# Patient Record
Sex: MALE | Race: BLACK OR AFRICAN AMERICAN | NOT HISPANIC OR LATINO | Employment: FULL TIME | URBAN - METROPOLITAN AREA
[De-identification: names, ages, dates, MRNs, and addresses within clinical notes are randomized per-mention and may not be internally consistent; named-entity substitution may affect disease eponyms.]

---

## 2017-04-19 ENCOUNTER — TELEPHONE (OUTPATIENT)
Dept: TRANSPLANT | Facility: CLINIC | Age: 37
End: 2017-04-19

## 2017-04-19 DIAGNOSIS — I10 ESSENTIAL HYPERTENSION: ICD-10-CM

## 2017-04-19 DIAGNOSIS — N18.6 END STAGE RENAL DISEASE (H): ICD-10-CM

## 2017-04-19 DIAGNOSIS — Z76.82 ORGAN TRANSPLANT CANDIDATE: ICD-10-CM

## 2017-04-19 DIAGNOSIS — Z87.891 HISTORY OF TOBACCO USE: ICD-10-CM

## 2017-04-19 DIAGNOSIS — D57.3 SICKLE CELL TRAIT (H): Primary | ICD-10-CM

## 2017-04-19 DIAGNOSIS — N05.1 FSGS (FOCAL SEGMENTAL GLOMERULOSCLEROSIS): ICD-10-CM

## 2017-04-19 NOTE — LETTER
April 19, 2017            Harshad Beatty  [address] [facility] [address]      Dear Dr. Florentino Chapin,    We are writing to inform you that Harshad Beatty has completed the referral process with us to be evaluated for a kidney transplant.    Their assigned Transplant Coordinator is Tonya Vallejo.  If you should have any questions or concerns, please don t hesitate to contact us.        Regards,           Solid Organ Transplant Intake   [facility], [address]  Office [address], [facility] [address]52  Phone: [phone]  [place] [address]

## 2017-04-19 NOTE — TELEPHONE ENCOUNTER
"Intake Progress Note  Organ:  Kidney    Referral Came Via (Fax from/phone call from):  Phone call    Referring Physician:  Dr. Florentino Chapin Nephrologist [facility]    Assigned Coordinator:  Tonya Vallejo    Reported Diagnosis that caused the kidney failure ( not CKD)  Hypertension    Best time patient can be reached:  Anytime     Type of packet sent:  Kidney    Records:  [facility] requested from: [facility], [facility], [facility], [facility]    Insurance information:  [employer]  Policy kilgore:  self  Subscriber/policy/ID number:  [id number]  Group Number:  [id number]    History of diabetes:  No    Do you have an endocrinologist?: No          On insulin or oral medication:  No          History of a kidney biopsy:  Yes         If Yes,when and where:  [facility], [facility]    Past Medical and Surgical History (updated in Epic medical / surgical):             History of  cancer personally:  No                            History of cardiac events:  No              History abdominal surgeries: No                History of previous transplant: No             Listed or in eval at another transplant center?  Yes [facility] Patricia' insurance changed no longer covered at [facility]             History of hospitalization in last 12 months:  No              History of blood transfusion:  Yes               Smoking history:  No     Current smoker:  No     On dialysis: Yes  [facility]                 Type of Dialysis: In center Hemo   Start date: 08/12/2015       Dialysis Days:  M-W-F  3rd Shift 3:00pm       If NYOD, estimated GFR:  Unknown  Height:  6'0\"  Weight:  230  BMI:  31.2    Health Maintenance:  Colon:  No   PSA:  No  Dental: Yes 6 months ago [facility]  Vaccines Yes [facility]  Special Needs (ie--wheelchair, assistance, guardian, " interpretor):  No      Informed patient on the need to arrange age appropriate cancer screening, vaccines up to date and dental clearance    Reviewed evaluation process and reminded patient to complete questionnaire, complete medical records release, and review packet prior to evaluation visit     Informed patient that coordinator will review their chart and insurance coverage and if no concerns they will receive a call from a  to schedule evaluation

## 2017-04-20 ENCOUNTER — MEDICAL CORRESPONDENCE (OUTPATIENT)
Dept: TRANSPLANT | Facility: CLINIC | Age: 37
End: 2017-04-20

## 2017-04-21 ENCOUNTER — MEDICAL CORRESPONDENCE (OUTPATIENT)
Dept: TRANSPLANT | Facility: CLINIC | Age: 37
End: 2017-04-21

## 2017-04-26 ENCOUNTER — MEDICAL CORRESPONDENCE (OUTPATIENT)
Dept: TRANSPLANT | Facility: CLINIC | Age: 37
End: 2017-04-26

## 2017-05-03 NOTE — TELEPHONE ENCOUNTER
Reviewed medical records and intake notes in EPIC to date. ESRD on hemodialysis since 8/12/2015. Kidney biopsy proven FSGS. HTN, Sickle Cell Trait, anemia and cardiomyopathy. Records indicate efx of 25% in 5/2014 and efx has improved since starting dialysis to 55-60% in 4/2016. Need to find out where he see cardiology and get those records. No records from hematology- will ask Harshad. Intake says he was listed at [facility] but his insurance provider changed and he is sent here. Requesting records from transplant evaluation at [facility]. Has received blood. History of smoking cigars and marijuana. BMI 31.2 Dental is not up  to date. Past surgery is ORIF of right 5th metatarpal fracture.Records acceptable to proceed with pre kidney evaluation.    Called Harshad and was only able to leave a voice message asking for a return call. I introduced myself and the purpose of the call. Provided my contact information.    Smart set orders placed in [facility] and routed to scheduling.    Harshad returned my call. We discussed the 2 day evaluation process. He is familiar with pre kidney transplant evaluation as he had gone through the evaluation at [facility]. He was never listed as he needed to be referred to an oral surgeon. An unknown mass was discovered by his dentist on his gum and he was referred to oral surgery. His insurance changed and the oral surgeon was no longer covered so he is contacting his insurance carrier for a list of acceptable providers. He will arrive fasting for labs on day 1. Once the labs are drawn he may eat breakfast. He may bring his breakfast or money to purchase. We talked about the online presentation in a group setting of [facility] and he was encouraged to bring someone with him. We reviewed the list of providers he will see and their roles. We did review the overall referral, evaluation, approval and wait list process. He is aware that Tonya Vallejo is his assigned coordinator and was given her direct  phone number. Next contact will be from scheduling to offer him the next available appointments and he was also given Deborah's phone number.He saw a cardiologist while he was inpatient at [facility] in [facility] for initiation of dialysis but has not been seen since. Will request those records. He has never seen a hematologist. His father has Sickle Cell Disease and Harshad has the trait. He has never had any type of episode with the Sickle Cell. He had no further questions at this time.

## 2017-05-04 ENCOUNTER — TELEPHONE (OUTPATIENT)
Dept: TRANSPLANT | Facility: CLINIC | Age: 37
End: 2017-05-04

## 2017-05-04 ENCOUNTER — MEDICAL CORRESPONDENCE (OUTPATIENT)
Dept: TRANSPLANT | Facility: CLINIC | Age: 37
End: 2017-05-04

## 2017-05-04 NOTE — LETTER
May 12, 2017    Harshad Beatty  [address] [facility] [address]      Dear Mr. Beatty,    Attached is your Pre Kidney Evaluation schedule on May 31, 2017 and returning August 28, 2017. Please feel free to contact me at [phone], if you have any question.      Sincerely,   Deborah DANIELS    CC:Tonya GOODMAN.                                                  [facility]  [address] [place] [address]  [address]      EVALUATION SCHEDULE: KIDNEY TRANSPLANT SLOT 3 EVAL    Patient:   HARSHAD BEATTY   MR#:    [id number]  Coordinator:  Tonya IVEY    [phone]  :   Deborah DANIELS     [phone]  Location:   [facility] Clinic 3A  Date:    May 31, 2017 & August 28, 2017    This is your evaluation schedule, please follow dates and times.  You  will receive reminder phone calls for other tests, but please follow this schedule only!  If you have any questions about dates and times,  please call us on number listed above.  Thank you, Transplant Clinic.     NO FOOD or DRINK AFTER MIDNIGHT  (You may only have small amounts of water)    Day/Date:   Wednesday, May 31, 2017  Time Location Activity   6:30a.m. [facility]  Imaging and Lab Testing  1st floor Blood tests (fasting, PLEASE)    7:15a.m. BREAKFAST     7:30a.m. [facility]  Transplant Services  3rd floor; Clinic 3A Review evaluation process, vitals, medication review   7:50a.m-  9:00a.m. [facility]  Transplant Services  3rd floor; Clinic 3A Pre-transplant class   9:00a.m. [facility]  Transplant Services  3rd floor; Clinic 3A; Consult Room Appointment with Priti Montano,  Registered Dietitian   9:30a.m. [facility]  Transplant Services  3rd floor; Clinic 3A Appointment with Dr. High,   Transplant Surgeon   10:00a.m. [facility]  Clinics  Transplant Services  3rd floor; Clinic 3A Meet with Tonya IVEY  Transplant Coordinator   10:30a.m. [facility]  Transplant Services  3rd floor; Clinic 3B Appointment with Dr. Kaur,   Transplant Nephrologist   11:15a.m. [facility]  Transplant Services  3rd floor; Clinic 3A; Consult Room Appointment with either Lien Mir  Transplant      12:15p.m.-  12:30p.m. [facility]  Transplant Services  3rd floor; Clinic 3A; Consult Room Research    12:30p.m.-  1:00p.m. LUNCH    1:00p.m. [facility]  Imaging and Lab Testing  1st floor 2nd ABO blood draw - confirmation of 1st draw    PLEASE TAKE SHUTTLE GOING TO MEDICAL CENTER: [facility]    2:00p.m. [facility] Waiting Room  (2nd floor [facility]) EKG   2:30p.m. [facility]  Waiting Room  (2nd floor [facility]) ECHOCARDIOGRAM    3:30p.m. [facility] Waiting Room  (2nd floor [facility]) CHEST X-RAY        Day/Date:   Monday, August 28, 2017  Time Location Activity   12:30p.m. [facility]  Pulmonary Function Lab  3rd floor Pulmonary Function Tests   2:00p.m. [facility]  Cardiology/Heart Care Clinic  3rd floor; Clinic 3L Appointment with Dr. Wan,   Cardiology

## 2017-05-10 NOTE — TELEPHONE ENCOUNTER
Called pt to schedule pre kidney eval. LVM to cb.  Called dialysis center and left my message to Magaly (HAIDER), left my contact information.

## 2017-05-31 ENCOUNTER — RESULTS ONLY (OUTPATIENT)
Dept: OTHER | Facility: CLINIC | Age: 37
End: 2017-05-31

## 2017-05-31 ENCOUNTER — HOSPITAL PATHOLOGY (OUTPATIENT)
Dept: OTHER | Facility: CLINIC | Age: 37
End: 2017-05-31

## 2017-05-31 ENCOUNTER — ALLIED HEALTH/NURSE VISIT (OUTPATIENT)
Dept: TRANSPLANT | Facility: CLINIC | Age: 37
End: 2017-05-31
Attending: INTERNAL MEDICINE
Payer: COMMERCIAL

## 2017-05-31 ENCOUNTER — HOSPITAL ENCOUNTER (OUTPATIENT)
Dept: GENERAL RADIOLOGY | Facility: CLINIC | Age: 37
End: 2017-05-31
Attending: PHYSICIAN ASSISTANT
Payer: COMMERCIAL

## 2017-05-31 ENCOUNTER — HOSPITAL ENCOUNTER (OUTPATIENT)
Dept: CARDIOLOGY | Facility: CLINIC | Age: 37
Discharge: HOME | End: 2017-05-31
Attending: PHYSICIAN ASSISTANT | Admitting: PHYSICIAN ASSISTANT
Payer: COMMERCIAL

## 2017-05-31 ENCOUNTER — HOSPITAL ENCOUNTER (OUTPATIENT)
Dept: CARDIOLOGY | Facility: CLINIC | Age: 37
End: 2017-05-31
Attending: PHYSICIAN ASSISTANT
Payer: COMMERCIAL

## 2017-05-31 ENCOUNTER — OFFICE VISIT (OUTPATIENT)
Dept: TRANSPLANT | Facility: CLINIC | Age: 37
End: 2017-05-31
Attending: SURGERY
Payer: COMMERCIAL

## 2017-05-31 VITALS
TEMPERATURE: 98.1 F | BODY MASS INDEX: 35.01 KG/M2 | HEIGHT: 72 IN | HEART RATE: 84 BPM | WEIGHT: 258.5 LBS | OXYGEN SATURATION: 96 % | SYSTOLIC BLOOD PRESSURE: 146 MMHG | RESPIRATION RATE: 18 BRPM | DIASTOLIC BLOOD PRESSURE: 82 MMHG

## 2017-05-31 DIAGNOSIS — Z87.891 HISTORY OF TOBACCO USE: ICD-10-CM

## 2017-05-31 DIAGNOSIS — N18.6 ESRD (END STAGE RENAL DISEASE) (H): Primary | ICD-10-CM

## 2017-05-31 DIAGNOSIS — Z76.82 ORGAN TRANSPLANT CANDIDATE: ICD-10-CM

## 2017-05-31 DIAGNOSIS — Z76.82 ORGAN TRANSPLANT CANDIDATE: Primary | ICD-10-CM

## 2017-05-31 DIAGNOSIS — N05.1 FSGS (FOCAL SEGMENTAL GLOMERULOSCLEROSIS): ICD-10-CM

## 2017-05-31 DIAGNOSIS — Z01.818 ENCOUNTER FOR PRE-TRANSPLANT EVALUATION FOR KIDNEY TRANSPLANT: ICD-10-CM

## 2017-05-31 DIAGNOSIS — D57.3 SICKLE CELL TRAIT (H): ICD-10-CM

## 2017-05-31 DIAGNOSIS — I10 ESSENTIAL HYPERTENSION: ICD-10-CM

## 2017-05-31 DIAGNOSIS — N18.6 END STAGE RENAL DISEASE (H): ICD-10-CM

## 2017-05-31 DIAGNOSIS — Z01.818 ENCOUNTER FOR PRE-TRANSPLANT EVALUATION FOR KIDNEY TRANSPLANT: Primary | ICD-10-CM

## 2017-05-31 LAB
ABO + RH BLD: NORMAL
ALBUMIN SERPL-MCNC: 3.9 G/DL (ref 3.4–5)
ALBUMIN UR-MCNC: 30 MG/DL
ALP SERPL-CCNC: 57 U/L (ref 40–150)
ALT SERPL W P-5'-P-CCNC: 19 U/L (ref 0–70)
ANION GAP SERPL CALCULATED.3IONS-SCNC: 11 MMOL/L (ref 3–14)
APPEARANCE UR: CLEAR
APTT PPP: 29 SEC (ref 22–37)
AST SERPL W P-5'-P-CCNC: 7 U/L (ref 0–45)
BASOPHILS # BLD AUTO: 0 10E9/L (ref 0–0.2)
BASOPHILS NFR BLD AUTO: 1.1 %
BILIRUB SERPL-MCNC: 0.4 MG/DL (ref 0.2–1.3)
BILIRUB UR QL STRIP: NEGATIVE
BLD GP AB SCN SERPL QL: NORMAL
BLOOD BANK CMNT PATIENT-IMP: NORMAL
BLOOD BANK CMNT PATIENT-IMP: NORMAL
BUN SERPL-MCNC: 47 MG/DL (ref 7–30)
CALCIUM SERPL-MCNC: 9.8 MG/DL (ref 8.5–10.1)
CARDIOLIPIN ANTIBODY IGG: NORMAL GPL-U/ML (ref 0–19.9)
CARDIOLIPIN ANTIBODY IGM: 9 MPL-U/ML (ref 0–19.9)
CHLORIDE SERPL-SCNC: 98 MMOL/L (ref 94–109)
CHOLEST SERPL-MCNC: 160 MG/DL
CMV IGG SERPL QL IA: ABNORMAL AI (ref 0–0.8)
CO2 SERPL-SCNC: 26 MMOL/L (ref 20–32)
COLOR UR AUTO: ABNORMAL
CREAT SERPL-MCNC: 12.6 MG/DL (ref 0.66–1.25)
DIFFERENTIAL METHOD BLD: ABNORMAL
EBV VCA IGG SER QL IA: ABNORMAL AI (ref 0–0.8)
EOSINOPHIL # BLD AUTO: 0.2 10E9/L (ref 0–0.7)
EOSINOPHIL NFR BLD AUTO: 5 %
ERYTHROCYTE [DISTWIDTH] IN BLOOD BY AUTOMATED COUNT: 13.2 % (ref 10–15)
GFR SERPL CREATININE-BSD FRML MDRD: 5 ML/MIN/1.7M2
GLUCOSE SERPL-MCNC: 77 MG/DL (ref 70–99)
GLUCOSE UR STRIP-MCNC: 50 MG/DL
HBV CORE AB SERPL QL IA: NONREACTIVE
HBV SURFACE AB SERPL IA-ACNC: 32.09 M[IU]/ML
HBV SURFACE AG SERPL QL IA: NONREACTIVE
HCT VFR BLD AUTO: 34.2 % (ref 40–53)
HCV AB SERPL QL IA: NORMAL
HDLC SERPL-MCNC: 81 MG/DL
HGB BLD-MCNC: 11.1 G/DL (ref 13.3–17.7)
HGB UR QL STRIP: ABNORMAL
HIV 1+2 AB+HIV1 P24 AG SERPL QL IA: NORMAL
IMM GRANULOCYTES # BLD: 0 10E9/L (ref 0–0.4)
IMM GRANULOCYTES NFR BLD: 0.3 %
INR PPP: 1.05 (ref 0.86–1.14)
KETONES UR STRIP-MCNC: NEGATIVE MG/DL
LDLC SERPL CALC-MCNC: 69 MG/DL
LEUKOCYTE ESTERASE UR QL STRIP: NEGATIVE
LYMPHOCYTES # BLD AUTO: 1.3 10E9/L (ref 0.8–5.3)
LYMPHOCYTES NFR BLD AUTO: 36.6 %
MCH RBC QN AUTO: 28.2 PG (ref 26.5–33)
MCHC RBC AUTO-ENTMCNC: 32.5 G/DL (ref 31.5–36.5)
MCV RBC AUTO: 87 FL (ref 78–100)
MONOCYTES # BLD AUTO: 0.3 10E9/L (ref 0–1.3)
MONOCYTES NFR BLD AUTO: 8.4 %
NEUTROPHILS # BLD AUTO: 1.7 10E9/L (ref 1.6–8.3)
NEUTROPHILS NFR BLD AUTO: 48.6 %
NITRATE UR QL: NEGATIVE
NONHDLC SERPL-MCNC: 79 MG/DL
NRBC # BLD AUTO: 0 10*3/UL
NRBC BLD AUTO-RTO: 0 /100
PH UR STRIP: 8 PH (ref 5–7)
PHOSPHATE SERPL-MCNC: 4.5 MG/DL (ref 2.5–4.5)
PLATELET # BLD AUTO: 305 10E9/L (ref 150–450)
POTASSIUM SERPL-SCNC: 4.4 MMOL/L (ref 3.4–5.3)
PROT SERPL-MCNC: 7.9 G/DL (ref 6.8–8.8)
RBC # BLD AUTO: 3.93 10E12/L (ref 4.4–5.9)
RBC #/AREA URNS AUTO: 1 /HPF (ref 0–2)
SODIUM SERPL-SCNC: 135 MMOL/L (ref 133–144)
SP GR UR STRIP: 1 (ref 1–1.03)
SPECIMEN EXP DATE BLD: NORMAL
SPECIMEN EXP DATE BLD: NORMAL
T PALLIDUM IGG+IGM SER QL: NEGATIVE
THROMBIN TIME: 17.6 SEC (ref 13–19)
TRIGL SERPL-MCNC: 51 MG/DL
URN SPEC COLLECT METH UR: ABNORMAL
UROBILINOGEN UR STRIP-MCNC: 0 MG/DL (ref 0–2)
VZV IGG SER QL IA: 4.4 AI (ref 0–0.8)
WBC # BLD AUTO: 3.6 10E9/L (ref 4–11)
WBC #/AREA URNS AUTO: 1 /HPF (ref 0–2)

## 2017-05-31 PROCEDURE — 85613 RUSSELL VIPER VENOM DILUTED: CPT | Performed by: PHYSICIAN ASSISTANT

## 2017-05-31 PROCEDURE — 85670 THROMBIN TIME PLASMA: CPT | Performed by: PHYSICIAN ASSISTANT

## 2017-05-31 PROCEDURE — 93005 ELECTROCARDIOGRAM TRACING: CPT

## 2017-05-31 PROCEDURE — 85025 COMPLETE CBC W/AUTO DIFF WBC: CPT | Performed by: PHYSICIAN ASSISTANT

## 2017-05-31 PROCEDURE — 86706 HEP B SURFACE ANTIBODY: CPT | Performed by: PHYSICIAN ASSISTANT

## 2017-05-31 PROCEDURE — 71020 XR CHEST 2 VW: CPT

## 2017-05-31 PROCEDURE — 86704 HEP B CORE ANTIBODY TOTAL: CPT | Performed by: PHYSICIAN ASSISTANT

## 2017-05-31 PROCEDURE — 80061 LIPID PANEL: CPT | Performed by: PHYSICIAN ASSISTANT

## 2017-05-31 PROCEDURE — 85610 PROTHROMBIN TIME: CPT | Performed by: PHYSICIAN ASSISTANT

## 2017-05-31 PROCEDURE — 86147 CARDIOLIPIN ANTIBODY EA IG: CPT | Performed by: PHYSICIAN ASSISTANT

## 2017-05-31 PROCEDURE — 86850 RBC ANTIBODY SCREEN: CPT | Performed by: PHYSICIAN ASSISTANT

## 2017-05-31 PROCEDURE — 87340 HEPATITIS B SURFACE AG IA: CPT | Performed by: PHYSICIAN ASSISTANT

## 2017-05-31 PROCEDURE — 84100 ASSAY OF PHOSPHORUS: CPT | Performed by: PHYSICIAN ASSISTANT

## 2017-05-31 PROCEDURE — 86832 HLA CLASS I HIGH DEFIN QUAL: CPT | Performed by: PHYSICIAN ASSISTANT

## 2017-05-31 PROCEDURE — 81240 F2 GENE: CPT | Performed by: PHYSICIAN ASSISTANT

## 2017-05-31 PROCEDURE — 86905 BLOOD TYPING RBC ANTIGENS: CPT | Performed by: PHYSICIAN ASSISTANT

## 2017-05-31 PROCEDURE — 93306 TTE W/DOPPLER COMPLETE: CPT

## 2017-05-31 PROCEDURE — 86665 EPSTEIN-BARR CAPSID VCA: CPT | Performed by: PHYSICIAN ASSISTANT

## 2017-05-31 PROCEDURE — 85730 THROMBOPLASTIN TIME PARTIAL: CPT | Performed by: PHYSICIAN ASSISTANT

## 2017-05-31 PROCEDURE — 86900 BLOOD TYPING SEROLOGIC ABO: CPT | Performed by: PHYSICIAN ASSISTANT

## 2017-05-31 PROCEDURE — 86886 COOMBS TEST INDIRECT TITER: CPT | Performed by: PHYSICIAN ASSISTANT

## 2017-05-31 PROCEDURE — 86803 HEPATITIS C AB TEST: CPT | Performed by: PHYSICIAN ASSISTANT

## 2017-05-31 PROCEDURE — 40000866 ZZHCL STATISTIC HIV 1/2 ANTIGEN/ANTIBODY PRETRANSPLANT ONLY: Performed by: PHYSICIAN ASSISTANT

## 2017-05-31 PROCEDURE — 80053 COMPREHEN METABOLIC PANEL: CPT | Performed by: PHYSICIAN ASSISTANT

## 2017-05-31 PROCEDURE — 86480 TB TEST CELL IMMUN MEASURE: CPT | Performed by: PHYSICIAN ASSISTANT

## 2017-05-31 PROCEDURE — 93306 TTE W/DOPPLER COMPLETE: CPT | Mod: 26 | Performed by: INTERNAL MEDICINE

## 2017-05-31 PROCEDURE — 85730 THROMBOPLASTIN TIME PARTIAL: CPT | Mod: 91 | Performed by: PHYSICIAN ASSISTANT

## 2017-05-31 PROCEDURE — 86787 VARICELLA-ZOSTER ANTIBODY: CPT | Performed by: PHYSICIAN ASSISTANT

## 2017-05-31 PROCEDURE — 81001 URINALYSIS AUTO W/SCOPE: CPT | Performed by: PHYSICIAN ASSISTANT

## 2017-05-31 PROCEDURE — 86833 HLA CLASS II HIGH DEFIN QUAL: CPT | Performed by: PHYSICIAN ASSISTANT

## 2017-05-31 PROCEDURE — 86644 CMV ANTIBODY: CPT | Performed by: PHYSICIAN ASSISTANT

## 2017-05-31 PROCEDURE — 36415 COLL VENOUS BLD VENIPUNCTURE: CPT | Performed by: PHYSICIAN ASSISTANT

## 2017-05-31 PROCEDURE — 86780 TREPONEMA PALLIDUM: CPT | Performed by: PHYSICIAN ASSISTANT

## 2017-05-31 PROCEDURE — 81241 F5 GENE: CPT | Performed by: PHYSICIAN ASSISTANT

## 2017-05-31 PROCEDURE — 86901 BLOOD TYPING SEROLOGIC RH(D): CPT | Performed by: PHYSICIAN ASSISTANT

## 2017-05-31 ASSESSMENT — PAIN SCALES - GENERAL: PAINLEVEL: NO PAIN (0)

## 2017-05-31 NOTE — PROGRESS NOTES
Pre Abdominal Organ Transplant Coordinator Evaluation Note:    MD:  Dr High say Harshad but I was not present during their visit.    Attendees: self    Type of transplant: kidney    Required Topic(s) Discussed: Evaluation notification document, SRTR data, Multiple wait list brochure, KDPI, Evaluation/approval process, Selection committee process, Wait list process and Living donor process    Teaching: Instruct patient on living donor process/provided contact info, Provided my business card and To call me with any questions    Assessment/Plan: Patricia states he is here for a possible kidney transplant.  He states he is familiar with transplant now that this the 2nd evaluation he has attended.  He states he is unsure if there will be any living donors.  I discussed some of the issues that came up when he was working with the [facility] program:  1) Colonoscopy - he says his PCP ?Dr Ortega reviewed and determined he does not need  2)  Dental - he says he has been completing dental at [facility] in [facility].  He had a questionable mass that was investigated and found to be nothing (I have asked Kyra to get records).  I reviewed the overall evaluation/approval process, the living donor process and the waitlisting process.  Questions to this point have been answered.    Time spent with patient: 15 minutes

## 2017-05-31 NOTE — PROGRESS NOTES
NUTRITION KIDNEY TRANSPLANT EVALUATION  Medical Nutrition Therapy    Weight and BMI:  Current BMI: 35.5  BMI is outside criteria for kidney transplant  BMI Goal: <35  Current Weight: 258 lbs  Goal Weight <254 lbs  WEIGHT TO LOSE: 4 lbs    Malnutrition Status:    Pt does not meet malnutrition criteria        Visit Type: Initial Assessment    Harshad Beatty referred by Dr. Mohr for MNT related to kidney transplant evaluation    Patient accompanied by self    H/o previous txp: none    Nutrition Assessment:  Anthropometrics  Height:   71.5 in   BMI:    35.5  Weight Status:Obesity Grade II BMI 35-39.9   Weight:  258 lbs            IBW (lb): 175  % IBW: 147    Wt Hx: Pt lost 20# prior to starting dialysis when he received his fistula, ~2 years ago. Wt relatively stable since, with normal fluctuations (pt reports 15-20# is typical).     Adj/dosing BW: 196 lbs/89 kg     Goal wt prior to txp: 254 lbs (BMI <35 or per MD)       Recent Labs   Lab Test  05/31/17   0734   CHOL  160   HDL  81   LDL  69   TRIG  51     No results found for: A1C  Potassium   Date Value Ref Range Status   05/31/2017 4.4 3.4 - 5.3 mmol/L Final       Phosphorus: 4.5 today    Vitamins, Supplements, Herbals, Pertinent Meds:   Phoslo (consistent with this), vit D     Dialysis Modality: HD  Days/Times: MWF 3pm  Dialysis Start: 8/2015  Pt receives protein supplement with dialysis: N    Nutrition History  Pt-reported special diets/eating habits: avoids K+/Phos   Dining out/food not made at home: 2x/week for lunch at work   Pt and wife cook at home. They do add some salt with their cooking and use some canned items, but try to buy low sodium if possible.    Recall:  B: scrambled eggs with shields or fruit (grapes) either from work or made at home   L: ham or turkey s/w, either from work or made at home   D: doesn't always eat dinner on dialysis days; turkey burger with green beans or grilled chix (4 oz) with some pasta or rice, canned/fresh/frozen veggies or  just a salad with ranch   Sn: fruit, cereal, rice krispie bar   Beverages: water, 1.5 cans regular Mt Dew or Sprite/day, 8-16 oz juice/day, 8-16 oz sweet tea/day  ETOH (1 drink = 12 oz beer, 5 oz wine, 1.5 oz liquor): none     Current adherence to recommended diet (low K+, low Phos): Good    Physical Activity  No routine exercise      MALNUTRITION  % Intake:  No decreased intake noted  % Weight Loss:  None noted  Subcutaneous Fat Loss:  None  Muscle Loss:  None  Fluid Accumulation/Edema:  Unknown  Malnutrition Diagnosis: Patient does not meet two of the above criteria necessary for diagnosing malnutrition     Nutrition Prescription  Energy:  7779-0068     (20-25 kcal/kg Adj/dosing BW for desired wt loss)       Protein:  107-125    (1.2-1.4 g/kg Adj/dosing BW for HD needs)           Fluid:  1 ml/kcal or per MD        Micronutrient:  Na+: <2000 mg/day  K+: 0537-5278 mg/day  Phos: 800-1000 mg/day         Nutrition Diagnosis:  Food and nutrition related knowledge deficit r/t pre transplant eval AEB pt verbalized not hearing pre/post transplant diet guidelines.    Obesity r/t excessive energy intake and inadequate physical activity AEB BMI >30.    Nutrition Intervention:  Nutrition education provided:  Discussed weight status and BMI and that he should avoid gaining weight and ideally lose some weight. Encouraged elimination of sweetened beverages (pop, juice, tea, etc).   Discussed sodium intake (low sodium foods and drinks, seasoning food without salt and tips for low sodium diet). Reviewed need for adequate protein intake with dialysis and reviewed K+/Phos foods to limit/avoid.     Reviewed post txp diet guidelines in brief (will review in further detail post txp):   (1) Review of proper food safety measures d/t immunosuppressant therapy post-op and increased risk for food-borne illness (2) Stressed importance of not taking any herbal/Chinese/alternative medicines or supplements post txp (d/t risk for rejection,  unknown effects on the organs, potential interactions with immunosuppresants). (3) Med regimen and possible side effects    Patient Understanding: Pt verbalized understanding of education provided.  Expected Compliance: Good  Follow-Up Plans: PRN     Nutrition Goals:  1. Weight loss to goal wt: < 254 lbs (BMI <35 or per MD)  2. Adequate protein (min 107 g/day) with dialysis    Provided pt with contact info.   Time spent with patient: 15 minutes.  Petra Montano, RD, LD

## 2017-05-31 NOTE — MR AVS SNAPSHOT
After Visit Summary   5/31/2017    Harshad Beatty    MRN: [id number]           Patient Information     Date Of Birth          1980        Visit Information        Provider Department      5/31/2017 9:00 AM Petra Montano RD [facility] Solid Organ Transplant        Today's Diagnoses     Organ transplant candidate    -  1       Follow-ups after your visit        Your next 10 appointments already scheduled     May 31, 2017 10:00 AM CDT   (Arrive by 9:45 AM)   SOT CARE COORDINATOR EVAL with Tonya Vallejo RN   [facility] Solid Organ Transplant ([facility])    [address] [place] [id number]   [phone]            May 31, 2017 10:30 AM CDT   Nephrology Consult with MACKENZIE VELAZCO PATIENT 3   [facility] Solid Organ Transplant ([facility])    [address] [place] [id number]   [phone]            May 31, 2017 11:15 AM CDT   (Arrive by 11:00 AM)   SOT SOCIAL WORK EVAL with SUMAN Calero   [facility] Solid Organ Transplant ([facility])    [address] [place] [address]   [phone]            May 31, 2017  1:00 PM CDT   Lab with [facility]    Health Lab ([facility])    [address] [place] [address]   [phone]            May 31, 2017  2:00 PM CDT   ecg with UELIJAHKGBO   [facility] ELECTROCARDIOLOGY ([facility])    [address] [facility] [phone]               May 31, 2017  2:30 PM CDT   Ech Complete with UUECHR2   [facility] [place],  Echocardiography ([facility])    [address] [facility] [address] [phone]           1.  Please bring or wear a comfortable two-piece outfit. 2.  You may eat, drink and take your normal medicines. 3.  For any questions that cannot be answered, please contact the  ordering physician            May 31, 2017  3:30 PM CDT   XR CHEST 2 VIEWS with UUXR3   [facility] [place],  Radiology ([facility])    [address] [facility] [id number] [phone]           Please bring a list of your current medicines to your exam. (Include vitamins, minerals and over-thecounter medicines.) Leave your valuables at home.  Tell your doctor if there is a chance you may be pregnant.  You do not need to do anything special for this exam.            Aug 28, 2017 12:30 PM CDT   FULL PULMONARY FUNCTION with  PFL C   [facility] Pulmonary Function Testing ([facility])    [address] [address] [phone]            Aug 28, 2017  2:00 PM CDT   (Arrive by 1:45 PM)   NEW PANCREAS/KIDNEY TRANSPLANT WORK-UP with Krystin Wan MD   [facility] Heart Care ([facility])    [address] [place] [address] [phone]              Who to contact     If you have questions or need follow up information about today's clinic visit or your schedule please contact [facility] SOLID ORGAN TRANSPLANT directly at [phone].  Normal or non-critical lab and imaging results will be communicated to you by [employer], letter or phone within 4 business days after the clinic has received the results. If you do not hear from us within 7 days, please contact the clinic through [employer] or phone. If you have a critical or abnormal lab result, we will notify you by phone as soon as possible.  Submit refill requests through [employer] or call your pharmacy and they will forward the refill request to us. Please allow 3 business days for your refill to be completed.          Additional Information About Your Visit        [employer] Information     [employer] lets you send messages to your doctor, view your test results, renew your prescriptions, schedule appointments and  "more. To sign up, go to [email or web address] . Click on \"Log in\" on the left side of the screen, which will take you to the Welcome page. Then click on \"Sign up Now\" on the right side of the page.     You will be asked to enter the access code listed below, as well as some personal information. Please follow the directions to create your username and password.     Your access code is: [email or web address]  Expires: 8/15/2017  6:30 AM     Your access code will  in 90 days. If you need help or a new code, please call your [facility] clinic or [phone].        Care EveryWhere ID     This is your Care EveryWhere ID. This could be used by other organizations to access your [facility] medical records  [id number]         Blood Pressure from Last 3 Encounters:   No data found for BP    Weight from Last 3 Encounters:   No data found for Wt              Today, you had the following     No orders found for display       Primary Care Provider    None Specified       No primary provider on file.        Thank you!     Thank you for choosing [facility] SOLID ORGAN TRANSPLANT  for your care. Our goal is always to provide you with excellent care. Hearing back from our patients is one way we can continue to improve our services. Please take a few minutes to complete the written survey that you may receive in the mail after your visit with us. Thank you!             Your Updated Medication List - Protect others around you: Learn how to safely use, store and throw away your medicines at www.disposemymeds.org.          This list is accurate as of: 17  9:30 AM.  Always use your most recent med list.                   Brand Name Dispense Instructions for use    AMLODIPINE BESYLATE PO      Take 5 mg by mouth daily       calcium acetate 667 MG Caps capsule    PHOSLO     Take 2,668 mg by mouth 3 times daily (with meals)       LISINOPRIL PO      Take 80 mg by mouth daily       MINOXIDIL PO      Take 10 mg by mouth daily       SENSIPAR " PO      Take 60 mg by mouth daily       VITAMIN D (CHOLECALCIFEROL) PO      Take 2,000 Units by mouth daily

## 2017-05-31 NOTE — PROGRESS NOTES
Patient attended the Pre Kidney/Pancreas Transplant Education Class today alone. Patient was attentive and engaged throughout the class and asked few questions. I introduced the [facility] website and encouraged them to access it for further education. In addition to the standard video content. I reviewed living donation paired exchange, KDPI, typical length of stay and the need to stay locally post transplant discharge.

## 2017-05-31 NOTE — MR AVS SNAPSHOT
"              After Visit Summary   5/31/2017    Harshad Beatty    MRN: [id number]           Patient Information     Date Of Birth          1980        Visit Information        Provider Department      5/31/2017 10:30 AM MACKENZIE PKE PATIENT 3 [facility] Solid Organ Transplant        Today's Diagnoses     ESRD (end stage renal disease) (H)    -  1       Follow-ups after your visit        Your next 10 appointments already scheduled     Aug 28, 2017  2:00 PM CDT   (Arrive by 1:45 PM)   NEW PANCREAS/KIDNEY TRANSPLANT WORK-UP with Krystin Wan MD   [facility] ([facility])    [address] [place] [id number] [phone]              Who to contact     If you have questions or need follow up information about today's clinic visit or your schedule please contact [facility] SOLID ORGAN TRANSPLANT directly at [phone].  Normal or non-critical lab and imaging results will be communicated to you by MyChart, letter or phone within 4 business days after the clinic has received the results. If you do not hear from us within 7 days, please contact the clinic through NPShart or phone. If you have a critical or abnormal lab result, we will notify you by phone as soon as possible.  Submit refill requests through Packetmotion or call your pharmacy and they will forward the refill request to us. Please allow 3 business days for your refill to be completed.          Additional Information About Your Visit        MyChart Information     Packetmotion lets you send messages to your doctor, view your test results, renew your prescriptions, schedule appointments and more. To sign up, go to [email or web address] . Click on \"Log in\" on the left side of the screen, which will take you to the Welcome page. Then click on \"Sign up Now\" on the right side of the page.     You will be asked to enter the access code listed below, as well as some personal information. Please follow the directions " "to create your username and password.     Your access code is: [email or web address]  Expires: 8/15/2017  6:30 AM     Your access code will  in 90 days. If you need help or a new code, please call your [place] clinic or [phone].        Care EveryWhere ID     This is your Care EveryWhere ID. This could be used by other organizations to access your [place] medical records  [id number]        Your Vitals Were     Pulse Temperature Respirations Height Pulse Oximetry BMI (Body Mass Index)    84 98.1  F (36.7  C) (Oral) 18 1.816 m (5' 11.5\") 96% 35.55 kg/m2       Blood Pressure from Last 3 Encounters:   17 146/82    Weight from Last 3 Encounters:   17 117.3 kg (258 lb 8 oz)              Today, you had the following     No orders found for display       Primary Care Provider    None Specified       No primary provider on file.        Thank you!     Thank you for choosing [facility] SOLID ORGAN TRANSPLANT  for your care. Our goal is always to provide you with excellent care. Hearing back from our patients is one way we can continue to improve our services. Please take a few minutes to complete the written survey that you may receive in the mail after your visit with us. Thank you!             Your Updated Medication List - Protect others around you: Learn how to safely use, store and throw away your medicines at www.disposemymeds.org.          This list is accurate as of: 17 11:59 PM.  Always use your most recent med list.                   Brand Name Dispense Instructions for use    AMLODIPINE BESYLATE PO      Take 5 mg by mouth daily       calcium acetate 667 MG Caps capsule    PHOSLO     Take 2,668 mg by mouth 3 times daily (with meals)       LISINOPRIL PO      Take 80 mg by mouth daily       MINOXIDIL PO      Take 10 mg by mouth daily       SENSIPAR PO      Take 60 mg by mouth daily       VITAMIN D (CHOLECALCIFEROL) PO      Take 2,000 Units by mouth daily         "

## 2017-05-31 NOTE — MR AVS SNAPSHOT
"              After Visit Summary   5/31/2017    Harshad Beatty    MRN: [id number]           Patient Information     Date Of Birth          1980        Visit Information        Provider Department      5/31/2017 10:00 AM Tonya Vallejo RN [facility] Solid Organ Transplant        Today's Diagnoses     Organ transplant candidate    -  1       Follow-ups after your visit        Your next 10 appointments already scheduled     Aug 28, 2017 12:30 PM CDT   FULL PULMONARY FUNCTION with  PFL C   [facility] Pulmonary Function Testing ([facility])    [address] [place] [id number] [phone]            Aug 28, 2017  2:00 PM CDT   (Arrive by 1:45 PM)   NEW PANCREAS/KIDNEY TRANSPLANT WORK-UP with Krystin Wan MD   [facility] Care ([facility])    [address] [place] [id number] [phone]              Who to contact     If you have questions or need follow up information about today's clinic visit or your schedule please contact [facility] SOLID ORGAN TRANSPLANT directly at [phone].  Normal or non-critical lab and imaging results will be communicated to you by Genomaticahart, letter or phone within 4 business days after the clinic has received the results. If you do not hear from us within 7 days, please contact the clinic through [facility] or phone. If you have a critical or abnormal lab result, we will notify you by phone as soon as possible.  Submit refill requests through CloudSlides or call your pharmacy and they will forward the refill request to us. Please allow 3 business days for your refill to be completed.          Additional Information About Your Visit        CloudSlides Information     CloudSlides lets you send messages to your doctor, view your test results, renew your prescriptions, schedule appointments and more. To sign up, go to [email or web address] . Click on \"Log in\" on the left side of the " "screen, which will take you to the Welcome page. Then click on \"Sign up Now\" on the right side of the page.     You will be asked to enter the access code listed below, as well as some personal information. Please follow the directions to create your username and password.     Your access code is: [email or web address]  Expires: 8/15/2017  6:30 AM     Your access code will  in 90 days. If you need help or a new code, please call your [place] clinic or [phone].        Care EveryWhere ID     This is your Care EveryWhere ID. This could be used by other organizations to access your [place] medical records  [id number]         Blood Pressure from Last 3 Encounters:   17 146/82    Weight from Last 3 Encounters:   17 117.3 kg (258 lb 8 oz)              Today, you had the following     No orders found for display       Primary Care Provider    None Specified       No primary provider on file.        Thank you!     Thank you for choosing [facility] SOLID ORGAN TRANSPLANT  for your care. Our goal is always to provide you with excellent care. Hearing back from our patients is one way we can continue to improve our services. Please take a few minutes to complete the written survey that you may receive in the mail after your visit with us. Thank you!             Your Updated Medication List - Protect others around you: Learn how to safely use, store and throw away your medicines at www.disposemymeds.org.          This list is accurate as of: 17  3:40 PM.  Always use your most recent med list.                   Brand Name Dispense Instructions for use    AMLODIPINE BESYLATE PO      Take 5 mg by mouth daily       calcium acetate 667 MG Caps capsule    PHOSLO     Take 2,668 mg by mouth 3 times daily (with meals)       LISINOPRIL PO      Take 80 mg by mouth daily       MINOXIDIL PO      Take 10 mg by mouth daily       SENSIPAR PO      Take 60 mg by mouth daily       VITAMIN D (CHOLECALCIFEROL) PO      Take 2,000 " Units by mouth daily

## 2017-05-31 NOTE — LETTER
2017       RE: Harshad Beatty  [address] [place] [address]     Dear Colleague,    Thank you for referring your patient, Harshad Beatty, to the [facility] SOLID ORGAN TRANSPLANT at [facility]. Please see a copy of my visit note below.    Transplant Surgery Consult Note     Medical record number: [id number]  YOB: 1980,   Consult requested by Dr. Chapin for evaluation of kidney transplant candidacy.    Assessment and Recommendations:Mr. Beatty appears to be an excellent candidate for kidney transplantation and has a good understanding of the risks and benefits of this approach to the management of renal failure. The following issues should be addressed prior to finalizing his transplant candidacy:     1. While his weight is within acceptable range for kidney transplantation, weight loss is recommended in order to decrease perioperative risk and risk of posttransplant diabetes.  Certainly, any weight gain may jeopardize his transplant candidacy and he should guard against this.  2. OK for donors to call in for evaluation.    The majority of our visit was spent in counselling, discussing the medical and surgical risks of kidney transplantation. We discussed approximate wait time and how that is influenced by issues such as blood type and sensitization (PRA) and access to a living donor. I contrasted potential waiting time for living vs  donor kidneys from  normal (0-85%) or higher (%) kidney donor profile index (KDPI) donors and their associated outcomes. I would not recommend this individual to consider kidneys from high KDPI donors. The reason for this decision is best summarized as: improved long term graft survival. Potential surgical complications of kidney transplantation include bleeding, superficial or deep wound complications (infection, hernia, lymphocele), ureteral anastomotic failure (leak or stenosis), graft thrombosis, need  for reoperation and other issues such as cardiac complications, pneumonia, deep venous thrombosis, pulmonary embolism, post transplant diabetes and death. The potential for recurrent disease or need for retransplantation was also addressed. We discussed the possible need for ureteral stent (and subsequent removal), and the utility of protocol biopsy and laboratory studies to evaluate for rejection or recurrent disease. We discussed the risk of graft rejection, our center's average graft and patient survival rates, immunosuppression protocols, as well as the potential opportunity to participate in clinical trials.  We also discussed the average length of stay, recovery process, and posttransplant lab and monitoring protocol.  I emphasized the need for strict immunosuppression medication adherence and the potential for complications of immunosuppression such as skin cancer or lymphoma, as well as a very low but not zero risk of donor-derived disease transmission risks (infection, cancer). Mr. Beatty asked good questions and his candidacy will be reviewed at our Multidisciplinary Selection Committee. Thank you for the opportunity to participate in Mr. eBatty's care.    Total time: 45 minutes  Counselling time: 40 minutes        Herb High MD  Department of Surgery  ---------------------------------------------------------------------------------------------------    HPI: Mr. Beatty has End stage renal failure due to Hypertension and FSGS, biopsy performed. The patient is non-diabetic. He has sickle trait, no history of disease.  He dialyzes via a R arm AVF, for about 1.5 years. He still makes a regular amounts of urine.  He has had a blood transfusion around the time of HD initiation (8/15).  He was originally evalutated at [facility] but changed insurances and now presents to [place].    The patient is on dialysis.    Has potential kidney donors:  Yes .  Interested in participation in paired exchange if a donor is willing: Yes      The patient has the following pertinent history:       No    Yes  Dialysis:    []      [x] via:     R arm AVF  Blood Transfusion                  []      [x]  Number of units: ?  Most recently: Fall 2015  Pregnancy:    [x]      [] Number:       Previous Transplant:  [x]      [] Details:    Cancer    [x]      [] Comment:   Kidney stones   [x]      [] Comment:      Recurrent infections  [x]      []  Type:                  Bladder dysfunction  [x]      [] Cause:    Claudication   [x]      [] Distance:    Previous Amputation  [x]      [] Cause:     Chronic anticoagulation  [x]      [] Indication:   Mormonism  [x]      []      Past Medical History:   Diagnosis Date     Anemia in chronic kidney disease      ESRD (end stage renal disease) (H)      History of cardiomyopathy      HTN (hypertension)      Secondary hyperparathyroidism (H)      Sickle cell trait (H)      Past Surgical History:   Procedure Laterality Date     CREATE FISTULA ARTERIOVENOUS UPPER EXTREMITY       ORIF right 5th metatarpal fracture       Family History   Problem Relation Age of Onset     Sickle Cell Anemia Father      Sickle Cell Trait Sister      Pancreatic Cancer Maternal Grandfather      KIDNEY DISEASE Cousin      Social History     Social History     Marital status:      Spouse name: Yesica     Number of children: 3     Years of education: 16     Occupational History     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[employer]     Social History Main Topics     Smoking status: Never Smoker     Smokeless tobacco: Not on file     Alcohol use No     Drug use: Yes     Special: Marijuana      Comment: remote     Sexual activity: Yes     Partners: Female     Birth control/ protection: Implant     Other Topics Concern     Not on file     Social History Narrative       ROS:   CONSTITUTIONAL:  No fevers or chills  EYES: negative for icterus  ENT:  negative for hearing loss, tinnitus and sore throat  RESPIRATORY:  negative for cough, sputum, dyspnea  CARDIOVASCULAR:   "negative for chest pain None  GASTROINTESTINAL:  negative for nausea, vomiting, diarrhea or constipation  GENITOURINARY:  negative for incontinence, dysuria, bladder emptying problems  HEME:  No easy bruising  INTEGUMENT:  negative for rash and pruritus  NEURO:  Negative for headache, seizure disorder    Allergies:   No Known Allergies  Medications:  Prescription Medications as of 6/10/2017             AMLODIPINE BESYLATE PO Take 5 mg by mouth daily    calcium acetate (PHOSLO) 667 MG CAPS capsule Take 2,668 mg by mouth 3 times daily (with meals)    VITAMIN D, CHOLECALCIFEROL, PO Take 2,000 Units by mouth daily    LISINOPRIL PO Take 80 mg by mouth daily    MINOXIDIL PO Take 10 mg by mouth daily    Cinacalcet HCl (SENSIPAR PO) Take 60 mg by mouth daily        Exam:   Vital Signs 5/31/2017   Systolic 146   Diastolic 82   Pulse 84   Temperature 98.1   Respirations 18   Weight (LB) 258 lb 8 oz   Height 5' 11.5\"   BMI (Calculated) 35.63   Pain    O2 96     Appearance: in no apparent distress.   Skin: normal  Head and Neck: Normal, no rashes or jaundice  Respiratory: easy respirations, no audible wheezing.  Cardiovascular: RRR  Abdomen: rounded, Liver, spleen, and kidneys not palpably enlarged and Lower abdomen negative for tenderness and masses   Extremeties: femoral 2+/2+, Edema, none  Neuro: without deficit     Diagnostics:   Recent Results (from the past 672 hour(s))   OS Unacceptable Antigens    Collection Time: 05/31/17 12:00 AM   Result Value Ref Range    Protocol Cutoff      Unacceptable Antigen DR:Jaya DRw:52    UNOS cPRA    Collection Time: 05/31/17 12:00 AM   Result Value Ref Range    [facility]    Routine UA with microscopic [CQX6444]    Collection Time: 05/31/17  7:26 AM   Result Value Ref Range    Color Urine Straw     Appearance Urine Clear     Glucose Urine 50 (A) NEG mg/dL    Bilirubin Urine Negative NEG    Ketones Urine Negative NEG mg/dL    Specific Gravity Urine 1.004 1.003 - 1.035    Blood Urine " Small (A) NEG    pH Urine 8.0 (H) 5.0 - 7.0 pH    Protein Albumin Urine 30 (A) NEG mg/dL    Urobilinogen mg/dL 0.0 0.0 - 2.0 mg/dL    Nitrite Urine Negative NEG    Leukocyte Esterase Urine Negative NEG    Source Midstream Urine     WBC Urine 1 0 - 2 /HPF    RBC Urine 1 0 - 2 /HPF   ABO type [JQH3575]    Collection Time: 05/31/17  7:31 AM   Result Value Ref Range    ABO A     RH(D)  Pos     Specimen Expires 06/03/2017    Lipid Profile [LAB18]    Collection Time: 05/31/17  7:34 AM   Result Value Ref Range    Cholesterol 160 <200 mg/dL    Triglycerides 51 <150 mg/dL    HDL Cholesterol 81 >39 mg/dL    LDL Cholesterol Calculated 69 <100 mg/dL    Non HDL Cholesterol 79 <130 mg/dL   Comprehensive metabolic panel [LAB17]    Collection Time: 05/31/17  7:34 AM   Result Value Ref Range    Sodium 135 133 - 144 mmol/L    Potassium 4.4 3.4 - 5.3 mmol/L    Chloride 98 94 - 109 mmol/L    Carbon Dioxide 26 20 - 32 mmol/L    Anion Gap 11 3 - 14 mmol/L    Glucose 77 70 - 99 mg/dL    Urea Nitrogen 47 (H) 7 - 30 mg/dL    Creatinine 12.60 (H) 0.66 - 1.25 mg/dL    GFR Estimate 5 (L) >60 mL/min/1.7m2    GFR Estimate If Black 5 (L) >60 mL/min/1.7m2    Calcium 9.8 8.5 - 10.1 mg/dL    Bilirubin Total 0.4 0.2 - 1.3 mg/dL    Albumin 3.9 3.4 - 5.0 g/dL    Protein Total 7.9 6.8 - 8.8 g/dL    Alkaline Phosphatase 57 40 - 150 U/L    ALT 19 0 - 70 U/L    AST 7 0 - 45 U/L   Cardiolipin Aggie IgG and IgM [EZT1192]    Collection Time: 05/31/17  7:34 AM   Result Value Ref Range    Cardiolipin Antibody IgG <1.6  Negative   0.0 - 19.9 GPL-U/mL    Cardiolipin Antibody IgM 9.0 0.0 - 19.9 MPL-U/mL   M Tuberculosis by Quantiferon [OTU3288]    Collection Time: 05/31/17  7:34 AM   Result Value Ref Range    M Tuberculosis Result Negative NEG    M Tuberculosis Antigen Value 0.04 IU/mL   INR [AAE6108]    Collection Time: 05/31/17  7:34 AM   Result Value Ref Range    INR 1.05 0.86 - 1.14   Partial thromboplastin time [LAB56]    Collection Time: 05/31/17  7:34 AM    Result Value Ref Range    PTT 29 22 - 37 sec   Thrombin time [GLI837]    Collection Time: 05/31/17  7:34 AM   Result Value Ref Range    Thrombin Time 17.6 13.0 - 19.0 sec   Lupus panel [ORU1547]    Collection Time: 05/31/17  7:34 AM   Result Value Ref Range    Lupus Result  NEG     Negative  (Note)  COMMENTS:  The INR is normal.  APTT ratio is normal.  DRVVT Screen ratio is normal.  Thrombin time is normal.  NEGATIVE TEST; A LUPUS ANTICOAGULANT WAS NOT DETECTED IN THIS  SPECIMEN WITHIN THE LIMITS OF THE TESTING REPERTOIRE.  If the clinical picture is strongly suggestive of an antiphospholipid  syndrome, recommend anticardiolipin and beta-2-glycoprotein (IgG and  IgM) antibody tests.  Kaylene Garnica M.D.  [phone]  6/1/2017    APTT:       Ratio  Patient  =  1.03  1:2 Mix  =  N/A  Reference:  Negative: Less than or equal to 1.16  Positive: Greater than or equal to 1.17     DILUTE CRISTINA VIPER VENOM TEST:  Screen Ratio = 0.93   Normal is less than 1.21       CBC with platelets differential [DRX537]    Collection Time: 05/31/17  7:34 AM   Result Value Ref Range    WBC 3.6 (L) 4.0 - 11.0 10e9/L    RBC Count 3.93 (L) 4.4 - 5.9 10e12/L    Hemoglobin 11.1 (L) 13.3 - 17.7 g/dL    Hematocrit 34.2 (L) 40.0 - 53.0 %    MCV 87 78 - 100 fl    MCH 28.2 26.5 - 33.0 pg    MCHC 32.5 31.5 - 36.5 g/dL    RDW 13.2 10.0 - 15.0 %    Platelet Count 305 150 - 450 10e9/L    Diff Method Automated Method     % Neutrophils 48.6 %    % Lymphocytes 36.6 %    % Monocytes 8.4 %    % Eosinophils 5.0 %    % Basophils 1.1 %    % Immature Granulocytes 0.3 %    Nucleated RBCs 0 0 /100    Absolute Neutrophil 1.7 1.6 - 8.3 10e9/L    Absolute Lymphocytes 1.3 0.8 - 5.3 10e9/L    Absolute Monocytes 0.3 0.0 - 1.3 10e9/L    Absolute Eosinophils 0.2 0.0 - 0.7 10e9/L    Absolute Basophils 0.0 0.0 - 0.2 10e9/L    Abs Immature Granulocytes 0.0 0 - 0.4 10e9/L    Absolute Nucleated RBC 0.0    HLA Typing Complete SOT Recipient    Collection Time:  05/31/17  7:34 AM   Result Value Ref Range    HLA Typing Complete SOT Recipient       Specimen received - Immunology report to follow upon completion.   PRA Single Antigen IgG Antibody    Collection Time: 05/31/17  7:34 AM   Result Value Ref Range    PRA Single Antigen IgG Antibody       Specimen received - Immunology report to follow upon completion.   CMV Antibody IgG [LGK0745]    Collection Time: 05/31/17  7:34 AM   Result Value Ref Range    CMV Antibody IgG (H) 0.0 - 0.8 AI     >8.0  Positive   Antibody index (AI) values reflect qualitative changes in antibody   concentration that cannot be directly associated with clinical condition or   disease state.     EBV Capsid Antibody IgG [KLC7627]    Collection Time: 05/31/17  7:34 AM   Result Value Ref Range    EBV Capsid Antibody IgG (H) 0.0 - 0.8 AI     >8.0  Positive, suggests recent or past exposure   Antibody index (AI) values reflect qualitative changes in antibody   concentration that cannot be directly associated with clinical condition or   disease state.     Hepatitis B core antibody [LYB3485]    Collection Time: 05/31/17  7:34 AM   Result Value Ref Range    Hepatitis B Core Aggie Nonreactive NR   Hepatitis B Surface Antibody [WWF3717]    Collection Time: 05/31/17  7:34 AM   Result Value Ref Range    Hepatitis B Surface Antibody 32.09 (H) <8.00 m[IU]/mL   Hepatitis B surface antigen [OKG631]    Collection Time: 05/31/17  7:34 AM   Result Value Ref Range    Hep B Surface Agn Nonreactive NR   Hepatitis C antibody [ROL445]    Collection Time: 05/31/17  7:34 AM   Result Value Ref Range    Hepatitis C Antibody  NR     Nonreactive   Assay performance characteristics have not been established for newborns,   infants, and children     HIV Antigen Antibody Combo Pretransplant    Collection Time: 05/31/17  7:34 AM   Result Value Ref Range    HIV Antigen Antibody Combo Pretransplant  NR     Nonreactive   HIV-1 p24 Ag & HIV-1/HIV-2 Ab Not Detected     Anti Treponema  [JXS6633]    Collection Time: 05/31/17  7:34 AM   Result Value Ref Range    Treponema pallidum Antibody Negative NEG   Varicella Zoster Virus Antibody IgG [LVW8100]    Collection Time: 05/31/17  7:34 AM   Result Value Ref Range    Varicella Zoster Virus Antibody IgG 4.4 (H) 0.0 - 0.8 AI   Phosphorus    Collection Time: 05/31/17  7:34 AM   Result Value Ref Range    Phosphorus 4.5 2.5 - 4.5 mg/dL   Factor 2 and 5 mutation analysis    Collection Time: 05/31/17  7:34 AM   Result Value Ref Range    Copath Report       Patient Name: WILIAN SAM  MR#: [id number]  Specimen #: [id number]  Collected: 5/31/2017 07:34  Received: 5/31/2017 09:57  Reported: 6/3/2017 14:58  Ordering Phy(s): DELLA RING    For improved result formatting, select 'View Enhanced Report Format'  under Linked Documents section.  _________________________________________    TEST(S) REQUESTED:  Factor 5 Leiden and Factor 2 by PCR    SPECIMEN DESCRIPTION:  Blood    METHODOLOGY:   The regions of genomic DNA containing the Z9104W Factor 5  gene mutation (Factor V Leiden) and the Factor 2(Prothrombin C93550D)  gene mutation were simultaneously amplified using the polymerase chain  reaction.  The amplified products were digested with restriction  endonuclease TaqI and products were analyzed by gel electrophoresis.    RESULTS:    Factor V 1691G>A (Leiden)  RESULTS:  Mutation analyzed:     1691G>A  Factor V 1691G>A (Leiden)  Interpretation:      ABSENT  Factor V 1691G>A (Leiden) mutation  genotype:      G/G    FACTOR 2/PROTHROMBIN  RESULTS:  Mutation analyzed:     65921F>A  Factor 2 Mutation Interpretation:      ABSENT  Factor 2 Mutation genotype:      G/G    INTERPRETATION:  The patient is negative for the Factor V 1691G>A (Leiden) and negative  for the Factor 2 mutation.    COMMENTS:  If a patient is the recipient of an allogeneic bone marrow transplant,  this test must be done on a pre-transplant sample or buccal swab.  A  previous allogeneic bone  marrow transplant will interfere with test  results.  Call the [facility]([phone]) for  instructions on sample collection for these patients.    This test was developed and its performance characteristics determined  by the [facility],  [facility]. It has not been cleared or approved by the FDA. The  laboratory is regulated under CLIA as qualified to perform  high-complexity testing. This test is used for clinical purposes. It  should not be regarded as investigational or for research.    A reside nt/fellow in an accredited training program was involved in the  selection of testing, review of laboratory data, and/or interpretation  of this case.  I, as the senior physician, attest that I: (i) confirmed  appropriate testing, (ii) examined the relevant raw data for the  specimen(s); and (iii) rendered or confirmed the interpretation(s).    Electronically Signed Out By:  Linda Brown M.D., [facility]    CPT Codes:  A: 36792-Q6TWHX, 28551-B2LUIY, -FEEUWS(2)    TESTING LAB LOCATION:  [address] [address]  [phone]    COLLECTION SITE:  Client:  [facility]  Location:  [facility] (B)     ABO/Rh type and screen [VOZ400]    Collection Time: 05/31/17  7:39 AM   Result Value Ref Range    ABO A     RH(D)  Pos     Antibody Screen Neg     Test Valid Only At       [facility]    Specimen Expires 06/03/2017    Antibody titer red cell [YQA1525]    Collection Time: 05/31/17  7:39 AM   Result Value Ref Range    Antibody Titer Anti B: IgM 16, IgG 16    ABO Subtyping [PGM4996]    Collection Time: 05/31/17  7:39 AM   Result Value Ref Range    Antigen Type A1 Positive    EKG 12-lead, tracing only [EKG1]    Collection Time: 05/31/17 12:14 PM   Result Value Ref Range    Interpretation ECG  Click View Image link to view waveform and result      UNOS cPRA   Date Value Ref Range Status   05/31/2017 62  Final       Again, thank you for allowing me to participate in the care of your patient.      Sincerely,    JUAN A

## 2017-05-31 NOTE — MR AVS SNAPSHOT
"              After Visit Summary   5/31/2017    Harshad Beatty    MRN: [id number]           Patient Information     Date Of Birth          1980        Visit Information        Provider Department      5/31/2017 9:30 AM [facility] PATIENT 3 [facility] Solid Organ Transplant        Today's Diagnoses     Organ transplant candidate    -  1    FSGS (focal segmental glomerulosclerosis)        Encounter for pre-transplant evaluation for kidney transplant        Sickle cell trait (H)        End stage renal disease (H)           Follow-ups after your visit        Your next 10 appointments already scheduled     Aug 28, 2017  2:00 PM CDT   (Arrive by 1:45 PM)   NEW PANCREAS/KIDNEY TRANSPLANT WORK-UP with Krystin Wan MD   [facility] ([facility])    [address]  [address] [facility] [address] [phone]              Who to contact     If you have questions or need follow up information about today's clinic visit or your schedule please contact [facility] SOLID ORGAN TRANSPLANT directly at [phone].  Normal or non-critical lab and imaging results will be communicated to you by [employer], letter or phone within 4 business days after the clinic has received the results. If you do not hear from us within 7 days, please contact the clinic through [employer] or phone. If you have a critical or abnormal lab result, we will notify you by phone as soon as possible.  Submit refill requests through [employer] or call your pharmacy and they will forward the refill request to us. Please allow 3 business days for your refill to be completed.          Additional Information About Your Visit        [employer] Information     [employer] lets you send messages to your doctor, view your test results, renew your prescriptions, schedule appointments and more. To sign up, go to [email or web address] . Click on \"Log in\" on the left side of the screen, which will take you to the Welcome page. Then click on " "\"Sign up Now\" on the right side of the page.     You will be asked to enter the access code listed below, as well as some personal information. Please follow the directions to create your username and password.     Your access code is: [email or web address]  Expires: 8/15/2017  6:30 AM     Your access code will  in 90 days. If you need help or a new code, please call your [place] clinic or [phone].        Care EveryWhere ID     This is your Care EveryWhere ID. This could be used by other organizations to access your [place] medical records  [id number]         Blood Pressure from Last 3 Encounters:   17 146/82    Weight from Last 3 Encounters:   17 117.3 kg (258 lb 8 oz)              Today, you had the following     No orders found for display       Primary Care Provider    None Specified       No primary provider on file.        Thank you!     Thank you for choosing [facility] SOLID ORGAN TRANSPLANT  for your care. Our goal is always to provide you with excellent care. Hearing back from our patients is one way we can continue to improve our services. Please take a few minutes to complete the written survey that you may receive in the mail after your visit with us. Thank you!             Your Updated Medication List - Protect others around you: Learn how to safely use, store and throw away your medicines at www.disposemymeds.org.          This list is accurate as of: 17 11:59 PM.  Always use your most recent med list.                   Brand Name Dispense Instructions for use    AMLODIPINE BESYLATE PO      Take 5 mg by mouth daily       calcium acetate 667 MG Caps capsule    PHOSLO     Take 2,668 mg by mouth 3 times daily (with meals)       LISINOPRIL PO      Take 80 mg by mouth daily       MINOXIDIL PO      Take 10 mg by mouth daily       SENSIPAR PO      Take 60 mg by mouth daily       VITAMIN D (CHOLECALCIFEROL) PO      Take 2,000 Units by mouth daily         "

## 2017-05-31 NOTE — MR AVS SNAPSHOT
"              After Visit Summary   5/31/2017    Harshad Beatty    MRN: [id number]           Patient Information     Date Of Birth          1980        Visit Information        Provider Department      5/31/2017 11:15 AM Adeola Quintanilla, SUMAN [facility] Solid Organ Transplant        Today's Diagnoses     Organ transplant candidate    -  1       Follow-ups after your visit        Your next 10 appointments already scheduled     Aug 28, 2017 12:30 PM CDT   FULL PULMONARY FUNCTION with  PFL C   [facility] Pulmonary Function Testing ([facility])    [address] [place] [id number] [phone]            Aug 28, 2017  2:00 PM CDT   (Arrive by 1:45 PM)   NEW PANCREAS/KIDNEY TRANSPLANT WORK-UP with Krystin Wan MD   [facility] ([facility])    [address] [place] [id number] [phone]              Who to contact     If you have questions or need follow up information about today's clinic visit or your schedule please contact [facility] SOLID ORGAN TRANSPLANT directly at [phone].  Normal or non-critical lab and imaging results will be communicated to you by Hailohart, letter or phone within 4 business days after the clinic has received the results. If you do not hear from us within 7 days, please contact the clinic through [employer] or phone. If you have a critical or abnormal lab result, we will notify you by phone as soon as possible.  Submit refill requests through [employer] or call your pharmacy and they will forward the refill request to us. Please allow 3 business days for your refill to be completed.          Additional Information About Your Visit        Nonoba Information     [employer] lets you send messages to your doctor, view your test results, renew your prescriptions, schedule appointments and more. To sign up, go to [email or web address] . Click on \"Log in\" on the left side of the " "screen, which will take you to the Welcome page. Then click on \"Sign up Now\" on the right side of the page.     You will be asked to enter the access code listed below, as well as some personal information. Please follow the directions to create your username and password.     Your access code is: [email or web address]  Expires: 8/15/2017  6:30 AM     Your access code will  in 90 days. If you need help or a new code, please call your [place] clinic or [phone].        Care EveryWhere ID     This is your Care EveryWhere ID. This could be used by other organizations to access your [facility] [place] medical records  [id number]         Blood Pressure from Last 3 Encounters:   17 146/82    Weight from Last 3 Encounters:   17 117.3 kg (258 lb 8 oz)              Today, you had the following     No orders found for display       Primary Care Provider    None Specified       No primary provider on file.        Thank you!     Thank you for choosing [facility] TRANSPL[facility]  for your care. Our goal is always to provide you with excellent care. Hearing back from our patients is one way we can continue to improve our services. Please take a few minutes to complete the written survey that you may receive in the mail after your visit with us. Thank you!             Your Updated Medication List - Protect others around you: Learn how to safely use, store and throw away your medicines at www.disposemymeds.org.          This list is accurate as of: 17 11:59 PM.  Always use your most recent med list.                   Brand Name Dispense Instructions for use    AMLODIPINE BESYLATE PO      Take 5 mg by mouth daily       calcium acetate 667 MG Caps capsule    PHOSLO     Take 2,668 mg by mouth 3 times daily (with meals)       LISINOPRIL PO      Take 80 mg by mouth daily       MINOXIDIL PO      Take 10 mg by mouth daily       SENSIPAR PO      Take 60 mg by mouth daily       VITAMIN D (CHOLECALCIFEROL) PO      Take 2,000 " Units by mouth daily

## 2017-05-31 NOTE — PROGRESS NOTES
"Psychosocial Assessment  Patient Name/ Age: Harshad Beatty 37 year old   Medical Record #: [id number]  Duration of Interview:  45 min  Process:   Face-to-Face Interview                (counseling < 50%)   Present at Appointment: Harshad        : CHAU Rocha Date:  May 31, 2017        Type of transplant: Kidney    Donor type: Harshad reported he has friends who are interested in being donors.      Cadaver and friend   Prior Transplants:    No Status of Transplant: n/a       Current Living Situation    Location:   [address] [place] [address]  With Whom: lives with their family (wife and three kids)       Family/ Social Support:    Harshad has three children- Rio 13, Wallace 6, and Servando 5. Harshad has one sister who lives in [place]. Harshad' mother lives in [place] and his father lives in [place].  available, helpful   Committed relationship: Harshad is  to Yesica.     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 Harshad's wife is employed full time.    Income   salary/wages and spouse's income   Insurance      At this time, patient can afford medication costs:  Yes  private insurance- [employer] through employer       Medical Status    Current mode of treatment for ESRD Dialysis-2015   Complications none       Behavioral    Tobacco Use No Chemical Dependency No   Harshad denied any tobacco use.  Harshad denied any alcohol use. Harshad reported he smokes marijuana recreationally \"every once in awhile\". Harshad reported he has not smoked marijuana since March and does not feel he will have a problem quitting post transplant. Harshad denied any other substance " use. Harshad denied any history of chemical dependency treatment.      Psychiatric Impairment No  Harshad denied any current or history of anxiety, depression, or other mental health concerns.     Reading ability Good  Education Level: College Recent Legal History No      Coping Style/Strategies: Take deep breaths       Ability to Adhere to Complex Medical Regime: Yes     Adherence History: Harshad reported he follows his physician's recommendations, takes his medications as prescribed, and attends his appointments as scheduled, including dialysis.         Education  _X_ Medicare  _X_ Rehabilitation  _X_ Donor issues  _X_ Community resources  _X_ Post discharge housing  _X_ Financial resources  _X_ Medical insurance options  _X_ Psych adjustment  _X_ Family adjustment  _X_ Health Care Directive No, okay with wife   Psychosocial Risks of Transplant Reviewed and Discussed:  _X_ Increased stress related to emotional,            family, social, employment or financial           situation  _X_ Affect on work and/or disability benefits  _X_ Affect on future health and life           insurance  _X_ Transplant outcome expectations may           not be met  _X_ Mental Health Risks: anxiety,           depression, PTSD, guilt, grief and           chronic fatigue     Notable Items:   None noted.       Final Evaluation/Assessment   Patient seemed to process information well. Appeared well informed, motivated and able to follow post transplant requirements. Behavior was appropriate during interview. Has adequate income and insurance coverage. Adequate social support. No major contraindications noted for transplant.  At this time patient appears to understand the risks and benefits of transplant.      Recommendation  Acceptable    Selection Criteria Met:  Plan for support Yes   Chemical Dependence Yes   Smoking Yes   Mental Health Yes   Adequate Finances Yes    Signature: CHAU Rocha   Title: Clinical

## 2017-05-31 NOTE — PROGRESS NOTES
Assessment and Plan:  1. Kidney transplant evaluation - patient is a good candidate overall. Benefits of a living donor transplant were discussed.  2. ESKD from hypertension - doing well on dialysis since August 2016, but would likely benefit from a kidney transplant.  3. Cardiac risk - he will need cardiac risk assessment given past history of cardiomyopathy and multiple cardiac risk factors.  4. Sickle cell trait without evidence of disease.  5. Health maintenance - he is currently looking for a new dentist (due to insurance changes) for further evaluation of a gum lesion that was identified as part of his previous transplant evaluation.     Discussed the risks and benefits of a transplant, including the risk of surgery and immunosuppression medications.  Patients overall evaluation will be discussed in the Transplant Program's regular meeting with a final recommendation on the patients suitability for transplant to be made at that time.  Patient was seen in conjunction with Dr. Juan Bahena as part of a shared visit.    Patient was seen by myself, Dr. Juan Bahena, in conjunction with Jayne Aguilar PA-C as part of a shared visit.    I personally reviewed past medical and surgical history, vital signs, medications and labs.  Present and past medical history, along with significant physical exam findings were all reviewed with CONCHITA.    My vivas findings:  Harshad Beatty is a 37 year old year old male with ESKD from HTN, who presents for kidney transplant evaluation.  Patient reports feeling okay overall with some medical complaints.    Key management decisions made by me and discussed with CONCHITA:  1. Kidney transplant evaluation - patient is a good candidate overall. Benefits of a living donor transplant were discussed.  2. ESKD from HTN - patient is doing okay on dialysis, but would benefit from a kidney transplant.  Preferably, a living donor kidney transplant.  3. Cardiac risk - patient will require Cardiology  evaluation prior to transplant.    Evaluation:  Harshad Beatty was seen in consultation at the request of Dr. Herb High for evaluation as a potential kidney transplant recipient.    Reason for Visit:  Harshad Beatty is a 37-year-old  male with ESKD from hypertension, who presents for kidney transplant evaluation.    HPI:  Mr. Beatty is a 37-year-old  male with ESKD from hypertension on dialysis since August 2015, hypertension, sickle cell trait without evidence of disease, and Sunitha-Uribe tears who presents for kidney transplant evaluation. He was previously undergoing evaluation at [facility] but recently had an insurance change, and [facility] is no longer in his network. He was diagnosed with hypertension in his early 20's, but did not consistently take antihypertensive medications until a few years ago. 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 He presented to the hospital in August 2015 with worsening uremic symptoms and was initiated on dialysis. During that admission, he was found to have Sunitha-Mateo tears that were treated with epinephrine injections. It is also reported that he had cardiomyopathy with a reduced EF somewhere around 25%, however, repeat ECHO's have since shown improvement. He has no other known cardiac issues. 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 He also stays busy with taking care of his 3 children. He doesn't do anything particular for exercise, but is able to walk for long distances without any exertional symptoms. For instance, he walked 16 miles while on vacation in [place], [place] in February 2017 without significant issue. Overall, he feels he is doing well. He reports a good energy level and a stable appetite. He denies chest pain, SOB, or claudication symptoms with exertion. No nausea, vomiting, or diarrhea. No recent illness or hospitalization. No fevers, sweats,  or chills. He currently dialyzes MWF via a RUE AVF. He is still making urine without dysuria, hematuria, or trouble emptying his bladder. He has a paternal cousin who is on dialysis, but Mr. Beatty does not know the etiology of the kidney disease.         Kidney Disease Hx:        Kidney Disease Dx: HTN       Biopsy Proven: Yes         On Dialysis: Yes, Date initiated: August 2015 and Dialysis Type: [facility];       Primary Nephrologist: Dr. Chapin          Medical Hx:       h/o HTN: Yes         h/o DM:  No       h/o Protein in Urine: Yes        h/o Blood in Urine:  No       h/o Kidney Stones:  No       h/o UTI: No       h/o Chronic NSAID Use: No         Previous Transplant Hx:        No         Transplant Sensitization Hx:       Previous Tx: No       Blood Transfusion: Yes         Uremic Symptoms:       Fatigue: No; Cold: No; Nausea: No; Poor Appetite: No; Metallic Taste: No; Edema: Yes;         Cardiovascular Hx:       h/o Cardiac Issues: cardiomyopathy at time of dialysis initiation        Exercise Tolerance: no chest pain or shortness of breath with exertion.         Health Maintenance:       Dental: Not up to date         Potential Donor(s): Yes    ROS:  A comprehensive review of systems was obtained and negative, except as noted in the HPI or PMH.    PMH:   Medical record was reviewed and PMH was discussed with patient and noted below.  Past Medical History:   Diagnosis Date     Anemia in chronic kidney disease      ESRD (end stage renal disease) (H)      History of cardiomyopathy      HTN (hypertension)      Secondary hyperparathyroidism (H)      Sickle cell trait (H)        PSH:   Past Surgical History:   Procedure Laterality Date     CREATE FISTULA ARTERIOVENOUS UPPER EXTREMITY       ORIF right 5th metatarpal fracture       Personal or family history of bleeding or anesthesia problems: No    Family Hx:  Family History   Problem Relation Age of Onset     Sickle Cell Anemia Father      Sickle Cell Trait  "Sister      Pancreatic Cancer Maternal Grandfather      KIDNEY DISEASE Cousin        Personal Hx:   Social History     Social History     Marital status:      Spouse name: Yesica     Number of children: 3     Years of education: 16     Occupational History     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[employer]     Social History Main Topics     Smoking status: Never Smoker     Smokeless tobacco: Not on file     Alcohol use No     Drug use: Yes     Special: Marijuana      Comment: remote     Sexual activity: Yes     Partners: Female     Birth control/ protection: Implant     Other Topics Concern     Not on file     Social History Narrative       Allergies:  No Known Allergies    Medications:  Prior to Admission medications    Not on File       Vitals:  /82  Pulse 84  Temp 98.1  F (36.7  C) (Oral)  Resp 18  Ht 1.816 m (5' 11.5\")  Wt 117.3 kg (258 lb 8 oz)  SpO2 96%  BMI 35.55 kg/m2    Exam:  GENERAL APPEARANCE: alert and no distress  HENT: mouth without ulcers or lesions. Good dentition. No obvious lesion or sign of infection  LYMPHATICS: no cervical or supraclavicular nodes  RESP: lungs clear to auscultation - no rales, rhonchi or wheezes  CV: regular rhythm, normal rate, no rub, no murmur  EDEMA: no LE edema bilaterally  ABDOMEN: soft, nondistended, nontender  MS: extremities normal - no gross deformities noted, no evidence of inflammation in joints, no muscle tenderness  SKIN: no rash    Results:   Recent Results (from the past 336 hour(s))   UNOS Unacceptable Antigens    Collection Time: 05/31/17 12:00 AM   Result Value Ref Range    Protocol Cutoff      Unacceptable Antigen :Jaya DAVALOSw:52    UNOS cPRA    Collection Time: 05/31/17 12:00 AM   Result Value Ref Range    [facility] cPRA 62    Routine UA with microscopic [[id number]]    Collection Time: 05/31/17  7:26 AM   Result Value Ref Range    Color Urine Straw     Appearance Urine Clear     Glucose Urine 50 (A) NEG mg/dL    Bilirubin Urine Negative NEG    Ketones Urine Negative NEG " mg/dL    Specific Gravity Urine 1.004 1.003 - 1.035    Blood Urine Small (A) NEG    pH Urine 8.0 (H) 5.0 - 7.0 pH    Protein Albumin Urine 30 (A) NEG mg/dL    Urobilinogen mg/dL 0.0 0.0 - 2.0 mg/dL    Nitrite Urine Negative NEG    Leukocyte Esterase Urine Negative NEG    Source Midstream Urine     WBC Urine 1 0 - 2 /HPF    RBC Urine 1 0 - 2 /HPF   ABO type [[id number]]    Collection Time: 05/31/17  7:31 AM   Result Value Ref Range    ABO A     RH(D)  Pos     Specimen Expires 06/03/2017    Lipid Profile [LAB18]    Collection Time: 05/31/17  7:34 AM   Result Value Ref Range    Cholesterol 160 <200 mg/dL    Triglycerides 51 <150 mg/dL    HDL Cholesterol 81 >39 mg/dL    LDL Cholesterol Calculated 69 <100 mg/dL    Non HDL Cholesterol 79 <130 mg/dL   Comprehensive metabolic panel [LAB17]    Collection Time: 05/31/17  7:34 AM   Result Value Ref Range    Sodium 135 133 - 144 mmol/L    Potassium 4.4 3.4 - 5.3 mmol/L    Chloride 98 94 - 109 mmol/L    Carbon Dioxide 26 20 - 32 mmol/L    Anion Gap 11 3 - 14 mmol/L    Glucose 77 70 - 99 mg/dL    Urea Nitrogen 47 (H) 7 - 30 mg/dL    Creatinine 12.60 (H) 0.66 - 1.25 mg/dL    GFR Estimate 5 (L) >60 mL/min/1.7m2    GFR Estimate If Black 5 (L) >60 mL/min/1.7m2    Calcium 9.8 8.5 - 10.1 mg/dL    Bilirubin Total 0.4 0.2 - 1.3 mg/dL    Albumin 3.9 3.4 - 5.0 g/dL    Protein Total 7.9 6.8 - 8.8 g/dL    Alkaline Phosphatase 57 40 - 150 U/L    ALT 19 0 - 70 U/L    AST 7 0 - 45 U/L   Cardiolipin Aggie IgG and IgM [[id number]]    Collection Time: 05/31/17  7:34 AM   Result Value Ref Range    Cardiolipin Antibody IgG <1.6  Negative   0.0 - 19.9 GPL-U/mL    Cardiolipin Antibody IgM 9.0 0.0 - 19.9 MPL-U/mL   M Tuberculosis by Quantiferon [[id number]]    Collection Time: 05/31/17  7:34 AM   Result Value Ref Range    M Tuberculosis Result Negative NEG    M Tuberculosis Antigen Value 0.04 IU/mL   INR [[id number]]    Collection Time: 05/31/17  7:34 AM   Result Value Ref Range    INR 1.05 0.86 - 1.14   Partial  thromboplastin time [LAB56]    Collection Time: 05/31/17  7:34 AM   Result Value Ref Range    PTT 29 22 - 37 sec   Thrombin time [MYM177]    Collection Time: 05/31/17  7:34 AM   Result Value Ref Range    Thrombin Time 17.6 13.0 - 19.0 sec   Lupus panel [[id number]]    Collection Time: 05/31/17  7:34 AM   Result Value Ref Range    Lupus Result  NEG     Negative  (Note)  COMMENTS:  The INR is normal.  APTT ratio is normal.  DRVVT Screen ratio is normal.  Thrombin time is normal.  NEGATIVE TEST; A LUPUS ANTICOAGULANT WAS NOT DETECTED IN THIS  SPECIMEN WITHIN THE LIMITS OF THE TESTING REPERTOIRE.  If the clinical picture is strongly suggestive of an antiphospholipid  syndrome, recommend anticardiolipin and beta-2-glycoprotein (IgG and  IgM) antibody tests.  Kaylene Garnica M.D.  [phone]  6/1/2017    APTT:       Ratio  Patient  =  1.03  1:2 Mix  =  N/A  Reference:  Negative: Less than or equal to 1.16  Positive: Greater than or equal to 1.17     DILUTE CRISTINA VIPER VENOM TEST:  Screen Ratio = 0.93   Normal is less than 1.21       CBC with platelets differential [[id number]]    Collection Time: 05/31/17  7:34 AM   Result Value Ref Range    WBC 3.6 (L) 4.0 - 11.0 10e9/L    RBC Count 3.93 (L) 4.4 - 5.9 10e12/L    Hemoglobin 11.1 (L) 13.3 - 17.7 g/dL    Hematocrit 34.2 (L) 40.0 - 53.0 %    MCV 87 78 - 100 fl    MCH 28.2 26.5 - 33.0 pg    MCHC 32.5 31.5 - 36.5 g/dL    RDW 13.2 10.0 - 15.0 %    Platelet Count 305 150 - 450 10e9/L    Diff Method Automated Method     % Neutrophils 48.6 %    % Lymphocytes 36.6 %    % Monocytes 8.4 %    % Eosinophils 5.0 %    % Basophils 1.1 %    % Immature Granulocytes 0.3 %    Nucleated RBCs 0 0 /100    Absolute Neutrophil 1.7 1.6 - 8.3 10e9/L    Absolute Lymphocytes 1.3 0.8 - 5.3 10e9/L    Absolute Monocytes 0.3 0.0 - 1.3 10e9/L    Absolute Eosinophils 0.2 0.0 - 0.7 10e9/L    Absolute Basophils 0.0 0.0 - 0.2 10e9/L    Abs Immature Granulocytes 0.0 0 - 0.4 10e9/L    Absolute Nucleated RBC  0.0    HLA Typing Complete SOT Recipient    Collection Time: 05/31/17  7:34 AM   Result Value Ref Range    HLA Typing Complete SOT Recipient       Specimen received - Immunology report to follow upon completion.   PRA Single Antigen IgG Antibody    Collection Time: 05/31/17  7:34 AM   Result Value Ref Range    PRA Single Antigen IgG Antibody       Specimen received - Immunology report to follow upon completion.   CMV Antibody IgG [[id number]]    Collection Time: 05/31/17  7:34 AM   Result Value Ref Range    CMV Antibody IgG (H) 0.0 - 0.8 AI     >8.0  Positive   Antibody index (AI) values reflect qualitative changes in antibody   concentration that cannot be directly associated with clinical condition or   disease state.     EBV Capsid Antibody IgG [[id number]]    Collection Time: 05/31/17  7:34 AM   Result Value Ref Range    EBV Capsid Antibody IgG (H) 0.0 - 0.8 AI     >8.0  Positive, suggests recent or past exposure   Antibody index (AI) values reflect qualitative changes in antibody   concentration that cannot be directly associated with clinical condition or   disease state.     Hepatitis B core antibody [[id number]]    Collection Time: 05/31/17  7:34 AM   Result Value Ref Range    Hepatitis B Core Aggie Nonreactive NR   Hepatitis B Surface Antibody [[id number]]    Collection Time: 05/31/17  7:34 AM   Result Value Ref Range    Hepatitis B Surface Antibody 32.09 (H) <8.00 m[IU]/mL   Hepatitis B surface antigen [JFY616]    Collection Time: 05/31/17  7:34 AM   Result Value Ref Range    Hep B Surface Agn Nonreactive NR   Hepatitis C antibody [CKH384]    Collection Time: 05/31/17  7:34 AM   Result Value Ref Range    Hepatitis C Antibody  NR     Nonreactive   Assay performance characteristics have not been established for newborns,   infants, and children     HIV Antigen Antibody Combo Pretransplant    Collection Time: 05/31/17  7:34 AM   Result Value Ref Range    HIV Antigen Antibody Combo Pretransplant  NR     Nonreactive   HIV-1  p24 Ag & HIV-1/HIV-2 Ab Not Detected     Anti Treponema [EZH2215]    Collection Time: 05/31/17  7:34 AM   Result Value Ref Range    Treponema pallidum Antibody Negative NEG   Varicella Zoster Virus Antibody IgG [WGX0843]    Collection Time: 05/31/17  7:34 AM   Result Value Ref Range    Varicella Zoster Virus Antibody IgG 4.4 (H) 0.0 - 0.8 AI   Phosphorus    Collection Time: 05/31/17  7:34 AM   Result Value Ref Range    Phosphorus 4.5 2.5 - 4.5 mg/dL   Factor 2 and 5 mutation analysis    Collection Time: 05/31/17  7:34 AM   Result Value Ref Range    Copath Report       Patient Name: WILIAN SAM  MR#: [id number]  Specimen #: [id number]  Collected: 5/31/2017 07:34  Received: 5/31/2017 09:57  Reported: 6/3/2017 14:58  Ordering Phy(s): DELLA RING    For improved result formatting, select 'View Enhanced Report Format'  under Linked Documents section.  _________________________________________    TEST(S) REQUESTED:  Factor 5 Leiden and Factor 2 by PCR    SPECIMEN DESCRIPTION:  Blood    METHODOLOGY:   The regions of genomic DNA containing the J7766M Factor 5  gene mutation (Factor V Leiden) and the Factor 2(Prothrombin E86979H)  gene mutation were simultaneously amplified using the polymerase chain  reaction.  The amplified products were digested with restriction  endonuclease TaqI and products were analyzed by gel electrophoresis.    RESULTS:    Factor V 1691G>A (Leiden)  RESULTS:  Mutation analyzed:     1691G>A  Factor V 1691G>A (Leiden)  Interpretation:      ABSENT  Factor V 1691G>A (Leiden) mutation  genotype:      G/G    FACTOR 2/PROTHROMBIN  RESULTS:  Mutation analyzed:     63182H>A  Factor 2 Mutation Interpretation:      ABSENT  Factor 2 Mutation genotype:      G/G    INTERPRETATION:  The patient is negative for the Factor V 1691G>A (Leiden) and negative  for the Factor 2 mutation.    COMMENTS:  If a patient is the recipient of an allogeneic bone marrow transplant,  this test must be done on a  pre-transplant sample or buccal swab.  A  previous allogeneic bone marrow transplant will interfere with test  results.  Call the [facility]([phone]) for  instructions on sample collection for these patients.    This test was developed and its performance characteristics determined  by the [facility],  Molecular Diagnostics  Laboratory. It has not been cleared or approved by the FDA. The  laboratory is regulated under CLIA as qualified to perform  high-complexity testing. This test is used for clinical purposes. It  should not be regarded as investigational or for research.    A reside nt/fellow in an accredited training program was involved in the  selection of testing, review of laboratory data, and/or interpretation  of this case.  I, as the senior physician, attest that I: (i) confirmed  appropriate testing, (ii) examined the relevant raw data for the  specimen(s); and (iii) rendered or confirmed the interpretation(s).    Electronically Signed Out By:  Linda Brown M.D., [facility]    CPT Codes:  A: 30196-O8VKAP, 26941-P7JZFV, -NLDZRO(2)    TESTING LAB LOCATION:  [address] [address] [phone]    COLLECTION SITE:  Client:  [facility]  Location:  [facility] (B)     ABO/Rh type and screen [GDX205]    Collection Time: 05/31/17  7:39 AM   Result Value Ref Range    ABO A     RH(D)  Pos     Antibody Screen Neg     Test Valid Only At       [facility]    Specimen Expires 06/03/2017    Antibody titer red cell [[id number]]    Collection Time: 05/31/17  7:39 AM   Result Value Ref Range    Antibody Titer Anti B: IgM 16, IgG 16    ABO Subtyping [[id number]]    Collection Time: 05/31/17  7:39 AM   Result Value Ref Range    Antigen Type A1 Positive    EKG 12-lead, tracing only [EKG1]    Collection Time:  05/31/17 12:14 PM   Result Value Ref Range    Interpretation ECG Click View Image link to view waveform and result

## 2017-05-31 NOTE — LETTER
5/31/2017       RE: Harshad Beatty  [address] [place] [address]     Dear Colleague,    Thank you for referring your patient, Harshad Beatty, to the [facility]. Please see a copy of my visit note below.    Assessment and Plan:  1. Kidney transplant evaluation - patient is a good candidate overall. Benefits of a living donor transplant were discussed.  2. ESKD from hypertension - doing well on dialysis since August 2016, but would likely benefit from a kidney transplant.  3. Cardiac risk - he will need cardiac risk assessment given past history of cardiomyopathy and multiple cardiac risk factors.  4. Sickle cell trait without evidence of disease.  5. Health maintenance - he is currently looking for a new dentist (due to insurance changes) for further evaluation of a gum lesion that was identified as part of his previous transplant evaluation.     Discussed the risks and benefits of a transplant, including the risk of surgery and immunosuppression medications.  Patients overall evaluation will be discussed in the Transplant Program's regular meeting with a final recommendation on the patients suitability for transplant to be made at that time.  Patient was seen in conjunction with Dr. Juan Bahena as part of a shared visit.    Patient was seen by myself, Dr. Juan Bahena, in conjunction with Jayne Aguilar PA-C as part of a shared visit.    I personally reviewed past medical and surgical history, vital signs, medications and labs.  Present and past medical history, along with significant physical exam findings were all reviewed with CONCHITA.    My vivas findings:  Harshad Beatty is a 37 year old year old male with ESKD from HTN, who presents for kidney transplant evaluation.  Patient reports feeling okay overall with some medical complaints.    Key management decisions made by me and discussed with CONCHITA:  1. Kidney transplant  evaluation - patient is a good candidate overall. Benefits of a living donor transplant were discussed.  2. ESKD from HTN - patient is doing okay on dialysis, but would benefit from a kidney transplant.  Preferably, a living donor kidney transplant.  3. Cardiac risk - patient will require Cardiology evaluation prior to transplant.    Evaluation:  Harshad Beatty was seen in consultation at the request of Dr. Herb High for evaluation as a potential kidney transplant recipient.    Reason for Visit:  Harshad Beatty is a 37-year-old  male with ESKD from hypertension, who presents for kidney transplant evaluation.    HPI:  Mr. Beatty is a 37-year-old  male with ESKD from hypertension on dialysis since August 2015, hypertension, sickle cell trait without evidence of disease, and Sunitha-Uribe tears who presents for kidney transplant evaluation. He was previously undergoing evaluation at [facility] but recently had an insurance change, and [facility] is no longer in his network. He was diagnosed with hypertension in his early 20's, but did not consistently take antihypertensive medications until a few years ago. 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 He presented to the hospital in August 2015 with worsening uremic symptoms and was initiated on dialysis. During that admission, he was found to have Sunitha-Mateo tears that were treated with epinephrine injections. It is also reported that he had cardiomyopathy with a reduced EF somewhere around 25%, however, repeat ECHO's have since shown improvement. He has no other known cardiac issues. 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 He also stays busy with taking care of his 3 children. He doesn't do anything particular for exercise, but is able to walk for long distances without any exertional symptoms. For instance, he walked 16 miles while on vacation in  [place] in February 2017 without significant issue. Overall, he feels he is doing well. He reports a good energy level and a stable appetite. He denies chest pain, SOB, or claudication symptoms with exertion. No nausea, vomiting, or diarrhea. No recent illness or hospitalization. No fevers, sweats, or chills. He currently dialyzes MWF via a RUE AVF. He is still making urine without dysuria, hematuria, or trouble emptying his bladder. He has a paternal cousin who is on dialysis, but Mr. Beatty does not know the etiology of the kidney disease.         Kidney Disease Hx:        Kidney Disease Dx: HTN       Biopsy Proven: Yes         On Dialysis: Yes, Date initiated: August 2015 and Dialysis Type: [facility];       Primary Nephrologist: Dr. Chapin          Medical Hx:       h/o HTN: Yes         h/o DM:  No       h/o Protein in Urine: Yes        h/o Blood in Urine:  No       h/o Kidney Stones:  No       h/o UTI: No       h/o Chronic NSAID Use: No         Previous Transplant Hx:        No         Transplant Sensitization Hx:       Previous Tx: No       Blood Transfusion: Yes         Uremic Symptoms:       Fatigue: No; Cold: No; Nausea: No; Poor Appetite: No; Metallic Taste: No; Edema: Yes;         Cardiovascular Hx:       h/o Cardiac Issues: cardiomyopathy at time of dialysis initiation        Exercise Tolerance: no chest pain or shortness of breath with exertion.         Health Maintenance:       Dental: Not up to date         Potential Donor(s): Yes    ROS:  A comprehensive review of systems was obtained and negative, except as noted in the HPI or PMH.    PMH:   Medical record was reviewed and PMH was discussed with patient and noted below.  Past Medical History:   Diagnosis Date     Anemia in chronic kidney disease      ESRD (end stage renal disease) (H)      History of cardiomyopathy      HTN (hypertension)      Secondary hyperparathyroidism (H)      Sickle cell trait (H)        PSH:   Past Surgical History:  "  Procedure Laterality Date     CREATE FISTULA ARTERIOVENOUS UPPER EXTREMITY       ORIF right 5th metatarpal fracture       Personal or family history of bleeding or anesthesia problems: No    Family Hx:  Family History   Problem Relation Age of Onset     Sickle Cell Anemia Father      Sickle Cell Trait Sister      Pancreatic Cancer Maternal Grandfather      KIDNEY DISEASE Cousin        Personal Hx:   Social History     Social History     Marital status:      Spouse name: Yesica     Number of children: 3     Years of education: 16     Occupational History     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[employer]     Social History Main Topics     Smoking status: Never Smoker     Smokeless tobacco: Not on file     Alcohol use No     Drug use: Yes     Special: Marijuana      Comment: remote     Sexual activity: Yes     Partners: Female     Birth control/ protection: Implant     Other Topics Concern     Not on file     Social History Narrative       Allergies:  No Known Allergies    Medications:  Prior to Admission medications    Not on File       Vitals:  /82  Pulse 84  Temp 98.1  F (36.7  C) (Oral)  Resp 18  Ht 1.816 m (5' 11.5\")  Wt 117.3 kg (258 lb 8 oz)  SpO2 96%  BMI 35.55 kg/m2    Exam:  GENERAL APPEARANCE: alert and no distress  HENT: mouth without ulcers or lesions. Good dentition. No obvious lesion or sign of infection  LYMPHATICS: no cervical or supraclavicular nodes  RESP: lungs clear to auscultation - no rales, rhonchi or wheezes  CV: regular rhythm, normal rate, no rub, no murmur  EDEMA: no LE edema bilaterally  ABDOMEN: soft, nondistended, nontender  MS: extremities normal - no gross deformities noted, no evidence of inflammation in joints, no muscle tenderness  SKIN: no rash    Results:   Recent Results (from the past 336 hour(s))   [facility] Unacceptable Antigens    Collection Time: 05/31/17 12:00 AM   Result Value Ref Range    Protocol Cutoff      Unacceptable Antigen :Jaya Mullen:52    [facility] cPRA    Collection Time: " 05/31/17 12:00 AM   Result Value Ref Range    UNOS cPRA 62    Routine UA with microscopic [[id number]]    Collection Time: 05/31/17  7:26 AM   Result Value Ref Range    Color Urine Straw     Appearance Urine Clear     Glucose Urine 50 (A) NEG mg/dL    Bilirubin Urine Negative NEG    Ketones Urine Negative NEG mg/dL    Specific Gravity Urine 1.004 1.003 - 1.035    Blood Urine Small (A) NEG    pH Urine 8.0 (H) 5.0 - 7.0 pH    Protein Albumin Urine 30 (A) NEG mg/dL    Urobilinogen mg/dL 0.0 0.0 - 2.0 mg/dL    Nitrite Urine Negative NEG    Leukocyte Esterase Urine Negative NEG    Source Midstream Urine     WBC Urine 1 0 - 2 /HPF    RBC Urine 1 0 - 2 /HPF   ABO type [UQF1753]    Collection Time: 05/31/17  7:31 AM   Result Value Ref Range    ABO A     RH(D)  Pos     Specimen Expires 06/03/2017    Lipid Profile [LAB18]    Collection Time: 05/31/17  7:34 AM   Result Value Ref Range    Cholesterol 160 <200 mg/dL    Triglycerides 51 <150 mg/dL    HDL Cholesterol 81 >39 mg/dL    LDL Cholesterol Calculated 69 <100 mg/dL    Non HDL Cholesterol 79 <130 mg/dL   Comprehensive metabolic panel [LAB17]    Collection Time: 05/31/17  7:34 AM   Result Value Ref Range    Sodium 135 133 - 144 mmol/L    Potassium 4.4 3.4 - 5.3 mmol/L    Chloride 98 94 - 109 mmol/L    Carbon Dioxide 26 20 - 32 mmol/L    Anion Gap 11 3 - 14 mmol/L    Glucose 77 70 - 99 mg/dL    Urea Nitrogen 47 (H) 7 - 30 mg/dL    Creatinine 12.60 (H) 0.66 - 1.25 mg/dL    GFR Estimate 5 (L) >60 mL/min/1.7m2    GFR Estimate If Black 5 (L) >60 mL/min/1.7m2    Calcium 9.8 8.5 - 10.1 mg/dL    Bilirubin Total 0.4 0.2 - 1.3 mg/dL    Albumin 3.9 3.4 - 5.0 g/dL    Protein Total 7.9 6.8 - 8.8 g/dL    Alkaline Phosphatase 57 40 - 150 U/L    ALT 19 0 - 70 U/L    AST 7 0 - 45 U/L   Cardiolipin Aggie IgG and IgM [[id number]]    Collection Time: 05/31/17  7:34 AM   Result Value Ref Range    Cardiolipin Antibody IgG <1.6  Negative   0.0 - 19.9 GPL-U/mL    Cardiolipin Antibody IgM 9.0 0.0 - 19.9  MPL-U/mL   M Tuberculosis by Quantiferon [[id number]]    Collection Time: 05/31/17  7:34 AM   Result Value Ref Range    M Tuberculosis Result Negative NEG    M Tuberculosis Antigen Value 0.04 IU/mL   INR [ZCK3860]    Collection Time: 05/31/17  7:34 AM   Result Value Ref Range    INR 1.05 0.86 - 1.14   Partial thromboplastin time [LAB56]    Collection Time: 05/31/17  7:34 AM   Result Value Ref Range    PTT 29 22 - 37 sec   Thrombin time [OOG410]    Collection Time: 05/31/17  7:34 AM   Result Value Ref Range    Thrombin Time 17.6 13.0 - 19.0 sec   Lupus panel [[id number]]    Collection Time: 05/31/17  7:34 AM   Result Value Ref Range    Lupus Result  NEG     Negative  (Note)  COMMENTS:  The INR is normal.  APTT ratio is normal.  DRVVT Screen ratio is normal.  Thrombin time is normal.  NEGATIVE TEST; A LUPUS ANTICOAGULANT WAS NOT DETECTED IN THIS  SPECIMEN WITHIN THE LIMITS OF THE TESTING REPERTOIRE.  If the clinical picture is strongly suggestive of an antiphospholipid  syndrome, recommend anticardiolipin and beta-2-glycoprotein (IgG and  IgM) antibody tests.  Kaylene Garnica M.D.  [phone]  6/1/2017    APTT:       Ratio  Patient  =  1.03  1:2 Mix  =  N/A  Reference:  Negative: Less than or equal to 1.16  Positive: Greater than or equal to 1.17     DILUTE CRISTINA VIPER VENOM TEST:  Screen Ratio = 0.93   Normal is less than 1.21       CBC with platelets differential [SCA139]    Collection Time: 05/31/17  7:34 AM   Result Value Ref Range    WBC 3.6 (L) 4.0 - 11.0 10e9/L    RBC Count 3.93 (L) 4.4 - 5.9 10e12/L    Hemoglobin 11.1 (L) 13.3 - 17.7 g/dL    Hematocrit 34.2 (L) 40.0 - 53.0 %    MCV 87 78 - 100 fl    MCH 28.2 26.5 - 33.0 pg    MCHC 32.5 31.5 - 36.5 g/dL    RDW 13.2 10.0 - 15.0 %    Platelet Count 305 150 - 450 10e9/L    Diff Method Automated Method     % Neutrophils 48.6 %    % Lymphocytes 36.6 %    % Monocytes 8.4 %    % Eosinophils 5.0 %    % Basophils 1.1 %    % Immature Granulocytes 0.3 %    Nucleated  RBCs 0 0 /100    Absolute Neutrophil 1.7 1.6 - 8.3 10e9/L    Absolute Lymphocytes 1.3 0.8 - 5.3 10e9/L    Absolute Monocytes 0.3 0.0 - 1.3 10e9/L    Absolute Eosinophils 0.2 0.0 - 0.7 10e9/L    Absolute Basophils 0.0 0.0 - 0.2 10e9/L    Abs Immature Granulocytes 0.0 0 - 0.4 10e9/L    Absolute Nucleated RBC 0.0    HLA Typing Complete SOT Recipient    Collection Time: 05/31/17  7:34 AM   Result Value Ref Range    HLA Typing Complete SOT Recipient       Specimen received - Immunology report to follow upon completion.   PRA Single Antigen IgG Antibody    Collection Time: 05/31/17  7:34 AM   Result Value Ref Range    PRA Single Antigen IgG Antibody       Specimen received - Immunology report to follow upon completion.   CMV Antibody IgG [[id number]]    Collection Time: 05/31/17  7:34 AM   Result Value Ref Range    CMV Antibody IgG (H) 0.0 - 0.8 AI     >8.0  Positive   Antibody index (AI) values reflect qualitative changes in antibody   concentration that cannot be directly associated with clinical condition or   disease state.     EBV Capsid Antibody IgG [[id number]]    Collection Time: 05/31/17  7:34 AM   Result Value Ref Range    EBV Capsid Antibody IgG (H) 0.0 - 0.8 AI     >8.0  Positive, suggests recent or past exposure   Antibody index (AI) values reflect qualitative changes in antibody   concentration that cannot be directly associated with clinical condition or   disease state.     Hepatitis B core antibody [[id number]]    Collection Time: 05/31/17  7:34 AM   Result Value Ref Range    Hepatitis B Core Aggie Nonreactive NR   Hepatitis B Surface Antibody [[id number]]    Collection Time: 05/31/17  7:34 AM   Result Value Ref Range    Hepatitis B Surface Antibody 32.09 (H) <8.00 m[IU]/mL   Hepatitis B surface antigen [OQP303]    Collection Time: 05/31/17  7:34 AM   Result Value Ref Range    Hep B Surface Agn Nonreactive NR   Hepatitis C antibody [DZH071]    Collection Time: 05/31/17  7:34 AM   Result Value Ref Range    Hepatitis C  Antibody  NR     Nonreactive   Assay performance characteristics have not been established for newborns,   infants, and children     HIV Antigen Antibody Combo Pretransplant    Collection Time: 05/31/17  7:34 AM   Result Value Ref Range    HIV Antigen Antibody Combo Pretransplant  NR     Nonreactive   HIV-1 p24 Ag & HIV-1/HIV-2 Ab Not Detected     Anti Treponema [[id number]]    Collection Time: 05/31/17  7:34 AM   Result Value Ref Range    Treponema pallidum Antibody Negative NEG   Varicella Zoster Virus Antibody IgG [[id number]]    Collection Time: 05/31/17  7:34 AM   Result Value Ref Range    Varicella Zoster Virus Antibody IgG 4.4 (H) 0.0 - 0.8 AI   Phosphorus    Collection Time: 05/31/17  7:34 AM   Result Value Ref Range    Phosphorus 4.5 2.5 - 4.5 mg/dL   Factor 2 and 5 mutation analysis    Collection Time: 05/31/17  7:34 AM   Result Value Ref Range    Copath Report       Patient Name: WILIAN SAM  MR#: [id number]  Specimen #: [id number]  Collected: 5/31/2017 07:34  Received: 5/31/2017 09:57  Reported: 6/3/2017 14:58  Ordering Phy(s): DELLA RING    For improved result formatting, select 'View Enhanced Report Format'  under Linked Documents section.  _________________________________________    TEST(S) REQUESTED:  Factor 5 Leiden and Factor 2 by PCR    SPECIMEN DESCRIPTION:  Blood    METHODOLOGY:   The regions of genomic DNA containing the [id number] Factor 5  gene mutation (Factor V Leiden) and the Factor 2(Prothrombin [id number])  gene mutation were simultaneously amplified using the polymerase chain  reaction.  The amplified products were digested with restriction  endonuclease TaqI and products were analyzed by gel electrophoresis.    RESULTS:    Factor V 1691G>A (Leiden)  RESULTS:  Mutation analyzed:     1691G>A  Factor V 1691G>A (Leiden)  Interpretation:      ABSENT  Factor V 1691G>A (Leiden) mutation  genotype:      G/G    FACTOR 2/PROTHROMBIN  RESULTS:  Mutation analyzed:     10983F>A  Factor 2 Mutation  Interpretation:      ABSENT  Factor 2 Mutation genotype:      G/G    INTERPRETATION:  The patient is negative for the Factor V 1691G>A (Leiden) and negative  for the Factor 2 mutation.    COMMENTS:  If a patient is the recipient of an allogeneic bone marrow transplant,  this test must be done on a pre-transplant sample or buccal swab.  A  previous allogeneic bone marrow transplant will interfere with test  results.  Call the [facility]([phone]) for  instructions on sample collection for these patients.    This test was developed and its performance characteristics determined  by the [facility]  [facility]  Laboratory. It has not been cleared or approved by the FDA. The  laboratory is regulated under CLIA as qualified to perform  high-complexity testing. This test is used for clinical purposes. It  should not be regarded as investigational or for research.    A reside nt/fellow in an accredited training program was involved in the  selection of testing, review of laboratory data, and/or interpretation  of this case.  I, as the senior physician, attest that I: (i) confirmed  appropriate testing, (ii) examined the relevant raw data for the  specimen(s); and (iii) rendered or confirmed the interpretation(s).    Electronically Signed Out By:  Linda Brown M.D., RUST    CPT Codes:  A: 22030-R7FNDE, 92677-Q3DBKY, -NLPPTP(2)    TESTING LAB LOCATION:  [address] [id number] [phone]    COLLECTION SITE:  Client[facility]  [facility]     ABO/Rh type and screen [VRD064]    Collection Time: 05/31/17  7:39 AM   Result Value Ref Range    ABO A     RH(D)  Pos     Antibody Screen Neg     Test Valid Only At       [facility]    Specimen Expires 06/03/2017    Antibody  titer red cell [[id number]]    Collection Time: 05/31/17  7:39 AM   Result Value Ref Range    Antibody Titer Anti B: IgM 16, IgG 16    ABO Subtyping [[id number]]    Collection Time: 05/31/17  7:39 AM   Result Value Ref Range    Antigen Type A1 Positive    EKG 12-lead, tracing only [EKG1]    Collection Time: 05/31/17 12:14 PM   Result Value Ref Range    Interpretation ECG Click View Image link to view waveform and result        Again, thank you for allowing me to participate in the care of your patient.      Sincerely,    Juan Bahena MD

## 2017-05-31 NOTE — MR AVS SNAPSHOT
After Visit Summary   5/31/2017    Harshad Beatty    MRN: [id number]           Patient Information     Date Of Birth          1980        Visit Information        Provider Department      5/31/2017 8:00 AM UC TRANSPLANT CLASS [facility] Solid Organ Transplant        Today's Diagnoses     Encounter for pre-transplant evaluation for kidney transplant    -  1       Follow-ups after your visit        Your next 10 appointments already scheduled     May 31, 2017  9:30 AM CDT   (Arrive by 9:15 AM)   Surgery Consult with  PKE PATIENT 3   [facility] Solid Organ Transplant ([facility])    [address] [place] [phone]   [phone]            May 31, 2017 10:00 AM CDT   (Arrive by 9:45 AM)   SOT CARE COORDINATOR EVAL with Tonya Vallejo RN   [facility] Solid Organ Transplant ([facility])    [address] [place] [phone]   [phone]            May 31, 2017 10:30 AM CDT   Nephrology Consult with UC PKE PATIENT 3   [facility] Solid Organ Transplant ([facility])    [address] [place] [phone]   [phone]            May 31, 2017 11:15 AM CDT   (Arrive by 11:00 AM)   SOT SOCIAL WORK EVAL with SUMAN Calero   [facility] Solid Organ Transplant ([facility])    [address] [place] [address]   [phone]            May 31, 2017  1:00 PM CDT   Lab with MACKENZIE LAB    Health Lab ([facility])    [address] [place] [id number]   [phone]            May 31, 2017  2:00 PM CDT   ecg with RISHABH REYEZ ELECTROCARDIOLOGY ([facility])    [address] [facility] [phone]               May 31, 2017  2:30 PM CDT   Ech Complete with UELIJAHCHDNEYS   [facility] [place],  Echocardiography ([facility])    [address] [facility] [phone]           1.  Please bring or wear a comfortable two-piece outfit. 2.  You may eat, drink and take your normal medicines. 3.  For any questions that cannot be answered, please contact the ordering physician            May 31, 2017  3:30 PM CDT   XR CHEST 2 VIEWS with UUXR3   [facility], [place],  Radiology ([facility])    [address] [facility] [phone]           Please bring a list of your current medicines to your exam. (Include vitamins, minerals and over-thecounter medicines.) Leave your valuables at home.  Tell your doctor if there is a chance you may be pregnant.  You do not need to do anything special for this exam.            Aug 28, 2017 12:30 PM CDT   FULL PULMONARY FUNCTION with  PFL C   [facility] Pulmonary Function Testing ([facility])    [address] [place] [phone] [phone]            Aug 28, 2017  2:00 PM CDT   (Arrive by 1:45 PM)   NEW PANCREAS/KIDNEY TRANSPLANT WORK-UP with Krystin Wan MD   [facility] Heart Care ([facility])    [address] [place] [address] [phone]              Who to contact     If you have questions or need follow up information about today's clinic visit or your schedule please contact [facility] SOLID ORGAN TRANSPLANT directly at [phone].  Normal or non-critical lab and imaging results will be communicated to you by MyChart, letter or phone within 4 business days after the clinic has received the results. If you do not hear from us within 7 days, please contact the clinic through exozethart or phone. If you have a critical or abnormal lab result, we will notify you by phone as soon as possible.  Submit refill requests through [employer] or call your pharmacy and they will forward the refill  "request to us. Please allow 3 business days for your refill to be completed.          Additional Information About Your Visit        MyChart Information     5by lets you send messages to your doctor, view your test results, renew your prescriptions, schedule appointments and more. To sign up, go to [email or web address] . Click on \"Log in\" on the left side of the screen, which will take you to the Welcome page. Then click on \"Sign up Now\" on the right side of the page.     You will be asked to enter the access code listed below, as well as some personal information. Please follow the directions to create your username and password.     Your access code is: [email or web address]  Expires: 8/15/2017  6:30 AM     Your access code will  in 90 days. If you need help or a new code, please call your [facility] clinic or [phone].        Care EveryWhere ID     This is your Care EveryWhere ID. This could be used by other organizations to access your [facility] medical records  [id number]         Blood Pressure from Last 3 Encounters:   No data found for BP    Weight from Last 3 Encounters:   No data found for Wt              Today, you had the following     No orders found for display       Primary Care Provider    None Specified       No primary provider on file.        Thank you!     Thank you for choosing [facility] SOLID ORGAN TRANSPLANT  for your care. Our goal is always to provide you with excellent care. Hearing back from our patients is one way we can continue to improve our services. Please take a few minutes to complete the written survey that you may receive in the mail after your visit with us. Thank you!             Your Updated Medication List - Protect others around you: Learn how to safely use, store and throw away your medicines at www.disposemymeds.org.          This list is accurate as of: 17  9:29 AM.  Always use your most recent med list.                   Brand Name Dispense Instructions for use "    AMLODIPINE BESYLATE PO      Take 5 mg by mouth daily       calcium acetate 667 MG Caps capsule    PHOSLO     Take 2,668 mg by mouth 3 times daily (with meals)       LISINOPRIL PO      Take 80 mg by mouth daily       MINOXIDIL PO      Take 10 mg by mouth daily       SENSIPAR PO      Take 60 mg by mouth daily       VITAMIN D (CHOLECALCIFEROL) PO      Take 2,000 Units by mouth daily

## 2017-05-31 NOTE — PROGRESS NOTES
Transplant Surgery Consult Note     Medical record number: [id number]  YOB: 1980,   Consult requested by Dr. Chapin for evaluation of kidney transplant candidacy.    Assessment and Recommendations:Mr. Beatty appears to be an excellent candidate for kidney transplantation and has a good understanding of the risks and benefits of this approach to the management of renal failure. The following issues should be addressed prior to finalizing his transplant candidacy:     1. While his weight is within acceptable range for kidney transplantation, weight loss is recommended in order to decrease perioperative risk and risk of posttransplant diabetes.  Certainly, any weight gain may jeopardize his transplant candidacy and he should guard against this.  2. OK for donors to call in for evaluation.    The majority of our visit was spent in counselling, discussing the medical and surgical risks of kidney transplantation. We discussed approximate wait time and how that is influenced by issues such as blood type and sensitization (PRA) and access to a living donor. I contrasted potential waiting time for living vs  donor kidneys from  normal (0-85%) or higher (%) kidney donor profile index (KDPI) donors and their associated outcomes. I would not recommend this individual to consider kidneys from high KDPI donors. The reason for this decision is best summarized as: improved long term graft survival. Potential surgical complications of kidney transplantation include bleeding, superficial or deep wound complications (infection, hernia, lymphocele), ureteral anastomotic failure (leak or stenosis), graft thrombosis, need for reoperation and other issues such as cardiac complications, pneumonia, deep venous thrombosis, pulmonary embolism, post transplant diabetes and death. The potential for recurrent disease or need for retransplantation was also addressed. We discussed the possible need for ureteral stent  (and subsequent removal), and the utility of protocol biopsy and laboratory studies to evaluate for rejection or recurrent disease. We discussed the risk of graft rejection, our center's average graft and patient survival rates, immunosuppression protocols, as well as the potential opportunity to participate in clinical trials.  We also discussed the average length of stay, recovery process, and posttransplant lab and monitoring protocol.  I emphasized the need for strict immunosuppression medication adherence and the potential for complications of immunosuppression such as skin cancer or lymphoma, as well as a very low but not zero risk of donor-derived disease transmission risks (infection, cancer). Mr. Beatty asked good questions and his candidacy will be reviewed at our Multidisciplinary Selection Committee. Thank you for the opportunity to participate in Mr. Beatty's care.    Total time: 45 minutes  Counselling time: 40 minutes        Herb High MD  Department of Surgery  ---------------------------------------------------------------------------------------------------    HPI: Mr. Beatty has End stage renal failure due to Hypertension and FSGS, biopsy performed. The patient is non-diabetic. He has sickle trait, no history of disease.  He dialyzes via a R arm AVF, for about 1.5 years. He still makes a regular amounts of urine.  He has had a blood transfusion around the time of HD initiation (8/15).  He was originally evalutated at [facility] [place] but changed insurances and now presents to [facility].    The patient is on dialysis.    Has potential kidney donors:  Yes .  Interested in participation in paired exchange if a donor is willing: Yes     The patient has the following pertinent history:       No    Yes  Dialysis:    []      [x] via:     R arm AVF  Blood Transfusion                  []      [x]  Number of units: ?  Most recently: Fall 2015  Pregnancy:    [x]      [] Number:       Previous Transplant:  [x]      [] Details:     Cancer    [x]      [] Comment:   Kidney stones   [x]      [] Comment:      Recurrent infections  [x]      []  Type:                  Bladder dysfunction  [x]      [] Cause:    Claudication   [x]      [] Distance:    Previous Amputation  [x]      [] Cause:     Chronic anticoagulation  [x]      [] Indication:   Synagogue  [x]      []      Past Medical History:   Diagnosis Date     Anemia in chronic kidney disease      ESRD (end stage renal disease) (H)      History of cardiomyopathy      HTN (hypertension)      Secondary hyperparathyroidism (H)      Sickle cell trait (H)      Past Surgical History:   Procedure Laterality Date     CREATE FISTULA ARTERIOVENOUS UPPER EXTREMITY       ORIF right 5th metatarpal fracture       Family History   Problem Relation Age of Onset     Sickle Cell Anemia Father      Sickle Cell Trait Sister      Pancreatic Cancer Maternal Grandfather      KIDNEY DISEASE Cousin      Social History     Social History     Marital status:      Spouse name: Yesica     Number of children: 3     Years of education: 16     Occupational History     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[employer]     Social History Main Topics     Smoking status: Never Smoker     Smokeless tobacco: Not on file     Alcohol use No     Drug use: Yes     Special: Marijuana      Comment: remote     Sexual activity: Yes     Partners: Female     Birth control/ protection: Implant     Other Topics Concern     Not on file     Social History Narrative       ROS:   CONSTITUTIONAL:  No fevers or chills  EYES: negative for icterus  ENT:  negative for hearing loss, tinnitus and sore throat  RESPIRATORY:  negative for cough, sputum, dyspnea  CARDIOVASCULAR:  negative for chest pain None  GASTROINTESTINAL:  negative for nausea, vomiting, diarrhea or constipation  GENITOURINARY:  negative for incontinence, dysuria, bladder emptying problems  HEME:  No easy bruising  INTEGUMENT:  negative for rash and pruritus  NEURO:  Negative for headache, seizure  "disorder    Allergies:   No Known Allergies  Medications:  Prescription Medications as of 6/10/2017             AMLODIPINE BESYLATE PO Take 5 mg by mouth daily    calcium acetate (PHOSLO) 667 MG CAPS capsule Take 2,668 mg by mouth 3 times daily (with meals)    VITAMIN D, CHOLECALCIFEROL, PO Take 2,000 Units by mouth daily    LISINOPRIL PO Take 80 mg by mouth daily    MINOXIDIL PO Take 10 mg by mouth daily    Cinacalcet HCl (SENSIPAR PO) Take 60 mg by mouth daily        Exam:   Vital Signs 5/31/2017   Systolic 146   Diastolic 82   Pulse 84   Temperature 98.1   Respirations 18   Weight (LB) 258 lb 8 oz   Height 5' 11.5\"   BMI (Calculated) 35.63   Pain    O2 96     Appearance: in no apparent distress.   Skin: normal  Head and Neck: Normal, no rashes or jaundice  Respiratory: easy respirations, no audible wheezing.  Cardiovascular: RRR  Abdomen: rounded, Liver, spleen, and kidneys not palpably enlarged and Lower abdomen negative for tenderness and masses   Extremeties: femoral 2+/2+, Edema, none  Neuro: without deficit     Diagnostics:   Recent Results (from the past 672 hour(s))   RocketOzOS Unacceptable Antigens    Collection Time: 05/31/17 12:00 AM   Result Value Ref Range    Protocol Cutoff      Unacceptable Antigen DR:Jaya DRw:52    UNOS cPRA    Collection Time: 05/31/17 12:00 AM   Result Value Ref Range    [facility]    Routine UA with microscopic [BEG5092]    Collection Time: 05/31/17  7:26 AM   Result Value Ref Range    Color Urine Straw     Appearance Urine Clear     Glucose Urine 50 (A) NEG mg/dL    Bilirubin Urine Negative NEG    Ketones Urine Negative NEG mg/dL    Specific Gravity Urine 1.004 1.003 - 1.035    Blood Urine Small (A) NEG    pH Urine 8.0 (H) 5.0 - 7.0 pH    Protein Albumin Urine 30 (A) NEG mg/dL    Urobilinogen mg/dL 0.0 0.0 - 2.0 mg/dL    Nitrite Urine Negative NEG    Leukocyte Esterase Urine Negative NEG    Source Midstream Urine     WBC Urine 1 0 - 2 /HPF    RBC Urine 1 0 - 2 /HPF   ABO type " [QJC5424]    Collection Time: 05/31/17  7:31 AM   Result Value Ref Range    ABO A     RH(D)  Pos     Specimen Expires 06/03/2017    Lipid Profile [LAB18]    Collection Time: 05/31/17  7:34 AM   Result Value Ref Range    Cholesterol 160 <200 mg/dL    Triglycerides 51 <150 mg/dL    HDL Cholesterol 81 >39 mg/dL    LDL Cholesterol Calculated 69 <100 mg/dL    Non HDL Cholesterol 79 <130 mg/dL   Comprehensive metabolic panel [LAB17]    Collection Time: 05/31/17  7:34 AM   Result Value Ref Range    Sodium 135 133 - 144 mmol/L    Potassium 4.4 3.4 - 5.3 mmol/L    Chloride 98 94 - 109 mmol/L    Carbon Dioxide 26 20 - 32 mmol/L    Anion Gap 11 3 - 14 mmol/L    Glucose 77 70 - 99 mg/dL    Urea Nitrogen 47 (H) 7 - 30 mg/dL    Creatinine 12.60 (H) 0.66 - 1.25 mg/dL    GFR Estimate 5 (L) >60 mL/min/1.7m2    GFR Estimate If Black 5 (L) >60 mL/min/1.7m2    Calcium 9.8 8.5 - 10.1 mg/dL    Bilirubin Total 0.4 0.2 - 1.3 mg/dL    Albumin 3.9 3.4 - 5.0 g/dL    Protein Total 7.9 6.8 - 8.8 g/dL    Alkaline Phosphatase 57 40 - 150 U/L    ALT 19 0 - 70 U/L    AST 7 0 - 45 U/L   Cardiolipin Aggie IgG and IgM [ROV6254]    Collection Time: 05/31/17  7:34 AM   Result Value Ref Range    Cardiolipin Antibody IgG <1.6  Negative   0.0 - 19.9 GPL-U/mL    Cardiolipin Antibody IgM 9.0 0.0 - 19.9 MPL-U/mL   M Tuberculosis by Quantiferon [YUS0316]    Collection Time: 05/31/17  7:34 AM   Result Value Ref Range    M Tuberculosis Result Negative NEG    M Tuberculosis Antigen Value 0.04 IU/mL   INR [JOD2057]    Collection Time: 05/31/17  7:34 AM   Result Value Ref Range    INR 1.05 0.86 - 1.14   Partial thromboplastin time [LAB56]    Collection Time: 05/31/17  7:34 AM   Result Value Ref Range    PTT 29 22 - 37 sec   Thrombin time [KVL911]    Collection Time: 05/31/17  7:34 AM   Result Value Ref Range    Thrombin Time 17.6 13.0 - 19.0 sec   Lupus panel [PWX0294]    Collection Time: 05/31/17  7:34 AM   Result Value Ref Range    Lupus Result  NEG      Negative  (Note)  COMMENTS:  The INR is normal.  APTT ratio is normal.  DRVVT Screen ratio is normal.  Thrombin time is normal.  NEGATIVE TEST; A LUPUS ANTICOAGULANT WAS NOT DETECTED IN THIS  SPECIMEN WITHIN THE LIMITS OF THE TESTING REPERTOIRE.  If the clinical picture is strongly suggestive of an antiphospholipid  syndrome, recommend anticardiolipin and beta-2-glycoprotein (IgG and  IgM) antibody tests.  Kaylene Garnica M.D.  [phone]  6/1/2017    APTT:       Ratio  Patient  =  1.03  1:2 Mix  =  N/A  Reference:  Negative: Less than or equal to 1.16  Positive: Greater than or equal to 1.17     DILUTE CRISTINA VIPER VENOM TEST:  Screen Ratio = 0.93   Normal is less than 1.21       CBC with platelets differential [JPY729]    Collection Time: 05/31/17  7:34 AM   Result Value Ref Range    WBC 3.6 (L) 4.0 - 11.0 10e9/L    RBC Count 3.93 (L) 4.4 - 5.9 10e12/L    Hemoglobin 11.1 (L) 13.3 - 17.7 g/dL    Hematocrit 34.2 (L) 40.0 - 53.0 %    MCV 87 78 - 100 fl    MCH 28.2 26.5 - 33.0 pg    MCHC 32.5 31.5 - 36.5 g/dL    RDW 13.2 10.0 - 15.0 %    Platelet Count 305 150 - 450 10e9/L    Diff Method Automated Method     % Neutrophils 48.6 %    % Lymphocytes 36.6 %    % Monocytes 8.4 %    % Eosinophils 5.0 %    % Basophils 1.1 %    % Immature Granulocytes 0.3 %    Nucleated RBCs 0 0 /100    Absolute Neutrophil 1.7 1.6 - 8.3 10e9/L    Absolute Lymphocytes 1.3 0.8 - 5.3 10e9/L    Absolute Monocytes 0.3 0.0 - 1.3 10e9/L    Absolute Eosinophils 0.2 0.0 - 0.7 10e9/L    Absolute Basophils 0.0 0.0 - 0.2 10e9/L    Abs Immature Granulocytes 0.0 0 - 0.4 10e9/L    Absolute Nucleated RBC 0.0    HLA Typing Complete SOT Recipient    Collection Time: 05/31/17  7:34 AM   Result Value Ref Range    HLA Typing Complete SOT Recipient       Specimen received - Immunology report to follow upon completion.   PRA Single Antigen IgG Antibody    Collection Time: 05/31/17  7:34 AM   Result Value Ref Range    PRA Single Antigen IgG Antibody        Specimen received - Immunology report to follow upon completion.   CMV Antibody IgG [EPM1049]    Collection Time: 05/31/17  7:34 AM   Result Value Ref Range    CMV Antibody IgG (H) 0.0 - 0.8 AI     >8.0  Positive   Antibody index (AI) values reflect qualitative changes in antibody   concentration that cannot be directly associated with clinical condition or   disease state.     EBV Capsid Antibody IgG [[id number]]    Collection Time: 05/31/17  7:34 AM   Result Value Ref Range    EBV Capsid Antibody IgG (H) 0.0 - 0.8 AI     >8.0  Positive, suggests recent or past exposure   Antibody index (AI) values reflect qualitative changes in antibody   concentration that cannot be directly associated with clinical condition or   disease state.     Hepatitis B core antibody [JNB3808]    Collection Time: 05/31/17  7:34 AM   Result Value Ref Range    Hepatitis B Core Aggie Nonreactive NR   Hepatitis B Surface Antibody [KFD6151]    Collection Time: 05/31/17  7:34 AM   Result Value Ref Range    Hepatitis B Surface Antibody 32.09 (H) <8.00 m[IU]/mL   Hepatitis B surface antigen [FGC470]    Collection Time: 05/31/17  7:34 AM   Result Value Ref Range    Hep B Surface Agn Nonreactive NR   Hepatitis C antibody [SFQ063]    Collection Time: 05/31/17  7:34 AM   Result Value Ref Range    Hepatitis C Antibody  NR     Nonreactive   Assay performance characteristics have not been established for newborns,   infants, and children     HIV Antigen Antibody Combo Pretransplant    Collection Time: 05/31/17  7:34 AM   Result Value Ref Range    HIV Antigen Antibody Combo Pretransplant  NR     Nonreactive   HIV-1 p24 Ag & HIV-1/HIV-2 Ab Not Detected     Anti Treponema [BON7339]    Collection Time: 05/31/17  7:34 AM   Result Value Ref Range    Treponema pallidum Antibody Negative NEG   Varicella Zoster Virus Antibody IgG [VYZ3659]    Collection Time: 05/31/17  7:34 AM   Result Value Ref Range    Varicella Zoster Virus Antibody IgG 4.4 (H) 0.0 - 0.8 AI    Phosphorus    Collection Time: 05/31/17  7:34 AM   Result Value Ref Range    Phosphorus 4.5 2.5 - 4.5 mg/dL   Factor 2 and 5 mutation analysis    Collection Time: 05/31/17  7:34 AM   Result Value Ref Range    Copath Report       Patient Name: WILIAN SAM  MR#: [id number]  Specimen #: [id number]  Collected: 5/31/2017 07:34  Received: 5/31/2017 09:57  Reported: 6/3/2017 14:58  Ordering Phy(s): DELLA RING    For improved result formatting, select 'View Enhanced Report Format'  under Linked Documents section.  _________________________________________    TEST(S) REQUESTED:  Factor 5 Leiden and Factor 2 by PCR    SPECIMEN DESCRIPTION:  Blood    METHODOLOGY:   The regions of genomic DNA containing the Y2036P Factor 5  gene mutation (Factor V Leiden) and the Factor 2(Prothrombin N60079U)  gene mutation were simultaneously amplified using the polymerase chain  reaction.  The amplified products were digested with restriction  endonuclease TaqI and products were analyzed by gel electrophoresis.    RESULTS:    Factor V 1691G>A (Leiden)  RESULTS:  Mutation analyzed:     1691G>A  Factor V 1691G>A (Leiden)  Interpretation:      ABSENT  Factor V 1691G>A (Leiden) mutation  genotype:      G/G    FACTOR 2/PROTHROMBIN  RESULTS:  Mutation analyzed:     03813U>A  Factor 2 Mutation Interpretation:      ABSENT  Factor 2 Mutation genotype:      G/G    INTERPRETATION:  The patient is negative for the Factor V 1691G>A (Leiden) and negative  for the Factor 2 mutation.    COMMENTS:  If a patient is the recipient of an allogeneic bone marrow transplant,  this test must be done on a pre-transplant sample or buccal swab.  A  previous allogeneic bone marrow transplant will interfere with test  results.  Call the Molecular Diagnostics Lab([phone]) for  instructions on sample collection for these patients.    This test was developed and its performance characteristics determined  by the [facility],   Molecular Diagnostics  Laboratory. It has not been cleared or approved by the FDA. The  laboratory is regulated under CLIA as qualified to perform  high-complexity testing. This test is used for clinical purposes. It  should not be regarded as investigational or for research.    A reside nt/fellow in an accredited training program was involved in the  selection of testing, review of laboratory data, and/or interpretation  of this case.  I, as the senior physician, attest that I: (i) confirmed  appropriate testing, (ii) examined the relevant raw data for the  specimen(s); and (iii) rendered or confirmed the interpretation(s).    Electronically Signed Out By:  Linda Brown M.D., [facility]    CPT Codes:  A: 62043-B9IMNS, 21255-U0AKLT, -KMVSNQ(2)    TESTING LAB LOCATION:  [address] [id number] [phone]    COLLECTION SITE:  Client:  [facility]  Location:  [facility] (B)     ABO/Rh type and screen [TTX552]    Collection Time: 05/31/17  7:39 AM   Result Value Ref Range    ABO A     RH(D)  Pos     Antibody Screen Neg     Test Valid Only At       [facility]    Specimen Expires 06/03/2017    Antibody titer red cell [JAB1238]    Collection Time: 05/31/17  7:39 AM   Result Value Ref Range    Antibody Titer Anti B: IgM 16, IgG 16    ABO Subtyping [RQE6767]    Collection Time: 05/31/17  7:39 AM   Result Value Ref Range    Antigen Type A1 Positive    EKG 12-lead, tracing only [EKG1]    Collection Time: 05/31/17 12:14 PM   Result Value Ref Range    Interpretation ECG Click View Image link to view waveform and result      UNOS cPRA   Date Value Ref Range Status   05/31/2017 62  Final

## 2017-06-01 LAB
BLD GP AB SCN TITR SERPL: NORMAL {TITER}
HLA TYPING COMPLETE SOT RECIPIENT: NORMAL
INTERPRETATION ECG - MUSE: NORMAL
LA PPP-IMP: NORMAL
M TB TUBERC IFN-G BLD QL: NEGATIVE
M TB TUBERC IFN-G/MITOGEN IGNF BLD: 0.04 IU/ML
PRA SINGLE ANTIGEN IGG ANTIBODY: NORMAL

## 2017-06-03 LAB — COPATH REPORT: NORMAL

## 2017-06-05 ENCOUNTER — MEDICAL CORRESPONDENCE (OUTPATIENT)
Dept: TRANSPLANT | Facility: CLINIC | Age: 37
End: 2017-06-05

## 2017-06-07 ENCOUNTER — COMMITTEE REVIEW (OUTPATIENT)
Dept: TRANSPLANT | Facility: CLINIC | Age: 37
End: 2017-06-07

## 2017-06-07 ENCOUNTER — TELEPHONE (OUTPATIENT)
Dept: TRANSPLANT | Facility: CLINIC | Age: 37
End: 2017-06-07

## 2017-06-07 NOTE — COMMITTEE REVIEW
Abdominal Committee Review Note     Evaluation Date: 5/31/2017  Committee Review Date: 6/7/2017    Organ being evaluated for: Kidney    Transplant Phase: Evaluation  Transplant Status: Active    Transplant Coordinator: Tonya Vallejo  Transplant Surgeon:       Referring Physician: Florentino Chapin    Primary Diagnosis: Focal Glomerular Sclerosis (Focal Segmental - FSG)  Secondary Diagnosis:     Committee Review Members:  Nephrology Nilay Petty, APRN CNP, Juan Bahena MD   Nurse Vandana Collier, RN, Elise Garcia, CHRISSIE   Nutrition Petra Montano,    Pharmacy Amanda Barrow [facility]    - Clinical Adeola Quintanilla, [facility], Leandra Dumont, MSW   Transplant Rhys Mohr MD, Vandana Collier, CHRISSIE, Blanca Smith LPN, Krista Woodard, CHRISSIE, Angie Le, CHRISSIE, Tonya Vallejo, CHRISSIE, Ned Carreon MD   Transplant Surgery Herb Hgih MD       Transplant Eligibility: Irreversible chronic kidney disease treated w/dialysis or expected need for dialysis    Committee Review Decision: Needs Re-presentation    Relative Contraindications: None    Absolute Contraindications: None    Committee Chair Herb High MD verbally attested to the committee's decision.    Committee Discussion Details:     Team reviewed.  His BMI 35.5 but is muscular and weight well-distributed.  Dr High who saw him in clinic approved him at BMI 35.5.  Team reviewed his cardiac picture and current echo normal so will not require him to see Cardiology here.  Team all agree that he must follow-through with the oral surgeon consult because of the suspicious lesion found on previous x-ray.  Provider notes not complete yet.  Team will review again after dental complete.  No listing yet; is on dialysis.    Decision:  Likely to be acceptable but review again once dental lesion fully investigated.

## 2017-06-07 NOTE — TELEPHONE ENCOUNTER
I attempted to reach Harshad to give him feedback from Selection meeting.  Message left on voicemail to call me back.  He needs to follow-through with dental plan.  Message to Deborah to cancel the cardiology and PFT appointments here 8-28-17.    Addendum:  I received a message later from Jayne Aguilar that Dr Bahena recommends that Harshad be evaluated by Cardiology because of mention of amyloid on echocardiogram.  I sent another message to  asking her NOT to cancel the cardiology appointment on 8-28-17.  I will review all with Harshad when he calls me back.

## 2017-06-08 ENCOUNTER — TELEPHONE (OUTPATIENT)
Dept: TRANSPLANT | Facility: CLINIC | Age: 37
End: 2017-06-08

## 2017-06-08 NOTE — TELEPHONE ENCOUNTER
Contacted patient to review outcome of selection committee meeting (See selection committee encounter).   Explained to patient that he/she needs to complete all components of the evaluation to be eligible for active status on the waiting list or to proceed with a live donor kidney transplant.   Reviewed next steps based on outcomes:   Patient will not be listed (patient is on dialysis and evaluation is not complete).  Confirmed with patient that on successful completion of outstanding components, patient is eligible for active status and they will receive a follow-up call.   Confirmed that patient has contact information for additional questions or concerns.   I shared with him that the team reviewed his dental status and need to insist that he follow through with the recommendation to see an oral surgeon regarding suspicious area on gum seen on x-ray.  He also has cavities to attend to.  He agrees to do this.  I informed him that he has 62% antibody level.  I informed him that the team reviewed his BMI 35.5 but all agree that he is acceptable because of how he carries it but he could try to lose a little weight and should not gain.  I told him that the team reviewed need for cardiology clearance and team wants him to be seen by Cardiology for sure.  I reminded him that he has appointment here in August.      Harshad states understanding of his situation and agrees to call me with any questions.

## 2017-06-15 LAB
SA1 CELL: NORMAL
SA1 COMMENTS: NORMAL
SA1 HI RISK ABY: NORMAL
SA1 MOD RISK ABY: NORMAL
SA1 TEST METHOD: NORMAL
SA2 CELL: NORMAL
SA2 COMMENTS: NORMAL
SA2 HI RISK ABY UA: NORMAL
SA2 MOD RISK ABY: NORMAL
SA2 TEST METHOD: NORMAL

## 2017-09-20 ENCOUNTER — PRE VISIT (OUTPATIENT)
Dept: CARDIOLOGY | Facility: CLINIC | Age: 37
End: 2017-09-20

## 2017-09-20 DIAGNOSIS — I10 HTN (HYPERTENSION): Primary | ICD-10-CM

## 2017-09-20 DIAGNOSIS — Z01.810 PRE-OPERATIVE CARDIOVASCULAR EXAMINATION: ICD-10-CM

## 2017-09-20 NOTE — TELEPHONE ENCOUNTER
Previsit nursing summary    HPI: Mr. Beatty is a 37-year-old  male with ESKD from hypertension on dialysis since August 2015, hypertension, sickle cell trait without evidence of disease, and Sunitha-Uribe tears who presents for kidney transplant evaluation. He was previously undergoing evaluation at [facility] but recently had an insurance change, and [facility] is no longer in his network. He was diagnosed with hypertension in his early 20's, but did not consistently take antihypertensive medications until a few years ago. 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 He presented to the hospital in August 2015 with worsening uremic symptoms and was initiated on dialysis. During that admission, he was found to have Sunitha-Mateo tears that were treated with epinephrine injections. It is also reported that he had cardiomyopathy with a reduced EF somewhere around 25%, however, repeat ECHO's have since shown improvement. He has no other known cardiac issues.     Procedures:    ECHO (5/31/2017)    Interpretation Summary  Mild left ventricular dilation with severe concentric hypertrophy. Normal  systolic function. EF 60-65%.  Mild right ventricular dilation with normal function.  No significant valvular pathology.  No pericardial effusion.  LVH can be from CKD/HTN, or alternatively infiltrative disease such as  amyloid.  No prior study for comparison.    ECHO (4/14/2016)  Final Impressions:   1. Borderline LV size, severely increased wall thickness, normal global systolic function with an estimated EF of 55 - 60%.   2. Severely enlarged left atrium.   3. The aortic valve is normal and trileaflet, no stenosis and trivial regurgitation.    ECHO (8/13/2015)   Summary   Asymmetric left ventricular septal hypertrophy   Left ventricular ejection fraction is calculated to be 65%.   No regional wall motion abnormalities.   Mild increased gradient through  the LVOT   Mild mitral regurgitation is present.   Mild tricuspid regurgitation.     Compared to echo of 5/7/14, the LV systolic function has normalized

## 2017-09-25 ENCOUNTER — OFFICE VISIT (OUTPATIENT)
Dept: CARDIOLOGY | Facility: CLINIC | Age: 37
End: 2017-09-25
Attending: INTERNAL MEDICINE
Payer: COMMERCIAL

## 2017-09-25 VITALS
HEIGHT: 70 IN | OXYGEN SATURATION: 96 % | BODY MASS INDEX: 37.75 KG/M2 | WEIGHT: 263.7 LBS | HEART RATE: 79 BPM | SYSTOLIC BLOOD PRESSURE: 125 MMHG | DIASTOLIC BLOOD PRESSURE: 71 MMHG

## 2017-09-25 DIAGNOSIS — Z76.82 ORGAN TRANSPLANT CANDIDATE: ICD-10-CM

## 2017-09-25 DIAGNOSIS — Z01.810 PRE-OPERATIVE CARDIOVASCULAR EXAMINATION: ICD-10-CM

## 2017-09-25 DIAGNOSIS — I15.0 RENOVASCULAR HYPERTENSION: ICD-10-CM

## 2017-09-25 DIAGNOSIS — N18.6 END STAGE RENAL DISEASE (H): Primary | ICD-10-CM

## 2017-09-25 PROCEDURE — 99212 OFFICE O/P EST SF 10 MIN: CPT | Mod: ZF

## 2017-09-25 PROCEDURE — 93010 ELECTROCARDIOGRAM REPORT: CPT | Mod: ZP | Performed by: INTERNAL MEDICINE

## 2017-09-25 PROCEDURE — 93005 ELECTROCARDIOGRAM TRACING: CPT | Mod: ZF

## 2017-09-25 PROCEDURE — 99204 OFFICE O/P NEW MOD 45 MIN: CPT | Mod: ZP | Performed by: INTERNAL MEDICINE

## 2017-09-25 ASSESSMENT — PAIN SCALES - GENERAL: PAINLEVEL: NO PAIN (0)

## 2017-09-25 NOTE — NURSING NOTE
Chief Complaint   Patient presents with     New Patient     risk stratify for kidney transplant/EKG     Vitals were taken and medications were reconciled. EKG was performed    Wesley Gonzáles MA  12:37 PM

## 2017-09-25 NOTE — MR AVS SNAPSHOT
After Visit Summary   9/25/2017    Harshad Beatty    MRN: [id number]           Patient Information     Date Of Birth          1980        Visit Information        Provider Department      9/25/2017 1:30 PM Krystin Wan MD [facility]        Today's Diagnoses     End stage renal disease (H)    -  1    Pre-operative cardiovascular examination        Renovascular hypertension        Organ transplant candidate          Care Instructions    Patient Instructions:  It was a pleasure to see you in the cardiology clinic today.      If you have any questions, you can reach my nurse, Jamila Meredith, at [phone].  Press Option #1 for the [facility], and then press Option #3 for nursing.  We are encouraging the use of Periscape to communicate with your HealthCare Provider    Note the new medications: none  Stop the following medications: none    Follow the American Heart Association Diet and Lifestyle recommendations:  Limit saturated fat, trans fat, sodium, red meat, sweets and sugar-sweetened beverages. If you choose to eat red meat, compare labels and select the leanest cuts available.  Aim for at least 150 minutes of moderate physical activity or 75 minutes of vigorous physical activity - or an equal combination of both - each week.    The results from today include: none  Please follow up with cardiology in one year if not transplanted    Sincerely,    Krystin Wan MD     If you have an urgent need after hours (8:00 am to 4:30 pm) please call [phone] and ask for the cardiology fellow on call.                    Follow-ups after your visit        Follow-up notes from your care team     Return in about 1 year (around 9/25/2018), or if symptoms worsen or fail to improve.      Who to contact     If you have questions or need follow up information about today's clinic visit or your schedule please contact [facility] directly at [phone].  Normal or  "non-critical lab and imaging results will be communicated to you by MyChart, letter or phone within 4 business days after the clinic has received the results. If you do not hear from us within 7 days, please contact the clinic through Mint[employer] or phone. If you have a critical or abnormal lab result, we will notify you by phone as soon as possible.  Submit refill requests through [employer] or call your pharmacy and they will forward the refill request to us. Please allow 3 business days for your refill to be completed.          Additional Information About Your Visit        [employer] Information     [employer] lets you send messages to your doctor, view your test results, renew your prescriptions, schedule appointments and more. To sign up, go to [email or web address] . Click on \"Log in\" on the left side of the screen, which will take you to the Welcome page. Then click on \"Sign up Now\" on the right side of the page.     You will be asked to enter the access code listed below, as well as some personal information. Please follow the directions to create your username and password.     Your access code is: [email or web address]  Expires: 12/10/2017  6:30 AM     Your access code will  in 90 days. If you need help or a new code, please call your [facility] clinic or [phone].        Care EveryWhere ID     This is your Care EveryWhere ID. This could be used by other organizations to access your [facility] medical records  [id number]        Your Vitals Were     Pulse Height Pulse Oximetry BMI (Body Mass Index)          79 1.778 m (5' 10\") 96% 37.84 kg/m2         Blood Pressure from Last 3 Encounters:   17 125/71   17 146/82    Weight from Last 3 Encounters:   17 119.6 kg (263 lb 11.2 oz)   17 117.3 kg (258 lb 8 oz)              We Performed the Following     EKG 12-lead, tracing only (Future)        Primary Care Provider    None Specified       No primary provider on file.        Equal Access to Services  "    JAKI PUGA : Hadii aad john sergio Penn, waaxda luqadaha, qaybta kaalmada simonalysha, herb allen haymargi smithcecyfabiola beltran . So [facility] [phone].    ATENCIÓN: Si habla español, tiene a rodriguez disposición servicios gratuitos de asistencia lingüística. Llame al [phone].    We comply with applicable federal civil rights laws and [place] laws. We do not discriminate on the basis of race, color, national origin, age, disability, sex, sexual orientation, or gender identity.            Thank you!     Thank you for choosing [facility]  for your care. Our goal is always to provide you with excellent care. Hearing back from our patients is one way we can continue to improve our services. Please take a few minutes to complete the written survey that you may receive in the mail after your visit with us. Thank you!             Your Updated Medication List - Protect others around you: Learn how to safely use, store and throw away your medicines at www.disposemymeds.org.          This list is accurate as of: 9/25/17 11:59 PM.  Always use your most recent med list.                   Brand Name Dispense Instructions for use Diagnosis    AMLODIPINE BESYLATE PO      Take 5 mg by mouth daily        calcium acetate 667 MG Caps capsule    PHOSLO     Take 2,668 mg by mouth 3 times daily (with meals)        LISINOPRIL PO      Take 80 mg by mouth daily        MINOXIDIL PO      Take 10 mg by mouth daily        SENSIPAR PO      Take 60 mg by mouth daily        VITAMIN D (CHOLECALCIFEROL) PO      Take 2,000 Units by mouth daily

## 2017-09-25 NOTE — PATIENT INSTRUCTIONS
Patient Instructions:  It was a pleasure to see you in the cardiology clinic today.      If you have any questions, you can reach my nurse, Jamila Meredith, at [phone].  Press Option #1 for the [facility] [place], and then press Option #3 for nursing.  We are encouraging the use of [employer] to communicate with your HealthCare Provider    Note the new medications: none  Stop the following medications: none    Follow the American Heart Association Diet and Lifestyle recommendations:  Limit saturated fat, trans fat, sodium, red meat, sweets and sugar-sweetened beverages. If you choose to eat red meat, compare labels and select the leanest cuts available.  Aim for at least 150 minutes of moderate physical activity or 75 minutes of vigorous physical activity - or an equal combination of both - each week.    The results from today include: none  Please follow up with cardiology in one year if not transplanted    Sincerely,    Krystin Wan MD     If you have an urgent need after hours (8:00 am to 4:30 pm) please call [phone] and ask for the cardiology fellow on call.

## 2017-09-25 NOTE — LETTER
9/25/2017      RE: Harshad Beatty  [address] [place] [address]       Dear Colleague,    Thank you for the opportunity to participate in the care of your patient, Harshad Beatty, at the [facility]. Please see a copy of my visit note below.      HPI: Mr. Beatty is a 37-year-old  male with ESKD from untreated hypertension, now on dialysis since August 2015. He has sickle cell trait without evidence of disease, and Sunitha-Uribe tears who presents for cardiac evaluation for future kidney transplantation. He was previously undergoing evaluation at [facility] but recently had an insurance change, and [facility] is no longer in his network. He was diagnosed with hypertension in his early 20's, but did not consistently take antihypertensive medications until a few years ago. He at one point had severe cardiomyopathy with a reduced EF around 25%, however, repeat ECHO's have since shown improvement. He has no other known cardiac issues.     He reports stable dyspnea with moderate exertion, but no chest pain. States he can walk several blocks and can do stairs without chest pain. He denies palpitations, worsening pedal edema, orthopnea, PND or syncope. He is not a diabetic and does not have a family history of premature cardiac disease.    PAST MEDICAL HISTORY:  Past Medical History:   Diagnosis Date     Anemia in chronic kidney disease      ESRD (end stage renal disease) (H)      History of cardiomyopathy      HTN (hypertension)      Secondary hyperparathyroidism (H)      Sickle cell trait (H)        CURRENT MEDICATIONS:  Current Outpatient Prescriptions   Medication Sig Dispense Refill     AMLODIPINE BESYLATE PO Take 5 mg by mouth daily       calcium acetate (PHOSLO) 667 MG CAPS capsule Take 2,668 mg by mouth 3 times daily (with meals)       VITAMIN D, CHOLECALCIFEROL, PO Take 2,000 Units by mouth daily       LISINOPRIL PO Take 80 mg by mouth  "daily       MINOXIDIL PO Take 10 mg by mouth daily       Cinacalcet HCl (SENSIPAR PO) Take 60 mg by mouth daily         PAST SURGICAL HISTORY:  Past Surgical History:   Procedure Laterality Date     CREATE FISTULA ARTERIOVENOUS UPPER EXTREMITY       ORIF right 5th metatarpal fracture         ALLERGIES   No Known Allergies    FAMILY HISTORY:  Family History   Problem Relation Age of Onset     Sickle Cell Anemia Father      Sickle Cell Trait Sister      Pancreatic Cancer Maternal Grandfather      KIDNEY DISEASE Cousin        SOCIAL HISTORY:  Social History     Social History     Marital status:      Spouse name: Yesica     Number of children: 3     Years of education: 16     Occupational History     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 No weight gain/loss   ENT: No visual disturbance, ear ache, epistaxis, sore throat  Allergies/Immunologic: Negative.   Respiratory: No cough, hemoptysia  Cardiovascular: As per HPI  GI: No nausea, vomiting, hematemesis, melena, or hematochezia  : on HD  Integument: Negative  Psychiatric: Negative  Neuro: Negative  Endocrinology: Negative   Musculoskeletal: Negative    EXAM:  /71 (BP Location: Left arm, Cuff Size: Adult Large)  Pulse 79  Ht 1.778 m (5' 10\")  Wt 119.6 kg (263 lb 11.2 oz)  SpO2 96%  BMI 37.84 kg/m2  In general, the patient is a pleasant male in no apparent distress.      HEENT: NC/AT.  PERRLA.  EOMI.  Sclerae white, not injected.    Neck: Carotids 2+ bilaterally without bruits.  No jugular venous distension. No thyromegaly.   Heart: RRR. Normal S1, S2. No murmur, rub, click, or gallop. There is no heave.    Lungs: Clear bilaterally.  No rhonchi, wheezes, " rales.   Abdomen: Soft, nontender, nondistended.   Extremities: No edema.  The pulses are 2+at the radial on the L and DP bilaterally. RUE fistula  Neuro: grossly non focal, gait normal.  Skin: no rashes.      Labs:  LIPID RESULTS:  Lab Results   Component Value Date    CHOL 160 05/31/2017    HDL 81 05/31/2017    LDL 69 05/31/2017    TRIG 51 05/31/2017    NHDL 79 05/31/2017       LIVER ENZYME RESULTS:  Lab Results   Component Value Date    AST 7 05/31/2017    ALT 19 05/31/2017       CBC RESULTS:  Lab Results   Component Value Date    WBC 3.6 (L) 05/31/2017    RBC 3.93 (L) 05/31/2017    HGB 11.1 (L) 05/31/2017    HCT 34.2 (L) 05/31/2017    MCV 87 05/31/2017    MCH 28.2 05/31/2017    MCHC 32.5 05/31/2017    RDW 13.2 05/31/2017     05/31/2017       BMP RESULTS:  Lab Results   Component Value Date     05/31/2017    POTASSIUM 4.4 05/31/2017    CHLORIDE 98 05/31/2017    CO2 26 05/31/2017    ANIONGAP 11 05/31/2017    GLC 77 05/31/2017    BUN 47 (H) 05/31/2017    CR 12.60 (H) 05/31/2017    GFRESTIMATED 5 (L) 05/31/2017    GFRESTBLACK 5 (L) 05/31/2017    JUAN 9.8 05/31/2017        A1C RESULTS:  No results found for: A1C    INR RESULTS:  Lab Results   Component Value Date    INR 1.05 05/31/2017       Cardiac data        ECHO (5/31/2017)  Mild left ventricular dilation with severe concentric hypertrophy. Normal systolic function. EF 60-65%.  Mild right ventricular dilation with normal function.  No significant valvular pathology.  No pericardial effusion.  LVH can be from CKD/HTN, or alternatively infiltrative disease such as amyloid.  No prior study for comparison.    ECHO (4/14/2016)   1. Borderline LV size, severely increased wall thickness, normal global systolic function with an estimated EF of 55 - 60%.   2. Severely enlarged left atrium.   3. The aortic valve is normal and trileaflet, no stenosis and trivial regurgitation.    ECHO (8/13/2015)   Asymmetric left ventricular septal hypertrophy   Left  ventricular ejection fraction is calculated to be 65%.   No regional wall motion abnormalities.   Mild increased gradient through the LVOT   Mild mitral regurgitation is present.   Mild tricuspid regurgitation.     Compared to echo of 5/7/14, the LV systolic function has normalized     Assessment and Plan:     37 year old male with end stage renal disease and hypertension.    Cardiac risk assessment -   Overall, patient has no active anginal or heart faliure symptoms. Exam shows no heart failure. The patient's Revised Marrero Cardiac Risk Index score is 1 with a low estimated post operative CV event rate of ~1% . Given the recent investigations he has had, he does not need any further cardiac work up at his point. Clinical suspicion for underlying CAD is low, therefore will defer stress testing at this time. Close attention to heart rate, blood pressure and fluid management would be recommended perioperatively in the event he gets a kidney in the near future.    Other comorbidities -   Hypertension - controlled (target BP <140/90).   Lipids - ldl-c 69 mg/dL (target <100). Daily exercise and diet low in saturated fat reinforced.  Diabetes - none.  Renal disease - Stage V, tolerating HD well. Euvolemic.      Follow up 1 year or sooner if needed    Krystin Wan MD, MS  Staff Cardiologist, [facility]   Pager: [phone]    CC  Patient Care Team:  Florentino Chapin MD as Nephrologist  Steven Salas MD as PCP (Family Practice)  [facility]  DELLA RING

## 2017-09-25 NOTE — PROGRESS NOTES
HPI: Mr. Beatty is a 37-year-old  male with ESKD from untreated hypertension, now on dialysis since August 2015. He has sickle cell trait without evidence of disease, and Sunitha-Uribe tears who presents for cardiac evaluation for future kidney transplantation. He was previously undergoing evaluation at [facility] but recently had an insurance change, and [facility] is no longer in his network. He was diagnosed with hypertension in his early 20's, but did not consistently take antihypertensive medications until a few years ago. He at one point had severe cardiomyopathy with a reduced EF around 25%, however, repeat ECHO's have since shown improvement. He has no other known cardiac issues.     He reports stable dyspnea with moderate exertion, but no chest pain. States he can walk several blocks and can do stairs without chest pain. He denies palpitations, worsening pedal edema, orthopnea, PND or syncope. He is not a diabetic and does not have a family history of premature cardiac disease.    PAST MEDICAL HISTORY:  Past Medical History:   Diagnosis Date     Anemia in chronic kidney disease      ESRD (end stage renal disease) (H)      History of cardiomyopathy      HTN (hypertension)      Secondary hyperparathyroidism (H)      Sickle cell trait (H)        CURRENT MEDICATIONS:  Current Outpatient Prescriptions   Medication Sig Dispense Refill     AMLODIPINE BESYLATE PO Take 5 mg by mouth daily       calcium acetate (PHOSLO) 667 MG CAPS capsule Take 2,668 mg by mouth 3 times daily (with meals)       VITAMIN D, CHOLECALCIFEROL, PO Take 2,000 Units by mouth daily       LISINOPRIL PO Take 80 mg by mouth daily       MINOXIDIL PO Take 10 mg by mouth daily       Cinacalcet HCl (SENSIPAR PO) Take 60 mg by mouth daily         PAST SURGICAL HISTORY:  Past Surgical History:   Procedure Laterality Date     CREATE FISTULA ARTERIOVENOUS UPPER EXTREMITY       ORIF right 5th metatarpal fracture         ALLERGIES   No Known  "Allergies    FAMILY HISTORY:  Family History   Problem Relation Age of Onset     Sickle Cell Anemia Father      Sickle Cell Trait Sister      Pancreatic Cancer Maternal Grandfather      KIDNEY DISEASE Cousin        SOCIAL HISTORY:  Social History     Social History     Marital status:      Spouse name: Yesica     Number of children: 3     Years of education: 16     Occupational History     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 No weight gain/loss   ENT: No visual disturbance, ear ache, epistaxis, sore throat  Allergies/Immunologic: Negative.   Respiratory: No cough, hemoptysia  Cardiovascular: As per HPI  GI: No nausea, vomiting, hematemesis, melena, or hematochezia  : on HD  Integument: Negative  Psychiatric: Negative  Neuro: Negative  Endocrinology: Negative   Musculoskeletal: Negative    EXAM:  /71 (BP Location: Left arm, Cuff Size: Adult Large)  Pulse 79  Ht 1.778 m (5' 10\")  Wt 119.6 kg (263 lb 11.2 oz)  SpO2 96%  BMI 37.84 kg/m2  In general, the patient is a pleasant male in no apparent distress.      HEENT: NC/AT.  PERRLA.  EOMI.  Sclerae white, not injected.    Neck: Carotids 2+ bilaterally without bruits.  No jugular venous distension. No thyromegaly.   Heart: RRR. Normal S1, S2. No murmur, rub, click, or gallop. There is no heave.    Lungs: Clear bilaterally.  No rhonchi, wheezes, rales.   Abdomen: Soft, nontender, nondistended.   Extremities: No edema.  The pulses are 2+at the radial on the L and DP bilaterally. RUE fistula  Neuro: grossly non focal, gait normal.  Skin: no rashes.      Labs:  LIPID RESULTS:  Lab Results   Component Value Date    CHOL 160 05/31/2017    HDL 81 05/31/2017    " LDL 69 05/31/2017    TRIG 51 05/31/2017    NHDL 79 05/31/2017       LIVER ENZYME RESULTS:  Lab Results   Component Value Date    AST 7 05/31/2017    ALT 19 05/31/2017       CBC RESULTS:  Lab Results   Component Value Date    WBC 3.6 (L) 05/31/2017    RBC 3.93 (L) 05/31/2017    HGB 11.1 (L) 05/31/2017    HCT 34.2 (L) 05/31/2017    MCV 87 05/31/2017    MCH 28.2 05/31/2017    MCHC 32.5 05/31/2017    RDW 13.2 05/31/2017     05/31/2017       BMP RESULTS:  Lab Results   Component Value Date     05/31/2017    POTASSIUM 4.4 05/31/2017    CHLORIDE 98 05/31/2017    CO2 26 05/31/2017    ANIONGAP 11 05/31/2017    GLC 77 05/31/2017    BUN 47 (H) 05/31/2017    CR 12.60 (H) 05/31/2017    GFRESTIMATED 5 (L) 05/31/2017    GFRESTBLACK 5 (L) 05/31/2017    JUAN 9.8 05/31/2017        A1C RESULTS:  No results found for: A1C    INR RESULTS:  Lab Results   Component Value Date    INR 1.05 05/31/2017       Cardiac data        ECHO (5/31/2017)  Mild left ventricular dilation with severe concentric hypertrophy. Normal systolic function. EF 60-65%.  Mild right ventricular dilation with normal function.  No significant valvular pathology.  No pericardial effusion.  LVH can be from CKD/HTN, or alternatively infiltrative disease such as amyloid.  No prior study for comparison.    ECHO (4/14/2016)   1. Borderline LV size, severely increased wall thickness, normal global systolic function with an estimated EF of 55 - 60%.   2. Severely enlarged left atrium.   3. The aortic valve is normal and trileaflet, no stenosis and trivial regurgitation.    ECHO (8/13/2015)   Asymmetric left ventricular septal hypertrophy   Left ventricular ejection fraction is calculated to be 65%.   No regional wall motion abnormalities.   Mild increased gradient through the LVOT   Mild mitral regurgitation is present.   Mild tricuspid regurgitation.     Compared to echo of 5/7/14, the LV systolic function has normalized     Assessment and Plan:     37 year old  male with end stage renal disease and hypertension.    Cardiac risk assessment -   Overall, patient has no active anginal or heart faliure symptoms. Exam shows no heart failure. The patient's Revised Marrero Cardiac Risk Index score is 1 with a low estimated post operative CV event rate of ~1% . Given the recent investigations he has had, he does not need any further cardiac work up at his point. Clinical suspicion for underlying CAD is low, therefore will defer stress testing at this time. Close attention to heart rate, blood pressure and fluid management would be recommended perioperatively in the event he gets a kidney in the near future.    Other comorbidities -   Hypertension - controlled (target BP <140/90).   Lipids - ldl-c 69 mg/dL (target <100). Daily exercise and diet low in saturated fat reinforced.  Diabetes - none.  Renal disease - Stage V, tolerating HD well. Euvolemic.      Follow up 1 year or sooner if needed    Krystin Wan MD, MS  Staff Cardiologist, [facility]   Pager: [phone]    CC  Patient Care Team:  Florentino Chapin MD as Nephrologist  Steven Salas MD as PCP (Family Practice)  [facility]

## 2017-09-26 LAB — INTERPRETATION ECG - MUSE: NORMAL

## 2017-09-27 ENCOUNTER — TELEPHONE (OUTPATIENT)
Dept: TRANSPLANT | Facility: CLINIC | Age: 37
End: 2017-09-27

## 2017-09-27 NOTE — TELEPHONE ENCOUNTER
Message left for Harshad to call me back to let me know if dental work complete or still in process?  Did lesion get a diagnosis, etc?  Left message requesting a call back.

## 2017-10-28 NOTE — PROGRESS NOTES
HPI: Mr. Beatty is a 37-year-old  male with ESKD from untreated hypertension, now on dialysis since August 2015. He has sickle cell trait without evidence of disease, and Sunitha-Uribe tears who presents for cardiac evaluation for future kidney transplantation. He was previously undergoing evaluation at [facility] but recently had an insurance change, and [facility] is no longer in his network. He was diagnosed with hypertension in his early 20's, but did not consistently take antihypertensive medications until a few years ago. He at one point had severe cardiomyopathy with a reduced EF around 25%, however, repeat ECHO's have since shown improvement. He has no other known cardiac issues.     He reports stable dyspnea with moderate exertion, but no chest pain. States he can walk several blocks and can do stairs without chest pain. He denies palpitations, worsening pedal edema, orthopnea, PND or syncope. He is not a diabetic and does not have a family history of premature cardiac disease.    PAST MEDICAL HISTORY:  Past Medical History:   Diagnosis Date     Anemia in chronic kidney disease      ESRD (end stage renal disease) (H)      History of cardiomyopathy      HTN (hypertension)      Secondary hyperparathyroidism (H)      Sickle cell trait (H)        CURRENT MEDICATIONS:  Current Outpatient Prescriptions   Medication Sig Dispense Refill     AMLODIPINE BESYLATE PO Take 5 mg by mouth daily       calcium acetate (PHOSLO) 667 MG CAPS capsule Take 2,668 mg by mouth 3 times daily (with meals)       VITAMIN D, CHOLECALCIFEROL, PO Take 2,000 Units by mouth daily       LISINOPRIL PO Take 80 mg by mouth daily       MINOXIDIL PO Take 10 mg by mouth daily       Cinacalcet HCl (SENSIPAR PO) Take 60 mg by mouth daily         PAST SURGICAL HISTORY:  Past Surgical History:   Procedure Laterality Date     CREATE FISTULA ARTERIOVENOUS UPPER EXTREMITY       ORIF right 5th metatarpal fracture         ALLERGIES   No Known  "Allergies    FAMILY HISTORY:  Family History   Problem Relation Age of Onset     Sickle Cell Anemia Father      Sickle Cell Trait Sister      Pancreatic Cancer Maternal Grandfather      KIDNEY DISEASE Cousin        SOCIAL HISTORY:  Social History     Social History     Marital status:      Spouse name: Yesica     Number of children: 3     Years of education: 16     Occupational History     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 No weight gain/loss   ENT: No visual disturbance, ear ache, epistaxis, sore throat  Allergies/Immunologic: Negative.   Respiratory: No cough, hemoptysia  Cardiovascular: As per HPI  GI: No nausea, vomiting, hematemesis, melena, or hematochezia  : on HD  Integument: Negative  Psychiatric: Negative  Neuro: Negative  Endocrinology: Negative   Musculoskeletal: Negative    EXAM:  /71 (BP Location: Left arm, Cuff Size: Adult Large)  Pulse 79  Ht 1.778 m (5' 10\")  Wt 119.6 kg (263 lb 11.2 oz)  SpO2 96%  BMI 37.84 kg/m2  In general, the patient is a pleasant male in no apparent distress.      HEENT: NC/AT.  PERRLA.  EOMI.  Sclerae white, not injected.    Neck: Carotids 2+ bilaterally without bruits.  No jugular venous distension. No thyromegaly.   Heart: RRR. Normal S1, S2. No murmur, rub, click, or gallop. There is no heave.    Lungs: Clear bilaterally.  No rhonchi, wheezes, rales.   Abdomen: Soft, nontender, nondistended.   Extremities: No edema.  The pulses are 2+at the radial on the L and DP bilaterally. RUE fistula  Neuro: grossly non focal, gait normal.  Skin: no rashes.      Labs:  LIPID RESULTS:  Lab Results   Component Value Date    CHOL 160 05/31/2017    HDL 81 05/31/2017    " LDL 69 05/31/2017    TRIG 51 05/31/2017    NHDL 79 05/31/2017       LIVER ENZYME RESULTS:  Lab Results   Component Value Date    AST 7 05/31/2017    ALT 19 05/31/2017       CBC RESULTS:  Lab Results   Component Value Date    WBC 3.6 (L) 05/31/2017    RBC 3.93 (L) 05/31/2017    HGB 11.1 (L) 05/31/2017    HCT 34.2 (L) 05/31/2017    MCV 87 05/31/2017    MCH 28.2 05/31/2017    MCHC 32.5 05/31/2017    RDW 13.2 05/31/2017     05/31/2017       BMP RESULTS:  Lab Results   Component Value Date     05/31/2017    POTASSIUM 4.4 05/31/2017    CHLORIDE 98 05/31/2017    CO2 26 05/31/2017    ANIONGAP 11 05/31/2017    GLC 77 05/31/2017    BUN 47 (H) 05/31/2017    CR 12.60 (H) 05/31/2017    GFRESTIMATED 5 (L) 05/31/2017    GFRESTBLACK 5 (L) 05/31/2017    JUAN 9.8 05/31/2017        A1C RESULTS:  No results found for: A1C    INR RESULTS:  Lab Results   Component Value Date    INR 1.05 05/31/2017       Cardiac data        ECHO (5/31/2017)  Mild left ventricular dilation with severe concentric hypertrophy. Normal systolic function. EF 60-65%.  Mild right ventricular dilation with normal function.  No significant valvular pathology.  No pericardial effusion.  LVH can be from CKD/HTN, or alternatively infiltrative disease such as amyloid.  No prior study for comparison.    ECHO (4/14/2016)   1. Borderline LV size, severely increased wall thickness, normal global systolic function with an estimated EF of 55 - 60%.   2. Severely enlarged left atrium.   3. The aortic valve is normal and trileaflet, no stenosis and trivial regurgitation.    ECHO (8/13/2015)   Asymmetric left ventricular septal hypertrophy   Left ventricular ejection fraction is calculated to be 65%.   No regional wall motion abnormalities.   Mild increased gradient through the LVOT   Mild mitral regurgitation is present.   Mild tricuspid regurgitation.     Compared to echo of 5/7/14, the LV systolic function has normalized     Assessment and Plan:     37 year old  male with end stage renal disease and hypertension.    Cardiac risk assessment -   Overall, patient has no active anginal or heart faliure symptoms. Exam shows no heart failure. The patient's Revised Marrero Cardiac Risk Index score is 1 with a low estimated post operative CV event rate of ~1% . Given the recent investigations he has had, he does not need any further cardiac work up at his point. Clinical suspicion for underlying CAD is low, therefore will defer stress testing at this time. Close attention to heart rate, blood pressure and fluid management would be recommended perioperatively in the event he gets a kidney in the near future.    Other comorbidities -   Hypertension - controlled (target BP <140/90).   Lipids - ldl-c 69 mg/dL (target <100). Daily exercise and diet low in saturated fat reinforced.  Diabetes - none.  Renal disease - Stage V, tolerating HD well. Euvolemic.      Follow up 1 year or sooner if needed    Krystin Wan MD, MS  Staff Cardiologist, [facility]   Pager: [phone]    CC  Patient Care Team:  Florentino Chapin MD as Nephrologist  Steven Salas MD as PCP (Family Practice)  [facility]

## 2017-11-15 ENCOUNTER — MEDICAL CORRESPONDENCE (OUTPATIENT)
Dept: TRANSPLANT | Facility: CLINIC | Age: 37
End: 2017-11-15

## 2017-11-22 ENCOUNTER — COMMITTEE REVIEW (OUTPATIENT)
Dept: TRANSPLANT | Facility: CLINIC | Age: 37
End: 2017-11-22

## 2017-11-22 NOTE — COMMITTEE REVIEW
Abdominal Committee Review Note     Evaluation Date: 5/31/2017  Committee Review Date: 11/22/2017    Organ being evaluated for: Kidney    Transplant Phase: Evaluation  Transplant Status: Active    Transplant Coordinator: Tonya Vallejo  Transplant Surgeon:       Referring Physician: Florentino Chapin    Primary Diagnosis: Focal Glomerular Sclerosis (Focal Segmental - FSG)  Secondary Diagnosis:     Committee Review Members:  Nephrology Andrey Connolly MD, Nilay Petty, APRN CNP, Brijesh Gaxiola MD   Nutrition Petra Montano,    Pharmacy Amanda Barrow, [facility]    - Clinical Adeola Quintanilla, [facility], Leandra Dumotn, [facility]   Transplant Jayne Aguilar PA-C, Kristy Minaya, CHRISSIE, Vandana Collier, RN, Nora El, LPN, Angie Le, RN, Tonya Vallejo, RN, Juan Bahena MD, Elise Garcia, CHRISSIE, Herb High MD       Transplant Eligibility: Irreversible chronic kidney disease treated w/dialysis or expected need for dialysis    Committee Review Decision: Approved  (OK to list active when $ prior authorization in place)    Relative Contraindications: None    Absolute Contraindications: None    Committee Chair Herb High MD verbally attested to the committee's decision.    Committee Discussion Details: Reviewed with team that Harshad has completed entire evaluation, reviewed the note from oral surgeon clearing the oral mass.  Next step is to submit all of the clinical documents and form to [facility] to get $ prior authorization in place.    Message left on Harshad' voicemail with this information.

## 2017-11-29 ENCOUNTER — TELEPHONE (OUTPATIENT)
Dept: TRANSPLANT | Facility: CLINIC | Age: 37
End: 2017-11-29

## 2017-11-29 DIAGNOSIS — Z76.82 ORGAN TRANSPLANT CANDIDATE: Primary | ICD-10-CM

## 2017-11-29 NOTE — TELEPHONE ENCOUNTER
I was notified today that insurance prior authorization for transplant was in place.  I contacted Patricia at dialysis and notified him.  I reviewed what typically might happen when he is offered a kidney.  I advised him to get ALA done via his dialysis unit as soon as mailers and letter/order come.  I notified him that he is now assigned to a new coordinator.  He states understanding.     Process completed to add Harshad to the active kidney list.

## 2018-01-08 ENCOUNTER — RESULTS ONLY (OUTPATIENT)
Dept: OTHER | Facility: CLINIC | Age: 38
End: 2018-01-08

## 2018-01-08 DIAGNOSIS — Z76.82 ORGAN TRANSPLANT CANDIDATE: ICD-10-CM

## 2018-01-12 LAB — PRA SINGLE ANTIGEN IGG ANTIBODY: NORMAL

## 2018-01-21 LAB
PROTOCOL CUTOFF: NORMAL
UNACCEPTABLE ANTIGEN: NORMAL
UNOS CPRA: 0

## 2018-03-08 VITALS — HEIGHT: 72 IN | WEIGHT: 264.55 LBS | BODY MASS INDEX: 35.83 KG/M2

## 2018-04-09 ENCOUNTER — RESULTS ONLY (OUTPATIENT)
Dept: OTHER | Facility: CLINIC | Age: 38
End: 2018-04-09

## 2018-04-09 DIAGNOSIS — Z76.82 ORGAN TRANSPLANT CANDIDATE: ICD-10-CM

## 2018-04-12 LAB — PRA SINGLE ANTIGEN IGG ANTIBODY: NORMAL

## 2018-04-16 ENCOUNTER — RESULTS ONLY (OUTPATIENT)
Dept: OTHER | Facility: CLINIC | Age: 38
End: 2018-04-16

## 2018-04-18 LAB — CROSSMATCH RESULT: NORMAL

## 2018-05-01 ENCOUNTER — TELEPHONE (OUTPATIENT)
Dept: TRANSPLANT | Facility: CLINIC | Age: 38
End: 2018-05-01

## 2018-05-01 NOTE — TELEPHONE ENCOUNTER
Coordinator left pt msg, requested call back. Wife is approved as his donor and is looking at scheduled transplant 6/7/18. Would like to verify this date works for pt. Contact number provided.

## 2018-05-08 NOTE — TELEPHONE ENCOUNTER
Coordinator left pt 2nd msg. Wanted to see if 6/7/18 would work for transplant. Requested call back, contact information provided.

## 2018-05-09 DIAGNOSIS — Z76.82 AWAITING ORGAN TRANSPLANT: Primary | ICD-10-CM

## 2018-05-09 DIAGNOSIS — N18.6 ESRD (END STAGE RENAL DISEASE) (H): ICD-10-CM

## 2018-05-09 NOTE — TELEPHONE ENCOUNTER
Pt called coordinator back to state 6/7/18 would work for transplant.   Pt will have pre-op appointments and final crossmatch on 6/4/18.   Reviewed general components of inpt stay and post-transplant routines, including frequent appointments here as an outpt x1-2 weeks.  PFR notified of transplant.  Dialysis notified of transplant date.   Pt verbalized understanding and had no further questions.

## 2018-05-10 ENCOUNTER — DOCUMENTATION ONLY (OUTPATIENT)
Dept: TRANSPLANT | Facility: CLINIC | Age: 38
End: 2018-05-10

## 2018-05-10 ENCOUNTER — HOSPITAL ENCOUNTER (INPATIENT)
Facility: CLINIC | Age: 38
Setting detail: SURGERY ADMIT
End: 2018-05-10
Attending: TRANSPLANT SURGERY | Admitting: TRANSPLANT SURGERY
Payer: COMMERCIAL

## 2018-05-24 ENCOUNTER — TELEPHONE (OUTPATIENT)
Dept: TRANSPLANT | Facility: CLINIC | Age: 38
End: 2018-05-24

## 2018-05-24 VITALS — WEIGHT: 263.69 LBS | BODY MASS INDEX: 37.75 KG/M2 | HEIGHT: 70 IN

## 2018-05-24 DIAGNOSIS — Z76.82 ORGAN TRANSPLANT CANDIDATE: Primary | ICD-10-CM

## 2018-05-24 DIAGNOSIS — N18.6 END STAGE RENAL DISEASE (H): ICD-10-CM

## 2018-05-29 ENCOUNTER — OFFICE VISIT (OUTPATIENT)
Dept: TRANSPLANT | Facility: CLINIC | Age: 38
End: 2018-05-29
Attending: SURGERY
Payer: COMMERCIAL

## 2018-05-29 ENCOUNTER — TELEPHONE (OUTPATIENT)
Dept: TRANSPLANT | Facility: CLINIC | Age: 38
End: 2018-05-29

## 2018-05-29 VITALS
RESPIRATION RATE: 20 BRPM | WEIGHT: 261 LBS | TEMPERATURE: 98.1 F | HEART RATE: 96 BPM | DIASTOLIC BLOOD PRESSURE: 78 MMHG | OXYGEN SATURATION: 99 % | SYSTOLIC BLOOD PRESSURE: 140 MMHG | HEIGHT: 70 IN | BODY MASS INDEX: 37.37 KG/M2

## 2018-05-29 DIAGNOSIS — N18.6 ESRD (END STAGE RENAL DISEASE) (H): Primary | ICD-10-CM

## 2018-05-29 DIAGNOSIS — I10 ESSENTIAL HYPERTENSION: ICD-10-CM

## 2018-05-29 DIAGNOSIS — N05.1 FSGS (FOCAL SEGMENTAL GLOMERULOSCLEROSIS): ICD-10-CM

## 2018-05-29 DIAGNOSIS — Z76.82 ORGAN TRANSPLANT CANDIDATE: ICD-10-CM

## 2018-05-29 RX ORDER — CARVEDILOL 25 MG/1
2 TABLET ORAL DAILY
Refills: 3 | Status: ON HOLD | COMMUNITY
Start: 2018-04-27 | End: 2018-08-10

## 2018-05-29 ASSESSMENT — PAIN SCALES - GENERAL: PAINLEVEL: NO PAIN (0)

## 2018-05-29 NOTE — LETTER
"2018      RE: Harshad Beatty  [address]  [place] [address]       Mr. Beatty comes to clinic to discuss his upcoming planned living donor kidney transplant.  His wife is his intended donor.  Mr. Beatty has been on dialysis for about 3 years.  His access is working well.  His pre-transplant course has been remarkable for efforts at weight loss.  His BMI hovers around 35-37, at the upper limits of acceptable to proceed with transplant.  This has not changed since his last clinic visit. This BMI was acceptable for remaining active on the  donor waitlist.      Vitals: /78 (BP Location: Left arm, Patient Position: Chair, Cuff Size: Adult Regular)  Pulse 96  Temp 98.1  F (36.7  C) (Oral)  Resp 20  Ht 1.778 m (5' 10\")  Wt 118.4 kg (261 lb)  SpO2 99%  BMI 37.45 kg/m2  BMI= Body mass index is 37.45 kg/(m^2).    During preoperative review of imaging it was found that his wife can only donate her right kidney due to an ostial stenosis of a lower polar accessory artery, and upon further review, there are TWO renal veins, both medium in size that will require venoplasty.  Due to these complex anatomic issues, in combination with his borderline acceptable weight, the technical risk of accepting his wife's kidney is significantly increased.    In review, Mr. Beatty is an unsensitized blood type A individual, so he should be easy to match.  His wife is blood type B.      In the interest of getting Mr. Beatty transplanted with the lowest risk of technical complications (which could increase his risk of graft loss), I suggested the possibility of searching for a more anatomically suitable donor through paired exchange, of course, if his wife is in agreement.       He will discuss the rational with her.  I spent over 20 minutes in counselling reviewing the kidney anatomy with him.  He asked good questions and has a good appreciation of the situation. I would advise cancelling the upcoming direct " donation if he and his wife are interested in trying paired exchange.  I think if no match is found in the next couple months (or slightly longer if his access is doing ok) it would be reasonable to proceed with direct donation with the understanding of increased technical risk.     He will discuss these options with his wife and report back to his coordinator with their decision.  I am available to meet with his wife if she desires to review her kidney anatomy and further discuss the situation.  He was encouraged to continue his efforts at weight loss as this will lower his risk of post-transplant diabetes and wound complications.    Total time 25 min, 20 minutes spent in counselling.      Herb High MD, MS  Associate Professor of Surgery   Division of Transplantation  Surgical Director, Kidney Transplant Program  Surgical Director, Living Kidney Donor Program  Associate Surgical Director, Chronic Pancreatitis Program  [facility]  [phone]

## 2018-05-29 NOTE — MR AVS SNAPSHOT
"              After Visit Summary   2018    Harshad Beatty    MRN: [id number]           Patient Information     Date Of Birth          1980        Visit Information        Provider Department      2018 8:40 AM Herb High MD [facility] Solid Organ Transplant        Today's Diagnoses     ESRD (end stage renal disease) (H)    -  1    Organ transplant candidate        FSGS (focal segmental glomerulosclerosis)        Essential hypertension           Follow-ups after your visit        Who to contact     If you have questions or need follow up information about today's clinic visit or your schedule please contact [facility] SOLID ORGAN TRANSPLANT directly at [phone].  Normal or non-critical lab and imaging results will be communicated to you by MyChart, letter or phone within 4 business days after the clinic has received the results. If you do not hear from us within 7 days, please contact the clinic through [employer] or phone. If you have a critical or abnormal lab result, we will notify you by phone as soon as possible.  Submit refill requests through The Echo Nest or call your pharmacy and they will forward the refill request to us. Please allow 3 business days for your refill to be completed.          Additional Information About Your Visit        MyChart Information     The [employer] Nest lets you send messages to your doctor, view your test results, renew your prescriptions, schedule appointments and more. To sign up, go to [email or web address] . Click on \"Log in\" on the left side of the screen, which will take you to the Welcome page. Then click on \"Sign up Now\" on the right side of the page.     You will be asked to enter the access code listed below, as well as some personal information. Please follow the directions to create your username and password.     Your access code is: [email or web address]  Expires: 2018  6:30 AM     Your access code will  in 90 days. If you need help or a new code, please call " "your [place] clinic or [phone].        Care EveryWhere ID     This is your Care EveryWhere ID. This could be used by other organizations to access your [place] medical records  [id number]        Your Vitals Were     Pulse Temperature Respirations Height Pulse Oximetry BMI (Body Mass Index)    96 98.1  F (36.7  C) (Oral) 20 1.778 m (5' 10\") 99% 37.45 kg/m2       Blood Pressure from Last 3 Encounters:   05/29/18 140/78   09/25/17 125/71   05/31/17 146/82    Weight from Last 3 Encounters:   05/29/18 118.4 kg (261 lb)   05/24/18 119.6 kg (263 lb 11 oz)   03/08/18 120 kg (264 lb 8.8 oz)              Today, you had the following     No orders found for display         Today's Medication Changes          These changes are accurate as of 5/29/18 11:59 PM.  If you have any questions, ask your nurse or doctor.               Stop taking these medicines if you haven't already. Please contact your care team if you have questions.     VITAMIN D (CHOLECALCIFEROL) PO   Stopped by:  Herb High MD                    Primary Care Provider Fax #    Physician No Ref-Primary [phone]       No address on file        Equal Access to Services     JAKI PUGA : Silas shaffero [place], waaxda luqadaha, qaybta kaalmada adenarcisoyada, herb mar. So [facility] [phone].    ATENCIÓN: Si habla español, tiene a rodriguez disposición servicios gratuitos de asistencia lingüística. Llame al [phone].    We comply with applicable federal civil rights laws and [place] laws. We do not discriminate on the basis of race, color, national origin, age, disability, sex, sexual orientation, or gender identity.            Thank you!     Thank you for choosing [facility] SOLID ORGAN TRANSPLANT  for your care. Our goal is always to provide you with excellent care. Hearing back from our patients is one way we can continue to improve our services. Please take a few minutes to complete the written survey that you may " receive in the mail after your visit with us. Thank you!             Your Updated Medication List - Protect others around you: Learn how to safely use, store and throw away your medicines at www.disposemymeds.org.          This list is accurate as of 5/29/18 11:59 PM.  Always use your most recent med list.                   Brand Name Dispense Instructions for use Diagnosis    AMLODIPINE BESYLATE PO      Take 5 mg by mouth daily        calcium acetate 667 MG Caps capsule    PHOSLO     Take 2,668 mg by mouth 3 times daily (with meals)        carvedilol 25 MG tablet    COREG     Take 2 tablets by mouth daily        LISINOPRIL PO      Take 80 mg by mouth daily        MINOXIDIL PO      Take 10 mg by mouth daily        SENSIPAR PO      Take 60 mg by mouth daily

## 2018-05-30 ENCOUNTER — TELEPHONE (OUTPATIENT)
Dept: TRANSPLANT | Facility: CLINIC | Age: 38
End: 2018-05-30

## 2018-05-30 NOTE — TELEPHONE ENCOUNTER
Coordinator left pt msg x2, requesting call back. Wondering if pt and his wife were able to discuss paired kidney exchange at all. Also, wanted to let pt know he could be backup for a kidney transplant wait list payback chain scheduled for tomorrow at 5/31/18.    Pt called back and is interested in being backup for tomorrow kidney transplant chain. Pt will be NPO starting tonight incase he were to become primary. Pt and his wife are going to discuss if they are interested in PKE tonight.

## 2018-06-07 NOTE — PROGRESS NOTES
"Mr. Beatty comes to clinic to discuss his upcoming planned living donor kidney transplant.  His wife is his intended donor.  Mr. Beatty has been on dialysis for about 3 years.  His access is working well.  His pre-transplant course has been remarkable for efforts at weight loss.  His BMI hovers around 35-37, at the upper limits of acceptable to proceed with transplant.  This has not changed since his last clinic visit. This BMI was acceptable for remaining active on the  donor waitlist.      Vitals: /78 (BP Location: Left arm, Patient Position: Chair, Cuff Size: Adult Regular)  Pulse 96  Temp 98.1  F (36.7  C) (Oral)  Resp 20  Ht 1.778 m (5' 10\")  Wt 118.4 kg (261 lb)  SpO2 99%  BMI 37.45 kg/m2  BMI= Body mass index is 37.45 kg/(m^2).    During preoperative review of imaging it was found that his wife can only donate her right kidney due to an ostial stenosis of a lower polar accessory artery, and upon further review, there are TWO renal veins, both medium in size that will require venoplasty.  Due to these complex anatomic issues, in combination with his borderline acceptable weight, the technical risk of accepting his wife's kidney is significantly increased.    In review, Mr. Beatty is an unsensitized blood type A individual, so he should be easy to match.  His wife is blood type B.      In the interest of getting Mr. Beatty transplanted with the lowest risk of technical complications (which could increase his risk of graft loss), I suggested the possibility of searching for a more anatomically suitable donor through paired exchange, of course, if his wife is in agreement.       He will discuss the rational with her.  I spent over 20 minutes in counselling reviewing the kidney anatomy with him.  He asked good questions and has a good appreciation of the situation. I would advise cancelling the upcoming direct donation if he and his wife are interested in trying paired exchange.  I think if no " match is found in the next couple months (or slightly longer if his access is doing ok) it would be reasonable to proceed with direct donation with the understanding of increased technical risk.     He will discuss these options with his wife and report back to his coordinator with their decision.  I am available to meet with his wife if she desires to review her kidney anatomy and further discuss the situation.  He was encouraged to continue his efforts at weight loss as this will lower his risk of post-transplant diabetes and wound complications.    Total time 25 min, 20 minutes spent in counselling.      Herb High MD, MS  Associate Professor of Surgery   Division of Transplantation  Surgical Director, Kidney Transplant Program  Surgical Director, Living Kidney Donor Program  Associate Surgical Director, Chronic Pancreatitis Program  [facility]  [phone]

## 2018-07-09 ENCOUNTER — ORGAN (OUTPATIENT)
Dept: TRANSPLANT | Facility: CLINIC | Age: 38
End: 2018-07-09

## 2018-07-11 ENCOUNTER — RESULTS ONLY (OUTPATIENT)
Dept: OTHER | Facility: CLINIC | Age: 38
End: 2018-07-11

## 2018-07-11 DIAGNOSIS — Z76.82 ORGAN TRANSPLANT CANDIDATE: ICD-10-CM

## 2018-07-16 ENCOUNTER — RESULTS ONLY (OUTPATIENT)
Dept: OTHER | Facility: CLINIC | Age: 38
End: 2018-07-16

## 2018-07-16 DIAGNOSIS — Z76.82 AWAITING ORGAN TRANSPLANT: Primary | ICD-10-CM

## 2018-07-16 DIAGNOSIS — N18.6 ESRD (END STAGE RENAL DISEASE) (H): ICD-10-CM

## 2018-07-16 LAB — PRA SINGLE ANTIGEN IGG ANTIBODY: NORMAL

## 2018-07-17 ENCOUNTER — TELEPHONE (OUTPATIENT)
Dept: TRANSPLANT | Facility: CLINIC | Age: 38
End: 2018-07-17

## 2018-07-17 NOTE — TELEPHONE ENCOUNTER
Coordinator left pt msg, requesting call back. Would like to go over transplant instructions with pt. Contact information provided.    Coordinator spoke with dialysis and let them know about pt's upcoming transplant on 8/7/18.

## 2018-07-18 LAB — CROSSMATCH RESULT: NORMAL

## 2018-07-18 NOTE — TELEPHONE ENCOUNTER
Coordinator left pt second msg, requesting call back. Want to review instructions for upcoming transplant on 8/7/18. Contact information provided.

## 2018-07-23 NOTE — TELEPHONE ENCOUNTER
Coordinator called pt to go over instructions for upcoming kidney transplant chain on 8/7/18.  Pt will have pre-op appointments and final crossmatch on 7/30/18 .  Reviewed general components of inpt stay and post-transplant routines, including frequent appointments here as an outpt x1-2 weeks.  PFR notified of transplant.  Dialysis notified of transplant date.    Pt verbalized understanding and had no further questions.

## 2018-07-30 ENCOUNTER — RESULTS ONLY (OUTPATIENT)
Dept: OTHER | Facility: CLINIC | Age: 38
End: 2018-07-30

## 2018-07-30 ENCOUNTER — APPOINTMENT (OUTPATIENT)
Dept: GENERAL RADIOLOGY | Facility: CLINIC | Age: 38
End: 2018-07-30
Payer: COMMERCIAL

## 2018-07-30 ENCOUNTER — OFFICE VISIT (OUTPATIENT)
Dept: TRANSPLANT | Facility: CLINIC | Age: 38
End: 2018-07-30
Attending: SURGERY
Payer: COMMERCIAL

## 2018-07-30 ENCOUNTER — APPOINTMENT (OUTPATIENT)
Dept: TRANSPLANT | Facility: CLINIC | Age: 38
End: 2018-07-30
Attending: SURGERY
Payer: COMMERCIAL

## 2018-07-30 ENCOUNTER — OFFICE VISIT (OUTPATIENT)
Dept: NEPHROLOGY | Facility: CLINIC | Age: 38
End: 2018-07-30
Attending: INTERNAL MEDICINE
Payer: COMMERCIAL

## 2018-07-30 VITALS
RESPIRATION RATE: 18 BRPM | WEIGHT: 264 LBS | SYSTOLIC BLOOD PRESSURE: 149 MMHG | HEART RATE: 76 BPM | TEMPERATURE: 97.7 F | DIASTOLIC BLOOD PRESSURE: 76 MMHG | BODY MASS INDEX: 37.8 KG/M2 | OXYGEN SATURATION: 100 % | HEIGHT: 70 IN

## 2018-07-30 DIAGNOSIS — Z76.82 AWAITING ORGAN TRANSPLANT: ICD-10-CM

## 2018-07-30 DIAGNOSIS — N25.81 SECONDARY HYPERPARATHYROIDISM (H): ICD-10-CM

## 2018-07-30 DIAGNOSIS — Z99.2 ANEMIA IN CHRONIC KIDNEY DISEASE, ON CHRONIC DIALYSIS (H): ICD-10-CM

## 2018-07-30 DIAGNOSIS — N18.6 ESRD (END STAGE RENAL DISEASE) (H): ICD-10-CM

## 2018-07-30 DIAGNOSIS — N18.6 ESRD (END STAGE RENAL DISEASE) (H): Primary | ICD-10-CM

## 2018-07-30 DIAGNOSIS — D63.1 ANEMIA IN CHRONIC KIDNEY DISEASE, ON CHRONIC DIALYSIS (H): ICD-10-CM

## 2018-07-30 DIAGNOSIS — D57.3 SICKLE CELL TRAIT (H): ICD-10-CM

## 2018-07-30 DIAGNOSIS — Z01.811 PRE-OP CHEST EXAM: Primary | ICD-10-CM

## 2018-07-30 DIAGNOSIS — N18.6 ANEMIA IN CHRONIC KIDNEY DISEASE, ON CHRONIC DIALYSIS (H): ICD-10-CM

## 2018-07-30 LAB
ALBUMIN SERPL-MCNC: 3.9 G/DL (ref 3.4–5)
ALBUMIN UR-MCNC: 100 MG/DL
ALP SERPL-CCNC: 50 U/L (ref 40–150)
ALT SERPL W P-5'-P-CCNC: 17 U/L (ref 0–70)
ANION GAP SERPL CALCULATED.3IONS-SCNC: 10 MMOL/L (ref 3–14)
APPEARANCE UR: CLEAR
AST SERPL W P-5'-P-CCNC: 4 U/L (ref 0–45)
BILIRUB SERPL-MCNC: 0.5 MG/DL (ref 0.2–1.3)
BILIRUB UR QL STRIP: NEGATIVE
BUN SERPL-MCNC: 57 MG/DL (ref 7–30)
CALCIUM SERPL-MCNC: 10.5 MG/DL (ref 8.5–10.1)
CHLORIDE SERPL-SCNC: 98 MMOL/L (ref 94–109)
CO2 SERPL-SCNC: 28 MMOL/L (ref 20–32)
COLOR UR AUTO: ABNORMAL
CREAT SERPL-MCNC: 14.6 MG/DL (ref 0.66–1.25)
DEPRECATED CALCIDIOL+CALCIFEROL SERPL-MC: 9 UG/L (ref 20–75)
ERYTHROCYTE [DISTWIDTH] IN BLOOD BY AUTOMATED COUNT: 14.6 % (ref 10–15)
FERRITIN SERPL-MCNC: 716 NG/ML (ref 26–388)
GFR SERPL CREATININE-BSD FRML MDRD: 4 ML/MIN/1.7M2
GLUCOSE SERPL-MCNC: 98 MG/DL (ref 70–99)
GLUCOSE UR STRIP-MCNC: 50 MG/DL
HBV CORE AB SERPL QL IA: NONREACTIVE
HBV SURFACE AB SERPL IA-ACNC: 13.27 M[IU]/ML
HBV SURFACE AG SERPL QL IA: NONREACTIVE
HCT VFR BLD AUTO: 33.7 % (ref 40–53)
HCV AB SERPL QL IA: NONREACTIVE
HGB BLD-MCNC: 10.7 G/DL (ref 13.3–17.7)
HGB UR QL STRIP: ABNORMAL
HIV 1+2 AB+HIV1 P24 AG SERPL QL IA: NONREACTIVE
IRON SATN MFR SERPL: 21 % (ref 15–46)
IRON SERPL-MCNC: 46 UG/DL (ref 35–180)
KETONES UR STRIP-MCNC: NEGATIVE MG/DL
LEUKOCYTE ESTERASE UR QL STRIP: NEGATIVE
MCH RBC QN AUTO: 27.7 PG (ref 26.5–33)
MCHC RBC AUTO-ENTMCNC: 31.8 G/DL (ref 31.5–36.5)
MCV RBC AUTO: 87 FL (ref 78–100)
MUCOUS THREADS #/AREA URNS LPF: PRESENT /LPF
NITRATE UR QL: NEGATIVE
PH UR STRIP: 8 PH (ref 5–7)
PLATELET # BLD AUTO: 276 10E9/L (ref 150–450)
POTASSIUM SERPL-SCNC: 4.9 MMOL/L (ref 3.4–5.3)
PROT SERPL-MCNC: 8.6 G/DL (ref 6.8–8.8)
PTH-INTACT SERPL-MCNC: 168 PG/ML (ref 18–80)
RBC # BLD AUTO: 3.86 10E12/L (ref 4.4–5.9)
RBC #/AREA URNS AUTO: 1 /HPF (ref 0–2)
SODIUM SERPL-SCNC: 135 MMOL/L (ref 133–144)
SOURCE: ABNORMAL
SP GR UR STRIP: 1.01 (ref 1–1.03)
TIBC SERPL-MCNC: 225 UG/DL (ref 240–430)
UROBILINOGEN UR STRIP-MCNC: 0 MG/DL (ref 0–2)
WBC # BLD AUTO: 5.3 10E9/L (ref 4–11)
WBC #/AREA URNS AUTO: 1 /HPF (ref 0–5)

## 2018-07-30 PROCEDURE — 83540 ASSAY OF IRON: CPT | Performed by: SURGERY

## 2018-07-30 PROCEDURE — 86803 HEPATITIS C AB TEST: CPT | Performed by: SURGERY

## 2018-07-30 PROCEDURE — 83550 IRON BINDING TEST: CPT | Performed by: SURGERY

## 2018-07-30 PROCEDURE — 83970 ASSAY OF PARATHORMONE: CPT | Performed by: SURGERY

## 2018-07-30 PROCEDURE — 85027 COMPLETE CBC AUTOMATED: CPT | Performed by: SURGERY

## 2018-07-30 PROCEDURE — 86644 CMV ANTIBODY: CPT | Performed by: SURGERY

## 2018-07-30 PROCEDURE — 86850 RBC ANTIBODY SCREEN: CPT | Performed by: SURGERY

## 2018-07-30 PROCEDURE — 86645 CMV ANTIBODY IGM: CPT | Performed by: SURGERY

## 2018-07-30 PROCEDURE — 82728 ASSAY OF FERRITIN: CPT | Performed by: SURGERY

## 2018-07-30 PROCEDURE — 87389 HIV-1 AG W/HIV-1&-2 AB AG IA: CPT | Performed by: SURGERY

## 2018-07-30 PROCEDURE — 87340 HEPATITIS B SURFACE AG IA: CPT | Performed by: SURGERY

## 2018-07-30 PROCEDURE — 80053 COMPREHEN METABOLIC PANEL: CPT | Performed by: SURGERY

## 2018-07-30 PROCEDURE — G0463 HOSPITAL OUTPT CLINIC VISIT: HCPCS | Mod: ZF

## 2018-07-30 PROCEDURE — 86901 BLOOD TYPING SEROLOGIC RH(D): CPT | Performed by: SURGERY

## 2018-07-30 PROCEDURE — 86665 EPSTEIN-BARR CAPSID VCA: CPT | Performed by: SURGERY

## 2018-07-30 PROCEDURE — 36415 COLL VENOUS BLD VENIPUNCTURE: CPT | Performed by: SURGERY

## 2018-07-30 PROCEDURE — 86900 BLOOD TYPING SEROLOGIC ABO: CPT | Performed by: SURGERY

## 2018-07-30 PROCEDURE — 87086 URINE CULTURE/COLONY COUNT: CPT | Performed by: SURGERY

## 2018-07-30 PROCEDURE — 81001 URINALYSIS AUTO W/SCOPE: CPT | Performed by: SURGERY

## 2018-07-30 PROCEDURE — 86832 HLA CLASS I HIGH DEFIN QUAL: CPT | Performed by: PHYSICIAN ASSISTANT

## 2018-07-30 PROCEDURE — 86833 HLA CLASS II HIGH DEFIN QUAL: CPT | Performed by: PHYSICIAN ASSISTANT

## 2018-07-30 PROCEDURE — 82306 VITAMIN D 25 HYDROXY: CPT | Performed by: SURGERY

## 2018-07-30 PROCEDURE — 86706 HEP B SURFACE ANTIBODY: CPT | Performed by: SURGERY

## 2018-07-30 PROCEDURE — 86825 HLA X-MATH NON-CYTOTOXIC: CPT | Performed by: PHYSICIAN ASSISTANT

## 2018-07-30 PROCEDURE — 86704 HEP B CORE ANTIBODY TOTAL: CPT | Performed by: SURGERY

## 2018-07-30 PROCEDURE — 86923 COMPATIBILITY TEST ELECTRIC: CPT | Performed by: SURGERY

## 2018-07-30 ASSESSMENT — PAIN SCALES - GENERAL: PAINLEVEL: NO PAIN (0)

## 2018-07-30 NOTE — MR AVS SNAPSHOT
After Visit Summary   7/30/2018    Harshad Beatty    MRN: [id number]           Patient Information     Date Of Birth          1980        Visit Information        Provider Department      7/30/2018 12:00 PM Tracy Finley NP [facility] Solid Organ Transplant        Today's Diagnoses     ESRD (end stage renal disease) (H)    -  1       Follow-ups after your visit        Your next 10 appointments already scheduled     Jul 30, 2018 10:30 AM CDT   LAB with  LAB    [facility] Lab ([facility])    [address] [place] [address]  1st Floor  [facility] [address] [phone]           Please do not eat 10-12 hours before your appointment if you are coming in fasting for labs on lipids, cholesterol, or glucose (sugar). This does not apply to pregnant women. Water, hot tea and black coffee (with nothing added) are okay. Do not drink other fluids, diet soda or chew gum.            Jul 30, 2018 11:00 AM CDT   ecg with  CV EKG   [facility] Imaging Almond Xray ([facility])    [address] [place] [address]  1st Floor  [facility] [address]   [phone]            Jul 30, 2018 11:30 AM CDT   Nurse Visit with  Txc Nurse   [facility] Solid Organ Transplant ([facility])    [address] [place] [address]  [address]  [facility] [address]   [phone]            Jul 30, 2018 12:00 PM CDT   (Arrive by 11:45 AM)   Day Minus Visit with Tracy Finley NP   [facility] Solid Organ Transplant ([facility])    [address] [place] [address]  [address]  [facility] [address]   [phone]            Jul 30, 2018  1:00 PM CDT   RESEARCH with  Transplant Research Coordinator   [facility] Solid Organ Transplant ([facility])    [address] [place] [address]  [address]  [facility] [address]   [phone]            Jul 30, 2018  1:15 PM CDT   (Arrive by 12:45 PM)   Return Kidney Transplant with  Kidney/Pancreas Recipient     [facility] Nephrology ([facility])    [address]  [address]  [facility] [address]   [phone]            Jul 30, 2018  1:30 PM CDT   (Arrive by 1:15 PM)   Kidney Post Op with Bety Mitchell MD   [facility] Solid Organ Transplant ([facility])    [address]  [address]  [facility] [address]   [phone]            Aug 27, 2018  2:30 PM CDT   (Arrive by 2:15 PM)   Kidney Post Op with Bety Mitchell MD   [facility] Solid Organ Transplant ([facility])    [address]  [address]  [facility] [address]   [phone]            Sep 07, 2018 10:30 AM CDT   (Arrive by 10:00 AM)   Return Kidney Transplant with Uc Early Post Transplant   [facility] Nephrology ([facility])    [address]  [address]  [facility] [address]   [phone]            Sep 07, 2018 11:00 AM CDT   (Arrive by 10:30 AM)   Return Kidney Transplant with Uc Early Post Transplant   [facility] Nephrology ([facility])    [address]  [address]  [facility] [address]   [phone]              Who to contact     If you have questions or need follow up information about today's clinic visit or your schedule please contact [facility] SOLID ORGAN TRANSPLANT directly at [phone].  Normal or non-critical lab and imaging results will be communicated to you by MyChart, letter or phone within 4 business days after the clinic has received the results. If you do not hear from us within 7 days, please contact the clinic through Judicatahart or phone. If you have a critical or abnormal lab result, we will notify you by phone as soon as possible.  Submit refill requests through DotBlu or call your pharmacy and they will forward the refill request to us. Please allow 3 business days for your refill to be completed.          Additional Information About Your Visit        JudicataharWiNetworks Information  "    [employer] lets you send messages to your doctor, view your test results, renew your prescriptions, schedule appointments and more. To sign up, go to [email or web address] . Click on \"Log in\" on the left side of the screen, which will take you to the Welcome page. Then click on \"Sign up Now\" on the right side of the page.     You will be asked to enter the access code listed below, as well as some personal information. Please follow the directions to create your username and password.     Your access code is: [email or web address]  Expires: 2018  6:30 AM     Your access code will  in 90 days. If you need help or a new code, please call your [facility] clinic or [phone].        Care EveryWhere ID     This is your Care EveryWhere ID. This could be used by other organizations to access your [facility] medical records  [id number]         Blood Pressure from Last 3 Encounters:   18 140/78   17 125/71   17 146/82    Weight from Last 3 Encounters:   18 118.4 kg (261 lb)   18 119.6 kg (263 lb 11 oz)   18 120 kg (264 lb 8.8 oz)              Today, you had the following     No orders found for display       Primary Care Provider    None Specified       No primary provider on file.        Equal Access to Services     [facility]: Hadii nan Penn, waaxda luqadaha, qaybta kaalmada dariela, herb beltran . So [facility] [phone].    ATENCIÓN: Si habla español, tiene a rodriguez disposición servicios gratuitos de asistencia lingüística. Llisaura al [phone].    We comply with applicable federal civil rights laws and [place] laws. We do not discriminate on the basis of race, color, national origin, age, disability, sex, sexual orientation, or gender identity.            Thank you!     Thank you for choosing [facility] SOLID ORGAN TRANSPLANT  for your care. Our goal is always to provide you with excellent care. Hearing back from our patients is one way " we can continue to improve our services. Please take a few minutes to complete the written survey that you may receive in the mail after your visit with us. Thank you!             Your Updated Medication List - Protect others around you: Learn how to safely use, store and throw away your medicines at www.disposemymeds.org.          This list is accurate as of 7/23/18  3:34 PM.  Always use your most recent med list.                   Brand Name Dispense Instructions for use Diagnosis    AMLODIPINE BESYLATE PO      Take 5 mg by mouth daily        calcium acetate 667 MG Caps capsule    PHOSLO     Take 2,668 mg by mouth 3 times daily (with meals)        carvedilol 25 MG tablet    COREG     Take 2 tablets by mouth daily        LISINOPRIL PO      Take 80 mg by mouth daily        MINOXIDIL PO      Take 10 mg by mouth daily        SENSIPAR PO      Take 60 mg by mouth daily

## 2018-07-30 NOTE — MR AVS SNAPSHOT
After Visit Summary   7/30/2018    Harshad Beatty    MRN: [id number]           Patient Information     Date Of Birth          1980        Visit Information        Provider Department      7/30/2018 1:30 PM Bety Mitchell MD [facility] Solid Organ Transplant        Today's Diagnoses     Pre-op chest exam    -  1       Follow-ups after your visit        Your next 10 appointments already scheduled     Aug 07, 2018   Procedure with Bety Mitchell MD   [facility] Same Day Surgery (--)    [address] [address]   [phone]            Aug 27, 2018  2:30 PM CDT   (Arrive by 2:15 PM)   Kidney Post Op with Bety Mitchell MD   [facility] Solid Organ Transplant ([facility])    [address]  [address]  [facility] [address]   [phone]            Sep 07, 2018 10:30 AM CDT   (Arrive by 10:00 AM)   Return Kidney Transplant with Uc Early Post Transplant   [facility] Nephrology ([facility])    [address]  [address]  [facility] [address]   [phone]            Sep 07, 2018 11:00 AM CDT   (Arrive by 10:30 AM)   Return Kidney Transplant with Uc Early Post Transplant   [facility] Nephrology ([facility])    [address]  [address]  [facility] [address]   [phone]            Sep 10, 2018  3:45 PM CDT   (Arrive by 3:30 PM)   Cystoscopy with Bety Mitchell MD   [facility] Solid Organ Transplant ([facility])    [address] [place] [address] [address]  [address]  [facility] [address]   [phone]            Dec 07, 2018  1:05 PM CST   (Arrive by 12:35 PM)   Return Kidney Transplant with Uc Kidney/Pancreas Recipient   [facility] Nephrology ([facility])    [address] [place] [address] [address]  [address]  [facility] [address]   [phone]            Feb 07, 2019  1:05 PM CST   (Arrive by 12:35 PM)   Return Kidney Transplant with Uc Kidney/Pancreas Recipient  "  [facility] Nephrology ([facility])    [address]  [address]  [facility] [address] [phone]              Future tests that were ordered for you today     Open Future Orders        Priority Expected Expires Ordered    X-ray Chest 2 vws* Routine 7/30/2018 7/30/2019 7/30/2018            Who to contact     If you have questions or need follow up information about today's clinic visit or your schedule please contact [facility] SOLID ORGAN TRANSPLANT directly at [phone].  Normal or non-critical lab and imaging results will be communicated to you by MyChart, letter or phone within 4 business days after the clinic has received the results. If you do not hear from us within 7 days, please contact the clinic through MyChart or phone. If you have a critical or abnormal lab result, we will notify you by phone as soon as possible.  Submit refill requests through [employer] or call your pharmacy and they will forward the refill request to us. Please allow 3 business days for your refill to be completed.          Additional Information About Your Visit        Care EveryWhere ID     This is your Care EveryWhere ID. This could be used by other organizations to access your [place] medical records  [id number]        Your Vitals Were     Pulse Temperature Respirations Height Pulse Oximetry BMI (Body Mass Index)    76 97.7  F (36.5  C) (Oral) 18 1.778 m (5' 10\") 100% 37.88 kg/m2       Blood Pressure from Last 3 Encounters:   07/30/18 149/76   05/29/18 140/78   09/25/17 125/71    Weight from Last 3 Encounters:   07/30/18 119.7 kg (264 lb)   05/29/18 118.4 kg (261 lb)   05/24/18 119.6 kg (263 lb 11 oz)               Primary Care Provider    None Specified       No primary provider on file.        Equal Access to Services     JAKI PUGA : Silas [place], alix poe, herb [facility] [phone].    ATENCIÓN: " Si habla fatoumata, tiene a rodriguez disposición servicios gratuitos de asistencia lingüística. Fabián arellano [phone].    We comply with applicable federal civil rights laws and [place] laws. We do not discriminate on the basis of race, color, national origin, age, disability, sex, sexual orientation, or gender identity.            Thank you!     Thank you for choosing [facility] SOLID ORGAN TRANSPLANT  for your care. Our goal is always to provide you with excellent care. Hearing back from our patients is one way we can continue to improve our services. Please take a few minutes to complete the written survey that you may receive in the mail after your visit with us. Thank you!             Your Updated Medication List - Protect others around you: Learn how to safely use, store and throw away your medicines at www.disposemymeds.org.          This list is accurate as of 7/30/18  4:04 PM.  Always use your most recent med list.                   Brand Name Dispense Instructions for use Diagnosis    AMLODIPINE BESYLATE PO      Take 5 mg by mouth daily        calcium acetate 667 MG Caps capsule    PHOSLO     Take 2,668 mg by mouth 3 times daily (with meals)        carvedilol 25 MG tablet    COREG     Take 2 tablets by mouth daily        LISINOPRIL PO      Take 80 mg by mouth daily        MINOXIDIL PO      Take 10 mg by mouth daily        SENSIPAR PO      Take 60 mg by mouth daily

## 2018-07-30 NOTE — LETTER
7/30/2018       RE: Harshad Beatty  [address] [place] [address]     Dear Colleague,    Thank you for referring your patient, Harshad Beatty, to the [facility] at [facility]. Please see a copy of my visit note below.    Assessment and Plan:  1. ESRD due to hypertension on dialysis  -  Kg, HD 3.5 hrs typically. Right Arm fistula, uses heparin on dialysis. He produces large amount of urine. He is scheduled for LDKT ([place] as part of paired kidney exchange.   2. Immunosuppression management no contraindication to standard induction and maintenance.  3. Sickle Cell Trait no history of crises or other complications related to his sickle trait. Would ensure adequate hydration and oxygenation   4.  Renal osteodystrophy will update his PTH and vitamin D.  5.  Anemia of chronic kidney disease in addition to sickle cell trait, hemoglobin last checked was 10 g/dL we will continue to monitor.  6.  Immune suppression prophylaxis patient has no known allergies Bactrim single strength daily or renally dosed will be advised.  Valcyte for a period of 3 months will serve as CMV prophylaxis.  7.  Obesity patient was advised to closely monitor his weight changes after dialysis and to pursue active lifestyle as soon as able.  8. Cardiac Risk Average   9. DVT prophylaxis consider subcutaneous heparin   Assessment and plan was discussed with patient and he voiced his understanding and agreement.    Reason for Visit:  Mr. Beatty is here for day -8 prior to living donor kidney transplant.    HPI:   Harshad Beatty is a 38 year old male with ESKD from Hypertension day-8 for LDKT .         Transplant Hx:       Tx: LDKT  Date: 8/7/2018       Recent DSA: Yes  Historic DPB1*1 MFI of 1680       Crossmatch is negative     Mr. Beatty is here for day -8 visit prior to living donor kidney transplantation.  He is in the usual state of health.  He works  full-time.  He is very active without any chest pains or shortness of breath.  He reports no recent illnesses no fevers no chills no nausea vomiting diarrhea or abdominal pain.  No cough or phlegm production.  He was found to be suitable candidate by our committee and he is now scheduled for living donor kidney transplantation as part of impaired kidney donation.  The kidney is expected to arrive from [place] in the afternoon on August 7, 2018.  Mr. Beatty is currently on dialysis about 3-1/2 hours on Monday Wednesday Friday.  He is scheduled to dialyze on Monday prior to surgery.  His estimated dry weight is 118 kg and he typically dialyzes 3-1/2 hours to right upper extremity fistula that is large with good thrill and bruit.  He is maintained on several blood pressure agents including lisinopril and minoxidil.  I reviewed his regimen and instructed him to continue his carvedilol through the morning of the surgery as well as the amlodipine.  After engraftment I plan on withholding the lisinopril and minoxidil and his regimen should consist of carvedilol and nifedipine unless otherwise indicated by the inpatient nephrology team.  He has history of sickle cell trait without any clinical consequences. He never experienced a crisis or required blood transfusion for this matter except for one blood transfusion at the time of diagnosis with end-stage kidney disease.     Home BP: Not checked.      ROS:   A comprehensive review of systems was obtained and negative, except as noted in the HPI or PMH.    Active Medical Problems:  Patient Active Problem List   Diagnosis     ESRD (end stage renal disease) (H)     HTN (hypertension)     Sickle cell trait (H)     Anemia in chronic kidney disease     Secondary hyperparathyroidism (H)       Personal Hx:  Social History     Social History     Marital status:      Spouse name: Yesica     Number of children: 3     Years of education: 16     Occupational History     [employer]  [employer]     Social History Main Topics     Smoking status: Never Smoker     Smokeless tobacco: Never Used     Alcohol use No     Drug use: Yes     Special: Marijuana      Comment: remote     Sexual activity: Yes     Partners: Female     Birth control/ protection: Implant     Other Topics Concern     Not on file     Social History Narrative       Allergies:  No Known Allergies    Medications:  Prior to Admission medications    Medication Sig Start Date End Date Taking? Authorizing Provider   AMLODIPINE BESYLATE PO Take 5 mg by mouth daily    Reported, Patient   calcium acetate (PHOSLO) 667 MG CAPS capsule Take 2,668 mg by mouth 3 times daily (with meals)    Reported, Patient   carvedilol (COREG) 25 MG tablet Take 2 tablets by mouth daily 4/27/18   Reported, Patient   Cinacalcet HCl (SENSIPAR PO) Take 60 mg by mouth daily    Reported, Patient   LISINOPRIL PO Take 80 mg by mouth daily    Reported, Patient   MINOXIDIL PO Take 10 mg by mouth daily    Reported, Patient       Vitals:  Vital Signs 7/30/2018   Systolic 149   Diastolic 76   Pulse 76   Temperature 97.7   Respirations 18   Weight (LB) 264 lb   Height 5' 10\"   BMI (Calculated) 37.96   Pain    O2 100       Exam:   GENERAL APPEARANCE: alert and no distress  HENT: mouth without ulcers or lesions  LYMPHATICS: no cervical or supraclavicular nodes  RESP: lungs clear to auscultation - no rales, rhonchi or wheezes  CV: regular rhythm, normal rate, no rub, no murmur  EDEMA: no LE edema bilaterally  ABDOMEN: soft, nondistended, nontender, bowel sounds normal  MS: extremities normal - no gross deformities noted, no evidence of inflammation in joints, no muscle tenderness  SKIN: no rash    Results:   Reviewed       Again, thank you for allowing me to participate in the care of your patient.      Sincerely,    Kidney/Pancreas Recipient      "

## 2018-07-30 NOTE — PATIENT INSTRUCTIONS
1. Last dose of Lisinopril should be on Saturday 8/4/2018   2. Day of surgery take usual dose Carvedilol and if your blood pressure greater than 150 mmHg take Amlodipine.

## 2018-07-30 NOTE — LETTER
7/30/2018      RE: Harshad Beatty  [address] [place] [address]       Transplant Surgery H&P:                           HPI:      Mr. Beatty is a 38 year old male who comes to clinic today for preop prior to planned living donor kidney transplantation. The patient was previously reviewed by the multidisciplinary selection committee and found to be medically and psychosocially appropriate for kidney transplantation. Mr. Beatty has End stage renal failure due to Hypertension.     The patient is on dialysis.      If on dialysis, modality: HD, via Right are fistula  Dialysis days: Monday, Wednesday, Friday    Chronic anticoagulation  [x]      [] Indication:   Recurrent infections  [x]      []  Type:                  Bladder dysfunction  [x]      [] Cause:  Claudication   [x]      [] Distance:    Previous Amputation  [x]      [] Cause:       Health events since transplant evaluation: None    Special considerations:  PONV: no  Taoism: No    MEDICAL HISTORY:      Patient Active Problem List    Diagnosis Date Noted     Secondary hyperparathyroidism (H)      Priority: Medium     Anemia in chronic kidney disease      Priority: Medium     ESRD (end stage renal disease) (H)      Priority: Medium     HTN (hypertension)      Priority: Medium     Sickle cell trait (H)      Priority: Medium      Past Medical History:   Diagnosis Date     Anemia in chronic kidney disease      ESRD (end stage renal disease) (H)      History of cardiomyopathy      HTN (hypertension)      Secondary hyperparathyroidism (H)      Sickle cell trait (H)      Past Surgical History:   Procedure Laterality Date     CREATE FISTULA ARTERIOVENOUS UPPER EXTREMITY       ORIF right 5th metatarpal fracture       Current Outpatient Prescriptions   Medication Sig Dispense Refill     AMLODIPINE BESYLATE PO Take 5 mg by mouth daily       calcium acetate (PHOSLO) 667 MG CAPS capsule Take 2,668 mg by mouth 3 times daily (with meals)       carvedilol  (COREG) 25 MG tablet Take 2 tablets by mouth daily  3     Cinacalcet HCl (SENSIPAR PO) Take 60 mg by mouth daily       LISINOPRIL PO Take 80 mg by mouth daily       MINOXIDIL PO Take 10 mg by mouth daily       OTC products: None, except as noted above  No Known Allergies   Social History   Substance Use Topics     Smoking status: Never Smoker     Smokeless tobacco: Never Used     Alcohol use No     OR  Social History     Social History     Marital status:      Spouse name: Yesica     Number of children: 3     Years of education: 16     Occupational History     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[employer]     Social History Main Topics     Smoking status: Never Smoker     Smokeless tobacco: Never Used     Alcohol use No     Drug use: Yes     Special: Marijuana      Comment: remote     Sexual activity: Yes     Partners: Female     Birth control/ protection: Implant     Other Topics Concern     Not on file     Social History Narrative     History   Drug Use     Yes     Special: Marijuana     Comment: remote     Family History   Problem Relation Age of Onset     Sickle Cell Anemia Father      Sickle Cell Trait Sister      Pancreatic Cancer Maternal Grandfather      KIDNEY DISEASE Cousin        REVIEW OF SYSTEMS:        CONSTITUTIONAL: NEGATIVE for fever, chills, change in weight  INTEGUMENTARY/SKIN: NEGATIVE for worrisome rashes, moles or lesions  EYES: NEGATIVE for vision changes or irritation  ENT/MOUTH: NEGATIVE for ear, mouth and throat problems  RESP: NEGATIVE for significant cough or SOB  BREAST: NEGATIVE for masses, tenderness or discharge  CV: NEGATIVE for chest pain, palpitations or peripheral edema  GI: NEGATIVE for nausea, abdominal pain, heartburn, or change in bowel habits  : NEGATIVE for frequency, dysuria, or hematuria  MUSCULOSKELETAL: NEGATIVE for significant arthralgias or myalgia  NEURO: NEGATIVE for weakness, dizziness or paresthesias  ENDOCRINE: NEGATIVE for temperature intolerance, skin/hair changes  HEME:  NEGATIVE for bleeding problems  PSYCHIATRIC: NEGATIVE for changes in mood or affect    EXAM:                       Temp:  [97.7  F (36.5  C)] 97.7  F (36.5  C)  Pulse:  [76] 76  Resp:  [18] 18  BP: (149)/(76) 149/76  SpO2:  [100 %] 100 %    GENERAL APPEARANCE: healthy, alert and no distress     EYES: EOMI,  PERRL     HENT: ear canals and TM's normal and nose and mouth without ulcers or lesions     NECK: no adenopathy, no asymmetry, masses, or scars and thyroid normal to palpation     RESP: lungs clear to auscultation - no rales, rhonchi or wheezes     CV: regular rates and rhythm, normal S1 S2, no S3 or S4 and no murmur, click or rub     ABDOMEN:  soft, nontender, no HSM or masses and bowel sounds normal     MS: extremities normal- no gross deformities noted, no evidence of inflammation in joints, FROM in all extremities.     SKIN: no suspicious lesions or rashes     NEURO: Normal strength and tone, sensory exam grossly normal, mentation intact and speech normal     PSYCH: mentation appears normal. and affect normal/bright     LYMPHATICS: No cervical adenopathy      DIAGNOSTICS:                EKG: appears normal, NSR, normal axis, normal intervals, no acute ST/T changes c/w ischemia, no LVH by voltage criteria, unchanged from previous tracings  Chest XRay  Labs Resulted Today:   Results for orders placed or performed in visit on 07/30/18   CBC with platelets   Result Value Ref Range    WBC 5.3 4.0 - 11.0 10e9/L    RBC Count 3.86 (L) 4.4 - 5.9 10e12/L    Hemoglobin 10.7 (L) 13.3 - 17.7 g/dL    Hematocrit 33.7 (L) 40.0 - 53.0 %    MCV 87 78 - 100 fl    MCH 27.7 26.5 - 33.0 pg    MCHC 31.8 31.5 - 36.5 g/dL    RDW 14.6 10.0 - 15.0 %    Platelet Count 276 150 - 450 10e9/L   Comprehensive metabolic panel   Result Value Ref Range    Sodium 135 133 - 144 mmol/L    Potassium 4.9 3.4 - 5.3 mmol/L    Chloride 98 94 - 109 mmol/L    Carbon Dioxide 28 20 - 32 mmol/L    Anion Gap 10 3 - 14 mmol/L    Glucose 98 70 - 99 mg/dL     Urea Nitrogen 57 (H) 7 - 30 mg/dL    Creatinine 14.60 (H) 0.66 - 1.25 mg/dL    GFR Estimate 4 (L) >60 mL/min/1.7m2    GFR Estimate If Black 5 (L) >60 mL/min/1.7m2    Calcium 10.5 (H) 8.5 - 10.1 mg/dL    Bilirubin Total 0.5 0.2 - 1.3 mg/dL    Albumin 3.9 3.4 - 5.0 g/dL    Protein Total 8.6 6.8 - 8.8 g/dL    Alkaline Phosphatase 50 40 - 150 U/L    ALT 17 0 - 70 U/L    AST 4 0 - 45 U/L   Routine UA with microscopic   Result Value Ref Range    Color Urine Straw     Appearance Urine Clear     Glucose Urine 50 (A) NEG^Negative mg/dL    Bilirubin Urine Negative NEG^Negative    Ketones Urine Negative NEG^Negative mg/dL    Specific Gravity Urine 1.008 1.003 - 1.035    Blood Urine Small (A) NEG^Negative    pH Urine 8.0 (H) 5.0 - 7.0 pH    Protein Albumin Urine 100 (A) NEG^Negative mg/dL    Urobilinogen mg/dL 0.0 0.0 - 2.0 mg/dL    Nitrite Urine Negative NEG^Negative    Leukocyte Esterase Urine Negative NEG^Negative    Source Midstream Urine     WBC Urine 1 0 - 5 /HPF    RBC Urine 1 0 - 2 /HPF    Mucous Urine Present (A) NEG^Negative /LPF   Iron and iron binding capacity   Result Value Ref Range    Iron 46 35 - 180 ug/dL    Iron Binding Cap 225 (L) 240 - 430 ug/dL    Iron Saturation Index 21 15 - 46 %   Ferritin   Result Value Ref Range    Ferritin 716 (H) 26 - 388 ng/mL     Labs Drawn and in Process:   Unresulted Labs Ordered in the Past 30 Days of this Admission     Date and Time Order Name Status Description    7/30/2018 1051 VITAMIN D DEFICIENCY SCREENING In process     7/30/2018 1051 PARATHYROID HORMONE INTACT In process     7/30/2018 1051 URINE CULTURE AEROBIC BACTERIAL In process     7/30/2018 1051 ABO/RH TYPE AND SCREEN In process     7/30/2018 1051 HLA FINAL CROSSMATCH RECIPIENT In process     7/30/2018 1051 HEPATITIS C ANTIBODY In process     7/30/2018 1051 HEPATITIS B CORE ANTIBODY In process     7/30/2018 1051 HEPATITIS B SURFACE ANTIGEN In process     7/30/2018 1051 HEPATITIS B SURFACE ANTIBODY In process      7/30/2018 1051 HIV ANTIGEN ANTIBODY COMBO In process     7/30/2018 1051 EBV CAPSID ANTIBODY IGM In process     7/30/2018 1051 EBV CAPSID ANTIBODY IGG In process     7/30/2018 1051 CMV ANTIBODY IGM In process     7/30/2018 1051 CMV ANTIBODY IGG In process         Recent Labs   Lab Test  07/30/18   1132  05/31/17   0734   HGB  10.7*  11.1*   PLT  276  305   INR   --   1.05   NA  135  135   POTASSIUM  4.9  4.4   CR  14.60*  12.60*     UNOS cPRA   Date Value Ref Range Status   01/21/2018 0  Final     A    ASSESSMENT:                 Mr. Beatty is a 38 year old male who is appropriate for elective living donor kidney transplantation with the following pertinent issues:    1. Labs reviewed. Findings requiring additional evaluation before surgery: No  2. EKG (7/30/2018): appears normal, NSR  3. ECHO (5/31/2017); { :[id number]::Interpretation Summary  Mild left ventricular dilation with severe concentric hypertrophy. Normal  systolic function. EF 60-65%.  Mild right ventricular dilation with normal function.  No significant valvular pathology.  No pericardial effusion.  LVH can be from CKD/HTN, or alternatively infiltrative disease such as  amyloid.  No prior study for comparison.  4. ABO= A  5. Paired Exchange case: Yes  6. Outstanding issues: chest x-ray, UC, final cross match    PLAN:                 1. Consent: Done  2. Outstanding issues: chest x-ray, uc, final cross match      Signed Electronically by: Tracy Finley    I have reviewed history, examined patient and discussed plan with the fellow/resident/CONCHITA.  I concur with the findings in this note.       Risks of the surgical procedure including but not limited to the rare risk of mortality discussed in detail. Patient verbalized good understanding and had several pertinent questions which were answered satisfactorily.     Immunosuppressive regimen, management and long term risks discussed in detail.     Total time: 40 min  Counseling time: 25 min

## 2018-07-30 NOTE — PROGRESS NOTES
Transplant Surgery H&P:                           HPI:      Mr. Beatty is a 38 year old male who comes to clinic today for preop prior to planned living donor kidney transplantation. The patient was previously reviewed by the multidisciplinary selection committee and found to be medically and psychosocially appropriate for kidney transplantation. Mr. Beatty has End stage renal failure due to Hypertension.     The patient is on dialysis.      If on dialysis, modality: HD, via Right are fistula  Dialysis days: Monday, Wednesday, Friday    Chronic anticoagulation  [x]      [] Indication:   Recurrent infections  [x]      []  Type:                  Bladder dysfunction  [x]      [] Cause:  Claudication   [x]      [] Distance:    Previous Amputation  [x]      [] Cause:       Health events since transplant evaluation: None    Special considerations:  PONV: no  Mu-ism: No    MEDICAL HISTORY:      Patient Active Problem List    Diagnosis Date Noted     Secondary hyperparathyroidism (H)      Priority: Medium     Anemia in chronic kidney disease      Priority: Medium     ESRD (end stage renal disease) (H)      Priority: Medium     HTN (hypertension)      Priority: Medium     Sickle cell trait (H)      Priority: Medium      Past Medical History:   Diagnosis Date     Anemia in chronic kidney disease      ESRD (end stage renal disease) (H)      History of cardiomyopathy      HTN (hypertension)      Secondary hyperparathyroidism (H)      Sickle cell trait (H)      Past Surgical History:   Procedure Laterality Date     CREATE FISTULA ARTERIOVENOUS UPPER EXTREMITY       ORIF right 5th metatarpal fracture       Current Outpatient Prescriptions   Medication Sig Dispense Refill     AMLODIPINE BESYLATE PO Take 5 mg by mouth daily       calcium acetate (PHOSLO) 667 MG CAPS capsule Take 2,668 mg by mouth 3 times daily (with meals)       carvedilol (COREG) 25 MG tablet Take 2 tablets by mouth daily  3     Cinacalcet HCl (SENSIPAR  PO) Take 60 mg by mouth daily       LISINOPRIL PO Take 80 mg by mouth daily       MINOXIDIL PO Take 10 mg by mouth daily       OTC products: None, except as noted above  No Known Allergies   Social History   Substance Use Topics     Smoking status: Never Smoker     Smokeless tobacco: Never Used     Alcohol use No     OR  Social History     Social History     Marital status:      Spouse name: Yesica     Number of children: 3     Years of education: 16     Occupational History     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[employer]     Social History Main Topics     Smoking status: Never Smoker     Smokeless tobacco: Never Used     Alcohol use No     Drug use: Yes     Special: Marijuana      Comment: remote     Sexual activity: Yes     Partners: Female     Birth control/ protection: Implant     Other Topics Concern     Not on file     Social History Narrative     History   Drug Use     Yes     Special: Marijuana     Comment: remote     Family History   Problem Relation Age of Onset     Sickle Cell Anemia Father      Sickle Cell Trait Sister      Pancreatic Cancer Maternal Grandfather      KIDNEY DISEASE Cousin        REVIEW OF SYSTEMS:        CONSTITUTIONAL: NEGATIVE for fever, chills, change in weight  INTEGUMENTARY/SKIN: NEGATIVE for worrisome rashes, moles or lesions  EYES: NEGATIVE for vision changes or irritation  ENT/MOUTH: NEGATIVE for ear, mouth and throat problems  RESP: NEGATIVE for significant cough or SOB  BREAST: NEGATIVE for masses, tenderness or discharge  CV: NEGATIVE for chest pain, palpitations or peripheral edema  GI: NEGATIVE for nausea, abdominal pain, heartburn, or change in bowel habits  : NEGATIVE for frequency, dysuria, or hematuria  MUSCULOSKELETAL: NEGATIVE for significant arthralgias or myalgia  NEURO: NEGATIVE for weakness, dizziness or paresthesias  ENDOCRINE: NEGATIVE for temperature intolerance, skin/hair changes  HEME: NEGATIVE for bleeding problems  PSYCHIATRIC: NEGATIVE for changes in mood or  affect    EXAM:                       Temp:  [97.7  F (36.5  C)] 97.7  F (36.5  C)  Pulse:  [76] 76  Resp:  [18] 18  BP: (149)/(76) 149/76  SpO2:  [100 %] 100 %    GENERAL APPEARANCE: healthy, alert and no distress     EYES: EOMI,  PERRL     HENT: ear canals and TM's normal and nose and mouth without ulcers or lesions     NECK: no adenopathy, no asymmetry, masses, or scars and thyroid normal to palpation     RESP: lungs clear to auscultation - no rales, rhonchi or wheezes     CV: regular rates and rhythm, normal S1 S2, no S3 or S4 and no murmur, click or rub     ABDOMEN:  soft, nontender, no HSM or masses and bowel sounds normal     MS: extremities normal- no gross deformities noted, no evidence of inflammation in joints, FROM in all extremities.     SKIN: no suspicious lesions or rashes     NEURO: Normal strength and tone, sensory exam grossly normal, mentation intact and speech normal     PSYCH: mentation appears normal. and affect normal/bright     LYMPHATICS: No cervical adenopathy      DIAGNOSTICS:                EKG: appears normal, NSR, normal axis, normal intervals, no acute ST/T changes c/w ischemia, no LVH by voltage criteria, unchanged from previous tracings  Chest XRay  Labs Resulted Today:   Results for orders placed or performed in visit on 07/30/18   CBC with platelets   Result Value Ref Range    WBC 5.3 4.0 - 11.0 10e9/L    RBC Count 3.86 (L) 4.4 - 5.9 10e12/L    Hemoglobin 10.7 (L) 13.3 - 17.7 g/dL    Hematocrit 33.7 (L) 40.0 - 53.0 %    MCV 87 78 - 100 fl    MCH 27.7 26.5 - 33.0 pg    MCHC 31.8 31.5 - 36.5 g/dL    RDW 14.6 10.0 - 15.0 %    Platelet Count 276 150 - 450 10e9/L   Comprehensive metabolic panel   Result Value Ref Range    Sodium 135 133 - 144 mmol/L    Potassium 4.9 3.4 - 5.3 mmol/L    Chloride 98 94 - 109 mmol/L    Carbon Dioxide 28 20 - 32 mmol/L    Anion Gap 10 3 - 14 mmol/L    Glucose 98 70 - 99 mg/dL    Urea Nitrogen 57 (H) 7 - 30 mg/dL    Creatinine 14.60 (H) 0.66 - 1.25 mg/dL     GFR Estimate 4 (L) >60 mL/min/1.7m2    GFR Estimate If Black 5 (L) >60 mL/min/1.7m2    Calcium 10.5 (H) 8.5 - 10.1 mg/dL    Bilirubin Total 0.5 0.2 - 1.3 mg/dL    Albumin 3.9 3.4 - 5.0 g/dL    Protein Total 8.6 6.8 - 8.8 g/dL    Alkaline Phosphatase 50 40 - 150 U/L    ALT 17 0 - 70 U/L    AST 4 0 - 45 U/L   Routine UA with microscopic   Result Value Ref Range    Color Urine Straw     Appearance Urine Clear     Glucose Urine 50 (A) NEG^Negative mg/dL    Bilirubin Urine Negative NEG^Negative    Ketones Urine Negative NEG^Negative mg/dL    Specific Gravity Urine 1.008 1.003 - 1.035    Blood Urine Small (A) NEG^Negative    pH Urine 8.0 (H) 5.0 - 7.0 pH    Protein Albumin Urine 100 (A) NEG^Negative mg/dL    Urobilinogen mg/dL 0.0 0.0 - 2.0 mg/dL    Nitrite Urine Negative NEG^Negative    Leukocyte Esterase Urine Negative NEG^Negative    Source Midstream Urine     WBC Urine 1 0 - 5 /HPF    RBC Urine 1 0 - 2 /HPF    Mucous Urine Present (A) NEG^Negative /LPF   Iron and iron binding capacity   Result Value Ref Range    Iron 46 35 - 180 ug/dL    Iron Binding Cap 225 (L) 240 - 430 ug/dL    Iron Saturation Index 21 15 - 46 %   Ferritin   Result Value Ref Range    Ferritin 716 (H) 26 - 388 ng/mL     Labs Drawn and in Process:   Unresulted Labs Ordered in the Past 30 Days of this Admission     Date and Time Order Name Status Description    7/30/2018 1051 VITAMIN D DEFICIENCY SCREENING In process     7/30/2018 1051 PARATHYROID HORMONE INTACT In process     7/30/2018 1051 URINE CULTURE AEROBIC BACTERIAL In process     7/30/2018 1051 ABO/RH TYPE AND SCREEN In process     7/30/2018 1051 HLA FINAL CROSSMATCH RECIPIENT In process     7/30/2018 1051 HEPATITIS C ANTIBODY In process     7/30/2018 1051 HEPATITIS B CORE ANTIBODY In process     7/30/2018 1051 HEPATITIS B SURFACE ANTIGEN In process     7/30/2018 1051 HEPATITIS B SURFACE ANTIBODY In process     7/30/2018 1051 HIV ANTIGEN ANTIBODY COMBO In process     7/30/2018 1051 EBV CAPSID  ANTIBODY IGM In process     7/30/2018 1051 EBV CAPSID ANTIBODY IGG In process     7/30/2018 1051 CMV ANTIBODY IGM In process     7/30/2018 1051 CMV ANTIBODY IGG In process         Recent Labs   Lab Test  07/30/18   1132  05/31/17   0734   HGB  10.7*  11.1*   PLT  276  305   INR   --   1.05   NA  135  135   POTASSIUM  4.9  4.4   CR  14.60*  12.60*     UNOS cPRA   Date Value Ref Range Status   01/21/2018 0  Final     A    ASSESSMENT:                 Mr. Beatty is a 38 year old male who is appropriate for elective living donor kidney transplantation with the following pertinent issues:    1. Labs reviewed. Findings requiring additional evaluation before surgery: No  2. EKG (7/30/2018): appears normal, NSR  3. ECHO (5/31/2017); { :[id number]::Interpretation Summary  Mild left ventricular dilation with severe concentric hypertrophy. Normal  systolic function. EF 60-65%.  Mild right ventricular dilation with normal function.  No significant valvular pathology.  No pericardial effusion.  LVH can be from CKD/HTN, or alternatively infiltrative disease such as  amyloid.  No prior study for comparison.  4. ABO= A  5. Paired Exchange case: Yes  6. Outstanding issues: chest x-ray, UC, final cross match    PLAN:                 1. Consent: Done  2. Outstanding issues: chest x-ray, uc, final cross match      Signed Electronically by: Tracy Finley    I have reviewed history, examined patient and discussed plan with the fellow/resident/CONCHITA.  I concur with the findings in this note.       Risks of the surgical procedure including but not limited to the rare risk of mortality discussed in detail. Patient verbalized good understanding and had several pertinent questions which were answered satisfactorily.     Immunosuppressive regimen, management and long term risks discussed in detail.     Total time: 40 min  Counseling time: 25 min

## 2018-07-30 NOTE — MR AVS SNAPSHOT
After Visit Summary   7/30/2018    Harshad Beatty    MRN: [id number]           Patient Information     Date Of Birth          1980        Visit Information        Provider Department      7/30/2018 1:15 PM Recipient, Uc Kidney/Pancreas [facility]        Today's Diagnoses     ESRD (end stage renal disease) (H)    -  1    Secondary hyperparathyroidism (H)        Sickle cell trait (H)        Anemia in chronic kidney disease, on chronic dialysis (H)          Care Instructions    1. Last dose of Lisinopril should be on Saturday 8/4/2018   2. Day of surgery take usual dose Carvedilol and if your blood pressure greater than 150 mmHg take Amlodipine.           Follow-ups after your visit        Your next 10 appointments already scheduled     Aug 07, 2018   Procedure with Bety Mitchell MD   [facility] [place], Same Day Surgery (--)    [address] [address]   [phone]            Aug 27, 2018  2:30 PM CDT   (Arrive by 2:15 PM)   Kidney Post Op with Bety Mitchell MD   [facility] Solid Organ Transplant ([facility])    [address] [place] [address]  [address]  [facility] [address]   [phone]            Sep 07, 2018 10:30 AM CDT   (Arrive by 10:00 AM)   Return Kidney Transplant with Uc Early Post Transplant   [facility] ([facility])    [address] [place] [address] Se  [address]  [facility] [address]   [phone]            Sep 07, 2018 11:00 AM CDT   (Arrive by 10:30 AM)   Return Kidney Transplant with Uc Early Post Transplant   [facility] ([facility])    [address] [place] [address] Se  [address]  [facility] [address]   [phone]            Sep 10, 2018  3:45 PM CDT   (Arrive by 3:30 PM)   Cystoscopy with Bety Mitchell MD   [facility] ([facility])    [address] [place] [address] Se  [address]  [facility] [address]   [phone]            Dec 07, 2018  1:05  PM CST   (Arrive by 12:35 PM)   Return Kidney Transplant with Uc Kidney/Pancreas Recipient   [facility] ([facility])    [address]  [address]  [facility] [address] [phone]            Feb 07, 2019  1:05 PM CST   (Arrive by 12:35 PM)   Return Kidney Transplant with Uc Kidney/Pancreas Recipient   [facility] ([facility])    [address]  [address]  [facility] [id number] [phone]              Future tests that were ordered for you today     Open Future Orders        Priority Expected Expires Ordered    X-ray Chest 2 vws* Routine 7/30/2018 7/30/2019 7/30/2018            Who to contact     If you have questions or need follow up information about today's clinic visit or your schedule please contact [facility] directly at [phone].  Normal or non-critical lab and imaging results will be communicated to you by MyChart, letter or phone within 4 business days after the clinic has received the results. If you do not hear from us within 7 days, please contact the clinic through MyChart or phone. If you have a critical or abnormal lab result, we will notify you by phone as soon as possible.  Submit refill requests through DocOnYou or call your pharmacy and they will forward the refill request to us. Please allow 3 business days for your refill to be completed.          Additional Information About Your Visit        Care EveryWhere ID     This is your Care EveryWhere ID. This could be used by other organizations to access your [place] medical records  [id number]         Blood Pressure from Last 3 Encounters:   07/30/18 149/76   05/29/18 140/78   09/25/17 125/71    Weight from Last 3 Encounters:   07/30/18 119.7 kg (264 lb)   05/29/18 118.4 kg (261 lb)   05/24/18 119.6 kg (263 lb 11 oz)              Today, you had the following     No orders found for display       Primary Care Provider    None Specified        No primary provider on file.        Equal Access to Services     [facility] VIVIEN : Hadii aad ku hadessiejaleesa Penn, wajuanaalysha vovitaliyha, qasantiagooli alvaradoestellaherb hunter. So [facility] [phone].    ATENCIÓN: Si habla español, tiene a rodriguez disposición servicios gratuitos de asistencia lingüística. Llame al [phone].    We comply with applicable federal civil rights laws and [place] laws. We do not discriminate on the basis of race, color, national origin, age, disability, sex, sexual orientation, or gender identity.            Thank you!     Thank you for choosing [facility]OLOG[facility]  for your care. Our goal is always to provide you with excellent care. Hearing back from our patients is one way we can continue to improve our services. Please take a few minutes to complete the written survey that you may receive in the mail after your visit with us. Thank you!             Your Updated Medication List - Protect others around you: Learn how to safely use, store and throw away your medicines at www.disposemymeds.org.          This list is accurate as of 7/30/18  6:01 PM.  Always use your most recent med list.                   Brand Name Dispense Instructions for use Diagnosis    AMLODIPINE BESYLATE PO      Take 5 mg by mouth daily        calcium acetate 667 MG Caps capsule    PHOSLO     Take 2,668 mg by mouth 3 times daily (with meals)        carvedilol 25 MG tablet    COREG     Take 2 tablets by mouth daily        LISINOPRIL PO      Take 80 mg by mouth daily        MINOXIDIL PO      Take 10 mg by mouth daily        SENSIPAR PO      Take 60 mg by mouth daily

## 2018-07-30 NOTE — LETTER
7/30/2018     RE: Harshad Beatty  [address] [place] [address]     Dear Colleague,    Thank you for referring your patient, Harshad Beatty, to the [facility] SOLID ORGAN TRANSPLANT at [facility]. Please see a copy of my visit note below.    Patient not seen yet          Again, thank you for allowing me to participate in the care of your patient.      Sincerely,    Tracy Finley NP

## 2018-07-31 LAB
BACTERIA SPEC CULT: NO GROWTH
CMV IGG SERPL QL IA: >8 AI (ref 0–0.8)
CMV IGM SERPL QL IA: 0.2 AI (ref 0–0.8)
EBV VCA IGG SER QL IA: 6.4 AI (ref 0–0.8)
EBV VCA IGM SER QL IA: <0.2 AI (ref 0–0.8)
HLA FINAL CROSSMATCH RECIPIENT: NORMAL
INTERPRETATION ECG - MUSE: NORMAL
Lab: NORMAL
SPECIMEN SOURCE: NORMAL

## 2018-08-01 ENCOUNTER — RESULTS ONLY (OUTPATIENT)
Dept: OTHER | Facility: CLINIC | Age: 38
End: 2018-08-01

## 2018-08-03 ENCOUNTER — RESULTS ONLY (OUTPATIENT)
Dept: OTHER | Facility: CLINIC | Age: 38
End: 2018-08-03

## 2018-08-06 ENCOUNTER — TELEPHONE (OUTPATIENT)
Dept: TRANSPLANT | Facility: CLINIC | Age: 38
End: 2018-08-06

## 2018-08-07 ENCOUNTER — HOSPITAL ENCOUNTER (INPATIENT)
Facility: CLINIC | Age: 38
LOS: 3 days | Discharge: HOME HEALTH | DRG: 652 | End: 2018-08-10
Attending: SURGERY | Admitting: SURGERY
Payer: COMMERCIAL

## 2018-08-07 ENCOUNTER — APPOINTMENT (OUTPATIENT)
Dept: GENERAL RADIOLOGY | Facility: CLINIC | Age: 38
DRG: 652 | End: 2018-08-07
Attending: ANESTHESIOLOGY
Payer: COMMERCIAL

## 2018-08-07 ENCOUNTER — ANESTHESIA (OUTPATIENT)
Dept: SURGERY | Facility: CLINIC | Age: 38
End: 2018-08-07

## 2018-08-07 ENCOUNTER — DOCUMENTATION ONLY (OUTPATIENT)
Dept: TRANSPLANT | Facility: CLINIC | Age: 38
End: 2018-08-07

## 2018-08-07 ENCOUNTER — ANESTHESIA EVENT (OUTPATIENT)
Dept: SURGERY | Facility: CLINIC | Age: 38
End: 2018-08-07

## 2018-08-07 DIAGNOSIS — I15.0 RENOVASCULAR HYPERTENSION: ICD-10-CM

## 2018-08-07 DIAGNOSIS — D57.3 SICKLE CELL TRAIT (H): ICD-10-CM

## 2018-08-07 DIAGNOSIS — Z94.0 KIDNEY REPLACED BY TRANSPLANT: ICD-10-CM

## 2018-08-07 DIAGNOSIS — D84.9 IMMUNOSUPPRESSED STATUS (H): ICD-10-CM

## 2018-08-07 DIAGNOSIS — N18.6 ESRD (END STAGE RENAL DISEASE) (H): Primary | ICD-10-CM

## 2018-08-07 LAB
ABO + RH BLD: NORMAL
ABO + RH BLD: NORMAL
ANION GAP SERPL CALCULATED.3IONS-SCNC: 9 MMOL/L (ref 3–14)
BASE EXCESS BLDA CALC-SCNC: 4.6 MMOL/L
BLD GP AB SCN SERPL QL: NORMAL
BLD PROD TYP BPU: NORMAL
BLOOD BANK CMNT PATIENT-IMP: NORMAL
BLOOD BANK CMNT PATIENT-IMP: NORMAL
BUN SERPL-MCNC: 27 MG/DL (ref 7–30)
CA-I BLD-MCNC: 4.3 MG/DL (ref 4.4–5.2)
CALCIUM SERPL-MCNC: 8.6 MG/DL (ref 8.5–10.1)
CHLORIDE SERPL-SCNC: 94 MMOL/L (ref 94–109)
CO2 SERPL-SCNC: 29 MMOL/L (ref 20–32)
CREAT SERPL-MCNC: 9.77 MG/DL (ref 0.66–1.25)
ERYTHROCYTE [DISTWIDTH] IN BLOOD BY AUTOMATED COUNT: 14.3 % (ref 10–15)
GFR SERPL CREATININE-BSD FRML MDRD: 6 ML/MIN/1.7M2
GLUCOSE BLD-MCNC: 114 MG/DL (ref 70–99)
GLUCOSE BLDC GLUCOMTR-MCNC: 65 MG/DL (ref 70–99)
GLUCOSE BLDC GLUCOMTR-MCNC: 83 MG/DL (ref 70–99)
GLUCOSE BLDC GLUCOMTR-MCNC: 85 MG/DL (ref 70–99)
GLUCOSE BLDC GLUCOMTR-MCNC: 86 MG/DL (ref 70–99)
GLUCOSE SERPL-MCNC: 141 MG/DL (ref 70–99)
HCO3 BLD-SCNC: 28 MMOL/L (ref 21–28)
HCT VFR BLD AUTO: 33.2 % (ref 40–53)
HGB BLD-MCNC: 10.9 G/DL (ref 13.3–17.7)
HGB BLD-MCNC: 9.7 G/DL (ref 13.3–17.7)
LACTATE BLD-SCNC: 0.8 MMOL/L (ref 0.7–2)
MAGNESIUM SERPL-MCNC: 2 MG/DL (ref 1.6–2.3)
MCH RBC QN AUTO: 28.2 PG (ref 26.5–33)
MCHC RBC AUTO-ENTMCNC: 32.8 G/DL (ref 31.5–36.5)
MCV RBC AUTO: 86 FL (ref 78–100)
NUM BPU REQUESTED: 2
O2/TOTAL GAS SETTING VFR VENT: 40 %
PCO2 BLD: 37 MM HG (ref 35–45)
PH BLD: 7.49 PH (ref 7.35–7.45)
PHOSPHATE SERPL-MCNC: 4.1 MG/DL (ref 2.5–4.5)
PLATELET # BLD AUTO: 211 10E9/L (ref 150–450)
PO2 BLD: 83 MM HG (ref 80–105)
POTASSIUM BLD-SCNC: 3.5 MMOL/L (ref 3.4–5.3)
POTASSIUM SERPL-SCNC: 3.7 MMOL/L (ref 3.4–5.3)
POTASSIUM SERPL-SCNC: 4.2 MMOL/L (ref 3.4–5.3)
RBC # BLD AUTO: 3.86 10E12/L (ref 4.4–5.9)
SODIUM BLD-SCNC: 134 MMOL/L (ref 133–144)
SODIUM SERPL-SCNC: 132 MMOL/L (ref 133–144)
SPECIMEN EXP DATE BLD: NORMAL
WBC # BLD AUTO: 8.4 10E9/L (ref 4–11)

## 2018-08-07 PROCEDURE — 84132 ASSAY OF SERUM POTASSIUM: CPT | Performed by: SURGERY

## 2018-08-07 PROCEDURE — 0TY00Z0 TRANSPLANTATION OF RIGHT KIDNEY, ALLOGENEIC, OPEN APPROACH: ICD-10-PCS | Performed by: SURGERY

## 2018-08-07 PROCEDURE — 25000128 H RX IP 250 OP 636: Performed by: SURGERY

## 2018-08-07 PROCEDURE — 84100 ASSAY OF PHOSPHORUS: CPT | Performed by: SURGERY

## 2018-08-07 PROCEDURE — 25000128 H RX IP 250 OP 636: Performed by: STUDENT IN AN ORGANIZED HEALTH CARE EDUCATION/TRAINING PROGRAM

## 2018-08-07 PROCEDURE — 84295 ASSAY OF SERUM SODIUM: CPT | Performed by: SURGERY

## 2018-08-07 PROCEDURE — 25000125 ZZHC RX 250: Performed by: STUDENT IN AN ORGANIZED HEALTH CARE EDUCATION/TRAINING PROGRAM

## 2018-08-07 PROCEDURE — 71000017 ZZH RECOVERY PHASE 1 LEVEL 3 EA ADDTL HR: Performed by: SURGERY

## 2018-08-07 PROCEDURE — P9041 ALBUMIN (HUMAN),5%, 50ML: HCPCS | Performed by: STUDENT IN AN ORGANIZED HEALTH CARE EDUCATION/TRAINING PROGRAM

## 2018-08-07 PROCEDURE — 25000566 ZZH SEVOFLURANE, EA 15 MIN: Performed by: SURGERY

## 2018-08-07 PROCEDURE — 20000004 ZZH R&B ICU UMMC

## 2018-08-07 PROCEDURE — 40000275 ZZH STATISTIC RCP TIME EA 10 MIN

## 2018-08-07 PROCEDURE — 37000008 ZZH ANESTHESIA TECHNICAL FEE, 1ST 30 MIN: Performed by: SURGERY

## 2018-08-07 PROCEDURE — 71000016 ZZH RECOVERY PHASE 1 LEVEL 3 FIRST HR: Performed by: SURGERY

## 2018-08-07 PROCEDURE — 25000131 ZZH RX MED GY IP 250 OP 636 PS 637: Performed by: STUDENT IN AN ORGANIZED HEALTH CARE EDUCATION/TRAINING PROGRAM

## 2018-08-07 PROCEDURE — 36415 COLL VENOUS BLD VENIPUNCTURE: CPT | Performed by: NURSE PRACTITIONER

## 2018-08-07 PROCEDURE — 36000062 ZZH SURGERY LEVEL 4 1ST 30 MIN - UMMC: Performed by: SURGERY

## 2018-08-07 PROCEDURE — 25800025 ZZH RX 258: Performed by: ANESTHESIOLOGY

## 2018-08-07 PROCEDURE — 82947 ASSAY GLUCOSE BLOOD QUANT: CPT | Performed by: SURGERY

## 2018-08-07 PROCEDURE — 37000009 ZZH ANESTHESIA TECHNICAL FEE, EACH ADDTL 15 MIN: Performed by: SURGERY

## 2018-08-07 PROCEDURE — 40000196 ZZH STATISTIC RAPCV CVP MONITORING

## 2018-08-07 PROCEDURE — 82803 BLOOD GASES ANY COMBINATION: CPT | Performed by: SURGERY

## 2018-08-07 PROCEDURE — 27210794 ZZH OR GENERAL SUPPLY STERILE: Performed by: SURGERY

## 2018-08-07 PROCEDURE — 86900 BLOOD TYPING SEROLOGIC ABO: CPT | Performed by: NURSE PRACTITIONER

## 2018-08-07 PROCEDURE — 81100000 ZZH ACQUISITION KIDNEY LIVING DONOR

## 2018-08-07 PROCEDURE — 25800025 ZZH RX 258: Performed by: SURGERY

## 2018-08-07 PROCEDURE — 36000064 ZZH SURGERY LEVEL 4 EA 15 ADDTL MIN - UMMC: Performed by: SURGERY

## 2018-08-07 PROCEDURE — 80048 BASIC METABOLIC PNL TOTAL CA: CPT | Performed by: SURGERY

## 2018-08-07 PROCEDURE — 83605 ASSAY OF LACTIC ACID: CPT | Performed by: SURGERY

## 2018-08-07 PROCEDURE — 25000128 H RX IP 250 OP 636: Performed by: NURSE PRACTITIONER

## 2018-08-07 PROCEDURE — 85027 COMPLETE CBC AUTOMATED: CPT | Performed by: SURGERY

## 2018-08-07 PROCEDURE — 82330 ASSAY OF CALCIUM: CPT | Performed by: SURGERY

## 2018-08-07 PROCEDURE — 27210995 ZZH RX 272: Performed by: SURGERY

## 2018-08-07 PROCEDURE — 40000986 XR CHEST PORT 1 VW

## 2018-08-07 PROCEDURE — 40000170 ZZH STATISTIC PRE-PROCEDURE ASSESSMENT II: Performed by: SURGERY

## 2018-08-07 PROCEDURE — 83735 ASSAY OF MAGNESIUM: CPT | Performed by: SURGERY

## 2018-08-07 PROCEDURE — 86850 RBC ANTIBODY SCREEN: CPT | Performed by: NURSE PRACTITIONER

## 2018-08-07 PROCEDURE — 00000146 ZZHCL STATISTIC GLUCOSE BY METER IP

## 2018-08-07 PROCEDURE — 25000132 ZZH RX MED GY IP 250 OP 250 PS 637: Performed by: ANESTHESIOLOGY

## 2018-08-07 PROCEDURE — 86901 BLOOD TYPING SEROLOGIC RH(D): CPT | Performed by: NURSE PRACTITIONER

## 2018-08-07 RX ORDER — EPHEDRINE SULFATE 50 MG/ML
INJECTION, SOLUTION INTRAMUSCULAR; INTRAVENOUS; SUBCUTANEOUS PRN
Status: DISCONTINUED | OUTPATIENT
Start: 2018-08-07 | End: 2018-08-07

## 2018-08-07 RX ORDER — ALBUMIN, HUMAN INJ 5% 5 %
SOLUTION INTRAVENOUS CONTINUOUS PRN
Status: DISCONTINUED | OUTPATIENT
Start: 2018-08-07 | End: 2018-08-07

## 2018-08-07 RX ORDER — LABETALOL HYDROCHLORIDE 5 MG/ML
10 INJECTION, SOLUTION INTRAVENOUS
Status: COMPLETED | OUTPATIENT
Start: 2018-08-07 | End: 2018-08-07

## 2018-08-07 RX ORDER — CEFUROXIME SODIUM 1.5 G/16ML
1.5 INJECTION, POWDER, FOR SOLUTION INTRAVENOUS ONCE
Status: COMPLETED | OUTPATIENT
Start: 2018-08-07 | End: 2018-08-07

## 2018-08-07 RX ORDER — DEXTROSE MONOHYDRATE 25 G/50ML
25-50 INJECTION, SOLUTION INTRAVENOUS
Status: DISCONTINUED | OUTPATIENT
Start: 2018-08-07 | End: 2018-08-10 | Stop reason: HOSPADM

## 2018-08-07 RX ORDER — ONDANSETRON 2 MG/ML
4 INJECTION INTRAMUSCULAR; INTRAVENOUS EVERY 30 MIN PRN
Status: DISCONTINUED | OUTPATIENT
Start: 2018-08-07 | End: 2018-08-08 | Stop reason: HOSPADM

## 2018-08-07 RX ORDER — NICOTINE POLACRILEX 4 MG
15-30 LOZENGE BUCCAL
Status: DISCONTINUED | OUTPATIENT
Start: 2018-08-07 | End: 2018-08-10 | Stop reason: HOSPADM

## 2018-08-07 RX ORDER — MAGNESIUM HYDROXIDE 1200 MG/15ML
LIQUID ORAL PRN
Status: DISCONTINUED | OUTPATIENT
Start: 2018-08-07 | End: 2018-08-08 | Stop reason: HOSPADM

## 2018-08-07 RX ORDER — DEXTROSE MONOHYDRATE, SODIUM CHLORIDE, AND POTASSIUM CHLORIDE 50; 1.49; 4.5 G/1000ML; G/1000ML; G/1000ML
INJECTION, SOLUTION INTRAVENOUS CONTINUOUS
Status: DISCONTINUED | OUTPATIENT
Start: 2018-08-07 | End: 2018-08-08 | Stop reason: HOSPADM

## 2018-08-07 RX ORDER — CARVEDILOL 25 MG/1
50 TABLET ORAL ONCE
Status: COMPLETED | OUTPATIENT
Start: 2018-08-07 | End: 2018-08-07

## 2018-08-07 RX ORDER — NEOSTIGMINE METHYLSULFATE 1 MG/ML
VIAL (ML) INJECTION PRN
Status: DISCONTINUED | OUTPATIENT
Start: 2018-08-07 | End: 2018-08-07

## 2018-08-07 RX ORDER — LIDOCAINE 40 MG/G
CREAM TOPICAL
Status: DISCONTINUED | OUTPATIENT
Start: 2018-08-07 | End: 2018-08-10 | Stop reason: HOSPADM

## 2018-08-07 RX ORDER — PROPOFOL 10 MG/ML
INJECTION, EMULSION INTRAVENOUS PRN
Status: DISCONTINUED | OUTPATIENT
Start: 2018-08-07 | End: 2018-08-07

## 2018-08-07 RX ORDER — ONDANSETRON 4 MG/1
4 TABLET, ORALLY DISINTEGRATING ORAL EVERY 30 MIN PRN
Status: DISCONTINUED | OUTPATIENT
Start: 2018-08-07 | End: 2018-08-08 | Stop reason: HOSPADM

## 2018-08-07 RX ORDER — SODIUM CHLORIDE 9 MG/ML
INJECTION, SOLUTION INTRAVENOUS CONTINUOUS
Status: DISCONTINUED | OUTPATIENT
Start: 2018-08-07 | End: 2018-08-07 | Stop reason: HOSPADM

## 2018-08-07 RX ORDER — SODIUM CHLORIDE, SODIUM LACTATE, POTASSIUM CHLORIDE, CALCIUM CHLORIDE 600; 310; 30; 20 MG/100ML; MG/100ML; MG/100ML; MG/100ML
INJECTION, SOLUTION INTRAVENOUS CONTINUOUS PRN
Status: DISCONTINUED | OUTPATIENT
Start: 2018-08-07 | End: 2018-08-07

## 2018-08-07 RX ORDER — GLYCOPYRROLATE 0.2 MG/ML
INJECTION, SOLUTION INTRAMUSCULAR; INTRAVENOUS PRN
Status: DISCONTINUED | OUTPATIENT
Start: 2018-08-07 | End: 2018-08-07

## 2018-08-07 RX ORDER — FENTANYL CITRATE 50 UG/ML
INJECTION, SOLUTION INTRAMUSCULAR; INTRAVENOUS PRN
Status: DISCONTINUED | OUTPATIENT
Start: 2018-08-07 | End: 2018-08-07

## 2018-08-07 RX ORDER — CALCIUM CHLORIDE 100 MG/ML
INJECTION INTRAVENOUS; INTRAVENTRICULAR PRN
Status: DISCONTINUED | OUTPATIENT
Start: 2018-08-07 | End: 2018-08-07

## 2018-08-07 RX ORDER — LIDOCAINE 40 MG/G
CREAM TOPICAL
Status: DISCONTINUED | OUTPATIENT
Start: 2018-08-07 | End: 2018-08-08

## 2018-08-07 RX ORDER — SODIUM CHLORIDE, SODIUM GLUCONATE, SODIUM ACETATE, POTASSIUM CHLORIDE AND MAGNESIUM CHLORIDE 526; 502; 368; 37; 30 MG/100ML; MG/100ML; MG/100ML; MG/100ML; MG/100ML
INJECTION, SOLUTION INTRAVENOUS CONTINUOUS PRN
Status: DISCONTINUED | OUTPATIENT
Start: 2018-08-07 | End: 2018-08-07

## 2018-08-07 RX ORDER — SODIUM CHLORIDE 9 MG/ML
1000 INJECTION, SOLUTION INTRAVENOUS CONTINUOUS PRN
Status: DISCONTINUED | OUTPATIENT
Start: 2018-08-07 | End: 2018-08-08

## 2018-08-07 RX ORDER — NALOXONE HYDROCHLORIDE 0.4 MG/ML
.1-.4 INJECTION, SOLUTION INTRAMUSCULAR; INTRAVENOUS; SUBCUTANEOUS
Status: DISCONTINUED | OUTPATIENT
Start: 2018-08-07 | End: 2018-08-08

## 2018-08-07 RX ORDER — DOPAMINE HYDROCHLORIDE 160 MG/100ML
INJECTION, SOLUTION INTRAVENOUS CONTINUOUS PRN
Status: DISCONTINUED | OUTPATIENT
Start: 2018-08-07 | End: 2018-08-07

## 2018-08-07 RX ORDER — HYDROMORPHONE HYDROCHLORIDE 1 MG/ML
.3-.5 INJECTION, SOLUTION INTRAMUSCULAR; INTRAVENOUS; SUBCUTANEOUS EVERY 5 MIN PRN
Status: DISCONTINUED | OUTPATIENT
Start: 2018-08-07 | End: 2018-08-08 | Stop reason: HOSPADM

## 2018-08-07 RX ORDER — ESMOLOL HYDROCHLORIDE 10 MG/ML
INJECTION INTRAVENOUS PRN
Status: DISCONTINUED | OUTPATIENT
Start: 2018-08-07 | End: 2018-08-07

## 2018-08-07 RX ORDER — FENTANYL CITRATE 50 UG/ML
25-50 INJECTION, SOLUTION INTRAMUSCULAR; INTRAVENOUS
Status: DISCONTINUED | OUTPATIENT
Start: 2018-08-07 | End: 2018-08-08 | Stop reason: HOSPADM

## 2018-08-07 RX ORDER — ONDANSETRON 2 MG/ML
INJECTION INTRAMUSCULAR; INTRAVENOUS PRN
Status: DISCONTINUED | OUTPATIENT
Start: 2018-08-07 | End: 2018-08-07

## 2018-08-07 RX ADMIN — DEXTROSE MONOHYDRATE AND SODIUM CHLORIDE: 5; .45 INJECTION, SOLUTION INTRAVENOUS at 17:53

## 2018-08-07 RX ADMIN — ANTI-THYMOCYTE GLOBULIN (RABBIT) 200 MG: 5 INJECTION, POWDER, LYOPHILIZED, FOR SOLUTION INTRAVENOUS at 18:45

## 2018-08-07 RX ADMIN — ESMOLOL HYDROCHLORIDE 100 MG: 10 INJECTION, SOLUTION INTRAVENOUS at 22:55

## 2018-08-07 RX ADMIN — FENTANYL CITRATE 50 MCG: 50 INJECTION, SOLUTION INTRAMUSCULAR; INTRAVENOUS at 19:05

## 2018-08-07 RX ADMIN — DEXTROSE MONOHYDRATE AND SODIUM CHLORIDE: 5; .9 INJECTION, SOLUTION INTRAVENOUS at 23:50

## 2018-08-07 RX ADMIN — ONDANSETRON 4 MG: 2 INJECTION INTRAMUSCULAR; INTRAVENOUS at 21:30

## 2018-08-07 RX ADMIN — PHENYLEPHRINE HYDROCHLORIDE 100 MCG: 10 INJECTION, SOLUTION INTRAMUSCULAR; INTRAVENOUS; SUBCUTANEOUS at 19:40

## 2018-08-07 RX ADMIN — EPINEPHRINE 5 MCG: 1 INJECTION PARENTERAL at 20:35

## 2018-08-07 RX ADMIN — PROPOFOL 200 MG: 10 INJECTION, EMULSION INTRAVENOUS at 18:03

## 2018-08-07 RX ADMIN — SODIUM CHLORIDE, SODIUM GLUCONATE, SODIUM ACETATE, POTASSIUM CHLORIDE AND MAGNESIUM CHLORIDE: 526; 502; 368; 37; 30 INJECTION, SOLUTION INTRAVENOUS at 19:00

## 2018-08-07 RX ADMIN — LABETALOL HYDROCHLORIDE 5 MG: 5 INJECTION INTRAVENOUS at 23:00

## 2018-08-07 RX ADMIN — ALBUMIN HUMAN: 0.05 INJECTION, SOLUTION INTRAVENOUS at 19:41

## 2018-08-07 RX ADMIN — EPINEPHRINE 10 MCG: 1 INJECTION PARENTERAL at 20:32

## 2018-08-07 RX ADMIN — FENTANYL CITRATE 50 MCG: 50 INJECTION, SOLUTION INTRAMUSCULAR; INTRAVENOUS at 20:09

## 2018-08-07 RX ADMIN — HYDROMORPHONE HYDROCHLORIDE 0.25 MG: 1 INJECTION, SOLUTION INTRAMUSCULAR; INTRAVENOUS; SUBCUTANEOUS at 22:06

## 2018-08-07 RX ADMIN — ALBUMIN HUMAN: 0.05 INJECTION, SOLUTION INTRAVENOUS at 19:30

## 2018-08-07 RX ADMIN — Medication 10 MG: at 19:53

## 2018-08-07 RX ADMIN — EPINEPHRINE 20 MCG: 1 INJECTION PARENTERAL at 19:57

## 2018-08-07 RX ADMIN — CARVEDILOL 50 MG: 25 TABLET, FILM COATED ORAL at 14:17

## 2018-08-07 RX ADMIN — LABETALOL HYDROCHLORIDE 10 MG: 5 INJECTION INTRAVENOUS at 22:55

## 2018-08-07 RX ADMIN — GLYCOPYRROLATE 1 MG: 0.2 INJECTION, SOLUTION INTRAMUSCULAR; INTRAVENOUS at 21:52

## 2018-08-07 RX ADMIN — PHENYLEPHRINE HYDROCHLORIDE 100 MCG: 10 INJECTION, SOLUTION INTRAMUSCULAR; INTRAVENOUS; SUBCUTANEOUS at 19:25

## 2018-08-07 RX ADMIN — DOPAMINE HYDROCHLORIDE 5 MCG/KG/MIN: 160 INJECTION, SOLUTION INTRAVENOUS at 19:56

## 2018-08-07 RX ADMIN — CISATRACURIUM BESYLATE 4 MG: 2 INJECTION INTRAVENOUS at 20:22

## 2018-08-07 RX ADMIN — DEXTROSE MONOHYDRATE AND SODIUM CHLORIDE: 5; .45 INJECTION, SOLUTION INTRAVENOUS at 13:21

## 2018-08-07 RX ADMIN — HYDROMORPHONE HYDROCHLORIDE 0.25 MG: 1 INJECTION, SOLUTION INTRAMUSCULAR; INTRAVENOUS; SUBCUTANEOUS at 22:26

## 2018-08-07 RX ADMIN — CISATRACURIUM BESYLATE 4 MG: 2 INJECTION INTRAVENOUS at 19:01

## 2018-08-07 RX ADMIN — EPINEPHRINE 10 MCG: 1 INJECTION PARENTERAL at 19:55

## 2018-08-07 RX ADMIN — CISATRACURIUM BESYLATE 20 MG: 2 INJECTION INTRAVENOUS at 18:03

## 2018-08-07 RX ADMIN — MYCOPHENOLATE MOFETIL 1500 MG: 500 INJECTION, POWDER, LYOPHILIZED, FOR SOLUTION INTRAVENOUS at 18:45

## 2018-08-07 RX ADMIN — CISATRACURIUM BESYLATE 4 MG: 2 INJECTION INTRAVENOUS at 19:44

## 2018-08-07 RX ADMIN — EPINEPHRINE 5 MCG: 1 INJECTION PARENTERAL at 20:40

## 2018-08-07 RX ADMIN — CALCIUM CHLORIDE 500 MG: 100 INJECTION, SOLUTION INTRAVENOUS at 20:45

## 2018-08-07 RX ADMIN — NEOSTIGMINE METHYLSULFATE 5 MG: 1 INJECTION, SOLUTION INTRAVENOUS at 21:52

## 2018-08-07 RX ADMIN — SODIUM CHLORIDE, POTASSIUM CHLORIDE, SODIUM LACTATE AND CALCIUM CHLORIDE: 600; 310; 30; 20 INJECTION, SOLUTION INTRAVENOUS at 18:45

## 2018-08-07 RX ADMIN — CISATRACURIUM BESYLATE 2 MG: 2 INJECTION INTRAVENOUS at 20:51

## 2018-08-07 RX ADMIN — MIDAZOLAM 2 MG: 1 INJECTION INTRAMUSCULAR; INTRAVENOUS at 23:00

## 2018-08-07 RX ADMIN — PROPOFOL 100 MG: 10 INJECTION, EMULSION INTRAVENOUS at 18:04

## 2018-08-07 RX ADMIN — CEFUROXIME 1500 MG: 1.5 INJECTION, POWDER, FOR SOLUTION INTRAVENOUS at 19:00

## 2018-08-07 RX ADMIN — HYDROMORPHONE HYDROCHLORIDE 0.25 MG: 1 INJECTION, SOLUTION INTRAMUSCULAR; INTRAVENOUS; SUBCUTANEOUS at 22:14

## 2018-08-07 RX ADMIN — SODIUM CHLORIDE, SODIUM GLUCONATE, SODIUM ACETATE, POTASSIUM CHLORIDE AND MAGNESIUM CHLORIDE: 526; 502; 368; 37; 30 INJECTION, SOLUTION INTRAVENOUS at 20:45

## 2018-08-07 RX ADMIN — Medication 5 MG: at 19:22

## 2018-08-07 RX ADMIN — FENTANYL CITRATE 50 MCG: 50 INJECTION, SOLUTION INTRAMUSCULAR; INTRAVENOUS at 20:22

## 2018-08-07 RX ADMIN — PHENYLEPHRINE HYDROCHLORIDE 100 MCG: 10 INJECTION, SOLUTION INTRAMUSCULAR; INTRAVENOUS; SUBCUTANEOUS at 22:20

## 2018-08-07 RX ADMIN — HYDROMORPHONE HYDROCHLORIDE 0.25 MG: 1 INJECTION, SOLUTION INTRAMUSCULAR; INTRAVENOUS; SUBCUTANEOUS at 21:30

## 2018-08-07 RX ADMIN — PHENYLEPHRINE HYDROCHLORIDE 150 MCG: 10 INJECTION, SOLUTION INTRAMUSCULAR; INTRAVENOUS; SUBCUTANEOUS at 19:51

## 2018-08-07 RX ADMIN — SODIUM CHLORIDE 500 MG: 9 INJECTION, SOLUTION INTRAVENOUS at 18:45

## 2018-08-07 RX ADMIN — PHENYLEPHRINE HYDROCHLORIDE 50 MCG: 10 INJECTION, SOLUTION INTRAMUSCULAR; INTRAVENOUS; SUBCUTANEOUS at 19:22

## 2018-08-07 RX ADMIN — ESMOLOL HYDROCHLORIDE 50 MG: 10 INJECTION, SOLUTION INTRAVENOUS at 23:00

## 2018-08-07 RX ADMIN — MIDAZOLAM 2 MG: 1 INJECTION INTRAMUSCULAR; INTRAVENOUS at 17:50

## 2018-08-07 ASSESSMENT — PAIN DESCRIPTION - DESCRIPTORS
DESCRIPTORS: ACHING;SHARP
DESCRIPTORS: SORE

## 2018-08-07 NOTE — LETTER
Transition Communication Hand-off for Care Transitions to Next Level of Care Provider    Name: Harshad Beatty  : 1980  MRN #: [id number]  Primary Care Provider: [facility]     Primary Clinic: [address] [place] [id number]     Reason for Hospitalization:  Kidney replaced by transplant [Z94.0]  S/P kidney transplant [Z94.0]  Admit Date/Time: 2018 10:44 AM  Discharge Date: 8.10.18  Payor Source: Payor: [employer] / Plan: [employer] TRANSPLANT / Product Type: Indemnity /       Reason for Communication Hand-off Referral: Other KT    Discharge Plan:  Discharge Plan:       Most Recent Value    Concerns To Be Addressed all concerns addressed in this encounter           Concern for non-adherence with plan of care:   Y/N N  Discharge Needs Assessment:  Needs       Most Recent Value    Equipment Currently Used at Home none    Transportation Available family or friend will provide    Home Care [facility] [phone], Fax: [phone]            Follow-up specialty is recommended: Yes    Follow-up plan:  Future Appointments  Date Time Provider Department Center   2018 7:00 AM UC SPEC INFUSION [facility]   2018 8:00 AM Brijesh Gaxiola MD [facility]   2018 2:30 PM Bety Mitchell MD [facility]   2018 10:30 [facility]   2018 11:00 AM Transplant, Uc Early Post [facility]   9/10/2018 3:45 PM Bety Mitchell MD [facility]   2018 1:05 PM Recipient, Uc Kidney/Pancreas [facility]   2019 1:05 PM Recipient[facility]       Any outstanding tests or procedures:        Referrals     Future Labs/Procedures    Home care nursing referral     Comments:    ___________________________________________________  [facility]  Phone: [phone]  Fax: [phone]  ___________________________________________________      RN skilled nursing visit, to begin  after completion of ATC clinic ([phone]) visits: Approx start date 8/15 or 8/17      RN to assess vital signs and weight, respiratory and cardiac status, patients ability to take and record daily blood pressure, temp and weight, pain level and activity tolerance, incision for signs/symptoms of infection, hydration, nutrition and bowel status and home safety.    RN to teach medication management.   Assist / teach patient to obtain and record lab results in handbook     RN to provide morning lab draws, Q M-W-F and report results to Outpatient Care Coordinator: Blanca Sow and Soledad Roldan  Ph: [phone]  Fax: [phone]          Your provider has ordered home care nursing services. If you have not been contacted within 2 days of your discharge please call the inpatient department phone number at [phone] .            Espitia Recommendations:      Elda Daley    AVS/Discharge Summary is the source of truth; this is a helpful guide for improved communication of patient story

## 2018-08-07 NOTE — OR NURSING
Dr. Benavides of anesthesia notified of patients glucose of 65. Orders to start D5 0.45% NS at 120 ml/hr for hypoglycemia. Dr. Benavides also okayed for patient to have clear liquids as OR start times is still a few hours away.

## 2018-08-07 NOTE — OR NURSING
Dr. Benavides of anesthesia notified that patient has not taken his home dose of coreg since Sunday. Orders received to give 50mg coreg pre op.

## 2018-08-07 NOTE — ANESTHESIA PREPROCEDURE EVALUATION
Anesthesia Evaluation     .             ROS/MED HX    ENT/Pulmonary:  - neg pulmonary ROS     Neurologic:  - neg neurologic ROS     Cardiovascular:     (+) hypertension----. : . . . :. .       METS/Exercise Tolerance:     Hematologic:     (+) Anemia, -      Musculoskeletal:  - neg musculoskeletal ROS       GI/Hepatic:  - neg GI/hepatic ROS       Renal/Genitourinary:     (+) chronic renal disease, type: ESRD, Pt requires dialysis,       Endo: Comment: Secondary hyperparathyroidism        Psychiatric:  - neg psychiatric ROS       Infectious Disease:  - neg infectious disease ROS       Malignancy:      - no malignancy   Other:    - neg other ROS               Past Medical History:   Diagnosis Date     Anemia in chronic kidney disease      ESRD (end stage renal disease) (H)      History of cardiomyopathy      HTN (hypertension)      Secondary hyperparathyroidism (H)      Sickle cell trait (H)          Patient Active Problem List   Diagnosis     ESRD (end stage renal disease) (H)     HTN (hypertension)     Sickle cell trait (H)     Anemia in chronic kidney disease     Secondary hyperparathyroidism (H)             Past Surgical History:   Procedure Laterality Date     CREATE FISTULA ARTERIOVENOUS UPPER EXTREMITY       ORIF right 5th metatarpal fracture               Allergies:  No Known Allergies        Meds:   Prescriptions Prior to Admission   Medication Sig Dispense Refill Last Dose     calcium acetate (PHOSLO) 667 MG CAPS capsule Take 2,668 mg by mouth 3 times daily (with meals)   8/6/2018 at 1200     Cinacalcet HCl (SENSIPAR PO) Take 60 mg by mouth daily   8/6/2018 at Unknown time     AMLODIPINE BESYLATE PO Take 5 mg by mouth daily   8/5/2018 at 0800     carvedilol (COREG) 25 MG tablet Take 2 tablets by mouth daily  3 8/5/2018 at 0800     LISINOPRIL PO Take 80 mg by mouth daily   8/5/2018 at 0800     MINOXIDIL PO Take 10 mg by mouth daily   8/7/2018 at 0700       No current outpatient prescriptions on file.            Physical Exam  Normal systems: cardiovascular, pulmonary and dental    Airway   Mallampati: II  TM distance: >3 FB  Neck ROM: full    Dental     Cardiovascular   Rhythm and rate: regular and normal      Pulmonary    breath sounds clear to auscultation      Labs:  BMP:  Recent Labs   Lab Test  08/07/18   1304  07/30/18   1132   NA   --   135   POTASSIUM  4.2  4.9   CHLORIDE   --   98   CO2   --   28   BUN   --   57*   CR   --   14.60*   GLC   --   98   JUAN   --   10.5*     LFTs:   Recent Labs   Lab Test  07/30/18   1132   PROTTOTAL  8.6   ALBUMIN  3.9   BILITOTAL  0.5   ALKPHOS  50   AST  4   ALT  17     CBC:   Recent Labs   Lab Test  07/30/18   1132   WBC  5.3   RBC  3.86*   HGB  10.7*   HCT  33.7*   MCV  87   MCH  27.7   MCHC  31.8   RDW  14.6   PLT  276     Coags:  Recent Labs   Lab Test  05/31/17   0734   INR  1.05   PTT  29     ECHO: 5/31/2017  Interpretation Summary  Mild left ventricular dilation with severe concentric hypertrophy. Normal  systolic function. EF 60-65%.  Mild right ventricular dilation with normal function.  No significant valvular pathology.  No pericardial effusion.  LVH can be from CKD/HTN, or alternatively infiltrative disease such as  amyloid.  No prior study for comparison.                Anesthesia Plan      History & Physical Review  History and physical reviewed and following examination; no interval change.    ASA Status:  3 .    NPO Status:  > 8 hours    Plan for General and ETT with Intravenous induction. Maintenance will be Balanced.    PONV prophylaxis:  Ondansetron (or other 5HT-3)  Additional equipment: 2nd IV and Central Line Plan Gen with ETT  May need CVL for Thymol infusion.       Postoperative Care  Postoperative pain management:  IV analgesics and Multi-modal analgesia.      Consents  Anesthetic plan, risks, benefits and alternatives discussed with:  Patient.  Use of blood products discussed: Yes.   Use of blood products discussed with Patient.  .                           .

## 2018-08-07 NOTE — PROGRESS NOTES
"The coordinator from the donor center ([facility]) created a new  donor UNOS ID since the start of this paired exchange, therefore some of the EPIC documentation for Patricia (including the pre-op final XM) references the \"old\"/\"former\"/\"previous\" UNOS ID.  The OR documents (TIEDI info and ABOs) and EPIC organ encounter have been updated with the \"new\"/\"current\" UNOS ID.  The Immunology department has been updated of the new UNOS ID.  The coordinator at [facility] was encouraged to follow-up with [facility] so that they could \"link\" the two IDs or delete the previous ID.    Old/Former/Previous UNOS ID: [id number]  New/Current UNOS ID: [id number]    "

## 2018-08-07 NOTE — IP AVS SNAPSHOT
[address] [address]    Phone:  [phone]                                       After Visit Summary   8/7/2018    Harshad Beatty    MRN: [id number]           After Visit Summary Signature Page     I have received my discharge instructions, and my questions have been answered. I have discussed any challenges I see with this plan with the nurse or doctor.    ..........................................................................................................................................  Patient/Patient Representative Signature      ..........................................................................................................................................  Patient Representative Print Name and Relationship to Patient    ..................................................               ................................................  Date                                            Time    ..........................................................................................................................................  Reviewed by Signature/Title    ...................................................              ..............................................  Date                                                            Time

## 2018-08-07 NOTE — IP AVS SNAPSHOT
MRN:[id number]                      After Visit Summary   8/7/2018    Harshad Beatty    MRN: [id number]           Thank you!     Thank you for choosing [facility] for your care. Our goal is always to provide you with excellent care. Hearing back from our patients is one way we can continue to improve our services. Please take a few minutes to complete the written survey that you may receive in the mail after you visit with us. Thank you!        Patient Information     Date Of Birth          1980        Designated Caregiver       Most Recent Value    Caregiver    Will someone help with your care after discharge? yes    Name of designated caregiver Yesica    Phone number of caregiver [phone]    Caregiver address [place], [place]      About your hospital stay     You were admitted on:  August 7, 2018 You last received care in the:  [address] [facility]    You were discharged on:  August 10, 2018        Reason for your hospital stay       Patient underwent a living donor kidney transplant on 8/7/18.                  Who to Call     For medical emergencies, please call 911.  For non-urgent questions about your medical care, please call your primary care provider or clinic, [phone]  For questions related to your surgery, please call your surgery clinic        Attending Provider     Provider Specialty    Bety Mitchell MD Transplant       Primary Care Provider Office Phone # Fax #    [facility] [phone]      After Care Instructions     Diet       Follow this diet upon discharge:  Regular Diet Adult            Discharge Instructions       On 2/8/19 (after 6 months), decrease aspirin dose from 325 mg daily to 81 mg by mouth daily                  Follow-up Appointments     Adult [facility] Follow-up and recommended labs and tests       Over the next 3-5 days you will be [facility] in [facility] at 7 am (ph. [phone], option 7) .  Your labs  will be drawn at the beginning of your appointment at 7:00 am.  DO NOT take your medications prior to having labs drawn. Please bring all your medications with you from home to take after labs are drawn.    LABS:  CBC, BMP, Mg, Phos and Tacrolimus levels (12 hours after administration) to be drawn daily while in ATC, then every Monday, Wednesday, Friday by home health care nurse if arranged, or at an outpatient lab.     FOLLOW UP APPOINTMENTS:  Remember to always bring an updated medication list to all appointments.     An appointment with your transplant surgeon will be scheduled for approximately 2 weeks following discharge from the hospital.  Your transplant surgeon is: Dr. Mitchell.  You will follow up with transplant nephrology in clinic at 1 and 3 months and then annually.     Follow up with primary care provider in 4-8 weeks. (Pt to schedule)    Call scheduling at [phone] if you have not heard about your appointments within 48 hours after discharge.  If you have staples in place, they will be removed in 3 weeks after operation.    WHEN TO CONTACT YOUR  COORDINATOR:  Transplant Coordinator [phone]  Notify your coordinator if you have pain over your kidney, increased redness or drainage from your incision, fever greater than 100.5F, or decreased urine output.  Notify your coordinator immediately if you are ever unable to take your immunosuppressive medications for any reason.  If it is outside of office hours, please call the hospital switchboard at [phone] and ask to have the kidney transplant surgery fellow paged for urgent medical questions, or present to the emergency department.    OTHER DISCHARGE INSTRUCTIONS:  RONALD plan: Please monitor color and amount of output. Drain will remain in place until output is <30cc/day x 3 days.     Monitor blood pressure and weight daily initially post transplant.    Walk at least four times a day, lift no greater than 10 pounds for 6-8 weeks from the time of  surgery.  No driving while taking narcotics or 3 weeks after surgery.    Diet recommendations post-transplant: Heart healthy dietary habits long term (low saturated/trans fat, low sodium). High protein diet x 8 weeks. Practice food safety precautions.                  Your next 10 appointments already scheduled     Aug 12, 2018  7:00 AM CDT   (Arrive by 6:45 AM)   New Transplant Visit with UC SPEC INFUSION, UC 42 ATC   [facility] ([facility])    [address] [place] [address]  [address]  [facility] [address]   [phone]            Aug 13, 2018  7:00 AM CDT   (Arrive by 6:45 AM)   New Transplant Visit with UC SPEC INFUSION, UC 50 ATC   [facility] ([facility])    [address] [place] [address]  [address]  [facility] [address]   [phone]            Aug 13, 2018  8:00 AM CDT   (Arrive by 7:45 AM)   Kidney Transplant Discharge with Brijesh Gaxiola MD   [facility] ([facility])    [address] [place] [address]  [address]  [facility] [address]   [phone]            Aug 14, 2018  7:00 AM CDT   (Arrive by 6:45 AM)   New Transplant Visit with UC SPEC INFUSION, UC 43 ATC   [facility] ([facility])    [address] [place] [address]  [address]  [facility] [address]   [phone]            Aug 14, 2018  8:00 AM CDT   (Arrive by 7:45 AM)   Kidney Transplant Discharge with Brijesh Gaxiola MD   [facility] ([facility])    [address] [place] [address]  [address]  [facility] [address]   [phone]            Aug 27, 2018  2:30 PM CDT   (Arrive by 2:15 PM)   Kidney Post Op with Bety Mitchell MD   [facility] ([facility])    [address] [place] [address]  [address]  [address]  [facility] [id number] [phone]            Sep 07, 2018 10:30 AM CDT   (Arrive by 10:00 AM)   Return Kidney Transplant with Uc Early Post Transplant   [facility] ([facility])    [address]  [address]  [facility] [id number] [phone]              Additional Services     Home care nursing referral       ___________________________________________________  [facility]  Phone: [phone]  Fax: [phone]  ___________________________________________________      RN skilled nursing visit, to begin after completion of ATC clinic ([phone]) visits: Approx start date 8/15 or 8/17      RN to assess vital signs and weight, respiratory and cardiac status, patients ability to take and record daily blood pressure, temp and weight, pain level and activity tolerance, incision for signs/symptoms of infection, hydration, nutrition and bowel status and home safety.    RN to teach medication management.   Assist / teach patient to obtain and record lab results in handbook     RN to provide morning lab draws, Q M-W-F and report results to Outpatient Care Coordinator: Blanca Sow and Soledad Roldan  Ph: [phone]  Fax: [phone]          Your provider has ordered home care nursing services. If you have not been contacted within 2 days of your discharge please call the inpatient department phone number at [phone] .                  Further instructions from your care team       ________________________________________________________  Discharge RN please fax discharge orders to home care agency: [facility]  ________________________________________________________        Diet recommendations post-transplant: High protein diet x 8 weeks.  Heart healthy dietary habits long term (low saturated/trans fat, low sodium). Practice food safety precautions. See nutrition section of transplant handbook for more information.      Pending  "Results     Date and Time Order Name Status Description    8/10/2018 1410 XR Abdomen Port 1 View In process             Statement of Approval     Ordered          08/10/18 1410  I have reviewed and agree with all the recommendations and orders detailed in this document.  EFFECTIVE NOW     Approved and electronically signed by:  Montse Mireles PA-C             Admission Information     Date & Time Provider Department Dept. Phone    8/7/2018 Bety Mitchell MD [address] [facility] [phone]      Your Vitals Were     Blood Pressure Pulse Temperature Respirations Height Weight    157/89 (BP Location: Left arm) 88 98.1  F (36.7  C) (Oral) 18 1.8 m (5' 10.87\") 117.2 kg (258 lb 4.8 oz)    Pulse Oximetry BMI (Body Mass Index)                98% 36.16 kg/m2          Care EveryWhere ID     This is your Care EveryWhere ID. This could be used by other organizations to access your [facility] medical records  [id number]        Equal Access to Services     JAKI PUGA AH: Hadii nan lopez hadasho [place], waaxda luqadaha, qaybta kaalmada adeegyada, herb beltran . So [facility] [phone].    ATENCIÓN: Si habla español, tiene a rodriguez disposición servicios gratuitos de asistencia lingüística. Llame al [phone].    We comply with applicable federal civil rights laws and [place] laws. We do not discriminate on the basis of race, color, national origin, age, disability, sex, sexual orientation, or gender identity.               Review of your medicines      START taking        Dose / Directions    acetaminophen 325 MG tablet   Commonly known as:  TYLENOL        Dose:  325-650 mg   Take 1-2 tablets (325-650 mg) by mouth every 4 hours as needed for mild pain or fever   Quantity:  100 tablet   Refills:  0       aspirin 325 MG EC tablet        Dose:  325 mg   Start taking on:  8/11/2018   Take 1 tablet (325 mg) by mouth daily   Quantity:  30 tablet   Refills:  5       atorvastatin 10 MG tablet "   Commonly known as:  LIPITOR        Dose:  10 mg   Start taking on:  8/11/2018   Take 1 tablet (10 mg) by mouth daily   Quantity:  30 tablet   Refills:  11       cholecalciferol 2000 units tablet   Used for:  Renovascular hypertension        Dose:  2000 Units   Start taking on:  8/11/2018   Take 2,000 Units by mouth daily   Quantity:  30 tablet   Refills:  3       magnesium oxide 400 MG tablet   Commonly known as:  MAG-OX        Dose:  400 mg   Take 1 tablet (400 mg) by mouth daily (with lunch)   Quantity:  30 tablet   Refills:  3       mycophenolate 250 MG capsule   Commonly known as:  GENERIC EQUIVALENT        Dose:  1000 mg   Take 4 capsules (1,000 mg) by mouth 2 times daily   Quantity:  240 capsule   Refills:  11       ondansetron 4 MG ODT tab   Commonly known as:  ZOFRAN-ODT        Dose:  4 mg   Take 1 tablet (4 mg) by mouth every 6 hours as needed for nausea or vomiting   Quantity:  20 tablet   Refills:  0       oxyCODONE IR 5 MG tablet   Commonly known as:  ROXICODONE        Dose:  5 mg   Take 1 tablet (5 mg) by mouth every 4 hours as needed for moderate to severe pain   Quantity:  20 tablet   Refills:  0       senna-docusate 8.6-50 MG per tablet   Commonly known as:  SENOKOT-S;PERICOLACE        Dose:  1 tablet   Take 1 tablet by mouth 2 times daily   Quantity:  30 tablet   Refills:  0       sulfamethoxazole-trimethoprim 400-80 MG per tablet   Commonly known as:  BACTRIM/SEPTRA   Indication:  PCP prophylaxis   Used for:  Immunosuppressed status (H)        Dose:  1 tablet   Start taking on:  8/11/2018   Take 1 tablet by mouth daily   Quantity:  30 tablet   Refills:  11       tacrolimus 1 MG 24 hr tablet   Commonly known as:  ENVARSUS XR        Dose:  10 mg   Take 10 tablets (10 mg) by mouth every morning (before breakfast)   Quantity:  300 tablet   Refills:  11       valGANciclovir 450 MG tablet   Commonly known as:  VALCYTE   Indication:  Medication Treatment to Prevent Cytomegalovirus Disease        Dose:   450 mg   Start taking on:  8/11/2018   Take 1 tablet (450 mg) by mouth daily Titrate dose up to a max of 2 tabs (900 mg) by mouth daily when directed by your transplant team.   Quantity:  60 tablet   Refills:  2         CONTINUE these medicines which may have CHANGED, or have new prescriptions. If we are uncertain of the size of tablets/capsules you have at home, strength may be listed as something that might have changed.        Dose / Directions    carvedilol 25 MG tablet   Commonly known as:  COREG   This may have changed:  when to take this   Used for:  Renovascular hypertension        Dose:  50 mg   Take 2 tablets (50 mg) by mouth 2 times daily (with meals)   Quantity:  120 tablet   Refills:  3       minoxidil 2.5 MG tablet   Commonly known as:  OPAL   This may have changed:    - how much to take  - when to take this   Used for:  Renovascular hypertension        Dose:  5 mg   Take 2 tablets (5 mg) by mouth 2 times daily   Quantity:  120 tablet   Refills:  11         STOP taking     AMLODIPINE BESYLATE PO           calcium acetate 667 MG Caps capsule   Commonly known as:  PHOSLO           LISINOPRIL PO           SENSIPAR PO                Where to get your medicines      These medications were sent to [facility] - [place], [place] - [address]  [address], [facility] [address]     Phone:  [phone]     acetaminophen 325 MG tablet    aspirin 325 MG EC tablet    atorvastatin 10 MG tablet    carvedilol 25 MG tablet    cholecalciferol 2000 units tablet    magnesium oxide 400 MG tablet    minoxidil 2.5 MG tablet    mycophenolate 250 MG capsule    ondansetron 4 MG ODT tab    senna-docusate 8.6-50 MG per tablet    sulfamethoxazole-trimethoprim 400-80 MG per tablet    tacrolimus 1 MG 24 hr tablet    valGANciclovir 450 MG tablet         Some of these will need a paper prescription and others can be bought over the counter. Ask your nurse if you have questions.     Bring a paper  prescription for each of these medications     oxyCODONE IR 5 MG tablet                Protect others around you: Learn how to safely use, store and throw away your medicines at www.disposemymeds.org.        ANTIBIOTIC INSTRUCTION     You've Been Prescribed an Antibiotic - Now What?  Your healthcare team thinks that you or your loved one might have an infection. Some infections can be treated with antibiotics, which are powerful, life-saving drugs. Like all medications, antibiotics have side effects and should only be used when necessary. There are some important things you should know about your antibiotic treatment.      Your healthcare team may run tests before you start taking an antibiotic.    Your team may take samples (e.g., from your blood, urine or other areas) to run tests to look for bacteria. These test can be important to determine if you need an antibiotic at all and, if you do, which antibiotic will work best.      Within a few days, your healthcare team might change or even stop your antibiotic.    Your team may start you on an antibiotic while they are working to find out what is making you sick.    Your team might change your antibiotic because test results show that a different antibiotic would be better to treat your infection.    In some cases, once your team has more information, they learn that you do not need an antibiotic at all. They may find out that you don't have an infection, or that the antibiotic you're taking won't work against your infection. For example, an infection caused by a virus can't be treated with antibiotics. Staying on an antibiotic when you don't need it is more likely to be harmful than helpful.      You may experience side effects from your antibiotic.    Like all medications, antibiotics have side effects. Some of these can be serious.    Let you healthcare team know if you have any known allergies when you are admitted to the hospital.    One significant side effect  of nearly all antibiotics is the risk of severe and sometimes deadly diarrhea caused by Clostridium difficile (C. Difficile). This occurs when a person takes antibiotics because some good germs are destroyed. Antibiotic use allows C. diificile to take over, putting patients at high risk for this serious infection.    As a patient or caregiver, it is important to understand your or your loved one's antibiotic treatment. It is especially important for caregivers to speak up when patients can't speak for themselves. Here are some important questions to ask your healthcare team.    What infection is this antibiotic treating and how do you know I have that infection?    What side effects might occur from this antibiotic?    How long will I need to take this antibiotic?    Is it safe to take this antibiotic with other medications or supplements (e.g., vitamins) that I am taking?     Are there any special directions I need to know about taking this antibiotic? For example, should I take it with food?    How will I be monitored to know whether my infection is responding to the antibiotic?    What tests may help to make sure the right antibiotic is prescribed for me?      Information provided by:  www.cdc.gov/getsmart  U.S. Department of Health and Human Services  Centers for disease Control and Prevention  National Center for Emerging and Zoonotic Infectious Diseases  Division of Healthcare Quality Promotion        Information about OPIOIDS     PRESCRIPTION OPIOIDS: WHAT YOU NEED TO KNOW   We gave you an opioid (narcotic) pain medicine. It is important to manage your pain, but opioids are not always the best choice. You should first try all the other options your care team gave you. Take this medicine for as short a time (and as few doses) as possible.    Some activities can increase your pain, such as bandage changes or therapy sessions. It may help to take your pain medicine 30 to 60 minutes before these activities.  Reduce your stress by getting enough sleep, working on hobbies you enjoy and practicing relaxation or meditation. Talk to your care team about ways to manage your pain beyond prescription opioids.    These medicines have risks:    DO NOT drive when on new or higher doses of pain medicine. These medicines can affect your alertness and reaction times, and you could be arrested for driving under the influence (DUI). If you need to use opioids long-term, talk to your care team about driving.    DO NOT operate heavy machinery    DO NOT do any other dangerous activities while taking these medicines.    DO NOT drink any alcohol while taking these medicines.     If the opioid prescribed includes acetaminophen, DO NOT take with any other medicines that contain acetaminophen. Read all labels carefully. Look for the word  acetaminophen  or  Tylenol.  Ask your pharmacist if you have questions or are unsure.    You can get addicted to pain medicines, especially if you have a history of addiction (chemical, alcohol or substance dependence). Talk to your care team about ways to reduce this risk.    All opioids tend to cause constipation. Drink plenty of water and eat foods that have a lot of fiber, such as fruits, vegetables, prune juice, apple juice and high-fiber cereal. Take a laxative (Miralax, milk of magnesia, Colace, Senna) if you don t move your bowels at least every other day. Other side effects include upset stomach, sleepiness, dizziness, throwing up, tolerance (needing more of the medicine to have the same effect), physical dependence and slowed breathing.    Store your pills in a secure place, locked if possible. We will not replace any lost or stolen medicine. If you don t finish your medicine, please throw away (dispose) as directed by your pharmacist. The [place] Pollution Control Agency has more information about safe disposal: https://www.pca.[facility].[place].us/living-green/managing-unwanted-medications              Medication List: This is a list of all your medications and when to take them. Check marks below indicate your daily home schedule. Keep this list as a reference.      Medications           Morning Afternoon Evening Bedtime As Needed    acetaminophen 325 MG tablet   Commonly known as:  TYLENOL   Take 1-2 tablets (325-650 mg) by mouth every 4 hours as needed for mild pain or fever   Last time this was given:  650 mg on 8/10/2018 12:54 PM                                aspirin 325 MG EC tablet   Take 1 tablet (325 mg) by mouth daily   Start taking on:  8/11/2018   Last time this was given:  325 mg on 8/10/2018  8:01 AM                                atorvastatin 10 MG tablet   Commonly known as:  LIPITOR   Take 1 tablet (10 mg) by mouth daily   Start taking on:  8/11/2018   Last time this was given:  10 mg on 8/10/2018  8:00 AM                                carvedilol 25 MG tablet   Commonly known as:  COREG   Take 2 tablets (50 mg) by mouth 2 times daily (with meals)   Last time this was given:  50 mg on 8/10/2018  8:01 AM                                cholecalciferol 2000 units tablet   Take 2,000 Units by mouth daily   Start taking on:  8/11/2018   Last time this was given:  2,000 Units on 8/10/2018  9:16 AM                                magnesium oxide 400 MG tablet   Commonly known as:  MAG-OX   Take 1 tablet (400 mg) by mouth daily (with lunch)   Last time this was given:  400 mg on 8/10/2018 12:14 PM                                minoxidil 2.5 MG tablet   Commonly known as:  LONITEN   Take 2 tablets (5 mg) by mouth 2 times daily                                mycophenolate 250 MG capsule   Commonly known as:  GENERIC EQUIVALENT   Take 4 capsules (1,000 mg) by mouth 2 times daily   Last time this was given:  1,000 mg on 8/10/2018  8:01 AM                                ondansetron 4 MG ODT tab   Commonly known as:  ZOFRAN-ODT   Take 1 tablet (4 mg) by mouth every 6 hours as needed for nausea or vomiting                                 oxyCODONE IR 5 MG tablet   Commonly known as:  ROXICODONE   Take 1 tablet (5 mg) by mouth every 4 hours as needed for moderate to severe pain   Last time this was given:  10 mg on 8/9/2018  7:56 AM                                senna-docusate 8.6-50 MG per tablet   Commonly known as:  SENOKOT-S;PERICOLACE   Take 1 tablet by mouth 2 times daily   Last time this was given:  2 tablets on 8/9/2018  8:16 PM                                sulfamethoxazole-trimethoprim 400-80 MG per tablet   Commonly known as:  BACTRIM/SEPTRA   Take 1 tablet by mouth daily   Start taking on:  8/11/2018   Last time this was given:  1 tablet on 8/10/2018  8:01 AM                                tacrolimus 1 MG 24 hr tablet   Commonly known as:  ENVARSUS XR   Take 10 tablets (10 mg) by mouth every morning (before breakfast)   Last time this was given:  10 mg on 8/10/2018  8:00 AM                                valGANciclovir 450 MG tablet   Commonly known as:  VALCYTE   Take 1 tablet (450 mg) by mouth daily Titrate dose up to a max of 2 tabs (900 mg) by mouth daily when directed by your transplant team.   Start taking on:  8/11/2018

## 2018-08-08 ENCOUNTER — APPOINTMENT (OUTPATIENT)
Dept: ULTRASOUND IMAGING | Facility: CLINIC | Age: 38
DRG: 652 | End: 2018-08-08
Attending: SURGERY
Payer: COMMERCIAL

## 2018-08-08 ENCOUNTER — DOCUMENTATION ONLY (OUTPATIENT)
Dept: TRANSPLANT | Facility: CLINIC | Age: 38
End: 2018-08-08

## 2018-08-08 DIAGNOSIS — Z94.0 KIDNEY REPLACED BY TRANSPLANT: Primary | ICD-10-CM

## 2018-08-08 DIAGNOSIS — Z48.298 AFTERCARE FOLLOWING ORGAN TRANSPLANT: ICD-10-CM

## 2018-08-08 LAB
ANION GAP SERPL CALCULATED.3IONS-SCNC: 8 MMOL/L (ref 3–14)
BASOPHILS # BLD AUTO: 0 10E9/L (ref 0–0.2)
BASOPHILS NFR BLD AUTO: 0 %
BUN SERPL-MCNC: 28 MG/DL (ref 7–30)
CALCIUM SERPL-MCNC: 8.8 MG/DL (ref 8.5–10.1)
CHLORIDE SERPL-SCNC: 97 MMOL/L (ref 94–109)
CO2 SERPL-SCNC: 28 MMOL/L (ref 20–32)
CREAT SERPL-MCNC: 9.58 MG/DL (ref 0.66–1.25)
DIFFERENTIAL METHOD BLD: ABNORMAL
DONOR IDENTIFICATION: NORMAL
DSA COMMENTS: NORMAL
DSA PRESENT: NO
DSA TEST METHOD: NORMAL
EOSINOPHIL # BLD AUTO: 0 10E9/L (ref 0–0.7)
EOSINOPHIL NFR BLD AUTO: 0 %
ERYTHROCYTE [DISTWIDTH] IN BLOOD BY AUTOMATED COUNT: 14.5 % (ref 10–15)
GFR SERPL CREATININE-BSD FRML MDRD: 6 ML/MIN/1.7M2
GLUCOSE BLDC GLUCOMTR-MCNC: 147 MG/DL (ref 70–99)
GLUCOSE SERPL-MCNC: 147 MG/DL (ref 70–99)
HCT VFR BLD AUTO: 33.6 % (ref 40–53)
HGB BLD-MCNC: 10.9 G/DL (ref 13.3–17.7)
HGB BLD-MCNC: 11.1 G/DL (ref 13.3–17.7)
HGB BLD-MCNC: 11.2 G/DL (ref 13.3–17.7)
HGB BLD-MCNC: 11.6 G/DL (ref 13.3–17.7)
IMM GRANULOCYTES # BLD: 0 10E9/L (ref 0–0.4)
IMM GRANULOCYTES NFR BLD: 0.2 %
LYMPHOCYTES # BLD AUTO: 0.1 10E9/L (ref 0.8–5.3)
LYMPHOCYTES NFR BLD AUTO: 1.9 %
MAGNESIUM SERPL-MCNC: 2.1 MG/DL (ref 1.6–2.3)
MCH RBC QN AUTO: 28 PG (ref 26.5–33)
MCHC RBC AUTO-ENTMCNC: 32.4 G/DL (ref 31.5–36.5)
MCV RBC AUTO: 86 FL (ref 78–100)
MONOCYTES # BLD AUTO: 0.1 10E9/L (ref 0–1.3)
MONOCYTES NFR BLD AUTO: 0.8 %
MRSA DNA SPEC QL NAA+PROBE: NEGATIVE
NEUTROPHILS # BLD AUTO: 6.2 10E9/L (ref 1.6–8.3)
NEUTROPHILS NFR BLD AUTO: 97.1 %
NRBC # BLD AUTO: 0 10*3/UL
NRBC BLD AUTO-RTO: 0 /100
ORGAN: NORMAL
PHOSPHATE SERPL-MCNC: 5.4 MG/DL (ref 2.5–4.5)
PLATELET # BLD AUTO: 202 10E9/L (ref 150–450)
POTASSIUM SERPL-SCNC: 4 MMOL/L (ref 3.4–5.3)
POTASSIUM SERPL-SCNC: 4 MMOL/L (ref 3.4–5.3)
POTASSIUM SERPL-SCNC: 4.2 MMOL/L (ref 3.4–5.3)
POTASSIUM SERPL-SCNC: 4.2 MMOL/L (ref 3.4–5.3)
RBC # BLD AUTO: 3.89 10E12/L (ref 4.4–5.9)
SODIUM SERPL-SCNC: 133 MMOL/L (ref 133–144)
SPECIMEN SOURCE: NORMAL
WBC # BLD AUTO: 6.4 10E9/L (ref 4–11)

## 2018-08-08 PROCEDURE — 00000146 ZZHCL STATISTIC GLUCOSE BY METER IP

## 2018-08-08 PROCEDURE — 25000132 ZZH RX MED GY IP 250 OP 250 PS 637: Performed by: SURGERY

## 2018-08-08 PROCEDURE — 84100 ASSAY OF PHOSPHORUS: CPT | Performed by: SURGERY

## 2018-08-08 PROCEDURE — 85018 HEMOGLOBIN: CPT | Performed by: SURGERY

## 2018-08-08 PROCEDURE — 85025 COMPLETE CBC W/AUTO DIFF WBC: CPT | Performed by: SURGERY

## 2018-08-08 PROCEDURE — 80048 BASIC METABOLIC PNL TOTAL CA: CPT | Performed by: SURGERY

## 2018-08-08 PROCEDURE — 83735 ASSAY OF MAGNESIUM: CPT | Performed by: SURGERY

## 2018-08-08 PROCEDURE — 84132 ASSAY OF SERUM POTASSIUM: CPT | Performed by: SURGERY

## 2018-08-08 PROCEDURE — 99291 CRITICAL CARE FIRST HOUR: CPT | Mod: GC | Performed by: ANESTHESIOLOGY

## 2018-08-08 PROCEDURE — 25000132 ZZH RX MED GY IP 250 OP 250 PS 637: Performed by: PHYSICIAN ASSISTANT

## 2018-08-08 PROCEDURE — 27210995 ZZH RX 272: Performed by: SURGERY

## 2018-08-08 PROCEDURE — 12000001 ZZH R&B MED SURG/OB UMMC

## 2018-08-08 PROCEDURE — 87640 STAPH A DNA AMP PROBE: CPT | Performed by: STUDENT IN AN ORGANIZED HEALTH CARE EDUCATION/TRAINING PROGRAM

## 2018-08-08 PROCEDURE — 25000128 H RX IP 250 OP 636: Performed by: SURGERY

## 2018-08-08 PROCEDURE — 40000275 ZZH STATISTIC RCP TIME EA 10 MIN

## 2018-08-08 PROCEDURE — 99207 ZZC APP CREDIT; MD BILLING SHARED VISIT: CPT | Performed by: ANESTHESIOLOGY

## 2018-08-08 PROCEDURE — 40000196 ZZH STATISTIC RAPCV CVP MONITORING

## 2018-08-08 PROCEDURE — 25000131 ZZH RX MED GY IP 250 OP 636 PS 637: Performed by: SURGERY

## 2018-08-08 PROCEDURE — 94660 CPAP INITIATION&MGMT: CPT

## 2018-08-08 PROCEDURE — 76776 US EXAM K TRANSPL W/DOPPLER: CPT

## 2018-08-08 PROCEDURE — 87641 MR-STAPH DNA AMP PROBE: CPT | Performed by: STUDENT IN AN ORGANIZED HEALTH CARE EDUCATION/TRAINING PROGRAM

## 2018-08-08 RX ORDER — SODIUM CHLORIDE 450 MG/100ML
INJECTION, SOLUTION INTRAVENOUS CONTINUOUS PRN
Status: DISCONTINUED | OUTPATIENT
Start: 2018-08-08 | End: 2018-08-08

## 2018-08-08 RX ORDER — ONDANSETRON 2 MG/ML
4 INJECTION INTRAMUSCULAR; INTRAVENOUS EVERY 6 HOURS PRN
Status: DISCONTINUED | OUTPATIENT
Start: 2018-08-08 | End: 2018-08-10 | Stop reason: HOSPADM

## 2018-08-08 RX ORDER — SULFAMETHOXAZOLE AND TRIMETHOPRIM 400; 80 MG/1; MG/1
1 TABLET ORAL
Status: DISCONTINUED | OUTPATIENT
Start: 2018-08-10 | End: 2018-08-10

## 2018-08-08 RX ORDER — METHYLPREDNISOLONE SODIUM SUCCINATE 125 MG/2ML
125 INJECTION, POWDER, LYOPHILIZED, FOR SOLUTION INTRAMUSCULAR; INTRAVENOUS ONCE
Status: COMPLETED | OUTPATIENT
Start: 2018-08-08 | End: 2018-08-08

## 2018-08-08 RX ORDER — DIPHENHYDRAMINE HCL 25 MG
25-50 CAPSULE ORAL ONCE
Status: COMPLETED | OUTPATIENT
Start: 2018-08-08 | End: 2018-08-08

## 2018-08-08 RX ORDER — DIPHENHYDRAMINE HCL 12.5MG/5ML
25-50 LIQUID (ML) ORAL ONCE
Status: COMPLETED | OUTPATIENT
Start: 2018-08-08 | End: 2018-08-08

## 2018-08-08 RX ORDER — ACETAMINOPHEN 325 MG/1
650 TABLET ORAL ONCE
Status: COMPLETED | OUTPATIENT
Start: 2018-08-08 | End: 2018-08-08

## 2018-08-08 RX ORDER — SULFAMETHOXAZOLE AND TRIMETHOPRIM 400; 80 MG/1; MG/1
1 TABLET ORAL DAILY
Status: DISCONTINUED | OUTPATIENT
Start: 2018-08-08 | End: 2018-08-08

## 2018-08-08 RX ORDER — OXYCODONE HYDROCHLORIDE 5 MG/1
5-10 TABLET ORAL EVERY 4 HOURS PRN
Status: DISCONTINUED | OUTPATIENT
Start: 2018-08-08 | End: 2018-08-10 | Stop reason: HOSPADM

## 2018-08-08 RX ORDER — VALACYCLOVIR HYDROCHLORIDE 500 MG/1
500 TABLET, FILM COATED ORAL DAILY
Status: DISCONTINUED | OUTPATIENT
Start: 2018-08-09 | End: 2018-08-08

## 2018-08-08 RX ORDER — MYCOPHENOLATE MOFETIL 250 MG/1
1000 CAPSULE ORAL
Status: DISCONTINUED | OUTPATIENT
Start: 2018-08-08 | End: 2018-08-10 | Stop reason: HOSPADM

## 2018-08-08 RX ORDER — AMOXICILLIN 250 MG
2 CAPSULE ORAL 2 TIMES DAILY
Status: DISCONTINUED | OUTPATIENT
Start: 2018-08-08 | End: 2018-08-10 | Stop reason: HOSPADM

## 2018-08-08 RX ORDER — MAGNESIUM OXIDE 400 MG/1
400 TABLET ORAL
Status: DISCONTINUED | OUTPATIENT
Start: 2018-08-09 | End: 2018-08-10 | Stop reason: HOSPADM

## 2018-08-08 RX ORDER — DIAZEPAM 5 MG
5 TABLET ORAL EVERY 6 HOURS PRN
Status: DISCONTINUED | OUTPATIENT
Start: 2018-08-08 | End: 2018-08-10 | Stop reason: HOSPADM

## 2018-08-08 RX ORDER — ACETAMINOPHEN 325 MG/1
325-650 TABLET ORAL EVERY 4 HOURS PRN
Status: DISCONTINUED | OUTPATIENT
Start: 2018-08-08 | End: 2018-08-10 | Stop reason: HOSPADM

## 2018-08-08 RX ORDER — HYDROMORPHONE HCL/0.9% NACL/PF 0.2MG/0.2
0.2 SYRINGE (ML) INTRAVENOUS
Status: DISCONTINUED | OUTPATIENT
Start: 2018-08-08 | End: 2018-08-10 | Stop reason: HOSPADM

## 2018-08-08 RX ORDER — ACETAMINOPHEN 325 MG/1
650 TABLET ORAL ONCE
Status: DISCONTINUED | OUTPATIENT
Start: 2018-08-08 | End: 2018-08-08

## 2018-08-08 RX ORDER — VALACYCLOVIR HYDROCHLORIDE 500 MG/1
500 TABLET, FILM COATED ORAL DAILY
Status: DISCONTINUED | OUTPATIENT
Start: 2018-08-08 | End: 2018-08-09

## 2018-08-08 RX ORDER — NALOXONE HYDROCHLORIDE 0.4 MG/ML
.1-.4 INJECTION, SOLUTION INTRAMUSCULAR; INTRAVENOUS; SUBCUTANEOUS
Status: DISCONTINUED | OUTPATIENT
Start: 2018-08-08 | End: 2018-08-08

## 2018-08-08 RX ORDER — AMOXICILLIN 250 MG
1 CAPSULE ORAL 2 TIMES DAILY
Status: DISCONTINUED | OUTPATIENT
Start: 2018-08-08 | End: 2018-08-10 | Stop reason: HOSPADM

## 2018-08-08 RX ORDER — ATORVASTATIN CALCIUM 10 MG/1
10 TABLET, FILM COATED ORAL DAILY
Status: DISCONTINUED | OUTPATIENT
Start: 2018-08-08 | End: 2018-08-10 | Stop reason: HOSPADM

## 2018-08-08 RX ORDER — ONDANSETRON 4 MG/1
4 TABLET, ORALLY DISINTEGRATING ORAL EVERY 6 HOURS PRN
Status: DISCONTINUED | OUTPATIENT
Start: 2018-08-08 | End: 2018-08-10 | Stop reason: HOSPADM

## 2018-08-08 RX ORDER — DOPAMINE HYDROCHLORIDE 160 MG/100ML
2-20 INJECTION, SOLUTION INTRAVENOUS CONTINUOUS
Status: DISCONTINUED | OUTPATIENT
Start: 2018-08-08 | End: 2018-08-08

## 2018-08-08 RX ORDER — VALGANCICLOVIR 450 MG/1
450 TABLET, FILM COATED ORAL
Status: DISCONTINUED | OUTPATIENT
Start: 2018-08-09 | End: 2018-08-08

## 2018-08-08 RX ORDER — NALOXONE HYDROCHLORIDE 0.4 MG/ML
.1-.4 INJECTION, SOLUTION INTRAMUSCULAR; INTRAVENOUS; SUBCUTANEOUS
Status: DISCONTINUED | OUTPATIENT
Start: 2018-08-08 | End: 2018-08-10 | Stop reason: HOSPADM

## 2018-08-08 RX ADMIN — ANTI-THYMOCYTE GLOBULIN (RABBIT) 200 MG: 5 INJECTION, POWDER, LYOPHILIZED, FOR SOLUTION INTRAVENOUS at 04:31

## 2018-08-08 RX ADMIN — METHYLPREDNISOLONE 125 MG: 125 INJECTION, POWDER, LYOPHILIZED, FOR SOLUTION INTRAMUSCULAR; INTRAVENOUS at 04:01

## 2018-08-08 RX ADMIN — VALACYCLOVIR HYDROCHLORIDE 500 MG: 500 TABLET, FILM COATED ORAL at 14:27

## 2018-08-08 RX ADMIN — ACETAMINOPHEN 650 MG: 325 TABLET, FILM COATED ORAL at 20:16

## 2018-08-08 RX ADMIN — SENNOSIDES AND DOCUSATE SODIUM 2 TABLET: 8.6; 5 TABLET ORAL at 20:02

## 2018-08-08 RX ADMIN — HYDROMORPHONE HYDROCHLORIDE: 10 INJECTION, SOLUTION INTRAMUSCULAR; INTRAVENOUS; SUBCUTANEOUS at 00:01

## 2018-08-08 RX ADMIN — SODIUM CHLORIDE 1000 ML: 9 INJECTION, SOLUTION INTRAVENOUS at 00:00

## 2018-08-08 RX ADMIN — OXYCODONE HYDROCHLORIDE 5 MG: 5 TABLET ORAL at 21:26

## 2018-08-08 RX ADMIN — SENNOSIDES AND DOCUSATE SODIUM 1 TABLET: 8.6; 5 TABLET ORAL at 08:27

## 2018-08-08 RX ADMIN — SODIUM CHLORIDE 1000 ML: 4.5 INJECTION, SOLUTION INTRAVENOUS at 11:03

## 2018-08-08 RX ADMIN — MYCOPHENOLATE MOFETIL 1000 MG: 250 CAPSULE ORAL at 08:27

## 2018-08-08 RX ADMIN — ACETAMINOPHEN 650 MG: 325 TABLET, FILM COATED ORAL at 04:01

## 2018-08-08 RX ADMIN — TACROLIMUS 16.75 MG: 0.75 TABLET, EXTENDED RELEASE ORAL at 08:28

## 2018-08-08 RX ADMIN — MYCOPHENOLATE MOFETIL 1000 MG: 250 CAPSULE ORAL at 18:09

## 2018-08-08 RX ADMIN — DEXTROSE MONOHYDRATE AND SODIUM CHLORIDE: 5; .9 INJECTION, SOLUTION INTRAVENOUS at 20:17

## 2018-08-08 RX ADMIN — ATORVASTATIN CALCIUM 10 MG: 10 TABLET, FILM COATED ORAL at 08:28

## 2018-08-08 RX ADMIN — SULFAMETHOXAZOLE AND TRIMETHOPRIM 1 TABLET: 400; 80 TABLET ORAL at 08:27

## 2018-08-08 RX ADMIN — DIPHENHYDRAMINE HYDROCHLORIDE 50 MG: 25 CAPSULE ORAL at 04:01

## 2018-08-08 RX ADMIN — OXYCODONE HYDROCHLORIDE 5 MG: 5 TABLET ORAL at 20:16

## 2018-08-08 RX ADMIN — SODIUM CHLORIDE 1000 ML: 9 INJECTION, SOLUTION INTRAVENOUS at 12:17

## 2018-08-08 RX ADMIN — DEXTROSE MONOHYDRATE AND SODIUM CHLORIDE: 5; .9 INJECTION, SOLUTION INTRAVENOUS at 10:11

## 2018-08-08 ASSESSMENT — ACTIVITIES OF DAILY LIVING (ADL)
ADLS_ACUITY_SCORE: 11
ADLS_ACUITY_SCORE: 10
DRESS: 0-->INDEPENDENT
RETIRED_EATING: 0-->INDEPENDENT
BATHING: 0-->INDEPENDENT
SWALLOWING: 0-->SWALLOWS FOODS/LIQUIDS WITHOUT DIFFICULTY
ADLS_ACUITY_SCORE: 9
FALL_HISTORY_WITHIN_LAST_SIX_MONTHS: NO
AMBULATION: 0-->INDEPENDENT
COGNITION: 0 - NO COGNITION ISSUES REPORTED
TRANSFERRING: 0-->INDEPENDENT
TOILETING: 0-->INDEPENDENT
ADLS_ACUITY_SCORE: 11
ADLS_ACUITY_SCORE: 11
RETIRED_COMMUNICATION: 0-->UNDERSTANDS/COMMUNICATES WITHOUT DIFFICULTY

## 2018-08-08 ASSESSMENT — PAIN DESCRIPTION - DESCRIPTORS
DESCRIPTORS: SORE
DESCRIPTORS: SORE
DESCRIPTORS: ACHING;SORE

## 2018-08-08 NOTE — PROGRESS NOTES
Social Work:    Pt underwent living unrelated donor kidney transplant on 8/7/2018. SW received post op solid organ transplant consult. SW to see pt tomorrow to complete psychosocial assessment and Medicare 2726 form.    Please contact  if immediate needs arise.    SUMAN Child, [address]   Ph: [phone], Pager: [phone]

## 2018-08-08 NOTE — PROGRESS NOTES
Patient removed from [facility] waitlist after living donor kidney transplant. [facility] ID [id number]   Donor PHS increased risk: No.   If Yes, MD documentation N/A.

## 2018-08-08 NOTE — OR NURSING
Pt to Pacu from the OR. Pt sedated and needing continual reminders to breath. Pt has needed reminders to breath and take deep breaths while in Pacu. Pt obstructs with his breathing continually. Dr. Harris-Anesthesiologist at bedside often to assess Pt. Dr. Harris ordered narcotics to be held. Pt is reporting sore throat and right abdominal pain but both are tolerable.Vitals stable. BP started off elevated but has decreased and is currently WNL's.  Dr. Lopez-Surgury Resident stated antirejection meds are not to be started until Pt gets to the SICU. There is concern that Pt will become hypotensive with meds. This was passed on to Chad SICU RN.

## 2018-08-08 NOTE — ADDENDUM NOTE
Addendum  created 08/08/18 0739 by Dian Harris MD    Anesthesia Intra Blocks edited, Sign clinical note

## 2018-08-08 NOTE — ANESTHESIA POSTPROCEDURE EVALUATION
Patient: Harshad Beatty    Procedure(s):  Kidney Transplant Living Unrelated Non Directed Recipient  - Wound Class: II-Clean Contaminated    Diagnosis:Focal Segmental Glomerular Sclerosis  Diagnosis Additional Information: No value filed.    Anesthesia Type:  General, ETT    Note:  Anesthesia Post Evaluation    Patient location during evaluation: PACU  Patient participation: Unable to evaluate secondary to administered sedation  Level of consciousness: agitated and combative  Pain management: adequate  Airway patency: Obstructing.  Cardiovascular status: acceptable  Respiratory status: acceptable  Hydration status: acceptable  PONV: none       Comments: Pt was acutely agitated upon emergence requiring 10 staff to restrain the patient, hypertensive up to 200s systolic. Administered 2mg versed, 150mg esmolol, 15mg labetalol. In transport, occasionally satting down to high 80s on nasal cannula. Denies nausea or pain. No apparent complications from anesthesia.     Juvencio No DO, MSc.  Anesthesia Resident, CA-2          Last vitals:  Vitals:    08/07/18 1247 08/07/18 1418   BP: 126/73 135/73   Pulse: 89 80   Resp: 16    Temp: 37.5  C (99.5  F)    SpO2: 92%          Electronically Signed By: Juvencio No DO  August 7, 2018  11:13 PM

## 2018-08-08 NOTE — ANESTHESIA PROCEDURE NOTES
Central Line Procedure Note  Staff:     Anesthesiologist:  MARIA ELENA HOU    Resident/CRNA:  CATHERINE BAKER    Central line placed by Resident/CRNA in the presence of a teaching physician    Location: In OR after induction  Procedure Start/Stop Times:     patient identified, IV checked, site marked, risks and benefits discussed, informed consent, monitors and equipment checked, pre-op evaluation and at physician/surgeon's request      Correct Patient: Yes      Correct Position: Yes      Correct Site: Yes      Correct Procedure: Yes      Correct Laterality:  Yes    Site Marked:  Yes  Line Placement:     Procedure:  Central Line    Insertion laterality:  Left    Insertion site:  Internal Jugular    Position:  Trendelenburg      Maximal Sterile Barriers: All elements of maximal sterile barrier technique followed      (Maximal sterile barriers include:   Sterile gown, Sterile Gloves, Mask, Cap, Whole body draped, hand hygiene and acceptable skin prep).Skin Prep: Chloraprep         Injection Technique:  Ultrasound guided    Sterile Ultrasound Technique:  Sterile probe cover and Sterile gel    Vein evaluated via U/S for patency/adequacy of catheter insertion and is adequate.  Using realtime U/S imaging the vein was punctured, and needle was observed entering vein on U/S      Permanent Image entered into patient's record      Local skin infiltration:  None    Catheter size:  7 Fr, 3 lumen, 20 cm    Catheter length at skin (cm):  15    Cath secured with: suture      Dressing:  Tegaderm and Biopatch    Complications:  None obvious    Blood aspirated all lumens: Yes      All Lumens Flushed: Yes

## 2018-08-08 NOTE — PROGRESS NOTES
SURGICAL ICU PROGRESS NOTE  08/08/2018      CO-MORBIDITIES:   ESRD (end stage renal disease) (H)  (primary encounter diagnosis)  HTN    ASSESSMENT: Harshad Beatty is a 38 year old male POD # 1 s/p LDKT who is being admitted to SICU for monitoring of thymoglobulin induced hypotension.    TODAY'S PROGRESS/PLANS:   - Transfer to Floor if stable during day  - Restart home meds per Transplant recs    PLAN:  Neuro/ pain/ sedation:  # Acute post surgical pain  - Monitor neurological status; notify the MD for any acute changes in exam  - Dilaudid PCA 0.2 mg Q10 minutes for pain.     Pulmonary care:   # Obstructive Sleep Apnea  - Supplemental oxygen to keep saturation above 92%  - Incentive spirometer p91-72sdm when awake  - Initially on CPAP at bedtime, patient did not tolerate due to feeling of claustrophobia and was placed on oxygen by facemask  - Has not required supplemental oxygen while awake     Cardiovascular:    # Hypertension  - Monitor hemodynamic status  - HOLDING PTA amlodipine, lisinopril, coreg. Will wait to restart per Transplant recs  - Atorvastatin 10 mg daily     GI care:   - Advance to regular diet as tolerated  - Bowel regimen: magnesium oxide daily and senna-docusate BID     Fluids/ Electrolytes/ Nutrition:   - D5 1/2NS for IV fluid hydration  - Replacement IVF per kidney transplant protocol  - No indication for parenteral nutrition  - Q4h Potassium check    Renal/ Fluid Balance:    # Living Donor Kidney Transplant  - Urine output is adequate so far  - Will continue to monitor intake and output     Endocrine:    - No management indication      ID/ Antibiotics:  # Living Donor Kidney Transplant  - No indication for antibiotics   - Immunotherapy per primary team: methylprednisolone, mycophenolate, tacrolimus  - Immunosuppression prophylaxis: Bactrim, valganciclovir  - Thymoglobulin running over 24 hours     Heme:     # Anemia of chronic disease  # Acute surgical blood loss anemia  - Post op acute  anemia, no indication for transfusion  - Trend Hgb until stable     MSK:   - Up OOB with assist     Prophylaxis:    - Mechanical prophylaxis for DVT  - No chemical DVT prophylaxis due to high risk of bleeding      Lines/ tubes/ drains:  - LUIZ  - RONALD drain (abdomen)  - Peres      Disposition:  - Surgical ICU    Patient seen and discussed with ICU staff, Dr. Zamora    ====================================    SUBJECTIVE:   - Transferred to SICU overnight  - Initially placed on CPAP for short apneic episodes and brief desat episodes, patient felt claustrophobic and was placed on oxygen by facemask instead    OBJECTIVE:   1. VITAL SIGNS:   Temp:  [96.6  F (35.9  C)-99.5  F (37.5  C)] 98  F (36.7  C)  Pulse:  [80-89] 80  Heart Rate:  [81-96] 91  Resp:  [16-37] 37  BP: (107-147)/(51-79) 143/72  MAP:  [63 mmHg-102 mmHg] 87 mmHg  Arterial Line BP: (111-168)/(39-90) 132/68  SpO2:  [87 %-99 %] 94 %  Resp: 37    2. INTAKE/ OUTPUT:   I/O last 3 completed shifts:  In: 4299.57 [P.O.:130; I.V.:2039.17; IV Piggyback:30.4]  Out: 2440 [Urine:2200; Drains:140; Blood:100]    3. PHYSICAL EXAMINATION:   General: sitting at edge of bed eating breakfast  Neuro: A&Ox3, NAD  Resp: Breathing non-labored on RA, no crackles, wheezing or rhonchi on auscultation  CV: RRR, no murmurs  Abdomen: Soft, Non-distended, tender to palpation at site of incision  Incisions: Right lower abdomen incision clean and intact, mildly swollen, no ecchymosis or erythema. RONALD drain in place  Extremities: warm and well perfused, no peripheral edema    4. INVESTIGATIONS:   Arterial Blood Gases     Recent Labs  Lab 08/07/18 2006   PH 7.49*   PCO2 37   PO2 83   HCO3 28     Complete Blood Count     Recent Labs  Lab 08/08/18  0423 08/07/18  2310 08/07/18 2006   WBC 6.4 8.4  --    HGB 10.9* 10.9* 9.7*    211  --      Basic Metabolic Panel    Recent Labs  Lab 08/08/18  0423 08/07/18  2310 08/07/18 2006 08/07/18  1304    132* 134  --    POTASSIUM 4.0 3.7 3.5 4.2    CHLORIDE 97 94  --   --    CO2 28 29  --   --    BUN 28 27  --   --    CR 9.58* 9.77*  --   --    * 141* 114*  --      Liver Function Tests  No lab results found in last 7 days.  Pancreatic Enzymes  No lab results found in last 7 days.  Coagulation Profile  No lab results found in last 7 days.  Lactate  Invalid input(s): LACTATE    5. RADIOLOGY:   Recent Results (from the past 24 hour(s))   XR Chest Port 1 View    Narrative    Exam:  XR CHEST PORT 1 VW, 8/7/2018 11:26 PM    History: Verify position of central venous catheter(s) inserted in the  O.R.;     Comparison:  Chest radiograph 5/31/2017.    Findings:  Portable AP of the chest. Left IJ central venous catheter  tip projected bilaterally over the high SVC. Enlargement of the  cardiac silhouette. Perihilar and bilateral mixed interstitial and  airspace opacities have increased. No pneumothorax. Visualized upper  abdomen and bones are unremarkable.      Impression    Impression:    1. Left IJ central venous catheter projected laterally over the high  SVC, possibly abutting the SVC sidewall. Consider repositioning.  2. Increased perihilar and mixed interstitial and airspace opacities  favored represent pulmonary edema.  3. Cardiomegaly.    I have personally reviewed the examination and initial interpretation  and I agree with the findings.    LISA PEREZ MD       =========================================      Patient seen, findings and plan discussed with surgical ICU staff Dr. Zamora.  - - - - - - - - - - - - - - - - - -    I, Yasmani Elmore MD, agree with the above documentation by student doctor Devin Tavarez and have made any necessary edits to the note.    Yasmani Elmore MD  PGY-2 Surgery  pager [phone]

## 2018-08-08 NOTE — PROGRESS NOTES
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    Graft function:Good, Cr was ~14 pre-op, now 9.6 this AM.  Good UOP, 7.6 L since MN  Immunosuppression management: Thymo: OR dose was paused due to hypotension, completed this AM in the ICU.  Will not order a repeat dose for today for this reason.  MMF 1000 mg BID and Envarsus ordered.  Complexity of management:Medium. Contributing factors: hypotension, induction  Hematology: Hgb has been stable, 11.2  Cardiorespiratory: Was initially transferred to the ICU post-op due to hypotension, thymo run very slowly as a result.  Now normotensive to hypertensive.  Now hypoxic, on 10L O2 via Oxy Plus, CXR last evening showed some pulmonary edema.  Will transfer to the floor once O2 needs improved  GI/Nutrition: Regular diet as tolerated  Endocrine: Mild steroid induced hyperglycemia  Fluid/Electrolytes: MIVF:  Will DC urine replacements, D5 NS at 100 ml/hr  : Peres to remain due to new surgical anastomosis  Infectious disease: PPx with valtrex (per study) and bactrim  Prophylaxis: DVT, fall, GI, fungal  Disposition: 4A, will transfer to  once O2 needs improved     Medical Decision Making: Medium  Subsequent visit 76636 (moderate level decision making)    CONCHITA/Fellow/Resident Provider: Montse Mireles    Faculty: Bety Mitchell M.D.  _________________________________________________________________  Transplant History: Admitted 2018 for  donor kidney transplant.  2018 (Kidney), Postoperative day: 1     Interval History: History is obtained from the patient  Overnight events: Hypotensive after OR, now normo-hypertensive.  Some hypoxia, does appear to have some sleep apnea though patient states this has never been diagnosed nor has he used CPAP at home    ROS:   A  "10-point review of systems was negative except as noted above.    Meds:    atorvastatin  10 mg Oral Daily     [START ON 8/9/2018] magnesium oxide  400 mg Oral Daily with lunch     mycophenolate  1,000 mg Oral BID IS     senna-docusate  1 tablet Oral BID    Or     senna-docusate  2 tablet Oral BID     sodium chloride (PF)  10 mL Intracatheter Q8H     sodium chloride (PF)  3 mL Intracatheter Q8H     sodium chloride (PF)  3 mL Intracatheter Q8H     [START ON 8/10/2018] sulfamethoxazole-trimethoprim  1 tablet Oral Once per day on Mon Wed Fri     [START ON 8/9/2018] tacrolimus  10 mg Oral QAM AC     valACYclovir  500 mg Oral Daily       Physical Exam:     Admit Weight: 120.1 kg (264 lb 12.4 oz)    Current vitals:   /72  Pulse 80  Temp 97.7  F (36.5  C) (Oral)  Resp (!) 38  Ht 1.8 m (5' 10.87\")  Wt 120.1 kg (264 lb 12.4 oz)  SpO2 93%  BMI 37.07 kg/m2    CVP (mmHg): 6 mmHg    Vital sign ranges:    Temp:  [96.6  F (35.9  C)-98.4  F (36.9  C)] 97.7  F (36.5  C)  Heart Rate:  [81-96] 91  Resp:  [18-38] 38  BP: (107-147)/(51-79) 143/72  MAP:  [63 mmHg-114 mmHg] 114 mmHg  Arterial Line BP: (111-170)/(39-90) 170/88  SpO2:  [87 %-99 %] 93 %  Patient Vitals for the past 24 hrs:   BP Temp Temp src Heart Rate Resp SpO2   08/08/18 1200 - 97.7  F (36.5  C) Oral - - -   08/08/18 1000 - - - 91 (!) 38 93 %   08/08/18 0900 - - - 91 (!) 37 92 %   08/08/18 0800 - 98.2  F (36.8  C) Oral 90 - 94 %   08/08/18 0700 - - - 92 18 94 %   08/08/18 0630 - - - 96 20 91 %   08/08/18 0600 - - - 88 20 99 %   08/08/18 0530 - - - 86 24 98 %   08/08/18 0500 - - - 92 20 92 %   08/08/18 0430 - - - 88 20 98 %   08/08/18 0400 - 98  F (36.7  C) Axillary 88 24 94 %   08/08/18 0330 - - - 86 20 98 %   08/08/18 0300 - - - 87 20 96 %   08/08/18 0245 - - - 87 24 97 %   08/08/18 0230 - - - 85 20 98 %   08/08/18 0215 - - - 86 24 97 %   08/08/18 0200 143/72 96.6  F (35.9  C) Oral 85 28 95 %   08/08/18 0145 133/77 - - 92 20 -   08/08/18 0130 136/79 - - 87 24 " 94 %   08/08/18 0115 115/61 - - 88 22 93 %   08/08/18 0100 114/54 98.1  F (36.7  C) Oral 87 26 92 %   08/08/18 0045 107/53 - - 88 24 92 %   08/08/18 0030 108/51 - - 88 22 93 %   08/08/18 0015 107/52 97.5  F (36.4  C) Oral 90 20 (!) 87 %   08/08/18 0000 113/56 - - 90 24 90 %   08/07/18 2345 129/65 - - 88 22 (!) 87 %   08/07/18 2330 140/71 98  F (36.7  C) Oral 89 20 92 %   08/07/18 2315 147/71 - - 95 24 93 %   08/07/18 2300 127/64 98.4  F (36.9  C) Oral 81 (!) 36 92 %     General Appearance: in no apparent distress.   Skin: normal, warm  Heart: regular rate and rhythm  Lungs: CTA on R, diffuse crackles on L  Abdomen: The abdomen is soft, nontender to light palpation, incision covered with post op dressing  : stallworth is present.  Urine has no gross hematuria.   Extremities: edema: absent.   Neurologic: awake and alert. Tremor absent..     Data:   CMP  Recent Labs  Lab 08/08/18  1211 08/08/18 0423 08/07/18 2310 08/07/18 2006   NA  --  133 132* 134   POTASSIUM 4.2 4.0 3.7 3.5   CHLORIDE  --  97 94  --    CO2  --  28 29  --    GLC  --  147* 141* 114*   BUN  --  28 27  --    CR  --  9.58* 9.77*  --    GFRESTIMATED  --  6* 6*  --    GFRESTBLACK  --  7* 7*  --    JUAN  --  8.8 8.6  --    ICAW  --   --   --  4.3*   MAG  --  2.1 2.0  --    PHOS  --  5.4* 4.1  --      CBC  Recent Labs  Lab 08/08/18  1211 08/08/18 0423 08/07/18 2310   HGB 11.2* 10.9* 10.9*   WBC  --  6.4 8.4   PLT  --  202 211     COAGSNo lab results found in last 7 days.    Invalid input(s): XA   Urinalysis  Recent Labs   Lab Test  07/30/18   1142  05/31/17   0726   COLOR  Straw  Straw   APPEARANCE  Clear  Clear   URINEGLC  50*  50*   URINEBILI  Negative  Negative   URINEKETONE  Negative  Negative   SG  1.008  1.004   UBLD  Small*  Small*   URINEPH  8.0*  8.0*   PROTEIN  100*  30*   NITRITE  Negative  Negative   LEUKEST  Negative  Negative   RBCU  1  1   WBCU  1  1     Virology:  Hepatitis C Antibody   Date Value Ref Range Status   07/30/2018 Nonreactive  NR^Nonreactive Final     Comment:     Assay performance characteristics have not been established for newborns,   infants, and children

## 2018-08-08 NOTE — PHARMACY-TRANSPLANT NOTE
Adult Kidney Transplant Post Operative Note    38 year old male s/p living donor kidney transplant on 8/7 for ESRD.      Planned immunosuppression regimen per kidney transplant protocol:  INDUCTION: thymoglobulin to total ~ 6mg/kg and methylprednisolone IV daily x 3 doses used as pre-med for thymoglobulin.  MAINTENANCE:  mycophenolate and tacrolimus (Envarsus XR) with goal trough levels of 8-10 mcg/L for first 6 months post-transplant.    Opportunistic pathogen prophylaxis includes: trimethoprim/sulfamethoxazole and valacyclovir (see study information below).    Patient is enrolled in medication study (IDS#: [id number]).    Pharmacy will monitor for medication interactions and immunosuppression levels in conjunction with the team. Medication therapy needs for discharge planning will continue to be addressed throughout the current admission via multidisciplinary rounds and order review.  Pharmacy will make recommendations as appropriate.        Eleanor Broadbent, PharmD, [facility]  PGY-2 Infectious Diseases Resident

## 2018-08-08 NOTE — ANESTHESIA POSTPROCEDURE EVALUATION
Patient: Harshad Beatty    Procedure(s):  Kidney Transplant Living Unrelated Non Directed Recipient  - Wound Class: II-Clean Contaminated    Diagnosis:Focal Segmental Glomerular Sclerosis  Diagnosis Additional Information: No value filed.    Anesthesia Type:  General, ETT    Note:  Anesthesia Post Evaluation    Patient location during evaluation: PACU  Patient participation: Able to fully participate in evaluation  Level of consciousness: awake and alert and sleepy but conscious  Pain management: adequate  Airway patency: patent (very likely has PRIETO had nasal trumpet in PACU he has pulled it out will likely need BIPAP  qhs will start in ICU)  Cardiovascular status: acceptable  Respiratory status: acceptable  Hydration status: acceptable  PONV: none     Anesthetic complications: None          Last vitals:  Vitals:    08/07/18 2345 08/08/18 0000 08/08/18 0015   BP: 129/65 113/56 107/52   Pulse:      Resp: 22 24 20   Temp:   36.4  C (97.5  F)   SpO2: (!) 87% 90% (!) 87%         Electronically Signed By: Dian Harris MD  August 8, 2018  1:00 AM

## 2018-08-08 NOTE — LETTER
OUTPATIENT LABORATORY TEST ORDER    Patient Name:Harshad Beatty   Transplant Date: 8/7/2018  YOB: 1980  Issue Date & Time: August 8, 2018 1:58 PM     [facility] MR: [id number]  Expiration Date:  (1 year after date issued)      Diagnoses: Aftercare following organ transplant (ICD-10 Z48.288)   Kidney Transplant (ICD-10 Z94.0)   Long term use of medications (ICD-10  Z79.899)     ?Lab results to be available on the same day drawn.   ?Patient should release information to the [facility], [facility].     ?Please fax to the Transplant Center at [phone].    3x/week, First 4Weeks post-transplant    8/7/2018 - 9/7/2018    2x/week, Months 2-3 post-transplant    9/8/2018 - 11/8/2018       1x/week, Months 4-6 post-transplant    11/9/2018 - 2/9/2019  Every 2 weeks, Months 7-9 post-transplant    2/10/2019 - 5/10/2019  Every 3 weeks, Months 10-12 post-transplant   5/11/2019 - 8/7/2019       ?Hemogram and Platelet   ?Basic Metabolic Panel (Sodium, Potassium,Chloride, CO2, Creatinine, Urea Nitrogen, Glucose, Calcium)   ?Prograf/Tacrolimus drug level     Weekly through first 3 months post-transplant    Due: 8/7/2018 - 11/7/2018   ?Phosphorus, Magnesium   ?MPA level (mycophenolic acid) once per week for first 3 months.    ?Please add a diff to hemogram once per week for the first 3 months.    Monthly   ? BK PCR Quantitative (Polyoma virus - blood in purple top tube to Reference Lab)    At 6 & 12 months post-transplant         Due: 2/2019 & 8/2019   ? HBsurfaceAb, HBcoreAb, HCV at 6 months only -   ? Liver Function Tests(Bilirubin Direct/Total, AST, ALT, Alkaline Phosphatase)   ?Complete Lipid Panel fasting (Cholesterol, Triglycerides, HDL, LDL)   ? Random Urine for Protein/Creatinine ratio    At month(s) 1, 2, 4, 6, 9, 12      Due: 9/2018, 10/2018, 12/2018, 2/2019, 5/2019, 8/2019                  ? PRA/DSA level (mailers provided by the patient)                    If you  have any questions, please call The Transplant Center at [phone] or [phone].    Please fax all results to [phone].  .

## 2018-08-08 NOTE — OP NOTE
Transplant Surgery  Operative Note     Procedure date:  08/07/18    Preoperative diagnosis:  End Stage renal failure due to Hypertension    Postoperative diagnosis:  Same    Procedure:  1. Left kidney transplant,  Living, Right  iliac fossa, without vascular reconstruction. A J-J ureteral stent was not placed.  2. Kidney allograft preparation on Back Table    Surgeon:  KIN SHANKAR    Fellow/Assistant:  Kareem Lugo, fellow, Eli Price, fellow, Misti Lopez, Resident, Shobha, Student. There was no qualified resident to assist with this procedure.    Anesthesia:  General    Specimen:  None    Drains:  Cirilo-Nelson drain    Urine output:  30 mls    Estimated blood loss:  100    Fluids administered:       Indication: The patient has End Stage renal failure due to Hypertension and received an organ offer for a Living kidney allograft. After discussing the risks and benefits of proceeding, the patient agreed to proceed with surgery and provided informed consent.  Findings: Integrity of recipient artery: Normal   Intraoperative Events: None    Final ABO/Crossmatch verification: After the donor organ arrived to the operating room and prior to anastomosis, I participated in the transplant pre-verification upon organ receipt timeout by visually verifying the donor ID, organ and laterality, donor blood type, recipient unique identifier, recipient blood type, and that the donor and recipient are blood type compatible. The crossmatch was done prospectively; the T cell flow crossmatch result was negative and B cell flow crossmatch result was negative prior to anastomosis.  The patient received Thymoglobulin, Cellcept and Solumedrol on induction. Notably, the cellcept and thymo were paused intraoperatively due to refractory hypotension. He eventually stabilized with a low dose dopamine drip.     Donor Organ Information:   Donor UNOS ID:  [id number]    Donor arterial clamp on:  8/7/2018  9:39 AM    Total ischemic  time:  664 min    Cold ischemic time:  611 min    Warm ischemic time:  53 min    Preservation fluid:  UW      Back Table Details:   Procedure:  Bench preparation of the kidney allograft for transplantation without vascular reconstruction    Surgeon:  KIN SHANKAR    Faculty Co-Surgeon:  KIN SHANKAR    Fellow/Assistant:  Kareem Lugo, fellow, Eli Price, fellow, Misti Lopez,     Donor arrival to recipient room:  8/7/2018  5:26 PM    Graft injury:  No    Graft biopsy:  No    Organ received on:  Ice    Pump resistance:      Pump flow:      Arterial anatomy:  Single    Donor arterial quality:  Normal    Venous anatomy:  Single    Ureteral anatomy:  Single    Any reconstruction:  No    Artery:      Vein:       Complications: None.    Findings: Normal       None.    Back Table Preparation:  The donor kidney was received and inspected. It had been flushed with lactated ringers. The graft was prepared on the back table by removing perinephric fat and ligating venous tributaries and lymphatics. The ureter was also cleaned of excess tissue. If required, reconstruction was performed as detailed above. The kidney was stored in iced cold preservation solution until ready for transplantation. Faculty was present for the critical portions of the procedure.    Operative Procedure:   Arterial anastomosis start:  8/7/2018  7:50 PM    Arterial unclamp:  8/7/2018  8:43 PM    Extra vessels used:         The patient was brought to the operating room, placed in a supine position, and a time out was performed. Sequential compression devices were placed on both lower extremities and general endotracheal anesthesia was induced.  The patient was given IV antibiotics and a Peres catheter. A central line was placed by Anesthesia service. The abdomen was then shaved, prepped, and draped in the usual sterile fashion.  An incision was made in the right lower quadrant and carried down through the subcutaneous tissue  and the abdominal wall fascia. If encountered, the epigastric vessels were ligated in continuity, divided and secured with surgical clips. The right iliac artery and vein were exposed. The retractor system was placed and the lymphatics overlying the vessels were serially ligated and divided.     The patient was not heparinized. We applied atraumatic vascular clamps and the donor kidney was brought to the operative field. We made a venotomy and the renal vein was anastomosed to the recipient right External Iliac in an end-to-side fashion. An arteriotomy was made and the donor renal artery was anastomosed to the recipient right external iliac artery  in an end to side fashion. The patient was simultaneously loaded with IV mannitol, Lasix and volume. The renal artery was protected and the clamps were removed. After several cardiac cycles, we opened the renal artery and the kidney had Good reperfusion and was firm and pink .    The transplant ureter was managed by creating a Liche (anterior multistitch) anastomosis with absorbable suture. No The kidney made Yes urine prior to implantation.    Hemostasis was obtained, the anastomoses inspected, and the kidney placed in the iliac fossa. After placement, the vessel lay was inspected and found to be acceptable. The kidney position was Retroperitoneal. The field was irrigated with antibiotic solution. A drain was placed. The retractor was removed and the abdominal wall fascia reapproximated. Subcutaneous tissues were irrigated and hemostasis obtained.  The skin was reapproximated with running subcuticular stitch and dermabond was applied.   All needle, sponge and instrument counts were correct x 2. The patient was awakened, extubated, and transferred to PACU for post-op monitoring. Faculty was present for key portions of the procedure.  I was present during the key portions of the procedure, and I was immediately available for the entire procedure.

## 2018-08-08 NOTE — OR NURSING
Dr. Harris at bedside assessing Pt. Pt continues to need continual reminders to take deep breaths as he obstructs while asleep and O2 sats drop to upper 80's to low 90's. Dr. Harris recommended writer let SICU RN know that Pt will need CPAP and to get this arranged. Writer passed this information to Ling RN and let him know. Writer discussed Pt's obstructing with family after getting the OK to give out info to family from Pt, and they said Pt obstructs at night but argues with them denying that he has sleep apnea.

## 2018-08-08 NOTE — ANESTHESIA PROCEDURE NOTES
Arterial Line Procedure Note  Staff:     Anesthesiologist:  LA BEACH    Resident/CRNA:  CATHERINE BAKER    Arterial line performed by resident/CRNA in presence of a teaching physician    Location: In OR After Induction  Procedure Start/Stop Times:     patient identified, IV checked, site marked, risks and benefits discussed, informed consent, monitors and equipment checked, pre-op evaluation and at physician/surgeon's request      Correct Patient: Yes      Correct Position: Yes      Correct Site: Yes      Correct Procedure: Yes      Correct Laterality:  Yes    Site Marked:  Yes  Line Placement:     Procedure:  Arterial Line    Insertion Site:  Radial    Insertion laterality:  Left    Skin Prep: Chloraprep      Patient Prep: mask and hat      Local skin infiltration:  None    Ultrasound Guided?: No      Catheter size:  20 gauge, Quick cath    Dressing:  Tegaderm    Complications:  None obvious    Arterial waveform: Yes      IBP within 10% of NIBP: Yes

## 2018-08-08 NOTE — H&P
SURGICAL ICU H&P  August 8, 2018      CO-MORBIDITIES:   ESRD (end stage renal disease) (H)  (primary encounter diagnosis)  HTN    ASSESSMENT: Harshad Beatty is a 38 year old male POD # 0 s/p LDKT who is being admitted to SICU for monitoring of thyroglobulin induced hypotension.    PLAN:  Neuro/ pain/ sedation:  - Monitor neurological status; notify the MD for any acute changes in exam  - dilaudid PCA for pain.    Pulmonary care:   - Supplemental oxygen to keep saturation above 92%  - Incentive spirometer [id number] when awake  - CPAP QHS      Cardiovascular:    - Monitor hemodynamic status  - HOLDING PTA amlodiine, lisinopril, coreg tonight    GI care:   -CLD    Fluids/ Electrolytes/ Nutrition:   - d5 1/2NS  for IV fluid hydration  - replacement IVF per kidney transplant protocol  - No indication for parenteral nutrition  -Q4h Potassium check    Renal/ Fluid Balance:    - Urine output is  adequate so far  - Will continue to monitor intake and output    Endocrine:    - No management indication     ID/ Antibiotics:  - No indication for antibiotics   - Immunotherapy per primary team    Heme:     - Post op acute anemia, no indication for transfusion  - Trend Hgb until stable    MSK:    Prophylaxis:    - Mechanical prophylaxis for DVT  - No chemical DVT prophylaxis due to high risk of bleeding       Lines/ tubes/ drains:  - LIJ  - RONALD drain (abdomen)  - stallworth     Disposition:  - Surgical ICU    Patient seen, findings and plan discussed with surgical ICU staff Dr. Small.    Joelle Pelaez MD  PGY-2, General Surgery  [phone]    - - - - - - - - - - - - - - - - - - - - - - - - - - - - - - - - - - - - - - - - - - - - - - - - - - - - - - - - -     PRIMARY TEAM: Kidney Transplant  PRIMARY PHYSICIAN: Paula    REASON FOR CRITICAL CARE ADMISSION: Hypotension 2/2 thymoglobulin infusion   ADMITTING PHYSICIAN: Geovanny    HISTORY PRESENTING ILLNESS: Harshad Beatty is a 38 year old male POD # 0 s/p LKDT for ESRD 2/2 HTN.  Intraoperatively, thymoglobulin infusion was started and associated with hypotension.  Epinephrine and dopamine infusions were started for SBP in 50s-60s. Thymoglobulin was then discontinued and he was able to be titrated to off prior to the end of the case. In PACU, he became hypertensive SBP 160s-170s, so Transplant decided to restart thymoglobulin infusion, but run at a slower rate. He was admitted to the SICU for observation for recurrence of hypotension with need for pressor support.    REVIEW OF SYSTEMS: As noted above. No headache, dizziness. No fever, chills. No chest pain or shortness of breath. No abdominal pain, nausea, vomiting. No diarrhea or constipation. No urinary difficulties. No muscle or body aches.     PAST MEDICAL HISTORY:   Past Medical History:   Diagnosis Date     Anemia in chronic kidney disease      ESRD (end stage renal disease) (H)      History of cardiomyopathy      HTN (hypertension)      Secondary hyperparathyroidism (H)      Sickle cell trait (H)        SURGICAL HISTORY:   Past Surgical History:   Procedure Laterality Date     CREATE FISTULA ARTERIOVENOUS UPPER EXTREMITY       ORIF right 5th metatarpal fracture         SOCIAL HISTORY:  Social History   Substance Use Topics     Smoking status: Never Smoker     Smokeless tobacco: Never Used     Alcohol use No       FAMILY HISTORY:  Family History   Problem Relation Age of Onset     Sickle Cell Anemia Father      Sickle Cell Trait Sister      Pancreatic Cancer Maternal Grandfather      KIDNEY DISEASE Cousin         ALLERGIES:    No Known Allergies    MEDICATIONS:    No current facility-administered medications on file prior to encounter.   Current Outpatient Prescriptions on File Prior to Encounter:  calcium acetate (PHOSLO) 667 MG CAPS capsule Take 2,668 mg by mouth 3 times daily (with meals)   Cinacalcet HCl (SENSIPAR PO) Take 60 mg by mouth daily   AMLODIPINE BESYLATE PO Take 5 mg by mouth daily   carvedilol (COREG) 25 MG tablet Take  2 tablets by mouth daily   LISINOPRIL PO Take 80 mg by mouth daily   MINOXIDIL PO Take 10 mg by mouth daily       PHYSICAL EXAMINATION:  Temp:  [97.5  F (36.4  C)-99.5  F (37.5  C)] 98.1  F (36.7  C)  Pulse:  [80-89] 80  Heart Rate:  [81-95] (P) 87  Resp:  [16-36] (P) 24  BP: (107-147)/(51-73) (P) 136/79  MAP:  [63 mmHg-87 mmHg] 87 mmHg  Arterial Line BP: (111-147)/(39-67) 139/67  SpO2:  [87 %-94 %] (P) 94 %  General: Alert, well-appearing  in no acute distress.  HEENT: Normocephalic, atraumatic.   Neck: No cervical lymphadenopathy.   Chest wall: Symmetric thorax. No masses or tenderness to palpation.   Respiratory: Non-labored breathing on oxy mask, snores. Lung sounds clear to auscultation bilaterally.   Cardiovascular: Regular rate and rhythm.   Gastrointestinal: Abdomen soft, non-distended, ATTP in RLQ, incision c/d/i with dermabond  Extremities: No limb deformities. No pedal edema.   Skin: As noted above. No rashes or lesions appreciated.    LABS: Reviewed.   Arterial Blood Gases     Recent Labs  Lab 08/07/18 2006   PH 7.49*   PCO2 37   PO2 83   HCO3 28     Complete Blood Count     Recent Labs  Lab 08/07/18 2310 08/07/18 2006   WBC 8.4  --    HGB 10.9* 9.7*     --      Basic Metabolic Panel    Recent Labs  Lab 08/07/18 2310 08/07/18 2006 08/07/18  1304   * 134  --    POTASSIUM 3.7 3.5 4.2   CHLORIDE 94  --   --    CO2 29  --   --    BUN 27  --   --    CR 9.77*  --   --    * 114*  --      Liver Function Tests  No lab results found in last 7 days.  Pancreatic Enzymes  No lab results found in last 7 days.  Coagulation Profile  No lab results found in last 7 days.  Lactate  Invalid input(s): LACTATE    IMAGING:  Results for orders placed or performed during the hospital encounter of 08/07/18   XR Chest Port 1 View    Impression    Impression:    1. Left IJ central venous catheter projected laterally over the high  SVC, possibly abutting the SVC sidewall. Consider repositioning.  2.  Increased perihilar and mixed interstitial and airspace opacities  favored represent pulmonary edema.  3. Cardiomegaly.

## 2018-08-08 NOTE — BRIEF OP NOTE
[facility], [place]    Brief Operative Note    Pre-operative diagnosis: Focal Segmental Glomerular Sclerosis  Post-operative diagnosis * No post-op diagnosis entered *  Procedure: Procedure(s):  Kidney Transplant Living Unrelated Non Directed Recipient  - Wound Class: II-Clean Contaminated  Surgeon: Surgeon(s) and Role:     * Bety Mitchell MD - Primary     * Kareem Lugo MD - Assisting     * Eli Price MD - Assisting     * Misti Lopez MD - Resident - Assisting  Anesthesia: General   Estimated blood loss: 100 mL  Drains: RONALD drain- subfascial  Specimens: * No specimens in log *  Findings:   None.  Complications: None.  Implants: None.

## 2018-08-08 NOTE — PROGRESS NOTES
"CLINICAL NUTRITION SERVICES - ASSESSMENT NOTE     Nutrition Prescription    RECOMMENDATIONS FOR MDs/PROVIDERS TO ORDER:  None at this time    Malnutrition Status:    Patient does not meet criteria necessary for diagnosing malnutrition    Recommendations already ordered by Registered Dietitian (RD):  None today    Future/Additional Recommendations:  1. If suspect eating poorly, would offer supplements and start on kcal cnts.     2. Discharge diet order needs to be written.    3. If pt to require enteral nutrition, would recommend feeding stomach:  -Nutren 1.5 @ 10 mL/hr  -If pt tolerates and lytes remain WNL, adv TF by 10 mL q8h to goal 70 mL/hr to provide 2520 kcals (28 kcal/kg/day), 114 g PRO (1.3 g/kg/day), 1277 mL H2O, 296 g CHO and no fiber daily.  -Order multivitamin/mineral (15 ml/day via FT) to help ensure micronutrient needs being met with suspected hypermetabolic demands and potential interruptions to TF infusions.  -30 mL water flush q4h for tube patency  -Assess gastric residuals and HOB >30 if to feed gastric     REASON FOR ASSESSMENT  Harshad Beatty is a 38 year old male seen by the dietitian for MD Order- Assess & Educate post-op SOT    Pt is s/p LDKT on 8/7.    NUTRITION HISTORY  Pt reports having no issues with eating or appetite PTA and was following a low sodium, potassium, and phosphorus diet PTA. He denies significant weight loss in the recent past.    CURRENT NUTRITION ORDERS  Diet: ADAT to Regular  Intake/Tolerance: Pt has not yet had any PO intake since surgery yesterday    LABS  8/8:  Creatinine = 9.6 (H)  Phos = 5.4 (H)    MEDICATIONS  Medications reviewed    ANTHROPOMETRICS  Height: 180 cm (5' 10.866\")  Most Recent Weight: 120.1 kg (264 lb 12.4 oz)    IBW: 78.2 kg  BMI: 37; Obesity Grade II BMI 35-39.9  Weight History:   Wt Readings from Last 10 Encounters:   08/07/18 120.1 kg (264 lb 12.4 oz)   07/30/18 119.7 kg (264 lb)   05/29/18 118.4 kg (261 lb)   05/24/18 119.6 kg (263 lb 11 oz) "   03/08/18 120 kg (264 lb 8.8 oz)   09/25/17 119.6 kg (263 lb 11.2 oz)   05/31/17 117.3 kg (258 lb 8 oz)   No significant weight loss noted in the recent past  Dosing Weight: 89 kg (adjusted based on lowest admit wt of 120.1 kg on 8/7, IBW = 78.2 kg)    ASSESSED NUTRITION NEEDS:  Estimated Energy Needs: 1055-1172 kcals (25-30 Kcal/Kg)  Justification: increased needs post-op SOT, OBese  Estimated Protein Needs: 116-178 grams protein (1.3-2 gm/Kg)  Justification: increased needs post op SOT  Estimated Fluid Needs: 3911-1241  mL (25-30 mL/Kg)  Justification: maintenance, or per MD pending fluid status and adequate UOP    PHYSICAL FINDINGS  See malnutrition section below.  No abnormal nutrition-related physical findings observed.     MALNUTRITION  % Intake: No decreased intake noted  % Weight Loss: None noted  Subcutaneous Fat Loss: None observed  Muscle Loss: None observed  Fluid Accumulation/Edema: Mild  Malnutrition Diagnosis: Patient does not meet two of the above criteria necessary for diagnosing malnutrition    NUTRITION DIAGNOSIS:  Food and nutrition-related knowledge deficit r/t length of time since previous post-transplant education AEB patient verbal report, review of chart record, and MD consult for nutrition education.     INTERVENTIONS  Implementation  Nutrition Education:  1. Provided instruction on post-transplant diet with discussion regarding protein sources and high protein needs in acute post-tx phase.  Reviewed recommendations to follow low fat/low sodium diet long term and discussed heart healthy diet tips.  Discussed monitoring of K+/Phos lab values with possible need for adjustment of these in the diet as necessary. Reviewed need for food safety precautions to prevent food borne illness.    2. Provided & reviewed handout: Post-transplant diet guidelines. Patient receptive to information provided. Expected diet compliance is good.     3. Collaboration and Referral of Nutrition care - Discussed plan  for FEN/GI on rounds with Providers    Goals  1. Patient will verbalize understanding of 3 important aspects of post-transplant diet guidelines.   2. PO intake >50% meals TID.    Monitoring/Evaluation  Progress toward goals will be monitored and evaluated per protocol.    Samantha Pappas RD, LD  SICU RD Pgr: [phone]

## 2018-08-08 NOTE — PLAN OF CARE
"Problem: Patient Care Overview  Goal: Plan of Care/Patient Progress Review  Outcome: Improving  D/I: Patient admitted to unit 4A Surgical/Neuro ICU s/p kidney transplant  /72  Pulse 80  Temp 98  F (36.7  C) (Axillary)  Resp 20  Ht 1.8 m (5' 10.87\")  Wt 120.1 kg (264 lb 12.4 oz)  SpO2 91%  BMI 37.07 kg/m2  Neuro- intact.  Remains slightly sleepy at times.  CASTRO  CV- HR .  SBP >120.  CVP 10  Respiratory- O2 sats 84-90 on room air, >95 on oxiplus mask.  Pt continue to snore and have periods of apena.  Pt refused to use CPAP.    GI- Clear liquids ordered this AM  - -700 ml/hr.  Following UOP replacement protocol.    Skin-Incision C/D/I, RONALD x 1  Pain- Dilaudid PCA  See flowsheets for further interventions and assessments.   A: Stable  P: Continue to monitor pt closely. Notify MD of significant changes. Continue to replace UOP per orders.  Increase activity today.  Continue Thymo infusion.            "

## 2018-08-09 ENCOUNTER — APPOINTMENT (OUTPATIENT)
Dept: OCCUPATIONAL THERAPY | Facility: CLINIC | Age: 38
DRG: 652 | End: 2018-08-09
Attending: PHYSICIAN ASSISTANT
Payer: COMMERCIAL

## 2018-08-09 LAB
ANION GAP SERPL CALCULATED.3IONS-SCNC: 5 MMOL/L (ref 3–14)
BASOPHILS # BLD AUTO: 0 10E9/L (ref 0–0.2)
BASOPHILS NFR BLD AUTO: 0 %
BUN SERPL-MCNC: 28 MG/DL (ref 7–30)
CALCIUM SERPL-MCNC: 9.9 MG/DL (ref 8.5–10.1)
CHLORIDE SERPL-SCNC: 106 MMOL/L (ref 94–109)
CO2 SERPL-SCNC: 28 MMOL/L (ref 20–32)
CREAT SERPL-MCNC: 5.73 MG/DL (ref 0.66–1.25)
CROSSMATCH RESULT: NORMAL
CROSSMATCH RESULT: NORMAL
DIFFERENTIAL METHOD BLD: ABNORMAL
EOSINOPHIL # BLD AUTO: 0 10E9/L (ref 0–0.7)
EOSINOPHIL NFR BLD AUTO: 0 %
ERYTHROCYTE [DISTWIDTH] IN BLOOD BY AUTOMATED COUNT: 14.7 % (ref 10–15)
GFR SERPL CREATININE-BSD FRML MDRD: 11 ML/MIN/1.7M2
GLUCOSE SERPL-MCNC: 117 MG/DL (ref 70–99)
HCT VFR BLD AUTO: 33.7 % (ref 40–53)
HGB BLD-MCNC: 10.8 G/DL (ref 13.3–17.7)
HGB BLD-MCNC: 10.8 G/DL (ref 13.3–17.7)
IMM GRANULOCYTES # BLD: 0 10E9/L (ref 0–0.4)
IMM GRANULOCYTES NFR BLD: 0.1 %
LYMPHOCYTES # BLD AUTO: 0.3 10E9/L (ref 0.8–5.3)
LYMPHOCYTES NFR BLD AUTO: 3 %
MAGNESIUM SERPL-MCNC: 2.4 MG/DL (ref 1.6–2.3)
MCH RBC QN AUTO: 27.6 PG (ref 26.5–33)
MCHC RBC AUTO-ENTMCNC: 32 G/DL (ref 31.5–36.5)
MCV RBC AUTO: 86 FL (ref 78–100)
MONOCYTES # BLD AUTO: 0.3 10E9/L (ref 0–1.3)
MONOCYTES NFR BLD AUTO: 3.3 %
NEUTROPHILS # BLD AUTO: 8.8 10E9/L (ref 1.6–8.3)
NEUTROPHILS NFR BLD AUTO: 93.6 %
NRBC # BLD AUTO: 0 10*3/UL
NRBC BLD AUTO-RTO: 0 /100
PHOSPHATE SERPL-MCNC: 5.4 MG/DL (ref 2.5–4.5)
PLATELET # BLD AUTO: 252 10E9/L (ref 150–450)
POTASSIUM SERPL-SCNC: 4.2 MMOL/L (ref 3.4–5.3)
POTASSIUM SERPL-SCNC: 4.4 MMOL/L (ref 3.4–5.3)
RBC # BLD AUTO: 3.91 10E12/L (ref 4.4–5.9)
SODIUM SERPL-SCNC: 139 MMOL/L (ref 133–144)
TACROLIMUS BLD-MCNC: 9.6 UG/L (ref 5–15)
TME LAST DOSE: NORMAL H
WBC # BLD AUTO: 9.4 10E9/L (ref 4–11)

## 2018-08-09 PROCEDURE — 40000133 ZZH STATISTIC OT WARD VISIT: Performed by: OCCUPATIONAL THERAPIST

## 2018-08-09 PROCEDURE — 12000026 ZZH R&B TRANSPLANT

## 2018-08-09 PROCEDURE — 30243S1 TRANSFUSION OF NONAUTOLOGOUS GLOBULIN INTO CENTRAL VEIN, PERCUTANEOUS APPROACH: ICD-10-PCS | Performed by: PHYSICIAN ASSISTANT

## 2018-08-09 PROCEDURE — 25000132 ZZH RX MED GY IP 250 OP 250 PS 637: Performed by: SURGERY

## 2018-08-09 PROCEDURE — 84100 ASSAY OF PHOSPHORUS: CPT | Performed by: SURGERY

## 2018-08-09 PROCEDURE — 25000128 H RX IP 250 OP 636

## 2018-08-09 PROCEDURE — 80048 BASIC METABOLIC PNL TOTAL CA: CPT | Performed by: SURGERY

## 2018-08-09 PROCEDURE — 25000128 H RX IP 250 OP 636: Performed by: PHYSICIAN ASSISTANT

## 2018-08-09 PROCEDURE — 97530 THERAPEUTIC ACTIVITIES: CPT | Mod: GO | Performed by: OCCUPATIONAL THERAPIST

## 2018-08-09 PROCEDURE — 40000196 ZZH STATISTIC RAPCV CVP MONITORING

## 2018-08-09 PROCEDURE — 25000132 ZZH RX MED GY IP 250 OP 250 PS 637: Performed by: PHYSICIAN ASSISTANT

## 2018-08-09 PROCEDURE — 80197 ASSAY OF TACROLIMUS: CPT | Performed by: SURGERY

## 2018-08-09 PROCEDURE — 40000275 ZZH STATISTIC RCP TIME EA 10 MIN

## 2018-08-09 PROCEDURE — 97535 SELF CARE MNGMENT TRAINING: CPT | Mod: GO | Performed by: OCCUPATIONAL THERAPIST

## 2018-08-09 PROCEDURE — 25000131 ZZH RX MED GY IP 250 OP 636 PS 637: Performed by: SURGERY

## 2018-08-09 PROCEDURE — 25000128 H RX IP 250 OP 636: Performed by: SURGERY

## 2018-08-09 PROCEDURE — 97165 OT EVAL LOW COMPLEX 30 MIN: CPT | Mod: GO | Performed by: OCCUPATIONAL THERAPIST

## 2018-08-09 PROCEDURE — 25000131 ZZH RX MED GY IP 250 OP 636 PS 637: Performed by: PHYSICIAN ASSISTANT

## 2018-08-09 PROCEDURE — 83735 ASSAY OF MAGNESIUM: CPT | Performed by: SURGERY

## 2018-08-09 PROCEDURE — 85025 COMPLETE CBC W/AUTO DIFF WBC: CPT | Performed by: SURGERY

## 2018-08-09 RX ORDER — DIPHENHYDRAMINE HCL 25 MG
25-50 CAPSULE ORAL ONCE
Status: COMPLETED | OUTPATIENT
Start: 2018-08-09 | End: 2018-08-09

## 2018-08-09 RX ORDER — ACETAMINOPHEN 325 MG/1
650 TABLET ORAL ONCE
Status: COMPLETED | OUTPATIENT
Start: 2018-08-09 | End: 2018-08-09

## 2018-08-09 RX ORDER — DIPHENHYDRAMINE HCL 12.5MG/5ML
25-50 LIQUID (ML) ORAL ONCE
Status: COMPLETED | OUTPATIENT
Start: 2018-08-09 | End: 2018-08-09

## 2018-08-09 RX ORDER — AMLODIPINE BESYLATE 5 MG/1
5 TABLET ORAL DAILY
Status: DISCONTINUED | OUTPATIENT
Start: 2018-08-09 | End: 2018-08-10

## 2018-08-09 RX ORDER — LABETALOL HYDROCHLORIDE 5 MG/ML
20 INJECTION, SOLUTION INTRAVENOUS EVERY 4 HOURS PRN
Status: DISCONTINUED | OUTPATIENT
Start: 2018-08-09 | End: 2018-08-10 | Stop reason: HOSPADM

## 2018-08-09 RX ORDER — CARVEDILOL 25 MG/1
50 TABLET ORAL 2 TIMES DAILY WITH MEALS
Status: DISCONTINUED | OUTPATIENT
Start: 2018-08-09 | End: 2018-08-10 | Stop reason: HOSPADM

## 2018-08-09 RX ORDER — VALACYCLOVIR HYDROCHLORIDE 500 MG/1
500 TABLET, FILM COATED ORAL EVERY 12 HOURS SCHEDULED
Status: DISCONTINUED | OUTPATIENT
Start: 2018-08-09 | End: 2018-08-10

## 2018-08-09 RX ORDER — LABETALOL HYDROCHLORIDE 5 MG/ML
20 INJECTION, SOLUTION INTRAVENOUS ONCE
Status: COMPLETED | OUTPATIENT
Start: 2018-08-09 | End: 2018-08-09

## 2018-08-09 RX ORDER — CARVEDILOL 25 MG/1
25 TABLET ORAL 2 TIMES DAILY WITH MEALS
Status: DISCONTINUED | OUTPATIENT
Start: 2018-08-09 | End: 2018-08-09

## 2018-08-09 RX ORDER — HYDRALAZINE HYDROCHLORIDE 20 MG/ML
10 INJECTION INTRAMUSCULAR; INTRAVENOUS EVERY 6 HOURS PRN
Status: DISCONTINUED | OUTPATIENT
Start: 2018-08-09 | End: 2018-08-10 | Stop reason: HOSPADM

## 2018-08-09 RX ADMIN — METHYLPREDNISOLONE SODIUM SUCCINATE 250 MG: 125 INJECTION, POWDER, LYOPHILIZED, FOR SOLUTION INTRAMUSCULAR; INTRAVENOUS at 10:33

## 2018-08-09 RX ADMIN — ANTI-THYMOCYTE GLOBULIN (RABBIT) 200 MG: 5 INJECTION, POWDER, LYOPHILIZED, FOR SOLUTION INTRAVENOUS at 11:04

## 2018-08-09 RX ADMIN — Medication 20 MG: at 08:51

## 2018-08-09 RX ADMIN — ACETAMINOPHEN 650 MG: 325 TABLET, FILM COATED ORAL at 10:33

## 2018-08-09 RX ADMIN — VALACYCLOVIR HYDROCHLORIDE 500 MG: 500 TABLET, FILM COATED ORAL at 20:16

## 2018-08-09 RX ADMIN — MAGNESIUM OXIDE TAB 400 MG (241.3 MG ELEMENTAL MG) 400 MG: 400 (241.3 MG) TAB at 13:22

## 2018-08-09 RX ADMIN — CARVEDILOL 50 MG: 25 TABLET, FILM COATED ORAL at 17:37

## 2018-08-09 RX ADMIN — Medication 20 MG: at 15:34

## 2018-08-09 RX ADMIN — SENNOSIDES AND DOCUSATE SODIUM 2 TABLET: 8.6; 5 TABLET ORAL at 20:16

## 2018-08-09 RX ADMIN — AMLODIPINE BESYLATE 5 MG: 5 TABLET ORAL at 08:50

## 2018-08-09 RX ADMIN — ATORVASTATIN CALCIUM 10 MG: 10 TABLET, FILM COATED ORAL at 08:15

## 2018-08-09 RX ADMIN — HYDRALAZINE HYDROCHLORIDE 10 MG: 20 INJECTION INTRAMUSCULAR; INTRAVENOUS at 13:22

## 2018-08-09 RX ADMIN — DIPHENHYDRAMINE HYDROCHLORIDE 50 MG: 25 CAPSULE ORAL at 10:35

## 2018-08-09 RX ADMIN — MYCOPHENOLATE MOFETIL 1000 MG: 250 CAPSULE ORAL at 08:15

## 2018-08-09 RX ADMIN — DEXTROSE MONOHYDRATE AND SODIUM CHLORIDE: 5; .9 INJECTION, SOLUTION INTRAVENOUS at 06:52

## 2018-08-09 RX ADMIN — CARVEDILOL 25 MG: 25 TABLET, FILM COATED ORAL at 08:15

## 2018-08-09 RX ADMIN — VALACYCLOVIR HYDROCHLORIDE 500 MG: 500 TABLET, FILM COATED ORAL at 08:17

## 2018-08-09 RX ADMIN — OXYCODONE HYDROCHLORIDE 10 MG: 5 TABLET ORAL at 07:56

## 2018-08-09 RX ADMIN — MYCOPHENOLATE MOFETIL 1000 MG: 250 CAPSULE ORAL at 17:37

## 2018-08-09 RX ADMIN — TACROLIMUS 10 MG: 4 TABLET, EXTENDED RELEASE ORAL at 08:16

## 2018-08-09 RX ADMIN — SENNOSIDES AND DOCUSATE SODIUM 1 TABLET: 8.6; 5 TABLET ORAL at 08:15

## 2018-08-09 ASSESSMENT — ACTIVITIES OF DAILY LIVING (ADL)
ADLS_ACUITY_SCORE: 11
PREVIOUS_RESPONSIBILITIES: MEAL PREP;HOUSEKEEPING;LAUNDRY;SHOPPING;MEDICATION MANAGEMENT;FINANCES;DRIVING;WORK;CHILD CARE
ADLS_ACUITY_SCORE: 11

## 2018-08-09 ASSESSMENT — PAIN DESCRIPTION - DESCRIPTORS
DESCRIPTORS: SORE
DESCRIPTORS: ACHING

## 2018-08-09 NOTE — PROGRESS NOTES
Brief SICU Note    Patient is floor status with kidney transplant. Chart reviewed and pt seen with no acute critical care needs. Up and walking around room without difficulty or labored breathing. SICU will continue to follow while on unit.    Yasmani Elmore MD  PGY-2 Surgery  pager [phone]

## 2018-08-09 NOTE — PLAN OF CARE
Problem: Patient Care Overview  Goal: Plan of Care/Patient Progress Review  4A-PT: Defer: Pt consult received and appreciated. Per discussion with OT, pt indep with mobility. No IP PT needs at this time. OT to continue to follow for ADLs and activity tolerance - PT to defer. Will complete order, please re-consult if pt has change in status.

## 2018-08-09 NOTE — PROGRESS NOTES
08/09/18 1400   Quick Adds   Type of Visit Initial Occupational Therapy Evaluation   Living Environment   Lives With child(connie), dependent;significant other   Living Arrangements house   Home Accessibility stairs to enter home;tub/shower is not walk in   Transportation Available car;family or friend will provide   Self-Care   Usual Activity Tolerance good   Current Activity Tolerance good   Regular Exercise no   Equipment Currently Used at Home none   Functional Level Prior   Ambulation 0-->independent   Transferring 0-->independent   Toileting 0-->independent   Bathing 0-->independent   Dressing 0-->independent   Eating 0-->independent   Communication 0-->understands/communicates without difficulty   Swallowing 0-->swallows foods/liquids without difficulty   Cognition 0 - no cognition issues reported   Fall history within last six months no   General Information   Referring Physician Montse Mireles   Patient/Family Goals Statement return to PLOF   Additional Occupational Profile Info/Pertinent History of Current Problem pt POD 2 kidney transplant.    Precautions/Limitations abdominal precautions   General Info Comments pt very motivated in therapy.    Cognitive Status Examination   Orientation orientation to person, place and time   Level of Consciousness alert   Able to Follow Commands WNL/WFL   Personal Safety (Cognitive) WNL/WFL   Memory intact   Attention No deficits were identified   Organization/Problem Solving No deficits were identified   Cognitive Comment no concerns.    Visual Perception   Visual Perception No deficits were identified   Sensory Examination   Sensory Quick Adds No deficits were identified   Range of Motion (ROM)   ROM Quick Adds No deficits were identified   Strength   Manual Muscle Testing Quick Adds No deficits were identified   Hand Strength   Hand Strength Comments no deficits.    Coordination   Coordination Comments no deficits    Transfer Skill: Bed to Chair/Chair to Bed   Level of  "[place]: Bed to Chair stand-by assist   Transfer Skill: Sit to Stand   Level of [place]: Sit/Stand independent   Lower Body Dressing   Level of [place]: Dress Lower Body other (see comments)  (education required)   Instrumental Activities of Daily Living (IADL)   Previous Responsibilities meal prep;housekeeping;laundry;shopping;medication management;finances;driving;work;   IADL Comments SO and sons can assist as needed   Activities of Daily Living Analysis   Impairments Contributing to Impaired Activities of Daily Living post surgical precautions   General Therapy Interventions   Planned Therapy Interventions ADL retraining;IADL retraining;transfer training;home program guidelines;progressive activity/exercise;risk factor education   Clinical Impression   Criteria for Skilled Therapeutic Interventions Met yes, treatment indicated   OT Diagnosis decreased ADL I   Assessment of Occupational Performance 3-5 Performance Deficits   Identified Performance Deficits dressing, bathing, toileting, chidcare, leisure.    Clinical Decision Making (Complexity) Low complexity   Therapy Frequency 5 times/wk   Predicted Duration of Therapy Intervention (days/wks) 1 week   Anticipated Discharge Disposition Home with Assist   Risks and Benefits of Treatment have been explained. Yes   Patient, Family & other staff in agreement with plan of care Yes   Clinical Impression Comments Pt presents to OT with post surgical precautions leading to decreased ADL I . Pt to benefit from ksilled OT intervention to address the above problem list. See daily note for treatment provided today.    [facility] Becual-PAC TM \"6 Clicks\"   2016, Trustees of [facility], under license to [employer].  All rights reserved.   6 Clicks Short Forms Daily Activity Inpatient Short Form   [facility] AM-PAC  \"6 Clicks\" Daily Activity Inpatient Short Form   1. Putting on and taking off regular lower body clothing? 3 - A Little "   2. Bathing (including washing, rinsing, drying)? 3 - A Little   3. Toileting, which includes using toilet, bedpan or urinal? 3 - A Little   4. Putting on and taking off regular upper body clothing? 4 - None   5. Taking care of personal grooming such as brushing teeth? 4 - None   6. Eating meals? 4 - None   Daily Activity Raw Score (Score out of 24.Lower scores equate to lower levels of function) 21   Total Evaluation Time   Total Evaluation Time (Minutes) 5

## 2018-08-09 NOTE — PROGRESS NOTES
Transplant Nephrology Progress Note  08/09/2018         Assessment & Recommendations:   Harshad Beatty is a 38 year old with h/o ESRD secondary to HTN who is now s/p LDKT on 8/7/2018.    # Kidney Tx - h/o ESRD secondary to HTN who is now s/p LDKT on 8/7/2018. 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 He has been on HD since 8/2015 and dialyzes on MWF schedule via his right arm fistula. His EDW is 118kg.  Post op course was complicated by hypotension after thyroglobulin administration which required pressors briefly.   Post operatively his creat was 9.7 and is down to 5.7 this morning. He has put out >10Litres as of this morning.   -would continue to monitor renal fucntion.   - would check UPC prior to discharge although his primary disease is due to HTN     # Immunosuppression -  Induced with ATG, cellcept and steroids. Received 2nd dose of ATF this morning without complication. Started on envarsus per protocol. Goal trough level of 8-10.      # Hypertension- was hypotensive requiring pressors briefly now elevated. Home meds included amlodipine 5mg daily, coreg 25mg BID, lisinopril 40 mg daily and minoxdil 10 mg daily.  IVF stopped.   - if his SBP continues to be above 165- would increase his coreg 50 mg BID and amlodipine to 10 mg daily     # Diabetes- no h/o DM. Post operatively elevated due to steroids. Management per primary.      # Anemia- Hgb postoperatively stable at ~10. Normocytic. Iron replete.      # Secondary hyperparathyroidism  # Vitamin D deficiency  # Hypophosphatemia  - iPTH prior to surgery on 7/2018 was 168. This will be followed as outpatient.   -Vit D low at 9. -Would start on 2000U of cholecalciferol daily.   - calcium okay. Phos mildly elevated. Expected to improve with renal function. Hold all binders.      # Hypomagnesemia- Mag mildly elevated. Started on mag oxide 400mg daily per protocol.      # PCP prophylaxis- Started  "on bactrim three times per week  which will be adjusted to his GFR.      # Viral prophylaxis- Donor and recipient positive for Ab to EBV and CMV.started on valcyte per his GFR.     Recommendations were communicated to primary team via note.     Interval History :   In the last 24 hours Harshad Beatty has had no acute events. SBP noted to be in 200s - was given PRN labetalol.     Review of Systems:   (4 pt ROS reviewed alone is not adequate unless you detail systems you reviewed)  I reviewed the following systems:  GI: no nausea or vomiting or diarrhea.   Neuro:  no confusion  Constitutional:  no fever or chills  CV: no dyspnea or edema.  no chest pain.    Physical Exam:   I/O last 3 completed shifts:  In: 7885.14 [P.O.:1360; I.V.:6443.94; IV Piggyback:81.2]  Out: 59301 [Urine:52170; Drains:107]   BP (!) 160/92  Pulse 80  Temp 97.6  F (36.4  C) (Oral)  Resp (!) 38  Ht 1.8 m (5' 10.87\")  Wt 120.1 kg (264 lb 12.4 oz)  SpO2 100%  BMI 37.07 kg/m2     GENERAL APPEARANCE: alert and no distress  EYES:  no scleral icterus, pupils equal  HENT: mouth without ulcers or lesions  PULM: lungs clear to auscultation, equal air movement, no clubbing  CV: regular rhythm, normal rate, no rub     -edema  none  GI: soft, post surgical site c/d/i  INTEGUMENT: no cyanosis  NEURO:  No asterixis   Access right UE avf    Labs:   All labs reviewed by me  Electrolytes/Renal -   Recent Labs   Lab Test  08/09/18   0306  08/08/18   2357  08/08/18 2009 08/08/18   0423  08/07/18   2310   NA  139   --    --    --   133  132*   POTASSIUM  4.4  4.2  4.2   < >  4.0  3.7   CHLORIDE  106   --    --    --   97  94   CO2  28   --    --    --   28  29   BUN  28   --    --    --   28  27   CR  5.73*   --    --    --   9.58*  9.77*   GLC  117*   --    --    --   147*  141*   JUAN  9.9   --    --    --   8.8  8.6   MAG  2.4*   --    --    --   2.1  2.0   PHOS  5.4*   --    --    --   5.4*  4.1    < > = values in this interval not displayed.       CBC " -   Recent Labs   Lab Test  08/09/18   0306  08/08/18   2357  08/08/18 2009 08/08/18   0423  08/07/18   2310   WBC  9.4   --    --    --   6.4  8.4   HGB  10.8*  10.8*  11.6*   < >  10.9*  10.9*   PLT  252   --    --    --   202  211    < > = values in this interval not displayed.       LFTs -   Recent Labs   Lab Test  07/30/18   1132  05/31/17   0734   ALKPHOS  50  57   BILITOTAL  0.5  0.4   ALT  17  19   AST  4  7   PROTTOTAL  8.6  7.9   ALBUMIN  3.9  3.9       Iron Panel -   Recent Labs   Lab Test  07/30/18   1132   IRON  46   IRONSAT  21   JET  716*         Imaging:  All imaging studies reviewed by me.     Current Medications:    amLODIPine  5 mg Oral Daily     atorvastatin  10 mg Oral Daily     carvedilol  25 mg Oral BID w/meals     magnesium oxide  400 mg Oral Daily with lunch     mycophenolate  1,000 mg Oral BID IS     senna-docusate  1 tablet Oral BID    Or     senna-docusate  2 tablet Oral BID     sodium chloride (PF)  10 mL Intracatheter Q8H     sodium chloride (PF)  3 mL Intracatheter Q8H     sodium chloride (PF)  3 mL Intracatheter Q8H     [START ON 8/10/2018] sulfamethoxazole-trimethoprim  1 tablet Oral Once per day on Mon Wed Fri     tacrolimus  10 mg Oral QAM AC     valACYclovir  500 mg Oral Q12H PATRIA Patrick MD    Attestation:  This patient has been seen and evaluated by me, Juan Bahena MD.  I have reviewed the note and agree with plan of care as documented by the fellow.

## 2018-08-09 NOTE — PROGRESS NOTES
Social Work:    SW attempted to meet with pt to complete psychosocial assessment and 2728 form but pts children were visiting. Pt asked that SW stop by tomorrow instead.    SUMAN Child, [address]   Ph: [phone], Pager: [phone]

## 2018-08-09 NOTE — PLAN OF CARE
Problem: Patient Care Overview  Goal: Plan of Care/Patient Progress Review  Outcome: Improving  Patient appears to be improving during the duration of my shift.     Neuro: A/o x4, denies numbness and tingling. Pupils 3-4mm and briskly reactive to light.   CV: Normal sinus rhythm to sinus tach when standing at bedside. BP range from 140-160's systolic.  Pulm: Room air while awake oxy plus while sleeping. Patient exhibits PRIETO, but cannot tolerate the CPAP machine.  GI: Patient taking PO with diet advanced to regular diet. Patient c/o lack of appetite.  : Peres catheter in place. UO replacement discontinued by transplant team this afternoon.  IV/Gtt: Left central line with three lumens. NS at 100.     Will continue to monitor for safety and comfort.    Usman Mendoza RN

## 2018-08-09 NOTE — PLAN OF CARE
Problem: Patient Care Overview  Goal: Plan of Care/Patient Progress Review  Discharge Planner OT   Patient plan for discharge: home with assist  Current status: pt ambulating I. Mod I bed mobility and LE dressing  Barriers to return to prior living situation: none  Recommendations for discharge: home with assist as needed  Rationale for recommendations: pt up I and with understanding of precautions and sufficient assist in home.        Entered by: Navid Huston 08/09/2018 2:49 PM

## 2018-08-09 NOTE — PLAN OF CARE
Neuro: AOX4. Complained of incisional pain X1, PRN oxycodone given X 1, with relief noted. Denies pain with continued assessments.   Cardiac: Hypertensive, PRN labetalol and hydralazine given. One time dose of labetalol given with slight improvement noted, Transplant aware.   Respiratory: LS clear, bases diminished. Pt utilizing IS, up and walking around the room.   GI: abd soft, passing flatus, no stool. Minimal appetite.   : stallworth patent, good UOP.   Pre-medicated with solu-medrol, benadryl, tylenol and thymoglobulin infusion completed, with no reactions noted.   Plan: report given to Mary WOODWARD RN. Pt will transfer when room is ready. continue to monitor pt and notify MD of any changes or concerns.

## 2018-08-09 NOTE — PLAN OF CARE
"Problem: Patient Care Overview  Goal: Plan of Care/Patient Progress Review    NEURO: AAOX4; PERRLA; MAETC; denies any vision changes/numbness/tingling; incisional pain well controlled with prn Oxycodone and Tylenol    CV: permissive HTN with SBP in the 140-160s; afebrile; NSR/ST    PULM: Clear lung sounds; on RA while awake but on 4L Oxyplus while sleeping for his PRIETO    GI/: no BM but reports + flatulence; stallworth with good UOP, pt would like stallworth out reasoning that it is \"too uncomfortable\"    SKIN: Abdominal skin incision intact with liquid bandage and ANDREY; abdominal binder to be worn when pt out of bed    GTTS: Thymo gtt done; MIVF at 100ml/hr    LABS: trending HGB and K+ and both are stable    PLAN: transfer to ,  Monitor UOP; continue to monitor and with POC, update primary team with changes or concerns.          "

## 2018-08-09 NOTE — PROGRESS NOTES
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    Graft function:Good, Cr was ~14 pre-op, now 5.73 this AM.  Good UOP- 11L yesterday, 3L since MN  Immunosuppression management: Thymo: OR dose was paused due to hypotension, completed yesterday in the ICU. Dose 2 today, run at normal rate. MMF 1000 mg BID and Envarsus ordered.  Complexity of management:Medium. Contributing factors: hypotension, induction  Hematology: Hgb has been stable, 10.8  Cardiorespiratory:   -Hypotension: resolved, unclear etiology  -Hypertension: baseline. Persistent 160-170s, occasional spikes to 200s. Resuming coreg/amlodipine, IV breakthrough, currently stable in 140s  -Hypoxia: undiagnosed PRIETO. Sats well when awake, needs PAP at night  GI/Nutrition: Regular diet as tolerated  Endocrine: Mild steroid induced hyperglycemia  Fluid/Electrolytes: SLIV. Drinking well.   : Peres to remain due to new surgical anastomosis  Infectious disease: PPx with valtrex (per study) and bactrim  Prophylaxis: DVT, fall, GI, fungal  Disposition: transfer to  pending. Floor status- may stop cardiac monitor.     Medical Decision Making: Medium  Subsequent visit 60512 (moderate level decision making)    CONCHITA/Fellow/Resident Provider: Kareem Lugo, Fellow    Faculty: Bety Mitchell M.D.  _________________________________________________________________  Transplant History: Admitted 2018 for  donor kidney transplant.  2018 (Kidney), Postoperative day: 2     Interval History: History is obtained from the patient  Overnight events: No events. Did well in ICU yesterday. Brisk diuresis. Hypoxemia only when sleeping- sats normal when awake. Taking good PO. Has been up OOB. Sore with position changes but otherwise doing well.     ROS:   A 10-point review of  "systems was negative except as noted above.    Meds:    acetaminophen  650 mg Oral Once     amLODIPine  5 mg Oral Daily     anti-thymocyte globulin  200 mg Intravenous Central line Once     atorvastatin  10 mg Oral Daily     carvedilol  25 mg Oral BID w/meals     diphenhydrAMINE  25-50 mg Oral Once    Or     diphenhydrAMINE  25-50 mg Per NG tube Once     magnesium oxide  400 mg Oral Daily with lunch     methylPREDNISolone  250 mg Intravenous Once     mycophenolate  1,000 mg Oral BID IS     senna-docusate  1 tablet Oral BID    Or     senna-docusate  2 tablet Oral BID     sodium chloride (PF)  10 mL Intracatheter Q8H     sodium chloride (PF)  3 mL Intracatheter Q8H     sodium chloride (PF)  3 mL Intracatheter Q8H     [START ON 8/10/2018] sulfamethoxazole-trimethoprim  1 tablet Oral Once per day on Mon Wed Fri     tacrolimus  10 mg Oral QAM AC     valACYclovir  500 mg Oral Q12H PATRIA       Physical Exam:     Admit Weight: 120.1 kg (264 lb 12.4 oz)    Current vitals:   BP (!) 218/115 (BP Location: Left arm)  Pulse 80  Temp 97.6  F (36.4  C) (Oral)  Resp 18  Ht 1.8 m (5' 10.87\")  Wt 120.1 kg (264 lb 12.4 oz)  SpO2 100%  BMI 37.07 kg/m2    CVP (mmHg): 6 mmHg    Vital sign ranges:    Temp:  [97.6  F (36.4  C)-98.5  F (36.9  C)] 97.6  F (36.4  C)  Heart Rate:  [] 90  Resp:  [13-38] 18  BP: (140-218)/() 218/115  MAP:  [87 mmHg-114 mmHg] 99 mmHg  Arterial Line BP: (137-175)/(64-88) 165/69  SpO2:  [93 %-100 %] 100 %  Patient Vitals for the past 24 hrs:   BP Temp Temp src Heart Rate Resp SpO2   08/09/18 0800 (!) 218/115 97.6  F (36.4  C) Oral 90 18 100 %   08/09/18 0700 (!) 180/98 - - 91 13 93 %   08/09/18 0500 (!) 166/92 - - 92 16 -   08/09/18 0400 (!) 170/97 97.8  F (36.6  C) Oral 96 30 -   08/09/18 0300 (!) 163/96 - - 91 17 -   08/09/18 0200 (!) 160/100 - - 82 27 -   08/09/18 0100 160/80 - - 91 25 -   08/09/18 0000 151/73 98.5  F (36.9  C) Oral 89 18 98 %   08/08/18 2300 145/73 - - 87 27 -   08/08/18 2200 " 141/66 - - 98 23 100 %   08/08/18 2100 (!) 180/108 - - 108 20 97 %   08/08/18 2015 - - - 105 23 96 %   08/08/18 2000 (!) 169/96 98.3  F (36.8  C) Oral 104 25 97 %   08/08/18 1945 - - - - - 99 %   08/08/18 1930 - - - - - 98 %   08/08/18 1900 140/69 - - 96 30 95 %   08/08/18 1800 165/88 - - 100 28 -   08/08/18 1700 157/81 - - 101 22 94 %   08/08/18 1600 - 97.9  F (36.6  C) Oral 97 28 97 %   08/08/18 1500 - - - 87 17 99 %   08/08/18 1400 - - - 90 20 93 %   08/08/18 1300 - - - 96 18 94 %   08/08/18 1200 - 97.7  F (36.5  C) Oral - - -   08/08/18 1000 - - - 91 (!) 38 93 %     General Appearance: in no apparent distress.   Skin: normal, warm  Heart: regular rate and rhythm  Lungs: CTA on R, diffuse crackles on L  Abdomen: The abdomen is soft, nontender to light palpation, incision c/d/i. Binder in place. RONALD 159mL out, serosang  : stallworth is present.  Urine has no gross hematuria.   Extremities: edema: absent.   Neurologic: awake and alert. Tremor absent..     Data:   CMP  Recent Labs  Lab 08/09/18  0306 08/08/18  2357  08/08/18  0423  08/07/18 2006     --   --  133  < > 134   POTASSIUM 4.4 4.2  < > 4.0  < > 3.5   CHLORIDE 106  --   --  97  < >  --    CO2 28  --   --  28  < >  --    *  --   --  147*  < > 114*   BUN 28  --   --  28  < >  --    CR 5.73*  --   --  9.58*  < >  --    GFRESTIMATED 11*  --   --  6*  < >  --    GFRESTBLACK 13*  --   --  7*  < >  --    JUAN 9.9  --   --  8.8  < >  --    ICAW  --   --   --   --   --  4.3*   MAG 2.4*  --   --  2.1  < >  --    PHOS 5.4*  --   --  5.4*  < >  --    < > = values in this interval not displayed.  CBC  Recent Labs  Lab 08/09/18  0306 08/08/18  2357  08/08/18  0423   HGB 10.8* 10.8*  < > 10.9*   WBC 9.4  --   --  6.4     --   --  202   < > = values in this interval not displayed.  COAGSNo lab results found in last 7 days.    Invalid input(s): XA   Urinalysis  Recent Labs   Lab Test  07/30/18   1142  05/31/17   0726   COLOR  Straw  Straw   APPEARANCE  Clear   Clear   URINEGLC  50*  50*   URINEBILI  Negative  Negative   URINEKETONE  Negative  Negative   SG  1.008  1.004   UBLD  Small*  Small*   URINEPH  8.0*  8.0*   PROTEIN  100*  30*   NITRITE  Negative  Negative   LEUKEST  Negative  Negative   RBCU  1  1   WBCU  1  1     Virology:  Hepatitis C Antibody   Date Value Ref Range Status   07/30/2018 Nonreactive NR^Nonreactive Final     Comment:     Assay performance characteristics have not been established for newborns,   infants, and children

## 2018-08-10 ENCOUNTER — TELEPHONE (OUTPATIENT)
Dept: PHARMACY | Facility: CLINIC | Age: 38
End: 2018-08-10

## 2018-08-10 ENCOUNTER — APPOINTMENT (OUTPATIENT)
Dept: GENERAL RADIOLOGY | Facility: CLINIC | Age: 38
DRG: 652 | End: 2018-08-10
Attending: PHYSICIAN ASSISTANT
Payer: COMMERCIAL

## 2018-08-10 VITALS
TEMPERATURE: 98.1 F | HEART RATE: 88 BPM | RESPIRATION RATE: 18 BRPM | SYSTOLIC BLOOD PRESSURE: 161 MMHG | OXYGEN SATURATION: 99 % | HEIGHT: 71 IN | BODY MASS INDEX: 36.16 KG/M2 | WEIGHT: 258.3 LBS | DIASTOLIC BLOOD PRESSURE: 89 MMHG

## 2018-08-10 LAB
ANION GAP SERPL CALCULATED.3IONS-SCNC: 8 MMOL/L (ref 3–14)
BASOPHILS # BLD AUTO: 0 10E9/L (ref 0–0.2)
BASOPHILS NFR BLD AUTO: 0 %
BUN SERPL-MCNC: 38 MG/DL (ref 7–30)
CALCIUM SERPL-MCNC: 10.5 MG/DL (ref 8.5–10.1)
CHLORIDE SERPL-SCNC: 106 MMOL/L (ref 94–109)
CO2 SERPL-SCNC: 24 MMOL/L (ref 20–32)
CREAT SERPL-MCNC: 3.24 MG/DL (ref 0.66–1.25)
DIFFERENTIAL METHOD BLD: ABNORMAL
EOSINOPHIL # BLD AUTO: 0 10E9/L (ref 0–0.7)
EOSINOPHIL NFR BLD AUTO: 0 %
ERYTHROCYTE [DISTWIDTH] IN BLOOD BY AUTOMATED COUNT: 15 % (ref 10–15)
GFR SERPL CREATININE-BSD FRML MDRD: 21 ML/MIN/1.7M2
GLUCOSE SERPL-MCNC: 108 MG/DL (ref 70–99)
HCT VFR BLD AUTO: 33.5 % (ref 40–53)
HGB BLD-MCNC: 10.8 G/DL (ref 13.3–17.7)
IMM GRANULOCYTES # BLD: 0 10E9/L (ref 0–0.4)
IMM GRANULOCYTES NFR BLD: 0.3 %
LYMPHOCYTES # BLD AUTO: 0.5 10E9/L (ref 0.8–5.3)
LYMPHOCYTES NFR BLD AUTO: 7.6 %
MAGNESIUM SERPL-MCNC: 2.4 MG/DL (ref 1.6–2.3)
MCH RBC QN AUTO: 28.1 PG (ref 26.5–33)
MCHC RBC AUTO-ENTMCNC: 32.2 G/DL (ref 31.5–36.5)
MCV RBC AUTO: 87 FL (ref 78–100)
MONOCYTES # BLD AUTO: 0.2 10E9/L (ref 0–1.3)
MONOCYTES NFR BLD AUTO: 2.3 %
NEUTROPHILS # BLD AUTO: 6.3 10E9/L (ref 1.6–8.3)
NEUTROPHILS NFR BLD AUTO: 89.8 %
NRBC # BLD AUTO: 0 10*3/UL
NRBC BLD AUTO-RTO: 0 /100
PHOSPHATE SERPL-MCNC: 2.8 MG/DL (ref 2.5–4.5)
PLATELET # BLD AUTO: 226 10E9/L (ref 150–450)
POTASSIUM SERPL-SCNC: 4.8 MMOL/L (ref 3.4–5.3)
RBC # BLD AUTO: 3.84 10E12/L (ref 4.4–5.9)
SODIUM SERPL-SCNC: 138 MMOL/L (ref 133–144)
WBC # BLD AUTO: 7 10E9/L (ref 4–11)

## 2018-08-10 PROCEDURE — 80048 BASIC METABOLIC PNL TOTAL CA: CPT | Performed by: SURGERY

## 2018-08-10 PROCEDURE — 40000275 ZZH STATISTIC RCP TIME EA 10 MIN

## 2018-08-10 PROCEDURE — 25000132 ZZH RX MED GY IP 250 OP 250 PS 637: Performed by: SURGERY

## 2018-08-10 PROCEDURE — 85025 COMPLETE CBC W/AUTO DIFF WBC: CPT | Performed by: SURGERY

## 2018-08-10 PROCEDURE — 83735 ASSAY OF MAGNESIUM: CPT | Performed by: SURGERY

## 2018-08-10 PROCEDURE — 84100 ASSAY OF PHOSPHORUS: CPT | Performed by: SURGERY

## 2018-08-10 PROCEDURE — 25000128 H RX IP 250 OP 636

## 2018-08-10 PROCEDURE — 40000986 XR ABDOMEN PORT 1 VW

## 2018-08-10 PROCEDURE — 25000131 ZZH RX MED GY IP 250 OP 636 PS 637: Performed by: SURGERY

## 2018-08-10 PROCEDURE — 97110 THERAPEUTIC EXERCISES: CPT | Mod: GO | Performed by: OCCUPATIONAL THERAPIST

## 2018-08-10 PROCEDURE — 97530 THERAPEUTIC ACTIVITIES: CPT | Mod: GO | Performed by: OCCUPATIONAL THERAPIST

## 2018-08-10 PROCEDURE — 40000133 ZZH STATISTIC OT WARD VISIT: Performed by: OCCUPATIONAL THERAPIST

## 2018-08-10 PROCEDURE — 25000128 H RX IP 250 OP 636: Performed by: PHYSICIAN ASSISTANT

## 2018-08-10 PROCEDURE — 36592 COLLECT BLOOD FROM PICC: CPT | Performed by: SURGERY

## 2018-08-10 PROCEDURE — 25000132 ZZH RX MED GY IP 250 OP 250 PS 637: Performed by: PHYSICIAN ASSISTANT

## 2018-08-10 RX ORDER — SULFAMETHOXAZOLE AND TRIMETHOPRIM 400; 80 MG/1; MG/1
1 TABLET ORAL DAILY
Status: DISCONTINUED | OUTPATIENT
Start: 2018-08-11 | End: 2018-08-10 | Stop reason: HOSPADM

## 2018-08-10 RX ORDER — MAGNESIUM OXIDE 400 MG/1
400 TABLET ORAL
Qty: 30 TABLET | Refills: 3 | Status: SHIPPED | OUTPATIENT
Start: 2018-08-10 | End: 2023-09-27

## 2018-08-10 RX ORDER — ACETAMINOPHEN 325 MG/1
650 TABLET ORAL ONCE
Status: COMPLETED | OUTPATIENT
Start: 2018-08-10 | End: 2018-08-10

## 2018-08-10 RX ORDER — AMLODIPINE BESYLATE 10 MG/1
10 TABLET ORAL DAILY
Status: DISCONTINUED | OUTPATIENT
Start: 2018-08-10 | End: 2018-08-10 | Stop reason: HOSPADM

## 2018-08-10 RX ORDER — ASPIRIN 325 MG
325 TABLET, DELAYED RELEASE (ENTERIC COATED) ORAL DAILY
Qty: 30 TABLET | Refills: 5 | Status: SHIPPED | OUTPATIENT
Start: 2018-08-11 | End: 2018-12-07

## 2018-08-10 RX ORDER — AMLODIPINE BESYLATE 10 MG/1
10 TABLET ORAL DAILY
Qty: 30 TABLET | Refills: 11 | Status: SHIPPED | OUTPATIENT
Start: 2018-08-10 | End: 2018-08-10

## 2018-08-10 RX ORDER — AMOXICILLIN 250 MG
1 CAPSULE ORAL 2 TIMES DAILY
Qty: 30 TABLET | Refills: 0 | Status: SHIPPED | OUTPATIENT
Start: 2018-08-10 | End: 2018-09-06

## 2018-08-10 RX ORDER — SULFAMETHOXAZOLE AND TRIMETHOPRIM 400; 80 MG/1; MG/1
1 TABLET ORAL DAILY
Qty: 30 TABLET | Refills: 11 | Status: SHIPPED | OUTPATIENT
Start: 2018-08-11 | End: 2018-08-24

## 2018-08-10 RX ORDER — ATORVASTATIN CALCIUM 10 MG/1
10 TABLET, FILM COATED ORAL DAILY
Qty: 30 TABLET | Refills: 11 | Status: SHIPPED | OUTPATIENT
Start: 2018-08-11 | End: 2019-08-19

## 2018-08-10 RX ORDER — ACETAMINOPHEN 325 MG/1
325-650 TABLET ORAL EVERY 4 HOURS PRN
Qty: 100 TABLET | Refills: 0 | Status: SHIPPED | OUTPATIENT
Start: 2018-08-10 | End: 2018-10-10

## 2018-08-10 RX ORDER — VALACYCLOVIR HYDROCHLORIDE 500 MG/1
1000 TABLET, FILM COATED ORAL EVERY 12 HOURS
Qty: 180 TABLET | Refills: 2 | Status: SHIPPED | OUTPATIENT
Start: 2018-08-10 | End: 2018-08-10

## 2018-08-10 RX ORDER — METHYLPREDNISOLONE SODIUM SUCCINATE 125 MG/2ML
100 INJECTION, POWDER, LYOPHILIZED, FOR SOLUTION INTRAMUSCULAR; INTRAVENOUS ONCE
Status: COMPLETED | OUTPATIENT
Start: 2018-08-10 | End: 2018-08-10

## 2018-08-10 RX ORDER — VALGANCICLOVIR 450 MG/1
450 TABLET, FILM COATED ORAL DAILY
Qty: 60 TABLET | Refills: 2 | Status: SHIPPED | OUTPATIENT
Start: 2018-08-11 | End: 2018-10-12

## 2018-08-10 RX ORDER — MINOXIDIL 2.5 MG/1
5 TABLET ORAL 2 TIMES DAILY
Qty: 120 TABLET | Refills: 11 | Status: SHIPPED | OUTPATIENT
Start: 2018-08-10 | End: 2018-08-16

## 2018-08-10 RX ORDER — DIPHENHYDRAMINE HCL 12.5MG/5ML
25-50 LIQUID (ML) ORAL ONCE
Status: COMPLETED | OUTPATIENT
Start: 2018-08-10 | End: 2018-08-10

## 2018-08-10 RX ORDER — VALGANCICLOVIR 450 MG/1
450 TABLET, FILM COATED ORAL DAILY
Status: DISCONTINUED | OUTPATIENT
Start: 2018-08-11 | End: 2018-08-10 | Stop reason: HOSPADM

## 2018-08-10 RX ORDER — OXYCODONE HYDROCHLORIDE 5 MG/1
5 TABLET ORAL EVERY 4 HOURS PRN
Qty: 20 TABLET | Refills: 0 | Status: SHIPPED | OUTPATIENT
Start: 2018-08-10 | End: 2018-09-06

## 2018-08-10 RX ORDER — ONDANSETRON 4 MG/1
4 TABLET, ORALLY DISINTEGRATING ORAL EVERY 6 HOURS PRN
Qty: 20 TABLET | Refills: 0 | Status: SHIPPED | OUTPATIENT
Start: 2018-08-10 | End: 2018-10-03

## 2018-08-10 RX ORDER — CARVEDILOL 25 MG/1
50 TABLET ORAL 2 TIMES DAILY WITH MEALS
Qty: 120 TABLET | Refills: 3 | Status: SHIPPED | OUTPATIENT
Start: 2018-08-10 | End: 2018-12-18

## 2018-08-10 RX ORDER — MYCOPHENOLATE MOFETIL 250 MG/1
1000 CAPSULE ORAL 2 TIMES DAILY
Qty: 240 CAPSULE | Refills: 11 | Status: SHIPPED | OUTPATIENT
Start: 2018-08-10 | End: 2018-09-05

## 2018-08-10 RX ORDER — DIPHENHYDRAMINE HCL 25 MG
25-50 CAPSULE ORAL ONCE
Status: COMPLETED | OUTPATIENT
Start: 2018-08-10 | End: 2018-08-10

## 2018-08-10 RX ADMIN — TACROLIMUS 10 MG: 4 TABLET, EXTENDED RELEASE ORAL at 08:00

## 2018-08-10 RX ADMIN — SULFAMETHOXAZOLE AND TRIMETHOPRIM 1 TABLET: 400; 80 TABLET ORAL at 08:01

## 2018-08-10 RX ADMIN — ASPIRIN 325 MG: 325 TABLET, DELAYED RELEASE ORAL at 08:01

## 2018-08-10 RX ADMIN — SENNOSIDES AND DOCUSATE SODIUM 1 TABLET: 8.6; 5 TABLET ORAL at 21:02

## 2018-08-10 RX ADMIN — ANTI-THYMOCYTE GLOBULIN (RABBIT) 200 MG: 5 INJECTION, POWDER, LYOPHILIZED, FOR SOLUTION INTRAVENOUS at 13:37

## 2018-08-10 RX ADMIN — CARVEDILOL 50 MG: 25 TABLET, FILM COATED ORAL at 18:03

## 2018-08-10 RX ADMIN — CARVEDILOL 50 MG: 25 TABLET, FILM COATED ORAL at 08:01

## 2018-08-10 RX ADMIN — METHYLPREDNISOLONE 100 MG: 125 INJECTION, POWDER, LYOPHILIZED, FOR SOLUTION INTRAMUSCULAR; INTRAVENOUS at 12:53

## 2018-08-10 RX ADMIN — VALACYCLOVIR HYDROCHLORIDE 500 MG: 500 TABLET, FILM COATED ORAL at 08:00

## 2018-08-10 RX ADMIN — ACETAMINOPHEN 650 MG: 325 TABLET, FILM COATED ORAL at 12:54

## 2018-08-10 RX ADMIN — MAGNESIUM OXIDE TAB 400 MG (241.3 MG ELEMENTAL MG) 400 MG: 400 (241.3 MG) TAB at 12:14

## 2018-08-10 RX ADMIN — VITAMIN D, TAB 1000IU (100/BT) 2000 UNITS: 25 TAB at 09:16

## 2018-08-10 RX ADMIN — MYCOPHENOLATE MOFETIL 1000 MG: 250 CAPSULE ORAL at 08:01

## 2018-08-10 RX ADMIN — DIPHENHYDRAMINE HYDROCHLORIDE 50 MG: 25 CAPSULE ORAL at 12:54

## 2018-08-10 RX ADMIN — ATORVASTATIN CALCIUM 10 MG: 10 TABLET, FILM COATED ORAL at 08:00

## 2018-08-10 RX ADMIN — MYCOPHENOLATE MOFETIL 1000 MG: 250 CAPSULE ORAL at 18:03

## 2018-08-10 RX ADMIN — AMLODIPINE BESYLATE 10 MG: 10 TABLET ORAL at 09:16

## 2018-08-10 ASSESSMENT — ACTIVITIES OF DAILY LIVING (ADL)
ADLS_ACUITY_SCORE: 11

## 2018-08-10 NOTE — PROGRESS NOTES
Care Coordinator  D: 38 year old with h/o ESRD secondary to HTN who is now s/p LDKT on 8/7/2018--per DR Bahena note today.  I: Due to time constraints, I did not see him but per lizzeth REESE--pt's wife donated her kidney and they have many family members to assist with driving,etc.  I have made referral to [facility] via email to begin after ATC clinic appointments.  A: discharge  This evening  P: ATC notified. [facility] Magi.

## 2018-08-10 NOTE — PROGRESS NOTES
CLINICAL NUTRITION SERVICES - DISCHARGE NOTE    Pt was followed by this service while inpatient. See previous nutrition notes for all recs. Diet discharge instructions in chart. Patient s discharge needs assessed and discharge planning has been conducted with the multidisciplinary transplant care team including physicians, pharmacy, social work and transplant coordinator.    Diet: regular - per patient appetite is fair. He reports he is trying to force intake of protein but has been eating light    Labs: (8/10): BUN 38 mg/dL (H), Cr 3.24 mg/dL (H)    Interventions  1. Reviewed post transplant nutrition guidelines. Emphasized high protein x 8 weeks and food safety. Patient verbalized understanding   2. Discharge diet instruction written     Follow up/Monitoring:  Once discharged, place outpatient nutrition consult via the transplant team if nutrition concerns arise.     Olivia Alan MS/RD/ANNALISE/CNSC  [address] RD Pager: [phone]

## 2018-08-10 NOTE — PROGRESS NOTES
Transplant Nephrology Progress Note  08/10/2018         Assessment & Recommendations:   Harshad Beatty is a 38 year old with h/o ESRD secondary to HTN who is now s/p LDKT on 8/7/2018.    # Kidney Tx - h/o ESRD secondary to HTN who is now s/p LDKT on 8/7/2018. 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 He has been on HD since 8/2015 and dialyzes on MWF schedule via his right arm fistula. His EDW is 118kg.  Post op course was complicated by hypotension after thyroglobulin administration which required pressors briefly.   Post operatively his creat was 9.7 and is down to 3.2 this morning. He has put out >4.4 Litres as of this morning.   -would continue to monitor renal fucntion.   - would check UPC prior to discharge although his primary disease is due to HTN     # Immunosuppression -  Induced with ATG, cellcept and steroids.  Started on envarsus and cellcept per protocol. Goal trough level of 8-10.      # Hypertension- was hypotensive requiring pressors briefly now elevated. Home meds included amlodipine 5mg daily, coreg 25mg BID, lisinopril 40 mg daily and minoxdil 10 mg daily.  -Now well controlled on 10 mg of amlodipine and 50 BID of coreg.   -if his BP continues to be elevated with systolics above 150- wouls stop amlodipine and start him on 5mg BID of minoxdil.      # Diabetes- no h/o DM. Post operatively elevated due to steroids. Management per primary.      # Anemia- Hgb postoperatively stable at ~10. Normocytic. Iron replete.      # Secondary hyperparathyroidism  # Vitamin D deficiency  # Hypophosphatemia  - iPTH prior to surgery on 7/2018 was 168. This will be followed as outpatient.   -Vit D low at 9. -started on 2000U of cholecalciferol daily.   - calcium okay. Phos okay.nders.      # Hypomagnesemia- Mag mildly elevated. Started on mag oxide 400mg daily per protocol.      # PCP prophylaxis- Started on bactrim three times per week  which  "will be adjusted to his GFR.      # Viral prophylaxis- Donor and recipient positive for Ab to EBV and CMV.started on valtrex per his GFR.     Recommendations were communicated to primary team via note.     Interval History :   In the last 24 hours Harshad Beatty has had no acute events. SBP controlled this AM. Has no complaints. Denies chest pain,n,v.     Review of Systems:   (4 pt ROS reviewed alone is not adequate unless you detail systems you reviewed)  I reviewed the following systems:  GI: no nausea or vomiting or diarrhea.   Neuro:  no confusion  Constitutional:  no fever or chills  CV: no dyspnea or edema.  no chest pain.    Physical Exam:   I/O last 3 completed shifts:  In: 480 [P.O.:480]  Out: 4837 [Urine:4775; Drains:62]   /83 (BP Location: Left arm)  Pulse 88  Temp 97.8  F (36.6  C) (Oral)  Resp 16  Ht 1.8 m (5' 10.87\")  Wt 117.2 kg (258 lb 4.8 oz)  SpO2 98%  BMI 36.16 kg/m2     GENERAL APPEARANCE: alert and no distress  EYES:  no scleral icterus, pupils equal  HENT: mouth without ulcers or lesions  PULM: lungs clear to auscultation, equal air movement, no clubbing  CV: regular rhythm, normal rate, no rub     -edema  none  GI: soft, post surgical site c/d/i  INTEGUMENT: no cyanosis  NEURO:  No asterixis   Access right UE avf    Labs:   All labs reviewed by me  Electrolytes/Renal -   Recent Labs   Lab Test  08/10/18   0654  08/09/18   0306  08/08/18   2357   08/08/18   0423   NA  138  139   --    --   133   POTASSIUM  4.8  4.4  4.2   < >  4.0   CHLORIDE  106  106   --    --   97   CO2  24  28   --    --   28   BUN  38*  28   --    --   28   CR  3.24*  5.73*   --    --   9.58*   GLC  108*  117*   --    --   147*   JUAN  10.5*  9.9   --    --   8.8   MAG  2.4*  2.4*   --    --   2.1   PHOS  2.8  5.4*   --    --   5.4*    < > = values in this interval not displayed.       CBC -   Recent Labs   Lab Test  08/10/18   0654  08/09/18   0306  08/08/18   2357   08/08/18   0423   WBC  7.0  9.4   --    --  "  6.4   HGB  10.8*  10.8*  10.8*   < >  10.9*   PLT  226  252   --    --   202    < > = values in this interval not displayed.       LFTs -   Recent Labs   Lab Test  07/30/18   1132  05/31/17   0734   ALKPHOS  50  57   BILITOTAL  0.5  0.4   ALT  17  19   AST  4  7   PROTTOTAL  8.6  7.9   ALBUMIN  3.9  3.9       Iron Panel -   Recent Labs   Lab Test  07/30/18   1132   IRON  46   IRONSAT  21   JET  716*         Imaging:  All imaging studies reviewed by me.     Current Medications:    amLODIPine  10 mg Oral Daily     aspirin  325 mg Oral Daily     atorvastatin  10 mg Oral Daily     carvedilol  50 mg Oral BID w/meals     cholecalciferol  2,000 Units Oral Daily     magnesium oxide  400 mg Oral Daily with lunch     mycophenolate  1,000 mg Oral BID IS     senna-docusate  1 tablet Oral BID    Or     senna-docusate  2 tablet Oral BID     sodium chloride (PF)  10 mL Intracatheter Q8H     sodium chloride (PF)  3 mL Intracatheter Q8H     sodium chloride (PF)  3 mL Intracatheter Q8H     [START ON 8/11/2018] sulfamethoxazole-trimethoprim  1 tablet Oral Daily     valACYclovir  500 mg Oral Q12H PATRIA Patrick MD    Attestation:  This patient has been seen and evaluated by me, Juan Bahena MD.  I have reviewed the note and agree with plan of care as documented by the fellow.

## 2018-08-10 NOTE — PROGRESS NOTES
Transplant Admission Psychosocial Assessment    Patient Name: Harshad Beatty  : 1980  Age: 38 year old  MRN: [id number]  Date of Initial Social Work Evaluation: 2017    Patient underwent living unrelated donor kidney transplant through paired exchange (pts wife donated) on 2018.  Met with pt to update psychosocial assessment and provide education about SW role while inpatient, and to begin discussion of expectations/requirements, caregiver needs and follow up needs post-transplant. Completed medicare 2728 form.    Presenting Information   Living Situation: Pt lives with his wife and kids.  If not local, plans for short term stay:  NA.  Previous Functional Status: Independent with ADL's.  Cultural/Language/Spiritual Considerations: Pt is an  male, english speaking.    Support System  Primary Support Person Wife.  Other support:  Children, pts mother and father, pts sister.  Plan for support in immediate post-transplant period: Pts mother, sister, and father. Once his wife heals from kidney donation she will also help.    Health Care Directive  Decision Maker: Pt is his own decision maker.  Alternate Decision Maker: Wife.  Health Care Directive: Declined completing    Mental Health/Coping:   History of Mental Health: Denied.  History of Chemical Health: Denied.  Current status: Stable.  Coping: Pt feels he is coping well post transplant and has no concerns.  Services Needed/Recommended: None.    Financial   Income: Pt works full time as a bond processor. Pts wife works part time.  Impact of transplant on income: Pt will be unable to work for a few months, he did fill out [facility] paperwork.  Insurance and medication coverage: HEALTH BENEFITS THROUGH [employer] ID#[id number] GRP#[id number] (EFFECTIVE: 18) WITH PHARMACY BENEFITS THROUGH [employer] (EFFECTIVE: 17 ). BILL MEDICATIONS TO [employer]ESS[employer] ID#[id number] GRP#[id number] PCN#[id number] BIN#[id number]. PATIENT HAS A DEDUCTIBLE  OF $1500 WITH A $5000 MAX OUT OF POCKET. PATIENT WILL PAY %20 COINSURANCE OR MINIMUM OF $10 COPAY WITH MAXIMUM $35 COPAY FOR GENERICS , %30  COINSURANCE OR MINIMUM $20 COPAY WITH MAXIMUM $175 COPAY FOR BRANDS , & %45 COINSURANCE OR MINIMUM $50 COPAY WITH MAXIMUM $250 COPAY FOR NON-PREFERRED MEDICATIONS. PATIENT WILL HAVE A PENALTY FOR CHOOSING BRAND/NON-PREFERRED WHEN GENERIC IS AVAILABLE, IT WILL BE THE BRAND/NON-PREFERRED COPAY PLUS THE DIFFERENCE IN PRICE OF GENERIC VS. BRAND. TESTCLAIMS: MYCOPHENOLATE 250MG =$0 , TACROLIMUS 1MG=$0 , CYCLOSPORINE 100MG=$0 , ENVARSUS XR 1MG= PRIOR AUTH REQUIRED , VALCYTE 450MG=$3991.84 , VALGANCICLOVIR 450MG=$0  Financial concerns: None.  Resources needed: None.    Education provided by SW: Social Work role inpatient setting, availability of support groups, parking information.    Assessment and recommendations and plan:  Pt is a 39 yo male who underwent living unrelated donor kidney transplant on 8/7/2018. Pt has been going to dialysis for the past three years at [facility] and has a fistula.     Pt was pleasant and receptive to  visit. He was very talkative and engaged. Asked appropriate questions. Seemed motivated and well informed to follow post transplant expectation. Has adequate social support and insurance coverage. No psychosocial concerns.    SUMAN Child, [facility]  7A   Ph: [phone], Pager: [phone]

## 2018-08-10 NOTE — PROGRESS NOTES
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    Graft function:Good, Cr was ~14 pre-op, now 3.24 this AM.  Good UOP- 4L yesterday  Immunosuppression management: Thymo: no issues yesterday. final dose today. MMF 1000 BID, [place] 10 today (0.1mg/kg). Lvl 9.6 yesterday  Complexity of management:Medium. Contributing factors: hypotension, induction  Hematology: Hgb has been stable  Cardiorespiratory:   -Hypertension: baseline. Persistent 160-170s, on home coreg, increase amlodipine to 10 daily  -Hypoxia: undiagnosed PRIETO. Sats well when awake, needs PAP at night  GI/Nutrition: Regular diet as tolerated  Endocrine: Mild steroid induced hyperglycemia- improved  Fluid/Electrolytes: SLIV. Drinking well.   : Stallworth out  Infectious disease: PPx with valtrex (per study) and bactrim  Prophylaxis: DVT, fall, GI, fungal  Disposition: likely home today if tolerates thymo well.     Medical Decision Making: Medium  Subsequent visit 80466 (moderate level decision making)    CONCHIAT/Fellow/Resident Provider: Kareem Lugo, Fellow    Faculty: Bety Mitchell M.D.  _________________________________________________________________  Transplant History: Admitted 2018 for  donor kidney transplant.  2018 (Kidney), Postoperative day: 3     Interval History: History is obtained from the patient  Overnight events: No events. Feeling well overall. Urinating well without stallworth. No issues with thymo yesterday. Eating well, moving bowels, no complaints.     ROS:   A 10-point review of systems was negative except as noted above.    Meds:    acetaminophen  650 mg Oral Once     amLODIPine  10 mg Oral Daily     anti-thymocyte globulin  200 mg Intravenous Central line Once     aspirin  325 mg Oral Daily     atorvastatin  10 mg Oral Daily      "carvedilol  50 mg Oral BID w/meals     cholecalciferol  2,000 Units Oral Daily     diphenhydrAMINE  25-50 mg Oral Once    Or     diphenhydrAMINE  25-50 mg Per NG tube Once     magnesium oxide  400 mg Oral Daily with lunch     methylPREDNISolone  100 mg Intravenous Once     mycophenolate  1,000 mg Oral BID IS     senna-docusate  1 tablet Oral BID    Or     senna-docusate  2 tablet Oral BID     sodium chloride (PF)  10 mL Intracatheter Q8H     sodium chloride (PF)  3 mL Intracatheter Q8H     sodium chloride (PF)  3 mL Intracatheter Q8H     [START ON 8/11/2018] sulfamethoxazole-trimethoprim  1 tablet Oral Daily     valACYclovir  500 mg Oral Q12H PATRIA       Physical Exam:     Admit Weight: 120.1 kg (264 lb 12.4 oz)    Current vitals:   BP (!) 164/96  Pulse 91  Temp 97.6  F (36.4  C) (Oral)  Resp 20  Ht 1.8 m (5' 10.87\")  Wt 117.2 kg (258 lb 4.8 oz)  SpO2 98%  BMI 36.16 kg/m2    CVP (mmHg): 6 mmHg    Vital sign ranges:    Temp:  [97.6  F (36.4  C)-98.4  F (36.9  C)] 97.6  F (36.4  C)  Pulse:  [91] 91  Heart Rate:  [83-95] 85  Resp:  [15-34] 20  BP: (144-202)/() 164/96  SpO2:  [97 %-100 %] 98 %  Patient Vitals for the past 24 hrs:   BP Temp Temp src Pulse Heart Rate Resp SpO2 Weight   08/10/18 1015 - - - - - - - 117.2 kg (258 lb 4.8 oz)   08/10/18 0758 (!) 164/96 - - - 85 - - -   08/10/18 0719 (!) 169/104 97.6  F (36.4  C) Oral - 83 20 98 % -   08/10/18 0400 144/85 98.4  F (36.9  C) Oral - 95 20 99 % -   08/10/18 0016 156/86 98.3  F (36.8  C) Oral - 92 20 98 % -   08/09/18 1914 (!) 157/97 97.9  F (36.6  C) Oral 91 - 20 97 % -   08/09/18 1630 (!) 155/95 - - - 85 19 98 % -   08/09/18 1600 (!) 202/114 97.6  F (36.4  C) Oral - 84 18 99 % -   08/09/18 1500 (!) 162/92 - - - 90 16 100 % -   08/09/18 1400 (!) 201/104 - - - 89 15 100 % -   08/09/18 1300 (!) 190/120 - - - 84 19 100 % -   08/09/18 1200 (!) 168/100 97.6  F (36.4  C) Oral - 85 (!) 34 100 % -     General Appearance: in no apparent distress.   Skin: normal, " warm  Heart: regular rate and rhythm  Lungs: CTA on R, diffuse crackles on L  Abdomen: The abdomen is soft, nontender to light palpation, incision c/d/i. Binder in place. RONALD 45mL out, serosang  : stallworth is present.  Urine has no gross hematuria.   Extremities: edema: absent.   Neurologic: awake and alert. Tremor absent..     Data:   CMP  Recent Labs  Lab 08/10/18  0654 08/09/18  0306  08/07/18 2006    139  < > 134   POTASSIUM 4.8 4.4  < > 3.5   CHLORIDE 106 106  < >  --    CO2 24 28  < >  --    * 117*  < > 114*   BUN 38* 28  < >  --    CR 3.24* 5.73*  < >  --    GFRESTIMATED 21* 11*  < >  --    GFRESTBLACK 26* 13*  < >  --    JUAN 10.5* 9.9  < >  --    ICAW  --   --   --  4.3*   MAG 2.4* 2.4*  < >  --    PHOS 2.8 5.4*  < >  --    < > = values in this interval not displayed.  CBC    Recent Labs  Lab 08/10/18  0654 08/09/18  0306   HGB 10.8* 10.8*   WBC 7.0 9.4    252     COAGSNo lab results found in last 7 days.    Invalid input(s): XA   Urinalysis  Recent Labs   Lab Test  07/30/18   1142  05/31/17   0726   COLOR  Straw  Straw   APPEARANCE  Clear  Clear   URINEGLC  50*  50*   URINEBILI  Negative  Negative   URINEKETONE  Negative  Negative   SG  1.008  1.004   UBLD  Small*  Small*   URINEPH  8.0*  8.0*   PROTEIN  100*  30*   NITRITE  Negative  Negative   LEUKEST  Negative  Negative   RBCU  1  1   WBCU  1  1     Virology:  Hepatitis C Antibody   Date Value Ref Range Status   07/30/2018 Nonreactive NR^Nonreactive Final     Comment:     Assay performance characteristics have not been established for newborns,   infants, and children

## 2018-08-10 NOTE — DISCHARGE INSTRUCTIONS
________________________________________________________  Discharge RN please fax discharge orders to home care agency: [facility]  ________________________________________________________        Diet recommendations post-transplant: High protein diet x 8 weeks.  Heart healthy dietary habits long term (low saturated/trans fat, low sodium). Practice food safety precautions. See nutrition section of transplant handbook for more information.

## 2018-08-10 NOTE — PLAN OF CARE
Problem: Patient Care Overview  Goal: Plan of Care/Patient Progress Review  Outcome: Improving  Afebrile; -160's/'s. Norvasc dose increased this AM. Otherwise vitally stable on RA. Denies pain and nausea. Tolerating regular diet with good appetite. Fluids encouraged. Peres removed- PVR negative. No BM. RONALD with 20 mL output- patient to discharge with drain. Drain education sheet printed and given to patient. Thymo infusing without issue. Patient to discharge this evening. Up independently. Will continue to monitor and treat per plan of care.

## 2018-08-10 NOTE — PLAN OF CARE
Problem: Patient Care Overview  Goal: Plan of Care/Patient Progress Review  Discharge Planner OT   Patient plan for discharge: Home  Current status: Pt ambulated ~500 ft<>rehab gym and completed 4 steps x10 w/no SOB or fatigue. Hypertensive, and RN notified.  Barriers to return to prior living situation: None  Recommendations for discharge: Home w/A PRN  Rationale for recommendations: Increase IND in ADLs/IADLs       Entered by: Yanni Ferrara 08/10/2018 4:21 PM

## 2018-08-10 NOTE — PLAN OF CARE
Problem: Patient Care Overview  Goal: Plan of Care/Patient Progress Review  Outcome: Improving    RN:  Afebrile, -150's,80's, HR 90's, O2 sat 98% on room air, facemask at 4L/MN O2 for sleep apnea when sleeping. Incisional pain tolerable, no pain medication during the shift, denies nausea. Peres with great urine output, 1750cc for the shift, RONALD with small serosanguinous drainage. Large soft BM x1. Up ad richard, will continue to monitor.

## 2018-08-10 NOTE — PLAN OF CARE
"Problem: Kidney Transplant (Adult)  Goal: Signs and Symptoms of Listed Potential Problems Will be Absent, Minimized or Managed (Kidney Transplant)  Signs and symptoms of listed potential problems will be absent, minimized or managed by discharge/transition of care (reference Kidney Transplant (Adult) CPG).   Outcome: Improving  BP (!) 157/97 (BP Location: Left arm)  Pulse 91  Temp 97.9  F (36.6  C) (Oral)  Resp 20  Ht 1.8 m (5' 10.87\")  Wt 120.1 kg (264 lb 12.4 oz)  SpO2 97%  BMI 37.07 kg/m2    Transferred from  from kidney tx 8/7/18 from ESRD.     8559-5855: Pt has been tachycardic. Pt also has a history of HTN. Pt denies any pain or nausea. CVC SL'd. AV fistula positive bruit and thrill. RONALD dressing changed. Scant amount of serosang. Incision liquid bandage open to air no drainage. Peres patent with adequate amounts of urine. Peres will be removed on POD #3. Alert and oriented x4. Ambulates frequently. Regular diet with fair PO intake. Pt still needs lab book and med care. Pt needs O2 at night with oxymask due to sleep apnea. Pt does not like CPAP and also needs a sleep study. Pt aware. Full code. Will continue to monitor and notify team of any changes.       "

## 2018-08-10 NOTE — PHARMACY-TRANSPLANT NOTE
Solid Organ Transplant Recipient Prior to Discharge Note    38 year old male s/p kidney transplant on 8/7/2018.    Pharmacy has monitored for medication interactions and immunosuppression levels in conjunction with the multidisciplinary team. In anticipation for discharge, medication therapy needs have been addressed daily throughout the current admission via multidisciplinary rounds and/or discussions, order verification, daily clinical pharmacy review, and communication with prescribers.  Julieta Hare, PharmD, BCPS

## 2018-08-11 LAB
BLD PROD TYP BPU: NORMAL
BLD PROD TYP BPU: NORMAL
BLD UNIT ID BPU: 0
BLD UNIT ID BPU: 0
BLOOD PRODUCT CODE: NORMAL
BLOOD PRODUCT CODE: NORMAL
BPU ID: NORMAL
BPU ID: NORMAL
TRANSFUSION STATUS PATIENT QL: NORMAL

## 2018-08-11 NOTE — PROGRESS NOTES
Pt received LUKT on 8/7. Orders for pt to dc home after thymo dose this evening.   VSS except continues to be hypertensive. Team aware. Denies pain. Wears abdominal binder when up and walking. Pt will go home with RONALD drain. Drain cares reviewed with pt and his mother- pt also received take home materials to reference and supplies to care for the drain and to measure output. RIJ removed. Pt voiding and having regular BM's. Meds were picked up for DC pharmacy and med card reviewed and updated. Lab book is up to date, and pt spoke with specialty pharmacy, watched the transplant videos and has all materials. Pt will have follow up labs and appointment at [facility] on 8/12 at 0700. Pt and his mother left the unit at 2100. No further concerns.

## 2018-08-11 NOTE — PLAN OF CARE
Problem: Patient Care Overview  Goal: Plan of Care/Patient Progress Review  Occupational Therapy Discharge Summary    Reason for therapy discharge:    Discharge Home with A as needed    Progress towards therapy goal(s). See goals on Care Plan in [facility] electronic health record for goal details.  Goals partially met.  Barriers to achieving goals:   discharge from facility.    Therapy recommendation(s):    No further therapy is recommended.  Pt continues to have post surgical precautions and drain.  Family to A as needed

## 2018-08-12 ENCOUNTER — INFUSION THERAPY VISIT (OUTPATIENT)
Dept: INFUSION THERAPY | Facility: CLINIC | Age: 38
End: 2018-08-12
Attending: INTERNAL MEDICINE
Payer: COMMERCIAL

## 2018-08-12 VITALS
WEIGHT: 254.4 LBS | OXYGEN SATURATION: 98 % | BODY MASS INDEX: 35.62 KG/M2 | DIASTOLIC BLOOD PRESSURE: 81 MMHG | TEMPERATURE: 98.7 F | HEART RATE: 83 BPM | RESPIRATION RATE: 18 BRPM | SYSTOLIC BLOOD PRESSURE: 160 MMHG

## 2018-08-12 DIAGNOSIS — Z94.0 KIDNEY REPLACED BY TRANSPLANT: Primary | ICD-10-CM

## 2018-08-12 LAB
ANION GAP SERPL CALCULATED.3IONS-SCNC: 6 MMOL/L (ref 3–14)
BASOPHILS # BLD AUTO: 0 10E9/L (ref 0–0.2)
BASOPHILS NFR BLD AUTO: 0 %
BUN SERPL-MCNC: 38 MG/DL (ref 7–30)
CALCIUM SERPL-MCNC: 10.3 MG/DL (ref 8.5–10.1)
CHLORIDE SERPL-SCNC: 110 MMOL/L (ref 94–109)
CO2 SERPL-SCNC: 24 MMOL/L (ref 20–32)
CREAT SERPL-MCNC: 2.28 MG/DL (ref 0.66–1.25)
DIFFERENTIAL METHOD BLD: ABNORMAL
EOSINOPHIL # BLD AUTO: 0 10E9/L (ref 0–0.7)
EOSINOPHIL NFR BLD AUTO: 0.4 %
ERYTHROCYTE [DISTWIDTH] IN BLOOD BY AUTOMATED COUNT: 14.4 % (ref 10–15)
GFR SERPL CREATININE-BSD FRML MDRD: 32 ML/MIN/1.7M2
GLUCOSE SERPL-MCNC: 89 MG/DL (ref 70–99)
HCT VFR BLD AUTO: 35.6 % (ref 40–53)
HGB BLD-MCNC: 11.6 G/DL (ref 13.3–17.7)
IMM GRANULOCYTES # BLD: 0 10E9/L (ref 0–0.4)
IMM GRANULOCYTES NFR BLD: 0.4 %
LYMPHOCYTES # BLD AUTO: 0.2 10E9/L (ref 0.8–5.3)
LYMPHOCYTES NFR BLD AUTO: 4.8 %
MAGNESIUM SERPL-MCNC: 1.7 MG/DL (ref 1.6–2.3)
MCH RBC QN AUTO: 28 PG (ref 26.5–33)
MCHC RBC AUTO-ENTMCNC: 32.6 G/DL (ref 31.5–36.5)
MCV RBC AUTO: 86 FL (ref 78–100)
MONOCYTES # BLD AUTO: 0.6 10E9/L (ref 0–1.3)
MONOCYTES NFR BLD AUTO: 12.1 %
NEUTROPHILS # BLD AUTO: 3.8 10E9/L (ref 1.6–8.3)
NEUTROPHILS NFR BLD AUTO: 82.3 %
NRBC # BLD AUTO: 0 10*3/UL
NRBC BLD AUTO-RTO: 0 /100
PHOSPHATE SERPL-MCNC: 1.5 MG/DL (ref 2.5–4.5)
PLATELET # BLD AUTO: 227 10E9/L (ref 150–450)
POTASSIUM SERPL-SCNC: 4.5 MMOL/L (ref 3.4–5.3)
RBC # BLD AUTO: 4.15 10E12/L (ref 4.4–5.9)
SODIUM SERPL-SCNC: 140 MMOL/L (ref 133–144)
TACROLIMUS BLD-MCNC: 11.9 UG/L (ref 5–15)
TME LAST DOSE: NORMAL H
WBC # BLD AUTO: 4.6 10E9/L (ref 4–11)

## 2018-08-12 PROCEDURE — 80048 BASIC METABOLIC PNL TOTAL CA: CPT | Performed by: INTERNAL MEDICINE

## 2018-08-12 PROCEDURE — 36415 COLL VENOUS BLD VENIPUNCTURE: CPT

## 2018-08-12 PROCEDURE — 85025 COMPLETE CBC W/AUTO DIFF WBC: CPT | Performed by: INTERNAL MEDICINE

## 2018-08-12 PROCEDURE — 80197 ASSAY OF TACROLIMUS: CPT | Performed by: INTERNAL MEDICINE

## 2018-08-12 PROCEDURE — 84100 ASSAY OF PHOSPHORUS: CPT | Performed by: INTERNAL MEDICINE

## 2018-08-12 PROCEDURE — 83735 ASSAY OF MAGNESIUM: CPT | Performed by: INTERNAL MEDICINE

## 2018-08-12 RX ORDER — DIBASIC SODIUM PHOSPHATE, MONOBASIC POTASSIUM PHOSPHATE AND MONOBASIC SODIUM PHOSPHATE 852; 155; 130 MG/1; MG/1; MG/1
500 TABLET ORAL 2 TIMES DAILY
Qty: 120 TABLET | Refills: 1 | Status: SHIPPED | OUTPATIENT
Start: 2018-08-12 | End: 2018-09-06

## 2018-08-12 NOTE — PATIENT INSTRUCTIONS
Dear Harshad Beatty    Thank you for choosing [facility] ([facility]) for your transplant cares.  The following information is a summary of our appointment as well as important reminders.      I will be calling your later with dose changes with your envarsus.     Additional information:   Remember to  your phosphorous tomorrow morning when you arrive here. You will start taking 500 mg 2 x daily. For today eat high phosphorous rich foods. See your hand out on high phosphorous containing foods.       We look forward in seeing you on your next appointment here at [facility].  Please don t hesitate to call us at [phone] to reschedule any of your appointments or to speak with one of the [facility] registered nurses.  It was a pleasure taking care of you today.    Sincerely,    [facility]  [address] [place] [address]  [address]  Phone:  [phone]

## 2018-08-12 NOTE — PROGRESS NOTES
"Harshad Beatty came to [facility] today for a lab and assess following a LDKT transplant on 8/7/18.      Discharge date: 8/11/18  Transplant coordinator: Blanca Sow/Soledad Proctor.  Phone number patient can be reached at: [phone]      Physical Assessment:  See physical assessment located under \"Document Flowsheets\".  Incision site: surgical glue c/d/i. Reviewed incision care and s/s of infection.   Lines: RONALD patient reports putting out about 90 mls in 24 hours.  Urine clarity: clear yellow urine. Denies burning, stinging, or irritation when voiding. Reports frequent voids as patient was on HD prior to surgery and not making urine.   Hydration: reports drinking 1 1/2-2 liters of non caffeinated fluid a day.   Nutrition: reports slowly improving appetitive. No N/V   Last BM: yesterday afternoon. Not taking stool softeners right now as patient able to have BM's without it. Discussed with patient to start taking if not having daily BM's/   Pain: patient did report pain when he stood up too quick last night and took one dose of oxycodone otherwise has denied pain and has not taking any pain meds.      Labs drawn by [facility] staff Yes    Plan of care for today:   Labs and assessment reviewed with Dr. Ronquillo.   Verified patient is to stop amlodipine and only take coreg 50 mg BID and minoxidil 5 mg BID with Dr. Ronquillo.  Verified patient is to be taking Bactrim daily not just MWF.     Medication changes:  Adding phosphorous 500 mg BID.  (medication card updated) Will  from pharmacy tomorrow.     Medications administered:      Patient education:    The following teaching topics were addressed: Importance of drinking 2L of non-caffeinated fluids daily, Incisional care, Signs/symptoms of infection, Good handwashing, Medications (purposes, doses and times of administration) and Phone numbers to call with concenrs (Transplant coordinator, [address] and [facility])   Patient verbalized understanding and all questions " answered.    Drug level:  Envarsus level today was 11.9 reviewed with Dr Ronquillo who gave orders to keep on the same dose.  Patient was updated with this information and verbalized understanding.    Face to face time: 60 minutes  Discharge Plan    Pt will follow up with specialty an infusion for LBA # 2.   Discharge instructions reviewed with patient: YES  Patient/Representative verbalized understanding, all questions answered: YES    Discharged from unit at 1000 with whom: self to home.    Brianna Castillo RN

## 2018-08-12 NOTE — MR AVS SNAPSHOT
After Visit Summary   8/12/2018    Harshad Beatty    MRN: [id number]           Patient Information     Date Of Birth          1980        Visit Information        Provider Department      8/12/2018 7:00 AM UC 42 ATC; UC SPEC INFUSION [facility] Specialty and Procedure        Today's Diagnoses     Kidney replaced by transplant    -  1      Care Instructions    Dear Harshad Beatty    Thank you for choosing [facility] Specialty Infusion [facility] ([facility]) for your transplant cares.  The following information is a summary of our appointment as well as important reminders.      I will be calling your later with dose changes with your envarsus.     Additional information:   Remember to  your phosphorous tomorrow morning when you arrive here. You will start taking 500 mg 2 x daily. For today eat high phosphorous rich foods. See your hand out on high phosphorous containing foods.       We look forward in seeing you on your next appointment here at [facility].  Please don t hesitate to call us at [phone] to reschedule any of your appointments or to speak with one of the [facility] registered nurses.  It was a pleasure taking care of you today.    Sincerely,    [facility]  Specialty Infusion & Procedure Center  [address] [place] [address]  [address]  Phone:  [phone]          Follow-ups after your visit        Your next 10 appointments already scheduled     Aug 13, 2018  7:00 AM CDT   (Arrive by 6:45 AM)   New Transplant Visit with UC SPEC INFUSION, UC 50 ATC   [facility] Specialty and Procedure ([facility])    [address] [place] [address]  [address] [place] [address] [phone]            Aug 13, 2018  8:00 AM CDT   (Arrive by 7:45 AM)   Kidney Transplant Discharge with Brijesh Gaxiola MD   [facility] Specialty and Procedure ([facility])    [address]  [address]  [place] [address]   [phone]            Aug 14, 2018  7:00 AM CDT   (Arrive by 6:45 AM)   New Transplant Visit with UC SPEC INFUSION, UC 43 ATC   [facility] Specialty and Procedure ([facility])    [address]  [address]  [place] [address]   [phone]            Aug 14, 2018  8:00 AM CDT   (Arrive by 7:45 AM)   Kidney Transplant Discharge with Brijesh Gaxiola MD   [facility] Specialty and Procedure ([facility])    [address]  [address]  [place] [address]   [phone]            Aug 27, 2018  2:30 PM CDT   (Arrive by 2:15 PM)   Kidney Post Op with Bety Mitchell MD   [facility] Solid Organ Transplant ([facility])    [address]  [address]  [place] [address] [phone]            Sep 07, 2018 10:30 AM CDT   (Arrive by 10:00 AM)   Return Kidney Transplant with Uc Early Post Transplant   [facility] Nephrology ([facility])    [address]  [address]  [place] [address]   [phone]            Sep 07, 2018 11:00 AM CDT   (Arrive by 10:30 AM)   Return Kidney Transplant with Uc Early Post Transplant   [facility] Nephrology ([facility])    [address]  [address]  [place] [address] [phone]            Sep 10, 2018  3:45 PM CDT   (Arrive by 3:30 PM)   Cystoscopy with Bety Mitchell MD   [facility] Solid Organ Transplant ([facility])    [address]  [address]  [place] [address] [phone]              Who to contact     If you have questions or need follow up information about today's clinic visit or your schedule please contact [facility] SPECIALTY AND PROCEDURE directly at [phone].  Normal or non-critical lab and  imaging results will be communicated to you by MyChart, letter or phone within 4 business days after the clinic has received the results. If you do not hear from us within 7 days, please contact the clinic through MyChart or phone. If you have a critical or abnormal lab result, we will notify you by phone as soon as possible.  Submit refill requests through [employer] or call your pharmacy and they will forward the refill request to us. Please allow 3 business days for your refill to be completed.          Additional Information About Your Visit        Care EveryWhere ID     This is your Care EveryWhere ID. This could be used by other organizations to access your [place] medical records  [id number]        Your Vitals Were     Pulse Temperature Respirations Pulse Oximetry BMI (Body Mass Index)       83 98.7  F (37.1  C) (Oral) 18 98% 35.62 kg/m2        Blood Pressure from Last 3 Encounters:   08/12/18 160/81   08/10/18 161/89   07/30/18 149/76    Weight from Last 3 Encounters:   08/12/18 115.4 kg (254 lb 6.4 oz)   08/10/18 117.2 kg (258 lb 4.8 oz)   07/30/18 119.7 kg (264 lb)              We Performed the Following     Basic metabolic panel     CBC with platelets differential     Magnesium     Phosphorus     Tacrolimus level        Primary Care Provider Office Phone # Fax #    [facility] [phone] [address] [facility] [address]        Equal Access to Services     JAKI PUGA : Silas Penn, wajuanada deepika, qaybta kaalmada dariela, herb beltran . So [facility] [phone].    ATENCIÓN: Si habla español, tiene a rodriguez disposición servicios gratuitos de asistencia lingüística. Llame al [phone].    We comply with applicable federal civil rights laws and [place] laws. We do not discriminate on the basis of race, color, national origin, age, disability, sex, sexual orientation, or gender identity.            Thank  you!     Thank you for choosing [facility] SPECIALTY AND PROCEDURE  for your care. Our goal is always to provide you with excellent care. Hearing back from our patients is one way we can continue to improve our services. Please take a few minutes to complete the written survey that you may receive in the mail after your visit with us. Thank you!             Your Updated Medication List - Protect others around you: Learn how to safely use, store and throw away your medicines at www.disposemymeds.org.          This list is accurate as of 8/12/18  9:37 AM.  Always use your most recent med list.                   Brand Name Dispense Instructions for use Diagnosis    acetaminophen 325 MG tablet    TYLENOL    100 tablet    Take 1-2 tablets (325-650 mg) by mouth every 4 hours as needed for mild pain or fever    Kidney replaced by transplant       aspirin 325 MG EC tablet     30 tablet    Take 1 tablet (325 mg) by mouth daily    Kidney replaced by transplant       atorvastatin 10 MG tablet    LIPITOR    30 tablet    Take 1 tablet (10 mg) by mouth daily    Kidney replaced by transplant       carvedilol 25 MG tablet    COREG    120 tablet    Take 2 tablets (50 mg) by mouth 2 times daily (with meals)    Renovascular hypertension, Kidney replaced by transplant       cholecalciferol 2000 units tablet     30 tablet    Take 2,000 Units by mouth daily    Renovascular hypertension, Kidney replaced by transplant       magnesium oxide 400 MG tablet    MAG-OX    30 tablet    Take 1 tablet (400 mg) by mouth daily (with lunch)    Kidney replaced by transplant       minoxidil 2.5 MG tablet    LONITEN    120 tablet    Take 2 tablets (5 mg) by mouth 2 times daily    Renovascular hypertension, Kidney replaced by transplant       mycophenolate 250 MG capsule    GENERIC EQUIVALENT    240 capsule    Take 4 capsules (1,000 mg) by mouth 2 times daily    Kidney replaced by transplant       ondansetron 4 MG ODT tab     ZOFRAN-ODT    20 tablet    Take 1 tablet (4 mg) by mouth every 6 hours as needed for nausea or vomiting    Kidney replaced by transplant       oxyCODONE IR 5 MG tablet    ROXICODONE    20 tablet    Take 1 tablet (5 mg) by mouth every 4 hours as needed for moderate to severe pain    Kidney replaced by transplant       senna-docusate 8.6-50 MG per tablet    SENOKOT-S;PERICOLACE    30 tablet    Take 1 tablet by mouth 2 times daily    Kidney replaced by transplant       sulfamethoxazole-trimethoprim 400-80 MG per tablet    BACTRIM/SEPTRA    30 tablet    Take 1 tablet by mouth daily    Kidney replaced by transplant, Immunosuppressed status (H)       tacrolimus 1 MG 24 hr tablet    ENVARSUS XR    300 tablet    Take 10 tablets (10 mg) by mouth every morning (before breakfast)    Kidney replaced by transplant       valGANciclovir 450 MG tablet    VALCYTE    60 tablet    Take 1 tablet (450 mg) by mouth daily Titrate dose up to a max of 2 tabs (900 mg) by mouth daily when directed by your transplant team.    Kidney replaced by transplant

## 2018-08-13 ENCOUNTER — DOCUMENTATION ONLY (OUTPATIENT)
Dept: TRANSPLANT | Facility: CLINIC | Age: 38
End: 2018-08-13

## 2018-08-13 ENCOUNTER — OFFICE VISIT (OUTPATIENT)
Dept: INFUSION THERAPY | Facility: CLINIC | Age: 38
End: 2018-08-13
Attending: NURSE PRACTITIONER
Payer: COMMERCIAL

## 2018-08-13 ENCOUNTER — TELEPHONE (OUTPATIENT)
Dept: TRANSPLANT | Facility: CLINIC | Age: 38
End: 2018-08-13

## 2018-08-13 ENCOUNTER — OFFICE VISIT (OUTPATIENT)
Dept: INFUSION THERAPY | Facility: CLINIC | Age: 38
End: 2018-08-13
Attending: INTERNAL MEDICINE
Payer: COMMERCIAL

## 2018-08-13 VITALS
BODY MASS INDEX: 34.17 KG/M2 | RESPIRATION RATE: 16 BRPM | SYSTOLIC BLOOD PRESSURE: 150 MMHG | OXYGEN SATURATION: 99 % | DIASTOLIC BLOOD PRESSURE: 102 MMHG | HEART RATE: 78 BPM | TEMPERATURE: 97.8 F | WEIGHT: 244.1 LBS

## 2018-08-13 DIAGNOSIS — E83.39 HYPOPHOSPHATEMIA: Primary | ICD-10-CM

## 2018-08-13 DIAGNOSIS — N25.81 SECONDARY RENAL HYPERPARATHYROIDISM (H): ICD-10-CM

## 2018-08-13 DIAGNOSIS — Z94.0 STATUS POST KIDNEY TRANSPLANT: Primary | ICD-10-CM

## 2018-08-13 DIAGNOSIS — Z94.0 KIDNEY REPLACED BY TRANSPLANT: ICD-10-CM

## 2018-08-13 DIAGNOSIS — Z29.89 NEED FOR PNEUMOCYSTIS PROPHYLAXIS: ICD-10-CM

## 2018-08-13 DIAGNOSIS — Z94.0 KIDNEY REPLACED BY TRANSPLANT: Primary | ICD-10-CM

## 2018-08-13 DIAGNOSIS — I10 BENIGN ESSENTIAL HYPERTENSION: ICD-10-CM

## 2018-08-13 DIAGNOSIS — Z48.298 AFTERCARE FOLLOWING ORGAN TRANSPLANT: ICD-10-CM

## 2018-08-13 DIAGNOSIS — D64.89 ANEMIA DUE TO OTHER CAUSE, NOT CLASSIFIED: ICD-10-CM

## 2018-08-13 DIAGNOSIS — D84.9 IMMUNOSUPPRESSION (H): ICD-10-CM

## 2018-08-13 DIAGNOSIS — E83.52 HYPERCALCEMIA: ICD-10-CM

## 2018-08-13 DIAGNOSIS — Z29.89 NEED FOR CMV IMMUNOTHERAPY: ICD-10-CM

## 2018-08-13 LAB
ANION GAP SERPL CALCULATED.3IONS-SCNC: 6 MMOL/L (ref 3–14)
BUN SERPL-MCNC: 34 MG/DL (ref 7–30)
CALCIUM SERPL-MCNC: 10.2 MG/DL (ref 8.5–10.1)
CHLORIDE SERPL-SCNC: 108 MMOL/L (ref 94–109)
CO2 SERPL-SCNC: 24 MMOL/L (ref 20–32)
CREAT SERPL-MCNC: 2.35 MG/DL (ref 0.66–1.25)
ERYTHROCYTE [DISTWIDTH] IN BLOOD BY AUTOMATED COUNT: 14.2 % (ref 10–15)
GFR SERPL CREATININE-BSD FRML MDRD: 31 ML/MIN/1.7M2
GLUCOSE SERPL-MCNC: 91 MG/DL (ref 70–99)
HCT VFR BLD AUTO: 36.4 % (ref 40–53)
HGB BLD-MCNC: 12 G/DL (ref 13.3–17.7)
MAGNESIUM SERPL-MCNC: 1.7 MG/DL (ref 1.6–2.3)
MCH RBC QN AUTO: 28.1 PG (ref 26.5–33)
MCHC RBC AUTO-ENTMCNC: 33 G/DL (ref 31.5–36.5)
MCV RBC AUTO: 85 FL (ref 78–100)
PHOSPHATE SERPL-MCNC: 1.6 MG/DL (ref 2.5–4.5)
PLATELET # BLD AUTO: 231 10E9/L (ref 150–450)
POTASSIUM SERPL-SCNC: 4.6 MMOL/L (ref 3.4–5.3)
RBC # BLD AUTO: 4.27 10E12/L (ref 4.4–5.9)
SODIUM SERPL-SCNC: 138 MMOL/L (ref 133–144)
TACROLIMUS BLD-MCNC: 14.8 UG/L (ref 5–15)
TME LAST DOSE: NORMAL H
WBC # BLD AUTO: 4.2 10E9/L (ref 4–11)

## 2018-08-13 PROCEDURE — 84100 ASSAY OF PHOSPHORUS: CPT | Performed by: SURGERY

## 2018-08-13 PROCEDURE — 83735 ASSAY OF MAGNESIUM: CPT | Performed by: SURGERY

## 2018-08-13 PROCEDURE — 36415 COLL VENOUS BLD VENIPUNCTURE: CPT

## 2018-08-13 PROCEDURE — 80048 BASIC METABOLIC PNL TOTAL CA: CPT | Performed by: SURGERY

## 2018-08-13 PROCEDURE — 85027 COMPLETE CBC AUTOMATED: CPT | Performed by: SURGERY

## 2018-08-13 PROCEDURE — 80197 ASSAY OF TACROLIMUS: CPT | Performed by: SURGERY

## 2018-08-13 NOTE — MR AVS SNAPSHOT
After Visit Summary   8/13/2018    Harshad Beatty    MRN: [id number]           Patient Information     Date Of Birth          1980        Visit Information        Provider Department      8/13/2018 8:00 AM Tracy Finley NP [facility] Specialty and Procedure        Today's Diagnoses     Status post kidney transplant    -  1       Follow-ups after your visit        Your next 10 appointments already scheduled     Aug 15, 2018  7:00 AM CDT   (Arrive by 6:45 AM)   New Transplant Visit with UC SPEC INFUSION, UC 41 ATC   [facility] Specialty and Procedure ([facility])    [address]  [address]  [place] [address]   [phone]            Aug 15, 2018  8:00 AM CDT   (Arrive by 7:45 AM)   Return with Fareed Ronquillo MD   [facility] Specialty and Procedure ([facility])    [address]  [address]  [place] [address]   [phone]            Aug 27, 2018  2:30 PM CDT   (Arrive by 2:15 PM)   Kidney Post Op with Bety Mitchell MD   [facility] Solid Organ Transplant ([facility])    [address]  [address]  [place] [address]   [phone]            Sep 07, 2018 10:30 AM CDT   (Arrive by 10:00 AM)   Return Kidney Transplant with Uc Early Post Transplant   [facility] Nephrology ([facility])    [address]  [address]  [place] [address]   [phone]            Sep 07, 2018 11:00 AM CDT   (Arrive by 10:30 AM)   Return Kidney Transplant with Uc Early Post Transplant   [facility] Nephrology ([facility])    [address]  [address]  [place] [address]   [phone]            Sep 10, 2018  3:45 PM CDT   (Arrive by 3:30 PM)   Cystoscopy with Bety Mitchell MD   [facility] Solid Organ Transplant ([facility])    [address]  [address]  [place] [address]   [phone]            Dec 07, 2018  1:05 PM CST   (Arrive by 12:35 PM)   Return Kidney Transplant with Uc Kidney/Pancreas Recipient   [facility] Nephrology ([facility])    [address]  [address]  [place] [address]   [phone]            Feb 07, 2019  1:05 PM CST   (Arrive by 12:35 PM)   Return Kidney Transplant with Uc Kidney/Pancreas Recipient   [facility] Nephrology ([facility])    [address]  [address]  [place] [address]   [phone]              Future tests that were ordered for you today     Open Standing Orders        Priority Remaining Interval Expires Ordered    Protein  random urine with Creat Ratio Routine 4/4 Weekly 9/13/2018 8/13/2018    PRA Donor Specific Antibody Routine 18/18 8/13/2019 8/13/2018          Open Future Orders        Priority Expected Expires Ordered    Protein  random urine with Creat Ratio Routine 9/14/2018 8/13/2019 8/13/2018            Who to contact     If you have questions or need follow up information about today's clinic visit or your schedule please contact [facility] SPECIALTY AND PROCEDURE directly at [phone].  Normal or non-critical lab and imaging results will be communicated to you by MyChart, letter or phone within 4 business days after the clinic has received the results. If you do not hear from us within 7 days, please contact the clinic through MyChart or phone. If you have a critical or abnormal lab result, we will notify you by phone as soon as possible.  Submit refill requests through [employer] or call your pharmacy and they will forward the refill request to us. Please allow 3 business days for your refill to be completed.          Additional Information About Your Visit        Care EveryWhere ID     This is your Care EveryWhere ID. This could be used by other organizations to access your  [place] medical records  [id number]         Blood Pressure from Last 3 Encounters:   08/14/18 145/74   08/13/18 (!) 150/102   08/12/18 160/81    Weight from Last 3 Encounters:   08/14/18 110.9 kg (244 lb 7.8 oz)   08/13/18 110.7 kg (244 lb 1.6 oz)   08/12/18 115.4 kg (254 lb 6.4 oz)              Today, you had the following     No orders found for display         Today's Medication Changes          These changes are accurate as of 8/13/18 11:59 PM.  If you have any questions, ask your nurse or doctor.               Start taking these medicines.        Dose/Directions    phosphorus tablet 250 mg 250 MG per tablet   Used for:  Hypophosphatemia        Dose:  500 mg   Take 2 tablets (500 mg) by mouth 2 times daily   Quantity:  120 tablet   Refills:  1            Where to get your medicines      These medications were sent to [address] [place] [address] [address] [place] [address]    Hours:  TRANSPLANT PHONE NUMBER [phone] Phone:  [phone]     phosphorus tablet 250 mg 250 MG per tablet                Primary Care Provider Office Phone # Fax #    [facility] [phone] [address] [facility] [address]        Equal Access to Services     JAKI PUGA AH: Silas shaffero [place], waaxda luqadaha, qaybta kaalmada dariela, herb mar. So [facility] [phone].    ATENCIÓN: Si habla español, tiene a rodriguez disposición servicios gratuitos de asistencia lingüística. Ettaame al [phone].    We comply with applicable federal civil rights laws and [place] laws. We do not discriminate on the basis of race, color, national origin, age, disability, sex, sexual orientation, or gender identity.            Thank you!     Thank you for choosing [facility] SPECIALTY AND PROCEDURE  for your care. Our goal is always to provide you with  excellent care. Hearing back from our patients is one way we can continue to improve our services. Please take a few minutes to complete the written survey that you may receive in the mail after your visit with us. Thank you!             Your Updated Medication List - Protect others around you: Learn how to safely use, store and throw away your medicines at www.disposemymeds.org.          This list is accurate as of 8/13/18 11:59 PM.  Always use your most recent med list.                   Brand Name Dispense Instructions for use Diagnosis    acetaminophen 325 MG tablet    TYLENOL    100 tablet    Take 1-2 tablets (325-650 mg) by mouth every 4 hours as needed for mild pain or fever    Kidney replaced by transplant       aspirin 325 MG EC tablet     30 tablet    Take 1 tablet (325 mg) by mouth daily    Kidney replaced by transplant       atorvastatin 10 MG tablet    LIPITOR    30 tablet    Take 1 tablet (10 mg) by mouth daily    Kidney replaced by transplant       carvedilol 25 MG tablet    COREG    120 tablet    Take 2 tablets (50 mg) by mouth 2 times daily (with meals)    Renovascular hypertension, Kidney replaced by transplant       cholecalciferol 2000 units tablet     30 tablet    Take 2,000 Units by mouth daily    Renovascular hypertension, Kidney replaced by transplant       magnesium oxide 400 MG tablet    MAG-OX    30 tablet    Take 1 tablet (400 mg) by mouth daily (with lunch)    Kidney replaced by transplant       minoxidil 2.5 MG tablet    LONITEN    120 tablet    Take 2 tablets (5 mg) by mouth 2 times daily    Renovascular hypertension, Kidney replaced by transplant       mycophenolate 250 MG capsule    GENERIC EQUIVALENT    240 capsule    Take 4 capsules (1,000 mg) by mouth 2 times daily    Kidney replaced by transplant       ondansetron 4 MG ODT tab    ZOFRAN-ODT    20 tablet    Take 1 tablet (4 mg) by mouth every 6 hours as needed for nausea or vomiting    Kidney replaced by transplant       oxyCODONE  IR 5 MG tablet    ROXICODONE    20 tablet    Take 1 tablet (5 mg) by mouth every 4 hours as needed for moderate to severe pain    Kidney replaced by transplant       phosphorus tablet 250 mg 250 MG per tablet     120 tablet    Take 2 tablets (500 mg) by mouth 2 times daily    Hypophosphatemia       senna-docusate 8.6-50 MG per tablet    SENOKOT-S;PERICOLACE    30 tablet    Take 1 tablet by mouth 2 times daily    Kidney replaced by transplant       sulfamethoxazole-trimethoprim 400-80 MG per tablet    BACTRIM/SEPTRA    30 tablet    Take 1 tablet by mouth daily    Kidney replaced by transplant, Immunosuppressed status (H)       valGANciclovir 450 MG tablet    VALCYTE    60 tablet    Take 1 tablet (450 mg) by mouth daily Titrate dose up to a max of 2 tabs (900 mg) by mouth daily when directed by your transplant team.    Kidney replaced by transplant

## 2018-08-13 NOTE — PATIENT INSTRUCTIONS
Dear Harshad Beatty    Thank you for choosing [facility] Specialty Infusion and Procedure Center ([facility]) for your transplant care and follow up.  The following information is a summary of our appointment as well as important reminders.      Please make sure your phone is available today because I will call to update you with your anti-rejection drug levels and possibly make changes to your anti-rejection dosages.    Additional information:   1. RONALD Drain goal is 45 ml in 24 hours, will discuss drain removal tomorrow.  2. Please continue to drink 3 liters of water per day.  3. Please return tomorrow for labs and assessment.  4. Tonya Beltran will follow up with study medication of Valtrex. This is the medication you will be be taking. Continue with current anti viral (Valcyte) for now.    We look forward in seeing you on your next appointment here at [facility].  Please don t hesitate to call us at [phone] to reschedule any of your appointments or to speak with one of the [facility] registered nurses.  It was a pleasure taking care of you today.    Sincerely,    [facility]  Specialty Infusion & Procedure Center  [address] [place] [address]  [address]  Phone:  [phone]

## 2018-08-13 NOTE — PROGRESS NOTES
"Harshad Beatty came to [facility] today for a lab and assess following a Kidney transplant on 8-7-2018.      Discharge date: 8-.  Transplant coordinator: Blanca Sow and Soledad Proctor  Phone number patient can be reached at: [phone] cell phone for patient, Bee is his mother she is helping him but she is not here today.      Physical Assessment:  See physical assessment located under \"Document Flowsheets\".  Incision site: clean, dry and intact with surgical glue.  Lines: RONALD drain. Patient reports has put out 25 ml in 24 hours, dressing changed today.  Peres: 0  Urine clarity: Clear yellow urine. Denies burning or irritation. Reports urinating frequently.  Hydration: reports drinking 2-3 liters a day, discussed importance of drinking non- caffeine drinks and patient understood.  Nutrition: appetite is decreased patient states, he believes he is snacking more that eating meals. No vomiting, nausea or diarrhea.   Last BM: yesterday had a well formed BM, patient understands the stool softeners and when to take them.  Pain: 0, intermittent with movement he states, he is wearing the abdominal binder.    Labs drawn by [facility] staff Yes    Plan of care for today: Labs and assessment following kidney transplant 8-7-2018, Dr. Connolly also reviewed labs and spoke with patient today.    1. RONALD Drain goal is 45 ml in 24 hours, will discuss drain removal tomorrow.  2. Please continue to drink 3 liters of water per day.  3. Please return tomorrow for labs and assessment.  4. Tonya Beltran will follow up with study medication of Valtrex. This is the medication you will be be taking. Continue with current anti viral (Valcyte) for now.    Medication changes: Harshad will be taking the study drug Valtrex, he has not started this yet. He discussed this with Tonya Everettl she will facilitate this she states, she saw the patient in [facility] today, he will continue with his current anti viral ( Valganciclovir, Valcyte ) until " then.    Medications administered: 0      Patient education:    The following teaching topics were addressed: Importance of drinking 2L of non-caffeinated fluids daily, Incisional care, Signs/symptoms of infection, Good handwashing, Medications (purposes, doses and times of administration) and Plan of care   Patient verbalized understanding and all questions answered.    Drug level:  Envarsus level today reviewed with Dr. Connolly who gave orders to reduce to 8 mg of Envarsus daily.  Patient was updated with this information I left a message on his phone.    Face to face time: 20  Discharge Plan    Pt will follow up with [facility] tomorrow 8- for labs and assessment.  Discharge instructions reviewed with patient: YES  Patient/Representative verbalized understanding, all questions answered: YES    Discharged from unit at 1045 with whom: self to home in [place].    Elsi Mancini RN

## 2018-08-13 NOTE — PROGRESS NOTES
TRANSPLANT NEPHROLOGY VISIT    Assessment & Plan   # LDKT: baseline Cr ~ yet to be determined; creatinine stuck today, likely due to high tac level. F/u today's. Will likely decrease to 8 mg daily on envarsus   - Proteinuria: check UPC due to hx of FSGS    DSA at the time of transplant: Check a stat donor specific antibody.    # Immunosuppression: Tacrolimus extended release (goal  8-10) and Mycophenolate mofetil (goal  1-3.5)   - Changes: Yes - see above. mmf 1000 mg po bid    # Prophylaxis:   - PJP: Bactrim   - CMV: Valtrex - on study 1000 mg po bid valA    # Transplant History:    Transplant: 8/7/2018 (Kidney)    Donor Type: Living Donor Class:    Crossmatch at time of Tx: negative    DSA at time of Tx: Yes    Latest DSA: Yes Date of last check: 8/3   Significant changes in immunosuppression: None    Biopsy: No Rejection History:No   CMV IgG Ab Discordance (D+/R-): No    EBV IgG Ab Discordance (D+/R-): No    History of BK Viremia: No    Significant Complications: None    Transplant Office Phone Number: [phone]    # Hypertension: inadequate control; Goal BP: 140/90   - Changes: No not in setting of elevated creatinine. Continue coreg / minoxidil.    # Anemia in chronic renal disease: Hgb: Stable   - Iron studies: iron sat 21%, but ferritin elevated in july 2018    # Mineral Bone Disorder:   - Secondary renal hyperparathyroidism:  on 7/30?18   - Vitamin D: low in July. On cholecalciferol.    - Calcium: high. Monitor for need to restart sensipar   - Phosphorus: low at 1.6. Continue phos supplement.     # Electrolytes:    - Magnesium: last on 1.7 mg / dl   - Potassium: 4.6 meq / L    # Other Medical Issues: None    Return visit: Follow-up tomorrow in infusion center.  Assessment and plan was discussed with the patient and he voiced his understanding and agreement.    Andrey Connolly MD    Chief Complaint   Mr. 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  Next    The patient currently feels well.  He specifically denies any chest pain or shortness of breath.  He has no nausea or vomiting.  He has no fever shakes or chills.  He has no problem with diarrhea.    He is currently on immunosuppression with long-acting tacrolimus and CellCept 1000 mg p.o. twice daily.    He did have donor specific antibody to DP beta 1 at the time of transplant and we will send a stat donor specific antibody given the creatinine plateau.    However, the patient's immunosuppression is if anything supratherapeutic for his tacrolimus trough.  As such, we will follow-up today's level and plan to reduce to 8 mg p.o. daily.  Goal tacrolimus trough for this patient is 8-10 ng/mL.    He also has a history of focal segmental glomerulosclerosis and as such should have a urine protein to creatinine ratio tomorrow as well.    Recent Hospitalizations:  [x] No [] Yes    New Medical Issues: [x] No [] Yes    Decreased energy: [x] No [] Yes    Chest pain or SOB with exertion:  [x] No [] Yes    Appetite change or weight change: [x] No [] Yes    Nausea, vomiting or diarrhea:  [x] No [] Yes    Fever, sweats or chills: [x] No [] Yes    Leg swelling: [x] No [] Yes      Other medical issues:  No    Home BP: elevated    Review of Systems   A comprehensive review of systems was obtained and negative, except as noted in the HPI or PMH.    Problem List   Patient Active Problem List   Diagnosis     ESRD (end stage renal disease) (H)     HTN (hypertension)     Sickle cell trait (H)     Anemia in chronic kidney disease     Secondary hyperparathyroidism (H)     Kidney replaced by transplant     Kidney transplant recipient      Immunosuppression (H)     Social History   Social History   Substance Use Topics     Smoking status: Never Smoker     Smokeless tobacco: Never Used     Alcohol use No     Allergies   No Known Allergies    Medications   Current Outpatient Prescriptions   Medication Sig     acetaminophen (TYLENOL) 325 MG tablet Take 1-2 tablets (325-650 mg) by mouth every 4 hours as needed for mild pain or fever (Patient not taking: Reported on 8/12/2018)     aspirin 325 MG EC tablet Take 1 tablet (325 mg) by mouth daily     atorvastatin (LIPITOR) 10 MG tablet Take 1 tablet (10 mg) by mouth daily     carvedilol (COREG) 25 MG tablet Take 2 tablets (50 mg) by mouth 2 times daily (with meals)     cholecalciferol 2000 units tablet Take 2,000 Units by mouth daily     magnesium oxide (MAG-OX) 400 MG tablet Take 1 tablet (400 mg) by mouth daily (with lunch)     minoxidil (LONITEN) 2.5 MG tablet Take 2 tablets (5 mg) by mouth 2 times daily     mycophenolate (GENERIC EQUIVALENT) 250 MG capsule Take 4 capsules (1,000 mg) by mouth 2 times daily     ondansetron (ZOFRAN-ODT) 4 MG ODT tab Take 1 tablet (4 mg) by mouth every 6 hours as needed for nausea or vomiting (Patient not taking: Reported on 8/12/2018)     oxyCODONE IR (ROXICODONE) 5 MG tablet Take 1 tablet (5 mg) by mouth every 4 hours as needed for moderate to severe pain (Patient not taking: Reported on 8/12/2018)     phosphorus tablet 250 mg (PHOSPHA 250 NEUTRAL) 250 MG per tablet Take 2 tablets (500 mg) by mouth 2 times daily     senna-docusate (SENOKOT-S;PERICOLACE) 8.6-50 MG per tablet Take 1 tablet by mouth 2 times daily (Patient not taking: Reported on 8/12/2018)     sulfamethoxazole-trimethoprim (BACTRIM/SEPTRA) 400-80 MG per tablet Take 1 tablet by mouth daily     tacrolimus (ENVARSUS XR) 1 MG 24 hr tablet Take 10 tablets (10 mg) by mouth every morning (before breakfast)     valGANciclovir (VALCYTE) 450 MG tablet Take 1 tablet (450 mg) by mouth daily Titrate dose up to a max of 2  tabs (900 mg) by mouth daily when directed by your transplant team.     No current facility-administered medications for this visit.      There are no discontinued medications.    Physical Exam   Vital Signs:   150/102  GENERAL APPEARANCE: alert and no distress  HENT: mouth without ulcers or lesions  LYMPHATICS: no cervical or supraclavicular nodes  RESP: lungs clear to auscultation - no rales, rhonchi or wheezes  CV: regular rhythm, normal rate, no rub, no murmur  EDEMA: no LE edema bilaterally  ABDOMEN: soft, nondistended, nontender, bowel sounds normal  MS: extremities normal - no gross deformities noted, no evidence of inflammation in joints, no muscle tenderness  SKIN: no rash    Data   Renal -   Recent Labs   Lab Test  08/13/18   0755  08/12/18   0720  08/10/18   0654   NA  138  140  138   POTASSIUM  4.6  4.5  4.8   CHLORIDE  108  110*  106   CO2  24  24  24   BUN  34*  38*  38*   CR  2.35*  2.28*  3.24*   GLC  91  89  108*   JUAN  10.2*  10.3*  10.5*     Recent Labs   Lab Test  08/13/18   0755  08/12/18   0720  08/10/18   0654   MAG  1.7  1.7  2.4*   PHOS  1.6*  1.5*  2.8     Recent Labs   Lab Test  07/30/18   1132   PTHI  168*     Recent Labs   Lab Test  07/30/18   1132   VITDT  9*     CBC -   Recent Labs   Lab Test  08/13/18   0755  08/12/18   0720  08/10/18   0654   WBC  4.2  4.6  7.0   HGB  12.0*  11.6*  10.8*   PLT  231  227  226     LFTs -   Recent Labs   Lab Test  07/30/18   1132  05/31/17   0734   ALKPHOS  50  57   BILITOTAL  0.5  0.4   ALT  17  19   AST  4  7   PROTTOTAL  8.6  7.9   ALBUMIN  3.9  3.9     Iron Panel -   Recent Labs   Lab Test  07/30/18   1132   IRON  46   IRONSAT  21   JET  716*     Transplant -     Recent Labs   Lab Test  08/12/18   0720  08/09/18   0306   DOSTAC  Not Provided  Not Provided   TACROL  11.9  9.6

## 2018-08-13 NOTE — TELEPHONE ENCOUNTER
1 week Kidney and Transplant Patient Phone Call      Please review with the patient how to contact the Transplant Center:  Best phone contact is [phone], as if the need is urgent, any care coordinator will be able to assist you.  *THIS PHONE NUMBER IS ANSWERED 24/7.*  Coordinators are available 8am-5pm M-F, otherwise there will be an on-call RN available.  If the emergency room is necessary, please present to the [facility] ER (not a local emergency room).  When to contact the Transplant Center:  - Fever > 100.5F  - If BP drops (symptoms = dizziness, lightheadedness)  - If your daily weight drops >5lbs   - If you are unable to take your immunosuppression medications.    Medications:  Please review medications, update MAR if needed.   Ensure the patient has an up to date medication card at home and is knowledgeable in updating their med card (or has someone to assist in this).    *ALWAYS BRING YOUR MED CARD TO APPOINTMENTS.*    Confirm which pharmacy the patient will use to fill medications:       *ALWAYS CONTACT YOUR PHARMACY FIRST FOR REFILLS*    Labs:  Please review how to record lab values OR how to review lab results on [employer].   *Labs are expected to be drawn M/W/F for the first month of transplant.  It is recommended that you take a copy of your lab letter to each lab draw.    If the patient does not have a copy of their lab letter, please send one now.  Confirm which lab patient will utilize post-home care:     Vitals:  Ensure the patient is recording the following:  - BP - 2x daily  - Temperature - daily  - Weight - daily     Follow Up:    Review current scheduled follow up appointments with surgeon and nephrology.    Follow up with your PCP within 1 month post-transplant.     NO STENT - Has the patient been contacted with initial visit from HOME CARE? Not at this time   o If not, please notify RNCC.

## 2018-08-13 NOTE — LETTER
OUTPATIENT LABORATORY TEST ORDER    Patient Name:Harshad Beatty   Transplant Date: 8/7/2018  YOB: 1980  Issue Date & Time: 8/13/2018  12:19 PM  [facility] MR: [id number]  Expiration Date:  (1 year after date issued)      Diagnoses: Aftercare following organ transplant (ICD-10 Z48.288)   Kidney Transplant (ICD-10 Z94.0)   Long term use of medications (ICD-10  Z79.899)     ?Lab results to be available on the same day drawn.   ?Patient should release information to the [facility].     ?Please fax to the Transplant Center at [phone].    3x/week, First 4Weeks post-transplant    8/7/2018 - 9/7/2018    2x/week, Months 2-3 post-transplant    9/8/2018 - 11/8/2018       1x/week, Months 4-6 post-transplant    11/9/2018 - 2/9/2019  Every 2 weeks, Months 7-9 post-transplant    2/10/2019 - 5/10/2019  Every 3 weeks, Months 10-12 post-transplant   5/11/2019 - 8/7/2019       ?Hemogram and Platelet   ?Basic Metabolic Panel (Sodium, Potassium,Chloride, CO2, Creatinine, Urea Nitrogen, Glucose, Calcium)   ?Prograf/Tacrolimus drug level     Weekly through first month post-transplant, then  Monthly   ? Random Urine for Protein/Creatinine ratio    Weekly through first 3 months post-transplant    Due: 8/7/2018 - 11/7/2018   ?Phosphorus, Magnesium   ?MPA level (mycophenolic acid) once per week for first 3 months.    ?Please add a diff to hemogram once per week for the first 3 months.    Monthly   ? BK PCR Quantitative (Polyoma virus - blood in purple top tube to Reference Lab)    At 6 & 12 months post-transplant         Due: 2/2019 & 8/2019   ? HBsurfaceAb, HBcoreAb, HCV at 6 months only -   ? Liver Function Tests(Bilirubin Direct/Total, AST, ALT, Alkaline Phosphatase)   ?Complete Lipid Panel fasting (Cholesterol, Triglycerides, HDL, LDL)     At 1 month post-transplant = Day 7,10,14,21,28  At 2 months post-transplant = Every other week  At 3-12 months post-transplant =  Monthly                  ? PRA/DSA level (mailers provided by the patient)                    If you have any questions, please call The Transplant Center at [phone] or [phone].    Please fax all results to [phone].  .

## 2018-08-13 NOTE — PROGRESS NOTES
Transplant Social Work Service Discharge Note      Patient Name:  Harshad Beatty     Anticipated Discharge Date:  8/10/2018    Discharge Disposition:   Home with assist.    Plan for 24 hour care for immediate post transplant period: Pts wife, sister, and mother will all be assisting pt.    If not local, plans for short term stay:  NA.    Additional Services/Equipment Arranged:  No SW services arranged. See RNCC note.     Persons notified of above discharge plan:  Pt and medical team.    Patient / Family response to discharge plan:  In agreement.    Education and resources provided by SW at discharge: role of transplant  in out patient setting and provided contact info for , availability of support groups.    Discussed anticipated pharmacy out of pocket costs: YES    Provided [employer] Donor Letter Writing packet : NO    Plan: Plan is for pt to return home and attend follow up appointments in clinic. His wife and other family members will be his caregivers.    SUMAN Child, [facility]  7A   Ph: [phone], Pager: [phone]

## 2018-08-13 NOTE — MR AVS SNAPSHOT
After Visit Summary   8/13/2018    Harshad Beatty    MRN: [id number]           Patient Information     Date Of Birth          1980        Visit Information        Provider Department      8/13/2018 7:00 AM UC 50 ATC; UC SPEC INFUSION [facility] Specialty and Procedure        Today's Diagnoses     Hypophosphatemia    -  1    Aftercare following organ transplant        Kidney replaced by transplant          Care Instructions    Dear Harshad Beatty    Thank you for choosing [facility] ([facility]) for your transplant care and follow up.  The following information is a summary of our appointment as well as important reminders.      Please make sure your phone is available today because I will call to update you with your anti-rejection drug levels and possibly make changes to your anti-rejection dosages.    Additional information:   1. Drain goal is 45 ml in 24 hours, will discuss drain removal tomorrow.  2. Please continue to drink 3 liters of water per day.  3. Please return tomorrow for labs and assessment.  4. Tonya Beltran will follow up with study medication of Valcyte. This is the medication you  will be be taking. Continue with current anti viral for now.          We look forward in seeing you on your next appointment here at [facility].  Please don t hesitate to call us at [phone] to reschedule any of your appointments or to speak with one of the [facility] registered nurses.  It was a pleasure taking care of you today.    Sincerely,    [facility]  Specialty Infusion & Procedure Center  [address] [place] [address]  [address]  Phone:  [phone]            Follow-ups after your visit        Your next 10 appointments already scheduled     Aug 14, 2018  7:00 AM CDT   (Arrive by 6:45 AM)   New Transplant Visit with MACKENZIE SPEC INFUSION, UC 43 ATC   [facility]  Specialty and Procedure ([facility])    [address]  [address]  [facility] [address]   [phone]            Aug 14, 2018  8:00 AM CDT   (Arrive by 7:45 AM)   Kidney Transplant Discharge with Brijesh Gaxiola MD   [facility] Special[facility] Procedure ([facility])    [address]  [address]  [facility] [address]   [phone]            Aug 27, 2018  2:30 PM CDT   (Arrive by 2:15 PM)   Kidney Post Op with Bety Mitchell MD   [facility] Solid Organ Transplant ([facility])    [address]  [address]  [facility] [address]   [phone]            Sep 07, 2018 10:30 AM CDT   (Arrive by 10:00 AM)   Return Kidney Transplant with Uc Early Post Transplant   [facility] Nephrology ([facility])    [address]  [address]  [facility] [address]   [phone]            Sep 07, 2018 11:00 AM CDT   (Arrive by 10:30 AM)   Return Kidney Transplant with Uc Early Post Transplant   [facility] Nephrology ([facility])    [address]  [address]  [facility] [address]   [phone]            Sep 10, 2018  3:45 PM CDT   (Arrive by 3:30 PM)   Cystoscopy with Bety Mitchell MD   [facility] Solid Organ Transplant ([facility])    [address]  [address]  [facility] [address]   [phone]            Dec 07, 2018  1:05 PM CST   (Arrive by 12:35 PM)   Return Kidney Transplant with Uc Kidney/Pancreas Recipient   [facility] Nephrology ([facility])    [address]  [address]  [facility] [address]   [phone]            Feb 07, 2019  1:05 PM CST   (Arrive by 12:35 PM)   Return Kidney Transplant with Uc Kidney/Pancreas Recipient   [facility] Nephrology ([facility])    [address]  [address]  [facility] [address]   [phone]               Who to contact     If you have questions or need follow up information about today's clinic visit or your schedule please contact [facility] SPECIALTY AND PROCEDURE directly at [phone].  Normal or non-critical lab and imaging results will be communicated to you by MyChart, letter or phone within 4 business days after the clinic has received the results. If you do not hear from us within 7 days, please contact the clinic through MyChart or phone. If you have a critical or abnormal lab result, we will notify you by phone as soon as possible.  Submit refill requests through [employer] or call your pharmacy and they will forward the refill request to us. Please allow 3 business days for your refill to be completed.          Additional Information About Your Visit        Care EveryWhere ID     This is your Care EveryWhere ID. This could be used by other organizations to access your [facility] medical records  [id number]        Your Vitals Were     Pulse Temperature Respirations Pulse Oximetry BMI (Body Mass Index)       78 97.8  F (36.6  C) (Oral) 16 99% 34.17 kg/m2        Blood Pressure from Last 3 Encounters:   08/13/18 (!) 150/102   08/12/18 160/81   08/10/18 161/89    Weight from Last 3 Encounters:   08/13/18 110.7 kg (244 lb 1.6 oz)   08/12/18 115.4 kg (254 lb 6.4 oz)   08/10/18 117.2 kg (258 lb 4.8 oz)              We Performed the Following     Basic metabolic panel     CBC with platelets     Magnesium     Phosphorus     Tacrolimus level          Today's Medication Changes          These changes are accurate as of 8/13/18 10:25 AM.  If you have any questions, ask your nurse or doctor.               Start taking these medicines.        Dose/Directions    phosphorus tablet 250 mg 250 MG per tablet   Used for:  Hypophosphatemia        Dose:  500 mg   Take 2 tablets (500 mg) by mouth 2 times daily   Quantity:  120 tablet   Refills:  1            Where to get your medicines       These medications were sent to [place], [place] - [address]  [address], [facility] [address]    Hours:  TRANSPLANT PHONE NUMBER [phone] Phone:  [phone]     phosphorus tablet 250 mg 250 MG per tablet                Primary Care Provider Office Phone # Fax #    [facility] [phone] [address] [address]        Equal Access to Services     JAKI PUGA : Hadii aad ku hadasho Soomaali, waaxda luqadaha, qaybta kaalmada adeegyada, waxay idiin hayaan adeeg norma mar. So [facility] [phone].    ATENCIÓN: Si habla español, tiene a rodriguez disposición servicios gratuitos de asistencia lingüística. [facility] [phone].    We comply with applicable federal civil rights laws and [place] laws. We do not discriminate on the basis of race, color, national origin, age, disability, sex, sexual orientation, or gender identity.            Thank you!     Thank you for choosing [facility] SPECIALTY AND PROCEDURE  for your care. Our goal is always to provide you with excellent care. Hearing back from our patients is one way we can continue to improve our services. Please take a few minutes to complete the written survey that you may receive in the mail after your visit with us. Thank you!             Your Updated Medication List - Protect others around you: Learn how to safely use, store and throw away your medicines at www.disposemymeds.org.          This list is accurate as of 8/13/18 10:25 AM.  Always use your most recent med list.                   Brand Name Dispense Instructions for use Diagnosis    acetaminophen 325 MG tablet    TYLENOL    100 tablet    Take 1-2 tablets (325-650 mg) by mouth every 4 hours as needed for mild pain or fever    Kidney replaced by transplant       aspirin 325 MG EC tablet     30 tablet    Take 1 tablet (325 mg) by mouth  daily    Kidney replaced by transplant       atorvastatin 10 MG tablet    LIPITOR    30 tablet    Take 1 tablet (10 mg) by mouth daily    Kidney replaced by transplant       carvedilol 25 MG tablet    COREG    120 tablet    Take 2 tablets (50 mg) by mouth 2 times daily (with meals)    Renovascular hypertension, Kidney replaced by transplant       cholecalciferol 2000 units tablet     30 tablet    Take 2,000 Units by mouth daily    Renovascular hypertension, Kidney replaced by transplant       magnesium oxide 400 MG tablet    MAG-OX    30 tablet    Take 1 tablet (400 mg) by mouth daily (with lunch)    Kidney replaced by transplant       minoxidil 2.5 MG tablet    LONITEN    120 tablet    Take 2 tablets (5 mg) by mouth 2 times daily    Renovascular hypertension, Kidney replaced by transplant       mycophenolate 250 MG capsule    GENERIC EQUIVALENT    240 capsule    Take 4 capsules (1,000 mg) by mouth 2 times daily    Kidney replaced by transplant       ondansetron 4 MG ODT tab    ZOFRAN-ODT    20 tablet    Take 1 tablet (4 mg) by mouth every 6 hours as needed for nausea or vomiting    Kidney replaced by transplant       oxyCODONE IR 5 MG tablet    ROXICODONE    20 tablet    Take 1 tablet (5 mg) by mouth every 4 hours as needed for moderate to severe pain    Kidney replaced by transplant       phosphorus tablet 250 mg 250 MG per tablet     120 tablet    Take 2 tablets (500 mg) by mouth 2 times daily    Hypophosphatemia       senna-docusate 8.6-50 MG per tablet    SENOKOT-S;PERICOLACE    30 tablet    Take 1 tablet by mouth 2 times daily    Kidney replaced by transplant       sulfamethoxazole-trimethoprim 400-80 MG per tablet    BACTRIM/SEPTRA    30 tablet    Take 1 tablet by mouth daily    Kidney replaced by transplant, Immunosuppressed status (H)       tacrolimus 1 MG 24 hr tablet    ENVARSUS XR    300 tablet    Take 10 tablets (10 mg) by mouth every morning (before breakfast)    Kidney replaced by transplant        valGANciclovir 450 MG tablet    VALCYTE    60 tablet    Take 1 tablet (450 mg) by mouth daily Titrate dose up to a max of 2 tabs (900 mg) by mouth daily when directed by your transplant team.    Kidney replaced by transplant

## 2018-08-13 NOTE — MR AVS SNAPSHOT
After Visit Summary   8/13/2018    Harshad Beatty    MRN: [id number]           Patient Information     Date Of Birth          1980        Visit Information        Provider Department      8/13/2018 8:00 AM Andrey Connolly MD [facility] Specialty and Procedure        Today's Diagnoses     Status post kidney transplant    -  1    Immunosuppression (H)        Need for CMV immunotherapy        Need for pneumocystis prophylaxis        Benign essential hypertension        Anemia due to other cause, not classified        Secondary renal hyperparathyroidism (H)        Hypercalcemia           Follow-ups after your visit        Your next 10 appointments already scheduled     Aug 14, 2018  7:00 AM CDT   (Arrive by 6:45 AM)   New Transplant Visit with UC SPEC INFUSION, UC 43 ATC   [facility] Specialty and Procedure ([facility])    [address]  [address]  [place] [address]   [phone]            Aug 14, 2018  8:00 AM CDT   (Arrive by 7:45 AM)   Kidney Transplant Discharge with Brijesh Gaxiola MD   [facility] Specialty and Procedure ([facility])    [address]  [address]  [place] [address]   [phone]            Aug 27, 2018  2:30 PM CDT   (Arrive by 2:15 PM)   Kidney Post Op with Bety Mitchell MD   [facility] Solid Organ Transplant ([facility])    [address]  [address]  [place] [address]   [phone]            Sep 07, 2018 10:30 AM CDT   (Arrive by 10:00 AM)   Return Kidney Transplant with Uc Early Post Transplant   [facility] Nephrology ([facility])    [address]  [address]  [place] [address]   [phone]            Sep 07, 2018 11:00 AM CDT   (Arrive by 10:30 AM)   Return Kidney Transplant with Uc Early Post Transplant   [facility] Nephrology ([facility]  [facility])    [address]  [address]  [place] [address]   [phone]            Sep 10, 2018  3:45 PM CDT   (Arrive by 3:30 PM)   Cystoscopy with Bety Mitchell MD   [facility] Solid Organ Transplant ([facility])    [address]  [address]  [place] [address]   [phone]            Dec 07, 2018  1:05 PM CST   (Arrive by 12:35 PM)   Return Kidney Transplant with Uc Kidney/Pancreas Recipient   [facility] Nephrology ([facility])    [address]  [address]  [place] [address]   [phone]            Feb 07, 2019  1:05 PM CST   (Arrive by 12:35 PM)   Return Kidney Transplant with Uc Kidney/Pancreas Recipient   [facility] Nephrology ([facility])    [address]  [address]  [place] [address]   [phone]              Who to contact     If you have questions or need follow up information about today's clinic visit or your schedule please contact [facility] SPECIALTY AND PROCEDURE directly at [phone].  Normal or non-critical lab and imaging results will be communicated to you by MyChart, letter or phone within 4 business days after the clinic has received the results. If you do not hear from us within 7 days, please contact the clinic through MyChart or phone. If you have a critical or abnormal lab result, we will notify you by phone as soon as possible.  Submit refill requests through [employer] or call your pharmacy and they will forward the refill request to us. Please allow 3 business days for your refill to be completed.          Additional Information About Your Visit        Care EveryWhere ID     This is your Care EveryWhere ID. This could be used by other organizations to access your [place] medical records  [id number]         Blood Pressure from Last 3 Encounters:   08/13/18 (!) 150/102   08/12/18 160/81   08/10/18 161/89     Weight from Last 3 Encounters:   08/13/18 110.7 kg (244 lb 1.6 oz)   08/12/18 115.4 kg (254 lb 6.4 oz)   08/10/18 117.2 kg (258 lb 4.8 oz)              Today, you had the following     No orders found for display         Today's Medication Changes          These changes are accurate as of 8/13/18 11:19 AM.  If you have any questions, ask your nurse or doctor.               Start taking these medicines.        Dose/Directions    phosphorus tablet 250 mg 250 MG per tablet   Used for:  Hypophosphatemia        Dose:  500 mg   Take 2 tablets (500 mg) by mouth 2 times daily   Quantity:  120 tablet   Refills:  1            Where to get your medicines      These medications were sent to [facility] [address]  [address] [address]    Hours:  TRANSPLANT PHONE NUMBER [phone] Phone:  [phone]     phosphorus tablet 250 mg 250 MG per tablet                Primary Care Provider Office Phone # Fax #    [facility] [phone] [address] [address]        Equal Access to Services     JAKI PUGA AH: Hadii nan shaffero [place], waaxda luqadaha, qaybta kaalmada adeegyada, herb mar. So [facility] [phone].    ATENCIÓN: Si habla español, tiene a rodriguez disposición servicios gratuitos de asistencia lingüística. Llame al [phone].    We comply with applicable federal civil rights laws and [place] laws. We do not discriminate on the basis of race, color, national origin, age, disability, sex, sexual orientation, or gender identity.            Thank you!     Thank you for choosing [facility] SPECIALTY AND PROCEDURE  for your care. Our goal is always to provide you with excellent care. Hearing back from our patients is one way we can continue to improve our services. Please take a few minutes to complete the written  survey that you may receive in the mail after your visit with us. Thank you!             Your Updated Medication List - Protect others around you: Learn how to safely use, store and throw away your medicines at www.disposemymeds.org.          This list is accurate as of 8/13/18 11:19 AM.  Always use your most recent med list.                   Brand Name Dispense Instructions for use Diagnosis    acetaminophen 325 MG tablet    TYLENOL    100 tablet    Take 1-2 tablets (325-650 mg) by mouth every 4 hours as needed for mild pain or fever    Kidney replaced by transplant       aspirin 325 MG EC tablet     30 tablet    Take 1 tablet (325 mg) by mouth daily    Kidney replaced by transplant       atorvastatin 10 MG tablet    LIPITOR    30 tablet    Take 1 tablet (10 mg) by mouth daily    Kidney replaced by transplant       carvedilol 25 MG tablet    COREG    120 tablet    Take 2 tablets (50 mg) by mouth 2 times daily (with meals)    Renovascular hypertension, Kidney replaced by transplant       cholecalciferol 2000 units tablet     30 tablet    Take 2,000 Units by mouth daily    Renovascular hypertension, Kidney replaced by transplant       magnesium oxide 400 MG tablet    MAG-OX    30 tablet    Take 1 tablet (400 mg) by mouth daily (with lunch)    Kidney replaced by transplant       minoxidil 2.5 MG tablet    LONITEN    120 tablet    Take 2 tablets (5 mg) by mouth 2 times daily    Renovascular hypertension, Kidney replaced by transplant       mycophenolate 250 MG capsule    GENERIC EQUIVALENT    240 capsule    Take 4 capsules (1,000 mg) by mouth 2 times daily    Kidney replaced by transplant       ondansetron 4 MG ODT tab    ZOFRAN-ODT    20 tablet    Take 1 tablet (4 mg) by mouth every 6 hours as needed for nausea or vomiting    Kidney replaced by transplant       oxyCODONE IR 5 MG tablet    ROXICODONE    20 tablet    Take 1 tablet (5 mg) by mouth every 4 hours as needed for moderate to severe pain    Kidney replaced by  transplant       phosphorus tablet 250 mg 250 MG per tablet     120 tablet    Take 2 tablets (500 mg) by mouth 2 times daily    Hypophosphatemia       senna-docusate 8.6-50 MG per tablet    SENOKOT-S;PERICOLACE    30 tablet    Take 1 tablet by mouth 2 times daily    Kidney replaced by transplant       sulfamethoxazole-trimethoprim 400-80 MG per tablet    BACTRIM/SEPTRA    30 tablet    Take 1 tablet by mouth daily    Kidney replaced by transplant, Immunosuppressed status (H)       tacrolimus 1 MG 24 hr tablet    ENVARSUS XR    300 tablet    Take 10 tablets (10 mg) by mouth every morning (before breakfast)    Kidney replaced by transplant       valGANciclovir 450 MG tablet    VALCYTE    60 tablet    Take 1 tablet (450 mg) by mouth daily Titrate dose up to a max of 2 tabs (900 mg) by mouth daily when directed by your transplant team.    Kidney replaced by transplant

## 2018-08-13 NOTE — PROGRESS NOTES
Kidney and Pancreas Transplant Coordinator Transition to Outpatient Discharge Note    Pt Name: Harshad Beatty  : 1980     Transplant Date:2018      Surgeon: Dr Tran  Transplant Coordinator: Tonya Albrecht    Harshad Beatty Right  iliac fossa, without vascular reconstruction. A J-J ureteral stent was not placed.     CMV: Donor + /Recipient +  EBV: Donor + / Recipient +      High Risk DSA: Donor specific antibody present:   18  DPB1*1150   PHS Increased Risk: No.    Immunosuppression:     Cellcept mycophenolate mofetil Envarsus tacrolimus xr      Prophylaxis: TMP Sulfa       Lines / drains in place at time of discharge      Pharmacy after discharge: Lab after discharge:    Post-op course / additional monitoring:  ]    Patient Active Problem List   Diagnosis     ESRD (end stage renal disease) (H)     HTN (hypertension)     Sickle cell trait (H)     Anemia in chronic kidney disease     Secondary hyperparathyroidism (H)     Kidney replaced by transplant     Kidney transplant recipient     Immunosuppression (H)     Need for CMV immunotherapy     Need for pneumocystis prophylaxis     Benign essential hypertension     Anemia due to other cause, not classified     Hypercalcemia       Education:  Writer met with Harshad BeattyWe discussed immunosuppression medications, indications, side effects, dose adjustments, and lab monitoring. Lab draw schedule was given to pt and fully explained - Mailers not given; no questions. Further education was provided on typical post transplant course, labs examined with thresholds, biopsy, infection prevention, office contact numbers, and when to call.     Pt questions for follow up:      Discharge Transition Plan:   Pt will transition home, with assistance   Pt will have home care  [place] .   Pt states having working BP monitor, scale, and thermometer at home.            Patient has viewed My Transplant Place, and has no additional questions at this time. RNCC  encouraged on-going review.  Patient has voiced understanding of ability to utilize MyChart for self-review of lab values.    Special/Additional needs: No.

## 2018-08-14 ENCOUNTER — RESULTS ONLY (OUTPATIENT)
Dept: OTHER | Facility: CLINIC | Age: 38
End: 2018-08-14

## 2018-08-14 ENCOUNTER — OFFICE VISIT (OUTPATIENT)
Dept: INFUSION THERAPY | Facility: CLINIC | Age: 38
End: 2018-08-14
Attending: INTERNAL MEDICINE
Payer: COMMERCIAL

## 2018-08-14 ENCOUNTER — RADIANT APPOINTMENT (OUTPATIENT)
Dept: ULTRASOUND IMAGING | Facility: CLINIC | Age: 38
End: 2018-08-14
Payer: COMMERCIAL

## 2018-08-14 VITALS
DIASTOLIC BLOOD PRESSURE: 74 MMHG | TEMPERATURE: 98.7 F | HEART RATE: 76 BPM | RESPIRATION RATE: 16 BRPM | BODY MASS INDEX: 34.23 KG/M2 | SYSTOLIC BLOOD PRESSURE: 145 MMHG | WEIGHT: 244.49 LBS

## 2018-08-14 DIAGNOSIS — Z29.89 NEED FOR CMV IMMUNOTHERAPY: ICD-10-CM

## 2018-08-14 DIAGNOSIS — E83.39 HYPOPHOSPHATEMIA: ICD-10-CM

## 2018-08-14 DIAGNOSIS — Z94.0 KIDNEY REPLACED BY TRANSPLANT: ICD-10-CM

## 2018-08-14 DIAGNOSIS — Z48.298 AFTERCARE FOLLOWING ORGAN TRANSPLANT: Primary | ICD-10-CM

## 2018-08-14 DIAGNOSIS — I15.0 RENOVASCULAR HYPERTENSION: ICD-10-CM

## 2018-08-14 DIAGNOSIS — E83.52 HYPERCALCEMIA: ICD-10-CM

## 2018-08-14 DIAGNOSIS — Z29.89 NEED FOR PNEUMOCYSTIS PROPHYLAXIS: ICD-10-CM

## 2018-08-14 DIAGNOSIS — E83.42 HYPOMAGNESEMIA: ICD-10-CM

## 2018-08-14 DIAGNOSIS — D64.89 ANEMIA DUE TO OTHER CAUSE, NOT CLASSIFIED: ICD-10-CM

## 2018-08-14 DIAGNOSIS — Z48.298 AFTERCARE FOLLOWING ORGAN TRANSPLANT: ICD-10-CM

## 2018-08-14 DIAGNOSIS — R79.89 ELEVATED SERUM CREATININE: ICD-10-CM

## 2018-08-14 DIAGNOSIS — D84.9 IMMUNOSUPPRESSION (H): ICD-10-CM

## 2018-08-14 LAB
ANION GAP SERPL CALCULATED.3IONS-SCNC: 7 MMOL/L (ref 3–14)
BUN SERPL-MCNC: 33 MG/DL (ref 7–30)
CALCIUM SERPL-MCNC: 10.2 MG/DL (ref 8.5–10.1)
CHLORIDE SERPL-SCNC: 105 MMOL/L (ref 94–109)
CO2 SERPL-SCNC: 24 MMOL/L (ref 20–32)
CREAT SERPL-MCNC: 2.57 MG/DL (ref 0.66–1.25)
CREAT UR-MCNC: 59 MG/DL
ERYTHROCYTE [DISTWIDTH] IN BLOOD BY AUTOMATED COUNT: 14.3 % (ref 10–15)
GFR SERPL CREATININE-BSD FRML MDRD: 28 ML/MIN/1.7M2
GLUCOSE SERPL-MCNC: 84 MG/DL (ref 70–99)
HCT VFR BLD AUTO: 35.9 % (ref 40–53)
HGB BLD-MCNC: 11.8 G/DL (ref 13.3–17.7)
MAGNESIUM SERPL-MCNC: 1.9 MG/DL (ref 1.6–2.3)
MCH RBC QN AUTO: 28.1 PG (ref 26.5–33)
MCHC RBC AUTO-ENTMCNC: 32.9 G/DL (ref 31.5–36.5)
MCV RBC AUTO: 86 FL (ref 78–100)
PHOSPHATE SERPL-MCNC: 1.9 MG/DL (ref 2.5–4.5)
PLATELET # BLD AUTO: 246 10E9/L (ref 150–450)
POTASSIUM SERPL-SCNC: 4.7 MMOL/L (ref 3.4–5.3)
PROT UR-MCNC: 0.28 G/L
PROT/CREAT 24H UR: 0.47 G/G CR (ref 0–0.2)
RBC # BLD AUTO: 4.2 10E12/L (ref 4.4–5.9)
SODIUM SERPL-SCNC: 136 MMOL/L (ref 133–144)
TACROLIMUS BLD-MCNC: 6.9 UG/L (ref 5–15)
TME LAST DOSE: NORMAL H
WBC # BLD AUTO: 5.5 10E9/L (ref 4–11)

## 2018-08-14 PROCEDURE — 83735 ASSAY OF MAGNESIUM: CPT | Performed by: SURGERY

## 2018-08-14 PROCEDURE — 84156 ASSAY OF PROTEIN URINE: CPT | Performed by: INTERNAL MEDICINE

## 2018-08-14 PROCEDURE — 86832 HLA CLASS I HIGH DEFIN QUAL: CPT | Performed by: PHYSICIAN ASSISTANT

## 2018-08-14 PROCEDURE — 85027 COMPLETE CBC AUTOMATED: CPT | Performed by: SURGERY

## 2018-08-14 PROCEDURE — 40000694 ZZHCL STATISTIC STAT SERVICE TX TESTING: Performed by: PHYSICIAN ASSISTANT

## 2018-08-14 PROCEDURE — 36415 COLL VENOUS BLD VENIPUNCTURE: CPT

## 2018-08-14 PROCEDURE — 80180 DRUG SCRN QUAN MYCOPHENOLATE: CPT | Performed by: SURGERY

## 2018-08-14 PROCEDURE — 80048 BASIC METABOLIC PNL TOTAL CA: CPT | Performed by: SURGERY

## 2018-08-14 PROCEDURE — 84100 ASSAY OF PHOSPHORUS: CPT | Performed by: SURGERY

## 2018-08-14 PROCEDURE — 80197 ASSAY OF TACROLIMUS: CPT | Performed by: SURGERY

## 2018-08-14 PROCEDURE — 86833 HLA CLASS II HIGH DEFIN QUAL: CPT | Performed by: PHYSICIAN ASSISTANT

## 2018-08-14 PROCEDURE — 86828 HLA CLASS I&II ANTIBODY QUAL: CPT | Performed by: PHYSICIAN ASSISTANT

## 2018-08-14 NOTE — PROGRESS NOTES
"Harshad Beatty came to [facility] today for a lab and assess following a kidney transplant on 08/07/2018.      Discharge date: 08/10/2018  Transplant coordinator: Blanca Sow/Soledad Meza  Phone number patient can be reached at: [phone]      Physical Assessment:  See physical assessment located under \"Document Flowsheets\".  Incision site: surgical incision closed with dermabond, no evidence of infection noted.   Lines: RONALD draining serosanguinous fluid. 35 ml in past 24 hours. Tracy Finley NP notified.   Peres: N/A  Urine clarity: light yellow per pt, pt denies any pain or burning but is experiencing frequency  Hydration: Pt states most likely did not drink a full 3 liters yesterday but trying.  Nutrition: Appetite is minimal, pt denies any nausea.    Last BM: 08/13/2018 normal formed stool  Pain: pt states pain at surgical site is a 4 on 1-10 pain scale and is using Oxy as needed for pain control. Last dose was yesterday at around 2 pm. Oxy is effectively relieving pain.  Pt denies dizziness when standing.     Labs drawn by [facility] staff Yes    Plan of care for today: Labs, assessment and VS reviewed by renal fellow and Dr. Ronquillo who also saw the pt today in the [facility].  U/S ordered and scheduled today for 1:30 pm. Pt will go home after the U/S and provider will follow up with patient later today via telephone or at tomorrow's [facility] appt.    Specialty pharmacy met with pt today and reviewed med card.     Pt verified he is registered on the transplant website and nurse reminded him to watch \"General Health\" and \"Kidney Complications\".     Medication changes:   1. Envarsus decreased to 8 mg PO every morning.    Medications administered:      Patient education:    The following teaching topics were addressed: Importance of drinking 2L of non-caffeinated fluids daily, Incisional care, Plan of care and Drain care   Patient verbalized understanding and all questions answered.    Drug level:  TAC level today reviewed with " Dr Ronquillo who gave orders to instruct pt to continue taking Envarsus 10 mg PO every morning.  Unable to reach pt with update, left 2 voicemails to call [facility]. Will update pt tomorrow AM before he takes his Envarsus dose.     Face to face time: 20 minutes  Discharge Plan    [facility]  Discharge instructions reviewed with patient: YES  Patient/Representative verbalized understanding, all questions answered: YES    Discharged from unit at 0945 with whom: self to home.    Bekah Heredia RN

## 2018-08-14 NOTE — MR AVS SNAPSHOT
After Visit Summary   8/14/2018    Harshad Beatty    MRN: [id number]           Patient Information     Date Of Birth          1980        Visit Information        Provider Department      8/14/2018 8:00 AM Fareed Ronquillo MD [facility] Specialty and Procedure        Today's Diagnoses     Aftercare following organ transplant    -  1    Kidney replaced by transplant        Hypercalcemia        Renovascular hypertension        Immunosuppression (H)        Need for CMV immunotherapy        Need for pneumocystis prophylaxis        Hypophosphatemia        Hypomagnesemia        Anemia due to other cause, not classified        Elevated serum creatinine           Follow-ups after your visit        Your next 10 appointments already scheduled     Aug 14, 2018  1:30 PM CDT   US RENAL TRANSPLANT with UCUS2   [facility] Imaging Center US ([facility])    [address]  1st [address]  [place] [address] [phone]           Please bring a list of your medicines (including vitamins, minerals and over-the-counter drugs). Also, tell your doctor about any allergies you may have. Wear comfortable clothes and leave your valuables at home.  You do not need to do anything special to prepare for your exam.  Please call the Imaging Department at your exam site with any questions.            Aug 15, 2018  7:00 AM CDT   (Arrive by 6:45 AM)   New Transplant Visit with UC SPEC INFUSION, UC 41 ATC   [facility] Specialty and Procedure ([facility])    [address]  [address]  [place] [address] [phone]            Aug 15, 2018  8:00 AM CDT   (Arrive by 7:45 AM)   Return with Fareed Ronquillo MD   [facility] Specialty and Procedure ([facility])    [address]  [address]  [place] [address] [phone]            Aug 27, 2018   2:30 PM CDT   (Arrive by 2:15 PM)   Kidney Post Op with Bety Mitchell MD   [facility] Solid Organ Transplant ([facility])    [address]  [address]  [place] [address]   [phone]            Sep 07, 2018 10:30 AM CDT   (Arrive by 10:00 AM)   Return Kidney Transplant with Uc Early Post Transplant   [facility] Nephrology ([facility])    [address]  [address]  [place] [address]   [phone]            Sep 07, 2018 11:00 AM CDT   (Arrive by 10:30 AM)   Return Kidney Transplant with Uc Early Post Transplant   [facility] Nephrology ([facility])    [address]  [address]  [place] [address]   [phone]            Sep 10, 2018  3:45 PM CDT   (Arrive by 3:30 PM)   Cystoscopy with Bety Mitchell MD   [facility] Solid Organ Transplant ([facility])    [address]  [address]  [place] [address]   [phone]            Dec 07, 2018  1:05 PM CST   (Arrive by 12:35 PM)   Return Kidney Transplant with Uc Kidney/Pancreas Recipient   [facility] Nephrology ([facility])    [address]  [address]  [place] [address]   [phone]            Feb 07, 2019  1:05 PM CST   (Arrive by 12:35 PM)   Return Kidney Transplant with Uc Kidney/Pancreas Recipient   [facility] Nephrology ([facility])    [address]  [address]  [place] [address]   [phone]              Future tests that were ordered for you today     Open Standing Orders        Priority Remaining Interval Expires Ordered    Protein  random urine with Creat Ratio Routine 4/4 Weekly 9/13/2018 8/13/2018    PRA Donor Specific Antibody Routine 18/18 8/13/2019 8/13/2018          Open Future Orders        Priority Expected Expires Ordered    Protein  random urine with Creat Ratio Routine 9/14/2018 8/13/2019 8/13/2018            Who to  contact     If you have questions or need follow up information about today's clinic visit or your schedule please contact [facility] SPECIALTY AND PROCEDURE directly at [phone].  Normal or non-critical lab and imaging results will be communicated to you by MyChart, letter or phone within 4 business days after the clinic has received the results. If you do not hear from us within 7 days, please contact the clinic through MyChart or phone. If you have a critical or abnormal lab result, we will notify you by phone as soon as possible.  Submit refill requests through [employer] or call your pharmacy and they will forward the refill request to us. Please allow 3 business days for your refill to be completed.          Additional Information About Your Visit        Care EveryWhere ID     This is your Care EveryWhere ID. This could be used by other organizations to access your [place] medical records  [id number]         Blood Pressure from Last 3 Encounters:   08/14/18 145/74   08/13/18 (!) 150/102   08/12/18 160/81    Weight from Last 3 Encounters:   08/14/18 110.9 kg (244 lb 7.8 oz)   08/13/18 110.7 kg (244 lb 1.6 oz)   08/12/18 115.4 kg (254 lb 6.4 oz)              We Performed the Following     US renal transplant          Today's Medication Changes          These changes are accurate as of 8/14/18 10:04 AM.  If you have any questions, ask your nurse or doctor.               These medicines have changed or have updated prescriptions.        Dose/Directions    tacrolimus 1 MG 24 hr tablet   Commonly known as:  ENVARSUS XR   This may have changed:  how much to take   Used for:  Kidney replaced by transplant   Changed by:  Fareed Ronquillo MD        Dose:  8 mg   Take 8 tablets (8 mg) by mouth every morning (before breakfast)   Quantity:  300 tablet   Refills:  11            Where to get your medicines      These medications were sent to [facility]  [place] - [address]  [address], [place] [address]    Hours:  TRANSPLANT PHONE NUMBER [phone] Phone:  [phone]     tacrolimus 1 MG 24 hr tablet                Primary Care Provider Office Phone # Fax #    [facility] [phone] [address] [address]        Equal Access to Services     JAKI PUGA : Hadii aad ku hadasho Soomaali, waaxda luqadaha, qaybta kaalmada adeegyada, waxay idiin hayaan adeeg sarahcecyfabiola lajúnior mar. So [facility] [phone].    ATENCIÓN: Si karinala espadarsh, tiene a rodriguez disposición servicios gratuitos de asistencia lingüística. Fabián al [phone].    We comply with applicable federal civil rights laws and [place] laws. We do not discriminate on the basis of race, color, national origin, age, disability, sex, sexual orientation, or gender identity.            Thank you!     Thank you for choosing [facility] SPECIALTY AND PROCEDURE  for your care. Our goal is always to provide you with excellent care. Hearing back from our patients is one way we can continue to improve our services. Please take a few minutes to complete the written survey that you may receive in the mail after your visit with us. Thank you!             Your Updated Medication List - Protect others around you: Learn how to safely use, store and throw away your medicines at www.disposemymeds.org.          This list is accurate as of 8/14/18 10:04 AM.  Always use your most recent med list.                   Brand Name Dispense Instructions for use Diagnosis    acetaminophen 325 MG tablet    TYLENOL    100 tablet    Take 1-2 tablets (325-650 mg) by mouth every 4 hours as needed for mild pain or fever    Kidney replaced by transplant       aspirin 325 MG EC tablet     30 tablet    Take 1 tablet (325 mg) by mouth daily    Kidney replaced by transplant       atorvastatin 10 MG tablet    LIPITOR    30 tablet     Take 1 tablet (10 mg) by mouth daily    Kidney replaced by transplant       carvedilol 25 MG tablet    COREG    120 tablet    Take 2 tablets (50 mg) by mouth 2 times daily (with meals)    Renovascular hypertension, Kidney replaced by transplant       cholecalciferol 2000 units tablet     30 tablet    Take 2,000 Units by mouth daily    Renovascular hypertension, Kidney replaced by transplant       magnesium oxide 400 MG tablet    MAG-OX    30 tablet    Take 1 tablet (400 mg) by mouth daily (with lunch)    Kidney replaced by transplant       minoxidil 2.5 MG tablet    LONITEN    120 tablet    Take 2 tablets (5 mg) by mouth 2 times daily    Renovascular hypertension, Kidney replaced by transplant       mycophenolate 250 MG capsule    GENERIC EQUIVALENT    240 capsule    Take 4 capsules (1,000 mg) by mouth 2 times daily    Kidney replaced by transplant       ondansetron 4 MG ODT tab    ZOFRAN-ODT    20 tablet    Take 1 tablet (4 mg) by mouth every 6 hours as needed for nausea or vomiting    Kidney replaced by transplant       oxyCODONE IR 5 MG tablet    ROXICODONE    20 tablet    Take 1 tablet (5 mg) by mouth every 4 hours as needed for moderate to severe pain    Kidney replaced by transplant       phosphorus tablet 250 mg 250 MG per tablet     120 tablet    Take 2 tablets (500 mg) by mouth 2 times daily    Hypophosphatemia       senna-docusate 8.6-50 MG per tablet    SENOKOT-S;PERICOLACE    30 tablet    Take 1 tablet by mouth 2 times daily    Kidney replaced by transplant       sulfamethoxazole-trimethoprim 400-80 MG per tablet    BACTRIM/SEPTRA    30 tablet    Take 1 tablet by mouth daily    Kidney replaced by transplant, Immunosuppressed status (H)       tacrolimus 1 MG 24 hr tablet    ENVARSUS XR    300 tablet    Take 8 tablets (8 mg) by mouth every morning (before breakfast)    Kidney replaced by transplant       valGANciclovir 450 MG tablet    VALCYTE    60 tablet    Take 1 tablet (450 mg) by mouth daily Titrate  dose up to a max of 2 tabs (900 mg) by mouth daily when directed by your transplant team.    Kidney replaced by transplant

## 2018-08-14 NOTE — MR AVS SNAPSHOT
"              After Visit Summary   8/14/2018    Harshad Beatty    MRN: [id number]           Patient Information     Date Of Birth          1980        Visit Information        Provider Department      8/14/2018 7:00 AM  42 ATC;  SPEC INFUSION [facility] Specialty and Procedure        Today's Diagnoses     Aftercare following organ transplant        Kidney replaced by transplant          Care Instructions    Dear Harshad Beatty    Thank you for choosing [facility] Specialty Infusion [facility] Center ([facility]) for your transplant cares.  The following information is a summary of our appointment as well as important reminders.      Please make sure your phone is available today because I will call to update you with your anti-rejection drug levels and possibly make changes to your anti-rejection dosages.    Please return tomorrow for labs and assessment at 7 am.    Please decrease your Envarsus to 8 mg every morning.     Please watch the videos \"General Health\" and \"Kidney Complications\" on the transplant website.    We look forward in seeing you on your next appointment here at [facility].  Please don t hesitate to call us at [phone] to reschedule any of your appointments or to speak with one of the [facility] registered nurses.  It was a pleasure taking care of you today.    Sincerely,    [facility]  Specialty Infusion & Procedure Center  [address] [place] [address]  [address]  Phone:  [phone]            Follow-ups after your visit        Your next 10 appointments already scheduled     Aug 14, 2018  1:30 PM CDT   US RENAL TRANSPLANT with [facility]   [facility]  ([facility])    [address] [place] [address] [phone]           Please bring a list of your medicines (including vitamins, minerals and over-the-counter drugs). Also, tell your doctor about any " allergies you may have. Wear comfortable clothes and leave your valuables at home.  You do not need to do anything special to prepare for your exam.  Please call the Imaging Department at your exam site with any questions.            Aug 15, 2018  7:00 AM CDT   (Arrive by 6:45 AM)   New Transplant Visit with UC SPEC INFUSION, UC 41 ATC   [facility] Specialty and Procedure ([facility])    [address]  [address]  [facility] [address]   [phone]            Aug 15, 2018  8:00 AM CDT   (Arrive by 7:45 AM)   Return with Viral Marquez Ronquillo MD   [facility] Specialty and Procedure ([facility])    [address]  [address]  [facility] [address]   [phone]            Aug 27, 2018  2:30 PM CDT   (Arrive by 2:15 PM)   Kidney Post Op with Bety Mitchell MD   [facility] Solid Organ Transplant ([facility])    [address]  [address]  [facility] [address]   [phone]            Sep 07, 2018 10:30 AM CDT   (Arrive by 10:00 AM)   Return Kidney Transplant with Uc Early Post Transplant   [facility] Nephrology ([facility])    [address]  [address]  [facility] [address]   [phone]            Sep 07, 2018 11:00 AM CDT   (Arrive by 10:30 AM)   Return Kidney Transplant with Uc Early Post Transplant   [facility] ([facility])    [address]  [address]  [facility] [address]   [phone]            Sep 10, 2018  3:45 PM CDT   (Arrive by 3:30 PM)   Cystoscopy with Bety Mitchell MD   [facility] Solid Organ Transplant ([facility])    [address]  [address]  [facility] [address]   [phone]            Dec 07, 2018  1:05 PM CST   (Arrive by 12:35 PM)   Return Kidney Transplant with Uc Kidney/Pancreas Recipient   [facility] Nephrology ([facility] [place])    [address]  [address]  [facility] [address] [phone]            Feb 07, 2019  1:05 PM CST   (Arrive by 12:35 PM)   Return Kidney Transplant with Uc Kidney/Pancreas Recipient   [facility] Nephrology ([facility])    [address]  [address]  [facility] [address] [phone]              Future tests that were ordered for you today     Open Standing Orders        Priority Remaining Interval Expires Ordered    Protein  random urine with Creat Ratio Routine 4/4 Weekly 9/13/2018 8/13/2018    PRA Donor Specific Antibody Routine 18/18 8/13/2019 8/13/2018          Open Future Orders        Priority Expected Expires Ordered    Protein  random urine with Creat Ratio Routine 9/14/2018 8/13/2019 8/13/2018            Who to contact     If you have questions or need follow up information about today's clinic visit or your schedule please contact [facility] SPECIALTY AND PROCEDURE directly at [phone].  Normal or non-critical lab and imaging results will be communicated to you by MyChart, letter or phone within 4 business days after the clinic has received the results. If you do not hear from us within 7 days, please contact the clinic through MyChart or phone. If you have a critical or abnormal lab result, we will notify you by phone as soon as possible.  Submit refill requests through [employer] or call your pharmacy and they will forward the refill request to us. Please allow 3 business days for your refill to be completed.          Additional Information About Your Visit        Care EveryWhere ID     This is your Care EveryWhere ID. This could be used by other organizations to access your [facility] medical records  [id number]        Your Vitals Were     Pulse Temperature Respirations BMI (Body Mass Index)          76 98.7  F (37.1  C) (Oral) 16 34.23 kg/m2         Blood Pressure from Last 3 Encounters:   08/14/18  145/74   08/13/18 (!) 150/102   08/12/18 160/81    Weight from Last 3 Encounters:   08/14/18 110.9 kg (244 lb 7.8 oz)   08/13/18 110.7 kg (244 lb 1.6 oz)   08/12/18 115.4 kg (254 lb 6.4 oz)              We Performed the Following     Basic metabolic panel     CBC with platelets     Creatinine urine calculation only     Magnesium     Mycophenolic acid     Phosphorus     PRA Donor Specific Antibody     Protein  random urine with Creat Ratio     Tacrolimus level          Today's Medication Changes          These changes are accurate as of 8/14/18  9:38 AM.  If you have any questions, ask your nurse or doctor.               These medicines have changed or have updated prescriptions.        Dose/Directions    tacrolimus 1 MG 24 hr tablet   Commonly known as:  ENVARSUS XR   This may have changed:  how much to take   Used for:  Kidney replaced by transplant   Changed by:  Fareed Ronquillo MD        Dose:  8 mg   Take 8 tablets (8 mg) by mouth every morning (before breakfast)   Quantity:  300 tablet   Refills:  11            Where to get your medicines      These medications were sent to [place] [id number] [facility] [address] [place] [address] [address] [place] [id number]    Hours:  TRANSPLANT PHONE NUMBER [phone] Phone:  [phone]     tacrolimus 1 MG 24 hr tablet                Primary Care Provider Office Phone # Fax #    [facility] [phone] [address] [facility] [address]        Equal Access to Services     JAKI PUGA AH: Silas Penn, waaxda luqadaha, qaybta kaalmada adeegyada, herb mar. So [facility] [phone].    ATENCIÓN: Si habla español, tiene a rodriguez disposición servicios gratuitos de asistencia lingüística. Llame al [phone].    We comply with applicable federal civil rights laws and [place] laws. We do not discriminate on the basis of race,  color, national origin, age, disability, sex, sexual orientation, or gender identity.            Thank you!     Thank you for choosing [facility] SPECIALTY AND PROCEDURE  for your care. Our goal is always to provide you with excellent care. Hearing back from our patients is one way we can continue to improve our services. Please take a few minutes to complete the written survey that you may receive in the mail after your visit with us. Thank you!             Your Updated Medication List - Protect others around you: Learn how to safely use, store and throw away your medicines at www.disposemymeds.org.          This list is accurate as of 8/14/18  9:38 AM.  Always use your most recent med list.                   Brand Name Dispense Instructions for use Diagnosis    acetaminophen 325 MG tablet    TYLENOL    100 tablet    Take 1-2 tablets (325-650 mg) by mouth every 4 hours as needed for mild pain or fever    Kidney replaced by transplant       aspirin 325 MG EC tablet     30 tablet    Take 1 tablet (325 mg) by mouth daily    Kidney replaced by transplant       atorvastatin 10 MG tablet    LIPITOR    30 tablet    Take 1 tablet (10 mg) by mouth daily    Kidney replaced by transplant       carvedilol 25 MG tablet    COREG    120 tablet    Take 2 tablets (50 mg) by mouth 2 times daily (with meals)    Renovascular hypertension, Kidney replaced by transplant       cholecalciferol 2000 units tablet     30 tablet    Take 2,000 Units by mouth daily    Renovascular hypertension, Kidney replaced by transplant       magnesium oxide 400 MG tablet    MAG-OX    30 tablet    Take 1 tablet (400 mg) by mouth daily (with lunch)    Kidney replaced by transplant       minoxidil 2.5 MG tablet    LONITEN    120 tablet    Take 2 tablets (5 mg) by mouth 2 times daily    Renovascular hypertension, Kidney replaced by transplant       mycophenolate 250 MG capsule    GENERIC EQUIVALENT    240 capsule    Take 4 capsules  (1,000 mg) by mouth 2 times daily    Kidney replaced by transplant       ondansetron 4 MG ODT tab    ZOFRAN-ODT    20 tablet    Take 1 tablet (4 mg) by mouth every 6 hours as needed for nausea or vomiting    Kidney replaced by transplant       oxyCODONE IR 5 MG tablet    ROXICODONE    20 tablet    Take 1 tablet (5 mg) by mouth every 4 hours as needed for moderate to severe pain    Kidney replaced by transplant       phosphorus tablet 250 mg 250 MG per tablet     120 tablet    Take 2 tablets (500 mg) by mouth 2 times daily    Hypophosphatemia       senna-docusate 8.6-50 MG per tablet    SENOKOT-S;PERICOLACE    30 tablet    Take 1 tablet by mouth 2 times daily    Kidney replaced by transplant       sulfamethoxazole-trimethoprim 400-80 MG per tablet    BACTRIM/SEPTRA    30 tablet    Take 1 tablet by mouth daily    Kidney replaced by transplant, Immunosuppressed status (H)       tacrolimus 1 MG 24 hr tablet    ENVARSUS XR    300 tablet    Take 8 tablets (8 mg) by mouth every morning (before breakfast)    Kidney replaced by transplant       valGANciclovir 450 MG tablet    VALCYTE    60 tablet    Take 1 tablet (450 mg) by mouth daily Titrate dose up to a max of 2 tabs (900 mg) by mouth daily when directed by your transplant team.    Kidney replaced by transplant

## 2018-08-14 NOTE — PROGRESS NOTES
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 No fevers, chills, nausea or vomiting no hematuria, dysuria or frequency.         Transplant History:    Transplant Type:  LDKT  Donor Type: Living   Transplant Date:  8/7/2018 (Kidney)   Ureteral Stent:  No   Crossmatch:  negative   DSA at Tx:  No  Baseline Cr: 2.3   DeNovo DSA: No    Acute Rejection Hx:  No    Present Maintenance Immunosuppression:  Tacrolimus - extended release and Mycophenolate mofetil    CMV IgG Ab Discordance:  No  EBV IgG Ab Discordance:  No    BK Viremia:  No  EBV Viremia:  No    Transplant Coordinator: Tonya Albrecht     Transplant Office Phone Number: [phone]     Immunsupresent Medications     Immunosuppressive Agents Disp Start End    mycophenolate (GENERIC EQUIVALENT) 250 MG capsule 240 capsule 8/10/2018     Sig - Route: Take 4 capsules (1,000 mg) by mouth 2 times daily - Oral    Class: E-Prescribe    tacrolimus (ENVARSUS XR) 1 MG 24 hr tablet 300 tablet 8/14/2018     Sig - Route: Take 8 tablets (8 mg) by mouth every morning (before breakfast) - Oral    Class: E-Prescribe    Prior authorization:  Closed          Possible Immunosuppression-related side effects:   []             headache  []             vivid dreams  []             irritability  []             cognitive difficuties  []             fine tremor  []             nausea  []             diarrhea  []             neuropathy      []             edema  []             renal calcineurin toxicity  []             hyperkalemia  []             post-transplant diabetes  []             decreased appetite  []             increased appetite  []             other:  [x]             none    Prescription Medications as of 8/14/2018             aspirin  325 MG EC tablet Take 1 tablet (325 mg) by mouth daily    atorvastatin (LIPITOR) 10 MG tablet Take 1 tablet (10 mg) by mouth daily    carvedilol (COREG) 25 MG tablet Take 2 tablets (50 mg) by mouth 2 times daily (with meals)    cholecalciferol 2000 units tablet Take 2,000 Units by mouth daily    magnesium oxide (MAG-OX) 400 MG tablet Take 1 tablet (400 mg) by mouth daily (with lunch)    minoxidil (LONITEN) 2.5 MG tablet Take 2 tablets (5 mg) by mouth 2 times daily    mycophenolate (GENERIC EQUIVALENT) 250 MG capsule Take 4 capsules (1,000 mg) by mouth 2 times daily    oxyCODONE IR (ROXICODONE) 5 MG tablet Take 1 tablet (5 mg) by mouth every 4 hours as needed for moderate to severe pain    phosphorus tablet 250 mg (PHOSPHA 250 NEUTRAL) 250 MG per tablet Take 2 tablets (500 mg) by mouth 2 times daily    sulfamethoxazole-trimethoprim (BACTRIM/SEPTRA) 400-80 MG per tablet Take 1 tablet by mouth daily    valGANciclovir (VALCYTE) 450 MG tablet Take 1 tablet (450 mg) by mouth daily Titrate dose up to a max of 2 tabs (900 mg) by mouth daily when directed by your transplant team.    acetaminophen (TYLENOL) 325 MG tablet Take 1-2 tablets (325-650 mg) by mouth every 4 hours as needed for mild pain or fever    ondansetron (ZOFRAN-ODT) 4 MG ODT tab Take 1 tablet (4 mg) by mouth every 6 hours as needed for nausea or vomiting    senna-docusate (SENOKOT-S;PERICOLACE) 8.6-50 MG per tablet Take 1 tablet by mouth 2 times daily    tacrolimus (ENVARSUS XR) 1 MG 24 hr tablet Take 8 tablets (8 mg) by mouth every morning (before breakfast)          O:   Temp:  [98.7  F (37.1  C)] 98.7  F (37.1  C)  Pulse:  [76] 76  Resp:  [16] 16  BP: (145)/(74) 145/74  General Appearance: in no apparent distress.   Skin: Normal, no rashes or jaundice  Heart: regular rate and rhythm, normal S1 and S2  Lungs: easy respirations, no audible wheezing.  Abdomen: rounded, The wound is dry and intact, without hernia. The abdomen is non-tender. The kidney graft is  not tender.  There is no ascites.  Extremities: Tremor absent.   Edema: absent.     Transplant Immunosuppression Labs Latest Ref Rng & Units 8/14/2018 8/13/2018 8/12/2018 8/10/2018 8/9/2018   Tacro Level 5.0 - 15.0 ug/L 6.9 14.8 11.9 - 9.6   Tacro Level - NOT DOCUMENTED Not Provided Not Provided - Not Provided   Creat 0.66 - 1.25 mg/dL 2.57(H) 2.35(H) 2.28(H) 3.24(H) 5.73(H)   BUN 7 - 30 mg/dL 33(H) 34(H) 38(H) 38(H) 28   WBC 4.0 - 11.0 10e9/L 5.5 4.2 4.6 7.0 9.4   Neutrophil % - - 82.3 89.8 93.6   ANEU 1.6 - 8.3 10e9/L - - 3.8 6.3 8.8(H)       Chemistries:   Recent Labs   Lab Test  08/14/18   0811   BUN  33*   CR  2.57*   GFRESTIMATED  28*   GLC  84     No results found for: A1C, CPEPT  Recent Labs   Lab Test  07/30/18   1132   ALBUMIN  3.9   BILITOTAL  0.5   ALKPHOS  50   AST  4   ALT  17     Urine Studies:  Recent Labs   Lab Test  07/30/18   1142   COLOR  Straw   APPEARANCE  Clear   URINEGLC  50*   URINEBILI  Negative   URINEKETONE  Negative   SG  1.008   UBLD  Small*   URINEPH  8.0*   PROTEIN  100*   NITRITE  Negative   LEUKEST  Negative   RBCU  1   WBCU  1     Recent Labs   Lab Test  08/14/18   0800   UTPG  0.47*     Hematology:   Recent Labs   Lab Test  08/14/18   0811  08/13/18   0755  08/12/18   0720   HGB  11.8*  12.0*  11.6*   PLT  246  231  227   WBC  5.5  4.2  4.6     Coags:   Recent Labs   Lab Test  05/31/17   0734   INR  1.05     HLA antibodies:   SA1 Hi Risk Aggie   Date Value Ref Range Status   07/30/2018 None  Final     SA1 Mod Risk Aggie   Date Value Ref Range Status   07/30/2018 None  Final     SA2 Hi Risk Aggie   Date Value Ref Range Status   07/30/2018 None  Final     SA2 Mod Risk Aggie   Date Value Ref Range Status   07/30/2018 None  Final       Assessment: Harshad Beatty is doing well s/p LDKT:  Issues we addressed during his visit include:    Plan:    1. Graft function: cr is 2.35  2. Immunosuppression Management: No change continue envarsus 8mg daily and cellcept 1000mg BID .  Complexity of  management:Medium.  Contributing factors: recent txp  3. Incision:  C/D/I  4. Víctor drain:  Continue until less than 30ml for 3 days then dc drain   Followup: as directed    Total Time: 25 min,   Counselling Time: 15 min.

## 2018-08-14 NOTE — PATIENT INSTRUCTIONS
"Dear Harshad Beatty    Thank you for choosing [facility] Specialty Infusion and Procedure Center ([facility]) for your transplant cares.  The following information is a summary of our appointment as well as important reminders.      Please make sure your phone is available today because I will call to update you with your anti-rejection drug levels and possibly make changes to your anti-rejection dosages.    Please return tomorrow for labs and assessment at 7 am.    Please decrease your Envarsus to 8 mg every morning.     Please watch the videos \"General Health\" and \"Kidney Complications\" on the transplant website.    We look forward in seeing you on your next appointment here at [facility].  Please don t hesitate to call us at [phone] to reschedule any of your appointments or to speak with one of the [facility] registered nurses.  It was a pleasure taking care of you today.    Sincerely,    [facility]  Specialty Infusion & Procedure Center  [address] [place] [address]  [address]  Phone:  [phone]    "

## 2018-08-14 NOTE — PROGRESS NOTES
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 His creatinine was seen to have plateaued at ~2.3 for the last two days,now 2.57 today. The pt reports good UOP and frequent urination. He is drinking 2L daily, aiming for 3L but unable to motivate himself to achieve it. No lightheadedness and orthostatics are negative. He reports mild LE swelling but much improved from pre-transplant when his EDW was 118kg. He is 110.9kg today, from 110.7kg yesterday, down from 115kg and 117kg.     Also of note, he did have donor specific antibody to DP beta 1 at the time of transplant. Repeat DSA today pending.     No fevers or chills. No CP or SOB. No abd pain aside from incision site pain with a twisting motion. Having BM at least every other day he reports. Drain put out 35ml. No wound drainage otherwise. Appetite has been poor and pt reports PO foods poorly. No N/V.       Review of systems:  10 point ROS negative except as noted above.    Past Medical History:   Diagnosis Date     Anemia in chronic kidney disease      ESRD (end stage renal disease) (H)      History of cardiomyopathy      HTN (hypertension)      Secondary hyperparathyroidism (H)      Sickle cell trait (H)           Current Outpatient Prescriptions on File Prior to Visit:  acetaminophen (TYLENOL) 325 MG tablet Take 1-2 tablets (325-650 mg) by mouth every 4 hours as needed for mild pain or fever (Patient not taking: Reported on 8/12/2018)   aspirin 325 MG EC tablet Take 1 tablet (325 mg) by mouth daily   atorvastatin (LIPITOR) 10 MG tablet Take 1 tablet (10 mg) by mouth daily   carvedilol (COREG) 25 MG tablet Take 2 tablets (50 mg) by mouth 2 times daily (with meals)    cholecalciferol 2000 units tablet Take 2,000 Units by mouth daily   magnesium oxide (MAG-OX) 400 MG tablet Take 1 tablet (400 mg) by mouth daily (with lunch)   minoxidil (LONITEN) 2.5 MG tablet Take 2 tablets (5 mg) by mouth 2 times daily   mycophenolate (GENERIC EQUIVALENT) 250 MG capsule Take 4 capsules (1,000 mg) by mouth 2 times daily   ondansetron (ZOFRAN-ODT) 4 MG ODT tab Take 1 tablet (4 mg) by mouth every 6 hours as needed for nausea or vomiting (Patient not taking: Reported on 8/12/2018)   oxyCODONE IR (ROXICODONE) 5 MG tablet Take 1 tablet (5 mg) by mouth every 4 hours as needed for moderate to severe pain   phosphorus tablet 250 mg (PHOSPHA 250 NEUTRAL) 250 MG per tablet Take 2 tablets (500 mg) by mouth 2 times daily   senna-docusate (SENOKOT-S;PERICOLACE) 8.6-50 MG per tablet Take 1 tablet by mouth 2 times daily (Patient not taking: Reported on 8/12/2018)   sulfamethoxazole-trimethoprim (BACTRIM/SEPTRA) 400-80 MG per tablet Take 1 tablet by mouth daily   valGANciclovir (VALCYTE) 450 MG tablet Take 1 tablet (450 mg) by mouth daily Titrate dose up to a max of 2 tabs (900 mg) by mouth daily when directed by your transplant team.     No current facility-administered medications on file prior to visit.      OBJECTIVE:  Physical exam:  There were no vitals taken for this visit.  There is no height or weight on file to calculate BMI.   Gen: Well-developed, well-nourished and in no apparent distress  HEENT: normocephalic, atraumatic, anicteric   CV: regular rate and rhythm, normal S1 S2, no S3 or S4 and no murmur, click, or rub  Resp: clear to ausculation bilaterally, normal respiratory effort  Abd: bowel sounds presents, soft, non-tender, non-distended, no masses or hepatosplenomegaly appreciated, RLQ incision with drain without tenderness or erythema   Ext: WWP, trace LE edema at ankles bilat    Skin: warm and dry, no rashes or ecchymoses  Psych: normal mood, normal affect  Neuro:  alert and oriented  x3    Recent Labs   Lab Test  08/14/18   0811  08/13/18   0755   NA  136  138   POTASSIUM  4.7  4.6   CHLORIDE  105  108   CO2  24  24   ANIONGAP  7  6   GLC  84  91   BUN  33*  34*   CR  2.57*  2.35*   JUAN  10.2*  10.2*       Lab Results   Component Value Date    WBC 5.5 08/14/2018     Lab Results   Component Value Date    RBC 4.20 08/14/2018     Lab Results   Component Value Date    HGB 11.8 08/14/2018     Lab Results   Component Value Date    HCT 35.9 08/14/2018     No components found for: MCT  Lab Results   Component Value Date    MCV 86 08/14/2018     Lab Results   Component Value Date    MCH 28.1 08/14/2018     Lab Results   Component Value Date    MCHC 32.9 08/14/2018     Lab Results   Component Value Date    RDW 14.3 08/14/2018     Lab Results   Component Value Date     08/14/2018       Color Urine (no units)   Date Value   07/30/2018 Straw     Appearance Urine (no units)   Date Value   07/30/2018 Clear     Glucose Urine (mg/dL)   Date Value   07/30/2018 50 (A)     Bilirubin Urine (no units)   Date Value   07/30/2018 Negative     Ketones Urine (mg/dL)   Date Value   07/30/2018 Negative     Specific Gravity Urine (no units)   Date Value   07/30/2018 1.008     pH Urine (pH)   Date Value   07/30/2018 8.0 (H)     Protein Albumin Urine (mg/dL)   Date Value   07/30/2018 100 (A)     Nitrite Urine (no units)   Date Value   07/30/2018 Negative     Leukocyte Esterase Urine (no units)   Date Value   07/30/2018 Negative     Urine prot: 0.47g/g    tacro trough: 14.8      ASSESSMENT and PLAN:   Diagnoses and all orders for this visit:    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 Creat plateaued at 2.3, 2.57 today. Rechecking DSA and now renal US. Proteinuria of 0.47g/g not fitting a recurrent FSGS at this time. Above goal tacro level (~15) likely contributing, and may suboptimal PO fluid/solute intake. Encouraged more aggressive fluid intake. Not more aggressively treating BP  (140s/70s), not wanting to cause hypotension. Pt also on ASA 325mg for 6 months.   -     tacrolimus (ENVARSUS XR) 1 MG 24 hr tablet; Take 8 tablets (8 mg) by mouth every morning (before breakfast)  -     US renal transplant    Immunosuppression (H)  Decreasing Envarsus to 8mg daily from 10mg, but will also see this afternoon's value reflective of yesterday AM's 10mg (and pt also took 10mg this AM, so tomorrow's trough will also reflect 10mg dose). MMF 1000mg bid, MPA level pending, no GI complaints.     Need for CMV immunotherapy  Pt had been on valA / valtrex via study inpt but switched to Valcyte (currently 450mg daily due to eGFR < 60) for insurance reasons. Plan for 3 months with recipient positive.     Need for pneumocystis prophylaxis  Bactrim daily.     Renovascular hypertension  BP above long term goal but BP control reasonable in setting of suboptimal graft function. No change to carvedilol 50mg bid and minoxidil 5mg bid. Pt should get sleep study for possible PRIETO in future based on hypoxia seen inpt.     Hypomagnesemia  Mag 1.9 on mag oxide 400mg daily.     Hypophosphatemia  Pt has now started neutraphos 500mg bid (picked med up from pharmacy yesterday he states), up from 1.6 to 1.9 today. May be able to encourage PO intake and stop phos supplementation soon.     Hypercalcemia  MBD  Calcium in 10s, pt with last PTH of 168 pre-transplant (with calcium 10.5), fitting tertiary hyperparathyroidism. On cholecalciferol 2000 units daily, last vit D def screen low at 9.     Anemia due to other cause, not classified  Hgb stable in 11s.       [facility]    Patient seen and discussed with Dr. Ronquillo who agrees with this plan.    Andrey Arreaga MD  Renal Fellow   Pager: [phone]    Attestation:  This patient has been seen and evaluated by me, Fareed Ronquillo MD.  I have reviewed the note and agree with plan of care as documented by the fellow.

## 2018-08-15 ENCOUNTER — INFUSION THERAPY VISIT (OUTPATIENT)
Dept: INFUSION THERAPY | Facility: CLINIC | Age: 38
End: 2018-08-15
Attending: INTERNAL MEDICINE
Payer: COMMERCIAL

## 2018-08-15 ENCOUNTER — TELEPHONE (OUTPATIENT)
Dept: PHARMACY | Facility: CLINIC | Age: 38
End: 2018-08-15

## 2018-08-15 VITALS
WEIGHT: 242.06 LBS | DIASTOLIC BLOOD PRESSURE: 89 MMHG | BODY MASS INDEX: 33.89 KG/M2 | TEMPERATURE: 98.6 F | RESPIRATION RATE: 16 BRPM | SYSTOLIC BLOOD PRESSURE: 153 MMHG | HEART RATE: 85 BPM

## 2018-08-15 DIAGNOSIS — D64.89 ANEMIA DUE TO OTHER CAUSE, NOT CLASSIFIED: ICD-10-CM

## 2018-08-15 DIAGNOSIS — Z48.298 AFTERCARE FOLLOWING ORGAN TRANSPLANT: Primary | ICD-10-CM

## 2018-08-15 DIAGNOSIS — E83.42 HYPOMAGNESEMIA: ICD-10-CM

## 2018-08-15 DIAGNOSIS — E83.42 HYPOMAGNESEMIA: Primary | ICD-10-CM

## 2018-08-15 DIAGNOSIS — Z94.0 KIDNEY REPLACED BY TRANSPLANT: ICD-10-CM

## 2018-08-15 DIAGNOSIS — I15.0 RENOVASCULAR HYPERTENSION: ICD-10-CM

## 2018-08-15 DIAGNOSIS — E83.39 HYPOPHOSPHATEMIA: ICD-10-CM

## 2018-08-15 DIAGNOSIS — Z29.89 NEED FOR CMV IMMUNOTHERAPY: ICD-10-CM

## 2018-08-15 DIAGNOSIS — Z29.89 NEED FOR PNEUMOCYSTIS PROPHYLAXIS: ICD-10-CM

## 2018-08-15 DIAGNOSIS — Z48.298 AFTERCARE FOLLOWING ORGAN TRANSPLANT: ICD-10-CM

## 2018-08-15 DIAGNOSIS — R79.89 ELEVATED SERUM CREATININE: ICD-10-CM

## 2018-08-15 DIAGNOSIS — D84.9 IMMUNOSUPPRESSION (H): ICD-10-CM

## 2018-08-15 DIAGNOSIS — E83.52 HYPERCALCEMIA: ICD-10-CM

## 2018-08-15 DIAGNOSIS — Z94.0 STATUS POST KIDNEY TRANSPLANT: ICD-10-CM

## 2018-08-15 DIAGNOSIS — N25.81 SECONDARY RENAL HYPERPARATHYROIDISM (H): ICD-10-CM

## 2018-08-15 LAB
ALBUMIN UR-MCNC: NEGATIVE MG/DL
ANION GAP SERPL CALCULATED.3IONS-SCNC: 8 MMOL/L (ref 3–14)
APPEARANCE UR: CLEAR
BILIRUB UR QL STRIP: NEGATIVE
BUN SERPL-MCNC: 37 MG/DL (ref 7–30)
CALCIUM SERPL-MCNC: 10.2 MG/DL (ref 8.5–10.1)
CHLORIDE SERPL-SCNC: 106 MMOL/L (ref 94–109)
CO2 SERPL-SCNC: 23 MMOL/L (ref 20–32)
COLOR UR AUTO: ABNORMAL
CREAT SERPL-MCNC: 2.75 MG/DL (ref 0.66–1.25)
DONOR IDENTIFICATION: NORMAL
DSA COMMENTS: NORMAL
DSA PRESENT: NO
DSA TEST METHOD: NORMAL
ERYTHROCYTE [DISTWIDTH] IN BLOOD BY AUTOMATED COUNT: 14.2 % (ref 10–15)
GFR SERPL CREATININE-BSD FRML MDRD: 26 ML/MIN/1.7M2
GLUCOSE SERPL-MCNC: 88 MG/DL (ref 70–99)
GLUCOSE UR STRIP-MCNC: NEGATIVE MG/DL
HCT VFR BLD AUTO: 36.5 % (ref 40–53)
HGB BLD-MCNC: 11.8 G/DL (ref 13.3–17.7)
HGB UR QL STRIP: ABNORMAL
INTERFERING SUBST TEST METHOD: NORMAL
INTERFERING SUBSTANCE COMMENT: NORMAL
INTERFERING SUBSTANCE RESULT: NORMAL
INTERFERING SUBSTANCE: NORMAL
KETONES UR STRIP-MCNC: NEGATIVE MG/DL
LEUKOCYTE ESTERASE UR QL STRIP: NEGATIVE
MAGNESIUM SERPL-MCNC: 2 MG/DL (ref 1.6–2.3)
MCH RBC QN AUTO: 27.7 PG (ref 26.5–33)
MCHC RBC AUTO-ENTMCNC: 32.3 G/DL (ref 31.5–36.5)
MCV RBC AUTO: 86 FL (ref 78–100)
MUCOUS THREADS #/AREA URNS LPF: PRESENT /LPF
MYCOPHENOLATE SERPL LC/MS/MS-MCNC: 3.4 MG/L (ref 1–3.5)
MYCOPHENOLATE-G SERPL LC/MS/MS-MCNC: 65.3 MG/L (ref 30–95)
NITRATE UR QL: NEGATIVE
ORGAN: NORMAL
PH UR STRIP: 7 PH (ref 5–7)
PHOSPHATE SERPL-MCNC: 2.4 MG/DL (ref 2.5–4.5)
PLATELET # BLD AUTO: 262 10E9/L (ref 150–450)
POTASSIUM SERPL-SCNC: 5.4 MMOL/L (ref 3.4–5.3)
PRA DONOR SPECIFIC ABY: NORMAL
RBC # BLD AUTO: 4.26 10E12/L (ref 4.4–5.9)
RBC #/AREA URNS AUTO: 1 /HPF (ref 0–2)
SA1 CELL: NORMAL
SA1 COMMENTS: NORMAL
SA1 HI RISK ABY: NORMAL
SA1 MOD RISK ABY: NORMAL
SA1 TEST METHOD: NORMAL
SA2 CELL: NORMAL
SA2 COMMENTS: NORMAL
SA2 HI RISK ABY UA: NORMAL
SA2 MOD RISK ABY: NORMAL
SA2 TEST METHOD: NORMAL
SODIUM SERPL-SCNC: 137 MMOL/L (ref 133–144)
SOURCE: ABNORMAL
SP GR UR STRIP: 1.01 (ref 1–1.03)
TACROLIMUS BLD-MCNC: 4.1 UG/L (ref 5–15)
TME LAST DOSE: ABNORMAL H
TME LAST DOSE: NORMAL H
UROBILINOGEN UR STRIP-MCNC: 0 MG/DL (ref 0–2)
WBC # BLD AUTO: 4.6 10E9/L (ref 4–11)
WBC #/AREA URNS AUTO: <1 /HPF (ref 0–5)

## 2018-08-15 PROCEDURE — 85027 COMPLETE CBC AUTOMATED: CPT | Performed by: SURGERY

## 2018-08-15 PROCEDURE — 80048 BASIC METABOLIC PNL TOTAL CA: CPT | Performed by: SURGERY

## 2018-08-15 PROCEDURE — 81001 URINALYSIS AUTO W/SCOPE: CPT | Performed by: INTERNAL MEDICINE

## 2018-08-15 PROCEDURE — 80197 ASSAY OF TACROLIMUS: CPT | Performed by: SURGERY

## 2018-08-15 PROCEDURE — 83735 ASSAY OF MAGNESIUM: CPT | Performed by: SURGERY

## 2018-08-15 PROCEDURE — 25000128 H RX IP 250 OP 636: Mod: ZF | Performed by: INTERNAL MEDICINE

## 2018-08-15 PROCEDURE — 96360 HYDRATION IV INFUSION INIT: CPT

## 2018-08-15 PROCEDURE — 84100 ASSAY OF PHOSPHORUS: CPT | Performed by: SURGERY

## 2018-08-15 RX ORDER — TACROLIMUS 1 MG/1
10 CAPSULE ORAL EVERY MORNING
Qty: 390 CAPSULE | Refills: 0 | Status: SHIPPED | OUTPATIENT
Start: 2018-08-15 | End: 2018-08-16

## 2018-08-15 RX ADMIN — SODIUM CHLORIDE 1000 ML: 9 INJECTION, SOLUTION INTRAVENOUS at 08:49

## 2018-08-15 NOTE — MR AVS SNAPSHOT
After Visit Summary   8/15/2018    Harshad Beatty    MRN: [id number]           Patient Information     Date Of Birth          1980        Visit Information        Provider Department      8/15/2018 7:00 AM UC 41 ATC; UC SPEC INFUSION [facility] Procedure        Today's Diagnoses     Aftercare following organ transplant        Kidney replaced by transplant          Care Instructions    Dear Harshad Beatty    Thank you for choosing [facility] ([facility]) for your transplant cares.  The following information is a summary of our appointment as well as important reminders.      Please make sure your phone is available today because I will call to update you with your anti-rejection drug levels and possibly make changes to your anti-rejection dosages.    Please take 2 mg of Envarsus when you get home (for a total dose this morning of 10 mg)    Please return tomorrow for labs and assessment at 7 am.    We look forward in seeing you on your next appointment here at [facility].  Please don t hesitate to call us at [phone] to reschedule any of your appointments or to speak with one of the [facility] registered nurses.  It was a pleasure taking care of you today.    Sincerely,    [facility]  [address] [place] [address]  [address]  Phone:  [phone]            Follow-ups after your visit        Your next 10 appointments already scheduled     Aug 16, 2018  7:00 AM CDT   (Arrive by 6:45 AM)   New Transplant Visit with UC SPEC INFUSION, UC 43 ATC   [facility] Special[facility] Procedure ([facility])    [address] [place] [address]  [address] [place] [id number] [phone]            Aug 16, 2018  8:00 AM CDT   (Arrive by 7:45 AM)   Return with Viral Marquez Ronquillo MD   [facility] ([facility])    [address]  [address]  [facility] [place] [address]   [phone]            Aug 27, 2018  2:30 PM CDT   (Arrive by 2:15 PM)   Kidney Post Op with Bety Mitchell MD   [facility] Solid Organ Transplant ([facility])    [address]  [address]  [place] [address]   [phone]            Sep 07, 2018 10:30 AM CDT   (Arrive by 10:00 AM)   Return Kidney Transplant with Uc Early Post Transplant   [facility] Nephrology ([facility])    [address]  [address]  [place] [address]   [phone]            Sep 07, 2018 11:00 AM CDT   (Arrive by 10:30 AM)   Return Kidney Transplant with Uc Early Post Transplant   [facility]ph[facility] ([facility])    [address]  [address]  [place] [address]   [phone]            Sep 10, 2018  3:45 PM CDT   (Arrive by 3:30 PM)   Cystoscopy with Bety Mitchell MD   [facility] Solid Organ Transplant ([facility])    [address]  [address]  [place] [address]   [phone]            Dec 07, 2018  1:05 PM CST   (Arrive by 12:35 PM)   Return Kidney Transplant with Uc Kidney/Pancreas Recipient   [facility]phrology ([facility])    [address]  [address]  [place] [address]   [phone]            Feb 07, 2019  1:05 PM CST   (Arrive by 12:35 PM)   Return Kidney Transplant with Uc Kidney/Pancreas Recipient   [facility]ph[facility] ([facility])    [address]  [address]  [place] [address]   [phone]              Future tests that were ordered for you today     Open Future Orders        Priority Expected Expires Ordered    Routine UA with micro reflex to culture Routine  9/14/2018 8/15/2018            Who to contact     If you have questions or need follow up information about  today's clinic visit or your schedule please contact [facility] directly at [phone].  Normal or non-critical lab and imaging results will be communicated to you by MyChart, letter or phone within 4 business days after the clinic has received the results. If you do not hear from us within 7 days, please contact the clinic through MyChart or phone. If you have a critical or abnormal lab result, we will notify you by phone as soon as possible.  Submit refill requests through [employer] or call your pharmacy and they will forward the refill request to us. Please allow 3 business days for your refill to be completed.          Additional Information About Your Visit        Care EveryWhere ID     This is your Care EveryWhere ID. This could be used by other organizations to access your [facility] medical records  [id number]        Your Vitals Were     Pulse Temperature Respirations BMI (Body Mass Index)          85 98.6  F (37  C) (Oral) 16 33.89 kg/m2         Blood Pressure from Last 3 Encounters:   08/15/18 153/89   08/14/18 145/74   08/13/18 (!) 150/102    Weight from Last 3 Encounters:   08/15/18 109.8 kg (242 lb 1 oz)   08/14/18 110.9 kg (244 lb 7.8 oz)   08/13/18 110.7 kg (244 lb 1.6 oz)              We Performed the Following     Basic metabolic panel     CBC with platelets     Magnesium     Phosphorus     Tacrolimus level        Primary Care Provider Office Phone # Fax #    [facility] [phone] [address] [address]        Equal Access to Services     EULALIO PUGA AH: Hadii nan shaffero [place], waaxda luqadaha, qaybta kaalmada adeyudith, herb mar. So [facility] [phone].    ATENCIÓN: Si habla español, tiene a rodriguez disposición servicios gratuitos de asistencia lingüística. Llame al [phone].    We comply with applicable federal civil rights laws and [place]  laws. We do not discriminate on the basis of race, color, national origin, age, disability, sex, sexual orientation, or gender identity.            Thank you!     Thank you for choosing [facility] SPECIALTY AND PROCEDURE  for your care. Our goal is always to provide you with excellent care. Hearing back from our patients is one way we can continue to improve our services. Please take a few minutes to complete the written survey that you may receive in the mail after your visit with us. Thank you!             Your Updated Medication List - Protect others around you: Learn how to safely use, store and throw away your medicines at www.disposemymeds.org.          This list is accurate as of 8/15/18  9:15 AM.  Always use your most recent med list.                   Brand Name Dispense Instructions for use Diagnosis    acetaminophen 325 MG tablet    TYLENOL    100 tablet    Take 1-2 tablets (325-650 mg) by mouth every 4 hours as needed for mild pain or fever    Kidney replaced by transplant       aspirin 325 MG EC tablet     30 tablet    Take 1 tablet (325 mg) by mouth daily    Kidney replaced by transplant       atorvastatin 10 MG tablet    LIPITOR    30 tablet    Take 1 tablet (10 mg) by mouth daily    Kidney replaced by transplant       carvedilol 25 MG tablet    COREG    120 tablet    Take 2 tablets (50 mg) by mouth 2 times daily (with meals)    Renovascular hypertension, Kidney replaced by transplant       cholecalciferol 2000 units tablet     30 tablet    Take 2,000 Units by mouth daily    Renovascular hypertension, Kidney replaced by transplant       magnesium oxide 400 MG tablet    MAG-OX    30 tablet    Take 1 tablet (400 mg) by mouth daily (with lunch)    Kidney replaced by transplant       minoxidil 2.5 MG tablet    LONITEN    120 tablet    Take 2 tablets (5 mg) by mouth 2 times daily    Renovascular hypertension, Kidney replaced by transplant       mycophenolate 250 MG capsule     GENERIC EQUIVALENT    240 capsule    Take 4 capsules (1,000 mg) by mouth 2 times daily    Kidney replaced by transplant       ondansetron 4 MG ODT tab    ZOFRAN-ODT    20 tablet    Take 1 tablet (4 mg) by mouth every 6 hours as needed for nausea or vomiting    Kidney replaced by transplant       oxyCODONE IR 5 MG tablet    ROXICODONE    20 tablet    Take 1 tablet (5 mg) by mouth every 4 hours as needed for moderate to severe pain    Kidney replaced by transplant       phosphorus tablet 250 mg 250 MG per tablet     120 tablet    Take 2 tablets (500 mg) by mouth 2 times daily    Hypophosphatemia       senna-docusate 8.6-50 MG per tablet    SENOKOT-S;PERICOLACE    30 tablet    Take 1 tablet by mouth 2 times daily    Kidney replaced by transplant       sulfamethoxazole-trimethoprim 400-80 MG per tablet    BACTRIM/SEPTRA    30 tablet    Take 1 tablet by mouth daily    Kidney replaced by transplant, Immunosuppressed status (H)       tacrolimus 1 MG 24 hr tablet    ENVARSUS XR    300 tablet    Take 8 tablets (8 mg) by mouth every morning (before breakfast)    Kidney replaced by transplant       valGANciclovir 450 MG tablet    VALCYTE    60 tablet    Take 1 tablet (450 mg) by mouth daily Titrate dose up to a max of 2 tabs (900 mg) by mouth daily when directed by your transplant team.    Kidney replaced by transplant

## 2018-08-15 NOTE — PROGRESS NOTES
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 His creatinine was seen to have plateaued at ~2.3 for the last two days,now 2.8 today. The pt reports good UOP and frequent urination. He is had minimal intake yesterday as he just slept all day.  aiming for 3L but unable to motivate himself to achieve it. No lightheadedness and orthostatics are negative. He reports no LE swelling.  Pre transplant he was 118kg and he has been near 110.7kg since     Also of note, he did have donor specific antibody to DP beta 1 at the time of transplant. Repeat DSA 8/14 pending.     No fevers or chills. No CP or SOB. No abd pain aside from incision site pain with a twisting motion. Having BM at least every other day he reports. Drain put out less than 30ml. No wound drainage otherwise. Appetite has been poor and pt reports PO foods poorly. No N/V.     Review of systems:  10 point ROS negative except as noted above.    Past Medical History:   Diagnosis Date     Anemia in chronic kidney disease      ESRD (end stage renal disease) (H)      History of cardiomyopathy      HTN (hypertension)      Secondary hyperparathyroidism (H)      Sickle cell trait (H)           Current Outpatient Prescriptions on File Prior to Visit:  acetaminophen (TYLENOL) 325 MG tablet Take 1-2 tablets (325-650 mg) by mouth every 4 hours as needed for mild pain or fever (Patient not taking: Reported on 8/12/2018)   aspirin 325 MG EC tablet Take 1 tablet (325 mg) by mouth daily   atorvastatin (LIPITOR) 10 MG tablet Take 1 tablet (10 mg) by mouth daily   carvedilol (COREG) 25 MG tablet Take 2 tablets (50 mg) by mouth 2 times daily (with meals)   cholecalciferol 2000 units tablet Take  2,000 Units by mouth daily   magnesium oxide (MAG-OX) 400 MG tablet Take 1 tablet (400 mg) by mouth daily (with lunch)   minoxidil (LONITEN) 2.5 MG tablet Take 2 tablets (5 mg) by mouth 2 times daily   mycophenolate (GENERIC EQUIVALENT) 250 MG capsule Take 4 capsules (1,000 mg) by mouth 2 times daily   ondansetron (ZOFRAN-ODT) 4 MG ODT tab Take 1 tablet (4 mg) by mouth every 6 hours as needed for nausea or vomiting (Patient not taking: Reported on 8/12/2018)   oxyCODONE IR (ROXICODONE) 5 MG tablet Take 1 tablet (5 mg) by mouth every 4 hours as needed for moderate to severe pain   phosphorus tablet 250 mg (PHOSPHA 250 NEUTRAL) 250 MG per tablet Take 2 tablets (500 mg) by mouth 2 times daily   senna-docusate (SENOKOT-S;PERICOLACE) 8.6-50 MG per tablet Take 1 tablet by mouth 2 times daily (Patient not taking: Reported on 8/12/2018)   sulfamethoxazole-trimethoprim (BACTRIM/SEPTRA) 400-80 MG per tablet Take 1 tablet by mouth daily   tacrolimus (ENVARSUS XR) 1 MG 24 hr tablet Take 8 tablets (8 mg) by mouth every morning (before breakfast)   valGANciclovir (VALCYTE) 450 MG tablet Take 1 tablet (450 mg) by mouth daily Titrate dose up to a max of 2 tabs (900 mg) by mouth daily when directed by your transplant team.     No current facility-administered medications on file prior to visit.      OBJECTIVE:  Physical exam:  There were no vitals taken for this visit.  There is no height or weight on file to calculate BMI.   Gen: Well-developed, well-nourished and in no apparent distress  HEENT: normocephalic, atraumatic, anicteric   CV: regular rate and rhythm, normal S1 S2, no S3 or S4 and no murmur, click, or rub  Resp: clear to ausculation bilaterally, normal respiratory effort  Abd: bowel sounds presents, soft, non-tender, non-distended, no masses or hepatosplenomegaly appreciated, RLQ incision with drain without tenderness or erythema   Ext: WWP, trace LE edema at ankles bilat    Skin: warm and dry, no rashes or  ecchymoses  Psych: normal mood, normal affect  Neuro:  alert and oriented x3    Recent Labs   Lab Test  08/14/18   0811  08/13/18   0755   NA  136  138   POTASSIUM  4.7  4.6   CHLORIDE  105  108   CO2  24  24   ANIONGAP  7  6   GLC  84  91   BUN  33*  34*   CR  2.57*  2.35*   JUAN  10.2*  10.2*       Lab Results   Component Value Date    WBC 5.5 08/14/2018     Lab Results   Component Value Date    RBC 4.20 08/14/2018     Lab Results   Component Value Date    HGB 11.8 08/14/2018     Lab Results   Component Value Date    HCT 35.9 08/14/2018     No components found for: MCT  Lab Results   Component Value Date    MCV 86 08/14/2018     Lab Results   Component Value Date    MCH 28.1 08/14/2018     Lab Results   Component Value Date    MCHC 32.9 08/14/2018     Lab Results   Component Value Date    RDW 14.3 08/14/2018     Lab Results   Component Value Date     08/14/2018       Color Urine (no units)   Date Value   07/30/2018 Straw     Appearance Urine (no units)   Date Value   07/30/2018 Clear     Glucose Urine (mg/dL)   Date Value   07/30/2018 50 (A)     Bilirubin Urine (no units)   Date Value   07/30/2018 Negative     Ketones Urine (mg/dL)   Date Value   07/30/2018 Negative     Specific Gravity Urine (no units)   Date Value   07/30/2018 1.008     pH Urine (pH)   Date Value   07/30/2018 8.0 (H)     Protein Albumin Urine (mg/dL)   Date Value   07/30/2018 100 (A)     Nitrite Urine (no units)   Date Value   07/30/2018 Negative     Leukocyte Esterase Urine (no units)   Date Value   07/30/2018 Negative     Urine prot: 0.47g/g    tacro trough: 14.8      ASSESSMENT and PLAN:   Diagnoses and all orders for this visit:    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 Creat up trending for several days, most likely due to pre-renal changes. Rechecking DSA and now renal US. Proteinuria of 0.47g/g not fitting a recurrent FSGS at this time. Above goal tacro level (~15) likely contributing but most recent  level is low, and may suboptimal PO fluid/solute intake. Encouraged more aggressive fluid intake. Not more aggressively treating BP (140s/70s), not wanting to cause hypotension. Pt also on ASA 325mg for 6 months.   -    1 L Normal saline bolus today  - Check UA  -  Follow up DSA    Immunosuppression (H)  Decreasing Envarsus to 8mg daily from 10mg, but will also see this afternoon's value reflective of yesterday AM's 10mg (and pt also took 10mg this AM, so tomorrow's trough will also reflect 10mg dose). MMF 1000mg bid, MPA level pending, no GI complaints.   - Increase envarsus to 10 mg daily and follow up todays' level    Need for CMV immunotherapy  Pt had been on valA / valtrex via study inpt but switched to Valcyte (currently 450mg daily due to eGFR < 60) for insurance reasons. Plan for 3 months with recipient positive.     Need for pneumocystis prophylaxis  Bactrim daily.     Renovascular hypertension  BP above long term goal but BP control reasonable in setting of suboptimal graft function. No change to carvedilol 50mg bid and minoxidil 5mg bid. Pt should get sleep study for possible PRIETO in future based on hypoxia seen inpt.     Hypomagnesemia  Mag 1.9 on mag oxide 400mg daily.     Hypophosphatemia  Pt has now started neutraphos 500mg bid (picked med up from pharmacy yesterday he states), up from 1.6 to 1.9 today. May be able to encourage PO intake and stop phos supplementation soon.     Hypercalcemia  MBD  Calcium in 10s, pt with last PTH of 168 pre-transplant (with calcium 10.5), mild hyperparathyroidism. On cholecalciferol 2000 units daily, last vit D def screen low at 9.     Anemia due to other cause, not classified  Hgb stable in 11s.     Return to clinic/SPIC tomorrow.    Fareed Ronquillo MD

## 2018-08-15 NOTE — PROGRESS NOTES
"Harshad Beatty came to [facility] today for a lab and assess following a kidney transplant on 08/07/2018.      Discharge date: 08/10/2018  Transplant coordinator: Blanca Sow/Soledad Meza  Phone number patient can be reached at: [phone]      Physical Assessment:  See physical assessment located under \"Document Flowsheets\".  Incision site: Surgical incision closed with dermabond. No evidence of infection noted.  Lines: RONALD draining serosanguinous fluid. 10 ml out in past 24 hours.  Peres: N/A  Urine clarity: Per pt, urine is light yellow, pt denies pain or burning but is experiencing frequency   Hydration: Pt feels he did not drink the recommended 3 liters of fluids yesterday due to being so tired and sleeping so much yesterday, he did try to make up for it in the evening.  Nutrition: Appetite is decreased but pt denies nausea.   Last BM: 8/14/2018, normal formed stool  Pain: Pt is taking Oxycodone for pain control with effective relief. Last dose was yesterday afternoon. On assessment, pt rates pain at surgical site a 2 on 1-10 pain scale.  Pt denies dizziness when standing.     Labs drawn by [facility] staff Yes    Plan of care for today: Labs, assessment and VS reviewed by Dr. Ronquillo who also saw pt today in [facility].     Pt did not return my call yesterday to update new Envarsus dose (10 mg po daily). Pt will take his dose this morning (8 mg) and take the extra 2 mg as soon as he returns home. MD aware.     Pt received 1 liter of NS. UA sent as well today due to urinary frequency.      Medications administered:  Administrations This Visit     0.9% sodium chloride BOLUS     Admin Date Action Dose Rate Route Administered By          08/15/2018 New Bag 1000 mL 1,000 mL/hr Intravenous Bekah Fisher, RN                             Patient education:    The following teaching topics were addressed: Plan of care   Patient verbalized understanding and all questions answered.    Drug level:  TAC level today reviewed with Dr" Yg who gave orders to instruct pt to take Envarsus 3 mg PO now and increase his dose to 13 mg PO every morning starting 08/16/2018.  Patient was updated with this information and verbalized understanding.    Verified with pt over the telephone that his med box is filled with 1 mg capsules.     Face to face time: 20 minutes  Discharge Plan    Pt will follow up with [facility] tomorrow for LBA.  Discharge instructions reviewed with patient: YES  Patient/Representative verbalized understanding, all questions answered: YES    Discharged from unit at 1015 with whom: Self to home.    Bekah Fisher RN

## 2018-08-15 NOTE — MR AVS SNAPSHOT
After Visit Summary   8/15/2018    Harshad Beatty    MRN: [id number]           Patient Information     Date Of Birth          1980        Visit Information        Provider Department      8/15/2018 8:00 AM Fareed Ronquillo MD [facility] Procedure        Today's Diagnoses     Aftercare following organ transplant    -  1    Kidney replaced by transplant        Hypercalcemia        Renovascular hypertension        Immunosuppression (H)        Need for CMV immunotherapy        Need for pneumocystis prophylaxis        Hypophosphatemia        Hypomagnesemia        Anemia due to other cause, not classified        Elevated serum creatinine        Status post kidney transplant        Secondary renal hyperparathyroidism (H)           Follow-ups after your visit        Your next 10 appointments already scheduled     Aug 16, 2018  7:00 AM CDT   (Arrive by 6:45 AM)   New Transplant Visit with UC SPEC INFUSION, UC 43 ATC   [facility] Specialty and Procedure ([place] [facility])    [address]  [address]  [place] [address]   [phone]            Aug 16, 2018  8:00 AM CDT   (Arrive by 7:45 AM)   Return with Fareed Ronquillo MD   [facility] Procedure ([place] [facility])    [address]  [address]  [place] [address]   [phone]            Aug 27, 2018  2:30 PM CDT   (Arrive by 2:15 PM)   Kidney Post Op with Bety Mitchell MD   [facility] Solid Organ Transplant ([place] [facility])    [address]  [address]  [place] [address]   [phone]            Sep 07, 2018 10:30 AM CDT   (Arrive by 10:00 AM)   Return Kidney Transplant with Uc Early Post Transplant   [facility] Nephrology ([place] [facility])    [address]  [address]  [place] [address]   [phone]            Sep  07, 2018 11:00 AM CDT   (Arrive by 10:30 AM)   Return Kidney Transplant with Uc Early Post Transplant   [facility]ph[facility] ([facility]    [address]  [address]  [place] [facility] [address]   [phone]            Sep 10, 2018  3:45 PM CDT   (Arrive by 3:30 PM)   Cystoscopy with Bety Mitchell MD   [facility] Solid Organ Transplant ([facility])    [address]  [address]  [place] [address]   [phone]            Dec 07, 2018  1:05 PM CST   (Arrive by 12:35 PM)   Return Kidney Transplant with Uc Kidney/Pancreas Recipient   [facility] Nephrology ([facility])    [address]  [address]  [place] [address]   [phone]            Feb 07, 2019  1:05 PM CST   (Arrive by 12:35 PM)   Return Kidney Transplant with Uc Kidney/Pancreas Recipient   [facility] Nephrology ([facility])    [address]  [address]  [place] [address]   [phone]              Future tests that were ordered for you today     Open Future Orders        Priority Expected Expires Ordered    Routine UA with micro reflex to culture Routine  9/14/2018 8/15/2018            Who to contact     If you have questions or need follow up information about today's clinic visit or your schedule please contact [facility] SPECIAL[facility] PROCEDURE directly at [phone].  Normal or non-critical lab and imaging results will be communicated to you by MyChart, letter or phone within 4 business days after the clinic has received the results. If you do not hear from us within 7 days, please contact the clinic through MyChart or phone. If you have a critical or abnormal lab result, we will notify you by phone as soon as possible.  Submit refill requests through [employer] or call your pharmacy and they will forward the refill request to us. Please allow 3 business days for your refill to be  completed.          Additional Information About Your Visit        Care EveryWhere ID     This is your Care EveryWhere ID. This could be used by other organizations to access your [place] medical records  [id number]         Blood Pressure from Last 3 Encounters:   08/15/18 153/89   08/14/18 145/74   08/13/18 (!) 150/102    Weight from Last 3 Encounters:   08/15/18 109.8 kg (242 lb 1 oz)   08/14/18 110.9 kg (244 lb 7.8 oz)   08/13/18 110.7 kg (244 lb 1.6 oz)               Primary Care Provider Office Phone # Fax #    [facility] [phone] [address] [address]        Equal Access to Services     JAKI PUGA : Silas shaffero Isamar, waaxda luqadaha, qaybta kaalmada adeegyaalysha, herb mar. So [facility] [phone].    ATENCIÓN: Si habla español, tiene a rodriguez disposición servicios gratuitos de asistencia lingüística. [facility] [phone].    We comply with applicable federal civil rights laws and [place] laws. We do not discriminate on the basis of race, color, national origin, age, disability, sex, sexual orientation, or gender identity.            Thank you!     Thank you for choosing [facility] PROCED[facility]  for your care. Our goal is always to provide you with excellent care. Hearing back from our patients is one way we can continue to improve our services. Please take a few minutes to complete the written survey that you may receive in the mail after your visit with us. Thank you!             Your Updated Medication List - Protect others around you: Learn how to safely use, store and throw away your medicines at www.disposemymeds.org.          This list is accurate as of 8/15/18  8:54 AM.  Always use your most recent med list.                   Brand Name Dispense Instructions for use Diagnosis    acetaminophen 325 MG tablet    TYLENOL    100 tablet    Take 1-2 tablets  (325-650 mg) by mouth every 4 hours as needed for mild pain or fever    Kidney replaced by transplant       aspirin 325 MG EC tablet     30 tablet    Take 1 tablet (325 mg) by mouth daily    Kidney replaced by transplant       atorvastatin 10 MG tablet    LIPITOR    30 tablet    Take 1 tablet (10 mg) by mouth daily    Kidney replaced by transplant       carvedilol 25 MG tablet    COREG    120 tablet    Take 2 tablets (50 mg) by mouth 2 times daily (with meals)    Renovascular hypertension, Kidney replaced by transplant       cholecalciferol 2000 units tablet     30 tablet    Take 2,000 Units by mouth daily    Renovascular hypertension, Kidney replaced by transplant       magnesium oxide 400 MG tablet    MAG-OX    30 tablet    Take 1 tablet (400 mg) by mouth daily (with lunch)    Kidney replaced by transplant       minoxidil 2.5 MG tablet    LONITEN    120 tablet    Take 2 tablets (5 mg) by mouth 2 times daily    Renovascular hypertension, Kidney replaced by transplant       mycophenolate 250 MG capsule    GENERIC EQUIVALENT    240 capsule    Take 4 capsules (1,000 mg) by mouth 2 times daily    Kidney replaced by transplant       ondansetron 4 MG ODT tab    ZOFRAN-ODT    20 tablet    Take 1 tablet (4 mg) by mouth every 6 hours as needed for nausea or vomiting    Kidney replaced by transplant       oxyCODONE IR 5 MG tablet    ROXICODONE    20 tablet    Take 1 tablet (5 mg) by mouth every 4 hours as needed for moderate to severe pain    Kidney replaced by transplant       phosphorus tablet 250 mg 250 MG per tablet     120 tablet    Take 2 tablets (500 mg) by mouth 2 times daily    Hypophosphatemia       senna-docusate 8.6-50 MG per tablet    SENOKOT-S;PERICOLACE    30 tablet    Take 1 tablet by mouth 2 times daily    Kidney replaced by transplant       sulfamethoxazole-trimethoprim 400-80 MG per tablet    BACTRIM/SEPTRA    30 tablet    Take 1 tablet by mouth daily    Kidney replaced by transplant, Immunosuppressed  status (H)       tacrolimus 1 MG 24 hr tablet    ENVARSUS XR    300 tablet    Take 8 tablets (8 mg) by mouth every morning (before breakfast)    Kidney replaced by transplant       valGANciclovir 450 MG tablet    VALCYTE    60 tablet    Take 1 tablet (450 mg) by mouth daily Titrate dose up to a max of 2 tabs (900 mg) by mouth daily when directed by your transplant team.    Kidney replaced by transplant

## 2018-08-15 NOTE — PATIENT INSTRUCTIONS
Dear Harshad Beatty    Thank you for choosing [facility] ([facility]) for your transplant cares.  The following information is a summary of our appointment as well as important reminders.      Please make sure your phone is available today because I will call to update you with your anti-rejection drug levels and possibly make changes to your anti-rejection dosages.    Please take 2 mg of Envarsus when you get home (for a total dose this morning of 10 mg)    Please return tomorrow for labs and assessment at 7 am.    We look forward in seeing you on your next appointment here at [facility].  Please don t hesitate to call us at [phone] to reschedule any of your appointments or to speak with one of the [facility] registered nurses.  It was a pleasure taking care of you today.    Sincerely,    [facility]  [address] [place] [address]  [address]  Phone:  [phone]

## 2018-08-16 ENCOUNTER — INFUSION THERAPY VISIT (OUTPATIENT)
Dept: INFUSION THERAPY | Facility: CLINIC | Age: 38
End: 2018-08-16
Attending: INTERNAL MEDICINE
Payer: COMMERCIAL

## 2018-08-16 VITALS
BODY MASS INDEX: 33.73 KG/M2 | WEIGHT: 240.96 LBS | HEART RATE: 75 BPM | DIASTOLIC BLOOD PRESSURE: 67 MMHG | RESPIRATION RATE: 16 BRPM | SYSTOLIC BLOOD PRESSURE: 133 MMHG | TEMPERATURE: 98.6 F

## 2018-08-16 DIAGNOSIS — Z29.89 NEED FOR CMV IMMUNOTHERAPY: ICD-10-CM

## 2018-08-16 DIAGNOSIS — I15.0 RENOVASCULAR HYPERTENSION: ICD-10-CM

## 2018-08-16 DIAGNOSIS — N25.81 SECONDARY RENAL HYPERPARATHYROIDISM (H): ICD-10-CM

## 2018-08-16 DIAGNOSIS — R79.89 ELEVATED SERUM CREATININE: ICD-10-CM

## 2018-08-16 DIAGNOSIS — E83.52 HYPERCALCEMIA: ICD-10-CM

## 2018-08-16 DIAGNOSIS — Z48.298 AFTERCARE FOLLOWING ORGAN TRANSPLANT: ICD-10-CM

## 2018-08-16 DIAGNOSIS — Z29.89 NEED FOR PNEUMOCYSTIS PROPHYLAXIS: ICD-10-CM

## 2018-08-16 DIAGNOSIS — Z94.0 KIDNEY REPLACED BY TRANSPLANT: ICD-10-CM

## 2018-08-16 DIAGNOSIS — D84.9 IMMUNOSUPPRESSION (H): ICD-10-CM

## 2018-08-16 DIAGNOSIS — E83.42 HYPOMAGNESEMIA: ICD-10-CM

## 2018-08-16 DIAGNOSIS — Z48.298 AFTERCARE FOLLOWING ORGAN TRANSPLANT: Primary | ICD-10-CM

## 2018-08-16 DIAGNOSIS — E83.39 HYPOPHOSPHATEMIA: ICD-10-CM

## 2018-08-16 DIAGNOSIS — D64.89 ANEMIA DUE TO OTHER CAUSE, NOT CLASSIFIED: ICD-10-CM

## 2018-08-16 LAB
ANION GAP SERPL CALCULATED.3IONS-SCNC: 7 MMOL/L (ref 3–14)
BUN SERPL-MCNC: 34 MG/DL (ref 7–30)
CALCIUM SERPL-MCNC: 10.2 MG/DL (ref 8.5–10.1)
CHLORIDE SERPL-SCNC: 108 MMOL/L (ref 94–109)
CO2 SERPL-SCNC: 22 MMOL/L (ref 20–32)
CREAT SERPL-MCNC: 2.67 MG/DL (ref 0.66–1.25)
ERYTHROCYTE [DISTWIDTH] IN BLOOD BY AUTOMATED COUNT: 13.9 % (ref 10–15)
GFR SERPL CREATININE-BSD FRML MDRD: 27 ML/MIN/1.7M2
GLUCOSE SERPL-MCNC: 87 MG/DL (ref 70–99)
HCT VFR BLD AUTO: 34.1 % (ref 40–53)
HGB BLD-MCNC: 11.1 G/DL (ref 13.3–17.7)
MAGNESIUM SERPL-MCNC: 2 MG/DL (ref 1.6–2.3)
MCH RBC QN AUTO: 27.6 PG (ref 26.5–33)
MCHC RBC AUTO-ENTMCNC: 32.6 G/DL (ref 31.5–36.5)
MCV RBC AUTO: 85 FL (ref 78–100)
PHOSPHATE SERPL-MCNC: 2.2 MG/DL (ref 2.5–4.5)
PLATELET # BLD AUTO: 276 10E9/L (ref 150–450)
POTASSIUM SERPL-SCNC: 5.2 MMOL/L (ref 3.4–5.3)
RBC # BLD AUTO: 4.02 10E12/L (ref 4.4–5.9)
SODIUM SERPL-SCNC: 138 MMOL/L (ref 133–144)
TACROLIMUS BLD-MCNC: 8.2 UG/L (ref 5–15)
TME LAST DOSE: NORMAL H
WBC # BLD AUTO: 3.7 10E9/L (ref 4–11)

## 2018-08-16 PROCEDURE — 80197 ASSAY OF TACROLIMUS: CPT | Performed by: SURGERY

## 2018-08-16 PROCEDURE — 80048 BASIC METABOLIC PNL TOTAL CA: CPT | Performed by: SURGERY

## 2018-08-16 PROCEDURE — 83735 ASSAY OF MAGNESIUM: CPT | Performed by: SURGERY

## 2018-08-16 PROCEDURE — 85027 COMPLETE CBC AUTOMATED: CPT | Performed by: SURGERY

## 2018-08-16 PROCEDURE — 36415 COLL VENOUS BLD VENIPUNCTURE: CPT

## 2018-08-16 PROCEDURE — 84100 ASSAY OF PHOSPHORUS: CPT | Performed by: SURGERY

## 2018-08-16 RX ORDER — TACROLIMUS 1 MG/1
7 CAPSULE ORAL 2 TIMES DAILY
Qty: 420 CAPSULE | Refills: 11 | Status: SHIPPED | OUTPATIENT
Start: 2018-08-16 | End: 2018-08-21

## 2018-08-16 NOTE — PROGRESS NOTES
TRANSPLANT NEPHROLOGY VISIT    Assessment & Plan   # LDKT: basline Cr ~ TBD; Stable Renal function. Proteinuria: Minimal. Baseline creat likely not yet achieved. Creat up trending for several days, most likely due to pre-renal changes. DSA negative and renal US with mild pelvictasis. Proteinuria of 0.47g/g not fitting a recurrent FSGS at this time. Above goal tacro level (~15) several days ago but now low.  PO fluid/solute intake now improved. Not more aggressively treating BP (140s/70s), not wanting to cause hypotension. Pt also on ASA 325mg for 6 months.   - Continue to monitor per protocol  - Will discuss with surgery about thoughts on biopsy    # Immunosuppression: Tacrolimus extended release (goal  8-10) and Mycophenolate mofetil (goal  1-3.5)   - Changes: Yes - will await tac level today (currently on envarsus 13 mg daily) and will send prescription for tac IR which is covered by insurance.  MMF is therapeutic.    # Prophylaxis:   - PJP: Bactrim   - CMV: Valcyte x3 months (was in ValA study but ValA not covered by insurance)    # Transplant History:    Transplant: 8/7/2018 (Kidney)    Donor Type: Living Donor Class:    Crossmatch at time of Tx: negative    DSA at time of Tx: No    Latest DSA: No Date of last check: 8/2018   Significant changes in immunosuppression: None    Biopsy: No Rejection History:No   CMV IgG Ab Discordance (D+/R-): No    EBV IgG Ab Discordance (D+/R-): No    History of BK Viremia: No    Significant Complications: None    Transplant Office Phone Number: [phone]    # Hypertension: controlled; Goal BP: 130/80   - Changes: Yes - will hold minoxidil for orthostatic changes.  patient should have sleep study as an outpatient when stabilized.    # Anemia in chronic renal disease: Hgb: Stable   - Iron studies: replete     # Mineral Bone Disorder:   - Secondary renal hyperparathyroidism: minimally elevated 168 at transplant   - Vitamin D: vitamin d deficient at time of transplant (9)   -  Calcium: elevated likely due to dehydration   - Phosphorus: low - normal    - continue cholecalciferol and phosphorus supplementation    # Electrolytes:    - Magnesium: within normal limits on supplementation   - Potassium: within normal limits    # Other Medical Issues: None    Return visit: No Follow-up on file.    Assessment and plan was discussed with the patient and he voiced his understanding and agreement.    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 His creatinine was seen to have plateaued at ~2.3-2.6. The pt reports good UOP and frequent urination. He has good intake at 3 L per day.  No lightheadedness and orthostatics are negative. He reports no LE swelling.  Pre transplant he was 118kg and he has been near 109 kg since     Also of note, he did have donor specific antibody to DP beta 1 at the time of transplant. Repeat DSA 8/14 negative.     No fevers or chills. No CP or SOB. No abd pain aside from incision site pain with a twisting motion. Having BM at least every other day he reports. Drain put out less than 30ml. No wound drainage otherwise. Appetite is now improved. No N/V.    Recent Hospitalizations:  [x] No [] Yes    New Medical Issues: [x] No [] Yes    Decreased energy: [x] No [] Yes    Chest pain or SOB with exertion:  [x] No [] Yes    Appetite change or weight change: [x] No [] Yes    Nausea, vomiting or diarrhea:  [x] No [] Yes    Fever, sweats or chills: [x] No [] Yes    Leg swelling: [x] No [] Yes      Other medical issues:  No    Home BP: 130's systolic    Review  of Systems   A comprehensive review of systems was obtained and negative, except as noted in the HPI or PMH.    Problem List   Patient Active Problem List   Diagnosis     ESRD (end stage renal disease) (H)     HTN (hypertension)     Sickle cell trait (H)     Anemia in chronic kidney disease     Secondary hyperparathyroidism (H)     Kidney replaced by transplant     Kidney transplant recipient     Immunosuppression (H)     Need for CMV immunotherapy     Need for pneumocystis prophylaxis     Benign essential hypertension     Anemia due to other cause, not classified     Hypercalcemia     Hypophosphatemia     Hypomagnesemia       Social History  Social History     Social History     Marital status:      Spouse name: Yesica     Number of children: 3     Years of education: 16     Occupational History     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[employer]     Social History Main Topics     Smoking status: Never Smoker     Smokeless tobacco: Never Used     Alcohol use No     Drug use: Yes     Special: Marijuana      Comment: remote     Sexual activity: Yes     Partners: Female     Birth control/ protection: Implant     Other Topics Concern     Not on file     Social History Narrative       Allergies    No Known Allergies    Medications   Outpatient Encounter Prescriptions as of 8/16/2018   Medication Sig Dispense Refill     tacrolimus (GENERIC EQUIVALENT) 1 MG capsule Take 7 capsules (7 mg) by mouth 2 times daily 420 capsule 11     acetaminophen (TYLENOL) 325 MG tablet Take 1-2 tablets (325-650 mg) by mouth every 4 hours as needed for mild pain or fever (Patient not taking: Reported on 8/12/2018) 100 tablet 0     aspirin 325 MG EC tablet Take 1 tablet (325 mg) by mouth daily 30 tablet 5     atorvastatin (LIPITOR) 10 MG tablet Take 1 tablet (10 mg) by mouth daily 30 tablet 11     carvedilol (COREG) 25 MG tablet Take 2 tablets (50 mg) by mouth 2 times daily (with meals) 120 tablet 3     cholecalciferol 2000 units tablet Take 2,000 Units by  mouth daily 30 tablet 3     magnesium oxide (MAG-OX) 400 MG tablet Take 1 tablet (400 mg) by mouth daily (with lunch) 30 tablet 3     mycophenolate (GENERIC EQUIVALENT) 250 MG capsule Take 4 capsules (1,000 mg) by mouth 2 times daily 240 capsule 11     ondansetron (ZOFRAN-ODT) 4 MG ODT tab Take 1 tablet (4 mg) by mouth every 6 hours as needed for nausea or vomiting (Patient not taking: Reported on 2018) 20 tablet 0     oxyCODONE IR (ROXICODONE) 5 MG tablet Take 1 tablet (5 mg) by mouth every 4 hours as needed for moderate to severe pain 20 tablet 0     phosphorus tablet 250 mg (PHOSPHA 250 NEUTRAL) 250 MG per tablet Take 2 tablets (500 mg) by mouth 2 times daily 120 tablet 1     senna-docusate (SENOKOT-S;PERICOLACE) 8.6-50 MG per tablet Take 1 tablet by mouth 2 times daily (Patient not taking: Reported on 2018) 30 tablet 0     sulfamethoxazole-trimethoprim (BACTRIM/SEPTRA) 400-80 MG per tablet Take 1 tablet by mouth daily 30 tablet 11     valGANciclovir (VALCYTE) 450 MG tablet Take 1 tablet (450 mg) by mouth daily Titrate dose up to a max of 2 tabs (900 mg) by mouth daily when directed by your transplant team. 60 tablet 2     [DISCONTINUED] minoxidil (LONITEN) 2.5 MG tablet Take 2 tablets (5 mg) by mouth 2 times daily 120 tablet 11     [DISCONTINUED] tacrolimus (GENERIC EQUIVALENT) 1 MG capsule Take 10 capsules (10 mg) by mouth every morning 390 capsule 0     [] 0.9% sodium chloride BOLUS        No facility-administered encounter medications on file as of 2018.        Physical Exam   Vital Signs: There were no vitals taken for this visit.    133/67  75  16  98.6  109 kg  115/61  76     Gen: Well-developed, well-nourished and in no apparent distress  HEENT: normocephalic, atraumatic, anicteric   CV: regular rate and rhythm, normal S1 S2, no S3 or S4 and no murmur, click, or rub  Resp: clear to ausculation bilaterally, normal respiratory effort  Abd: bowel sounds presents, soft, non-tender,  non-distended, no masses or hepatosplenomegaly appreciated, RLQ incision with drain without tenderness or erythema   Ext: WWP, trace LE edema at ankles bilat    Skin: warm and dry, no rashes or ecchymoses  Psych: normal mood, normal affect  Neuro:  alert and oriented x3      Data   Renal -   Crossmatch Result   Date Value Ref Range Status   08/03/2018   Final    Donor: DARRYL WEST                               Crossmatch Date: 08/03/2018  Serum Date  Test                                 Test Method   Cell        Result         Barnett Shift  Pos Cutoff              Comments  07/30/2018  allo/T1                              FLOW          T PBL       Neg            -9          >67  07/30/2018  allo/B1                              FLOW          B PBL       Neg            35          >122  01/08/2018  allo/T2                              FLOW          T PBL       Neg            -9          >67  01/08/2018  allo/B2                              FLOW          B PBL       Neg            45          >122              Donor specific antibody present:  1-8-18  DPB1*01/1150  Test performed by modified procedure.  T cell result is using non pronased   cells.       DSA Present   Date Value Ref Range Status   08/14/2018 NO  Final     Creatinine   Date Value Ref Range Status   08/16/2018 2.67 (H) 0.66 - 1.25 mg/dL Final     Protein Total Urine g/gr Creatinine   Date Value Ref Range Status   08/14/2018 0.47 (H) 0 - 0.2 g/g Cr Final     Glucose   Date Value Ref Range Status   08/16/2018 87 70 - 99 mg/dL Final     Hemoglobin   Date Value Ref Range Status   08/16/2018 11.1 (L) 13.3 - 17.7 g/dL Final     Ferritin   Date Value Ref Range Status   07/30/2018 716 (H) 26 - 388 ng/mL Final     Iron Saturation Index   Date Value Ref Range Status   07/30/2018 21 15 - 46 % Final     Parathyroid Hormone Intact   Date Value Ref Range Status   07/30/2018 168 (H) 18 - 80 pg/mL Final     Vitamin D Deficiency screening   Date Value Ref Range Status    07/30/2018 9 (L) 20 - 75 ug/L Final     Comment:     Season, race, dietary intake, and treatment affect the concentration of   25-hydroxy-Vitamin D. Values may decrease during winter months and increase   during summer months. Values 20-29 ug/L may indicate Vitamin D insufficiency   and values <20 ug/L may indicate Vitamin D deficiency.  Vitamin D determination is routinely performed by an immunoassay specific for   25 hydroxyvitamin D3.  If an individual is on vitamin D2 (ergocalciferol)   supplementation, please specify 25 OH vitamin D2 and D3 level determination by   LCMSMS test [id number].       Calcium   Date Value Ref Range Status   08/16/2018 10.2 (H) 8.5 - 10.1 mg/dL Final     Phosphorus   Date Value Ref Range Status   08/16/2018 2.2 (L) 2.5 - 4.5 mg/dL Final     Magnesium   Date Value Ref Range Status   08/16/2018 2.0 1.6 - 2.3 mg/dL Final     Potassium   Date Value Ref Range Status   08/16/2018 5.2 3.4 - 5.3 mmol/L Final     CMV Antibody IgG   Date Value Ref Range Status   07/30/2018 >8.0 (H) 0.0 - 0.8 AI Final     Comment:     Positive  Antibody index (AI) values reflect qualitative changes in antibody   concentration that cannot be directly associated with clinical condition or   disease state.       EBV Capsid Antibody IgG   Date Value Ref Range Status   07/30/2018 6.4 (H) 0.0 - 0.8 AI Final     Comment:     Positive, suggests recent or past exposure  Antibody index (AI) values reflect qualitative changes in antibody   concentration that cannot be directly associated with clinical condition or   disease state.

## 2018-08-16 NOTE — MR AVS SNAPSHOT
After Visit Summary   8/16/2018    Harshad Beatty    MRN: [id number]           Patient Information     Date Of Birth          1980        Visit Information        Provider Department      8/16/2018 7:00 AM UC 43 ATC; UC SPEC INFUSION [facility] Specialty [facility] Procedure        Today's Diagnoses     Aftercare following organ transplant        Kidney replaced by transplant          Care Instructions    Dear Harshad Beatty    Thank you for choosing [facility] ([facility]) for your transplant cares.  The following information is a summary of our appointment as well as important reminders.      Please make sure your phone is available today because I will call to update you with your anti-rejection drug levels and possibly make changes to your anti-rejection dosages.    [facility] will start services tomorrow. [phone].    Continue to monitor your RONALD output and notify coordinator.    Stop Envarsus and Minoxidil.     your Tacrolimus (Prograf) and start 8/17/2018 morning as instructed today when nurse calls with Tacrolimus level and new Tacrolimus dose.      We look forward in seeing you on your next appointment here at [facility].  Please don t hesitate to call us at [phone] to reschedule any of your appointments or to speak with one of the [facility] registered nurses.  It was a pleasure taking care of you today.    Sincerely,    [facility]  Specialty Infusion [facility] Procedure [facility]  [address] [place] [address]  [address]  Phone:  [phone]            Follow-ups after your visit        Your next 10 appointments already scheduled     Aug 27, 2018  2:30 PM CDT   (Arrive by 2:15 PM)   Kidney Post Op with Bety Mitchell MD   [facility] Solid Organ Transplant ([facility])    [address] [place] [address]  [address] [place] [address] [phone]             Sep 07, 2018 10:30 AM CDT   (Arrive by 10:00 AM)   Return Kidney Transplant with Uc Early Post Transplant   [facility] Nephrology ([facility])    [address]  [address]  [facility] [address]   [phone]            Sep 07, 2018 11:00 AM CDT   (Arrive by 10:30 AM)   Return Kidney Transplant with Uc Early Post Transplant   [facility]phrology ([facility])    [address]  [address]  [facility] [address]   [phone]            Sep 10, 2018  3:45 PM CDT   (Arrive by 3:30 PM)   Cystoscopy with Bety Mitchell MD   [facility] Solid Organ Transplant ([facility])    [address] [place] [address]  [address]  [facility] [address]   [phone]            Dec 07, 2018  1:05 PM CST   (Arrive by 12:35 PM)   Return Kidney Transplant with Uc Kidney/Pancreas Recipient   [facility] Nephrology ([facility])    [address] [place] [address]  [address]  [facility] [address]   [phone]            Dec 07, 2018  1:30 PM CST   (Arrive by 1:15 PM)   Office Visit with Lambert Wall RPH   [facility] Medication Therapy Management ([facility])    [address] [place] [address]  3rd Floor  [facility] [address]   [phone]           Bring a current list of meds and any records pertaining to this visit. For Physicals, please bring immunization records and any forms needing to be filled out. Please arrive 10 minutes early to complete paperwork.            Feb 07, 2019  1:05 PM CST   (Arrive by 12:35 PM)   Return Kidney Transplant with Uc Kidney/Pancreas Recipient   [facility]phrology ([facility])    [address] [place] [address]  [address]  [facility] [address]   [phone]              Who to contact     If you have questions or need follow up information about today's clinic visit or your schedule please contact [facility] [place] SPECIALTY AND PROCEDURE  directly at [phone].  Normal or non-critical lab and imaging results will be communicated to you by MyChart, letter or phone within 4 business days after the clinic has received the results. If you do not hear from us within 7 days, please contact the clinic through MyChart or phone. If you have a critical or abnormal lab result, we will notify you by phone as soon as possible.  Submit refill requests through [employer] or call your pharmacy and they will forward the refill request to us. Please allow 3 business days for your refill to be completed.          Additional Information About Your Visit        Care EveryWhere ID     This is your Care EveryWhere ID. This could be used by other organizations to access your [facility] medical records  [id number]        Your Vitals Were     Pulse Temperature Respirations BMI (Body Mass Index)          75 98.6  F (37  C) (Oral) 16 33.73 kg/m2         Blood Pressure from Last 3 Encounters:   08/16/18 133/67   08/15/18 153/89   08/14/18 145/74    Weight from Last 3 Encounters:   08/16/18 109.3 kg (240 lb 15.4 oz)   08/15/18 109.8 kg (242 lb 1 oz)   08/14/18 110.9 kg (244 lb 7.8 oz)              We Performed the Following     Basic metabolic panel     CBC with platelets     Magnesium     Phosphorus     Tacrolimus level          Today's Medication Changes          These changes are accurate as of 8/16/18  9:49 AM.  If you have any questions, ask your nurse or doctor.               These medicines have changed or have updated prescriptions.        Dose/Directions    tacrolimus 1 MG capsule   Commonly known as:  GENERIC EQUIVALENT   This may have changed:    - how much to take  - when to take this   Used for:  Kidney replaced by transplant   Changed by:  Fareed Ronquillo MD        Dose:  7 mg   Take 7 capsules (7 mg) by mouth 2 times daily   Quantity:  420 capsule   Refills:  11         Stop taking these medicines if you haven't already. Please contact your care team if you  have questions.     minoxidil 2.5 MG tablet   Commonly known as:  LONITEN   Stopped by:  Fareed Ronquillo MD                Where to get your medicines      These medications were sent to [place], [place] - [address]  [address] [address]    Hours:  TRANSPLANT PHONE NUMBER [phone] Phone:  [phone]     tacrolimus 1 MG capsule                Primary Care Provider Office Phone # Fax #    [facility] [phone] [address] [address]        Equal Access to Services     [facility] HospitalDIMITRI : Hadii nan lopez hadasho [place], waaxda luqadaha, qaybta kaalmada adenarcisoyada, herb beltran . [facility] [phone].    ATENCIÓN: Si habla español, tiene a rodriguez disposición servicios gratuitos de asistencia lingüística. [facility] [phone].    We comply with applicable federal civil rights laws and [place] laws. We do not discriminate on the basis of race, color, national origin, age, disability, sex, sexual orientation, or gender identity.            Thank you!     Thank you for choosing [facility] SPECIALTY [facility] PROCEDURE  for your care. Our goal is always to provide you with excellent care. Hearing back from our patients is one way we can continue to improve our services. Please take a few minutes to complete the written survey that you may receive in the mail after your visit with us. Thank you!             Your Updated Medication List - Protect others around you: Learn how to safely use, store and throw away your medicines at www.disposemymeds.org.          This list is accurate as of 8/16/18  9:49 AM.  Always use your most recent med list.                   Brand Name Dispense Instructions for use Diagnosis    acetaminophen 325 MG tablet    TYLENOL    100 tablet    Take 1-2 tablets (325-650 mg) by mouth every 4 hours as needed  for mild pain or fever    Kidney replaced by transplant       aspirin 325 MG EC tablet     30 tablet    Take 1 tablet (325 mg) by mouth daily    Kidney replaced by transplant       atorvastatin 10 MG tablet    LIPITOR    30 tablet    Take 1 tablet (10 mg) by mouth daily    Kidney replaced by transplant       carvedilol 25 MG tablet    COREG    120 tablet    Take 2 tablets (50 mg) by mouth 2 times daily (with meals)    Renovascular hypertension, Kidney replaced by transplant       cholecalciferol 2000 units tablet     30 tablet    Take 2,000 Units by mouth daily    Renovascular hypertension, Kidney replaced by transplant       magnesium oxide 400 MG tablet    MAG-OX    30 tablet    Take 1 tablet (400 mg) by mouth daily (with lunch)    Kidney replaced by transplant       mycophenolate 250 MG capsule    GENERIC EQUIVALENT    240 capsule    Take 4 capsules (1,000 mg) by mouth 2 times daily    Kidney replaced by transplant       ondansetron 4 MG ODT tab    ZOFRAN-ODT    20 tablet    Take 1 tablet (4 mg) by mouth every 6 hours as needed for nausea or vomiting    Kidney replaced by transplant       oxyCODONE IR 5 MG tablet    ROXICODONE    20 tablet    Take 1 tablet (5 mg) by mouth every 4 hours as needed for moderate to severe pain    Kidney replaced by transplant       phosphorus tablet 250 mg 250 MG per tablet     120 tablet    Take 2 tablets (500 mg) by mouth 2 times daily    Hypophosphatemia       senna-docusate 8.6-50 MG per tablet    SENOKOT-S;PERICOLACE    30 tablet    Take 1 tablet by mouth 2 times daily    Kidney replaced by transplant       sulfamethoxazole-trimethoprim 400-80 MG per tablet    BACTRIM/SEPTRA    30 tablet    Take 1 tablet by mouth daily    Kidney replaced by transplant, Immunosuppressed status (H)       tacrolimus 1 MG capsule    GENERIC EQUIVALENT    420 capsule    Take 7 capsules (7 mg) by mouth 2 times daily    Kidney replaced by transplant       valGANciclovir 450 MG tablet    VALCYTE    60  tablet    Take 1 tablet (450 mg) by mouth daily Titrate dose up to a max of 2 tabs (900 mg) by mouth daily when directed by your transplant team.    Kidney replaced by transplant

## 2018-08-16 NOTE — PATIENT INSTRUCTIONS
Dear Harshad Beatty    Thank you for choosing [facility] Specialty Infusion [facility] Procedure Center ([facility]) for your transplant cares.  The following information is a summary of our appointment as well as important reminders.      Please make sure your phone is available today because I will call to update you with your anti-rejection drug levels and possibly make changes to your anti-rejection dosages.    [facility] will start services tomorrow. [phone].      Stop Envarsus and Minoxidil.     your Tacrolimus (Prograf) and start 8/17/2018 morning as instructed today when nurse calls with Tacrolimus level and new Tacrolimus dose.      We look forward in seeing you on your next appointment here at [facility].  Please don t hesitate to call us at [phone] to reschedule any of your appointments or to speak with one of the [facility] registered nurses.  It was a pleasure taking care of you today.    Sincerely,    [facility]  Specialty Infusion & Procedure Center  [address] [place] [address]  [address]  Phone:  [phone]

## 2018-08-16 NOTE — MR AVS SNAPSHOT
After Visit Summary   8/16/2018    Harshad Beatty    MRN: [id number]           Patient Information     Date Of Birth          1980        Visit Information        Provider Department      8/16/2018 8:00 AM Fareed Ronquillo MD [facility] Special[facility] Procedure        Today's Diagnoses     Aftercare following organ transplant    -  1    Kidney replaced by transplant        Hypercalcemia        Renovascular hypertension        Immunosuppression (H)        Need for CMV immunotherapy        Need for pneumocystis prophylaxis        Hypophosphatemia        Hypomagnesemia        Anemia due to other cause, not classified        Elevated serum creatinine        Secondary renal hyperparathyroidism (H)           Follow-ups after your visit        Your next 10 appointments already scheduled     Aug 27, 2018  2:30 PM CDT   (Arrive by 2:15 PM)   Kidney Post Op with Bety Mitchell MD   [facility] Solid Organ Transplant ([facility])    [address] [place] [address] [address]  [address]  [facility] [address]   [phone]            Sep 07, 2018 10:30 AM CDT   (Arrive by 10:00 AM)   Return Kidney Transplant with Uc Early Post Transplant   [facility] Nephrology ([facility])    [address]  [address]  [facility] [address]   [phone]            Sep 07, 2018 11:00 AM CDT   (Arrive by 10:30 AM)   Return Kidney Transplant with Uc Early Post Transplant   [facility] ([facility])    [address]  [address]  [facility] [address]   [phone]            Sep 10, 2018  3:45 PM CDT   (Arrive by 3:30 PM)   Cystoscopy with Bety Mitchell MD   [facility] Transplant ([facility])    [address] [place] [address] [address]  [address]  [facility] [address]   [phone]            Dec 07, 2018  1:05 PM CST   (Arrive by 12:35 PM)   Return Kidney Transplant with Uc Kidney/Pancreas  Recipient   [facility] ([facility])    [address]  [address]  [facility] [id number] [phone]            Dec 07, 2018  1:30 PM CST   (Arrive by 1:15 PM)   Office Visit with Lambert Wall RPH   [facility] ([facility])    [address]  3rd [address]  [facility] [address] [phone]           Bring a current list of meds and any records pertaining to this visit. For Physicals, please bring immunization records and any forms needing to be filled out. Please arrive 10 minutes early to complete paperwork.            Feb 07, 2019  1:05 PM CST   (Arrive by 12:35 PM)   Return Kidney Transplant with Uc Kidney/Pancreas Recipient   [facility] ([facility])    [address]  [address]  [facility] [id number] [phone]              Who to contact     If you have questions or need follow up information about today's clinic visit or your schedule please contact [facility] SPECIALTY AND PROCEDURE directly at [phone].  Normal or non-critical lab and imaging results will be communicated to you by MyChart, letter or phone within 4 business days after the clinic has received the results. If you do not hear from us within 7 days, please contact the clinic through MyChart or phone. If you have a critical or abnormal lab result, we will notify you by phone as soon as possible.  Submit refill requests through [employer] or call your pharmacy and they will forward the refill request to us. Please allow 3 business days for your refill to be completed.          Additional Information About Your Visit        Care EveryWhere ID     This is your Care EveryWhere ID. This could be used by other organizations to access your [facility] medical records  [id number]         Blood Pressure from Last 3 Encounters:   08/16/18 133/67   08/15/18 153/89    08/14/18 145/74    Weight from Last 3 Encounters:   08/16/18 109.3 kg (240 lb 15.4 oz)   08/15/18 109.8 kg (242 lb 1 oz)   08/14/18 110.9 kg (244 lb 7.8 oz)              Today, you had the following     No orders found for display         Today's Medication Changes          These changes are accurate as of 8/16/18 10:20 AM.  If you have any questions, ask your nurse or doctor.               These medicines have changed or have updated prescriptions.        Dose/Directions    tacrolimus 1 MG capsule   Commonly known as:  GENERIC EQUIVALENT   This may have changed:    - how much to take  - when to take this   Used for:  Kidney replaced by transplant   Changed by:  Fareed Ronquillo MD        Dose:  7 mg   Take 7 capsules (7 mg) by mouth 2 times daily   Quantity:  420 capsule   Refills:  11         Stop taking these medicines if you haven't already. Please contact your care team if you have questions.     minoxidil 2.5 MG tablet   Commonly known as:  LONITEN   Stopped by:  Fareed Ronquillo MD                Where to get your medicines      These medications were sent to [facility] [address] [facility] [address] [place] [address] [address] [place] [address]    Hours:  TRANSPLANT PHONE NUMBER [phone] Phone:  [phone]     tacrolimus 1 MG capsule                Primary Care Provider Office Phone # Fax #    [facility] [phone] [address] [facility] [address]        Equal Access to Services     JAKI PUGA AH: Hadii nan shaffero [place], waaxda luqadaha, qaybta kaalmada adenarcisoyada, waxay allen mar. So [facility] [phone].    ATENCIÓN: Si habla español, tiene a rodriguez disposición servicios gratuitos de asistencia lingüística. Llame al [phone].    We comply with applicable federal civil rights laws and [place] laws. We do not discriminate on the basis of race, color, national  origin, age, disability, sex, sexual orientation, or gender identity.            Thank you!     Thank you for choosing [facility] SPECIALTY AND PROCEDURE  for your care. Our goal is always to provide you with excellent care. Hearing back from our patients is one way we can continue to improve our services. Please take a few minutes to complete the written survey that you may receive in the mail after your visit with us. Thank you!             Your Updated Medication List - Protect others around you: Learn how to safely use, store and throw away your medicines at www.disposemymeds.org.          This list is accurate as of 8/16/18 10:20 AM.  Always use your most recent med list.                   Brand Name Dispense Instructions for use Diagnosis    acetaminophen 325 MG tablet    TYLENOL    100 tablet    Take 1-2 tablets (325-650 mg) by mouth every 4 hours as needed for mild pain or fever    Kidney replaced by transplant       aspirin 325 MG EC tablet     30 tablet    Take 1 tablet (325 mg) by mouth daily    Kidney replaced by transplant       atorvastatin 10 MG tablet    LIPITOR    30 tablet    Take 1 tablet (10 mg) by mouth daily    Kidney replaced by transplant       carvedilol 25 MG tablet    COREG    120 tablet    Take 2 tablets (50 mg) by mouth 2 times daily (with meals)    Renovascular hypertension, Kidney replaced by transplant       cholecalciferol 2000 units tablet     30 tablet    Take 2,000 Units by mouth daily    Renovascular hypertension, Kidney replaced by transplant       magnesium oxide 400 MG tablet    MAG-OX    30 tablet    Take 1 tablet (400 mg) by mouth daily (with lunch)    Kidney replaced by transplant       mycophenolate 250 MG capsule    GENERIC EQUIVALENT    240 capsule    Take 4 capsules (1,000 mg) by mouth 2 times daily    Kidney replaced by transplant       ondansetron 4 MG ODT tab    ZOFRAN-ODT    20 tablet    Take 1 tablet (4 mg) by mouth every 6 hours as needed  for nausea or vomiting    Kidney replaced by transplant       oxyCODONE IR 5 MG tablet    ROXICODONE    20 tablet    Take 1 tablet (5 mg) by mouth every 4 hours as needed for moderate to severe pain    Kidney replaced by transplant       phosphorus tablet 250 mg 250 MG per tablet     120 tablet    Take 2 tablets (500 mg) by mouth 2 times daily    Hypophosphatemia       senna-docusate 8.6-50 MG per tablet    SENOKOT-S;PERICOLACE    30 tablet    Take 1 tablet by mouth 2 times daily    Kidney replaced by transplant       sulfamethoxazole-trimethoprim 400-80 MG per tablet    BACTRIM/SEPTRA    30 tablet    Take 1 tablet by mouth daily    Kidney replaced by transplant, Immunosuppressed status (H)       tacrolimus 1 MG capsule    GENERIC EQUIVALENT    420 capsule    Take 7 capsules (7 mg) by mouth 2 times daily    Kidney replaced by transplant       valGANciclovir 450 MG tablet    VALCYTE    60 tablet    Take 1 tablet (450 mg) by mouth daily Titrate dose up to a max of 2 tabs (900 mg) by mouth daily when directed by your transplant team.    Kidney replaced by transplant

## 2018-08-16 NOTE — PROGRESS NOTES
"Harshad Beatty came to [facility] today for a lab and assess following a kidney transplant on 08/07/2018.      Discharge date: 08/10/2018  Transplant coordinator: Blanca Sow/Soledad Meza  Phone number patient can be reached at: [phone]      Physical Assessment:  See physical assessment located under \"Document Flowsheets\".  Incision site: Surgical incision closed with dermabond and no evidence of infection noted.   Lines: ORNALD draining serosanguinous fluid. 5-10 ml out in past 24 hours. Tracy Finley NP notified of output the past 4 days. RONALD drain removed today.   Peres: N/A  Urine clarity: light yellow per pt, pt denies any pain or burning but is still experiencing frequency. UA sent yesterday and no evidence of UTI.  Hydration: pt states he was able to drink 3 liters of fluids yesterday.  Nutrition: Appetite is decreased but slowly improving. Pt denies any nausea.   Last BM: normal formed stool today  Pain: pt reports pain 1 on 1-10 pain scale at time of assessment. Last dose of Oxycodone was 2 days ago.  Pt denies any dizziness when standing.     Labs drawn by [facility] staff Yes    Plan of care for today: Labs, assessment, and VS reviewed by Dr. Ronquillo who also saw pt in [facility] today.    Nurse verified pt has been taking the correct dose of Envarsus pills.     Pt discharged from the [facility] and home care services to start tomorrow. Jessica at [facility] notified.     Pt instructed to continue to monitor RNOALD output and notify coordinator daily.     Medication changes:   1. Stop Envarsus  2. Start Tacrolimus. Pt will be notified of dose when we receive his TAC level today. Pt reminded to  pills at pharmacy upon discharge from [facility].  3. Stop Minoxidil    Medications administered:      Patient education:    The following teaching topics were addressed: Medications (purposes, doses and times of administration), Plan of care and Drain care   Patient verbalized understanding and all questions answered.    Drug " level:  TAC level today reviewed with Dr Ronquillo who gave orders to instruct pt to start taking Tacrolimus (Prograf) 7 mg po every 12 hours.  Patient was updated with this information and verbalized understanding. Pt picked up his RX upon discharge and is updating pill box.     Face to face time: 15 minutes  Discharge Plan    Pt will follow up with coordinator with any questions/concerns and with providers as scheduled.  Discharge instructions reviewed with patient: YES  Patient/Representative verbalized understanding, all questions answered: YES    Discharged from unit at 1030 with whom: self to home.    Bekah Fisher, RN

## 2018-08-17 ENCOUNTER — HOSPITAL ENCOUNTER (OUTPATIENT)
Facility: CLINIC | Age: 38
Discharge: HOME | End: 2018-08-17
Attending: RADIOLOGY | Admitting: RADIOLOGY
Payer: COMMERCIAL

## 2018-08-17 ENCOUNTER — TELEPHONE (OUTPATIENT)
Dept: TRANSPLANT | Facility: CLINIC | Age: 38
End: 2018-08-17

## 2018-08-17 ENCOUNTER — APPOINTMENT (OUTPATIENT)
Dept: MEDSURG UNIT | Facility: CLINIC | Age: 38
End: 2018-08-17
Attending: RADIOLOGY
Payer: COMMERCIAL

## 2018-08-17 ENCOUNTER — APPOINTMENT (OUTPATIENT)
Dept: INTERVENTIONAL RADIOLOGY/VASCULAR | Facility: CLINIC | Age: 38
End: 2018-08-17
Attending: PHYSICIAN ASSISTANT
Payer: COMMERCIAL

## 2018-08-17 VITALS
WEIGHT: 239 LBS | DIASTOLIC BLOOD PRESSURE: 91 MMHG | HEART RATE: 76 BPM | BODY MASS INDEX: 32.37 KG/M2 | RESPIRATION RATE: 16 BRPM | SYSTOLIC BLOOD PRESSURE: 156 MMHG | TEMPERATURE: 98.3 F | OXYGEN SATURATION: 97 % | HEIGHT: 72 IN

## 2018-08-17 DIAGNOSIS — Z94.0 KIDNEY REPLACED BY TRANSPLANT: Primary | ICD-10-CM

## 2018-08-17 DIAGNOSIS — Z94.0 KIDNEY REPLACED BY TRANSPLANT: ICD-10-CM

## 2018-08-17 LAB
ANION GAP SERPL CALCULATED.3IONS-SCNC: 6 MMOL/L (ref 3–14)
B-HCG FREE SERPL-ACNC: 1.2 [IU]/L (ref 0.86–1.14)
BUN SERPL-MCNC: 35 MG/DL (ref 7–30)
CALCIUM SERPL-MCNC: 10.5 MG/DL (ref 8.5–10.1)
CHLORIDE SERPL-SCNC: 111 MMOL/L (ref 94–109)
CO2 SERPL-SCNC: 23 MMOL/L (ref 20–32)
CREAT SERPL-MCNC: 3.06 MG/DL (ref 0.66–1.25)
ERYTHROCYTE [DISTWIDTH] IN BLOOD BY AUTOMATED COUNT: 14 % (ref 10–15)
GFR SERPL CREATININE-BSD FRML MDRD: 23 ML/MIN/1.7M2
GLUCOSE SERPL-MCNC: 74 MG/DL (ref 70–99)
HCT VFR BLD AUTO: 35.6 % (ref 40–53)
HGB BLD-MCNC: 11.1 G/DL (ref 13.3–17.7)
HGB BLD-MCNC: 11.5 G/DL (ref 13.3–17.7)
MAGNESIUM SERPL-MCNC: 2.2 MG/DL (ref 1.6–2.3)
MCH RBC QN AUTO: 27.6 PG (ref 26.5–33)
MCHC RBC AUTO-ENTMCNC: 32.3 G/DL (ref 31.5–36.5)
MCV RBC AUTO: 85 FL (ref 78–100)
PHOSPHATE SERPL-MCNC: 2.8 MG/DL (ref 2.5–4.5)
PLATELET # BLD AUTO: 314 10E9/L (ref 150–450)
POTASSIUM SERPL-SCNC: 5.5 MMOL/L (ref 3.4–5.3)
RBC # BLD AUTO: 4.17 10E12/L (ref 4.4–5.9)
SODIUM SERPL-SCNC: 140 MMOL/L (ref 133–144)
TACROLIMUS BLD-MCNC: 14.5 UG/L (ref 5–15)
TME LAST DOSE: NORMAL H
WBC # BLD AUTO: 3.5 10E9/L (ref 4–11)

## 2018-08-17 PROCEDURE — 88313 SPECIAL STAINS GROUP 2: CPT | Performed by: RADIOLOGY

## 2018-08-17 PROCEDURE — 88350 IMFLUOR EA ADDL 1ANTB STN PX: CPT | Performed by: RADIOLOGY

## 2018-08-17 PROCEDURE — 25000132 ZZH RX MED GY IP 250 OP 250 PS 637: Performed by: INTERNAL MEDICINE

## 2018-08-17 PROCEDURE — 85018 HEMOGLOBIN: CPT | Performed by: INTERNAL MEDICINE

## 2018-08-17 PROCEDURE — 85027 COMPLETE CBC AUTOMATED: CPT | Performed by: SURGERY

## 2018-08-17 PROCEDURE — 83735 ASSAY OF MAGNESIUM: CPT | Performed by: SURGERY

## 2018-08-17 PROCEDURE — 88348 ELECTRON MICROSCOPY DX: CPT | Performed by: RADIOLOGY

## 2018-08-17 PROCEDURE — 85610 PROTHROMBIN TIME: CPT

## 2018-08-17 PROCEDURE — 36415 COLL VENOUS BLD VENIPUNCTURE: CPT | Performed by: INTERNAL MEDICINE

## 2018-08-17 PROCEDURE — 40000428 ZZHCL STATISTIC R-RUSH PROCESSING: Performed by: RADIOLOGY

## 2018-08-17 PROCEDURE — 25000131 ZZH RX MED GY IP 250 OP 636 PS 637: Performed by: INTERNAL MEDICINE

## 2018-08-17 PROCEDURE — 84100 ASSAY OF PHOSPHORUS: CPT | Performed by: SURGERY

## 2018-08-17 PROCEDURE — 40000172 ZZH STATISTIC PROCEDURE PREP ONLY

## 2018-08-17 PROCEDURE — 88346 IMFLUOR 1ST 1ANTB STAIN PX: CPT | Performed by: RADIOLOGY

## 2018-08-17 PROCEDURE — 80197 ASSAY OF TACROLIMUS: CPT | Performed by: SURGERY

## 2018-08-17 PROCEDURE — 80048 BASIC METABOLIC PNL TOTAL CA: CPT | Performed by: SURGERY

## 2018-08-17 PROCEDURE — 88305 TISSUE EXAM BY PATHOLOGIST: CPT | Performed by: RADIOLOGY

## 2018-08-17 PROCEDURE — 25000125 ZZHC RX 250: Performed by: RADIOLOGY

## 2018-08-17 PROCEDURE — C1769 GUIDE WIRE: HCPCS

## 2018-08-17 RX ORDER — CARVEDILOL 12.5 MG/1
50 TABLET ORAL ONCE
Status: COMPLETED | OUTPATIENT
Start: 2018-08-17 | End: 2018-08-17

## 2018-08-17 RX ORDER — LIDOCAINE 40 MG/G
CREAM TOPICAL
Status: DISCONTINUED | OUTPATIENT
Start: 2018-08-17 | End: 2018-08-17 | Stop reason: HOSPADM

## 2018-08-17 RX ORDER — MYCOPHENOLATE MOFETIL 500 MG/1
1000 TABLET, FILM COATED ORAL ONCE
Status: DISCONTINUED | OUTPATIENT
Start: 2018-08-17 | End: 2018-08-17 | Stop reason: CLARIF

## 2018-08-17 RX ORDER — LIDOCAINE HYDROCHLORIDE 10 MG/ML
1-30 INJECTION, SOLUTION EPIDURAL; INFILTRATION; INTRACAUDAL; PERINEURAL
Status: COMPLETED | OUTPATIENT
Start: 2018-08-17 | End: 2018-08-17

## 2018-08-17 RX ORDER — CARVEDILOL 12.5 MG/1
50 TABLET ORAL 2 TIMES DAILY WITH MEALS
Status: DISCONTINUED | OUTPATIENT
Start: 2018-08-17 | End: 2018-08-17

## 2018-08-17 RX ORDER — MYCOPHENOLATE MOFETIL 250 MG/1
1000 CAPSULE ORAL ONCE
Status: COMPLETED | OUTPATIENT
Start: 2018-08-17 | End: 2018-08-17

## 2018-08-17 RX ORDER — SODIUM CHLORIDE 9 MG/ML
INJECTION, SOLUTION INTRAVENOUS CONTINUOUS
Status: DISCONTINUED | OUTPATIENT
Start: 2018-08-17 | End: 2018-08-17 | Stop reason: HOSPADM

## 2018-08-17 RX ADMIN — CARVEDILOL 50 MG: 12.5 TABLET, FILM COATED ORAL at 21:56

## 2018-08-17 RX ADMIN — MYCOPHENOLATE MOFETIL 1000 MG: 250 CAPSULE ORAL at 21:56

## 2018-08-17 RX ADMIN — TACROLIMUS 7 MG: 5 CAPSULE ORAL at 20:54

## 2018-08-17 RX ADMIN — LIDOCAINE HYDROCHLORIDE 10 ML: 10 INJECTION, SOLUTION EPIDURAL; INFILTRATION; INTRACAUDAL; PERINEURAL at 17:45

## 2018-08-17 NOTE — IP AVS SNAPSHOT
[address] Observation [address] [address]    Phone:  [phone]    Fax:  [phone]                                       After Visit Summary   8/17/2018    Harshad Beatty    MRN: [id number]           After Visit Summary Signature Page     I have received my discharge instructions, and my questions have been answered. I have discussed any challenges I see with this plan with the nurse or doctor.    ..........................................................................................................................................  Patient/Patient Representative Signature      ..........................................................................................................................................  Patient Representative Print Name and Relationship to Patient    ..................................................               ................................................  Date                                            Time    ..........................................................................................................................................  Reviewed by Signature/Title    ...................................................              ..............................................  Date                                                            Time

## 2018-08-17 NOTE — IP AVS SNAPSHOT
MRN:[id number]                      After Visit Summary   8/17/2018    Harshad Beatty    MRN: [id number]           Thank you!     Thank you for choosing [facility] for your care. Our goal is always to provide you with excellent care. Hearing back from our patients is one way we can continue to improve our services. Please take a few minutes to complete the written survey that you may receive in the mail after you visit with us. Thank you!        Patient Information     Date Of Birth          1980        About your hospital stay     You were admitted on:  August 17, 2018 You last received care in the:  Unit 6D Observation [facility]    You were discharged on:  August 17, 2018       Who to Call     For medical emergencies, please call 911.  For non-urgent questions about your medical care, please call your primary care provider or clinic, [phone]          Attending Provider     Provider Specialty    Ra Parham MD Radiology       Primary Care Provider Office Phone # Fax #    [facility] [phone]      After Care Instructions     Discharge Instructions       No aspirin products or NSAIDs for 7 days after kidney biopsy. Provider will determine when to start (warfarin)  Coumadin, (clopidogrel) Plavix or other blood thinner if appropriate                  Your next 10 appointments already scheduled     Aug 27, 2018  2:30 PM CDT   (Arrive by 2:15 PM)   Kidney Post Op with Bety Mitchell MD   [facility] Solid Organ Transplant ([facility])    [address] [place] [address]  [address] [place] [address]   [phone]            Sep 07, 2018 10:30 AM CDT   (Arrive by 10:00 AM)   Return Kidney Transplant with  Early Post Transplant   [facility] Nephrology ([facility])    [address] [place] [address]  [address] [place] [address]   [phone]            Sep 07, 2018 11:00 AM CDT   (Arrive by 10:30 AM)  "  Return Kidney Transplant with Uc Early Post Transplant   [facility] Nephrology ([facility])    [address]  [address]  [facility] [address]   [phone]            Sep 10, 2018  3:45 PM CDT   (Arrive by 3:30 PM)   Cystoscopy with Bety Mitchell MD   [facility] Solid Organ Transplant ([facility])    [address]  [address]  [facility] [address]   [phone]            Dec 07, 2018  1:05 PM CST   (Arrive by 12:35 PM)   Return Kidney Transplant with Uc Kidney/Pancreas Recipient   [facility] Nephrology ([facility])    [address]  [address]  [facility] [address]   [phone]            Dec 07, 2018  1:30 PM CST   (Arrive by 1:15 PM)   Office Visit with Lambert Wall [facility] ([facility])    [address]  [address]rd Floor  [facility] [address]   [phone]           Bring a current list of meds and any records pertaining to this visit. For Physicals, please bring immunization records and any forms needing to be filled out. Please arrive 10 minutes early to complete paperwork.            Feb 07, 2019  1:05 PM CST   (Arrive by 12:35 PM)   Return Kidney Transplant with Uc Kidney/Pancreas Recipient   [facility] Nephrology ([facility])    [address]  [address]  [facility] [address]   [phone]              Pending Results     Date and Time Order Name Status Description    8/17/2018 1724 Surgical Path Exam In process             Admission Information     Date & Time Provider Department Dept. Phone    8/17/2018 Ra Parham MD Unit 6D Observation [facility] [place] [phone]      Your Vitals Were     Blood Pressure Pulse Temperature Respirations Height Weight    156/91 (BP Location: Left arm) 76 98.3  F (36.8  C) (Oral) 16 1.816 m (5' 11.5\") 108.4 kg (239 lb)    Pulse Oximetry BMI " (Body Mass Index)                97% 32.87 kg/m2          Care EveryWhere ID     This is your Care EveryWhere ID. This could be used by other organizations to access your [facility] medical records  [id number]        Equal Access to Services     JAKI PUGA : Hadii aad ku hadessiejaleesa Sodanyelali, waaxda luqadaha, qaybta kaalmada dariela, herb maría elenain hayaamaldonado beach norma ruby mar. So [facility] [phone].    ATENCIÓN: Si habla español, tiene a rodriguez disposición servicios gratuitos de asistencia lingüística. Llame al [phone].    We comply with applicable federal civil rights laws and [place] laws. We do not discriminate on the basis of race, color, national origin, age, disability, sex, sexual orientation, or gender identity.               Review of your medicines      UNREVIEWED medicines. Ask your doctor about these medicines        Dose / Directions    acetaminophen 325 MG tablet   Commonly known as:  TYLENOL   Used for:  Kidney replaced by transplant        Dose:  325-650 mg   Take 1-2 tablets (325-650 mg) by mouth every 4 hours as needed for mild pain or fever   Quantity:  100 tablet   Refills:  0       aspirin 325 MG EC tablet   Used for:  Kidney replaced by transplant        Dose:  325 mg   Take 1 tablet (325 mg) by mouth daily   Quantity:  30 tablet   Refills:  5       atorvastatin 10 MG tablet   Commonly known as:  LIPITOR   Used for:  Kidney replaced by transplant        Dose:  10 mg   Take 1 tablet (10 mg) by mouth daily   Quantity:  30 tablet   Refills:  11       carvedilol 25 MG tablet   Commonly known as:  COREG   Used for:  Renovascular hypertension, Kidney replaced by transplant        Dose:  50 mg   Take 2 tablets (50 mg) by mouth 2 times daily (with meals)   Quantity:  120 tablet   Refills:  3       cholecalciferol 2000 units tablet   Used for:  Renovascular hypertension, Kidney replaced by transplant        Dose:  2000 Units   Take 2,000 Units by mouth daily   Quantity:  30 tablet   Refills:  3        magnesium oxide 400 MG tablet   Commonly known as:  MAG-OX   Used for:  Kidney replaced by transplant        Dose:  400 mg   Take 1 tablet (400 mg) by mouth daily (with lunch)   Quantity:  30 tablet   Refills:  3       mycophenolate 250 MG capsule   Commonly known as:  GENERIC EQUIVALENT   Used for:  Kidney replaced by transplant        Dose:  1000 mg   Take 4 capsules (1,000 mg) by mouth 2 times daily   Quantity:  240 capsule   Refills:  11       ondansetron 4 MG ODT tab   Commonly known as:  ZOFRAN-ODT   Used for:  Kidney replaced by transplant        Dose:  4 mg   Take 1 tablet (4 mg) by mouth every 6 hours as needed for nausea or vomiting   Quantity:  20 tablet   Refills:  0       oxyCODONE IR 5 MG tablet   Commonly known as:  ROXICODONE   Used for:  Kidney replaced by transplant        Dose:  5 mg   Take 1 tablet (5 mg) by mouth every 4 hours as needed for moderate to severe pain   Quantity:  20 tablet   Refills:  0       phosphorus tablet 250 mg 250 MG per tablet   Used for:  Hypophosphatemia        Dose:  500 mg   Take 2 tablets (500 mg) by mouth 2 times daily   Quantity:  120 tablet   Refills:  1       senna-docusate 8.6-50 MG per tablet   Commonly known as:  SENOKOT-S;PERICOLACE   Used for:  Kidney replaced by transplant        Dose:  1 tablet   Take 1 tablet by mouth 2 times daily   Quantity:  30 tablet   Refills:  0       sulfamethoxazole-trimethoprim 400-80 MG per tablet   Commonly known as:  BACTRIM/SEPTRA   Indication:  PCP prophylaxis   Used for:  Kidney replaced by transplant, Immunosuppressed status (H)        Dose:  1 tablet   Take 1 tablet by mouth daily   Quantity:  30 tablet   Refills:  11       tacrolimus 1 MG capsule   Commonly known as:  GENERIC EQUIVALENT   Used for:  Kidney replaced by transplant        Dose:  7 mg   Take 7 capsules (7 mg) by mouth 2 times daily   Quantity:  420 capsule   Refills:  11       valGANciclovir 450 MG tablet   Commonly known as:  VALCYTE   Indication:  Medication  Treatment to Prevent Cytomegalovirus Disease   Used for:  Kidney replaced by transplant        Dose:  450 mg   Take 1 tablet (450 mg) by mouth daily Titrate dose up to a max of 2 tabs (900 mg) by mouth daily when directed by your transplant team.   Quantity:  60 tablet   Refills:  2                Protect others around you: Learn how to safely use, store and throw away your medicines at www.disposemymeds.org.             Medication List: This is a list of all your medications and when to take them. Check marks below indicate your daily home schedule. Keep this list as a reference.      Medications           Morning Afternoon Evening Bedtime As Needed    acetaminophen 325 MG tablet   Commonly known as:  TYLENOL   Take 1-2 tablets (325-650 mg) by mouth every 4 hours as needed for mild pain or fever                                aspirin 325 MG EC tablet   Take 1 tablet (325 mg) by mouth daily                                atorvastatin 10 MG tablet   Commonly known as:  LIPITOR   Take 1 tablet (10 mg) by mouth daily                                carvedilol 25 MG tablet   Commonly known as:  COREG   Take 2 tablets (50 mg) by mouth 2 times daily (with meals)   Last time this was given:  50 mg on 8/17/2018  9:56 PM                                cholecalciferol 2000 units tablet   Take 2,000 Units by mouth daily                                magnesium oxide 400 MG tablet   Commonly known as:  MAG-OX   Take 1 tablet (400 mg) by mouth daily (with lunch)                                mycophenolate 250 MG capsule   Commonly known as:  GENERIC EQUIVALENT   Take 4 capsules (1,000 mg) by mouth 2 times daily   Last time this was given:  1,000 mg on 8/17/2018  9:56 PM                                ondansetron 4 MG ODT tab   Commonly known as:  ZOFRAN-ODT   Take 1 tablet (4 mg) by mouth every 6 hours as needed for nausea or vomiting                                oxyCODONE IR 5 MG tablet   Commonly known as:  ROXICODONE   Take  1 tablet (5 mg) by mouth every 4 hours as needed for moderate to severe pain                                phosphorus tablet 250 mg 250 MG per tablet   Take 2 tablets (500 mg) by mouth 2 times daily                                senna-docusate 8.6-50 MG per tablet   Commonly known as:  SENOKOT-S;PERICOLACE   Take 1 tablet by mouth 2 times daily                                sulfamethoxazole-trimethoprim 400-80 MG per tablet   Commonly known as:  BACTRIM/SEPTRA   Take 1 tablet by mouth daily                                tacrolimus 1 MG capsule   Commonly known as:  GENERIC EQUIVALENT   Take 7 capsules (7 mg) by mouth 2 times daily   Last time this was given:  7 mg on 8/17/2018  8:54 PM                                valGANciclovir 450 MG tablet   Commonly known as:  VALCYTE   Take 1 tablet (450 mg) by mouth daily Titrate dose up to a max of 2 tabs (900 mg) by mouth daily when directed by your transplant team.

## 2018-08-17 NOTE — PROGRESS NOTES
Patient Name: Harshad Beatty  Medical Record Number: [id number]  Today's Date: 8/17/2018    Procedure: Image guided renal transplant biopsy  Proceduralist: Dr. Rodriguez    Procedure start time: 1740  Puncture time: 1740  Procedure end time: 1750    Report given to: 6D    Other Notes: Pt arrived to IR room 7 from 2A. Pt denies any questions or concerns regarding procedure. Pt positioned supine and monitored per protocol. Three core samples obtained. Pt tolerated procedure without any noted complications. Pt transferred to 6D.    Toy Morales RN

## 2018-08-17 NOTE — PROGRESS NOTES
1630 Pt on 2a prepped and ready for kidney biopsy. PIV placed. INR 1.2 per istat. H&P current. Pt's mother or sister will  after procedure. Pt did eat at 1430, pt will do procedure without sedation. Pt ready for consent.

## 2018-08-17 NOTE — TELEPHONE ENCOUNTER
Potassium elevated.  Creatinine elevated.    Harshad reports he is drinking 3L daily.    MD recommends transplant biopsy - will complete outpatient in IR tonight with obs on 6D - DILMA Franklin, patient placement, and IR all aware.  Patient voiced understanding.

## 2018-08-17 NOTE — TELEPHONE ENCOUNTER
Issue 1 - Envarsus tacrolimus xr denied    Notified Dr Connolly/dr Ronquillo  Currently has one month supply Envarsus tacrolimus    Currently followed in [facility]

## 2018-08-17 NOTE — TELEPHONE ENCOUNTER
Provider Call: Transplant Lab/Orders  Route to LPN  Post Transplant Days: 10  When patient is less than 60 days post-transplant, route high priority  Reason for Call: would like the full lab orders faxed   Liver patients reporting abnormal lab results: Route to RN and Page  Document lab facility information when provider is calling about annual lab orders. Delete facility wildcards when not needed.  Facility Name: [facility]  Facility Location: [place], [place]  Outside Facility Fax Number:  [phone]  Callback needed? If needed

## 2018-08-18 NOTE — PLAN OF CARE
Problem: Patient Care Overview  Goal: Plan of Care/Patient Progress Review  Arrived to 6D shortly after 1750 renal transplant biopsy. VSS, alert and oriented, independently mobile to bed from [facility]. Right lower abdomen biopsy site dressing clean and dry. Denies significant pain. Tolerated clears then regular diet, voided good amount clear yellow urine. Received evening meds, comfortable resting 4 hours, Hgb then drawn, 11.1 no significant change. As agreed per team and patient, feels well for discharge to home, biopsy site continues to be clean and dry. Discussed discharge instructions, questions answered and given copy. Family to transport home, left unit independently ambulatory to meet ride home at exit.

## 2018-08-20 ENCOUNTER — TELEPHONE (OUTPATIENT)
Dept: TRANSPLANT | Facility: CLINIC | Age: 38
End: 2018-08-20

## 2018-08-20 LAB
ANION GAP SERPL CALCULATED.3IONS-SCNC: 9 MMOL/L (ref 3–14)
BASOPHILS # BLD AUTO: 0 10E9/L (ref 0–0.2)
BASOPHILS NFR BLD AUTO: 0.9 %
BUN SERPL-MCNC: 35 MG/DL (ref 7–30)
CALCIUM SERPL-MCNC: 10.9 MG/DL (ref 8.5–10.1)
CHLORIDE SERPL-SCNC: 109 MMOL/L (ref 94–109)
CO2 SERPL-SCNC: 19 MMOL/L (ref 20–32)
CREAT SERPL-MCNC: 3.26 MG/DL (ref 0.66–1.25)
DIFFERENTIAL METHOD BLD: ABNORMAL
EOSINOPHIL # BLD AUTO: 0.2 10E9/L (ref 0–0.7)
EOSINOPHIL NFR BLD AUTO: 4.5 %
ERYTHROCYTE [DISTWIDTH] IN BLOOD BY AUTOMATED COUNT: 14.1 % (ref 10–15)
GFR SERPL CREATININE-BSD FRML MDRD: 21 ML/MIN/1.7M2
GLUCOSE SERPL-MCNC: 71 MG/DL (ref 70–99)
HCT VFR BLD AUTO: 35.7 % (ref 40–53)
HGB BLD-MCNC: 11.5 G/DL (ref 13.3–17.7)
IMM GRANULOCYTES # BLD: 0 10E9/L (ref 0–0.4)
IMM GRANULOCYTES NFR BLD: 0.2 %
LYMPHOCYTES # BLD AUTO: 0.2 10E9/L (ref 0.8–5.3)
LYMPHOCYTES NFR BLD AUTO: 4.9 %
MAGNESIUM SERPL-MCNC: 2.3 MG/DL (ref 1.6–2.3)
MCH RBC QN AUTO: 27.7 PG (ref 26.5–33)
MCHC RBC AUTO-ENTMCNC: 32.2 G/DL (ref 31.5–36.5)
MCV RBC AUTO: 86 FL (ref 78–100)
MONOCYTES # BLD AUTO: 0.2 10E9/L (ref 0–1.3)
MONOCYTES NFR BLD AUTO: 5.6 %
NEUTROPHILS # BLD AUTO: 3.6 10E9/L (ref 1.6–8.3)
NEUTROPHILS NFR BLD AUTO: 83.9 %
NRBC # BLD AUTO: 0 10*3/UL
NRBC BLD AUTO-RTO: 0 /100
PHOSPHATE SERPL-MCNC: 2.6 MG/DL (ref 2.5–4.5)
PLATELET # BLD AUTO: 345 10E9/L (ref 150–450)
POTASSIUM SERPL-SCNC: 5.4 MMOL/L (ref 3.4–5.3)
RBC # BLD AUTO: 4.15 10E12/L (ref 4.4–5.9)
SODIUM SERPL-SCNC: 137 MMOL/L (ref 133–144)
TACROLIMUS BLD-MCNC: 17.2 UG/L (ref 5–15)
TME LAST DOSE: ABNORMAL H
WBC # BLD AUTO: 4.3 10E9/L (ref 4–11)

## 2018-08-20 PROCEDURE — 85025 COMPLETE CBC W/AUTO DIFF WBC: CPT | Performed by: INTERNAL MEDICINE

## 2018-08-20 PROCEDURE — 80197 ASSAY OF TACROLIMUS: CPT | Performed by: INTERNAL MEDICINE

## 2018-08-20 PROCEDURE — 80180 DRUG SCRN QUAN MYCOPHENOLATE: CPT | Performed by: INTERNAL MEDICINE

## 2018-08-20 PROCEDURE — 80048 BASIC METABOLIC PNL TOTAL CA: CPT | Performed by: INTERNAL MEDICINE

## 2018-08-20 PROCEDURE — 84100 ASSAY OF PHOSPHORUS: CPT | Performed by: INTERNAL MEDICINE

## 2018-08-20 PROCEDURE — 83735 ASSAY OF MAGNESIUM: CPT | Performed by: INTERNAL MEDICINE

## 2018-08-20 NOTE — TELEPHONE ENCOUNTER
Tac level above goal. Current dose = 7mg tacrolimus twice daily.  Previous dose change 8/16/18 in [facility].  Confirm dose and 12 hour trough. Ensure no recent illness (diarrhea), or changes to other medications.    Notify RNCC if any s/s of tac toxicity (elevated BP, headache, tremor).

## 2018-08-20 NOTE — TELEPHONE ENCOUNTER
RNCC discussed with Harshad Lugo was stopped 8/15. IR started 8/16, tac level drawn 8/17. Will await 8/20 level prior to dose change.

## 2018-08-20 NOTE — TELEPHONE ENCOUNTER
RNCC spoke with Harshad.  He does report eating high potassium foods and will reduce this.  He was well hydrated - creatinine up - tac level and biopsy pending.

## 2018-08-20 NOTE — TELEPHONE ENCOUNTER
Left message for patient instructing patient return call to confirm current Tac dose and good 12 hour trough level.  Also instructed patient give general health update at that time.

## 2018-08-21 ENCOUNTER — TELEPHONE (OUTPATIENT)
Dept: TRANSPLANT | Facility: CLINIC | Age: 38
End: 2018-08-21

## 2018-08-21 DIAGNOSIS — Z94.0 KIDNEY REPLACED BY TRANSPLANT: ICD-10-CM

## 2018-08-21 LAB — COPATH REPORT: NORMAL

## 2018-08-21 RX ORDER — TACROLIMUS 1 MG/1
5 CAPSULE ORAL 2 TIMES DAILY
Qty: 300 CAPSULE | Refills: 11 | Status: SHIPPED | OUTPATIENT
Start: 2018-08-21 | End: 2018-08-29

## 2018-08-21 NOTE — TELEPHONE ENCOUNTER
Tacrolimus level above goal, which may explain elevated creatinine.  Current dose tacrolimus 7mg BID confirmed with Patricia.    Will reduce tacrolimus to 5mg twice daily and repeat level with next scheduled lab draw.    Detailed message left regarding dose change and RNCC asked for phone call back to confirm.

## 2018-08-22 ENCOUNTER — TELEPHONE (OUTPATIENT)
Dept: TRANSPLANT | Facility: CLINIC | Age: 38
End: 2018-08-22

## 2018-08-22 ENCOUNTER — RADIANT APPOINTMENT (OUTPATIENT)
Dept: ULTRASOUND IMAGING | Facility: CLINIC | Age: 38
End: 2018-08-22
Attending: SURGERY
Payer: COMMERCIAL

## 2018-08-22 ENCOUNTER — INFUSION THERAPY VISIT (OUTPATIENT)
Dept: INFUSION THERAPY | Facility: CLINIC | Age: 38
End: 2018-08-22
Attending: INTERNAL MEDICINE
Payer: COMMERCIAL

## 2018-08-22 ENCOUNTER — RECORDS - HEALTHEAST (OUTPATIENT)
Dept: LAB | Facility: CLINIC | Age: 38
End: 2018-08-22

## 2018-08-22 VITALS — SYSTOLIC BLOOD PRESSURE: 143 MMHG | RESPIRATION RATE: 16 BRPM | DIASTOLIC BLOOD PRESSURE: 88 MMHG

## 2018-08-22 DIAGNOSIS — Z48.298 AFTERCARE FOLLOWING ORGAN TRANSPLANT: ICD-10-CM

## 2018-08-22 DIAGNOSIS — Z94.0 KIDNEY REPLACED BY TRANSPLANT: ICD-10-CM

## 2018-08-22 DIAGNOSIS — E86.0 DEHYDRATION: Primary | ICD-10-CM

## 2018-08-22 LAB
ANION GAP SERPL CALCULATED.3IONS-SCNC: 10 MMOL/L (ref 5–18)
BUN SERPL-MCNC: 34 MG/DL (ref 8–22)
CALCIUM SERPL-MCNC: 11.2 MG/DL (ref 8.5–10.5)
CHLORIDE BLD-SCNC: 110 MMOL/L (ref 98–107)
CO2 SERPL-SCNC: 17 MMOL/L (ref 22–31)
CREAT SERPL-MCNC: 3.01 MG/DL (ref 0.7–1.3)
ERYTHROCYTE [DISTWIDTH] IN BLOOD BY AUTOMATED COUNT: 13.8 % (ref 11–14.5)
GFR SERPL CREATININE-BSD FRML MDRD: 23 ML/MIN/1.73M2
GLUCOSE BLD-MCNC: 125 MG/DL (ref 70–125)
HCT VFR BLD AUTO: 34 % (ref 40–54)
HGB BLD-MCNC: 11.4 G/DL (ref 14–18)
MCH RBC QN AUTO: 28.4 PG (ref 27–34)
MCHC RBC AUTO-ENTMCNC: 33.5 G/DL (ref 32–36)
MCV RBC AUTO: 85 FL (ref 80–100)
MYCOPHENOLATE SERPL LC/MS/MS-MCNC: 5.33 MG/L (ref 1–3.5)
MYCOPHENOLATE-G SERPL LC/MS/MS-MCNC: 155.6 MG/L (ref 30–95)
PLATELET # BLD AUTO: 306 THOU/UL (ref 140–440)
PMV BLD AUTO: 9.4 FL (ref 8.5–12.5)
POTASSIUM BLD-SCNC: 5.7 MMOL/L (ref 3.5–5)
RBC # BLD AUTO: 4.02 MILL/UL (ref 4.4–6.2)
SODIUM SERPL-SCNC: 137 MMOL/L (ref 136–145)
TME LAST DOSE: ABNORMAL H
WBC: 4.2 THOU/UL (ref 4–11)

## 2018-08-22 PROCEDURE — 96360 HYDRATION IV INFUSION INIT: CPT

## 2018-08-22 PROCEDURE — 80197 ASSAY OF TACROLIMUS: CPT | Performed by: SURGERY

## 2018-08-22 PROCEDURE — 25000128 H RX IP 250 OP 636: Mod: ZF | Performed by: INTERNAL MEDICINE

## 2018-08-22 RX ADMIN — SODIUM CHLORIDE 1000 ML: 9 INJECTION, SOLUTION INTRAVENOUS at 15:45

## 2018-08-22 NOTE — TELEPHONE ENCOUNTER
----- Message from Mirna Harrell RN sent at 8/22/2018  7:26 AM CDT -----  Please call to schedule the ordered repeat ultrasound.    Thanks!    Eli

## 2018-08-22 NOTE — TELEPHONE ENCOUNTER
Post Kidney and Pancreas Transplant Team Conference  Date: 8/22/2018  Transplant Coordinator: Minra Harrell     Attendees:  [x]  Dr. Bahena [] Yanni Field, RN  [x] Lynda Wilson LPN     []  Dr. Gaxiola [x] Tonya Albrecht RN [] Sandra Epperson LPN   [x]  Dr. Connolly [x] Sherry Jimenez, CHRISSIE    [x]  Dr. Ronquillo [] Julieta Perez RN    [x] Dr. High [x] Eli Harrell RN    [] Dr. Lema [] Randy Peter RN    [] Dr. Mitchell [] Dorcas Lopez RN    [] Surgery Fellow [x] Blanca Sow RN    [] Tracy Finley NP              Verbal Plan Read Back:   Repeat renal ultrasound with doppler  1 liter IVF with normal saline    Routed to RN Coordinator   Lynda Wilson

## 2018-08-22 NOTE — MR AVS SNAPSHOT
After Visit Summary   8/22/2018    Harshad Beatty    MRN: [id number]           Patient Information     Date Of Birth          1980        Visit Information        Provider Department      8/22/2018 3:00 PM UC 44 ATC; UC SPEC INFUSION [facility] [place] Specialty [facility] Procedure        Today's Diagnoses     Dehydration    -  1    Kidney replaced by transplant           Follow-ups after your visit        Your next 10 appointments already scheduled     Aug 22, 2018  6:45 PM CDT   LAB with UU LAB MAIL IN   [facility], Laboratory Services (--)    [address] [address] [phone]           Please do not eat 10-12 hours before your appointment if you are coming in fasting for labs on lipids, cholesterol, or glucose (sugar). This does not apply to pregnant women. Water, hot tea and black coffee (with nothing added) are okay. Do not drink other fluids, diet soda or chew gum.            Aug 27, 2018  2:30 PM CDT   (Arrive by 2:15 PM)   Kidney Post Op with Bety Mitchell MD   [facility] Solid Organ Transplant ([facility])    [address] [place] [address] Se  [address] [place] [address]   [phone]            Sep 07, 2018 10:30 AM CDT   (Arrive by 10:00 AM)   Return Kidney Transplant with Uc Early Post Transplant   [facility] Nephrology ([facility])    [address]  [address]  [facility] [address]   [phone]            Sep 07, 2018 11:00 AM CDT   (Arrive by 10:30 AM)   Return Kidney Transplant with Uc Early Post Transplant   [facility] Nephrology ([facility])    [address]  [address]  [facility] [address]   [phone]            Sep 10, 2018  3:45 PM CDT   (Arrive by 3:30 PM)   Cystoscopy with Bety Mitchell MD   [facility] Solid Organ Transplant ([facility])    [address] [place] [address] Se  [address]  [facility] [address]   [phone]             Dec 07, 2018 11:30 AM CST   (Arrive by 11:00 AM)   Return Kidney Transplant with  Early Post Transplant   [facility] Nephrology ([facility])    [address]  [address]  [facility] [address] [phone]            Dec 07, 2018  1:30 PM CST   (Arrive by 1:15 PM)   Office Visit with Lambert Wall RPH   [facility] Medication Therapy Management ([facility])    [address]  3rd Floor  [facility] [address] [phone]           Bring a current list of meds and any records pertaining to this visit. For Physicals, please bring immunization records and any forms needing to be filled out. Please arrive 10 minutes early to complete paperwork.            Feb 07, 2019  1:05 PM CST   (Arrive by 12:35 PM)   Return Kidney Transplant with  Kidney/Pancreas Recipient   [facility] Nephrology ([facility])    [address]  [address]  [facility] [address] [phone]              Who to contact     If you have questions or need follow up information about today's clinic visit or your schedule please contact [facility] SPECIALTY AND PROCEDURE directly at [phone].  Normal or non-critical lab and imaging results will be communicated to you by MyChart, letter or phone within 4 business days after the clinic has received the results. If you do not hear from us within 7 days, please contact the clinic through MyChart or phone. If you have a critical or abnormal lab result, we will notify you by phone as soon as possible.  Submit refill requests through [email or web address] or call your pharmacy and they will forward the refill request to us. Please allow 3 business days for your refill to be completed.          Additional Information About Your Visit        Care EveryWhere ID     This is your Care EveryWhere ID. This could be used by other organizations to access your [facility]  records  [id number]        Your Vitals Were     Respirations                   16            Blood Pressure from Last 3 Encounters:   08/22/18 143/88   08/17/18 (!) 156/91   08/16/18 133/67    Weight from Last 3 Encounters:   08/17/18 108.4 kg (239 lb)   08/16/18 109.3 kg (240 lb 15.4 oz)   08/15/18 109.8 kg (242 lb 1 oz)              Today, you had the following     No orders found for display       Primary Care Provider Office Phone # Fax #    [facility] [phone] [address] [facility] [address]        Equal Access to Services     JAKI PUGA : Silas Penn, alix poe, americo lyle, herb mar. So [facility] [phone].    ATENCIÓN: Si habla español, tiene a rodriguez disposición servicios gratuitos de asistencia lingüística. Ll[facility] [phone].    We comply with applicable federal civil rights laws and [place] laws. We do not discriminate on the basis of race, color, national origin, age, disability, sex, sexual orientation, or gender identity.            Thank you!     Thank you for choosing [facility] SPECIALTY AND PROCEDURE  for your care. Our goal is always to provide you with excellent care. Hearing back from our patients is one way we can continue to improve our services. Please take a few minutes to complete the written survey that you may receive in the mail after your visit with us. Thank you!             Your Updated Medication List - Protect others around you: Learn how to safely use, store and throw away your medicines at www.disposemymeds.org.          This list is accurate as of 8/22/18  5:50 PM.  Always use your most recent med list.                   Brand Name Dispense Instructions for use Diagnosis    acetaminophen 325 MG tablet    TYLENOL    100 tablet    Take 1-2 tablets (325-650 mg) by mouth every 4 hours as needed for mild pain or fever     Kidney replaced by transplant       aspirin 325 MG EC tablet     30 tablet    Take 1 tablet (325 mg) by mouth daily    Kidney replaced by transplant       atorvastatin 10 MG tablet    LIPITOR    30 tablet    Take 1 tablet (10 mg) by mouth daily    Kidney replaced by transplant       carvedilol 25 MG tablet    COREG    120 tablet    Take 2 tablets (50 mg) by mouth 2 times daily (with meals)    Renovascular hypertension, Kidney replaced by transplant       cholecalciferol 2000 units tablet     30 tablet    Take 2,000 Units by mouth daily    Renovascular hypertension, Kidney replaced by transplant       magnesium oxide 400 MG tablet    MAG-OX    30 tablet    Take 1 tablet (400 mg) by mouth daily (with lunch)    Kidney replaced by transplant       mycophenolate 250 MG capsule    GENERIC EQUIVALENT    240 capsule    Take 4 capsules (1,000 mg) by mouth 2 times daily    Kidney replaced by transplant       ondansetron 4 MG ODT tab    ZOFRAN-ODT    20 tablet    Take 1 tablet (4 mg) by mouth every 6 hours as needed for nausea or vomiting    Kidney replaced by transplant       oxyCODONE IR 5 MG tablet    ROXICODONE    20 tablet    Take 1 tablet (5 mg) by mouth every 4 hours as needed for moderate to severe pain    Kidney replaced by transplant       phosphorus tablet 250 mg 250 MG per tablet     120 tablet    Take 2 tablets (500 mg) by mouth 2 times daily    Hypophosphatemia       senna-docusate 8.6-50 MG per tablet    SENOKOT-S;PERICOLACE    30 tablet    Take 1 tablet by mouth 2 times daily    Kidney replaced by transplant       sulfamethoxazole-trimethoprim 400-80 MG per tablet    BACTRIM/SEPTRA    30 tablet    Take 1 tablet by mouth daily    Kidney replaced by transplant, Immunosuppressed status (H)       tacrolimus 1 MG capsule    GENERIC EQUIVALENT    300 capsule    Take 5 capsules (5 mg) by mouth 2 times daily    Kidney replaced by transplant       valGANciclovir 450 MG tablet    VALCYTE    60 tablet    Take 1 tablet  (450 mg) by mouth daily Titrate dose up to a max of 2 tabs (900 mg) by mouth daily when directed by your transplant team.    Kidney replaced by transplant

## 2018-08-22 NOTE — PROGRESS NOTES
Infusion nursing note:    Harshad Beatty presents today to [facility] for a NS one liter infusion.  During today's [facility] appointment orders from Dr Connolly were completed.  Frequency: one time order    Progress note:  ID verified by name and .  Assessment completed.  Vitals were stable throughout time in [facility].  verbal education given to patient/representative regarding infusion and possible side effects.  Patient verbalized understanding.    Note: Pt had labs drawn this am showing potassium of 5.7, high prograf level 17.2 two days ago,: creatinine down to 3.0 from 3.2 two days ago. Pt states he is drinking 2Liters/day of mostly water. He denies any other issues today.      Infusion given over approximately  One hour.     Administrations This Visit     0.9% sodium chloride BOLUS     Admin Date Action Dose Route Administered By             2018 New Bag 1000 mL Intravenous Amanda Peguero RN                          /88  Resp 16    Discharge plan:    Discharge instructions were reviewed with patient: Yes  Patient/representative verbalized understanding of discharge instructions and all questions answered: Yes.    Discharged from [facility] at 1700 with self to home.    Amanda Peguero

## 2018-08-23 LAB
TACROLIMUS BLD-MCNC: 14.6 UG/L (ref 5–15)
TME LAST DOSE: NORMAL H

## 2018-08-24 ENCOUNTER — TELEPHONE (OUTPATIENT)
Dept: TRANSPLANT | Facility: CLINIC | Age: 38
End: 2018-08-24

## 2018-08-24 DIAGNOSIS — E86.0 DEHYDRATION: Primary | ICD-10-CM

## 2018-08-24 DIAGNOSIS — Z94.0 KIDNEY REPLACED BY TRANSPLANT: ICD-10-CM

## 2018-08-24 DIAGNOSIS — E87.20 ACIDOSIS: ICD-10-CM

## 2018-08-24 DIAGNOSIS — D84.9 IMMUNOSUPPRESSED STATUS (H): ICD-10-CM

## 2018-08-24 LAB
ANION GAP SERPL CALCULATED.3IONS-SCNC: 9 MMOL/L (ref 3–14)
BASOPHILS # BLD AUTO: 0.1 10E9/L (ref 0–0.2)
BASOPHILS NFR BLD AUTO: 2.3 %
BUN SERPL-MCNC: 31 MG/DL (ref 7–30)
CALCIUM SERPL-MCNC: 10.7 MG/DL (ref 8.5–10.1)
CHLORIDE SERPL-SCNC: 113 MMOL/L (ref 94–109)
CO2 SERPL-SCNC: 15 MMOL/L (ref 20–32)
CREAT SERPL-MCNC: 2.56 MG/DL (ref 0.66–1.25)
DIFFERENTIAL METHOD BLD: ABNORMAL
EOSINOPHIL # BLD AUTO: 0.2 10E9/L (ref 0–0.7)
EOSINOPHIL NFR BLD AUTO: 6.9 %
ERYTHROCYTE [DISTWIDTH] IN BLOOD BY AUTOMATED COUNT: 13.9 % (ref 10–15)
GFR SERPL CREATININE-BSD FRML MDRD: 28 ML/MIN/1.7M2
GLUCOSE SERPL-MCNC: 85 MG/DL (ref 70–99)
HCT VFR BLD AUTO: 35.7 % (ref 40–53)
HGB BLD-MCNC: 11.5 G/DL (ref 13.3–17.7)
IMM GRANULOCYTES # BLD: 0 10E9/L (ref 0–0.4)
IMM GRANULOCYTES NFR BLD: 0 %
LYMPHOCYTES # BLD AUTO: 0.4 10E9/L (ref 0.8–5.3)
LYMPHOCYTES NFR BLD AUTO: 11.4 %
MCH RBC QN AUTO: 28.4 PG (ref 26.5–33)
MCHC RBC AUTO-ENTMCNC: 32.2 G/DL (ref 31.5–36.5)
MCV RBC AUTO: 88 FL (ref 78–100)
MONOCYTES # BLD AUTO: 0.1 10E9/L (ref 0–1.3)
MONOCYTES NFR BLD AUTO: 4.2 %
NEUTROPHILS # BLD AUTO: 2.3 10E9/L (ref 1.6–8.3)
NEUTROPHILS NFR BLD AUTO: 75.2 %
NRBC # BLD AUTO: 0 10*3/UL
NRBC BLD AUTO-RTO: 0 /100
PLATELET # BLD AUTO: 304 10E9/L (ref 150–450)
POTASSIUM SERPL-SCNC: 5.5 MMOL/L (ref 3.4–5.3)
RBC # BLD AUTO: 4.05 10E12/L (ref 4.4–5.9)
SODIUM SERPL-SCNC: 137 MMOL/L (ref 133–144)
TACROLIMUS BLD-MCNC: 12.7 UG/L (ref 5–15)
TME LAST DOSE: NORMAL H
WBC # BLD AUTO: 3.1 10E9/L (ref 4–11)

## 2018-08-24 PROCEDURE — 80197 ASSAY OF TACROLIMUS: CPT | Performed by: SURGERY

## 2018-08-24 PROCEDURE — 80048 BASIC METABOLIC PNL TOTAL CA: CPT | Performed by: SURGERY

## 2018-08-24 PROCEDURE — 85025 COMPLETE CBC W/AUTO DIFF WBC: CPT | Performed by: SURGERY

## 2018-08-24 RX ORDER — SODIUM BICARBONATE 650 MG/1
1300 TABLET ORAL 2 TIMES DAILY
Qty: 120 TABLET | Refills: 11 | Status: SHIPPED | OUTPATIENT
Start: 2018-08-24 | End: 2023-09-27

## 2018-08-24 RX ORDER — SULFAMETHOXAZOLE AND TRIMETHOPRIM 400; 80 MG/1; MG/1
TABLET ORAL
Qty: 30 TABLET | Refills: 11 | Status: SHIPPED | OUTPATIENT
Start: 2018-08-24 | End: 2018-10-05

## 2018-08-24 NOTE — TELEPHONE ENCOUNTER
Khaliad, MD Julio Cesar Montelongo Alanna, CHRISSIE                     Correct, thank you   SR            Previous Messages       ----- Message -----      From: Mirna Harrell RN      Sent: 8/24/2018  12:40 PM        To: Brijesh Gaxiola MD     Per review of Mr. Beatty's chart, please confirm the following changes in plan of care:     1. 1L IV NS, repeat if patient is positive for orthostasis (HR increase by 10BPM, SBP or DBP drop by 10 points).   2. Start 1300mg sodium bicarbonate BID   3. REDUCE bactrim dosing to MWF   4. Hold vitamin D                    Patricia voiced understanding of all changes to plan of care.

## 2018-08-25 ENCOUNTER — INFUSION THERAPY VISIT (OUTPATIENT)
Dept: INFUSION THERAPY | Facility: CLINIC | Age: 38
End: 2018-08-25
Attending: INTERNAL MEDICINE
Payer: COMMERCIAL

## 2018-08-25 VITALS
OXYGEN SATURATION: 99 % | DIASTOLIC BLOOD PRESSURE: 76 MMHG | TEMPERATURE: 98.3 F | RESPIRATION RATE: 16 BRPM | SYSTOLIC BLOOD PRESSURE: 127 MMHG

## 2018-08-25 DIAGNOSIS — E86.0 DEHYDRATION: Primary | ICD-10-CM

## 2018-08-25 DIAGNOSIS — Z94.0 KIDNEY REPLACED BY TRANSPLANT: ICD-10-CM

## 2018-08-25 PROCEDURE — 96365 THER/PROPH/DIAG IV INF INIT: CPT

## 2018-08-25 PROCEDURE — 25000128 H RX IP 250 OP 636: Mod: ZF | Performed by: INTERNAL MEDICINE

## 2018-08-25 RX ADMIN — SODIUM CHLORIDE 1000 ML: 9 INJECTION, SOLUTION INTRAVENOUS at 07:42

## 2018-08-25 NOTE — PROGRESS NOTES
Nursing Note  [facility] Beat[facility]:   Chief Complaint   Patient presents with     Infusion     IV Fluids     During [facility]'s [facility] appointment, orders from Dr ABDOUL Gaxiola were completed.  Frequency: once    Progress note:  Patient identification verified by name and date of birth.  Assessment completed. Vitals recorded in Doc Flowsheets.  Patient was provided with education regarding infusion and possible side effects.  Patient verbalized understanding.      needed: No  Premedications: were not ordered.  Infusion Rates: infusion given over approximately one hour.  Labs: were not ordered for this appointment.  Vascular access: peripheral IV placed today.  Treatment Conditions: non-applicable.  Patient tolerated infusion: well.    Sitting blood pressure/HR following 1liter of IV Normal Saline: 129/74-69; standing blood pressure/HR following 1 liter of IV Normal Saline: 127/76-72; 2nd liter of Normal Saline not needed per orders.     Discharge Plan:   Follow up plan of care with: transplant coordinator.  Discharge instructions were reviewed with patient.  Patient/representative verbalized understanding of discharge instructions and all questions answered.  [facility] discharged [facility] in stable condition.    Alex Bruno RN    Administrations This Visit     0.9% sodium chloride BOLUS     Admin Date Action Dose Route Administered By             08/25/2018 New Bag 1000 mL Intravenous Alex Bruno RN                          BP 98/61  Temp 98.3  F (36.8  C) (Oral)  Resp 16  SpO2 99%

## 2018-08-25 NOTE — MR AVS SNAPSHOT
After Visit Summary   8/25/2018    Harshad Beatty    MRN: [id number]           Patient Information     Date Of Birth          1980        Visit Information        Provider Department      8/25/2018 7:00 AM UC 45 ATC; UC SPEC INFUSION [facility] [place] Specialty and Procedure        Today's Diagnoses     Dehydration    -  1    Kidney replaced by transplant           Follow-ups after your visit        Your next 10 appointments already scheduled     Aug 27, 2018  2:30 PM CDT   (Arrive by 2:15 PM)   Kidney Post Op with Bety Mitchell MD   [facility] Solid Organ Transplant ([facility])    [address]  [address]  [facility] [address]   [phone]            Sep 07, 2018 10:30 AM CDT   (Arrive by 10:00 AM)   Return Kidney Transplant with Uc Early Post Transplant   [facility] Nephrology ([facility])    [address]  [address]  [facility] [address]   [phone]            Sep 07, 2018 11:00 AM CDT   (Arrive by 10:30 AM)   Return Kidney Transplant with Uc Early Post Transplant   [facility] Nephrology ([facility])    [address]  [address]  [facility] [address]   [phone]            Sep 10, 2018  3:45 PM CDT   (Arrive by 3:30 PM)   Cystoscopy with Bety Mitchell MD   [facility] Solid Organ Transplant ([facility])    [address]  [address]  [facility] [address]   [phone]            Dec 07, 2018 11:30 AM CST   (Arrive by 11:00 AM)   Return Kidney Transplant with Uc Early Post Transplant   [facility] Nephrology ([facility])    [address]  [address]  [facility] [address]   [phone]            Dec 07, 2018  1:30 PM CST   (Arrive by 1:15 PM)   Office Visit with Lambert Wall RPH   [facility] Medication Therapy Management ([facility])    [address] [place] [address]  Floor  [facility] [address] [phone]           Bring a current list of meds and any records pertaining to this visit. For Physicals, please bring immunization records and any forms needing to be filled out. Please arrive 10 minutes early to complete paperwork.            Feb 07, 2019  1:05 PM CST   (Arrive by 12:35 PM)   Return Kidney Transplant with Uc Kidney/Pancreas Recipient   [facility] Nephrology ([facility] [place])    [address]  [address]  [facility] [address] [phone]              Who to contact     If you have questions or need follow up information about today's clinic visit or your schedule please contact [facility] SPECIALTY AND PROCEDURE directly at [phone].  Normal or non-critical lab and imaging results will be communicated to you by MyChart, letter or phone within 4 business days after the clinic has received the results. If you do not hear from us within 7 days, please contact the clinic through MyChart or phone. If you have a critical or abnormal lab result, we will notify you by phone as soon as possible.  Submit refill requests through [employer] or call your pharmacy and they will forward the refill request to us. Please allow 3 business days for your refill to be completed.          Additional Information About Your Visit        Care EveryWhere ID     This is your Care EveryWhere ID. This could be used by other organizations to access your [place] medical records  [id number]        Your Vitals Were     Temperature Respirations Pulse Oximetry             98.3  F (36.8  C) (Oral) 16 99%          Blood Pressure from Last 3 Encounters:   08/25/18 127/76   08/22/18 143/88   08/17/18 (!) 156/91    Weight from Last 3 Encounters:   08/17/18 108.4 kg (239 lb)   08/16/18 109.3 kg (240 lb 15.4 oz)   08/15/18 109.8 kg (242 lb 1 oz)              Today, you had the following     No orders found for display       Primary Care  Provider Office Phone # Fax #    [facility] [phone] [address] [address]        Equal Access to Services     JAKI PUGA : Hadii aad ku hadessiejaleesa Penn, alix tavovitaliyha, qasantiagota kapavan lyle, herb jimenez davidnarciso green ruby mar. So [facility] [phone].    ATENCIÓN: Si habla español, tiene a rodriguez disposición servicios gratuitos de asistencia lingüística. Llame al [phone].    We comply with applicable federal civil rights laws and [place] laws. We do not discriminate on the basis of race, color, national origin, age, disability, sex, sexual orientation, or gender identity.            Thank you!     Thank you for choosing [facility] SPECIALTY AND PROCEDURE  for your care. Our goal is always to provide you with excellent care. Hearing back from our patients is one way we can continue to improve our services. Please take a few minutes to complete the written survey that you may receive in the mail after your visit with us. Thank you!             Your Updated Medication List - Protect others around you: Learn how to safely use, store and throw away your medicines at www.disposemymeds.org.          This list is accurate as of 8/25/18  9:18 AM.  Always use your most recent med list.                   Brand Name Dispense Instructions for use Diagnosis    acetaminophen 325 MG tablet    TYLENOL    100 tablet    Take 1-2 tablets (325-650 mg) by mouth every 4 hours as needed for mild pain or fever    Kidney replaced by transplant       aspirin 325 MG EC tablet     30 tablet    Take 1 tablet (325 mg) by mouth daily    Kidney replaced by transplant       atorvastatin 10 MG tablet    LIPITOR    30 tablet    Take 1 tablet (10 mg) by mouth daily    Kidney replaced by transplant       carvedilol 25 MG tablet    COREG    120 tablet    Take 2 tablets (50 mg) by mouth 2 times daily (with meals)    Renovascular hypertension,  Kidney replaced by transplant       magnesium oxide 400 MG tablet    MAG-OX    30 tablet    Take 1 tablet (400 mg) by mouth daily (with lunch)    Kidney replaced by transplant       mycophenolate 250 MG capsule    GENERIC EQUIVALENT    240 capsule    Take 4 capsules (1,000 mg) by mouth 2 times daily    Kidney replaced by transplant       ondansetron 4 MG ODT tab    ZOFRAN-ODT    20 tablet    Take 1 tablet (4 mg) by mouth every 6 hours as needed for nausea or vomiting    Kidney replaced by transplant       oxyCODONE IR 5 MG tablet    ROXICODONE    20 tablet    Take 1 tablet (5 mg) by mouth every 4 hours as needed for moderate to severe pain    Kidney replaced by transplant       phosphorus tablet 250 mg 250 MG per tablet     120 tablet    Take 2 tablets (500 mg) by mouth 2 times daily    Hypophosphatemia       senna-docusate 8.6-50 MG per tablet    SENOKOT-S;PERICOLACE    30 tablet    Take 1 tablet by mouth 2 times daily    Kidney replaced by transplant       sodium bicarbonate 650 MG tablet     120 tablet    Take 2 tablets (1,300 mg) by mouth 2 times daily    Acidosis, Kidney replaced by transplant       sulfamethoxazole-trimethoprim 400-80 MG per tablet    BACTRIM/SEPTRA    30 tablet    1 tablet Monday, Wednesday, Friday    Kidney replaced by transplant, Immunosuppressed status (H)       tacrolimus 1 MG capsule    GENERIC EQUIVALENT    300 capsule    Take 5 capsules (5 mg) by mouth 2 times daily    Kidney replaced by transplant       valGANciclovir 450 MG tablet    VALCYTE    60 tablet    Take 1 tablet (450 mg) by mouth daily Titrate dose up to a max of 2 tabs (900 mg) by mouth daily when directed by your transplant team.    Kidney replaced by transplant

## 2018-08-27 ENCOUNTER — OFFICE VISIT (OUTPATIENT)
Dept: TRANSPLANT | Facility: CLINIC | Age: 38
End: 2018-08-27
Attending: SURGERY
Payer: COMMERCIAL

## 2018-08-27 VITALS
DIASTOLIC BLOOD PRESSURE: 80 MMHG | OXYGEN SATURATION: 99 % | WEIGHT: 234.5 LBS | BODY MASS INDEX: 31.76 KG/M2 | HEIGHT: 72 IN | SYSTOLIC BLOOD PRESSURE: 138 MMHG | HEART RATE: 69 BPM | RESPIRATION RATE: 22 BRPM

## 2018-08-27 DIAGNOSIS — Z94.0 KIDNEY REPLACED BY TRANSPLANT: Primary | ICD-10-CM

## 2018-08-27 DIAGNOSIS — Z48.298 AFTERCARE FOLLOWING ORGAN TRANSPLANT: Primary | ICD-10-CM

## 2018-08-27 LAB
ANION GAP SERPL CALCULATED.3IONS-SCNC: 9 MMOL/L (ref 3–14)
BASOPHILS # BLD AUTO: 0.1 10E9/L (ref 0–0.2)
BASOPHILS NFR BLD AUTO: 3 %
BUN SERPL-MCNC: 28 MG/DL (ref 7–30)
CALCIUM SERPL-MCNC: 9.9 MG/DL (ref 8.5–10.1)
CHLORIDE SERPL-SCNC: 115 MMOL/L (ref 94–109)
CO2 SERPL-SCNC: 18 MMOL/L (ref 20–32)
CREAT SERPL-MCNC: 2.3 MG/DL (ref 0.66–1.25)
DIFFERENTIAL METHOD BLD: ABNORMAL
EOSINOPHIL # BLD AUTO: 0.2 10E9/L (ref 0–0.7)
EOSINOPHIL NFR BLD AUTO: 6.7 %
ERYTHROCYTE [DISTWIDTH] IN BLOOD BY AUTOMATED COUNT: 13.8 % (ref 10–15)
GFR SERPL CREATININE-BSD FRML MDRD: 32 ML/MIN/1.7M2
GLUCOSE SERPL-MCNC: 89 MG/DL (ref 70–99)
HCT VFR BLD AUTO: 32 % (ref 40–53)
HGB BLD-MCNC: 10.5 G/DL (ref 13.3–17.7)
IMM GRANULOCYTES # BLD: 0 10E9/L (ref 0–0.4)
IMM GRANULOCYTES NFR BLD: 0.4 %
LYMPHOCYTES # BLD AUTO: 0.3 10E9/L (ref 0.8–5.3)
LYMPHOCYTES NFR BLD AUTO: 11.9 %
MAGNESIUM SERPL-MCNC: 1.9 MG/DL (ref 1.6–2.3)
MCH RBC QN AUTO: 27.9 PG (ref 26.5–33)
MCHC RBC AUTO-ENTMCNC: 32.8 G/DL (ref 31.5–36.5)
MCV RBC AUTO: 85 FL (ref 78–100)
MONOCYTES # BLD AUTO: 0.1 10E9/L (ref 0–1.3)
MONOCYTES NFR BLD AUTO: 4.9 %
NEUTROPHILS # BLD AUTO: 2 10E9/L (ref 1.6–8.3)
NEUTROPHILS NFR BLD AUTO: 73.1 %
NRBC # BLD AUTO: 0 10*3/UL
NRBC BLD AUTO-RTO: 0 /100
PHOSPHATE SERPL-MCNC: 2.9 MG/DL (ref 2.5–4.5)
PLATELET # BLD AUTO: 295 10E9/L (ref 150–450)
POTASSIUM SERPL-SCNC: 4.9 MMOL/L (ref 3.4–5.3)
RBC # BLD AUTO: 3.77 10E12/L (ref 4.4–5.9)
SODIUM SERPL-SCNC: 142 MMOL/L (ref 133–144)
TACROLIMUS BLD-MCNC: 11.8 UG/L (ref 5–15)
TME LAST DOSE: NORMAL H
WBC # BLD AUTO: 2.7 10E9/L (ref 4–11)

## 2018-08-27 PROCEDURE — 84100 ASSAY OF PHOSPHORUS: CPT | Performed by: INTERNAL MEDICINE

## 2018-08-27 PROCEDURE — 80048 BASIC METABOLIC PNL TOTAL CA: CPT | Performed by: INTERNAL MEDICINE

## 2018-08-27 PROCEDURE — 85025 COMPLETE CBC W/AUTO DIFF WBC: CPT | Performed by: INTERNAL MEDICINE

## 2018-08-27 PROCEDURE — 80197 ASSAY OF TACROLIMUS: CPT | Performed by: INTERNAL MEDICINE

## 2018-08-27 PROCEDURE — 80180 DRUG SCRN QUAN MYCOPHENOLATE: CPT | Performed by: INTERNAL MEDICINE

## 2018-08-27 PROCEDURE — 83735 ASSAY OF MAGNESIUM: CPT | Performed by: INTERNAL MEDICINE

## 2018-08-27 PROCEDURE — G0463 HOSPITAL OUTPT CLINIC VISIT: HCPCS | Mod: ZF

## 2018-08-27 NOTE — PROGRESS NOTES
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     Denies fevers, chills, sweats. No lightheadedness or dizziness. Early satiety due to large volume fluid intake. Otherwise good appetite and improving po intake over the last few days. Voiding multiple times a day therefore not sleeping well.  Pain well controlled. Dermabond glue peeling off.      O:  /80 (Patient Position: Sitting)  Pulse 69  Resp 22  Ht 1.816 m (5' 11.5\")  Wt 106.4 kg (234 lb 8 oz)  SpO2 99%  BMI 32.25 kg/m2    Physical Exam:  Gen: seated, no acute distress  HEENT: NCAT, EOMI  CV: regular rate, RUE fistula with bruit  Pulm: nonlabored on room air  Abd: soft, nondistended, nontender, RLQ incision c/d/i healing appropriately, no drainage or erythema  MSK: normal tone,   Skin: no acute rash, incisions c/d/i no discharge or erythema      8/17/18 Kidney biopsy  FINAL DIAGNOSIS:   Kidney allograft with no evidence of acute cellular or humoral rejection     Immunofluorescence microscopy.  C4d is negative in the peritubular   vessels. The tubules are negative for light   chains. The glomeruli are not identified.    8/22/18 Renal ultrasound      IMPRESSION:  1. Normal grayscale appearance of the transplant kidney. Decreased  fluid collection adjacent to the effusion. No perinephric fluid  collections.   2. Elevated peak systolic velocities in the renal artery at the hilum  and at the anastomosis as well as in the renal vein at the  anastomosis, new from prior, with normal Doppler waveforms.      A/P  1. 8/27 Cr. 2.30. Trending down appropriately since decrease in Tac  2. 8/24 Tac level 12.7<- 17.2  3. Immunosuppression:  - Tacro 5mg BID down from 7mg  - Cellcept 1000mg BID  4.  CO2 18. Sodium bicarb not started yet, insurance not covering  Advised to take 1/4 tsp Baking Soda in 4oz of water BID  5. " Advised to wear abdominal binder  6. Follow up in 1 month, if Cr <2.0 then will schedule RUE fistula removal    Patient seen and discussed with Dr. Mitchell.    Katharina Amado, PGY1  Transplant Surgery Service              I have reviewed history, examined patient and discussed plan with the fellow/resident/CONCHITA.  I concur with the findings in this note.    Immunosuppressive regimen, management and long term risks discussed in detail. Changes, when applicable made as per orders.    Total time: 15 min  Counseling time: 10 min

## 2018-08-27 NOTE — TELEPHONE ENCOUNTER
Call placed to patient. No answer. Voice message left requesting a return call to discuss current tacrolimus level and dose change

## 2018-08-27 NOTE — LETTER
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     Denies fevers, chills, sweats. No lightheadedness or dizziness. Early satiety due to large volume fluid intake. Otherwise good appetite and improving po intake over the last few days. Voiding multiple times a day therefore not sleeping well.  Pain well controlled. Dermabond glue peeling off.      O:  /80 (Patient Position: Sitting)  Pulse 69  Resp 22  Ht 1.816 m (5' 11.5\")  Wt 106.4 kg (234 lb 8 oz)  SpO2 99%  BMI 32.25 kg/m2    Physical Exam:  Gen: seated, no acute distress  HEENT: NCAT, EOMI  CV: regular rate, RUE fistula with bruit  Pulm: nonlabored on room air  Abd: soft, nondistended, nontender, RLQ incision c/d/i healing appropriately, no drainage or erythema  MSK: normal tone,   Skin: no acute rash, incisions c/d/i no discharge or erythema      8/17/18 Kidney biopsy  FINAL DIAGNOSIS:   Kidney allograft with no evidence of acute cellular or humoral rejection     Immunofluorescence microscopy.  C4d is negative in the peritubular   vessels. The tubules are negative for light   chains. The glomeruli are not identified.    8/22/18 Renal ultrasound      IMPRESSION:  1. Normal grayscale appearance of the transplant kidney. Decreased  fluid collection adjacent to the effusion. No perinephric fluid  collections.   2. Elevated peak systolic velocities in the renal artery at the hilum  and at the anastomosis as well as in the renal vein at the  anastomosis, new from prior, with normal Doppler waveforms.      A/P  1. 8/27 Cr. 2.30. Trending down appropriately since decrease in Tac  2. 8/24 Tac level 12.7<- 17.2  3. Immunosuppression:  - Tacro 5mg BID down from 7mg  - Cellcept 1000mg BID  4.  CO2 18. Sodium bicarb not started yet, " insurance not covering  Advised to take 1/4 tsp Baking Soda in 4oz of water BID  5. Advised to wear abdominal binder  6. Follow up in 1 month, if Cr <2.0 then will schedule RUE fistula removal    Patient seen and discussed with Dr. Mitchell.    Katharina Amado, PGY1  Transplant Surgery Service              I have reviewed history, examined patient and discussed plan with the fellow/resident/CONCHITA.  I concur with the findings in this note.    Immunosuppressive regimen, management and long term risks discussed in detail. Changes, when applicable made as per orders.    Total time: 15 min  Counseling time: 10 min

## 2018-08-27 NOTE — NURSING NOTE
"Chief Complaint   Patient presents with     RECHECK     2 week post kidney txp 8/7/2018     /80 (Patient Position: Sitting)  Pulse 69  Resp 22  Ht 1.816 m (5' 11.5\")  Wt 106.4 kg (234 lb 8 oz)  SpO2 99%  BMI 32.25 kg/m2  Adri Iqbal LPN     "

## 2018-08-27 NOTE — MR AVS SNAPSHOT
After Visit Summary   8/27/2018    Harshad Beatty    MRN: [id number]           Patient Information     Date Of Birth          1980        Visit Information        Provider Department      8/27/2018 2:30 PM Bety Mitchell MD [facility] Solid Organ Transplant         Follow-ups after your visit        Your next 10 appointments already scheduled     Sep 07, 2018 10:30 AM CDT   (Arrive by 10:00 AM)   Return Kidney Transplant with Uc Early Post Transplant   [facility] Nephrology ([facility])    [address]  [address]  [facility] [address]   [phone]            Sep 07, 2018 11:00 AM CDT   (Arrive by 10:30 AM)   Return Kidney Transplant with Uc Early Post Transplant   [facility] Nephrology ([facility])    [address] [place] [address]  [address]  [facility] [address]   [phone]            Sep 10, 2018  3:45 PM CDT   (Arrive by 3:30 PM)   Cystoscopy with Bety Mitchell MD   [facility] Solid Organ Transplant ([facility])    [address] [place] [address]  [address]  [facility] [address]   [phone]            Sep 17, 2018  1:30 PM CDT   (Arrive by 1:15 PM)   Kidney Post Op with Btey Mitchell MD   [facility] Solid Organ Transplant ([facility])    [address] [place] [address]  [address]  [facility] [address]   [phone]            Dec 07, 2018 11:30 AM CST   (Arrive by 11:00 AM)   Return Kidney Transplant with Uc Early Post Transplant   [facility] Nephrology ([facility])    [address]  [address]  [facility] [address]   [phone]            Dec 07, 2018  1:30 PM CST   (Arrive by 1:15 PM)   Office Visit with Lambert Wall RPH   [facility] Medication Therapy Management ([facility])    [address] [place] [address]  3rd Floor  [facility] [address]   [phone]           Bring a current list of meds and any records pertaining to  "this visit. For Physicals, please bring immunization records and any forms needing to be filled out. Please arrive 10 minutes early to complete paperwork.            Feb 07, 2019  1:05 PM CST   (Arrive by 12:35 PM)   Return Kidney Transplant with Uc Kidney/Pancreas Recipient   [facility] Nephrology ([facility])    [address]  [address]  [facility] [address] [phone]              Who to contact     If you have questions or need follow up information about today's clinic visit or your schedule please contact [facility] SOLID ORGAN TRANSPLANT directly at [phone].  Normal or non-critical lab and imaging results will be communicated to you by MyChart, letter or phone within 4 business days after the clinic has received the results. If you do not hear from us within 7 days, please contact the clinic through MyChart or phone. If you have a critical or abnormal lab result, we will notify you by phone as soon as possible.  Submit refill requests through [employer] or call your pharmacy and they will forward the refill request to us. Please allow 3 business days for your refill to be completed.          Additional Information About Your Visit        Care EveryWhere ID     This is your Care EveryWhere ID. This could be used by other organizations to access your [place] medical records  [id number]        Your Vitals Were     Pulse Respirations Height Pulse Oximetry BMI (Body Mass Index)       69 22 1.816 m (5' 11.5\") 99% 32.25 kg/m2        Blood Pressure from Last 3 Encounters:   08/27/18 138/80   08/25/18 127/76   08/22/18 143/88    Weight from Last 3 Encounters:   08/27/18 106.4 kg (234 lb 8 oz)   08/17/18 108.4 kg (239 lb)   08/16/18 109.3 kg (240 lb 15.4 oz)              Today, you had the following     No orders found for display       Primary Care Provider Office Phone # Fax #    [facility] [phone] [address] " [facility] [address]        Equal Access to Services     [facility] VIVIEN : Hadii aad ku hadessiejaleesa Andersonali, waaxda luqadaha, qaybta kaalmada dariela, herb mar. So [facility] [phone].    ATENCIÓN: Si habla español, tiene a rodriguez disposición servicios gratuitos de asistencia lingüística. Ettaame al [phone].    We comply with applicable federal civil rights laws and [place] laws. We do not discriminate on the basis of race, color, national origin, age, disability, sex, sexual orientation, or gender identity.            Thank you!     Thank you for choosing [facility] SOLID ORGAN TRANSPLANT  for your care. Our goal is always to provide you with excellent care. Hearing back from our patients is one way we can continue to improve our services. Please take a few minutes to complete the written survey that you may receive in the mail after your visit with us. Thank you!             Your Updated Medication List - Protect others around you: Learn how to safely use, store and throw away your medicines at www.disposemymeds.org.          This list is accurate as of 8/27/18  2:55 PM.  Always use your most recent med list.                   Brand Name Dispense Instructions for use Diagnosis    acetaminophen 325 MG tablet    TYLENOL    100 tablet    Take 1-2 tablets (325-650 mg) by mouth every 4 hours as needed for mild pain or fever    Kidney replaced by transplant       aspirin 325 MG EC tablet     30 tablet    Take 1 tablet (325 mg) by mouth daily    Kidney replaced by transplant       atorvastatin 10 MG tablet    LIPITOR    30 tablet    Take 1 tablet (10 mg) by mouth daily    Kidney replaced by transplant       carvedilol 25 MG tablet    COREG    120 tablet    Take 2 tablets (50 mg) by mouth 2 times daily (with meals)    Renovascular hypertension, Kidney replaced by transplant       magnesium oxide 400 MG tablet    MAG-OX    30 tablet    Take 1 tablet (400 mg) by mouth daily (with lunch)    Kidney  replaced by transplant       mycophenolate 250 MG capsule    GENERIC EQUIVALENT    240 capsule    Take 4 capsules (1,000 mg) by mouth 2 times daily    Kidney replaced by transplant       ondansetron 4 MG ODT tab    ZOFRAN-ODT    20 tablet    Take 1 tablet (4 mg) by mouth every 6 hours as needed for nausea or vomiting    Kidney replaced by transplant       oxyCODONE IR 5 MG tablet    ROXICODONE    20 tablet    Take 1 tablet (5 mg) by mouth every 4 hours as needed for moderate to severe pain    Kidney replaced by transplant       phosphorus tablet 250 mg 250 MG per tablet     120 tablet    Take 2 tablets (500 mg) by mouth 2 times daily    Hypophosphatemia       senna-docusate 8.6-50 MG per tablet    SENOKOT-S;PERICOLACE    30 tablet    Take 1 tablet by mouth 2 times daily    Kidney replaced by transplant       sodium bicarbonate 650 MG tablet     120 tablet    Take 2 tablets (1,300 mg) by mouth 2 times daily    Acidosis, Kidney replaced by transplant       sulfamethoxazole-trimethoprim 400-80 MG per tablet    BACTRIM/SEPTRA    30 tablet    1 tablet Monday, Wednesday, Friday    Kidney replaced by transplant, Immunosuppressed status (H)       tacrolimus 1 MG capsule    GENERIC EQUIVALENT    300 capsule    Take 5 capsules (5 mg) by mouth 2 times daily    Kidney replaced by transplant       valGANciclovir 450 MG tablet    VALCYTE    60 tablet    Take 1 tablet (450 mg) by mouth daily Titrate dose up to a max of 2 tabs (900 mg) by mouth daily when directed by your transplant team.    Kidney replaced by transplant

## 2018-08-27 NOTE — TELEPHONE ENCOUNTER
Tac level above goal. Current dose = 5mg BID.  Confirm dose and trough. Ensure no current illness (diarrhea) or recent changes to other medications.  If accurate, DECREASE tacrolimus to 4.5mg twice daily and repeat level with next scheduled lab draw.     Please ensure no high BPs, headaches, tremors, or insomnia.

## 2018-08-28 LAB
MYCOPHENOLATE SERPL LC/MS/MS-MCNC: 6.41 MG/L (ref 1–3.5)
MYCOPHENOLATE-G SERPL LC/MS/MS-MCNC: 98.1 MG/L (ref 30–95)
TME LAST DOSE: ABNORMAL H

## 2018-08-29 LAB
ANION GAP SERPL CALCULATED.3IONS-SCNC: 9 MMOL/L (ref 3–14)
BUN SERPL-MCNC: 30 MG/DL (ref 7–30)
CALCIUM SERPL-MCNC: 9.9 MG/DL (ref 8.5–10.1)
CHLORIDE SERPL-SCNC: 109 MMOL/L (ref 94–109)
CO2 SERPL-SCNC: 19 MMOL/L (ref 20–32)
CREAT SERPL-MCNC: 2.24 MG/DL (ref 0.66–1.25)
ERYTHROCYTE [DISTWIDTH] IN BLOOD BY AUTOMATED COUNT: 13.6 % (ref 10–15)
GFR SERPL CREATININE-BSD FRML MDRD: 33 ML/MIN/1.7M2
GLUCOSE SERPL-MCNC: 92 MG/DL (ref 70–99)
HCT VFR BLD AUTO: 34.4 % (ref 40–53)
HGB BLD-MCNC: 11.1 G/DL (ref 13.3–17.7)
MCH RBC QN AUTO: 27.7 PG (ref 26.5–33)
MCHC RBC AUTO-ENTMCNC: 32.3 G/DL (ref 31.5–36.5)
MCV RBC AUTO: 86 FL (ref 78–100)
PLATELET # BLD AUTO: 297 10E9/L (ref 150–450)
POTASSIUM SERPL-SCNC: 5.3 MMOL/L (ref 3.4–5.3)
RBC # BLD AUTO: 4.01 10E12/L (ref 4.4–5.9)
SODIUM SERPL-SCNC: 137 MMOL/L (ref 133–144)
TACROLIMUS BLD-MCNC: 12 UG/L (ref 5–15)
TME LAST DOSE: NORMAL H
WBC # BLD AUTO: 2.5 10E9/L (ref 4–11)

## 2018-08-29 PROCEDURE — 85027 COMPLETE CBC AUTOMATED: CPT | Performed by: SURGERY

## 2018-08-29 PROCEDURE — 80048 BASIC METABOLIC PNL TOTAL CA: CPT | Performed by: SURGERY

## 2018-08-29 PROCEDURE — 80197 ASSAY OF TACROLIMUS: CPT | Performed by: SURGERY

## 2018-08-29 RX ORDER — TACROLIMUS 0.5 MG/1
0.5 CAPSULE ORAL 2 TIMES DAILY
Qty: 60 CAPSULE | Refills: 11 | Status: SHIPPED | OUTPATIENT
Start: 2018-08-29 | End: 2018-09-06

## 2018-08-29 RX ORDER — TACROLIMUS 1 MG/1
4 CAPSULE ORAL 2 TIMES DAILY
Qty: 240 CAPSULE | Refills: 11 | Status: SHIPPED | OUTPATIENT
Start: 2018-08-29 | End: 2018-09-06

## 2018-08-31 ENCOUNTER — TELEPHONE (OUTPATIENT)
Dept: TRANSPLANT | Facility: CLINIC | Age: 38
End: 2018-08-31

## 2018-08-31 DIAGNOSIS — Z48.298 AFTERCARE FOLLOWING ORGAN TRANSPLANT: ICD-10-CM

## 2018-08-31 DIAGNOSIS — T86.10 COMPLICATIONS, KIDNEY TRANSPLANT: ICD-10-CM

## 2018-08-31 DIAGNOSIS — Z94.0 KIDNEY TRANSPLANTED: Primary | ICD-10-CM

## 2018-08-31 LAB
ANION GAP SERPL CALCULATED.3IONS-SCNC: 7 MMOL/L (ref 3–14)
BUN SERPL-MCNC: 34 MG/DL (ref 7–30)
CALCIUM SERPL-MCNC: 10 MG/DL (ref 8.5–10.1)
CHLORIDE SERPL-SCNC: 112 MMOL/L (ref 94–109)
CO2 SERPL-SCNC: 23 MMOL/L (ref 20–32)
CREAT SERPL-MCNC: 2.47 MG/DL (ref 0.66–1.25)
ERYTHROCYTE [DISTWIDTH] IN BLOOD BY AUTOMATED COUNT: 13.6 % (ref 10–15)
GFR SERPL CREATININE-BSD FRML MDRD: 29 ML/MIN/1.7M2
GLUCOSE SERPL-MCNC: 92 MG/DL (ref 70–99)
HCT VFR BLD AUTO: 33.5 % (ref 40–53)
HGB BLD-MCNC: 10.8 G/DL (ref 13.3–17.7)
MCH RBC QN AUTO: 27.6 PG (ref 26.5–33)
MCHC RBC AUTO-ENTMCNC: 32.2 G/DL (ref 31.5–36.5)
MCV RBC AUTO: 86 FL (ref 78–100)
PLATELET # BLD AUTO: 299 10E9/L (ref 150–450)
POTASSIUM SERPL-SCNC: 5.3 MMOL/L (ref 3.4–5.3)
RBC # BLD AUTO: 3.91 10E12/L (ref 4.4–5.9)
SODIUM SERPL-SCNC: 142 MMOL/L (ref 133–144)
TACROLIMUS BLD-MCNC: 11.2 UG/L (ref 5–15)
TME LAST DOSE: NORMAL H
WBC # BLD AUTO: 2.3 10E9/L (ref 4–11)

## 2018-08-31 PROCEDURE — 85027 COMPLETE CBC AUTOMATED: CPT | Performed by: SURGERY

## 2018-08-31 PROCEDURE — 80048 BASIC METABOLIC PNL TOTAL CA: CPT | Performed by: SURGERY

## 2018-08-31 PROCEDURE — 80197 ASSAY OF TACROLIMUS: CPT | Performed by: SURGERY

## 2018-08-31 NOTE — TELEPHONE ENCOUNTER
Provider Call: General  Route to LPN    Reason for call: Needs update on decision regarding Valcyte    Call back needed? Yes    Return Call Needed  Same as documented in contacts section  When to return call?: Same day: Route High Priority

## 2018-08-31 NOTE — TELEPHONE ENCOUNTER
Notes Recorded by Herb High MD on 8/25/2018 at 11:59 AM  Would repeat ultrasound in 1 month for possible moderate renal artery stenosis.    PLAN:  Renal transplant ultrasound order placed.  Message sent to  to set up.    LPN task:  Call Harshad Beatty and make aware of plan to repeat Renal Ultrasound in a month.

## 2018-09-03 ENCOUNTER — RECORDS - HEALTHEAST (OUTPATIENT)
Dept: LAB | Facility: CLINIC | Age: 38
End: 2018-09-03

## 2018-09-03 DIAGNOSIS — Z94.0 KIDNEY REPLACED BY TRANSPLANT: ICD-10-CM

## 2018-09-03 DIAGNOSIS — Z48.298 AFTERCARE FOLLOWING ORGAN TRANSPLANT: ICD-10-CM

## 2018-09-03 LAB
ANION GAP SERPL CALCULATED.3IONS-SCNC: 8 MMOL/L (ref 5–18)
BASOPHILS # BLD AUTO: 0 THOU/UL (ref 0–0.2)
BASOPHILS NFR BLD AUTO: 1 % (ref 0–2)
BUN SERPL-MCNC: 31 MG/DL (ref 8–22)
CALCIUM SERPL-MCNC: 10.6 MG/DL (ref 8.5–10.5)
CHLORIDE BLD-SCNC: 108 MMOL/L (ref 98–107)
CO2 SERPL-SCNC: 21 MMOL/L (ref 22–31)
CREAT SERPL-MCNC: 2.47 MG/DL (ref 0.7–1.3)
CREAT UR-MCNC: 98.5 MG/DL
EOSINOPHIL # BLD AUTO: 0.1 THOU/UL (ref 0–0.4)
EOSINOPHIL NFR BLD AUTO: 3 % (ref 0–6)
ERYTHROCYTE [DISTWIDTH] IN BLOOD BY AUTOMATED COUNT: 13.3 % (ref 11–14.5)
GFR SERPL CREATININE-BSD FRML MDRD: 29 ML/MIN/1.73M2
GLUCOSE BLD-MCNC: 98 MG/DL (ref 70–125)
HCT VFR BLD AUTO: 32.9 % (ref 40–54)
HGB BLD-MCNC: 10.8 G/DL (ref 14–18)
LYMPHOCYTES # BLD AUTO: 0.3 THOU/UL (ref 0.8–4.4)
LYMPHOCYTES NFR BLD AUTO: 12 % (ref 20–40)
MAGNESIUM SERPL-MCNC: 1.9 MG/DL (ref 1.8–2.6)
MCH RBC QN AUTO: 27.8 PG (ref 27–34)
MCHC RBC AUTO-ENTMCNC: 32.8 G/DL (ref 32–36)
MCV RBC AUTO: 85 FL (ref 80–100)
MONOCYTES # BLD AUTO: 0.2 THOU/UL (ref 0–0.9)
MONOCYTES NFR BLD AUTO: 6 % (ref 2–10)
NEUTROPHILS # BLD AUTO: 2.1 THOU/UL (ref 2–7.7)
NEUTROPHILS NFR BLD AUTO: 79 % (ref 50–70)
PHOSPHATE SERPL-MCNC: 2.7 MG/DL (ref 2.5–4.5)
PLATELET # BLD AUTO: 301 THOU/UL (ref 140–440)
PMV BLD AUTO: 9.6 FL (ref 8.5–12.5)
POTASSIUM BLD-SCNC: 5.5 MMOL/L (ref 3.5–5)
PROTEIN, RANDOM URINE - HISTORICAL: 10 MG/DL
PROTEIN/CREAT RATIO, RANDOM UR: 0.1
RBC # BLD AUTO: 3.88 MILL/UL (ref 4.4–6.2)
SODIUM SERPL-SCNC: 137 MMOL/L (ref 136–145)
WBC: 2.7 THOU/UL (ref 4–11)

## 2018-09-03 PROCEDURE — 80197 ASSAY OF TACROLIMUS: CPT | Performed by: SURGERY

## 2018-09-04 ENCOUNTER — TELEPHONE (OUTPATIENT)
Dept: TRANSPLANT | Facility: CLINIC | Age: 38
End: 2018-09-04

## 2018-09-04 DIAGNOSIS — Z94.0 KIDNEY REPLACED BY TRANSPLANT: ICD-10-CM

## 2018-09-04 DIAGNOSIS — R19.7 DIARRHEA: Primary | ICD-10-CM

## 2018-09-04 DIAGNOSIS — Z94.0 KIDNEY REPLACED BY TRANSPLANT: Primary | ICD-10-CM

## 2018-09-04 NOTE — TELEPHONE ENCOUNTER
MPA levels high. Last creatinine elevated - Harshad reports 2-4 loose / watery stools daily and does feel dehydrated.  Will check stool cultures and discuss ability to decrease current 1000mg BID CellCept dose with transplant team.

## 2018-09-04 NOTE — TELEPHONE ENCOUNTER
Per discussion with Dr. Gaxiola, due to severe diarrhea and dehydration, REDUCE cellcept to 750mg BID.

## 2018-09-04 NOTE — TELEPHONE ENCOUNTER
RNCC spoke with Harshad - he is currently taking valcyte 450mg tablets, these were prescribed by the PA discharging at time of transplant.   Message sent to Gold Alexander .

## 2018-09-05 ENCOUNTER — TELEPHONE (OUTPATIENT)
Dept: TRANSPLANT | Facility: CLINIC | Age: 38
End: 2018-09-05

## 2018-09-05 LAB
ANION GAP SERPL CALCULATED.3IONS-SCNC: 9 MMOL/L (ref 3–14)
BASOPHILS # BLD AUTO: 0 10E9/L (ref 0–0.2)
BASOPHILS NFR BLD AUTO: 1.2 %
BUN SERPL-MCNC: 36 MG/DL (ref 7–30)
CALCIUM SERPL-MCNC: 9.9 MG/DL (ref 8.5–10.1)
CHLORIDE SERPL-SCNC: 114 MMOL/L (ref 94–109)
CO2 SERPL-SCNC: 20 MMOL/L (ref 20–32)
CREAT SERPL-MCNC: 2.03 MG/DL (ref 0.66–1.25)
DIFFERENTIAL METHOD BLD: ABNORMAL
EOSINOPHIL # BLD AUTO: 0.1 10E9/L (ref 0–0.7)
EOSINOPHIL NFR BLD AUTO: 2.4 %
ERYTHROCYTE [DISTWIDTH] IN BLOOD BY AUTOMATED COUNT: 13.4 % (ref 10–15)
GFR SERPL CREATININE-BSD FRML MDRD: 37 ML/MIN/1.7M2
GLUCOSE SERPL-MCNC: 86 MG/DL (ref 70–99)
HCT VFR BLD AUTO: 33.1 % (ref 40–53)
HGB BLD-MCNC: 10.7 G/DL (ref 13.3–17.7)
IMM GRANULOCYTES # BLD: 0 10E9/L (ref 0–0.4)
IMM GRANULOCYTES NFR BLD: 0.4 %
LYMPHOCYTES # BLD AUTO: 0.4 10E9/L (ref 0.8–5.3)
LYMPHOCYTES NFR BLD AUTO: 17 %
MCH RBC QN AUTO: 27.4 PG (ref 26.5–33)
MCHC RBC AUTO-ENTMCNC: 32.3 G/DL (ref 31.5–36.5)
MCV RBC AUTO: 85 FL (ref 78–100)
MONOCYTES # BLD AUTO: 0.1 10E9/L (ref 0–1.3)
MONOCYTES NFR BLD AUTO: 2.4 %
NEUTROPHILS # BLD AUTO: 1.9 10E9/L (ref 1.6–8.3)
NEUTROPHILS NFR BLD AUTO: 76.6 %
NRBC # BLD AUTO: 0 10*3/UL
NRBC BLD AUTO-RTO: 0 /100
PLATELET # BLD AUTO: 276 10E9/L (ref 150–450)
POTASSIUM SERPL-SCNC: 5.3 MMOL/L (ref 3.4–5.3)
RBC # BLD AUTO: 3.9 10E12/L (ref 4.4–5.9)
SODIUM SERPL-SCNC: 143 MMOL/L (ref 133–144)
TACROLIMUS BLD-MCNC: 14.1 UG/L (ref 5–15)
TME LAST DOSE: NORMAL H
WBC # BLD AUTO: 2.5 10E9/L (ref 4–11)

## 2018-09-05 PROCEDURE — 85025 COMPLETE CBC W/AUTO DIFF WBC: CPT | Performed by: SURGERY

## 2018-09-05 PROCEDURE — 80180 DRUG SCRN QUAN MYCOPHENOLATE: CPT | Performed by: SURGERY

## 2018-09-05 PROCEDURE — 80197 ASSAY OF TACROLIMUS: CPT | Performed by: SURGERY

## 2018-09-05 PROCEDURE — 80048 BASIC METABOLIC PNL TOTAL CA: CPT | Performed by: SURGERY

## 2018-09-05 RX ORDER — MYCOPHENOLATE MOFETIL 250 MG/1
750 CAPSULE ORAL 2 TIMES DAILY
Qty: 180 CAPSULE | Refills: 11 | Status: SHIPPED | OUTPATIENT
Start: 2018-09-05 | End: 2018-10-05

## 2018-09-05 NOTE — TELEPHONE ENCOUNTER
Patient called regarding decreasing cellcept dose-no answer. Detailed message left instructing patient to decrease cellcept to 750 mg BID (from 1000 mg BID) d/t dirrhea and dehydration. Pt. Instructed to call back to confirm understanding.

## 2018-09-05 NOTE — TELEPHONE ENCOUNTER
Provider Call: General      Reason for call: HH RN called back regarding her message earlier. She stated she would like a call back from coordinator tomorrow if possible so she can schedule the patient for visits next week.    Call back needed? Yes    Return Call Needed  Same as documented in contacts section  When to return call?: Same day: Route High Priority

## 2018-09-05 NOTE — TELEPHONE ENCOUNTER
Tac level above goal. Current dose tac should be 4.5mg BID.  Confirm dose and 12 hour trough. Ensure no change in patient's diarrhea.     IF bowel movements are consistent with last weeks', DECREASE tacrolimus to 3.5mg twice daily, repeat level with next scheduled lab draw.

## 2018-09-05 NOTE — TELEPHONE ENCOUNTER
Pt. Returned phone call to confirm understanding of decreased dose of cellcept. Pt. States he has changed his med card and will begin taking decreased dose this evening.

## 2018-09-05 NOTE — TELEPHONE ENCOUNTER
Left message for patient regarding tac level above goal.  Instructed patient return call and confirm current tac dose and good 12 hour trough level.  If both are accurate, patient should decrease dose to 3.5 mg BID.

## 2018-09-05 NOTE — TELEPHONE ENCOUNTER
Patient Call: General    Reason for call: HH RN called with questions about visit schedule and stool sample.    Call back needed? Yes    Return Call Needed  Same as documented in contacts section  When to return call?: Greater than one day: Route standard priority

## 2018-09-06 ENCOUNTER — OFFICE VISIT (OUTPATIENT)
Dept: NEPHROLOGY | Facility: CLINIC | Age: 38
End: 2018-09-06
Attending: INTERNAL MEDICINE
Payer: COMMERCIAL

## 2018-09-06 VITALS
TEMPERATURE: 98.2 F | WEIGHT: 232 LBS | HEIGHT: 72 IN | SYSTOLIC BLOOD PRESSURE: 133 MMHG | DIASTOLIC BLOOD PRESSURE: 81 MMHG | HEART RATE: 65 BPM | RESPIRATION RATE: 18 BRPM | BODY MASS INDEX: 31.42 KG/M2

## 2018-09-06 DIAGNOSIS — N25.81 SECONDARY RENAL HYPERPARATHYROIDISM (H): ICD-10-CM

## 2018-09-06 DIAGNOSIS — Z29.89 NEED FOR CMV IMMUNOTHERAPY: ICD-10-CM

## 2018-09-06 DIAGNOSIS — Z94.0 KIDNEY REPLACED BY TRANSPLANT: Primary | ICD-10-CM

## 2018-09-06 DIAGNOSIS — E87.5 HYPERKALEMIA: ICD-10-CM

## 2018-09-06 DIAGNOSIS — I10 BENIGN ESSENTIAL HYPERTENSION: ICD-10-CM

## 2018-09-06 DIAGNOSIS — Z29.89 NEED FOR PNEUMOCYSTIS PROPHYLAXIS: ICD-10-CM

## 2018-09-06 DIAGNOSIS — E83.52 HYPERCALCEMIA: ICD-10-CM

## 2018-09-06 DIAGNOSIS — D84.9 IMMUNOSUPPRESSION (H): ICD-10-CM

## 2018-09-06 DIAGNOSIS — D64.89 ANEMIA DUE TO OTHER CAUSE, NOT CLASSIFIED: ICD-10-CM

## 2018-09-06 PROBLEM — E83.39 HYPOPHOSPHATEMIA: Status: RESOLVED | Noted: 2018-08-14 | Resolved: 2018-09-06

## 2018-09-06 LAB
MYCOPHENOLATE SERPL LC/MS/MS-MCNC: 5.42 MG/L (ref 1–3.5)
MYCOPHENOLATE-G SERPL LC/MS/MS-MCNC: 91 MG/L (ref 30–95)
MYCOPHENOLIC ACID LAST DOSE: ABNORMAL
TACROLIMUS BLD-MCNC: 13 UG/L (ref 5–15)
TME LAST DOSE: NORMAL H

## 2018-09-06 PROCEDURE — G0463 HOSPITAL OUTPT CLINIC VISIT: HCPCS | Mod: ZF

## 2018-09-06 RX ORDER — TACROLIMUS 0.5 MG/1
0.5 CAPSULE ORAL 2 TIMES DAILY
Qty: 60 CAPSULE | Refills: 11 | Status: SHIPPED | OUTPATIENT
Start: 2018-09-06 | End: 2018-09-11

## 2018-09-06 RX ORDER — TACROLIMUS 1 MG/1
4 CAPSULE ORAL 2 TIMES DAILY
Qty: 240 CAPSULE | Refills: 11 | Status: SHIPPED | OUTPATIENT
Start: 2018-09-06 | End: 2018-09-11

## 2018-09-06 ASSESSMENT — PAIN SCALES - GENERAL: PAINLEVEL: NO PAIN (0)

## 2018-09-06 NOTE — LETTER
9/6/2018      RE: Harshad Beatty  [address] [facility]  [place] [address]       TRANSPLANT NEPHROLOGY VISIT    Assessment & Plan   # LDKT: baseline Cr ~ 2 mg / dl; improved   - Proteinuria: Minimal 0.47 g / g    The patient's creatinine continues to decrease despite supratherapeutic tacrolimus level.  I am continue to titrate down his tacrolimus to a goal of 8-10 ng/mL.  Continue to monitor his kidney function closely.  No evidence of recurrence of focal segmental glomerular sclerosis by urine protein to creatinine ratio or pathology from his biopsy on 8/17/2018.  Follow-up in 1 month.    # Immunosuppression: Tacrolimus immediate release (goal  8-10) and Mycophenolate mofetil (goal  1-3.5)   - Changes: Decrease tacrolimus to 4.5 mg p.o. twice daily    Immunsupresent Medications     Immunosuppressive Agents Disp Start End    mycophenolate (GENERIC EQUIVALENT) 250 MG capsule 180 capsule 9/5/2018     Sig - Route: Take 3 capsules (750 mg) by mouth 2 times daily - Oral    Class: E-Prescribe    tacrolimus (GENERIC EQUIVALENT) 0.5 MG capsule 60 capsule 8/29/2018     Sig - Route: Take 1 capsule (0.5 mg) by mouth 2 times daily Total dose = 4.5 mg BID - Oral    Class: E-Prescribe    tacrolimus (GENERIC EQUIVALENT) 1 MG capsule 240 capsule 8/29/2018     Sig - Route: Take 4 capsules (4 mg) by mouth 2 times daily Total dose = 4.5 mg BID - Oral    Class: E-Prescribe        # Prophylaxis:   - PJP: Bactrim single strength tablet  on Monday Wednesday Friday due to hyperkalemia.  If potassium greater than 5.5 mEq/L on next check stop Bactrim   - CMV: Valcyte 450 mg p.o. daily.  We will continue to monitor for improvement in the creatinine to increased to a max of 900 mg p.o. daily once he GFR is greater than 60 mL/min    # Transplant History:    Transplant:     Donor Type: Donor Class:    Crossmatch at time of Tx: negative    DSA at time of Tx: No    Latest DSA: No Date of last check: 8/14/18   Significant changes in  immunosuppression: None    Biopsy: Yes - negative for rejection Rejection History:No   CMV IgG Ab Discordance (D+/R-): No    EBV IgG Ab Discordance (D+/R-): No    History of BK Viremia: No    Significant Complications: None    Transplant Office Phone Number: [phone]    # Hypertension: controlled; Goal BP: 140/90   - Changes: No continue carvedilol.    # Anemia in chronic renal disease: Hgb: Stable 10.8 g / dl   - Iron studies: replete    # Mineral Bone Disorder:   - Secondary renal hyperparathyroidism: pth 168 - check at 6 months.  Restart Sensipar 30 mg daily   - Vitamin D: jluis 2000 units daily   - Calcium: 10.6 mg / dl on last check.  Restart Sensipar 30 mg daily   - Phosphorus: replete. Stop phos.     # Electrolytes:    - hyperkalemia: low k diet.  I recommended less than 2000 mg per day intake continue sodium bicarbonate.    # Other Medical Issues: None    Return visit: No Follow-up on file.    Assessment and plan was discussed with the patient and he voiced his understanding and agreement.    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    Harshad had a postoperative course that was complicated by supratherapeutic tacrolimus levels that resulted in a rise in his serum creatinine.  For this, he underwent a kidney transplant biopsy on August 17 that showed no evidence of cellular or antibody meter rejection.    Currently, the patient's creatinine has decreased to 2 mg/dL.  He is currently on immunosuppression with tacrolimus and CellCept.  His most recent tacrolimus level was 13 ng/mL and therefore I have reduced him to 4.5 mg p.o. twice daily.  His CellCept was also elevated but he just recently made the change to 750 mg p.o. twice daily.   Next    The patient currently feels well.  He specifically denies any chest pain or shortness breath.  He has no nausea or vomiting.  He has no fever shakes or chills.  He does note having loose stool.    Blood pressure is well controlled.    Recent Hospitalizations:  [] No [x] Yes    New Medical Issues: [x] No [] Yes    Decreased energy: [x] No [] Yes    Chest pain or SOB with exertion:  [x] No [] Yes    Appetite change or weight change: [x] No [] Yes    Nausea, vomiting or diarrhea:  [x] No [] Yes    Fever, sweats or chills: [x] No [] Yes    Leg swelling: [x] No [] Yes      Other medical issues:  No    Home BP: at goal    Review of Systems   A comprehensive review of systems was obtained and negative, except as noted in the HPI or PMH.    Problem List   Patient Active Problem List   Diagnosis     ESRD (end stage renal disease) (H)     HTN (hypertension)     Sickle cell trait (H)     Anemia in chronic kidney disease     Secondary hyperparathyroidism (H)     Kidney replaced by transplant     Kidney transplant recipient     Immunosuppression (H)     Need for CMV immunotherapy     Need for pneumocystis prophylaxis     Benign essential hypertension     Anemia due to other cause, not classified     Hypercalcemia     Hypophosphatemia     Hypomagnesemia     Dehydration     Social History  Social History     Social History     Marital status:      Spouse name: Yesica     Number of children: 3     Years of education: 16     Occupational History     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[employer]     Social History Main Topics     Smoking status: Never Smoker     Smokeless tobacco: Never Used     Alcohol use No     Drug use: Yes     Special: Marijuana      Comment: remote     Sexual activity: Yes     Partners: Female     Birth control/ protection: Implant     Other Topics Concern     Not on file     Social History Narrative     Allergies    No Known Allergies    Medications   Outpatient Encounter Prescriptions as of 9/6/2018   Medication Sig  Dispense Refill     acetaminophen (TYLENOL) 325 MG tablet Take 1-2 tablets (325-650 mg) by mouth every 4 hours as needed for mild pain or fever 100 tablet 0     aspirin 325 MG EC tablet Take 1 tablet (325 mg) by mouth daily 30 tablet 5     atorvastatin (LIPITOR) 10 MG tablet Take 1 tablet (10 mg) by mouth daily 30 tablet 11     carvedilol (COREG) 25 MG tablet Take 2 tablets (50 mg) by mouth 2 times daily (with meals) 120 tablet 3     magnesium oxide (MAG-OX) 400 MG tablet Take 1 tablet (400 mg) by mouth daily (with lunch) 30 tablet 3     mycophenolate (GENERIC EQUIVALENT) 250 MG capsule Take 3 capsules (750 mg) by mouth 2 times daily 180 capsule 11     ondansetron (ZOFRAN-ODT) 4 MG ODT tab Take 1 tablet (4 mg) by mouth every 6 hours as needed for nausea or vomiting 20 tablet 0     oxyCODONE IR (ROXICODONE) 5 MG tablet Take 1 tablet (5 mg) by mouth every 4 hours as needed for moderate to severe pain 20 tablet 0     phosphorus tablet 250 mg (PHOSPHA 250 NEUTRAL) 250 MG per tablet Take 2 tablets (500 mg) by mouth 2 times daily 120 tablet 1     senna-docusate (SENOKOT-S;PERICOLACE) 8.6-50 MG per tablet Take 1 tablet by mouth 2 times daily 30 tablet 0     sodium bicarbonate 650 MG tablet Take 2 tablets (1,300 mg) by mouth 2 times daily 120 tablet 11     sulfamethoxazole-trimethoprim (BACTRIM/SEPTRA) 400-80 MG per tablet 1 tablet Monday, Wednesday, Friday 30 tablet 11     tacrolimus (GENERIC EQUIVALENT) 0.5 MG capsule Take 1 capsule (0.5 mg) by mouth 2 times daily Total dose = 4.5 mg BID 60 capsule 11     tacrolimus (GENERIC EQUIVALENT) 1 MG capsule Take 4 capsules (4 mg) by mouth 2 times daily Total dose = 4.5 mg  capsule 11     valGANciclovir (VALCYTE) 450 MG tablet Take 1 tablet (450 mg) by mouth daily Titrate dose up to a max of 2 tabs (900 mg) by mouth daily when directed by your transplant team. 60 tablet 2     No facility-administered encounter medications on file as of 9/6/2018.      Physical Exam   Vital  Signs:   BP Readings from Last 1 Encounters:   09/06/18 133/81   ]  GENERAL APPEARANCE: alert and no distress  HENT: mouth without ulcers or lesions  LYMPHATICS: no cervical or supraclavicular nodes  RESP: lungs clear to auscultation - no rales, rhonchi or wheezes  CV: regular rhythm, normal rate, no rub, no murmur  EDEMA: no LE edema bilaterally  ABDOMEN: soft, nondistended, nontender, bowel sounds normal  MS: extremities normal - no gross deformities noted, no evidence of inflammation in joints, no muscle tenderness  SKIN: no rash    Data   Renal -   Crossmatch Result   Date Value Ref Range Status   08/03/2018   Final    Donor: DARRYL WE[facility]                               Crossmatch Date: 08/03/2018  Serum Date  Test                                 Test Method   Cell        Result         [place] Shift  Pos Cutoff              Comments  07/30/2018  allo/T1                              FLOW          T PBL       Neg            -9          >67  07/30/2018  allo/B1                              FLOW          B PBL       Neg            35          >122  01/08/2018  allo/T2                              FLOW          T PBL       Neg            -9          >67  01/08/2018  allo/B2                              FLOW          B PBL       Neg            45          >122              Donor specific antibody present:  1-8-18  DPB1*01/1150  Test performed by modified procedure.  T cell result is using non pronased   cells.       DSA Present   Date Value Ref Range Status   08/14/2018 NO  Final     SA1 Comments   Date Value Ref Range Status   08/14/2018   Final    Test performed by modified procedure. Serum heat inactivated, treated with   immunoadsorbent beads to remove interfering Thymoglobulin and tested by a   modified ([place]) protocol including fetal calf serum addition. High-risk,   mfi >3,000. Mod-risk, mfi 500-3,000.        SA2 Comments   Date Value Ref Range Status   08/14/2018   Final    Test performed by modified procedure.  Serum heat inactivated, treated with   immunoadsorbent beads to remove interfering Thymoglobulin and tested by a   modified ([place]) protocol including fetal calf serum addition. High-risk,   mfi >3,000. Mod-risk, mfi 500-3,000.        Creatinine   Date Value Ref Range Status   09/05/2018 2.03 (H) 0.66 - 1.25 mg/dL Final     Creatinine (External)   Date Value Ref Range Status   09/03/2018 2.47 (H) 0.70 - 1.30 mg/dL Final     Protein Total Urine g/gr Creatinine   Date Value Ref Range Status   08/14/2018 0.47 (H) 0 - 0.2 g/g Cr Final     Protein Random Urine (External)   Date Value Ref Range Status   09/03/2018 10 mg/dL Final     Tacrolimus Level   Date Value Ref Range Status   09/05/2018 13.0 5.0 - 15.0 ug/L Final     Comment:     Tacrolimus Reference Range  Kidney Transplant  Pediatric                      ug/L    0-3 months post transplant   10-12    3-6 months post transplant   8-10    6-12 months post transplant  6-8    >12 months post transplant   4-7  Adult    0-6 months post transplant   8-10    6-12 months post transplant  6-8    >12 months post transplant   4-6    >5 years post transplant     3-5  Heart Transplant  Pediatric    0-12 months post transplant  10-15    >12 months post transplant   5-10  Adult    0-3 months post transplant   10-15    3-6 months post transplant   8-12    6-12 months post transplant  6-12    >12 months post transplant   6-10  Lung Transplant    0-12 months post transplant  10-15    >12 months post transplant   8-12  Liver Transplant  Pediatric    0-3 months post transplant   10-15    3-6 months post transplant   8-10    >6 months post transplant    6-8  Adult    0-3 months post transplant   10-12    3-6 months post transplant   8-10    >6 months post transplant    6-8  Pancreas Transplant    0-6 months post transplant   8-10    >6 months post transplant    5-8  This test was developed and its performance characteristics determined by the   [facility],   Special Chemistry Laboratory. It has   not been cleared or approved by the FDA. The laboratory is regulated under   CLIA as qualified to perform high-complexity testing. This test is used for   clinical purposes. It should not be regarded as investigational or for   research.       Mycophenolic Acid Mg/L   Date Value Ref Range Status   08/27/2018 6.41 (H) 1.00 - 3.50 mg/L Final     Glucose   Date Value Ref Range Status   09/05/2018 86 70 - 99 mg/dL Final     Glucose (External)   Date Value Ref Range Status   09/03/2018 98 70 - 125 mg/dL Final     Hemoglobin   Date Value Ref Range Status   09/05/2018 10.7 (L) 13.3 - 17.7 g/dL Final     Hemoglobin (External)   Date Value Ref Range Status   09/03/2018 10.8 (L) 14.0 - 18.0 g/dL Final     Ferritin   Date Value Ref Range Status   07/30/2018 716 (H) 26 - 388 ng/mL Final     Iron Saturation Index   Date Value Ref Range Status   07/30/2018 21 15 - 46 % Final     Parathyroid Hormone Intact   Date Value Ref Range Status   07/30/2018 168 (H) 18 - 80 pg/mL Final     Vitamin D Deficiency screening   Date Value Ref Range Status   07/30/2018 9 (L) 20 - 75 ug/L Final     Comment:     Season, race, dietary intake, and treatment affect the concentration of   25-hydroxy-Vitamin D. Values may decrease during winter months and increase   during summer months. Values 20-29 ug/L may indicate Vitamin D insufficiency   and values <20 ug/L may indicate Vitamin D deficiency.  Vitamin D determination is routinely performed by an immunoassay specific for   25 hydroxyvitamin D3.  If an individual is on vitamin D2 (ergocalciferol)   supplementation, please specify 25 OH vitamin D2 and D3 level determination by   LCMSMS test [id number].       Calcium   Date Value Ref Range Status   09/05/2018 9.9 8.5 - 10.1 mg/dL Final     Calcium (External)   Date Value Ref Range Status   09/03/2018 10.6 (H) 8.5 - 10.5 mg/dL Final     Phosphorus   Date Value Ref Range Status   08/27/2018 2.9 2.5 - 4.5 mg/dL Final      Phosphorus (External)   Date Value Ref Range Status   09/03/2018 2.7 2.5 - 4.5 mg/dL Final     Magnesium   Date Value Ref Range Status   08/27/2018 1.9 1.6 - 2.3 mg/dL Final     Potassium   Date Value Ref Range Status   09/05/2018 5.3 3.4 - 5.3 mmol/L Final     Potassium (External)   Date Value Ref Range Status   09/03/2018 5.5 (H) 3.5 - 5.0 mmol/L Final     CMV Antibody IgG   Date Value Ref Range Status   07/30/2018 >8.0 (H) 0.0 - 0.8 AI Final     Comment:     Positive  Antibody index (AI) values reflect qualitative changes in antibody   concentration that cannot be directly associated with clinical condition or   disease state.       EBV Capsid Antibody IgG   Date Value Ref Range Status   07/30/2018 6.4 (H) 0.0 - 0.8 AI Final     Comment:     Positive, suggests recent or past exposure  Antibody index (AI) values reflect qualitative changes in antibody   concentration that cannot be directly associated with clinical condition or   disease state.         Kidney/Pancreas Recipient

## 2018-09-06 NOTE — TELEPHONE ENCOUNTER
Left message for patient regarding tac level above goal.  Instructed patient return call and confirm current tac dose and good 12 hour trough level.  If both are accurate, patient should decrease dose to 3.5 mg BID

## 2018-09-06 NOTE — NURSING NOTE
"Chief Complaint   Patient presents with     RECHECK     Kidney tx follow up     /81  Pulse 65  Temp 98.2  F (36.8  C) (Oral)  Resp 18  Ht 1.816 m (5' 11.5\")  Wt 105.2 kg (232 lb)  BMI 31.91 kg/m2  CUAUHTEMOC HERNÁNDEZ CMA    "

## 2018-09-06 NOTE — PROGRESS NOTES
TRANSPLANT NEPHROLOGY VISIT    Assessment & Plan   # LDKT: baseline Cr ~ 2 mg / dl; improved   - Proteinuria: Minimal 0.47 g / g    The patient's creatinine continues to decrease despite supratherapeutic tacrolimus level.  I am continue to titrate down his tacrolimus to a goal of 8-10 ng/mL.  Continue to monitor his kidney function closely.  No evidence of recurrence of focal segmental glomerular sclerosis by urine protein to creatinine ratio or pathology from his biopsy on 8/17/2018.  Follow-up in 1 month.    # Immunosuppression: Tacrolimus immediate release (goal  8-10) and Mycophenolate mofetil (goal  1-3.5)   - Changes: Decrease tacrolimus to 4.5 mg p.o. twice daily    Immunsupresent Medications     Immunosuppressive Agents Disp Start End    mycophenolate (GENERIC EQUIVALENT) 250 MG capsule 180 capsule 9/5/2018     Sig - Route: Take 3 capsules (750 mg) by mouth 2 times daily - Oral    Class: E-Prescribe    tacrolimus (GENERIC EQUIVALENT) 0.5 MG capsule 60 capsule 8/29/2018     Sig - Route: Take 1 capsule (0.5 mg) by mouth 2 times daily Total dose = 4.5 mg BID - Oral    Class: E-Prescribe    tacrolimus (GENERIC EQUIVALENT) 1 MG capsule 240 capsule 8/29/2018     Sig - Route: Take 4 capsules (4 mg) by mouth 2 times daily Total dose = 4.5 mg BID - Oral    Class: E-Prescribe        # Prophylaxis:   - PJP: Bactrim single strength tablet  on Monday Wednesday Friday due to hyperkalemia.  If potassium greater than 5.5 mEq/L on next check stop Bactrim   - CMV: Valcyte 450 mg p.o. daily.  We will continue to monitor for improvement in the creatinine to increased to a max of 900 mg p.o. daily once he GFR is greater than 60 mL/min    # Transplant History:    Transplant:     Donor Type: Donor Class:    Crossmatch at time of Tx: negative    DSA at time of Tx: No    Latest DSA: No Date of last check: 8/14/18   Significant changes in immunosuppression: None    Biopsy: Yes - negative for rejection Rejection History:No   CMV IgG  Ab Discordance (D+/R-): No    EBV IgG Ab Discordance (D+/R-): No    History of BK Viremia: No    Significant Complications: None    Transplant Office Phone Number: [phone]    # Hypertension: controlled; Goal BP: 140/90   - Changes: No continue carvedilol.    # Anemia in chronic renal disease: Hgb: Stable 10.8 g / dl   - Iron studies: replete    # Mineral Bone Disorder:   - Secondary renal hyperparathyroidism: pth 168 - check at 6 months.  Restart Sensipar 30 mg daily   - Vitamin D: jluis 2000 units daily   - Calcium: 10.6 mg / dl on last check.  Restart Sensipar 30 mg daily   - Phosphorus: replete. Stop phos.     # Electrolytes:    - hyperkalemia: low k diet.  I recommended less than 2000 mg per day intake continue sodium bicarbonate.    # Other Medical Issues: None    Return visit: No Follow-up on file.    Assessment and plan was discussed with the patient and he voiced his understanding and agreement.    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    Harshad had a postoperative course that was complicated by supratherapeutic tacrolimus levels that resulted in a rise in his serum creatinine.  For this, he underwent a kidney transplant biopsy on August 17 that showed no evidence of cellular or antibody meter rejection.    Currently, the patient's creatinine has decreased to 2 mg/dL.  He is currently on immunosuppression with tacrolimus and CellCept.  His most recent tacrolimus level was 13 ng/mL and therefore I have reduced him to 4.5 mg p.o. twice daily.  His CellCept was also elevated but he just recently made the change to 750 mg p.o. twice daily.  Next    The patient currently feels well.  He specifically denies any chest pain or shortness  breath.  He has no nausea or vomiting.  He has no fever shakes or chills.  He does note having loose stool.    Blood pressure is well controlled.    Recent Hospitalizations:  [] No [x] Yes    New Medical Issues: [x] No [] Yes    Decreased energy: [x] No [] Yes    Chest pain or SOB with exertion:  [x] No [] Yes    Appetite change or weight change: [x] No [] Yes    Nausea, vomiting or diarrhea:  [x] No [] Yes    Fever, sweats or chills: [x] No [] Yes    Leg swelling: [x] No [] Yes      Other medical issues:  No    Home BP: at goal    Review of Systems   A comprehensive review of systems was obtained and negative, except as noted in the HPI or PMH.    Problem List   Patient Active Problem List   Diagnosis     ESRD (end stage renal disease) (H)     HTN (hypertension)     Sickle cell trait (H)     Anemia in chronic kidney disease     Secondary hyperparathyroidism (H)     Kidney replaced by transplant     Kidney transplant recipient     Immunosuppression (H)     Need for CMV immunotherapy     Need for pneumocystis prophylaxis     Benign essential hypertension     Anemia due to other cause, not classified     Hypercalcemia     Hypophosphatemia     Hypomagnesemia     Dehydration     Social History  Social History     Social History     Marital status:      Spouse name: Yesica     Number of children: 3     Years of education: 16     Occupational History     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[employer]     Social History Main Topics     Smoking status: Never Smoker     Smokeless tobacco: Never Used     Alcohol use No     Drug use: Yes     Special: Marijuana      Comment: remote     Sexual activity: Yes     Partners: Female     Birth control/ protection: Implant     Other Topics Concern     Not on file     Social History Narrative     Allergies    No Known Allergies    Medications   Outpatient Encounter Prescriptions as of 9/6/2018   Medication Sig Dispense Refill     acetaminophen (TYLENOL) 325 MG tablet Take 1-2 tablets (325-650 mg) by  mouth every 4 hours as needed for mild pain or fever 100 tablet 0     aspirin 325 MG EC tablet Take 1 tablet (325 mg) by mouth daily 30 tablet 5     atorvastatin (LIPITOR) 10 MG tablet Take 1 tablet (10 mg) by mouth daily 30 tablet 11     carvedilol (COREG) 25 MG tablet Take 2 tablets (50 mg) by mouth 2 times daily (with meals) 120 tablet 3     magnesium oxide (MAG-OX) 400 MG tablet Take 1 tablet (400 mg) by mouth daily (with lunch) 30 tablet 3     mycophenolate (GENERIC EQUIVALENT) 250 MG capsule Take 3 capsules (750 mg) by mouth 2 times daily 180 capsule 11     ondansetron (ZOFRAN-ODT) 4 MG ODT tab Take 1 tablet (4 mg) by mouth every 6 hours as needed for nausea or vomiting 20 tablet 0     oxyCODONE IR (ROXICODONE) 5 MG tablet Take 1 tablet (5 mg) by mouth every 4 hours as needed for moderate to severe pain 20 tablet 0     phosphorus tablet 250 mg (PHOSPHA 250 NEUTRAL) 250 MG per tablet Take 2 tablets (500 mg) by mouth 2 times daily 120 tablet 1     senna-docusate (SENOKOT-S;PERICOLACE) 8.6-50 MG per tablet Take 1 tablet by mouth 2 times daily 30 tablet 0     sodium bicarbonate 650 MG tablet Take 2 tablets (1,300 mg) by mouth 2 times daily 120 tablet 11     sulfamethoxazole-trimethoprim (BACTRIM/SEPTRA) 400-80 MG per tablet 1 tablet Monday, Wednesday, Friday 30 tablet 11     tacrolimus (GENERIC EQUIVALENT) 0.5 MG capsule Take 1 capsule (0.5 mg) by mouth 2 times daily Total dose = 4.5 mg BID 60 capsule 11     tacrolimus (GENERIC EQUIVALENT) 1 MG capsule Take 4 capsules (4 mg) by mouth 2 times daily Total dose = 4.5 mg  capsule 11     valGANciclovir (VALCYTE) 450 MG tablet Take 1 tablet (450 mg) by mouth daily Titrate dose up to a max of 2 tabs (900 mg) by mouth daily when directed by your transplant team. 60 tablet 2     No facility-administered encounter medications on file as of 9/6/2018.      Physical Exam   Vital Signs:   BP Readings from Last 1 Encounters:   09/06/18 133/81   ]  GENERAL APPEARANCE:  alert and no distress  HENT: mouth without ulcers or lesions  LYMPHATICS: no cervical or supraclavicular nodes  RESP: lungs clear to auscultation - no rales, rhonchi or wheezes  CV: regular rhythm, normal rate, no rub, no murmur  EDEMA: no LE edema bilaterally  ABDOMEN: soft, nondistended, nontender, bowel sounds normal  MS: extremities normal - no gross deformities noted, no evidence of inflammation in joints, no muscle tenderness  SKIN: no rash    Data   Renal -   Crossmatch Result   Date Value Ref Range Status   08/03/2018   Final    Donor: [facility]                               Crossmatch Date: 08/03/2018  Serum Date  Test                                 Test Method   Cell        Result         [place] Shift  Pos Cutoff              Comments  07/30/2018  allo/T1                              FLOW          T PBL       Neg            -9          >67  07/30/2018  allo/B1                              FLOW          B PBL       Neg            35          >122  01/08/2018  allo/T2                              FLOW          T PBL       Neg            -9          >67  01/08/2018  allo/B2                              FLOW          B PBL       Neg            45          >122              Donor specific antibody present:  1-8-18  DPB1*01/1150  Test performed by modified procedure.  T cell result is using non pronased   cells.       DSA Present   Date Value Ref Range Status   08/14/2018 NO  Final     SA1 Comments   Date Value Ref Range Status   08/14/2018   Final    Test performed by modified procedure. Serum heat inactivated, treated with   immunoadsorbent beads to remove interfering Thymoglobulin and tested by a   modified ([place]) protocol including fetal calf serum addition. High-risk,   mfi >3,000. Mod-risk, mfi 500-3,000.        SA2 Comments   Date Value Ref Range Status   08/14/2018   Final    Test performed by modified procedure. Serum heat inactivated, treated with   immunoadsorbent beads to remove interfering  Thymoglobulin and tested by a   modified ([place]) protocol including fetal calf serum addition. High-risk,   mfi >3,000. Mod-risk, mfi 500-3,000.        Creatinine   Date Value Ref Range Status   09/05/2018 2.03 (H) 0.66 - 1.25 mg/dL Final     Creatinine (External)   Date Value Ref Range Status   09/03/2018 2.47 (H) 0.70 - 1.30 mg/dL Final     Protein Total Urine g/gr Creatinine   Date Value Ref Range Status   08/14/2018 0.47 (H) 0 - 0.2 g/g Cr Final     Protein Random Urine (External)   Date Value Ref Range Status   09/03/2018 10 mg/dL Final     Tacrolimus Level   Date Value Ref Range Status   09/05/2018 13.0 5.0 - 15.0 ug/L Final     Comment:     Tacrolimus Reference Range  Kidney Transplant  Pediatric                      ug/L    0-3 months post transplant   10-12    3-6 months post transplant   8-10    6-12 months post transplant  6-8    >12 months post transplant   4-7  Adult    0-6 months post transplant   8-10    6-12 months post transplant  6-8    >12 months post transplant   4-6    >5 years post transplant     3-5  Heart Transplant  Pediatric    0-12 months post transplant  10-15    >12 months post transplant   5-10  Adult    0-3 months post transplant   10-15    3-6 months post transplant   8-12    6-12 months post transplant  6-12    >12 months post transplant   6-10  Lung Transplant    0-12 months post transplant  10-15    >12 months post transplant   8-12  Liver Transplant  Pediatric    0-3 months post transplant   10-15    3-6 months post transplant   8-10    >6 months post transplant    6-8  Adult    0-3 months post transplant   10-12    3-6 months post transplant   8-10    >6 months post transplant    6-8  Pancreas Transplant    0-6 months post transplant   8-10    >6 months post transplant    5-8  This test was developed and its performance characteristics determined by the   [facility],  Special [facility] Laboratory. It has   not been cleared or approved by the FDA. The  laboratory is regulated under   CLIA as qualified to perform high-complexity testing. This test is used for   clinical purposes. It should not be regarded as investigational or for   research.       Mycophenolic Acid Mg/L   Date Value Ref Range Status   08/27/2018 6.41 (H) 1.00 - 3.50 mg/L Final     Glucose   Date Value Ref Range Status   09/05/2018 86 70 - 99 mg/dL Final     Glucose (External)   Date Value Ref Range Status   09/03/2018 98 70 - 125 mg/dL Final     Hemoglobin   Date Value Ref Range Status   09/05/2018 10.7 (L) 13.3 - 17.7 g/dL Final     Hemoglobin (External)   Date Value Ref Range Status   09/03/2018 10.8 (L) 14.0 - 18.0 g/dL Final     Ferritin   Date Value Ref Range Status   07/30/2018 716 (H) 26 - 388 ng/mL Final     Iron Saturation Index   Date Value Ref Range Status   07/30/2018 21 15 - 46 % Final     Parathyroid Hormone Intact   Date Value Ref Range Status   07/30/2018 168 (H) 18 - 80 pg/mL Final     Vitamin D Deficiency screening   Date Value Ref Range Status   07/30/2018 9 (L) 20 - 75 ug/L Final     Comment:     Season, race, dietary intake, and treatment affect the concentration of   25-hydroxy-Vitamin D. Values may decrease during winter months and increase   during summer months. Values 20-29 ug/L may indicate Vitamin D insufficiency   and values <20 ug/L may indicate Vitamin D deficiency.  Vitamin D determination is routinely performed by an immunoassay specific for   25 hydroxyvitamin D3.  If an individual is on vitamin D2 (ergocalciferol)   supplementation, please specify 25 OH vitamin D2 and D3 level determination by   LCMSMS test [id number].       Calcium   Date Value Ref Range Status   09/05/2018 9.9 8.5 - 10.1 mg/dL Final     Calcium (External)   Date Value Ref Range Status   09/03/2018 10.6 (H) 8.5 - 10.5 mg/dL Final     Phosphorus   Date Value Ref Range Status   08/27/2018 2.9 2.5 - 4.5 mg/dL Final     Phosphorus (External)   Date Value Ref Range Status   09/03/2018 2.7 2.5 - 4.5 mg/dL  Final     Magnesium   Date Value Ref Range Status   08/27/2018 1.9 1.6 - 2.3 mg/dL Final     Potassium   Date Value Ref Range Status   09/05/2018 5.3 3.4 - 5.3 mmol/L Final     Potassium (External)   Date Value Ref Range Status   09/03/2018 5.5 (H) 3.5 - 5.0 mmol/L Final     CMV Antibody IgG   Date Value Ref Range Status   07/30/2018 >8.0 (H) 0.0 - 0.8 AI Final     Comment:     Positive  Antibody index (AI) values reflect qualitative changes in antibody   concentration that cannot be directly associated with clinical condition or   disease state.       EBV Capsid Antibody IgG   Date Value Ref Range Status   07/30/2018 6.4 (H) 0.0 - 0.8 AI Final     Comment:     Positive, suggests recent or past exposure  Antibody index (AI) values reflect qualitative changes in antibody   concentration that cannot be directly associated with clinical condition or   disease state.

## 2018-09-06 NOTE — PATIENT INSTRUCTIONS
STOP phosphorous.  DECREASE tacrolimus to 4.5 mg twice a day.  RESTART sensipar 30 mg daily.     Labs next week. If the cellcept is still elevated, we can decrease that at that time.     Follow up in 1 month.     Call with any questions.     Goal bp is 100-140/60-90.

## 2018-09-06 NOTE — MR AVS SNAPSHOT
After Visit Summary   9/6/2018    Harshad Beatty    MRN: [id number]           Patient Information     Date Of Birth          1980        Visit Information        Provider Department      9/6/2018 1:30 PM Recipient, Uc Kidney/Pancreas [facility] Nephrology        Today's Diagnoses     Immunosuppression (H)    -  1    Kidney replaced by transplant        Need for pneumocystis prophylaxis          Care Instructions    STOP phosphorous.  DECREASE tacrolimus to 4.5 mg twice a day.  RESTART sensipar 30 mg daily.     Labs next week. If the cellcept is still elevated, we can decrease that at that time.     Follow up in 1 month.     Call with any questions.     Goal bp is 100-140/60-90.               Follow-ups after your visit        Follow-up notes from your care team     Return in about 1 month (around 10/6/2018).      Your next 10 appointments already scheduled     Sep 06, 2018  1:30 PM CDT   (Arrive by 1:00 PM)   Return Kidney Transplant with  Kidney/Pancreas Recipient   [facility] Nephrology ([facility])    [address]  [address]  [place] [id number] [phone]            Sep 10, 2018  3:45 PM CDT   (Arrive by 3:30 PM)   Cystoscopy with Bety Mitchell MD   [facility] Solid Organ Transplant ([facility])    [address]  [address]  [place] [id number] [phone]            Sep 17, 2018  1:30 PM CDT   (Arrive by 1:15 PM)   Kidney Post Op with Bety Mitchell MD   [facility] Solid Organ Transplant ([facility])    [address]  [address]  [place] [id number] [phone]            Sep 28, 2018 12:00 PM CDT   US RENAL TRANSPLANT with UCUS3   [facility] Imaging Center US ([facility])    [address]  1st Floor  [place] [id number] [phone]           Please bring a list of your medicines (including vitamins, minerals and over-the-counter  drugs). Also, tell your doctor about any allergies you may have. Wear comfortable clothes and leave your valuables at home.  You do not need to do anything special to prepare for your exam.  Please call the Imaging Department at your exam site with any questions.            Oct 10, 2018 11:30 AM CDT   (Arrive by 11:00 AM)   Return Kidney Transplant with  Early Post Transplant   [facility] Nephrology ([place] [facility])    [address]  [address]  [place] [place] [facility] [address] [phone]            Dec 07, 2018 11:30 AM CST   (Arrive by 11:00 AM)   Return Kidney Transplant with  Early Post Transplant   [facility] Nephrology ([place] [facility])    [address]  [address]  [place] [place] [facility] [address] [phone]            Dec 07, 2018  1:30 PM CST   (Arrive by 1:15 PM)   Office Visit with Lambert Wall RPH   [facility] Medication Therapy Management ([place] [facility])    [address]  3rd Floor  [place] [facility] [id number] [phone]           Bring a current list of meds and any records pertaining to this visit. For Physicals, please bring immunization records and any forms needing to be filled out. Please arrive 10 minutes early to complete paperwork.            Feb 07, 2019  1:05 PM CST   (Arrive by 12:35 PM)   Return Kidney Transplant with  Kidney/Pancreas Recipient   [facility] Nephrology ([place] [facility])    [address]  [address]  [facility] [place] [facility] [address] [phone]              Who to contact     If you have questions or need follow up information about today's clinic visit or your schedule please contact [facility] NEPHROLOGY directly at [phone].  Normal or non-critical lab and imaging results will be communicated to you by MyChart, letter or phone within 4 business days after the clinic has received the results. If you do not hear from us within 7 days, please contact the clinic  "through [employer] or phone. If you have a critical or abnormal lab result, we will notify you by phone as soon as possible.  Submit refill requests through [employer] or call your pharmacy and they will forward the refill request to us. Please allow 3 business days for your refill to be completed.          Additional Information About Your Visit        Care EveryWhere ID     This is your Care EveryWhere ID. This could be used by other organizations to access your [facility] medical records  [id number]        Your Vitals Were     Pulse Temperature Respirations Height BMI (Body Mass Index)       65 98.2  F (36.8  C) (Oral) 18 1.816 m (5' 11.5\") 31.91 kg/m2        Blood Pressure from Last 3 Encounters:   09/06/18 133/81   08/27/18 138/80   08/25/18 127/76    Weight from Last 3 Encounters:   09/06/18 105.2 kg (232 lb)   08/27/18 106.4 kg (234 lb 8 oz)   08/17/18 108.4 kg (239 lb)              Today, you had the following     No orders found for display         Today's Medication Changes          These changes are accurate as of 9/6/18 12:40 PM.  If you have any questions, ask your nurse or doctor.               Stop taking these medicines if you haven't already. Please contact your care team if you have questions.     oxyCODONE IR 5 MG tablet   Commonly known as:  ROXICODONE   Stopped by:  Recipient, [facility]Pancreas           phosphorus tablet 250 mg 250 MG per tablet   Stopped by:  Recipient, [facility] Kidney/Pancreas           senna-docusate 8.6-50 MG per tablet   Commonly known as:  SENOKOT-S;PERICOLACE   Stopped by:  Recipient, [facility] Kidney/Pancreas                Where to get your medicines      These medications were sent to [facility]/pharmacy #[id number] - [place], [place] - [address]  [address] [address]     Phone:  [phone]     tacrolimus 0.5 MG capsule    tacrolimus 1 MG capsule                Primary Care Provider Office Phone # Fax #    [facility] [phone] [id number]        Equal Access to Services     JAKI PUGA : Silas Penn, waivonne poe, qaherb alcantar. So [facility] [phone].    ATENCIÓN: Si habla español, tiene a rodriguez disposición servicios gratuitos de asistencia lingüística. [facility] [phone].    We comply with applicable federal civil rights laws and [place] laws. We do not discriminate on the basis of race, color, national origin, age, disability, sex, sexual orientation, or gender identity.            Thank you!     Thank you for choosing [facility] NEPHROLOGY  for your care. Our goal is always to provide you with excellent care. Hearing back from our patients is one way we can continue to improve our services. Please take a few minutes to complete the written survey that you may receive in the mail after your visit with us. Thank you!             Your Updated Medication List - Protect others around you: Learn how to safely use, store and throw away your medicines at www.disposemymeds.org.          This list is accurate as of 9/6/18 12:40 PM.  Always use your most recent med list.                   Brand Name Dispense Instructions for use Diagnosis    acetaminophen 325 MG tablet    TYLENOL    100 tablet    Take 1-2 tablets (325-650 mg) by mouth every 4 hours as needed for mild pain or fever    Kidney replaced by transplant       aspirin 325 MG EC tablet     30 tablet    Take 1 tablet (325 mg) by mouth daily    Kidney replaced by transplant       atorvastatin 10 MG tablet    LIPITOR    30 tablet    Take 1 tablet (10 mg) by mouth daily    Kidney replaced by transplant       carvedilol 25 MG tablet    COREG    120 tablet    Take 2 tablets (50 mg) by mouth 2 times daily (with meals)    Renovascular hypertension, Kidney replaced by transplant       magnesium oxide 400 MG tablet    MAG-OX    30 tablet    Take 1 tablet (400 mg) by mouth daily (with lunch)     Kidney replaced by transplant       mycophenolate 250 MG capsule    GENERIC EQUIVALENT    180 capsule    Take 3 capsules (750 mg) by mouth 2 times daily    Kidney replaced by transplant       ondansetron 4 MG ODT tab    ZOFRAN-ODT    20 tablet    Take 1 tablet (4 mg) by mouth every 6 hours as needed for nausea or vomiting    Kidney replaced by transplant       sodium bicarbonate 650 MG tablet     120 tablet    Take 2 tablets (1,300 mg) by mouth 2 times daily    Acidosis, Kidney replaced by transplant       sulfamethoxazole-trimethoprim 400-80 MG per tablet    BACTRIM/SEPTRA    30 tablet    1 tablet Monday, Wednesday, Friday    Kidney replaced by transplant, Immunosuppressed status (H)       * tacrolimus 0.5 MG capsule    GENERIC EQUIVALENT    60 capsule    Take 1 capsule (0.5 mg) by mouth 2 times daily Total dose = 4.5 mg BID    Kidney replaced by transplant       * tacrolimus 1 MG capsule    GENERIC EQUIVALENT    240 capsule    Take 4 capsules (4 mg) by mouth 2 times daily Total dose = 4.5 mg BID    Kidney replaced by transplant       valGANciclovir 450 MG tablet    VALCYTE    60 tablet    Take 1 tablet (450 mg) by mouth daily Titrate dose up to a max of 2 tabs (900 mg) by mouth daily when directed by your transplant team.    Kidney replaced by transplant       * Notice:  This list has 2 medication(s) that are the same as other medications prescribed for you. Read the directions carefully, and ask your doctor or other care provider to review them with you.

## 2018-09-07 ENCOUNTER — RECORDS - HEALTHEAST (OUTPATIENT)
Dept: LAB | Facility: CLINIC | Age: 38
End: 2018-09-07

## 2018-09-07 ENCOUNTER — TELEPHONE (OUTPATIENT)
Dept: TRANSPLANT | Facility: CLINIC | Age: 38
End: 2018-09-07

## 2018-09-07 DIAGNOSIS — Z94.0 KIDNEY REPLACED BY TRANSPLANT: ICD-10-CM

## 2018-09-07 DIAGNOSIS — Z48.298 AFTERCARE FOLLOWING ORGAN TRANSPLANT: ICD-10-CM

## 2018-09-07 DIAGNOSIS — Z94.0 KIDNEY REPLACED BY TRANSPLANT: Primary | ICD-10-CM

## 2018-09-07 LAB
ANION GAP SERPL CALCULATED.3IONS-SCNC: 7 MMOL/L (ref 5–18)
BUN SERPL-MCNC: 28 MG/DL (ref 8–22)
CALCIUM SERPL-MCNC: 10.4 MG/DL (ref 8.5–10.5)
CHLORIDE BLD-SCNC: 110 MMOL/L (ref 98–107)
CO2 SERPL-SCNC: 19 MMOL/L (ref 22–31)
CREAT SERPL-MCNC: 2.05 MG/DL (ref 0.7–1.3)
ERYTHROCYTE [DISTWIDTH] IN BLOOD BY AUTOMATED COUNT: 13.6 % (ref 11–14.5)
GFR SERPL CREATININE-BSD FRML MDRD: 37 ML/MIN/1.73M2
GLUCOSE BLD-MCNC: 98 MG/DL (ref 70–125)
HCT VFR BLD AUTO: 32.6 % (ref 40–54)
HGB BLD-MCNC: 10.7 G/DL (ref 14–18)
MCH RBC QN AUTO: 28.2 PG (ref 27–34)
MCHC RBC AUTO-ENTMCNC: 32.8 G/DL (ref 32–36)
MCV RBC AUTO: 86 FL (ref 80–100)
MYCOPHENOLATE SERPL LC/MS/MS-MCNC: 4.52 MG/L (ref 1–3.5)
MYCOPHENOLATE-G SERPL LC/MS/MS-MCNC: 80.5 MG/L (ref 30–95)
PLATELET # BLD AUTO: 276 THOU/UL (ref 140–440)
PMV BLD AUTO: 9.3 FL (ref 8.5–12.5)
POTASSIUM BLD-SCNC: 5.4 MMOL/L (ref 3.5–5)
RBC # BLD AUTO: 3.8 MILL/UL (ref 4.4–6.2)
SODIUM SERPL-SCNC: 136 MMOL/L (ref 136–145)
TME LAST DOSE: ABNORMAL H
WBC: 2.5 THOU/UL (ref 4–11)

## 2018-09-07 PROCEDURE — 80197 ASSAY OF TACROLIMUS: CPT | Performed by: SURGERY

## 2018-09-07 NOTE — TELEPHONE ENCOUNTER
Currently scheduled for stent removal, op note states NO stent. Will cancel stent removal and schedule KUB to confirm NO stent.  Harshad voiced understanding.      Diarrhea continues, will start fiber supplement and probiotic. Recently reduced cellcept and tac doses.

## 2018-09-08 LAB
TACROLIMUS BLD-MCNC: 10.4 UG/L (ref 5–15)
TME LAST DOSE: NORMAL H

## 2018-09-10 ENCOUNTER — RESULTS ONLY (OUTPATIENT)
Dept: OTHER | Facility: CLINIC | Age: 38
End: 2018-09-10

## 2018-09-10 ENCOUNTER — TELEPHONE (OUTPATIENT)
Dept: TRANSPLANT | Facility: CLINIC | Age: 38
End: 2018-09-10

## 2018-09-10 ENCOUNTER — RECORDS - HEALTHEAST (OUTPATIENT)
Dept: LAB | Facility: CLINIC | Age: 38
End: 2018-09-10

## 2018-09-10 DIAGNOSIS — Z94.0 KIDNEY REPLACED BY TRANSPLANT: ICD-10-CM

## 2018-09-10 DIAGNOSIS — Z48.298 AFTERCARE FOLLOWING ORGAN TRANSPLANT: ICD-10-CM

## 2018-09-10 LAB
ANION GAP SERPL CALCULATED.3IONS-SCNC: 7 MMOL/L (ref 5–18)
BASOPHILS # BLD AUTO: 0.1 THOU/UL (ref 0–0.2)
BASOPHILS NFR BLD AUTO: 2 % (ref 0–2)
BK VIRUS SPECIMEN SOURCE: NORMAL
BKV DNA # SPEC NAA+PROBE: NORMAL COPIES/ML
BKV DNA SPEC NAA+PROBE-LOG#: NORMAL LOG COPIES/ML
BUN SERPL-MCNC: 30 MG/DL (ref 8–22)
CALCIUM SERPL-MCNC: 10.1 MG/DL (ref 8.5–10.5)
CHLORIDE BLD-SCNC: 110 MMOL/L (ref 98–107)
CO2 SERPL-SCNC: 20 MMOL/L (ref 22–31)
CREAT SERPL-MCNC: 2.14 MG/DL (ref 0.7–1.3)
EOSINOPHIL # BLD AUTO: 0 THOU/UL (ref 0–0.4)
EOSINOPHIL NFR BLD AUTO: 2 % (ref 0–6)
ERYTHROCYTE [DISTWIDTH] IN BLOOD BY AUTOMATED COUNT: 13.6 % (ref 11–14.5)
GFR SERPL CREATININE-BSD FRML MDRD: 35 ML/MIN/1.73M2
GLUCOSE BLD-MCNC: 97 MG/DL (ref 70–125)
HCT VFR BLD AUTO: 31.8 % (ref 40–54)
HGB BLD-MCNC: 10.4 G/DL (ref 14–18)
LYMPHOCYTES # BLD AUTO: 0.4 THOU/UL (ref 0.8–4.4)
LYMPHOCYTES NFR BLD AUTO: 15 % (ref 20–40)
MAGNESIUM SERPL-MCNC: 1.7 MG/DL (ref 1.8–2.6)
MCH RBC QN AUTO: 28.4 PG (ref 27–34)
MCHC RBC AUTO-ENTMCNC: 32.7 G/DL (ref 32–36)
MCV RBC AUTO: 87 FL (ref 80–100)
MONOCYTES # BLD AUTO: 0.1 THOU/UL (ref 0–0.9)
MONOCYTES NFR BLD AUTO: 5 % (ref 2–10)
MYCOPHENOLATE SERPL LC/MS/MS-MCNC: 3.66 MG/L (ref 1–3.5)
MYCOPHENOLATE-G SERPL LC/MS/MS-MCNC: 55.6 MG/L (ref 30–95)
MYCOPHENOLIC ACID LAST DOSE: ABNORMAL
NEUTROPHILS # BLD AUTO: 2 THOU/UL (ref 2–7.7)
NEUTROPHILS NFR BLD AUTO: 76 % (ref 50–70)
PHOSPHATE SERPL-MCNC: 2 MG/DL (ref 2.5–4.5)
PLATELET # BLD AUTO: 264 THOU/UL (ref 140–440)
PMV BLD AUTO: 9.6 FL (ref 8.5–12.5)
POTASSIUM BLD-SCNC: 5.3 MMOL/L (ref 3.5–5)
RBC # BLD AUTO: 3.66 MILL/UL (ref 4.4–6.2)
SODIUM SERPL-SCNC: 137 MMOL/L (ref 136–145)
WBC: 2.7 THOU/UL (ref 4–11)

## 2018-09-10 PROCEDURE — 80197 ASSAY OF TACROLIMUS: CPT | Performed by: SURGERY

## 2018-09-10 PROCEDURE — 86828 HLA CLASS I&II ANTIBODY QUAL: CPT | Performed by: PHYSICIAN ASSISTANT

## 2018-09-10 PROCEDURE — 86833 HLA CLASS II HIGH DEFIN QUAL: CPT | Performed by: PHYSICIAN ASSISTANT

## 2018-09-10 PROCEDURE — 86832 HLA CLASS I HIGH DEFIN QUAL: CPT | Performed by: PHYSICIAN ASSISTANT

## 2018-09-10 NOTE — TELEPHONE ENCOUNTER
RNCC spoke with Margaret who dicussed ongoing home care with Harshad. Harshad has decided to discharge from home care and will continue with labs at [facility].

## 2018-09-11 ENCOUNTER — TELEPHONE (OUTPATIENT)
Dept: TRANSPLANT | Facility: CLINIC | Age: 38
End: 2018-09-11

## 2018-09-11 DIAGNOSIS — Z94.0 KIDNEY REPLACED BY TRANSPLANT: ICD-10-CM

## 2018-09-11 LAB
DONOR IDENTIFICATION: NORMAL
DSA COMMENTS: NORMAL
DSA PRESENT: NO
DSA TEST METHOD: NORMAL
INTERFERING SUBST TEST METHOD: NORMAL
INTERFERING SUBSTANCE COMMENT: NORMAL
INTERFERING SUBSTANCE RESULT: NORMAL
INTERFERING SUBSTANCE: NORMAL
ORGAN: NORMAL
PRA DONOR SPECIFIC ABY: NORMAL
SA1 CELL: NORMAL
SA1 COMMENTS: NORMAL
SA1 HI RISK ABY: NORMAL
SA1 MOD RISK ABY: NORMAL
SA1 TEST METHOD: NORMAL
SA2 CELL: NORMAL
SA2 COMMENTS: NORMAL
SA2 HI RISK ABY UA: NORMAL
SA2 MOD RISK ABY: NORMAL
SA2 TEST METHOD: NORMAL
TACROLIMUS BLD-MCNC: 10.3 UG/L (ref 5–15)
TME LAST DOSE: NORMAL H

## 2018-09-11 RX ORDER — TACROLIMUS 1 MG/1
4 CAPSULE ORAL 2 TIMES DAILY
Qty: 240 CAPSULE | Refills: 11 | Status: SHIPPED | OUTPATIENT
Start: 2018-09-11 | End: 2018-09-21

## 2018-09-11 RX ORDER — TACROLIMUS 0.5 MG/1
CAPSULE ORAL
Qty: 60 CAPSULE | Refills: 11 | Status: SHIPPED | OUTPATIENT
Start: 2018-09-11 | End: 2018-09-21

## 2018-09-11 NOTE — TELEPHONE ENCOUNTER
Transplant Social Work Services Phone Call      Data: High cost of Valganciclovir  Intervention: Received in-basket message from transplant coordinator that patient expressed concerns about high cost of Valganciclovir. Left patient a message to return this writers call.   Assessment: There are no assistance programs available for generic medications however name brand Valcyte has a $15 copay card. Will discuss with patient when patient returns call.   Education provided by SW: n/a  Plan: Await patient call back.     Adeola Quintanilla [facility]    Kidney/Pancreas/[place] Transplant Programs

## 2018-09-13 ENCOUNTER — RECORDS - HEALTHEAST (OUTPATIENT)
Dept: LAB | Facility: CLINIC | Age: 38
End: 2018-09-13

## 2018-09-13 ENCOUNTER — TELEPHONE (OUTPATIENT)
Dept: TRANSPLANT | Facility: CLINIC | Age: 38
End: 2018-09-13

## 2018-09-13 DIAGNOSIS — Z94.0 KIDNEY REPLACED BY TRANSPLANT: ICD-10-CM

## 2018-09-13 DIAGNOSIS — Z48.298 AFTERCARE FOLLOWING ORGAN TRANSPLANT: ICD-10-CM

## 2018-09-13 DIAGNOSIS — E86.0 DEHYDRATION: Primary | ICD-10-CM

## 2018-09-13 LAB
ANION GAP SERPL CALCULATED.3IONS-SCNC: 3 MMOL/L (ref 5–18)
BUN SERPL-MCNC: 28 MG/DL (ref 8–22)
CALCIUM SERPL-MCNC: 10.1 MG/DL (ref 8.5–10.5)
CHLORIDE BLD-SCNC: 111 MMOL/L (ref 98–107)
CO2 SERPL-SCNC: 22 MMOL/L (ref 22–31)
CREAT SERPL-MCNC: 2.4 MG/DL (ref 0.7–1.3)
ERYTHROCYTE [DISTWIDTH] IN BLOOD BY AUTOMATED COUNT: 13.8 % (ref 11–14.5)
GFR SERPL CREATININE-BSD FRML MDRD: 30 ML/MIN/1.73M2
GLUCOSE BLD-MCNC: 101 MG/DL (ref 70–125)
HCT VFR BLD AUTO: 31.7 % (ref 40–54)
HGB BLD-MCNC: 10.3 G/DL (ref 14–18)
MCH RBC QN AUTO: 28.3 PG (ref 27–34)
MCHC RBC AUTO-ENTMCNC: 32.5 G/DL (ref 32–36)
MCV RBC AUTO: 87 FL (ref 80–100)
PLATELET # BLD AUTO: 240 THOU/UL (ref 140–440)
PMV BLD AUTO: 9.1 FL (ref 8.5–12.5)
POTASSIUM BLD-SCNC: 5.9 MMOL/L (ref 3.5–5)
RBC # BLD AUTO: 3.64 MILL/UL (ref 4.4–6.2)
SODIUM SERPL-SCNC: 136 MMOL/L (ref 136–145)
WBC: 3.3 THOU/UL (ref 4–11)

## 2018-09-13 PROCEDURE — 80197 ASSAY OF TACROLIMUS: CPT | Performed by: SURGERY

## 2018-09-14 LAB
TACROLIMUS BLD-MCNC: 10.8 UG/L (ref 5–15)
TME LAST DOSE: NORMAL H

## 2018-09-14 NOTE — TELEPHONE ENCOUNTER
Post Kidney and Pancreas Transplant Team Conference  Date: 9/14/2018  Transplant Coordinator: Mirna Harrell     Attendees:  []  Dr. Bahena [] Yanni Field, RN  [] Lynda Wilson LPN     [x]  Dr. Gaxiola [] Tonya Albrecht RN [] Sandra Epperson LPN   []  Dr. Connolly [] Sherry Jimenez RN    []  Dr. Ronquillo [] Julieta Perez RN    [] Dr. High [x] Eli Harrell RN    [] Dr. Lema [] Randy Peter RN    [] Dr. Mitchell [] Dorcas Lopez, RN    [] Surgery Fellow [] Blanca Sow RN    [] Tracy Finley NP              Verbal Plan Read Back:   Due to increased creatinine -   IF SBP <150mmHg OR if patient is positive for orthostatic hypotension:  1L IVF weekly   Florinef 0.1mg Mondays, Wednesdays, Fridays    Clarify current Coreg dose.  IF BP consistently <120mmHg, reduce Coreg to 25mg BID    Routed to RN Coordinator   Mirna Harrell

## 2018-09-17 ENCOUNTER — RECORDS - HEALTHEAST (OUTPATIENT)
Dept: LAB | Facility: CLINIC | Age: 38
End: 2018-09-17

## 2018-09-17 ENCOUNTER — OFFICE VISIT (OUTPATIENT)
Dept: TRANSPLANT | Facility: CLINIC | Age: 38
End: 2018-09-17
Attending: SURGERY
Payer: COMMERCIAL

## 2018-09-17 ENCOUNTER — RADIANT APPOINTMENT (OUTPATIENT)
Dept: GENERAL RADIOLOGY | Facility: CLINIC | Age: 38
End: 2018-09-17
Attending: SURGERY
Payer: COMMERCIAL

## 2018-09-17 VITALS
OXYGEN SATURATION: 99 % | HEART RATE: 71 BPM | DIASTOLIC BLOOD PRESSURE: 75 MMHG | WEIGHT: 241.2 LBS | HEIGHT: 72 IN | BODY MASS INDEX: 32.67 KG/M2 | SYSTOLIC BLOOD PRESSURE: 125 MMHG | TEMPERATURE: 98.2 F

## 2018-09-17 DIAGNOSIS — Z94.0 KIDNEY REPLACED BY TRANSPLANT: ICD-10-CM

## 2018-09-17 DIAGNOSIS — Z94.0 KIDNEY TRANSPLANTED: Primary | ICD-10-CM

## 2018-09-17 DIAGNOSIS — Z48.298 AFTERCARE FOLLOWING ORGAN TRANSPLANT: ICD-10-CM

## 2018-09-17 LAB
ANION GAP SERPL CALCULATED.3IONS-SCNC: 11 MMOL/L (ref 5–18)
BASOPHILS # BLD AUTO: 0 THOU/UL (ref 0–0.2)
BASOPHILS NFR BLD AUTO: 1 % (ref 0–2)
BUN SERPL-MCNC: 25 MG/DL (ref 8–22)
CALCIUM SERPL-MCNC: 10.4 MG/DL (ref 8.5–10.5)
CHLORIDE BLD-SCNC: 112 MMOL/L (ref 98–107)
CO2 SERPL-SCNC: 16 MMOL/L (ref 22–31)
CREAT SERPL-MCNC: 2.11 MG/DL (ref 0.7–1.3)
EOSINOPHIL # BLD AUTO: 0.1 THOU/UL (ref 0–0.4)
EOSINOPHIL NFR BLD AUTO: 2 % (ref 0–6)
ERYTHROCYTE [DISTWIDTH] IN BLOOD BY AUTOMATED COUNT: 13.8 % (ref 11–14.5)
GFR SERPL CREATININE-BSD FRML MDRD: 35 ML/MIN/1.73M2
GLUCOSE BLD-MCNC: 92 MG/DL (ref 70–125)
HCT VFR BLD AUTO: 33 % (ref 40–54)
HGB BLD-MCNC: 10.5 G/DL (ref 14–18)
LYMPHOCYTES # BLD AUTO: 0.4 THOU/UL (ref 0.8–4.4)
LYMPHOCYTES NFR BLD AUTO: 14 % (ref 20–40)
MAGNESIUM SERPL-MCNC: 2 MG/DL (ref 1.8–2.6)
MCH RBC QN AUTO: 27.8 PG (ref 27–34)
MCHC RBC AUTO-ENTMCNC: 31.8 G/DL (ref 32–36)
MCV RBC AUTO: 87 FL (ref 80–100)
MONOCYTES # BLD AUTO: 0.2 THOU/UL (ref 0–0.9)
MONOCYTES NFR BLD AUTO: 6 % (ref 2–10)
NEUTROPHILS # BLD AUTO: 2.2 THOU/UL (ref 2–7.7)
NEUTROPHILS NFR BLD AUTO: 77 % (ref 50–70)
PHOSPHATE SERPL-MCNC: 2.1 MG/DL (ref 2.5–4.5)
PLATELET # BLD AUTO: 275 THOU/UL (ref 140–440)
PMV BLD AUTO: 9.6 FL (ref 8.5–12.5)
POTASSIUM BLD-SCNC: 5.4 MMOL/L (ref 3.5–5)
RBC # BLD AUTO: 3.78 MILL/UL (ref 4.4–6.2)
SODIUM SERPL-SCNC: 139 MMOL/L (ref 136–145)
WBC: 2.9 THOU/UL (ref 4–11)

## 2018-09-17 PROCEDURE — 80197 ASSAY OF TACROLIMUS: CPT | Performed by: SURGERY

## 2018-09-17 PROCEDURE — G0463 HOSPITAL OUTPT CLINIC VISIT: HCPCS | Mod: ZF

## 2018-09-17 ASSESSMENT — PAIN SCALES - GENERAL: PAINLEVEL: NO PAIN (0)

## 2018-09-17 NOTE — LETTER
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 Recovering well. No pain. Incision well-healed, no hernia. Did have rise in creatinine that was assessed with biopsy. Biopsy negative for rejection or recurrent FSGS. Likely etiology supratherapeutic tacrolimus levels. Has had dose decreased to 4 BID- last level   10.8 from 9/13. Cr today 2.11 from 2.40, close to baseline 2-2.1. Making good urine, no swelling/dyspnea. Blood pressures well-controlled.     Transplant History:    Transplant Type:  LDKT  Donor Type: Living   Transplant Date:  8/7/2018 (Kidney)   Ureteral Stent:  No   Crossmatch:  negative   DSA at Tx:  No  Baseline Cr: 2-2.2   DeNovo DSA: No    Acute Rejection Hx:  No    Present Maintenance Immunosuppression:  Tacrolimus and Mycophenolate mofetil    CMV IgG Ab Discordance:  No  EBV IgG Ab Discordance:  No    BK Viremia:  No  EBV Viremia:  No    Transplant Coordinator: Mirna Harrell     Transplant Office Phone Number: [phone]     Immunsupresent Medications     Immunosuppressive Agents Disp Start End    mycophenolate (GENERIC EQUIVALENT) 250 MG capsule 180 capsule 9/5/2018     Sig - Route: Take 3 capsules (750 mg) by mouth 2 times daily - Oral    Class: E-Prescribe    tacrolimus (GENERIC EQUIVALENT) 0.5 MG capsule 60 capsule 9/11/2018     Sig: Hold, dose change.    Patient not taking:  Reported on 9/17/2018       Class: E-Prescribe    tacrolimus (GENERIC EQUIVALENT) 1 MG capsule 240 capsule 9/11/2018     Sig - Route: Take 4 capsules (4 mg) by mouth 2 times daily - Oral    Class: E-Prescribe          Possible Immunosuppression-related side effects:   []             headache  []             vivid dreams  []             irritability  []             cognitive difficuties  []             fine tremor  []             nausea  []              diarrhea  []             neuropathy      []             edema  []             renal calcineurin toxicity  []             hyperkalemia  []             post-transplant diabetes  []             decreased appetite  []             increased appetite  []             other:  []             none    Prescription Medications as of 9/17/2018             atorvastatin (LIPITOR) 10 MG tablet Take 1 tablet (10 mg) by mouth daily    carvedilol (COREG) 25 MG tablet Take 2 tablets (50 mg) by mouth 2 times daily (with meals)    magnesium oxide (MAG-OX) 400 MG tablet Take 1 tablet (400 mg) by mouth daily (with lunch)    mycophenolate (GENERIC EQUIVALENT) 250 MG capsule Take 3 capsules (750 mg) by mouth 2 times daily    sodium bicarbonate 650 MG tablet Take 2 tablets (1,300 mg) by mouth 2 times daily    sulfamethoxazole-trimethoprim (BACTRIM/SEPTRA) 400-80 MG per tablet 1 tablet Monday, Wednesday, Friday    tacrolimus (GENERIC EQUIVALENT) 1 MG capsule Take 4 capsules (4 mg) by mouth 2 times daily    valGANciclovir (VALCYTE) 450 MG tablet Take 1 tablet (450 mg) by mouth daily Titrate dose up to a max of 2 tabs (900 mg) by mouth daily when directed by your transplant team.    acetaminophen (TYLENOL) 325 MG tablet Take 1-2 tablets (325-650 mg) by mouth every 4 hours as needed for mild pain or fever    aspirin 325 MG EC tablet Take 1 tablet (325 mg) by mouth daily    ondansetron (ZOFRAN-ODT) 4 MG ODT tab Take 1 tablet (4 mg) by mouth every 6 hours as needed for nausea or vomiting    tacrolimus (GENERIC EQUIVALENT) 0.5 MG capsule Hold, dose change.          O:   Temp:  [98.2  F (36.8  C)] 98.2  F (36.8  C)  Pulse:  [71] 71  BP: (125)/(75) 125/75  SpO2:  [99 %] 99 %  General Appearance: in no apparent distress.   Skin: Normal, no rashes or jaundice  Heart: regular rate and rhythm, normal S1 and S2  Lungs: easy respirations, no audible wheezing.  Abdomen: Abdomen soft. Nontender. No hernia with straining. Incision well-healed. No tenderness  over kidney graft. Extremities: Tremor absent.   Edema: absent.    Transplant Immunosuppression Labs Latest Ref Rng & Units 9/13/2018 9/10/2018 9/7/2018 9/5/2018 9/3/2018   Tacro Level 5.0 - 15.0 ug/L 10.8 10.3 10.4 13.0 14.1   Tacro Level - 09/12/2018 20:00 09/09/2018 20:15 09/06/2018 20:15 [id number]   Creat 0.66 - 1.25 mg/dL - - - 2.03(H) -   BUN 7 - 30 mg/dL - - - 36(H) -   WBC 4.0 - 11.0 10e9/L - - - 2.5(L) -   Neutrophil % - - - 76.6 -   ANEU 1.6 - 8.3 10e9/L - - - 1.9 -       Chemistries:   Recent Labs   Lab Test  09/05/18   0845   BUN  36*   CR  2.03*   GFRESTIMATED  37*   GLC  86     No results found for: A1C, CPEPT  Recent Labs   Lab Test  07/30/18   1132   ALBUMIN  3.9   BILITOTAL  0.5   ALKPHOS  50   AST  4   ALT  17     Urine Studies:  Recent Labs   Lab Test  08/15/18   1000   COLOR  Straw   APPEARANCE  Clear   URINEGLC  Negative   URINEBILI  Negative   URINEKETONE  Negative   SG  1.008   UBLD  Small*   URINEPH  7.0   PROTEIN  Negative   NITRITE  Negative   LEUKEST  Negative   RBCU  1   WBCU  <1     Recent Labs   Lab Test  08/14/18   0800   UTPG  0.47*     Hematology:   Recent Labs   Lab Test  09/05/18   0845  08/31/18   0845  08/29/18   0845   HGB  10.7*  10.8*  11.1*   PLT  276  299  297   WBC  2.5*  2.3*  2.5*     Coags:   Recent Labs   Lab Test  05/31/17   0734   INR  1.05     HLA antibodies:   SA1 Hi Risk Aggie   Date Value Ref Range Status   09/10/2018 None  Final     SA1 Mod Risk Aggei   Date Value Ref Range Status   09/10/2018 None  Final     SA2 Hi Risk Aggie   Date Value Ref Range Status   09/10/2018 None  Final     SA2 Mod Risk Aggie   Date Value Ref Range Status   09/10/2018 None  Final       Assessment: Harshad Beatty is doing well s/p LDKT:  Issues we addressed during his visit include:    -creatinine: He has good graft function. We anticipate that his creatinine remains stably elevated due to his overall mass, but that it may fall in the future as his tacrolimus goals and levels  decrease.  -RUE AV fistula, aneurysmal- as he recovers from this surgery, he may choose a time that is convenient for fistula ligation/excision. This is not urgent and he agrees that he would like to make further recovery prior to this surgery.     Plan:    1. Graft function: good  2. Immunosuppression Management: No change continue tacrolimus 4 BID,  mycophenolate 750 BID.  Complexity of management:Low.  Contributing factors: organ dysfunction  Followup: may follow up with transplant nephrology for continued appointments. Discussed that he should contact us for appointment when ready to discuss     I have reviewed history, examined patient and discussed plan with the fellow/resident/CONCHITA.  I concur with the findings in this note.    Immunosuppressive regimen, management and long term risks discussed in detail. Changes, when applicable made as per orders.      Total time: 15 min  Counseling time: 10 min

## 2018-09-17 NOTE — MR AVS SNAPSHOT
After Visit Summary   9/17/2018    Harshad Beatty    MRN: [id number]           Patient Information     Date Of Birth          1980        Visit Information        Provider Department      9/17/2018 1:30 PM Bety Mitchell MD [facility] Solid Organ Transplant        Today's Diagnoses     Kidney transplanted    -  1       Follow-ups after your visit        Your next 10 appointments already scheduled     Sep 28, 2018 12:00 PM CDT   US RENAL TRANSPLANT with UCUS3   [facility] Imaging Center US ([facility])    [address] [place] [address] [phone]           How do I prepare for my exam? (Food and drink instructions) No Food and Drink Restrictions.  How do I prepare for my exam? (Other instructions) You do not need to do anything special to prepare for your exam.  What should I wear: Wear comfortable clothes.  How long does the exam take: Most ultrasounds take 30 to 60 minutes.  What should I bring: Bring a list of your medicines, including vitamins, minerals and over-the-counter drugs. It is safest to leave personal items at home.  Do I need a :  No  is needed.  What do I need to tell my doctor: Tell your doctor about any allergies you may have.  What should I do after the exam: No restrictions, You may resume normal activities.  What is this test: An ultrasound uses sound waves to make pictures of the body. Sound waves do not cause pain. The only discomfort may be the pressure of the wand against your skin or full bladder.  Who should I call with questions: If you have any questions, please call the Imaging Department where you will have your exam. Directions, parking instructions, and other information is available on our website, [email or web address].            Oct 10, 2018 11:30 AM CDT   (Arrive by 11:00 AM)   Return Kidney Transplant with  Early Post Transplant   [facility] Nephrology ([facility])     [address]  [address]  [facility] [address]   [phone]            Dec 07, 2018 11:30 AM CST   (Arrive by 11:00 AM)   Return Kidney Transplant with Uc Early Post Transplant   [facility] Nephrology ([facility])    [address]  [address]  [facility] [address]   [phone]            Dec 07, 2018  1:30 PM CST   (Arrive by 1:15 PM)   Office Visit with Lambert Wall RPH   [facility] Medication Therapy Management ([facility])    [address] [place] [address]  3rd Floor  [facility] [address] [phone]           Bring a current list of meds and any records pertaining to this visit. For Physicals, please bring immunization records and any forms needing to be filled out. Please arrive 10 minutes early to complete paperwork.            Feb 07, 2019  1:05 PM CST   (Arrive by 12:35 PM)   Return Kidney Transplant with  Kidney/Pancreas Recipient 2   [facility] Nephrology ([facility])    [address] [place] [address]  [address]  [facility] [address]   [phone]              Who to contact     If you have questions or need follow up information about today's clinic visit or your schedule please contact [facility] SOLID ORGAN TRANSPLANT directly at [phone].  Normal or non-critical lab and imaging results will be communicated to you by MyChart, letter or phone within 4 business days after the clinic has received the results. If you do not hear from us within 7 days, please contact the clinic through MyChart or phone. If you have a critical or abnormal lab result, we will notify you by phone as soon as possible.  Submit refill requests through [employer] or call your pharmacy and they will forward the refill request to us. Please allow 3 business days for your refill to be completed.          Additional Information About Your Visit        Care EveryWhere ID     This is your Care EveryWhere ID. This could be used by other  "organizations to access your [place] medical records  [id number]        Your Vitals Were     Pulse Temperature Height Pulse Oximetry BMI (Body Mass Index)       71 98.2  F (36.8  C) (Oral) 1.816 m (5' 11.5\") 99% 33.17 kg/m2        Blood Pressure from Last 3 Encounters:   09/17/18 125/75   09/06/18 133/81   08/27/18 138/80    Weight from Last 3 Encounters:   09/17/18 109.4 kg (241 lb 3.2 oz)   09/06/18 105.2 kg (232 lb)   08/27/18 106.4 kg (234 lb 8 oz)              Today, you had the following     No orders found for display       Primary Care Provider Office Phone # Fax #    [facility] [phone] [address] [facility] [place] [facility] [place] [address]        Equal Access to Services     JAKI PUGA : Hadii nan [place], wajuanada ludimitri, qaybta kaalmada dariela, herb beltran . So [facility] [phone].    ATENCIÓN: Si habla español, tiene a rodriguez disposición servicios gratuitos de asistencia lingüística. Fabián al [phone].    We comply with applicable federal civil rights laws and [place] laws. We do not discriminate on the basis of race, color, national origin, age, disability, sex, sexual orientation, or gender identity.            Thank you!     Thank you for choosing [facility] SOLID ORGAN TRANSPLANT  for your care. Our goal is always to provide you with excellent care. Hearing back from our patients is one way we can continue to improve our services. Please take a few minutes to complete the written survey that you may receive in the mail after your visit with us. Thank you!             Your Updated Medication List - Protect others around you: Learn how to safely use, store and throw away your medicines at www.disposemymeds.org.          This list is accurate as of 9/17/18  2:37 PM.  Always use your most recent med list.                   Brand Name Dispense Instructions for use Diagnosis    acetaminophen 325 MG tablet    TYLENOL    " 100 tablet    Take 1-2 tablets (325-650 mg) by mouth every 4 hours as needed for mild pain or fever    Kidney replaced by transplant       aspirin 325 MG EC tablet     30 tablet    Take 1 tablet (325 mg) by mouth daily    Kidney replaced by transplant       atorvastatin 10 MG tablet    LIPITOR    30 tablet    Take 1 tablet (10 mg) by mouth daily    Kidney replaced by transplant       carvedilol 25 MG tablet    COREG    120 tablet    Take 2 tablets (50 mg) by mouth 2 times daily (with meals)    Renovascular hypertension, Kidney replaced by transplant       magnesium oxide 400 MG tablet    MAG-OX    30 tablet    Take 1 tablet (400 mg) by mouth daily (with lunch)    Kidney replaced by transplant       mycophenolate 250 MG capsule    GENERIC EQUIVALENT    180 capsule    Take 3 capsules (750 mg) by mouth 2 times daily    Kidney replaced by transplant       ondansetron 4 MG ODT tab    ZOFRAN-ODT    20 tablet    Take 1 tablet (4 mg) by mouth every 6 hours as needed for nausea or vomiting    Kidney replaced by transplant       sodium bicarbonate 650 MG tablet     120 tablet    Take 2 tablets (1,300 mg) by mouth 2 times daily    Acidosis, Kidney replaced by transplant       sulfamethoxazole-trimethoprim 400-80 MG per tablet    BACTRIM/SEPTRA    30 tablet    1 tablet Monday, Wednesday, Friday    Kidney replaced by transplant, Immunosuppressed status (H)       * tacrolimus 0.5 MG capsule    GENERIC EQUIVALENT    60 capsule    Hold, dose change.    Kidney replaced by transplant       * tacrolimus 1 MG capsule    GENERIC EQUIVALENT    240 capsule    Take 4 capsules (4 mg) by mouth 2 times daily    Kidney replaced by transplant       valGANciclovir 450 MG tablet    VALCYTE    60 tablet    Take 1 tablet (450 mg) by mouth daily Titrate dose up to a max of 2 tabs (900 mg) by mouth daily when directed by your transplant team.    Kidney replaced by transplant       * Notice:  This list has 2 medication(s) that are the same as other  medications prescribed for you. Read the directions carefully, and ask your doctor or other care provider to review them with you.

## 2018-09-17 NOTE — NURSING NOTE
"Chief Complaint   Patient presents with     RECHECK     Tx follow up      /75  Pulse 71  Temp 98.2  F (36.8  C) (Oral)  Ht 1.816 m (5' 11.5\")  Wt 109.4 kg (241 lb 3.2 oz)  SpO2 99%  BMI 33.17 kg/m2  CUAUHTEMOC HERNÁNDEZ CMA    "

## 2018-09-18 LAB
TACROLIMUS BLD-MCNC: 10.7 UG/L (ref 5–15)
TME LAST DOSE: NORMAL H

## 2018-09-19 ENCOUNTER — TELEPHONE (OUTPATIENT)
Dept: TRANSPLANT | Facility: CLINIC | Age: 38
End: 2018-09-19

## 2018-09-19 LAB
MYCOPHENOLATE SERPL LC/MS/MS-MCNC: 2.67 MG/L (ref 1–3.5)
MYCOPHENOLATE-G SERPL LC/MS/MS-MCNC: 76.2 MG/L (ref 30–95)
MYCOPHENOLIC ACID LAST DOSE: NORMAL

## 2018-09-19 NOTE — TELEPHONE ENCOUNTER
Post Kidney and Pancreas Transplant Team Conference  Date: 9/19/2018  Transplant Coordinator: Mirna Harrell     Attendees:  [x]  Dr. Bahena [] Yanni Field, RN  [x] Lynda Wilson LPN     [x]  Dr. Gaxiola [x] Tonya Albrecht RN [] Sandra Epperson LPN   []  Dr. Connolly [] Sherry Jimenez RN    [x]  Dr. Ronquillo [] Julieta Perez RN    [] Dr. High [x] Eli Harrell RN    [] Dr. Lema [] Randy Peter RN    [x] Dr. Mitchell [] Dorcas Lopez, RN    [] Surgery Fellow [] Blanca Sow RN    [] Tracy Finley NP              Verbal Plan Read Back:   No changes at this time.    Routed to RN Coordinator   Lynda Wilson

## 2018-09-20 ENCOUNTER — RECORDS - HEALTHEAST (OUTPATIENT)
Dept: LAB | Facility: CLINIC | Age: 38
End: 2018-09-20

## 2018-09-20 DIAGNOSIS — Z94.0 KIDNEY REPLACED BY TRANSPLANT: ICD-10-CM

## 2018-09-20 DIAGNOSIS — Z48.298 AFTERCARE FOLLOWING ORGAN TRANSPLANT: ICD-10-CM

## 2018-09-20 LAB
ANION GAP SERPL CALCULATED.3IONS-SCNC: 9 MMOL/L (ref 5–18)
BUN SERPL-MCNC: 30 MG/DL (ref 8–22)
CALCIUM SERPL-MCNC: 11.5 MG/DL (ref 8.5–10.5)
CHLORIDE BLD-SCNC: 109 MMOL/L (ref 98–107)
CO2 SERPL-SCNC: 20 MMOL/L (ref 22–31)
CREAT SERPL-MCNC: 2.3 MG/DL (ref 0.7–1.3)
ERYTHROCYTE [DISTWIDTH] IN BLOOD BY AUTOMATED COUNT: 14 % (ref 11–14.5)
GFR SERPL CREATININE-BSD FRML MDRD: 32 ML/MIN/1.73M2
GLUCOSE BLD-MCNC: 88 MG/DL (ref 70–125)
HCT VFR BLD AUTO: 34.7 % (ref 40–54)
HGB BLD-MCNC: 11 G/DL (ref 14–18)
MCH RBC QN AUTO: 28 PG (ref 27–34)
MCHC RBC AUTO-ENTMCNC: 31.7 G/DL (ref 32–36)
MCV RBC AUTO: 88 FL (ref 80–100)
PLATELET # BLD AUTO: 279 THOU/UL (ref 140–440)
PMV BLD AUTO: 9.6 FL (ref 8.5–12.5)
POTASSIUM BLD-SCNC: 5.4 MMOL/L (ref 3.5–5)
RBC # BLD AUTO: 3.93 MILL/UL (ref 4.4–6.2)
SODIUM SERPL-SCNC: 138 MMOL/L (ref 136–145)
TACROLIMUS BLD-MCNC: 11.8 UG/L (ref 5–15)
TME LAST DOSE: NORMAL H
WBC: 3.3 THOU/UL (ref 4–11)

## 2018-09-20 PROCEDURE — 80197 ASSAY OF TACROLIMUS: CPT | Performed by: SURGERY

## 2018-09-21 ENCOUNTER — TELEPHONE (OUTPATIENT)
Dept: TRANSPLANT | Facility: CLINIC | Age: 38
End: 2018-09-21

## 2018-09-21 DIAGNOSIS — Z94.0 KIDNEY REPLACED BY TRANSPLANT: ICD-10-CM

## 2018-09-21 RX ORDER — TACROLIMUS 1 MG/1
3 CAPSULE ORAL 2 TIMES DAILY
Qty: 180 CAPSULE | Refills: 11 | Status: SHIPPED | OUTPATIENT
Start: 2018-09-21 | End: 2018-10-03

## 2018-09-21 RX ORDER — TACROLIMUS 0.5 MG/1
0.5 CAPSULE ORAL 2 TIMES DAILY
Qty: 60 CAPSULE | Refills: 11 | Status: SHIPPED | OUTPATIENT
Start: 2018-09-21 | End: 2018-10-03

## 2018-09-21 NOTE — LETTER
PHYSICIAN ORDERS      DATE & TIME ISSUED: 2018 8:39 AM  PATIENT NAME: Harshad Beatty   : 1980     [facility] MR# [if applicable]: [id number]     DIAGNOSIS:  Kidney replaced by transplant, diarrhea.  ICD-10 CODE: Z94.0     Please complete the following stool studies:  Clostridium difficile   JO ANN stool panel    Please complete CMV PCR Quantitative (serum).    Any questions please call: Eli Harrell RN, BSN                                             Transplant Care Coordinator                                             [phone]  Fax all results to:  [phone].    .

## 2018-09-21 NOTE — TELEPHONE ENCOUNTER
Tac level below goal. Current dose tacrolimus = 4mg BID.   REDUCE to 3.5mg BID. Repeat level as scheduled.   Creatinine elevated - assess hydration.  Patricia does report some dehydration and diarrhea continues.  Will check stools studies.  Pt prefers no IVF, will hydrate and recheck on Monday - if creatinine remains elevated, amenable to IVF..

## 2018-09-24 ENCOUNTER — RECORDS - HEALTHEAST (OUTPATIENT)
Dept: LAB | Facility: CLINIC | Age: 38
End: 2018-09-24

## 2018-09-24 ENCOUNTER — RESULTS ONLY (OUTPATIENT)
Dept: OTHER | Facility: CLINIC | Age: 38
End: 2018-09-24

## 2018-09-24 ENCOUNTER — TELEPHONE (OUTPATIENT)
Dept: TRANSPLANT | Facility: CLINIC | Age: 38
End: 2018-09-24

## 2018-09-24 DIAGNOSIS — Z94.0 KIDNEY REPLACED BY TRANSPLANT: ICD-10-CM

## 2018-09-24 DIAGNOSIS — Z48.298 AFTERCARE FOLLOWING ORGAN TRANSPLANT: ICD-10-CM

## 2018-09-24 LAB
ANION GAP SERPL CALCULATED.3IONS-SCNC: 8 MMOL/L (ref 5–18)
BASOPHILS # BLD AUTO: 0 THOU/UL (ref 0–0.2)
BASOPHILS NFR BLD AUTO: 1 % (ref 0–2)
BUN SERPL-MCNC: 33 MG/DL (ref 8–22)
CALCIUM SERPL-MCNC: 10.7 MG/DL (ref 8.5–10.5)
CHLORIDE BLD-SCNC: 111 MMOL/L (ref 98–107)
CMV DNA SPECIMEN TYPE: NORMAL
CMV QUANT IU/ML: NORMAL [IU]/ML
CO2 SERPL-SCNC: 19 MMOL/L (ref 22–31)
CREAT SERPL-MCNC: 2.2 MG/DL (ref 0.7–1.3)
EOSINOPHIL # BLD AUTO: 0.1 THOU/UL (ref 0–0.4)
EOSINOPHIL NFR BLD AUTO: 3 % (ref 0–6)
ERYTHROCYTE [DISTWIDTH] IN BLOOD BY AUTOMATED COUNT: 14 % (ref 11–14.5)
GFR SERPL CREATININE-BSD FRML MDRD: 34 ML/MIN/1.73M2
GLUCOSE BLD-MCNC: 99 MG/DL (ref 70–125)
HCT VFR BLD AUTO: 32.1 % (ref 40–54)
HGB BLD-MCNC: 10.1 G/DL (ref 14–18)
LOG IU/ML OF CMVQNT: NORMAL {LOG_IU}/ML
LYMPHOCYTES # BLD AUTO: 0.4 THOU/UL (ref 0.8–4.4)
LYMPHOCYTES NFR BLD AUTO: 13 % (ref 20–40)
MAGNESIUM SERPL-MCNC: 2.1 MG/DL (ref 1.8–2.6)
MCH RBC QN AUTO: 27.5 PG (ref 27–34)
MCHC RBC AUTO-ENTMCNC: 31.5 G/DL (ref 32–36)
MCV RBC AUTO: 88 FL (ref 80–100)
MONOCYTES # BLD AUTO: 0.2 THOU/UL (ref 0–0.9)
MONOCYTES NFR BLD AUTO: 6 % (ref 2–10)
MYCOPHENOLATE SERPL LC/MS/MS-MCNC: 2.71 MG/L (ref 1–3.5)
MYCOPHENOLATE-G SERPL LC/MS/MS-MCNC: 57.4 MG/L (ref 30–95)
MYCOPHENOLIC ACID LAST DOSE: NORMAL
NEUTROPHILS # BLD AUTO: 2.5 THOU/UL (ref 2–7.7)
NEUTROPHILS NFR BLD AUTO: 78 % (ref 50–70)
PHOSPHATE SERPL-MCNC: 2.3 MG/DL (ref 2.5–4.5)
PLATELET # BLD AUTO: 258 THOU/UL (ref 140–440)
PMV BLD AUTO: 9.3 FL (ref 8.5–12.5)
POTASSIUM BLD-SCNC: 5.4 MMOL/L (ref 3.5–5)
RBC # BLD AUTO: 3.67 MILL/UL (ref 4.4–6.2)
SODIUM SERPL-SCNC: 138 MMOL/L (ref 136–145)
TACROLIMUS BLD-MCNC: 9.9 UG/L (ref 5–15)
TME LAST DOSE: NORMAL H
WBC: 3.2 THOU/UL (ref 4–11)

## 2018-09-24 PROCEDURE — 86828 HLA CLASS I&II ANTIBODY QUAL: CPT | Performed by: PHYSICIAN ASSISTANT

## 2018-09-24 PROCEDURE — 86833 HLA CLASS II HIGH DEFIN QUAL: CPT | Performed by: PHYSICIAN ASSISTANT

## 2018-09-24 PROCEDURE — 86832 HLA CLASS I HIGH DEFIN QUAL: CPT | Performed by: PHYSICIAN ASSISTANT

## 2018-09-24 PROCEDURE — 80197 ASSAY OF TACROLIMUS: CPT | Performed by: SURGERY

## 2018-09-24 NOTE — TELEPHONE ENCOUNTER
Creatinine remains elevated despite the weekend of improved PO hydration. Will give 1L NS and repeat labs.   Patricia agreeable to the plan.    Reports appetite has improved and diarrhea is now gone.

## 2018-09-25 LAB — PRA DONOR SPECIFIC ABY: NORMAL

## 2018-09-26 ENCOUNTER — INFUSION THERAPY VISIT (OUTPATIENT)
Dept: INFUSION THERAPY | Facility: CLINIC | Age: 38
End: 2018-09-26
Attending: INTERNAL MEDICINE
Payer: COMMERCIAL

## 2018-09-26 VITALS
OXYGEN SATURATION: 100 % | HEART RATE: 76 BPM | TEMPERATURE: 98.2 F | DIASTOLIC BLOOD PRESSURE: 84 MMHG | RESPIRATION RATE: 16 BRPM | SYSTOLIC BLOOD PRESSURE: 127 MMHG

## 2018-09-26 DIAGNOSIS — Z94.0 KIDNEY REPLACED BY TRANSPLANT: ICD-10-CM

## 2018-09-26 DIAGNOSIS — E86.0 DEHYDRATION: Primary | ICD-10-CM

## 2018-09-26 PROCEDURE — 25000128 H RX IP 250 OP 636: Mod: ZF | Performed by: INTERNAL MEDICINE

## 2018-09-26 PROCEDURE — 96360 HYDRATION IV INFUSION INIT: CPT

## 2018-09-26 RX ADMIN — SODIUM CHLORIDE 1000 ML: 9 INJECTION, SOLUTION INTRAVENOUS at 14:56

## 2018-09-26 ASSESSMENT — PAIN SCALES - GENERAL: PAINLEVEL: NO PAIN (0)

## 2018-09-26 NOTE — MR AVS SNAPSHOT
After Visit Summary   9/26/2018    Harshad Beatty    MRN: [id number]           Patient Information     Date Of Birth          1980        Visit Information        Provider Department      9/26/2018 3:00 PM  41 ATC; UC SPEC INFUSION [facility] Specialty and Procedure        Today's Diagnoses     Dehydration    -  1    Kidney replaced by transplant          Care Instructions    Dear Harshad Beatty    Thank you for choosing [facility] Specialty Infusion and Procedure Center ([facility]) for your infusion.  The following information is a summary of our appointment as well as important reminders.      We look forward in seeing you on your next appointment here at [facility].  Please don t hesitate to call us at [phone] to reschedule any of your appointments or to speak with one of the [facility] registered nurses.  It was a pleasure taking care of you today.    Sincerely,    [facility]  Specialty Infusion & Procedure Center  [address] [place] [address]  [address]  Phone:  [phone]            Follow-ups after your visit        Your next 10 appointments already scheduled     Sep 28, 2018 12:00 PM CDT   US RENAL TRANSPLANT with US3   [facility] US ([facility])    [address] [place] [address] [phone]           How do I prepare for my exam? (Food and drink instructions) No Food and Drink Restrictions.  How do I prepare for my exam? (Other instructions) You do not need to do anything special to prepare for your exam.  What should I wear: Wear comfortable clothes.  How long does the exam take: Most ultrasounds take 30 to 60 minutes.  What should I bring: Bring a list of your medicines, including vitamins, minerals and over-the-counter drugs. It is safest to leave personal items at home.  Do I need a :  No  is needed.  What do I need to tell my  doctor: Tell your doctor about any allergies you may have.  What should I do after the exam: No restrictions, You may resume normal activities.  What is this test: An ultrasound uses sound waves to make pictures of the body. Sound waves do not cause pain. The only discomfort may be the pressure of the wand against your skin or full bladder.  Who should I call with questions: If you have any questions, please call the Imaging Department where you will have your exam. Directions, parking instructions, and other information is available on our website, [email or web address].            Oct 10, 2018 11:30 AM CDT   (Arrive by 11:00 AM)   Return Kidney Transplant with  Early Post Transplant   [facility] Nephrology ([facility])    [address]  [address]  [facility] [id number] [phone]            Dec 07, 2018 11:30 AM CST   (Arrive by 11:00 AM)   Return Kidney Transplant with  Early Post Transplant   [facility] Nephrology ([facility])    [address]  [address]  [facility] [address] [phone]            Dec 07, 2018  1:30 PM CST   (Arrive by 1:15 PM)   Office Visit with Lambert Wall [facility] Medication Therapy Management ([facility])    [address]  3rd Floor  [facility] [id number] [phone]           Bring a current list of meds and any records pertaining to this visit. For Physicals, please bring immunization records and any forms needing to be filled out. Please arrive 10 minutes early to complete paperwork.            Feb 07, 2019  1:05 PM CST   (Arrive by 12:35 PM)   Return Kidney Transplant with  Kidney/Pancreas Recipient 2   [facility] Nephrology ([facility])    [address]  [address]  [facility] [address] [phone]              Who to contact     If you have questions or need follow up information about today's clinic visit or  your schedule please contact [facility] SPECIALTY AND PROCEDURE directly at [phone].  Normal or non-critical lab and imaging results will be communicated to you by MyChart, letter or phone within 4 business days after the clinic has received the results. If you do not hear from us within 7 days, please contact the clinic through MyChart or phone. If you have a critical or abnormal lab result, we will notify you by phone as soon as possible.  Submit refill requests through [employer] or call your pharmacy and they will forward the refill request to us. Please allow 3 business days for your refill to be completed.          Additional Information About Your Visit        Care EveryWhere ID     This is your Care EveryWhere ID. This could be used by other organizations to access your [place] medical records  [id number]        Your Vitals Were     Pulse Temperature Respirations Pulse Oximetry          76 98.2  F (36.8  C) (Oral) 16 100%         Blood Pressure from Last 3 Encounters:   09/26/18 127/84   09/17/18 125/75   09/06/18 133/81    Weight from Last 3 Encounters:   09/17/18 109.4 kg (241 lb 3.2 oz)   09/06/18 105.2 kg (232 lb)   08/27/18 106.4 kg (234 lb 8 oz)              Today, you had the following     No orders found for display       Primary Care Provider Office Phone # Fax #    Daysi Redwood [phone] [address] [facility] [address]        Equal Access to Services     JAKI PUGA : Hadii nan [place], waaxda lualexiaadaha, qaybta kaalmada dariela, wax allen mar. So [facility] [phone].    ATENCIÓN: Si habla español, tiene a rodriguez disposición servicios gratuitos de asistencia lingüística. Llame al [phone].    We comply with applicable federal civil rights laws and [place] laws. We do not discriminate on the basis of race, color, national origin, age, disability, sex, sexual orientation,  or gender identity.            Thank you!     Thank you for choosing [facility] SPECIALTY AND PROCEDURE  for your care. Our goal is always to provide you with excellent care. Hearing back from our patients is one way we can continue to improve our services. Please take a few minutes to complete the written survey that you may receive in the mail after your visit with us. Thank you!             Your Updated Medication List - Protect others around you: Learn how to safely use, store and throw away your medicines at www.disposemymeds.org.          This list is accurate as of 9/26/18  4:07 PM.  Always use your most recent med list.                   Brand Name Dispense Instructions for use Diagnosis    acetaminophen 325 MG tablet    TYLENOL    100 tablet    Take 1-2 tablets (325-650 mg) by mouth every 4 hours as needed for mild pain or fever    Kidney replaced by transplant       aspirin 325 MG EC tablet     30 tablet    Take 1 tablet (325 mg) by mouth daily    Kidney replaced by transplant       atorvastatin 10 MG tablet    LIPITOR    30 tablet    Take 1 tablet (10 mg) by mouth daily    Kidney replaced by transplant       carvedilol 25 MG tablet    COREG    120 tablet    Take 2 tablets (50 mg) by mouth 2 times daily (with meals)    Renovascular hypertension, Kidney replaced by transplant       magnesium oxide 400 MG tablet    MAG-OX    30 tablet    Take 1 tablet (400 mg) by mouth daily (with lunch)    Kidney replaced by transplant       mycophenolate 250 MG capsule    GENERIC EQUIVALENT    180 capsule    Take 3 capsules (750 mg) by mouth 2 times daily    Kidney replaced by transplant       ondansetron 4 MG ODT tab    ZOFRAN-ODT    20 tablet    Take 1 tablet (4 mg) by mouth every 6 hours as needed for nausea or vomiting    Kidney replaced by transplant       sodium bicarbonate 650 MG tablet     120 tablet    Take 2 tablets (1,300 mg) by mouth 2 times daily    Acidosis, Kidney replaced by  transplant       sulfamethoxazole-trimethoprim 400-80 MG per tablet    BACTRIM/SEPTRA    30 tablet    1 tablet Monday, Wednesday, Friday    Kidney replaced by transplant, Immunosuppressed status (H)       * tacrolimus 0.5 MG capsule    GENERIC EQUIVALENT    60 capsule    Take 1 capsule (0.5 mg) by mouth 2 times daily    Kidney replaced by transplant       * tacrolimus 1 MG capsule    GENERIC EQUIVALENT    180 capsule    Take 3 capsules (3 mg) by mouth 2 times daily    Kidney replaced by transplant       valGANciclovir 450 MG tablet    VALCYTE    60 tablet    Take 1 tablet (450 mg) by mouth daily Titrate dose up to a max of 2 tabs (900 mg) by mouth daily when directed by your transplant team.    Kidney replaced by transplant       * Notice:  This list has 2 medication(s) that are the same as other medications prescribed for you. Read the directions carefully, and ask your doctor or other care provider to review them with you.

## 2018-09-26 NOTE — PATIENT INSTRUCTIONS
Dear Harshad Beatty    Thank you for choosing [facility] Specialty Infusion and Procedure Center ([facility]) for your infusion.  The following information is a summary of our appointment as well as important reminders.      We look forward in seeing you on your next appointment here at [facility].  Please don t hesitate to call us at [phone] to reschedule any of your appointments or to speak with one of the [facility] registered nurses.  It was a pleasure taking care of you today.    Sincerely,    [facility]  Specialty Infusion & Procedure Center  [address] [place] [address]  Phone:  [phone]

## 2018-09-26 NOTE — PROGRESS NOTES
Nursing Note  Harshad Beatty presents today to [facility] for:   Chief Complaint   Patient presents with     Infusion     IV Fluids     During today's [facility] appointment, orders from Dr. Gaxiola were completed.  Frequency: weekly as needed    Progress note:  Patient identification verified by name and date of birth.  Assessment completed.  Vitals recorded in Doc Flowsheets.  Patient was provided with education regarding infusion and possible side effects.  Patient verbalized understanding.      needed: No  Premedications: were not ordered.  Infusion Rates: 999 ml/hr.  Approximate Infusion length:1 hours.   Labs: were not ordered for this appointment. Will have labs redrawn tomorrow morning.   Vascular access: peripheral IV placed today.  Treatment Conditions: non-applicable.  Patient tolerated infusion: well.    Discharge Plan:   Follow up plan of care with: transplant coordinator.  Discharge instructions were reviewed with patient.  Patient/representative verbalized understanding of discharge instructions and all questions answered.  Patient discharged from [facility] in stable condition.    Lakshmi Garduno RN     Administrations This Visit     0.9% sodium chloride BOLUS     Admin Date Action Dose Rate Route Administered By          09/26/2018 New Bag 1000 mL 999 mL/hr Intravenous Lakshmi Garduno, CHRISSIE                       /75  Pulse 72  Temp 98.2  F (36.8  C) (Oral)  Resp 16  SpO2 100%

## 2018-09-27 ENCOUNTER — TELEPHONE (OUTPATIENT)
Dept: TRANSPLANT | Facility: CLINIC | Age: 38
End: 2018-09-27

## 2018-09-27 ENCOUNTER — RECORDS - HEALTHEAST (OUTPATIENT)
Dept: LAB | Facility: CLINIC | Age: 38
End: 2018-09-27

## 2018-09-27 DIAGNOSIS — Z94.0 KIDNEY REPLACED BY TRANSPLANT: ICD-10-CM

## 2018-09-27 DIAGNOSIS — E86.0 DEHYDRATION: Primary | ICD-10-CM

## 2018-09-27 DIAGNOSIS — Z48.298 AFTERCARE FOLLOWING ORGAN TRANSPLANT: ICD-10-CM

## 2018-09-27 LAB
ANION GAP SERPL CALCULATED.3IONS-SCNC: 5 MMOL/L (ref 5–18)
BUN SERPL-MCNC: 24 MG/DL (ref 8–22)
CALCIUM SERPL-MCNC: 11 MG/DL (ref 8.5–10.5)
CHLORIDE BLD-SCNC: 112 MMOL/L (ref 98–107)
CO2 SERPL-SCNC: 20 MMOL/L (ref 22–31)
CREAT SERPL-MCNC: 2.22 MG/DL (ref 0.7–1.3)
DONOR IDENTIFICATION: NORMAL
DSA COMMENTS: NORMAL
DSA PRESENT: NO
DSA TEST METHOD: NORMAL
ERYTHROCYTE [DISTWIDTH] IN BLOOD BY AUTOMATED COUNT: 14.2 % (ref 11–14.5)
GFR SERPL CREATININE-BSD FRML MDRD: 33 ML/MIN/1.73M2
GLUCOSE BLD-MCNC: 101 MG/DL (ref 70–125)
HCT VFR BLD AUTO: 34 % (ref 40–54)
HGB BLD-MCNC: 10.7 G/DL (ref 14–18)
INTERFERING SUBST TEST METHOD: NORMAL
INTERFERING SUBST TEST METHOD: NORMAL
INTERFERING SUBSTANCE COMMENT: NORMAL
INTERFERING SUBSTANCE COMMENT: NORMAL
INTERFERING SUBSTANCE RESULT: NORMAL
INTERFERING SUBSTANCE RESULT: NORMAL
INTERFERING SUBSTANCE: NORMAL
INTERFERING SUBSTANCE: NORMAL
MCH RBC QN AUTO: 27.4 PG (ref 27–34)
MCHC RBC AUTO-ENTMCNC: 31.5 G/DL (ref 32–36)
MCV RBC AUTO: 87 FL (ref 80–100)
ORGAN: NORMAL
PLATELET # BLD AUTO: 266 THOU/UL (ref 140–440)
PMV BLD AUTO: 9.5 FL (ref 8.5–12.5)
POTASSIUM BLD-SCNC: 5.3 MMOL/L (ref 3.5–5)
RBC # BLD AUTO: 3.9 MILL/UL (ref 4.4–6.2)
SA1 CELL: NORMAL
SA1 COMMENTS: NORMAL
SA1 HI RISK ABY: NORMAL
SA1 MOD RISK ABY: NORMAL
SA1 TEST METHOD: NORMAL
SA2 CELL: NORMAL
SA2 COMMENTS: NORMAL
SA2 HI RISK ABY UA: NORMAL
SA2 MOD RISK ABY: NORMAL
SA2 TEST METHOD: NORMAL
SODIUM SERPL-SCNC: 137 MMOL/L (ref 136–145)
WBC: 3.2 THOU/UL (ref 4–11)

## 2018-09-27 PROCEDURE — 80197 ASSAY OF TACROLIMUS: CPT | Performed by: SURGERY

## 2018-09-27 NOTE — TELEPHONE ENCOUNTER
Post Kidney and Pancreas Transplant Team Conference  Date: 9/27/2018  Transplant Coordinator: Mirna Harrell     Attendees:  []  Dr. Bahena [] Yanni Field RN  [] Lynda Wilson LPN     [x]  Dr. Gaxiola [] Tonya Albrecht RN [] Sandra Epperson LPN   []  Dr. Connolly [] Sherry Jimenez RN    []  Dr. Ronquillo [] Julieta Perez RN    [] Dr. High [x] Eli Harrell RN    [] Dr. Lema [] Randy Peter RN    [] Dr. Mitchell [] Dorcas Lopez RN    [] Surgery Fellow [] Blanca Sow RN    [] Tracy Finley NP              Verbal Plan Read Back:   Reviewed potassium, continue to monitor. No change to bactrim.  Start sensipar 30mg daily. Check PTH.  If patient prefers, okay to switch to myfortic 540mg BID and stop mmf 750BID (due to diarrhea).  No change in creatinine with addition of 1L IVF. If BP <140 / 90, okay to start florinef 0.1mg daily. If BP >140 / 90, start florinef 0.1mg MWF.      Routed to RN Coordinator   Mirna Patricia voiced understanding of all meds.  BP running 132 - 134 / 84 - 89. Harshad voiced understanding to call if BPs are consistently >140.  Diarrhea is improved, will not switch to myfortic.

## 2018-09-28 ENCOUNTER — RADIANT APPOINTMENT (OUTPATIENT)
Dept: ULTRASOUND IMAGING | Facility: CLINIC | Age: 38
End: 2018-09-28
Attending: SURGERY
Payer: COMMERCIAL

## 2018-09-28 DIAGNOSIS — Z94.0 KIDNEY TRANSPLANTED: ICD-10-CM

## 2018-09-28 DIAGNOSIS — Z48.298 AFTERCARE FOLLOWING ORGAN TRANSPLANT: ICD-10-CM

## 2018-09-28 DIAGNOSIS — T86.10 COMPLICATIONS, KIDNEY TRANSPLANT: ICD-10-CM

## 2018-09-28 LAB
TACROLIMUS BLD-MCNC: 10.7 UG/L (ref 5–15)
TME LAST DOSE: NORMAL H

## 2018-09-28 RX ORDER — CINACALCET 30 MG/1
30 TABLET, FILM COATED ORAL DAILY
Qty: 30 TABLET | Refills: 3 | Status: SHIPPED | OUTPATIENT
Start: 2018-09-28 | End: 2019-04-05

## 2018-09-28 RX ORDER — FLUDROCORTISONE ACETATE 0.1 MG/1
0.1 TABLET ORAL DAILY
Qty: 30 TABLET | Refills: 3 | Status: SHIPPED | OUTPATIENT
Start: 2018-09-28 | End: 2018-10-05

## 2018-10-01 ENCOUNTER — RECORDS - HEALTHEAST (OUTPATIENT)
Dept: LAB | Facility: CLINIC | Age: 38
End: 2018-10-01

## 2018-10-01 DIAGNOSIS — Z94.0 KIDNEY REPLACED BY TRANSPLANT: Primary | ICD-10-CM

## 2018-10-01 DIAGNOSIS — Z94.0 KIDNEY REPLACED BY TRANSPLANT: ICD-10-CM

## 2018-10-01 DIAGNOSIS — Z48.298 AFTERCARE FOLLOWING ORGAN TRANSPLANT: ICD-10-CM

## 2018-10-01 LAB
ANION GAP SERPL CALCULATED.3IONS-SCNC: 5 MMOL/L (ref 5–18)
BASOPHILS # BLD AUTO: 0 THOU/UL (ref 0–0.2)
BASOPHILS NFR BLD AUTO: 1 % (ref 0–2)
BUN SERPL-MCNC: 33 MG/DL (ref 8–22)
CALCIUM SERPL-MCNC: 9.9 MG/DL (ref 8.5–10.5)
CHLORIDE BLD-SCNC: 110 MMOL/L (ref 98–107)
CO2 SERPL-SCNC: 21 MMOL/L (ref 22–31)
CREAT SERPL-MCNC: 2.4 MG/DL (ref 0.7–1.3)
EOSINOPHIL # BLD AUTO: 0.1 THOU/UL (ref 0–0.4)
EOSINOPHIL NFR BLD AUTO: 4 % (ref 0–6)
ERYTHROCYTE [DISTWIDTH] IN BLOOD BY AUTOMATED COUNT: 14.3 % (ref 11–14.5)
GFR SERPL CREATININE-BSD FRML MDRD: 30 ML/MIN/1.73M2
GLUCOSE BLD-MCNC: 100 MG/DL (ref 70–125)
HCT VFR BLD AUTO: 31.5 % (ref 40–54)
HGB BLD-MCNC: 10 G/DL (ref 14–18)
LYMPHOCYTES # BLD AUTO: 0.4 THOU/UL (ref 0.8–4.4)
LYMPHOCYTES NFR BLD AUTO: 19 % (ref 20–40)
MAGNESIUM SERPL-MCNC: 1.7 MG/DL (ref 1.8–2.6)
MCH RBC QN AUTO: 27.9 PG (ref 27–34)
MCHC RBC AUTO-ENTMCNC: 31.7 G/DL (ref 32–36)
MCV RBC AUTO: 88 FL (ref 80–100)
MONOCYTES # BLD AUTO: 0.2 THOU/UL (ref 0–0.9)
MONOCYTES NFR BLD AUTO: 9 % (ref 2–10)
MYCOPHENOLATE SERPL LC/MS/MS-MCNC: 3.36 MG/L (ref 1–3.5)
MYCOPHENOLATE-G SERPL LC/MS/MS-MCNC: 87 MG/L (ref 30–95)
MYCOPHENOLIC ACID LAST DOSE: NORMAL
NEUTROPHILS # BLD AUTO: 1.4 THOU/UL (ref 2–7.7)
NEUTROPHILS NFR BLD AUTO: 67 % (ref 50–70)
PHOSPHATE SERPL-MCNC: 1.8 MG/DL (ref 2.5–4.5)
PLATELET # BLD AUTO: 257 THOU/UL (ref 140–440)
PMV BLD AUTO: 9.4 FL (ref 8.5–12.5)
POTASSIUM BLD-SCNC: 5.6 MMOL/L (ref 3.5–5)
RBC # BLD AUTO: 3.58 MILL/UL (ref 4.4–6.2)
SODIUM SERPL-SCNC: 136 MMOL/L (ref 136–145)
WBC: 2.1 THOU/UL (ref 4–11)

## 2018-10-01 PROCEDURE — 80197 ASSAY OF TACROLIMUS: CPT | Performed by: SURGERY

## 2018-10-01 NOTE — TELEPHONE ENCOUNTER
Creatinine and potassium elevated - did Harshad start the florinef?  Phos is low - not on supplement. Dose he still have some at home? Increase dairy intake!  Awaiting drug level.    Tac level above goal. Current dose = 3.5mg BID.  REDUCE tac to 3mg.

## 2018-10-02 LAB
TACROLIMUS BLD-MCNC: 11.2 UG/L (ref 5–15)
TME LAST DOSE: NORMAL H

## 2018-10-03 ENCOUNTER — TELEPHONE (OUTPATIENT)
Dept: TRANSPLANT | Facility: CLINIC | Age: 38
End: 2018-10-03

## 2018-10-03 DIAGNOSIS — Z94.0 KIDNEY REPLACED BY TRANSPLANT: Primary | ICD-10-CM

## 2018-10-03 RX ORDER — TACROLIMUS 0.5 MG/1
CAPSULE ORAL
Qty: 60 CAPSULE | Refills: 11
Start: 2018-10-03 | End: 2018-10-12

## 2018-10-03 RX ORDER — TACROLIMUS 1 MG/1
3 CAPSULE ORAL 2 TIMES DAILY
Qty: 180 CAPSULE | Refills: 11 | Status: SHIPPED | OUTPATIENT
Start: 2018-10-03 | End: 2018-10-12

## 2018-10-03 NOTE — TELEPHONE ENCOUNTER
Post Kidney and Pancreas Transplant Team Conference  Date: 10/3/2018  Transplant Coordinator: Mirna Harrell     Attendees:  []  Dr. Bahena [] Yanni Field, RN  [] Lynda Wilson LPN     [x]  Dr. Gaxiola [] Tonya Albrecht RN [] Sandra Epperson LPN   []  Dr. Connolly [] Sherry Jimenez RN    []  Dr. Ronquillo [] Julieta Perez RN    [] Dr. High [x] Eli Harrell RN    [] Dr. Lema [] Randy Peter RN    [] Dr. Mitchell [] Dorcas Lopez RN    [] Surgery Fellow [] Blanca Sow RN    [] Tracy Finley NP              Verbal Plan Read Back:   Reviewed on 9/28 - due to previous US, repeat US in 2 weeks (due 10/12).    Routed to RN Coordinator   Mirna Harrell

## 2018-10-03 NOTE — TELEPHONE ENCOUNTER
Spoke to patient who states that he is working on hydrating better after the past weekend.  Patient states that his pharmacy did not have Florinef ready when he went to , instructed patient to  from pharmacy and call txp center if there were any issues.  Patient states that he does not have any Phosphorus supplements at home, verbalizes understanding to increase dairy intake.  Patient also confirmed current tac dose and verbalizes understanding to decrease Tacrolimus dose to 3 mg BID.

## 2018-10-03 NOTE — TELEPHONE ENCOUNTER
Left message for patient regarding :  Creatinine and potassium elevated - did Harshad start the florinef?  Phos is low - not on supplement. Dose he still have some at home? Increase dairy intake!  Awaiting drug level.     Tac level above goal. Current dose = 3.5mg BID.  REDUCE tac to 3mg.

## 2018-10-04 ENCOUNTER — TELEPHONE (OUTPATIENT)
Dept: TRANSPLANT | Facility: CLINIC | Age: 38
End: 2018-10-04

## 2018-10-04 ENCOUNTER — RECORDS - HEALTHEAST (OUTPATIENT)
Dept: LAB | Facility: CLINIC | Age: 38
End: 2018-10-04

## 2018-10-04 DIAGNOSIS — E86.0 DEHYDRATION: ICD-10-CM

## 2018-10-04 DIAGNOSIS — Z48.298 AFTERCARE FOLLOWING ORGAN TRANSPLANT: ICD-10-CM

## 2018-10-04 DIAGNOSIS — Z94.0 KIDNEY REPLACED BY TRANSPLANT: ICD-10-CM

## 2018-10-04 DIAGNOSIS — D72.819 LEUKOPENIA: Primary | ICD-10-CM

## 2018-10-04 DIAGNOSIS — D84.9 IMMUNOSUPPRESSED STATUS (H): ICD-10-CM

## 2018-10-04 LAB
ANION GAP SERPL CALCULATED.3IONS-SCNC: 7 MMOL/L (ref 5–18)
BUN SERPL-MCNC: 33 MG/DL (ref 8–22)
CALCIUM SERPL-MCNC: 10.5 MG/DL (ref 8.5–10.5)
CHLORIDE BLD-SCNC: 106 MMOL/L (ref 98–107)
CO2 SERPL-SCNC: 20 MMOL/L (ref 22–31)
CREAT SERPL-MCNC: 2.44 MG/DL (ref 0.7–1.3)
ERYTHROCYTE [DISTWIDTH] IN BLOOD BY AUTOMATED COUNT: 14.2 % (ref 11–14.5)
GFR SERPL CREATININE-BSD FRML MDRD: 30 ML/MIN/1.73M2
GLUCOSE BLD-MCNC: 101 MG/DL (ref 70–125)
HCT VFR BLD AUTO: 33.6 % (ref 40–54)
HGB BLD-MCNC: 10.7 G/DL (ref 14–18)
MCH RBC QN AUTO: 27.7 PG (ref 27–34)
MCHC RBC AUTO-ENTMCNC: 31.8 G/DL (ref 32–36)
MCV RBC AUTO: 87 FL (ref 80–100)
PLATELET # BLD AUTO: 257 THOU/UL (ref 140–440)
PMV BLD AUTO: 9.6 FL (ref 8.5–12.5)
POTASSIUM BLD-SCNC: 5.8 MMOL/L (ref 3.5–5)
RBC # BLD AUTO: 3.86 MILL/UL (ref 4.4–6.2)
SODIUM SERPL-SCNC: 133 MMOL/L (ref 136–145)
WBC: 1.1 THOU/UL (ref 4–11)

## 2018-10-04 PROCEDURE — 80197 ASSAY OF TACROLIMUS: CPT | Performed by: SURGERY

## 2018-10-04 NOTE — TELEPHONE ENCOUNTER
Differential added.  Will monitor CBC with platelets and differential 2x weekly.  Check EBV, CMV and parvo per protocol (NOT DISCORDANT, currently on prophylaxis).    MPA level of 2.7 on cellcept 750 BID - Per Dr. Gaxiola, start 750 AM, 500PM    HOLD bactrim 10/5 - 10/19.    Patient education on infection prevention completed.   Harshad voiced understanding.

## 2018-10-04 NOTE — TELEPHONE ENCOUNTER
DATE:  10/4/2018   TIME OF RECEIPT FROM LAB:  0900  LAB TEST:  wbc  LAB VALUE:  1.1  RESULTS GIVEN WITH READ-BACK TO (PROVIDER):  Mirna Harrell  TIME LAB VALUE REPORTED TO PROVIDER:   0902

## 2018-10-04 NOTE — LETTER
PHYSICIAN ORDERS      DATE & TIME ISSUED: 2018 9:06 AM  PATIENT NAME: Harshad Beatty   : 1980     [facility] MR# [if applicable]: [id number]     DIAGNOSIS:  Leukopenia, kidney replaced by transplant.  ICD-10 CODE: Z94.0     Please complete the following lab work:  ADD-ON (to labs from 10/4/18) - DIFFERENTIAL.  With next lab draw -    EBV (ebstein - barr virus) DNA PCR Quantification             CMV (cytolomegalovirus) DNA PCR Quantification             Parvovirus B19 (blood) DNA PCR  Until further notice -   CBC with platelets and differential 2x weekly.    Any questions please call: Eli Harrell RN BSN                                             Transplant Care Coordinator                                             [phone]  Fax all results to:  [phone].      Brijesh Gaxiola

## 2018-10-04 NOTE — TELEPHONE ENCOUNTER
Creatinine is elevated and potassium is high. Harshad has not yet started florinef. Will  today.

## 2018-10-05 LAB
TACROLIMUS BLD-MCNC: 11.2 UG/L (ref 5–15)
TME LAST DOSE: NORMAL H

## 2018-10-05 RX ORDER — FLUDROCORTISONE ACETATE 0.1 MG/1
0.1 TABLET ORAL DAILY
Qty: 30 TABLET | Refills: 3 | Status: SHIPPED | OUTPATIENT
Start: 2018-10-05 | End: 2018-11-19

## 2018-10-05 RX ORDER — SULFAMETHOXAZOLE AND TRIMETHOPRIM 400; 80 MG/1; MG/1
TABLET ORAL
Qty: 30 TABLET | Refills: 11 | COMMUNITY
Start: 2018-10-05 | End: 2018-10-18

## 2018-10-05 RX ORDER — MYCOPHENOLATE MOFETIL 250 MG/1
500 CAPSULE ORAL 2 TIMES DAILY
Qty: 180 CAPSULE | Refills: 11 | COMMUNITY
Start: 2018-10-05 | End: 2018-12-19

## 2018-10-08 ENCOUNTER — RESULTS ONLY (OUTPATIENT)
Dept: OTHER | Facility: CLINIC | Age: 38
End: 2018-10-08

## 2018-10-08 ENCOUNTER — ALLIED HEALTH/NURSE VISIT (OUTPATIENT)
Dept: TRANSPLANT | Facility: CLINIC | Age: 38
End: 2018-10-08
Attending: INTERNAL MEDICINE
Payer: COMMERCIAL

## 2018-10-08 ENCOUNTER — TELEPHONE (OUTPATIENT)
Dept: TRANSPLANT | Facility: CLINIC | Age: 38
End: 2018-10-08

## 2018-10-08 ENCOUNTER — RECORDS - HEALTHEAST (OUTPATIENT)
Dept: LAB | Facility: CLINIC | Age: 38
End: 2018-10-08

## 2018-10-08 DIAGNOSIS — D70.9 NEUTROPENIA (H): Primary | ICD-10-CM

## 2018-10-08 DIAGNOSIS — D84.9 IMMUNOSUPPRESSION (H): ICD-10-CM

## 2018-10-08 DIAGNOSIS — Z94.0 KIDNEY REPLACED BY TRANSPLANT: ICD-10-CM

## 2018-10-08 DIAGNOSIS — Z48.298 AFTERCARE FOLLOWING ORGAN TRANSPLANT: ICD-10-CM

## 2018-10-08 LAB
ANION GAP SERPL CALCULATED.3IONS-SCNC: 5 MMOL/L (ref 5–18)
BASOPHILS # BLD AUTO: 0 THOU/UL (ref 0–0.2)
BASOPHILS NFR BLD AUTO: 6 % (ref 0–2)
BUN SERPL-MCNC: 30 MG/DL (ref 8–22)
CALCIUM SERPL-MCNC: 9.7 MG/DL (ref 8.5–10.5)
CHLORIDE BLD-SCNC: 106 MMOL/L (ref 98–107)
CMV DNA SPECIMEN TYPE: NORMAL
CMV QUANT IU/ML: NORMAL [IU]/ML
CO2 SERPL-SCNC: 22 MMOL/L (ref 22–31)
CREAT SERPL-MCNC: 2.44 MG/DL (ref 0.7–1.3)
CREAT UR-MCNC: 59.5 MG/DL
EOSINOPHIL COUNT (ABSOLUTE): 0.1 THOU/UL (ref 0–0.4)
EOSINOPHIL NFR BLD AUTO: 12 % (ref 0–6)
ERYTHROCYTE [DISTWIDTH] IN BLOOD BY AUTOMATED COUNT: 13.8 % (ref 11–14.5)
GFR SERPL CREATININE-BSD FRML MDRD: 30 ML/MIN/1.73M2
GLUCOSE BLD-MCNC: 105 MG/DL (ref 70–125)
HCT VFR BLD AUTO: 31.1 % (ref 40–54)
HGB BLD-MCNC: 9.9 G/DL (ref 14–18)
LOG IU/ML OF CMVQNT: NORMAL {LOG_IU}/ML
LYMPHOCYTES # BLD AUTO: 0.4 THOU/UL (ref 0.8–4.4)
LYMPHOCYTES NFR BLD AUTO: 50 % (ref 20–40)
MAGNESIUM SERPL-MCNC: 1.6 MG/DL (ref 1.8–2.6)
MCH RBC QN AUTO: 27.7 PG (ref 27–34)
MCHC RBC AUTO-ENTMCNC: 31.8 G/DL (ref 32–36)
MCV RBC AUTO: 87 FL (ref 80–100)
MONOCYTES # BLD AUTO: 0.1 THOU/UL (ref 0–0.9)
MONOCYTES NFR BLD AUTO: 20 % (ref 2–10)
PHOSPHATE SERPL-MCNC: 1.9 MG/DL (ref 2.5–4.5)
PLAT MORPH BLD: NORMAL
PLATELET # BLD AUTO: 261 THOU/UL (ref 140–440)
PMV BLD AUTO: 9.4 FL (ref 8.5–12.5)
POTASSIUM BLD-SCNC: 5.2 MMOL/L (ref 3.5–5)
PROTEIN, RANDOM URINE - HISTORICAL: <7 MG/DL
PROTEIN/CREAT RATIO, RANDOM UR: NORMAL
RBC # BLD AUTO: 3.58 MILL/UL (ref 4.4–6.2)
SODIUM SERPL-SCNC: 133 MMOL/L (ref 136–145)
TOTAL NEUTROPHILS-ABS(DIFF): 0.1 THOU/UL (ref 2–7.7)
TOTAL NEUTROPHILS-REL(DIFF): 12 % (ref 50–70)
WBC: 0.7 THOU/UL (ref 4–11)

## 2018-10-08 PROCEDURE — 40000269 ZZH STATISTIC NO CHARGE FACILITY FEE: Mod: ZF

## 2018-10-08 PROCEDURE — 25000128 H RX IP 250 OP 636: Mod: ZF | Performed by: INTERNAL MEDICINE

## 2018-10-08 PROCEDURE — 86833 HLA CLASS II HIGH DEFIN QUAL: CPT | Performed by: PHYSICIAN ASSISTANT

## 2018-10-08 PROCEDURE — 86832 HLA CLASS I HIGH DEFIN QUAL: CPT | Performed by: PHYSICIAN ASSISTANT

## 2018-10-08 PROCEDURE — 80197 ASSAY OF TACROLIMUS: CPT | Performed by: SURGERY

## 2018-10-08 PROCEDURE — 96372 THER/PROPH/DIAG INJ SC/IM: CPT | Mod: ZF

## 2018-10-08 PROCEDURE — 86828 HLA CLASS I&II ANTIBODY QUAL: CPT | Performed by: PHYSICIAN ASSISTANT

## 2018-10-08 RX ADMIN — FILGRASTIM-SNDZ 300 MCG: 300 INJECTION, SOLUTION INTRAVENOUS; SUBCUTANEOUS at 16:00

## 2018-10-08 NOTE — NURSING NOTE
Dose 1 of 3 Zarxio given per Andrey Connolly MD due to neutropenia, pt's first name, last name and  verified prior to administration. Injection given without complications or questions, pt already has appointments scheduled the next 2 days.    Robyn Bhagat CMA  10/8/2018 4:00 PM

## 2018-10-08 NOTE — TELEPHONE ENCOUNTER
DATE:  10/8/2018   TIME OF RECEIPT FROM LAB:  9:30  LAB TEST:  WBC  LAB VALUE:  0.7  RESULTS GIVEN WITH READ-BACK TO (PROVIDER):  Tonya Albrecht  TIME LAB VALUE REPORTED TO PROVIDER:   10:15

## 2018-10-08 NOTE — MR AVS SNAPSHOT
After Visit Summary   10/8/2018    Harshad Beatty    MRN: [id number]           Patient Information     Date Of Birth          1980        Visit Information        Provider Department      10/8/2018 2:45 PM Nurse, Nam Escobar [facility]        Today's Diagnoses     Neutropenia (H)    -  1    Immunosuppression (H)        Kidney replaced by transplant           Follow-ups after your visit        Your next 10 appointments already scheduled     Oct 09, 2018  2:30 PM CDT   Nurse Visit with  Tx Nurse   [facility] ([facility])    [address]  [address]  [place] [address]   [phone]            Oct 10, 2018 11:30 AM CDT   (Arrive by 11:00 AM)   Return Kidney Transplant with  Early Post Transplant   [facility] Nephrology ([facility])    [address]  [address]  [place] [address] [phone]            Oct 12, 2018  1:00 PM CDT   US RENAL TRANSPLANT with UCUS1   [facility] US ([facility])    [address]  1st Floor  [place] [address] [phone]           How do I prepare for my exam? (Food and drink instructions) No Food and Drink Restrictions.  How do I prepare for my exam? (Other instructions) You do not need to do anything special to prepare for your exam.  What should I wear: Wear comfortable clothes.  How long does the exam take: Most ultrasounds take 30 to 60 minutes.  What should I bring: Bring a list of your medicines, including vitamins, minerals and over-the-counter drugs. It is safest to leave personal items at home.  Do I need a :  No  is needed.  What do I need to tell my doctor: Tell your doctor about any allergies you may have.  What should I do after the exam: No restrictions, You may resume normal activities.  What is this test: An ultrasound uses sound waves to make pictures of the body. Sound  waves do not cause pain. The only discomfort may be the pressure of the wand against your skin or full bladder.  Who should I call with questions: If you have any questions, please call the Imaging Department where you will have your exam. Directions, parking instructions, and other information is available on our website, [email or web address].            Dec 07, 2018 11:30 AM CST   (Arrive by 11:00 AM)   Return Kidney Transplant with  Early Post Transplant   [facility] Nephrology ([facility])    [address]  [address]  [facility] [id number] [phone]            Dec 07, 2018  1:30 PM CST   (Arrive by 1:15 PM)   Office Visit with Lambert Wall RPH   [facility] Medication Therapy Management ([facility])    [address] [place] [address]  3rd Floor  [facility] [id number] [phone]           Bring a current list of meds and any records pertaining to this visit. For Physicals, please bring immunization records and any forms needing to be filled out. Please arrive 10 minutes early to complete paperwork.            Feb 07, 2019  1:05 PM CST   (Arrive by 12:35 PM)   Return Kidney Transplant with  Kidney/Pancreas Recipient 2   [facility] Nephrology ([facility])    [address] [place] [address]  [address]  [facility] [address] [phone]              Who to contact     If you have questions or need follow up information about today's clinic visit or your schedule please contact [facility] SOLID ORGAN TRANSPLANT directly at [phone].  Normal or non-critical lab and imaging results will be communicated to you by Liztichart, letter or phone within 4 business days after the clinic has received the results. If you do not hear from us within 7 days, please contact the clinic through [employer] or phone. If you have a critical or abnormal lab result, we will notify you by phone as soon as possible.  Submit refill requests through [employer]  or call your pharmacy and they will forward the refill request to us. Please allow 3 business days for your refill to be completed.          Additional Information About Your Visit        Care EveryWhere ID     This is your Care EveryWhere ID. This could be used by other organizations to access your [place] medical records  [id number]         Blood Pressure from Last 3 Encounters:   09/26/18 127/84   09/17/18 125/75   09/06/18 133/81    Weight from Last 3 Encounters:   09/17/18 109.4 kg (241 lb 3.2 oz)   09/06/18 105.2 kg (232 lb)   08/27/18 106.4 kg (234 lb 8 oz)              Today, you had the following     No orders found for display       Primary Care Provider Office Phone # Fax #    Daysi [facility] [phone] [address] [address]        Equal Access to Services     JAKI PUGA : Silas Penn, waivonne poe, qaybta kaalmada dariela, herb beltran . So [facility] [phone].    ATENCIÓN: Si habla español, tiene a rodriguez disposición servicios gratuitos de asistencia lingüística. Ettaame al [phone].    We comply with applicable federal civil rights laws and [place] laws. We do not discriminate on the basis of race, color, national origin, age, disability, sex, sexual orientation, or gender identity.            Thank you!     Thank you for choosing [facility] SOLID ORGAN TRANSPLANT  for your care. Our goal is always to provide you with excellent care. Hearing back from our patients is one way we can continue to improve our services. Please take a few minutes to complete the written survey that you may receive in the mail after your visit with us. Thank you!             Your Updated Medication List - Protect others around you: Learn how to safely use, store and throw away your medicines at www.disposemymeds.org.          This list is accurate as of 10/8/18  4:02 PM.  Always use your most recent med list.                    Brand Name Dispense Instructions for use Diagnosis    acetaminophen 325 MG tablet    TYLENOL    100 tablet    Take 1-2 tablets (325-650 mg) by mouth every 4 hours as needed for mild pain or fever    Kidney replaced by transplant       aspirin 325 MG EC tablet     30 tablet    Take 1 tablet (325 mg) by mouth daily    Kidney replaced by transplant       atorvastatin 10 MG tablet    LIPITOR    30 tablet    Take 1 tablet (10 mg) by mouth daily    Kidney replaced by transplant       carvedilol 25 MG tablet    COREG    120 tablet    Take 2 tablets (50 mg) by mouth 2 times daily (with meals)    Renovascular hypertension, Kidney replaced by transplant       cinacalcet 30 MG tablet    SENSIPAR    30 tablet    Take 1 tablet (30 mg) by mouth daily    Kidney replaced by transplant       fludrocortisone 0.1 MG tablet    FLORINEF    30 tablet    Take 1 tablet (0.1 mg) by mouth daily    Kidney replaced by transplant, Dehydration       magnesium oxide 400 MG tablet    MAG-OX    30 tablet    Take 1 tablet (400 mg) by mouth daily (with lunch)    Kidney replaced by transplant       mycophenolate 250 MG capsule    GENERIC EQUIVALENT    180 capsule    750mg AM, 500mg PM    Kidney replaced by transplant       sodium bicarbonate 650 MG tablet     120 tablet    Take 2 tablets (1,300 mg) by mouth 2 times daily    Acidosis, Kidney replaced by transplant       sulfamethoxazole-trimethoprim 400-80 MG per tablet    BACTRIM/SEPTRA    30 tablet    1 tablet Monday, Wednesday, Friday    Kidney replaced by transplant, Immunosuppressed status (H)       * tacrolimus 0.5 MG capsule    GENERIC EQUIVALENT    60 capsule    HOLD    Kidney replaced by transplant       * tacrolimus 1 MG capsule    GENERIC EQUIVALENT    180 capsule    Take 3 capsules (3 mg) by mouth 2 times daily    Kidney replaced by transplant       valGANciclovir 450 MG tablet    VALCYTE    60 tablet    Take 1 tablet (450 mg) by mouth daily Titrate dose up to a max of 2  tabs (900 mg) by mouth daily when directed by your transplant team.    Kidney replaced by transplant       * Notice:  This list has 2 medication(s) that are the same as other medications prescribed for you. Read the directions carefully, and ask your doctor or other care provider to review them with you.

## 2018-10-08 NOTE — TELEPHONE ENCOUNTER
Awaiting ANC - will dose neupogen per protocol as needed.  Continue per protocol, WBC with diff 2x weekly.  Virology pending.    ANC 0.1, will give neupogen per protocol.    Harshad voiced understanding.  RNCC discussed r/f injection and asked that Harshad avoid sources of infection / discussed prevention.

## 2018-10-09 ENCOUNTER — ALLIED HEALTH/NURSE VISIT (OUTPATIENT)
Dept: TRANSPLANT | Facility: CLINIC | Age: 38
End: 2018-10-09
Attending: INTERNAL MEDICINE
Payer: COMMERCIAL

## 2018-10-09 DIAGNOSIS — D84.9 IMMUNOSUPPRESSION (H): ICD-10-CM

## 2018-10-09 DIAGNOSIS — D70.9 NEUTROPENIC FEVER (H): ICD-10-CM

## 2018-10-09 DIAGNOSIS — R50.81 NEUTROPENIC FEVER (H): ICD-10-CM

## 2018-10-09 DIAGNOSIS — Z94.0 KIDNEY REPLACED BY TRANSPLANT: ICD-10-CM

## 2018-10-09 DIAGNOSIS — D70.9 NEUTROPENIA (H): Primary | ICD-10-CM

## 2018-10-09 LAB
PRA DONOR SPECIFIC ABY: NORMAL
TACROLIMUS BLD-MCNC: 10.7 UG/L (ref 5–15)
TME LAST DOSE: NORMAL H

## 2018-10-09 PROCEDURE — 96372 THER/PROPH/DIAG INJ SC/IM: CPT

## 2018-10-09 PROCEDURE — 25000128 H RX IP 250 OP 636: Mod: ZF | Performed by: INTERNAL MEDICINE

## 2018-10-09 PROCEDURE — 40000269 ZZH STATISTIC NO CHARGE FACILITY FEE: Mod: ZF

## 2018-10-09 RX ADMIN — FILGRASTIM-SNDZ 300 MCG: 300 INJECTION, SOLUTION INTRAVENOUS; SUBCUTANEOUS at 15:17

## 2018-10-09 NOTE — MR AVS SNAPSHOT
After Visit Summary   10/9/2018    Harshad Beatty    MRN: [id number]           Patient Information     Date Of Birth          1980        Visit Information        Provider Department      10/9/2018 2:30 PM Nurse, Nam Txc [facility] Solid Organ Transplant        Today's Diagnoses     Neutropenia (H)    -  1    Immunosuppression (H)        Kidney replaced by transplant           Follow-ups after your visit        Your next 10 appointments already scheduled     Oct 10, 2018 11:30 AM CDT   (Arrive by 11:00 AM)   Return Kidney Transplant with Nam Early Post Transplant   [facility] Nephrology ([facility])    [address]  [address]  [place] [address] [phone]            Oct 12, 2018  1:00 PM CDT   US RENAL TRANSPLANT with UCUS1   [facility] Imaging Center US ([facility])    [address]  1st Floor  [place] [facility] [address] [phone]           How do I prepare for my exam? (Food and drink instructions) No Food and Drink Restrictions.  How do I prepare for my exam? (Other instructions) You do not need to do anything special to prepare for your exam.  What should I wear: Wear comfortable clothes.  How long does the exam take: Most ultrasounds take 30 to 60 minutes.  What should I bring: Bring a list of your medicines, including vitamins, minerals and over-the-counter drugs. It is safest to leave personal items at home.  Do I need a :  No  is needed.  What do I need to tell my doctor: Tell your doctor about any allergies you may have.  What should I do after the exam: No restrictions, You may resume normal activities.  What is this test: An ultrasound uses sound waves to make pictures of the body. Sound waves do not cause pain. The only discomfort may be the pressure of the wand against your skin or full bladder.  Who should I call with questions: If you have any questions, please call the Imaging Department where you  will have your exam. Directions, parking instructions, and other information is available on our website, [email or web address].            Dec 07, 2018 11:30 AM CST   (Arrive by 11:00 AM)   Return Kidney Transplant with Uc Early Post Transplant   [facility] Nephrology ([facility])    [address]  [address]  [facility] [address] [phone]            Dec 07, 2018  1:30 PM CST   (Arrive by 1:15 PM)   [facility]    [address]  3rd Floor  [facility] [address] [phone]           Bring a current list of meds and any records pertaining to this visit. For Physicals, please bring immunization records and any forms needing to be filled out. Please arrive 10 minutes early to complete paperwork.            Feb 07, 2019  1:05 PM CST   (Arrive by 12:35 PM)   Return Kidney Transplant with  Kidney/Pancreas Recipient 2   [facility] Nephrology ([facility])    [address]  [address]  [facility] [address] [phone]              Who to contact     If you have questions or need follow up information about today's clinic visit or your schedule please contact [facility] SOLID ORGAN TRANSPLANT directly at [phone].  Normal or non-critical lab and imaging results will be communicated to you by MyChart, letter or phone within 4 business days after the clinic has received the results. If you do not hear from us within 7 days, please contact the clinic through MyChart or phone. If you have a critical or abnormal lab result, we will notify you by phone as soon as possible.  Submit refill requests through [employer] or call your pharmacy and they will forward the refill request to us. Please allow 3 business days for your refill to be completed.          Additional Information About Your Visit        Care EveryWhere ID     This is  your Care EveryWhere ID. This could be used by other organizations to access your [place] medical records  [id number]         Blood Pressure from Last 3 Encounters:   09/26/18 127/84   09/17/18 125/75   09/06/18 133/81    Weight from Last 3 Encounters:   09/17/18 109.4 kg (241 lb 3.2 oz)   09/06/18 105.2 kg (232 lb)   08/27/18 106.4 kg (234 lb 8 oz)              Today, you had the following     No orders found for display       Primary Care Provider Office Phone # Fax #    Daysi [facility] [phone] [address] [address]        Equal Access to Services     JAKI PUGA : Silas Penn, alix poe, americo kaalmaalysha lyle, herb mar. So [facility] [place] [facility] [phone].    ATENCIÓN: Si habla español, tiene a rodriguez disposición servicios gratuitos de asistencia lingüística. [facility] [phone].    We comply with applicable federal civil rights laws and [place] laws. We do not discriminate on the basis of race, color, national origin, age, disability, sex, sexual orientation, or gender identity.            Thank you!     Thank you for choosing [facility] SOLID ORGAN TRANSPLANT  for your care. Our goal is always to provide you with excellent care. Hearing back from our patients is one way we can continue to improve our services. Please take a few minutes to complete the written survey that you may receive in the mail after your visit with us. Thank you!             Your Updated Medication List - Protect others around you: Learn how to safely use, store and throw away your medicines at www.disposemymeds.org.          This list is accurate as of 10/9/18  3:20 PM.  Always use your most recent med list.                   Brand Name Dispense Instructions for use Diagnosis    acetaminophen 325 MG tablet    TYLENOL    100 tablet    Take 1-2 tablets (325-650 mg) by mouth every 4 hours as needed for mild pain or fever     Kidney replaced by transplant       aspirin 325 MG EC tablet     30 tablet    Take 1 tablet (325 mg) by mouth daily    Kidney replaced by transplant       atorvastatin 10 MG tablet    LIPITOR    30 tablet    Take 1 tablet (10 mg) by mouth daily    Kidney replaced by transplant       carvedilol 25 MG tablet    COREG    120 tablet    Take 2 tablets (50 mg) by mouth 2 times daily (with meals)    Renovascular hypertension, Kidney replaced by transplant       cinacalcet 30 MG tablet    SENSIPAR    30 tablet    Take 1 tablet (30 mg) by mouth daily    Kidney replaced by transplant       fludrocortisone 0.1 MG tablet    FLORINEF    30 tablet    Take 1 tablet (0.1 mg) by mouth daily    Kidney replaced by transplant, Dehydration       magnesium oxide 400 MG tablet    MAG-OX    30 tablet    Take 1 tablet (400 mg) by mouth daily (with lunch)    Kidney replaced by transplant       mycophenolate 250 MG capsule    GENERIC EQUIVALENT    180 capsule    750mg AM, 500mg PM    Kidney replaced by transplant       sodium bicarbonate 650 MG tablet     120 tablet    Take 2 tablets (1,300 mg) by mouth 2 times daily    Acidosis, Kidney replaced by transplant       sulfamethoxazole-trimethoprim 400-80 MG per tablet    BACTRIM/SEPTRA    30 tablet    1 tablet Monday, Wednesday, Friday    Kidney replaced by transplant, Immunosuppressed status (H)       * tacrolimus 0.5 MG capsule    GENERIC EQUIVALENT    60 capsule    HOLD    Kidney replaced by transplant       * tacrolimus 1 MG capsule    GENERIC EQUIVALENT    180 capsule    Take 3 capsules (3 mg) by mouth 2 times daily    Kidney replaced by transplant       valGANciclovir 450 MG tablet    VALCYTE    60 tablet    Take 1 tablet (450 mg) by mouth daily Titrate dose up to a max of 2 tabs (900 mg) by mouth daily when directed by your transplant team.    Kidney replaced by transplant       * Notice:  This list has 2 medication(s) that are the same as other medications prescribed for you. Read the  directions carefully, and ask your doctor or other care provider to review them with you.

## 2018-10-10 ENCOUNTER — OFFICE VISIT (OUTPATIENT)
Dept: NEPHROLOGY | Facility: CLINIC | Age: 38
End: 2018-10-10
Attending: INTERNAL MEDICINE
Payer: COMMERCIAL

## 2018-10-10 VITALS
HEART RATE: 71 BPM | BODY MASS INDEX: 34.1 KG/M2 | SYSTOLIC BLOOD PRESSURE: 114 MMHG | DIASTOLIC BLOOD PRESSURE: 76 MMHG | OXYGEN SATURATION: 98 % | WEIGHT: 251.8 LBS | HEIGHT: 72 IN | TEMPERATURE: 97.6 F

## 2018-10-10 DIAGNOSIS — D70.9 NEUTROPENIC FEVER (H): ICD-10-CM

## 2018-10-10 DIAGNOSIS — D84.9 IMMUNOSUPPRESSION (H): ICD-10-CM

## 2018-10-10 DIAGNOSIS — I15.0 RENOVASCULAR HYPERTENSION: ICD-10-CM

## 2018-10-10 DIAGNOSIS — D63.1 ANEMIA IN CHRONIC KIDNEY DISEASE, UNSPECIFIED CKD STAGE: ICD-10-CM

## 2018-10-10 DIAGNOSIS — D70.2 OTHER DRUG-INDUCED NEUTROPENIA (H): ICD-10-CM

## 2018-10-10 DIAGNOSIS — N18.9 ANEMIA IN CHRONIC KIDNEY DISEASE, UNSPECIFIED CKD STAGE: ICD-10-CM

## 2018-10-10 DIAGNOSIS — Z94.0 KIDNEY REPLACED BY TRANSPLANT: Primary | ICD-10-CM

## 2018-10-10 DIAGNOSIS — R50.81 NEUTROPENIC FEVER (H): ICD-10-CM

## 2018-10-10 DIAGNOSIS — E83.42 HYPOMAGNESEMIA: ICD-10-CM

## 2018-10-10 DIAGNOSIS — Z29.89 NEED FOR PNEUMOCYSTIS PROPHYLAXIS: ICD-10-CM

## 2018-10-10 LAB
ALBUMIN UR-MCNC: NEGATIVE MG/DL
APPEARANCE UR: CLEAR
ARUP MISCELLANEOUS TEST: NORMAL
BILIRUB UR QL STRIP: NEGATIVE
COLOR UR AUTO: YELLOW
DONOR IDENTIFICATION: NORMAL
DSA COMMENTS: NORMAL
DSA PRESENT: NO
DSA TEST METHOD: NORMAL
GLUCOSE UR STRIP-MCNC: NEGATIVE MG/DL
HGB UR QL STRIP: NEGATIVE
INTERFERING SUBST TEST METHOD: NORMAL
INTERFERING SUBST TEST METHOD: NORMAL
INTERFERING SUBSTANCE COMMENT: NORMAL
INTERFERING SUBSTANCE COMMENT: NORMAL
INTERFERING SUBSTANCE RESULT: NORMAL
INTERFERING SUBSTANCE RESULT: NORMAL
INTERFERING SUBSTANCE: NORMAL
INTERFERING SUBSTANCE: NORMAL
KETONES UR STRIP-MCNC: NEGATIVE MG/DL
LEUKOCYTE ESTERASE UR QL STRIP: NEGATIVE
MUCOUS THREADS #/AREA URNS LPF: PRESENT /LPF
MYCOPHENOLATE SERPL LC/MS/MS-MCNC: 2.44 MG/L (ref 1–3.5)
MYCOPHENOLATE-G SERPL LC/MS/MS-MCNC: 60.1 MG/L (ref 30–95)
MYCOPHENOLIC ACID LAST DOSE: NORMAL
NITRATE UR QL: NEGATIVE
ORGAN: NORMAL
PH UR STRIP: 5 PH (ref 5–7)
RBC #/AREA URNS AUTO: <1 /HPF (ref 0–2)
SA1 CELL: NORMAL
SA1 COMMENTS: NORMAL
SA1 HI RISK ABY: NORMAL
SA1 MOD RISK ABY: NORMAL
SA1 TEST METHOD: NORMAL
SA2 CELL: NORMAL
SA2 COMMENTS: NORMAL
SA2 HI RISK ABY UA: NORMAL
SA2 MOD RISK ABY: NORMAL
SA2 TEST METHOD: NORMAL
SOURCE: ABNORMAL
SP GR UR STRIP: 1.01 (ref 1–1.03)
UROBILINOGEN UR STRIP-MCNC: 0 MG/DL (ref 0–2)
WBC #/AREA URNS AUTO: 1 /HPF (ref 0–5)

## 2018-10-10 PROCEDURE — G0463 HOSPITAL OUTPT CLINIC VISIT: HCPCS | Mod: 25,ZF

## 2018-10-10 PROCEDURE — 25000128 H RX IP 250 OP 636: Mod: ZF | Performed by: INTERNAL MEDICINE

## 2018-10-10 PROCEDURE — 81001 URINALYSIS AUTO W/SCOPE: CPT | Performed by: INTERNAL MEDICINE

## 2018-10-10 PROCEDURE — 96372 THER/PROPH/DIAG INJ SC/IM: CPT | Mod: ZF

## 2018-10-10 PROCEDURE — 87086 URINE CULTURE/COLONY COUNT: CPT | Performed by: INTERNAL MEDICINE

## 2018-10-10 RX ADMIN — FILGRASTIM-SNDZ 300 MCG: 300 INJECTION, SOLUTION INTRAVENOUS; SUBCUTANEOUS at 15:57

## 2018-10-10 ASSESSMENT — PAIN SCALES - GENERAL: PAINLEVEL: NO PAIN (0)

## 2018-10-10 NOTE — LETTER
10/10/2018      RE: Harshad Beatty  [address] [facility]  [place] [address]       ACUTE TRANSPLANT NEPHROLOGY VISIT    Assessment & Plan      # LDKT: baseline Cr ~ 2.0-2.4; Stable   - Proteinuria: Normal- will need monthly UPC for h/o FSGS.    - Latest DSA: No Date of DSA last checked: 9/2018   - BK: No   - Kidney Tx Biopsy: Yes- 8/17/2018- no rejection    # Immunosuppression: Tacrolimus immediate release (goal  8-10) and Mycophenolate mofetil (goal  1-3.5)   - Changes: No major changes. But will reduce to  mg BID due to canker sores and leukopenia.     longterm may consider belatacept for primary immunosuppression due to terminal creatinine elevation.     # Prophylaxis:   - PJP: TMP/Sulfa (Bactrim)   - CMV: Valcyte    # Hypertension: Controlled; Goal BP: < 130/80   - Changes: No- on florinef daily    # Anemia in chronic renal disease: Hgb: Stable   - Iron studies: Replete- not on iron supplements.     # Mineral Bone Disorder:    - Secondary renal hyperparathyroidism; PTH level is:  mildly elevated- 168. Can be rechecked in 4 months time. On sensipar  39 mg daily  - Vitamin D; level is:  low- not on vit D.  - Calcium; level is:  normal now. Was hypercalcemic.   - Phosphorus; level is: normal- not on any phos supplements.     # Electrolytes:   - Potassium; level: high but started on florinef 4 days ago. Monday labs improved. Not on high k diet.   - Magnesium; level: normal- on mag supplements  - Bicarbonate; level: normal- on baking soda- quarter spoon.     # leukopenia:  Will get 3rd day of neupogen today. Will get labs tomorrow.     # Hyponatremia: asked him to restrict fluid to 1.5 lire per day.     # Canker Sore: likely due to MMF. Dose has been since reduced.     # fever: surgical site well healed. Will get UA and urine cx. No resp symptoms.     # immunization: will hold off on influenza shots in the setting of neutropenia. .     Return visit: Return as previously scheduled.    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    Earle Patrick MD    Chief Complaint   Mr. 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    Harshad had a postoperative course that was complicated by supratherapeutic tacrolimus levels that resulted in a rise in his serum creatinine.  For this, he underwent a kidney transplant biopsy on August 17 that showed no evidence of cellular or antibody meter rejection.     He was last seen in clinic on 9/6/2018. He has not had any major illnesses or hospitalization. He has no complaints today except for feeling weak- could have been improved with neupogen shots. Appetite is good. Has increased weight now.  No nausea, vomiting or diarrhea.   Had an episode of fever yesterday of 100 with chills which resolved with aspirin.  No leg swelling.  No pain or burning with urination.      Recent Hospitalizations:  [x] No [] Yes    New Medical Issues: [x] No [] Yes    Decreased energy: [] No [x] Yes    Chest pain or SOB with exertion:  [x] No [] Yes    Appetite change or weight change: [x] No [] Yes    Nausea, vomiting or diarrhea:  [x] No [] Yes    Fever, sweats or chills: [] No [x] Yes Once yesterday.    Leg swelling: [] No [] Yes      Other medical issues:  No    Home BP: 130s/80s.    Review of Systems    A comprehensive review of systems was obtained and negative, except as noted in the HPI or PMH.    Problem List   Patient Active Problem List   Diagnosis     Sickle cell trait (H)     Anemia in chronic kidney disease     Secondary hyperparathyroidism (H)     Kidney replaced by transplant     Kidney transplant recipient     Immunosuppression (H)     Need for CMV immunotherapy     Need for pneumocystis prophylaxis     Benign essential hypertension     Anemia due to other cause, not classified     Hypercalcemia     Hypomagnesemia     Dehydration     Neutropenia (H)     Social History   Social History   Substance Use Topics     Smoking status: Never Smoker     Smokeless tobacco: Never Used     Alcohol use No     Allergies   No Known Allergies    Medications   Current Outpatient Prescriptions   Medication Sig     aspirin 325 MG EC tablet Take 1 tablet (325 mg) by mouth daily     atorvastatin (LIPITOR) 10 MG tablet Take 1 tablet (10 mg) by mouth daily     carvedilol (COREG) 25 MG tablet Take 2 tablets (50 mg) by mouth 2 times daily (with meals)     cinacalcet (SENSIPAR) 30 MG tablet Take 1 tablet (30 mg) by mouth daily     fludrocortisone (FLORINEF) 0.1 MG tablet Take 1 tablet (0.1 mg) by mouth daily     magnesium oxide (MAG-OX) 400 MG tablet Take 1 tablet (400 mg) by mouth daily (with lunch)     mycophenolate (GENERIC EQUIVALENT) 250 MG capsule 750mg AM, 500mg PM     sodium bicarbonate 650 MG tablet Take 2 tablets (1,300 mg) by mouth 2 times daily     sulfamethoxazole-trimethoprim (BACTRIM/SEPTRA) 400-80 MG per tablet 1 tablet Monday, Wednesday, Friday     tacrolimus (GENERIC EQUIVALENT) 0.5 MG capsule HOLD     tacrolimus (GENERIC EQUIVALENT) 1 MG capsule Take 3 capsules (3 mg) by mouth 2 times daily     valGANciclovir (VALCYTE) 450 MG tablet Take 1 tablet (450 mg) by mouth daily Titrate dose up to a max of 2 tabs (900 mg) by mouth daily when directed by your transplant team.     No current facility-administered  "medications for this visit.      Medications Discontinued During This Encounter   Medication Reason     acetaminophen (TYLENOL) 325 MG tablet Stopped by Patient       Physical Exam   Vital Signs: /76  Pulse 71  Temp 97.6  F (36.4  C) (Oral)  Ht 1.816 m (5' 11.5\")  Wt 114.2 kg (251 lb 12.8 oz)  SpO2 98%  BMI 34.63 kg/m2    GENERAL APPEARANCE: alert and no distress  HENT: mouth with larg apthous ulser of the tongue.  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     Data     Renal Latest Ref Rng & Units 10/8/2018 10/4/2018 10/1/2018   Na 133 - 144 mmol/L - - -   Na (external) 136 - 145 mmol/L 133(L) 133(L) 136   K 3.4 - 5.3 mmol/L - - -   K (external) 3.5 - 5.0 mmol/L 5.2(H) 5.8(H) 5.6(H)   Cl 94 - 109 mmol/L - - -   Cl (external) 98 - 107 mmol/L 106 106 110(H)   CO2 20 - 32 mmol/L - - -   CO2 (external) 22 - 31 mmol/L 22 20(L) 21(L)   BUN 7 - 30 mg/dL - - -   BUN (external) 8 - 22 mg/dL 30(H) 33(H) 33(H)   Cr 0.66 - 1.25 mg/dL - - -   Cr (external) 0.70 - 1.30 mg/dL 2.44(H) 2.44(H) 2.40(H)   Glucose 70 - 99 mg/dL - - -   Glucose (external) 70 - 125 mg/dL 105 101 100   Ca  8.5 - 10.1 mg/dL - - -   Ca (external) 8.5 - 10.5 mg/dL 9.7 10.5 9.9   Mg 1.6 - 2.3 mg/dL - - -   Mg (external) 1.8 - 2.6 mg/dL 1.6(L) - 1.7(L)     Bone Health Latest Ref Rng & Units 10/8/2018 10/1/2018 9/24/2018   Phos 2.5 - 4.5 mg/dL - - -   Phos (external) 2.5 - 4.5 mg/dL 1.9(L) 1.8(L) 2.3(L)   PTHi 18 - 80 pg/mL - - -   Vit D Def 20 - 75 ug/L - - -     Heme Latest Ref Rng & Units 10/8/2018 10/4/2018 10/1/2018 "   WBC 4.0 - 11.0 10e9/L - - -   WBC (external) 4.0 - 11.0 thou/uL 0.7(LL) 1.1(LL) 2.1(L)   Hgb 13.3 - 17.7 g/dL - - -   Hgb (external) 14.0 - 18.0 g/dL 9.9(L) 10.7(L) 10.0(L)   Plt 150 - 450 10e9/L - - -   Plt (external) 140 - 440 thou/uL 261 257 257     Liver Latest Ref Rng & Units 7/30/2018 5/31/2017   AP 40 - 150 U/L 50 57   TBili 0.2 - 1.3 mg/dL 0.5 0.4   ALT 0 - 70 U/L 17 19   AST 0 - 45 U/L 4 7   Tot Protein 6.8 - 8.8 g/dL 8.6 7.9   Albumin 3.4 - 5.0 g/dL 3.9 3.9        Iron studies Latest Ref Rng & Units 7/30/2018   Iron 35 - 180 ug/dL 46   Iron sat 15 - 46 % 21   Ferritin 26 - 388 ng/mL 716(H)     [facility] Txp Virology Latest Ref Rng & Units 10/8/2018 9/24/2018 9/10/2018   CMV DNA Quant Ext Not Detected IU/mL Not Detected CMV DNA Not Detected -   BK Quant Log Ext <2.7 Log copies/mL - - Not Calculated   BK Quant Result Ext NEG copies/mL - - Not Detected   BK Quant Spec Ext - - - Plasma (EDTA)   Hep B Core NR:Nonreactive - - -        Recent Labs   Lab Test  10/01/18   0918  10/04/18   0838  10/08/18   0901   DOSTAC  09/30/2018 20:10  10/03/2018 20:00  10/07/2018 20:00   TACROL  11.2  11.2  10.7     Recent Labs   Lab Test  08/20/18   0900  08/27/18   0810  09/05/18   0845   DOSMPA  Not Provided  Not Provided  [id number]   MPACID  5.33*  6.41*  4.52*   MPAG  155.6*  98.1*  80.5     Attestation:  This patient has been seen and evaluated by me, Andrey Connolly MD.  I have reviewed the note and agree with plan of care as documented by the fellow.       Early Post Transplant

## 2018-10-10 NOTE — PROGRESS NOTES
ACUTE TRANSPLANT NEPHROLOGY VISIT    Assessment & Plan      # LDKT: baseline Cr ~ 2.0-2.4; Stable   - Proteinuria: Normal- will need monthly UPC for h/o FSGS.    - Latest DSA: No Date of DSA last checked: 9/2018   - BK: No   - Kidney Tx Biopsy: Yes- 8/17/2018- no rejection    # Immunosuppression: Tacrolimus immediate release (goal  8-10) and Mycophenolate mofetil (goal  1-3.5)   - Changes: No major changes. But will reduce to  mg BID due to canker sores and leukopenia.     longterm may consider belatacept for primary immunosuppression due to terminal creatinine elevation.     # Prophylaxis:   - PJP: TMP/Sulfa (Bactrim)   - CMV: Valcyte    # Hypertension: Controlled; Goal BP: < 130/80   - Changes: No- on florinef daily    # Anemia in chronic renal disease: Hgb: Stable   - Iron studies: Replete- not on iron supplements.     # Mineral Bone Disorder:    - Secondary renal hyperparathyroidism; PTH level is:  mildly elevated- 168. Can be rechecked in 4 months time. On sensipar  39 mg daily  - Vitamin D; level is:  low- not on vit D.  - Calcium; level is:  normal now. Was hypercalcemic.   - Phosphorus; level is: normal- not on any phos supplements.     # Electrolytes:   - Potassium; level: high but started on florinef 4 days ago. Monday labs improved. Not on high k diet.   - Magnesium; level: normal- on mag supplements  - Bicarbonate; level: normal- on baking soda- quarter spoon.     # leukopenia:  Will get 3rd day of neupogen today. Will get labs tomorrow.     # Hyponatremia: asked him to restrict fluid to 1.5 lire per day.     # Canker Sore: likely due to MMF. Dose has been since reduced.     # fever: surgical site well healed. Will get UA and urine cx. No resp symptoms.     # immunization: will hold off on influenza shots in the setting of neutropenia. .     Return visit: Return as previously scheduled.    # Transplant History:  Etiology of kidney failure: focal segmental glomerulosclerosis (FSGS)  Tx:  LDKT  Transplant: 8/7/2018 (Kidney)  Donor Type: Living Donor Class:   Crossmatch at time of Tx: negative  DSA at time of Tx: Yes- DPB1*01/1150   Significant changes in immunosuppression: None  CMV IgG Ab Discordance (D+/R-): No  EBV IgG Ab Discordance (D+/R-): No  Significant transplant-related complications: None    Transplant Office Phone Number: [phone]    Assessment and plan was discussed with the patient and he voiced his understanding and agreement.    Earle Patrick MD    Chief Complaint   Mr. 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    Harshad had a postoperative course that was complicated by supratherapeutic tacrolimus levels that resulted in a rise in his serum creatinine.  For this, he underwent a kidney transplant biopsy on August 17 that showed no evidence of cellular or antibody meter rejection.     He was last seen in clinic on 9/6/2018. He has not had any major illnesses or hospitalization. He has no complaints today except for feeling weak- could have been improved with neupogen shots. Appetite is good. Has increased weight now.  No nausea, vomiting or diarrhea.  Had an episode of fever yesterday of 100 with chills which resolved with aspirin.  No leg swelling.  No pain or burning with urination.      Recent Hospitalizations:  [x] No [] Yes    New Medical Issues: [x] No [] Yes    Decreased energy: [] No [x] Yes    Chest pain or SOB with exertion:  [x] No [] Yes    Appetite change or weight change: [x] No [] Yes    Nausea, vomiting or diarrhea:  [x] No [] Yes    Fever, sweats or chills: [] No [x] Yes Once yesterday.    Leg swelling: [] No [] Yes      Other medical issues:  No    Home BP: 130s/80s.    Review of Systems   A comprehensive review of systems was obtained and negative, except as noted in the  HPI or PMH.    Problem List   Patient Active Problem List   Diagnosis     Sickle cell trait (H)     Anemia in chronic kidney disease     Secondary hyperparathyroidism (H)     Kidney replaced by transplant     Kidney transplant recipient     Immunosuppression (H)     Need for CMV immunotherapy     Need for pneumocystis prophylaxis     Benign essential hypertension     Anemia due to other cause, not classified     Hypercalcemia     Hypomagnesemia     Dehydration     Neutropenia (H)     Social History   Social History   Substance Use Topics     Smoking status: Never Smoker     Smokeless tobacco: Never Used     Alcohol use No     Allergies   No Known Allergies    Medications   Current Outpatient Prescriptions   Medication Sig     aspirin 325 MG EC tablet Take 1 tablet (325 mg) by mouth daily     atorvastatin (LIPITOR) 10 MG tablet Take 1 tablet (10 mg) by mouth daily     carvedilol (COREG) 25 MG tablet Take 2 tablets (50 mg) by mouth 2 times daily (with meals)     cinacalcet (SENSIPAR) 30 MG tablet Take 1 tablet (30 mg) by mouth daily     fludrocortisone (FLORINEF) 0.1 MG tablet Take 1 tablet (0.1 mg) by mouth daily     magnesium oxide (MAG-OX) 400 MG tablet Take 1 tablet (400 mg) by mouth daily (with lunch)     mycophenolate (GENERIC EQUIVALENT) 250 MG capsule 750mg AM, 500mg PM     sodium bicarbonate 650 MG tablet Take 2 tablets (1,300 mg) by mouth 2 times daily     sulfamethoxazole-trimethoprim (BACTRIM/SEPTRA) 400-80 MG per tablet 1 tablet Monday, Wednesday, Friday     tacrolimus (GENERIC EQUIVALENT) 0.5 MG capsule HOLD     tacrolimus (GENERIC EQUIVALENT) 1 MG capsule Take 3 capsules (3 mg) by mouth 2 times daily     valGANciclovir (VALCYTE) 450 MG tablet Take 1 tablet (450 mg) by mouth daily Titrate dose up to a max of 2 tabs (900 mg) by mouth daily when directed by your transplant team.     No current facility-administered medications for this visit.      Medications Discontinued During This Encounter   Medication  "Reason     acetaminophen (TYLENOL) 325 MG tablet Stopped by Patient       Physical Exam   Vital Signs: /76  Pulse 71  Temp 97.6  F (36.4  C) (Oral)  Ht 1.816 m (5' 11.5\")  Wt 114.2 kg (251 lb 12.8 oz)  SpO2 98%  BMI 34.63 kg/m2    GENERAL APPEARANCE: alert and no distress  HENT: mouth with larg apthous ulser of the tongue.  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     Data     Renal Latest Ref Rng & Units 10/8/2018 10/4/2018 10/1/2018   Na 133 - 144 mmol/L - - -   Na (external) 136 - 145 mmol/L 133(L) 133(L) 136   K 3.4 - 5.3 mmol/L - - -   K (external) 3.5 - 5.0 mmol/L 5.2(H) 5.8(H) 5.6(H)   Cl 94 - 109 mmol/L - - -   Cl (external) 98 - 107 mmol/L 106 106 110(H)   CO2 20 - 32 mmol/L - - -   CO2 (external) 22 - 31 mmol/L 22 20(L) 21(L)   BUN 7 - 30 mg/dL - - -   BUN (external) 8 - 22 mg/dL 30(H) 33(H) 33(H)   Cr 0.66 - 1.25 mg/dL - - -   Cr (external) 0.70 - 1.30 mg/dL 2.44(H) 2.44(H) 2.40(H)   Glucose 70 - 99 mg/dL - - -   Glucose (external) 70 - 125 mg/dL 105 101 100   Ca  8.5 - 10.1 mg/dL - - -   Ca (external) 8.5 - 10.5 mg/dL 9.7 10.5 9.9   Mg 1.6 - 2.3 mg/dL - - -   Mg (external) 1.8 - 2.6 mg/dL 1.6(L) - 1.7(L)     Bone Health Latest Ref Rng & Units 10/8/2018 10/1/2018 9/24/2018   Phos 2.5 - 4.5 mg/dL - - -   Phos (external) 2.5 - 4.5 mg/dL 1.9(L) 1.8(L) 2.3(L)   PTHi 18 - 80 pg/mL - - -   Vit D Def 20 - 75 ug/L - - -     Heme Latest Ref Rng & Units 10/8/2018 10/4/2018 10/1/2018   WBC 4.0 - 11.0 10e9/L - - -   WBC (external) 4.0 - 11.0 thou/uL 0.7(LL) 1.1(LL) 2.1(L)   Hgb " 13.3 - 17.7 g/dL - - -   Hgb (external) 14.0 - 18.0 g/dL 9.9(L) 10.7(L) 10.0(L)   Plt 150 - 450 10e9/L - - -   Plt (external) 140 - 440 thou/uL 261 257 257     Liver Latest Ref Rng & Units 7/30/2018 5/31/2017   AP 40 - 150 U/L 50 57   TBili 0.2 - 1.3 mg/dL 0.5 0.4   ALT 0 - 70 U/L 17 19   AST 0 - 45 U/L 4 7   Tot Protein 6.8 - 8.8 g/dL 8.6 7.9   Albumin 3.4 - 5.0 g/dL 3.9 3.9        Iron studies Latest Ref Rng & Units 7/30/2018   Iron 35 - 180 ug/dL 46   Iron sat 15 - 46 % 21   Ferritin 26 - 388 ng/mL 716(H)     [facility] Txp Virology Latest Ref Rng & Units 10/8/2018 9/24/2018 9/10/2018   CMV DNA Quant Ext Not Detected IU/mL Not Detected CMV DNA Not Detected -   BK Quant Log Ext <2.7 Log copies/mL - - Not Calculated   BK Quant Result Ext NEG copies/mL - - Not Detected   BK Quant Spec Ext - - - Plasma (EDTA)   Hep B Core NR:Nonreactive - - -        Recent Labs   Lab Test  10/01/18   0918  10/04/18   0838  10/08/18   0901   DOSTAC  09/30/2018 20:10  10/03/2018 20:00  10/07/2018 20:00   TACROL  11.2  11.2  10.7     Recent Labs   Lab Test  08/20/18   0900  08/27/18   0810  09/05/18   0845   DOSMPA  Not Provided  Not Provided  [id number]   MPACID  5.33*  6.41*  4.52*   MPAG  155.6*  98.1*  80.5     Attestation:  This patient has been seen and evaluated by me, Andrey Connolly MD.  I have reviewed the note and agree with plan of care as documented by the fellow.

## 2018-10-10 NOTE — MR AVS SNAPSHOT
After Visit Summary   10/10/2018    Harshad Beatty    MRN: [id number]           Patient Information     Date Of Birth          1980        Visit Information        Provider Department      10/10/2018 11:30 AM Transplant,  [facility] Nephrology        Today's Diagnoses     Kidney replaced by transplant    -  1    Neutropenic fever (H)        Other drug-induced neutropenia (H)        Immunosuppression (H)        Need for pneumocystis prophylaxis        Anemia in chronic kidney disease, unspecified CKD stage        Renovascular hypertension        Hypomagnesemia           Follow-ups after your visit        Follow-up notes from your care team     Return as previously scheduled.      Your next 10 appointments already scheduled     Oct 12, 2018  1:00 PM CDT   US RENAL TRANSPLANT with UCUS1   [facility]  ([facility])    [address]  1st Floor  [facility] [address] [phone]           How do I prepare for my exam? (Food and drink instructions) No Food and Drink Restrictions.  How do I prepare for my exam? (Other instructions) You do not need to do anything special to prepare for your exam.  What should I wear: Wear comfortable clothes.  How long does the exam take: Most ultrasounds take 30 to 60 minutes.  What should I bring: Bring a list of your medicines, including vitamins, minerals and over-the-counter drugs. It is safest to leave personal items at home.  Do I need a :  No  is needed.  What do I need to tell my doctor: Tell your doctor about any allergies you may have.  What should I do after the exam: No restrictions, You may resume normal activities.  What is this test: An ultrasound uses sound waves to make pictures of the body. Sound waves do not cause pain. The only discomfort may be the pressure of the wand against your skin or full bladder.  Who should I call with questions: If you have any questions, please call  the Imaging Department where you will have your exam. Directions, parking instructions, and other information is available on our website, [email or web address].            Oct 16, 2018   Procedure with Brijesh Gaxiola MD   [facility] ([facility])    [address] [place] [address]  5th [facility] [id number] [phone]           Located in the Clinics and Surgery Center at [address] [place] [address].   parking is very convenient and highly recommended.  is a $6 flat rate fee.  Both  and self parkers should enter the main arrival plaza from [facility]; parking attendants will direct you based on your parking preference.            Dec 07, 2018 11:30 AM CST   (Arrive by 11:00 AM)   Return Kidney Transplant with  Early Post Transplant   [facility] ([facility])    [address] [place] [address]  [address]  [facility] [address]   [phone]            Dec 07, 2018  1:30 PM CST   (Arrive by 1:15 PM)   Office Visit with Lambert Wall [facility] ([facility])    [address] [place] [address]  3rd Floor  [facility] [address] [phone]           Bring a current list of meds and any records pertaining to this visit. For Physicals, please bring immunization records and any forms needing to be filled out. Please arrive 10 minutes early to complete paperwork.            Feb 07, 2019  1:05 PM CST   (Arrive by 12:35 PM)   Return Kidney Transplant with  Kidney/Pancreas Recipient 2   [facility] ([facility])    [address] [place] [address]  [address]  [facility] [address]   [phone]              Future tests that were ordered for you today     Open Future Orders        Priority Expected Expires Ordered    PRA Donor Specific Antibody STAT  4/10/2019 10/12/2018    Basic metabolic panel Routine  4/10/2019 10/12/2018  "   CBC with platelets differential Routine  4/10/2019 10/12/2018    INR Routine  4/10/2019 10/12/2018    ABO/Rh type and screen Routine  4/10/2019 10/12/2018    Routine UA with microscopic Routine  4/10/2019 10/12/2018    Albumin Random Urine Quantitative with Creat Ratio Routine  4/10/2019 10/12/2018    Protein  random urine with Creat Ratio Routine  4/10/2019 10/12/2018    BK virus PCR quantitative Routine  4/10/2019 10/12/2018            Who to contact     If you have questions or need follow up information about today's clinic visit or your schedule please contact [facility] NEPHROLOGY directly at [phone].  Normal or non-critical lab and imaging results will be communicated to you by MyChart, letter or phone within 4 business days after the clinic has received the results. If you do not hear from us within 7 days, please contact the clinic through MyChart or phone. If you have a critical or abnormal lab result, we will notify you by phone as soon as possible.  Submit refill requests through [employer] or call your pharmacy and they will forward the refill request to us. Please allow 3 business days for your refill to be completed.          Additional Information About Your Visit        Care EveryWhere ID     This is your Care EveryWhere ID. This could be used by other organizations to access your [place] medical records  [id number]        Your Vitals Were     Pulse Temperature Height Pulse Oximetry BMI (Body Mass Index)       71 97.6  F (36.4  C) (Oral) 1.816 m (5' 11.5\") 98% 34.63 kg/m2        Blood Pressure from Last 3 Encounters:   10/10/18 114/76   09/26/18 127/84   09/17/18 125/75    Weight from Last 3 Encounters:   10/10/18 114.2 kg (251 lb 12.8 oz)   09/17/18 109.4 kg (241 lb 3.2 oz)   09/06/18 105.2 kg (232 lb)               Primary Care Provider Office Phone # Fax #    [facility] [phone] [address] [facility] [address]        Equal " Access to Services     [facility]: Hadii aad ku hadessiejaleesa [place], waaxda luqadaha, qaybta kaalmaherb hunter. So [facility] [phone].    ATENCIÓN: Si habla laithañol, tiene a rodriguez disposición servicios gratuitos de asistencia lingüística. Llame al [phone].    We comply with applicable federal civil rights laws and [place] laws. We do not discriminate on the basis of race, color, national origin, age, disability, sex, sexual orientation, or gender identity.            Thank you!     Thank you for choosing [facility] NEPHROLOGY  for your care. Our goal is always to provide you with excellent care. Hearing back from our patients is one way we can continue to improve our services. Please take a few minutes to complete the written survey that you may receive in the mail after your visit with us. Thank you!             Your Updated Medication List - Protect others around you: Learn how to safely use, store and throw away your medicines at www.disposemymeds.org.          This list is accurate as of 10/10/18 11:59 PM.  Always use your most recent med list.                   Brand Name Dispense Instructions for use Diagnosis    aspirin 325 MG EC tablet     30 tablet    Take 1 tablet (325 mg) by mouth daily    Kidney replaced by transplant       atorvastatin 10 MG tablet    LIPITOR    30 tablet    Take 1 tablet (10 mg) by mouth daily    Kidney replaced by transplant       carvedilol 25 MG tablet    COREG    120 tablet    Take 2 tablets (50 mg) by mouth 2 times daily (with meals)    Renovascular hypertension, Kidney replaced by transplant       cinacalcet 30 MG tablet    SENSIPAR    30 tablet    Take 1 tablet (30 mg) by mouth daily    Kidney replaced by transplant       fludrocortisone 0.1 MG tablet    FLORINEF    30 tablet    Take 1 tablet (0.1 mg) by mouth daily    Kidney replaced by transplant, Dehydration       magnesium oxide 400 MG tablet    MAG-OX    30 tablet    Take 1  tablet (400 mg) by mouth daily (with lunch)    Kidney replaced by transplant       mycophenolate 250 MG capsule    GENERIC EQUIVALENT    180 capsule    750mg AM, 500mg PM    Kidney replaced by transplant       sodium bicarbonate 650 MG tablet     120 tablet    Take 2 tablets (1,300 mg) by mouth 2 times daily    Acidosis, Kidney replaced by transplant       sulfamethoxazole-trimethoprim 400-80 MG per tablet    BACTRIM/SEPTRA    30 tablet    1 tablet Monday, Wednesday, Friday    Kidney replaced by transplant, Immunosuppressed status (H)

## 2018-10-11 ENCOUNTER — TELEPHONE (OUTPATIENT)
Dept: TRANSPLANT | Facility: CLINIC | Age: 38
End: 2018-10-11

## 2018-10-11 ENCOUNTER — RECORDS - HEALTHEAST (OUTPATIENT)
Dept: LAB | Facility: CLINIC | Age: 38
End: 2018-10-11

## 2018-10-11 DIAGNOSIS — Z48.298 AFTERCARE FOLLOWING ORGAN TRANSPLANT: ICD-10-CM

## 2018-10-11 DIAGNOSIS — Z94.0 KIDNEY REPLACED BY TRANSPLANT: Primary | ICD-10-CM

## 2018-10-11 DIAGNOSIS — Z94.0 KIDNEY REPLACED BY TRANSPLANT: ICD-10-CM

## 2018-10-11 LAB
ANION GAP SERPL CALCULATED.3IONS-SCNC: 7 MMOL/L (ref 5–18)
BACTERIA SPEC CULT: NO GROWTH
BASOPHILS # BLD AUTO: 0 THOU/UL (ref 0–0.2)
BASOPHILS NFR BLD AUTO: 1 % (ref 0–2)
BUN SERPL-MCNC: 20 MG/DL (ref 8–22)
CALCIUM SERPL-MCNC: 10.2 MG/DL (ref 8.5–10.5)
CHLORIDE BLD-SCNC: 105 MMOL/L (ref 98–107)
CO2 SERPL-SCNC: 22 MMOL/L (ref 22–31)
CREAT SERPL-MCNC: 2.57 MG/DL (ref 0.7–1.3)
EBV DNA # SPEC NAA+PROBE: <390 CPY/ML
EBV DNA SPEC NAA+PROBE-LOG#: <2.6 LOG
EBV DNA SPEC QL NAA+PROBE: NOT DETECTED
EOSINOPHIL COUNT (ABSOLUTE): 0.1 THOU/UL (ref 0–0.4)
EOSINOPHIL NFR BLD AUTO: 3 % (ref 0–6)
ERYTHROCYTE [DISTWIDTH] IN BLOOD BY AUTOMATED COUNT: 13.5 % (ref 11–14.5)
GFR SERPL CREATININE-BSD FRML MDRD: 28 ML/MIN/1.73M2
GLUCOSE BLD-MCNC: 100 MG/DL (ref 70–125)
HCT VFR BLD AUTO: 32.7 % (ref 40–54)
HGB BLD-MCNC: 10.5 G/DL (ref 14–18)
LYMPHOCYTES # BLD AUTO: 0.8 THOU/UL (ref 0.8–4.4)
LYMPHOCYTES NFR BLD AUTO: 36 % (ref 20–40)
Lab: NORMAL
MCH RBC QN AUTO: 27.6 PG (ref 27–34)
MCHC RBC AUTO-ENTMCNC: 32.1 G/DL (ref 32–36)
MCV RBC AUTO: 86 FL (ref 80–100)
MISCELLANEOUS TEST DEPT. - HE HISTORICAL: NORMAL
MONOCYTES # BLD AUTO: 1 THOU/UL (ref 0–0.9)
MONOCYTES NFR BLD AUTO: 48 % (ref 2–10)
PERFORMING LAB: NORMAL
PLAT MORPH BLD: NORMAL
PLATELET # BLD AUTO: 230 THOU/UL (ref 140–440)
PMV BLD AUTO: 9.4 FL (ref 8.5–12.5)
POTASSIUM BLD-SCNC: 5.2 MMOL/L (ref 3.5–5)
RBC # BLD AUTO: 3.81 MILL/UL (ref 4.4–6.2)
SODIUM SERPL-SCNC: 134 MMOL/L (ref 136–145)
SPECIMEN SOURCE: NORMAL
SPECIMEN SOURCE: NORMAL
SPECIMEN STATUS: NORMAL
TACROLIMUS BLD-MCNC: 12.9 UG/L (ref 5–15)
TEST NAME: NORMAL
TME LAST DOSE: NORMAL H
TOTAL NEUTROPHILS-ABS(DIFF): 0.3 THOU/UL (ref 2–7.7)
TOTAL NEUTROPHILS-REL(DIFF): 12 % (ref 50–70)
WBC: 2.1 THOU/UL (ref 4–11)

## 2018-10-11 PROCEDURE — 80197 ASSAY OF TACROLIMUS: CPT | Performed by: SURGERY

## 2018-10-11 NOTE — TELEPHONE ENCOUNTER
Transplant Coordinator Renal Biopsy Communication    Call placed to Harshad Beatty to discuss indication for kidney transplant biopsy per Dr. Bahena.     Indication for transplant renal biopsy: elevated serum creatinine   Laterality: right  Date of biopsy: 10/16/18    Patient location within 70 miles of [facility]: Yes.     Harshad Beatty's medication list was reviewed.   Anticoagulant: aspirin   Ibuprofen: No.  Fish Oil:  No.  Medications held: aspirin - dose held starting 10/11/2018 (RNCC left VM to hold)    Recent blood pressure readings are WNL or not applicable. Instructed to take medication, especially blood pressure medications, before arriving to the Clinic and Surgery Center at 0600 day of procedure.     Procedure expectations and duration of stay discussed. Expressed pt can expect a phone call from LPN/MA to confirm biopsy date/time/location/directions/review of medications. Pt has no additional questions at this time. Transplant Office phone number given to pt for future questions.      Mirna Harrell  Kidney/Pancreas Transplant Coordinator  [phone] option 5

## 2018-10-12 DIAGNOSIS — T86.10 COMPLICATIONS, KIDNEY TRANSPLANT: Primary | ICD-10-CM

## 2018-10-12 RX ORDER — TACROLIMUS 0.5 MG/1
0.5 CAPSULE ORAL 2 TIMES DAILY
Qty: 60 CAPSULE | Refills: 11 | Status: SHIPPED | OUTPATIENT
Start: 2018-10-12 | End: 2018-10-18

## 2018-10-12 RX ORDER — TACROLIMUS 1 MG/1
2 CAPSULE ORAL 2 TIMES DAILY
Qty: 120 CAPSULE | Refills: 11 | Status: SHIPPED | OUTPATIENT
Start: 2018-10-12 | End: 2018-10-18

## 2018-10-12 RX ORDER — VALGANCICLOVIR 450 MG/1
450 TABLET, FILM COATED ORAL EVERY OTHER DAY
Qty: 30 TABLET | Refills: 0 | Status: SHIPPED | OUTPATIENT
Start: 2018-10-12 | End: 2018-11-29

## 2018-10-12 NOTE — TELEPHONE ENCOUNTER
Tac level above goal. Current dose = 3.5mg BID.  Harshad confirmed dose and trough, no recent dose change, etc.  Will DECREASE tacrolimus to 2.5mg BID as last dose reduction of 0.5mg resulted in increased tac level.    Valcyte - reduce dose to 450mg EOD due to increased GFR.    Harshad voiced understanding.

## 2018-10-12 NOTE — TELEPHONE ENCOUNTER
LPN/MA  Renal Biopsy Communication    Call to pt to confirm renal biopsy procedure. Biopsy orders entered and patient aware of date 10[place]16/2018, [place] [facility] [place] to arrive at 6:00AM.     No need to be NPO.      Discussed anticoagulants (i.e. fish oil, ASA, Plavix, Coumadin, Ibuprofen). Pt confirms use of anticoagulants. Patient is currently holding aspirin.    Take all medicine before arrival for biopsy and bring all medicine bottles with.      Report to 1st floor lab, then 5th floor for biopsy.      Use [employer] Scheduling services and bring form of entertainment.     Discussed with patient need to stay overnight locally evening of procedure if live more than 70 miles away.    Call placed to scheduling at [phone] to schedule and confirm biopsy date/time    Lab appointment scheduled for morning of biopsy.

## 2018-10-15 ENCOUNTER — RECORDS - HEALTHEAST (OUTPATIENT)
Dept: LAB | Facility: CLINIC | Age: 38
End: 2018-10-15

## 2018-10-15 ENCOUNTER — TELEPHONE (OUTPATIENT)
Dept: TRANSPLANT | Facility: CLINIC | Age: 38
End: 2018-10-15

## 2018-10-15 DIAGNOSIS — Z94.0 KIDNEY REPLACED BY TRANSPLANT: ICD-10-CM

## 2018-10-15 DIAGNOSIS — Z48.298 AFTERCARE FOLLOWING ORGAN TRANSPLANT: ICD-10-CM

## 2018-10-15 LAB
ANION GAP SERPL CALCULATED.3IONS-SCNC: 8 MMOL/L (ref 5–18)
BASOPHILS # BLD AUTO: 0 THOU/UL (ref 0–0.2)
BASOPHILS NFR BLD AUTO: 1 % (ref 0–2)
BUN SERPL-MCNC: 31 MG/DL (ref 8–22)
CALCIUM SERPL-MCNC: 10.4 MG/DL (ref 8.5–10.5)
CHLORIDE BLD-SCNC: 109 MMOL/L (ref 98–107)
CO2 SERPL-SCNC: 19 MMOL/L (ref 22–31)
CREAT SERPL-MCNC: 2.13 MG/DL (ref 0.7–1.3)
EOSINOPHIL COUNT (ABSOLUTE): 0 THOU/UL (ref 0–0.4)
EOSINOPHIL NFR BLD AUTO: 0 % (ref 0–6)
ERYTHROCYTE [DISTWIDTH] IN BLOOD BY AUTOMATED COUNT: 13.2 % (ref 11–14.5)
GFR SERPL CREATININE-BSD FRML MDRD: 35 ML/MIN/1.73M2
GLUCOSE BLD-MCNC: 124 MG/DL (ref 70–125)
HCT VFR BLD AUTO: 32.5 % (ref 40–54)
HGB BLD-MCNC: 10.4 G/DL (ref 14–18)
LYMPHOCYTES # BLD AUTO: 0.6 THOU/UL (ref 0.8–4.4)
LYMPHOCYTES NFR BLD AUTO: 32 % (ref 20–40)
MAGNESIUM SERPL-MCNC: 2.1 MG/DL (ref 1.8–2.6)
MCH RBC QN AUTO: 27.7 PG (ref 27–34)
MCHC RBC AUTO-ENTMCNC: 32 G/DL (ref 32–36)
MCV RBC AUTO: 86 FL (ref 80–100)
MONOCYTES # BLD AUTO: 0.4 THOU/UL (ref 0–0.9)
MONOCYTES NFR BLD AUTO: 20 % (ref 2–10)
PHOSPHATE SERPL-MCNC: 2 MG/DL (ref 2.5–4.5)
PLAT MORPH BLD: NORMAL
PLATELET # BLD AUTO: 198 THOU/UL (ref 140–440)
PMV BLD AUTO: 9.9 FL (ref 8.5–12.5)
POTASSIUM BLD-SCNC: 5.2 MMOL/L (ref 3.5–5)
RBC # BLD AUTO: 3.76 MILL/UL (ref 4.4–6.2)
SODIUM SERPL-SCNC: 136 MMOL/L (ref 136–145)
TOTAL NEUTROPHILS-ABS(DIFF): 0.8 THOU/UL (ref 2–7.7)
TOTAL NEUTROPHILS-REL(DIFF): 47 % (ref 50–70)
TOXIC GRANULATION: ABNORMAL
WBC: 1.8 THOU/UL (ref 4–11)

## 2018-10-15 PROCEDURE — 80197 ASSAY OF TACROLIMUS: CPT | Performed by: SURGERY

## 2018-10-15 NOTE — TELEPHONE ENCOUNTER
DATE:  10/15/2018   TIME OF RECEIPT FROM LAB:  10:28 am  LAB TEST:  WBC  LAB VALUE:  1.8  RESULTS GIVEN WITH READ-BACK TO (PROVIDER):  Julieta Roldan   TIME LAB VALUE REPORTED TO PROVIDER:   10:38 am

## 2018-10-15 NOTE — TELEPHONE ENCOUNTER
Per protocol, no changes at this time. Cellcept dose recently reduced. Continue to monitor WBCs and ANC 2x weekly.

## 2018-10-16 ENCOUNTER — RESULTS ONLY (OUTPATIENT)
Dept: OTHER | Facility: CLINIC | Age: 38
End: 2018-10-16

## 2018-10-16 ENCOUNTER — SURGERY (OUTPATIENT)
Age: 38
End: 2018-10-16

## 2018-10-16 ENCOUNTER — TELEPHONE (OUTPATIENT)
Dept: TRANSPLANT | Facility: CLINIC | Age: 38
End: 2018-10-16

## 2018-10-16 ENCOUNTER — HOSPITAL ENCOUNTER (OUTPATIENT)
Facility: AMBULATORY SURGERY CENTER | Age: 38
End: 2018-10-16
Attending: INTERNAL MEDICINE
Payer: COMMERCIAL

## 2018-10-16 VITALS
TEMPERATURE: 98.9 F | SYSTOLIC BLOOD PRESSURE: 147 MMHG | HEART RATE: 73 BPM | RESPIRATION RATE: 16 BRPM | DIASTOLIC BLOOD PRESSURE: 104 MMHG

## 2018-10-16 DIAGNOSIS — I15.1 HTN, KIDNEY TRANSPLANT RELATED: Primary | ICD-10-CM

## 2018-10-16 DIAGNOSIS — Z94.0 HTN, KIDNEY TRANSPLANT RELATED: Primary | ICD-10-CM

## 2018-10-16 DIAGNOSIS — T86.10 COMPLICATIONS, KIDNEY TRANSPLANT: ICD-10-CM

## 2018-10-16 DIAGNOSIS — Z94.0 KIDNEY REPLACED BY TRANSPLANT: ICD-10-CM

## 2018-10-16 LAB
ABO + RH BLD: NORMAL
ABO + RH BLD: NORMAL
ALBUMIN UR-MCNC: NEGATIVE MG/DL
ANION GAP SERPL CALCULATED.3IONS-SCNC: 6 MMOL/L (ref 3–14)
APPEARANCE UR: CLEAR
BASOPHILS # BLD AUTO: 0 10E9/L (ref 0–0.2)
BASOPHILS NFR BLD AUTO: 1.3 %
BILIRUB UR QL STRIP: NEGATIVE
BLD GP AB SCN SERPL QL: NORMAL
BLOOD BANK CMNT PATIENT-IMP: NORMAL
BUN SERPL-MCNC: 35 MG/DL (ref 7–30)
CALCIUM SERPL-MCNC: 9.6 MG/DL (ref 8.5–10.1)
CHLORIDE SERPL-SCNC: 108 MMOL/L (ref 94–109)
CO2 SERPL-SCNC: 22 MMOL/L (ref 20–32)
COLOR UR AUTO: NORMAL
CREAT SERPL-MCNC: 2.08 MG/DL (ref 0.66–1.25)
CREAT UR-MCNC: 57 MG/DL
DIFFERENTIAL METHOD BLD: ABNORMAL
EOSINOPHIL # BLD AUTO: 0.1 10E9/L (ref 0–0.7)
EOSINOPHIL NFR BLD AUTO: 2.6 %
ERYTHROCYTE [DISTWIDTH] IN BLOOD BY AUTOMATED COUNT: 13.2 % (ref 10–15)
GFR SERPL CREATININE-BSD FRML MDRD: 36 ML/MIN/1.7M2
GLUCOSE SERPL-MCNC: 102 MG/DL (ref 70–99)
GLUCOSE UR STRIP-MCNC: NEGATIVE MG/DL
HCT VFR BLD AUTO: 31.9 % (ref 40–53)
HGB BLD-MCNC: 10.4 G/DL (ref 13.3–17.7)
HGB UR QL STRIP: NEGATIVE
IMM GRANULOCYTES # BLD: 0 10E9/L (ref 0–0.4)
IMM GRANULOCYTES NFR BLD: 0.4 %
INR PPP: 1.01 (ref 0.86–1.14)
KETONES UR STRIP-MCNC: NEGATIVE MG/DL
LEUKOCYTE ESTERASE UR QL STRIP: NEGATIVE
LYMPHOCYTES # BLD AUTO: 0.5 10E9/L (ref 0.8–5.3)
LYMPHOCYTES NFR BLD AUTO: 20.2 %
MCH RBC QN AUTO: 27.9 PG (ref 26.5–33)
MCHC RBC AUTO-ENTMCNC: 32.6 G/DL (ref 31.5–36.5)
MCV RBC AUTO: 86 FL (ref 78–100)
MICROALBUMIN UR-MCNC: 21 MG/L
MICROALBUMIN/CREAT UR: 37.24 MG/G CR (ref 0–17)
MONOCYTES # BLD AUTO: 0.6 10E9/L (ref 0–1.3)
MONOCYTES NFR BLD AUTO: 25.9 %
NEUTROPHILS # BLD AUTO: 1.1 10E9/L (ref 1.6–8.3)
NEUTROPHILS NFR BLD AUTO: 49.6 %
NITRATE UR QL: NEGATIVE
NRBC # BLD AUTO: 0 10*3/UL
NRBC BLD AUTO-RTO: 0 /100
PH UR STRIP: 6 PH (ref 5–7)
PLATELET # BLD AUTO: 185 10E9/L (ref 150–450)
PLATELET # BLD EST: ABNORMAL 10*3/UL
POTASSIUM SERPL-SCNC: 4.9 MMOL/L (ref 3.4–5.3)
PROT UR-MCNC: 0.11 G/L
PROT/CREAT 24H UR: 0.2 G/G CR (ref 0–0.2)
RBC # BLD AUTO: 3.73 10E12/L (ref 4.4–5.9)
RBC #/AREA URNS AUTO: 1 /HPF (ref 0–2)
RBC MORPH BLD: ABNORMAL
SODIUM SERPL-SCNC: 135 MMOL/L (ref 133–144)
SOURCE: NORMAL
SP GR UR STRIP: 1.01 (ref 1–1.03)
SPECIMEN EXP DATE BLD: NORMAL
TACROLIMUS BLD-MCNC: 10.9 UG/L (ref 5–15)
TACROLIMUS BLD-MCNC: 11.6 UG/L (ref 5–15)
TME LAST DOSE: NORMAL H
TME LAST DOSE: NORMAL H
UROBILINOGEN UR STRIP-MCNC: 0 MG/DL (ref 0–2)
WBC # BLD AUTO: 2.3 10E9/L (ref 4–11)
WBC #/AREA URNS AUTO: <1 /HPF (ref 0–5)

## 2018-10-16 RX ORDER — HYDRALAZINE HYDROCHLORIDE 25 MG/1
25 TABLET, FILM COATED ORAL ONCE
Status: COMPLETED | OUTPATIENT
Start: 2018-10-16 | End: 2018-10-16

## 2018-10-16 RX ORDER — CARVEDILOL 12.5 MG/1
25 TABLET ORAL ONCE
Status: DISCONTINUED | OUTPATIENT
Start: 2018-10-16 | End: 2018-10-16

## 2018-10-16 RX ORDER — CINACALCET 30 MG/1
30 TABLET, FILM COATED ORAL DAILY
COMMUNITY
Start: 2016-01-20 | End: 2018-12-07

## 2018-10-16 RX ADMIN — HYDRALAZINE HYDROCHLORIDE 25 MG: 25 TABLET, FILM COATED ORAL at 08:41

## 2018-10-16 NOTE — TELEPHONE ENCOUNTER
Phone call to CHRISSIE Gamez at [facility]. Biopsy was cancelled due to improved creatinine with improved drug levels.  Will monitor.    Vera stated that patient was confused and running late. Will discuss with Harshad.

## 2018-10-16 NOTE — TELEPHONE ENCOUNTER
Provider Call: General  Route to LPN    Reason for call: Kidney BX got cancelled    Call back needed? Yes    Return Call Needed  Same as documented in contacts section  When to return call?: Greater than one day: Route standard priority

## 2018-10-17 LAB
BKV DNA # SPEC NAA+PROBE: NORMAL COPIES/ML
BKV DNA SPEC NAA+PROBE-LOG#: NORMAL LOG COPIES/ML
DONOR IDENTIFICATION: NORMAL
DSA COMMENTS: NORMAL
DSA PRESENT: NO
DSA TEST METHOD: NORMAL
MYCOPHENOLATE SERPL LC/MS/MS-MCNC: 1.01 MG/L (ref 1–3.5)
MYCOPHENOLATE-G SERPL LC/MS/MS-MCNC: 43.6 MG/L (ref 30–95)
MYCOPHENOLIC ACID LAST DOSE: NORMAL
ORGAN: NORMAL
PRA DONOR SPECIFIC ABY: NORMAL
SA1 CELL: NORMAL
SA1 COMMENTS: NORMAL
SA1 HI RISK ABY: NORMAL
SA1 MOD RISK ABY: NORMAL
SA1 TEST METHOD: NORMAL
SA2 CELL: NORMAL
SA2 COMMENTS: NORMAL
SA2 HI RISK ABY UA: NORMAL
SA2 MOD RISK ABY: NORMAL
SA2 TEST METHOD: NORMAL
SPECIMEN SOURCE: NORMAL

## 2018-10-18 ENCOUNTER — RECORDS - HEALTHEAST (OUTPATIENT)
Dept: LAB | Facility: CLINIC | Age: 38
End: 2018-10-18

## 2018-10-18 ENCOUNTER — TELEPHONE (OUTPATIENT)
Dept: TRANSPLANT | Facility: CLINIC | Age: 38
End: 2018-10-18

## 2018-10-18 DIAGNOSIS — Z48.298 AFTERCARE FOLLOWING ORGAN TRANSPLANT: ICD-10-CM

## 2018-10-18 DIAGNOSIS — Z94.0 KIDNEY REPLACED BY TRANSPLANT: ICD-10-CM

## 2018-10-18 DIAGNOSIS — D84.9 IMMUNOSUPPRESSED STATUS (H): ICD-10-CM

## 2018-10-18 LAB
ANION GAP SERPL CALCULATED.3IONS-SCNC: 7 MMOL/L (ref 5–18)
BASOPHILS # BLD AUTO: 0 THOU/UL (ref 0–0.2)
BASOPHILS NFR BLD AUTO: 1 % (ref 0–2)
BUN SERPL-MCNC: 27 MG/DL (ref 8–22)
CALCIUM SERPL-MCNC: 10.2 MG/DL (ref 8.5–10.5)
CHLORIDE BLD-SCNC: 109 MMOL/L (ref 98–107)
CO2 SERPL-SCNC: 19 MMOL/L (ref 22–31)
CREAT SERPL-MCNC: 2.01 MG/DL (ref 0.7–1.3)
EOSINOPHIL COUNT (ABSOLUTE): 0.1 THOU/UL (ref 0–0.4)
EOSINOPHIL NFR BLD AUTO: 3 % (ref 0–6)
ERYTHROCYTE [DISTWIDTH] IN BLOOD BY AUTOMATED COUNT: 13.3 % (ref 11–14.5)
GFR SERPL CREATININE-BSD FRML MDRD: 37 ML/MIN/1.73M2
GLUCOSE BLD-MCNC: 105 MG/DL (ref 70–125)
HCT VFR BLD AUTO: 32.4 % (ref 40–54)
HGB BLD-MCNC: 10.3 G/DL (ref 14–18)
LYMPHOCYTES # BLD AUTO: 0.6 THOU/UL (ref 0.8–4.4)
LYMPHOCYTES NFR BLD AUTO: 25 % (ref 20–40)
MCH RBC QN AUTO: 27.7 PG (ref 27–34)
MCHC RBC AUTO-ENTMCNC: 31.8 G/DL (ref 32–36)
MCV RBC AUTO: 87 FL (ref 80–100)
MONOCYTES # BLD AUTO: 0.3 THOU/UL (ref 0–0.9)
MONOCYTES NFR BLD AUTO: 14 % (ref 2–10)
PLAT MORPH BLD: NORMAL
PLATELET # BLD AUTO: 176 THOU/UL (ref 140–440)
PMV BLD AUTO: 9.6 FL (ref 8.5–12.5)
POTASSIUM BLD-SCNC: 5.4 MMOL/L (ref 3.5–5)
RBC # BLD AUTO: 3.72 MILL/UL (ref 4.4–6.2)
SODIUM SERPL-SCNC: 135 MMOL/L (ref 136–145)
TOTAL NEUTROPHILS-ABS(DIFF): 1.3 THOU/UL (ref 2–7.7)
TOTAL NEUTROPHILS-REL(DIFF): 58 % (ref 50–70)
WBC: 2.3 THOU/UL (ref 4–11)

## 2018-10-18 PROCEDURE — 80197 ASSAY OF TACROLIMUS: CPT | Performed by: SURGERY

## 2018-10-18 RX ORDER — TACROLIMUS 0.5 MG/1
0.5 CAPSULE ORAL 2 TIMES DAILY
Qty: 60 CAPSULE | Refills: 11 | Status: SHIPPED | OUTPATIENT
Start: 2018-10-18 | End: 2018-10-29

## 2018-10-18 RX ORDER — SULFAMETHOXAZOLE AND TRIMETHOPRIM 400; 80 MG/1; MG/1
TABLET ORAL
Qty: 30 TABLET | Refills: 11 | Status: SHIPPED | OUTPATIENT
Start: 2018-10-18 | End: 2018-12-07 | Stop reason: ALTCHOICE

## 2018-10-18 RX ORDER — TACROLIMUS 1 MG/1
1 CAPSULE ORAL 2 TIMES DAILY
Qty: 60 CAPSULE | Refills: 11 | Status: SHIPPED | OUTPATIENT
Start: 2018-10-18 | End: 2018-10-29

## 2018-10-19 LAB
TACROLIMUS BLD-MCNC: 10.1 UG/L (ref 5–15)
TME LAST DOSE: NORMAL H

## 2018-10-22 ENCOUNTER — TELEPHONE (OUTPATIENT)
Dept: TRANSPLANT | Facility: CLINIC | Age: 38
End: 2018-10-22

## 2018-10-22 ENCOUNTER — RECORDS - HEALTHEAST (OUTPATIENT)
Dept: LAB | Facility: CLINIC | Age: 38
End: 2018-10-22

## 2018-10-22 DIAGNOSIS — Z94.0 KIDNEY REPLACED BY TRANSPLANT: ICD-10-CM

## 2018-10-22 DIAGNOSIS — Z48.298 AFTERCARE FOLLOWING ORGAN TRANSPLANT: ICD-10-CM

## 2018-10-22 LAB
ANION GAP SERPL CALCULATED.3IONS-SCNC: 9 MMOL/L (ref 5–18)
BASOPHILS # BLD AUTO: 0.1 THOU/UL (ref 0–0.2)
BASOPHILS NFR BLD AUTO: 3 % (ref 0–2)
BUN SERPL-MCNC: 32 MG/DL (ref 8–22)
CALCIUM SERPL-MCNC: 9.9 MG/DL (ref 8.5–10.5)
CHLORIDE BLD-SCNC: 107 MMOL/L (ref 98–107)
CO2 SERPL-SCNC: 20 MMOL/L (ref 22–31)
CREAT SERPL-MCNC: 2.19 MG/DL (ref 0.7–1.3)
EOSINOPHIL COUNT (ABSOLUTE): 0.1 THOU/UL (ref 0–0.4)
EOSINOPHIL NFR BLD AUTO: 4 % (ref 0–6)
ERYTHROCYTE [DISTWIDTH] IN BLOOD BY AUTOMATED COUNT: 13.5 % (ref 11–14.5)
GFR SERPL CREATININE-BSD FRML MDRD: 34 ML/MIN/1.73M2
GLUCOSE BLD-MCNC: 103 MG/DL (ref 70–125)
HCT VFR BLD AUTO: 30.1 % (ref 40–54)
HGB BLD-MCNC: 9.7 G/DL (ref 14–18)
LYMPHOCYTES # BLD AUTO: 0.4 THOU/UL (ref 0.8–4.4)
LYMPHOCYTES NFR BLD AUTO: 22 % (ref 20–40)
MAGNESIUM SERPL-MCNC: 1.7 MG/DL (ref 1.8–2.6)
MCH RBC QN AUTO: 28.2 PG (ref 27–34)
MCHC RBC AUTO-ENTMCNC: 32.2 G/DL (ref 32–36)
MCV RBC AUTO: 88 FL (ref 80–100)
MONOCYTES # BLD AUTO: 0.5 THOU/UL (ref 0–0.9)
MONOCYTES NFR BLD AUTO: 28 % (ref 2–10)
MYCOPHENOLATE SERPL LC/MS/MS-MCNC: 1.42 MG/L (ref 1–3.5)
MYCOPHENOLATE-G SERPL LC/MS/MS-MCNC: 40.5 MG/L (ref 30–95)
MYCOPHENOLIC ACID LAST DOSE: NORMAL
PHOSPHATE SERPL-MCNC: 2.8 MG/DL (ref 2.5–4.5)
PLAT MORPH BLD: NORMAL
PLATELET # BLD AUTO: 237 THOU/UL (ref 140–440)
PMV BLD AUTO: 8.9 FL (ref 8.5–12.5)
POTASSIUM BLD-SCNC: 4.8 MMOL/L (ref 3.5–5)
RBC # BLD AUTO: 3.44 MILL/UL (ref 4.4–6.2)
SODIUM SERPL-SCNC: 136 MMOL/L (ref 136–145)
TOTAL NEUTROPHILS-ABS(DIFF): 0.7 THOU/UL (ref 2–7.7)
TOTAL NEUTROPHILS-REL(DIFF): 43 % (ref 50–70)
WBC: 1.7 THOU/UL (ref 4–11)

## 2018-10-22 PROCEDURE — 80197 ASSAY OF TACROLIMUS: CPT | Performed by: SURGERY

## 2018-10-22 NOTE — TELEPHONE ENCOUNTER
DATE:  10/22/2018   TIME OF RECEIPT FROM LAB:  0910  LAB TEST:  WBC  LAB VALUE:  1.7 (Pre ANC 0.7)  RESULTS GIVEN WITH READ-BACK TO (PROVIDER):  Eli SCHREIBER RN  TIME LAB VALUE REPORTED TO PROVIDER:   7364

## 2018-10-23 ENCOUNTER — TELEPHONE (OUTPATIENT)
Dept: TRANSPLANT | Facility: CLINIC | Age: 38
End: 2018-10-23

## 2018-10-23 LAB
TACROLIMUS BLD-MCNC: 5.6 UG/L (ref 5–15)
TME LAST DOSE: NORMAL H

## 2018-10-23 NOTE — TELEPHONE ENCOUNTER
Left message for patient regarding tac level below goal.  Instructed patient return call and confirm current tac dose and good 12 hour trough level.  If both are accurate, patient should increase Tacrolimus 2 mg BID.

## 2018-10-23 NOTE — TELEPHONE ENCOUNTER
Tac level now below goal. Current dose = 1.5mg BID.  Confirm dose and 12h trough. Ensure no missed doses or changes to other medications.  If accurate, INCREASE tacrolimus to 2mg BID and repeat level as scheduled.

## 2018-10-23 NOTE — TELEPHONE ENCOUNTER
Post Kidney and Pancreas Transplant Team Conference  Date: 10/23/2018  Transplant Coordinator: Mirna Harrell     Attendees:  []  Dr. Bahena [] Yanni Field, RN  [] Lynda Wilson LPN     [x]  Dr. Gaxiola [] Tonya Albrecht RN [] Sandra Epperson LPN   []  Dr. Connolly [] Sherry Jimenez RN    []  Dr. Ronquillo [] Julieta Perez RN    [] Dr. High [x] Eli Harrell RN    [] Dr. Lema [] Randy Peter RN    [] Dr. Mitchell [] Dorcas Lopez RN    [] Surgery Fellow [] Blanca Sow RN    [] Tracy Finley NP              Verbal Plan Read Back:   No kidney biopsy. Keep drug levels 8 - 10.  Biopsy only for creatinine >2.5     Routed to RN Coordinator   Mirna Harrell

## 2018-10-24 ENCOUNTER — TELEPHONE (OUTPATIENT)
Dept: TRANSPLANT | Facility: CLINIC | Age: 38
End: 2018-10-24

## 2018-10-24 NOTE — TELEPHONE ENCOUNTER
MPA level WNL, current dose cellcept = 750am, 500pm.  Leukopenia continues.  Discussed with Dr. Connolly, reduce cellcept to 500mg BID.

## 2018-10-24 NOTE — TELEPHONE ENCOUNTER
Left message for patient regarding tac level below goal.  Instructed patient return call and confirm current tac dose and good 12 hour trough level.  If both are accurate, patient should increase Tacrolimus 2 mg BID

## 2018-10-25 ENCOUNTER — TELEPHONE (OUTPATIENT)
Dept: TRANSPLANT | Facility: CLINIC | Age: 38
End: 2018-10-25

## 2018-10-25 ENCOUNTER — ALLIED HEALTH/NURSE VISIT (OUTPATIENT)
Dept: TRANSPLANT | Facility: CLINIC | Age: 38
End: 2018-10-25
Attending: INTERNAL MEDICINE
Payer: COMMERCIAL

## 2018-10-25 ENCOUNTER — RECORDS - HEALTHEAST (OUTPATIENT)
Dept: LAB | Facility: CLINIC | Age: 38
End: 2018-10-25

## 2018-10-25 DIAGNOSIS — D84.9 IMMUNOSUPPRESSION (H): ICD-10-CM

## 2018-10-25 DIAGNOSIS — D70.9 NEUTROPENIA (H): Primary | ICD-10-CM

## 2018-10-25 DIAGNOSIS — Z94.0 KIDNEY REPLACED BY TRANSPLANT: ICD-10-CM

## 2018-10-25 DIAGNOSIS — Z48.298 AFTERCARE FOLLOWING ORGAN TRANSPLANT: ICD-10-CM

## 2018-10-25 LAB
ANION GAP SERPL CALCULATED.3IONS-SCNC: 7 MMOL/L (ref 5–18)
BASOPHILS # BLD AUTO: 0.1 THOU/UL (ref 0–0.2)
BASOPHILS NFR BLD AUTO: 5 % (ref 0–2)
BUN SERPL-MCNC: 16 MG/DL (ref 8–22)
CALCIUM SERPL-MCNC: 10.6 MG/DL (ref 8.5–10.5)
CHLORIDE BLD-SCNC: 107 MMOL/L (ref 98–107)
CO2 SERPL-SCNC: 24 MMOL/L (ref 22–31)
CREAT SERPL-MCNC: 2.03 MG/DL (ref 0.7–1.3)
EOSINOPHIL COUNT (ABSOLUTE): 0.2 THOU/UL (ref 0–0.4)
EOSINOPHIL NFR BLD AUTO: 10 % (ref 0–6)
ERYTHROCYTE [DISTWIDTH] IN BLOOD BY AUTOMATED COUNT: 13.6 % (ref 11–14.5)
GFR SERPL CREATININE-BSD FRML MDRD: 37 ML/MIN/1.73M2
GLUCOSE BLD-MCNC: 108 MG/DL (ref 70–125)
HCT VFR BLD AUTO: 30.4 % (ref 40–54)
HGB BLD-MCNC: 9.8 G/DL (ref 14–18)
LYMPHOCYTES # BLD AUTO: 0.7 THOU/UL (ref 0.8–4.4)
LYMPHOCYTES NFR BLD AUTO: 41 % (ref 20–40)
MCH RBC QN AUTO: 28.2 PG (ref 27–34)
MCHC RBC AUTO-ENTMCNC: 32.2 G/DL (ref 32–36)
MCV RBC AUTO: 88 FL (ref 80–100)
MONOCYTES # BLD AUTO: 0.4 THOU/UL (ref 0–0.9)
MONOCYTES NFR BLD AUTO: 27 % (ref 2–10)
PLAT MORPH BLD: NORMAL
PLATELET # BLD AUTO: 363 THOU/UL (ref 140–440)
PMV BLD AUTO: 8.8 FL (ref 8.5–12.5)
POTASSIUM BLD-SCNC: 4.9 MMOL/L (ref 3.5–5)
RBC # BLD AUTO: 3.47 MILL/UL (ref 4.4–6.2)
SODIUM SERPL-SCNC: 138 MMOL/L (ref 136–145)
TACROLIMUS BLD-MCNC: 6.5 UG/L (ref 5–15)
TME LAST DOSE: NORMAL H
TOTAL NEUTROPHILS-ABS(DIFF): 0.3 THOU/UL (ref 2–7.7)
TOTAL NEUTROPHILS-REL(DIFF): 17 % (ref 50–70)
WBC: 1.6 THOU/UL (ref 4–11)

## 2018-10-25 PROCEDURE — 80197 ASSAY OF TACROLIMUS: CPT | Performed by: SURGERY

## 2018-10-25 PROCEDURE — 25000128 H RX IP 250 OP 636: Mod: ZF | Performed by: INTERNAL MEDICINE

## 2018-10-25 PROCEDURE — 96372 THER/PROPH/DIAG INJ SC/IM: CPT

## 2018-10-25 RX ADMIN — FILGRASTIM-SNDZ 300 MCG: 300 INJECTION, SOLUTION INTRAVENOUS; SUBCUTANEOUS at 12:35

## 2018-10-25 NOTE — TELEPHONE ENCOUNTER
DATE:  10/25/2018   TIME OF RECEIPT FROM LAB:  0906  LAB TEST:  WBC  LAB VALUE:  1.6  (Pre ANC: 0.2)  RESULTS GIVEN WITH READ-BACK TO (PROVIDER):  Eli SCHREIBER RN  TIME LAB VALUE REPORTED TO PROVIDER:   0915

## 2018-10-26 ENCOUNTER — TELEPHONE (OUTPATIENT)
Dept: TRANSPLANT | Facility: CLINIC | Age: 38
End: 2018-10-26

## 2018-10-26 ENCOUNTER — ALLIED HEALTH/NURSE VISIT (OUTPATIENT)
Dept: TRANSPLANT | Facility: CLINIC | Age: 38
End: 2018-10-26
Attending: INTERNAL MEDICINE
Payer: COMMERCIAL

## 2018-10-26 DIAGNOSIS — D70.9 NEUTROPENIA (H): Primary | ICD-10-CM

## 2018-10-26 DIAGNOSIS — D84.9 IMMUNOSUPPRESSION (H): ICD-10-CM

## 2018-10-26 DIAGNOSIS — Z94.0 KIDNEY REPLACED BY TRANSPLANT: ICD-10-CM

## 2018-10-26 PROCEDURE — 96372 THER/PROPH/DIAG INJ SC/IM: CPT | Mod: ZF

## 2018-10-26 PROCEDURE — 25000128 H RX IP 250 OP 636: Mod: ZF | Performed by: INTERNAL MEDICINE

## 2018-10-26 RX ADMIN — FILGRASTIM-SNDZ 300 MCG: 300 INJECTION, SOLUTION INTRAVENOUS; SUBCUTANEOUS at 11:34

## 2018-10-26 NOTE — MR AVS SNAPSHOT
After Visit Summary   10/26/2018    Harshad Beatty    MRN: [id number]           Patient Information     Date Of Birth          1980        Visit Information        Provider Department      10/26/2018 11:00 AM Nurse, [facility] Solid Organ Transplant        Today's Diagnoses     Neutropenia (H)    -  1    Immunosuppression (H)        Kidney replaced by transplant           Follow-ups after your visit        Your next 10 appointments already scheduled     Oct 27, 2018 10:00 AM CDT   Infusion 60 with UC SPEC INFUSION, UC 47 ATC   [facility] [place] [facility] ([facility])    [address] [place] [address]  [address]  [facility] [id number] [phone]            Dec 07, 2018 11:30 AM CST   (Arrive by 11:00 AM)   Return Kidney Transplant with Uc Early Post Transplant   [facility] Nephrology ([facility])    [address] [place] [address]  [address]  [facility] [id number] [phone]            Dec 07, 2018  1:30 PM CST   (Arrive by 1:15 PM)   [facility]    [address] [place] [address]  3rd Floor  [facility] [id number] [phone]           Bring a current list of meds and any records pertaining to this visit. For Physicals, please bring immunization records and any forms needing to be filled out. Please arrive 10 minutes early to complete paperwork.            Feb 07, 2019  1:05 PM CST   (Arrive by 12:35 PM)   Return Kidney Transplant with Uc Kidney/Pancreas Recipient 2   [facility] Nephrology ([facility])    [address] [place] [address]  [address]  [facility] [id number] [phone]              Who to contact     If you have questions or need follow up information about today's clinic visit or your schedule please contact [facility] SOLID ORGAN TRANSPLANT directly at [phone].  Normal or  non-critical lab and imaging results will be communicated to you by MyChart, letter or phone within 4 business days after the clinic has received the results. If you do not hear from us within 7 days, please contact the clinic through MyChart or phone. If you have a critical or abnormal lab result, we will notify you by phone as soon as possible.  Submit refill requests through [email or web address] or call your pharmacy and they will forward the refill request to us. Please allow 3 business days for your refill to be completed.          Additional Information About Your Visit        Care EveryWhere ID     This is your Care EveryWhere ID. This could be used by other organizations to access your [place] medical records  [id number]         Blood Pressure from Last 3 Encounters:   10/16/18 (!) 147/104   10/10/18 114/76   09/26/18 127/84    Weight from Last 3 Encounters:   10/10/18 114.2 kg (251 lb 12.8 oz)   09/17/18 109.4 kg (241 lb 3.2 oz)   09/06/18 105.2 kg (232 lb)              Today, you had the following     No orders found for display       Primary Care Provider Office Phone # Fax #    [facility] [phone] [address] [facility] [address]        Equal Access to Services     JAKI PUGA : Hadii nan shaffero [place], waaxda luqadaha, qaybta kaalmada adeegyada, hebr mar. So [facility] [phone].    ATENCIÓN: Si habla español, tiene a rodriguez disposición servicios gratuitos de asistencia lingüística. Llame al [phone].    We comply with applicable federal civil rights laws and [place] laws. We do not discriminate on the basis of race, color, national origin, age, disability, sex, sexual orientation, or gender identity.            Thank you!     Thank you for choosing [facility] SOLID ORGAN TRANSPLANT  for your care. Our goal is always to provide you with excellent care. Hearing back from our patients is one way we can continue to  improve our services. Please take a few minutes to complete the written survey that you may receive in the mail after your visit with us. Thank you!             Your Updated Medication List - Protect others around you: Learn how to safely use, store and throw away your medicines at www.disposemymeds.org.          This list is accurate as of 10/26/18 11:39 AM.  Always use your most recent med list.                   Brand Name Dispense Instructions for use Diagnosis    aspirin 325 MG EC tablet     30 tablet    Take 1 tablet (325 mg) by mouth daily    Kidney replaced by transplant       atorvastatin 10 MG tablet    LIPITOR    30 tablet    Take 1 tablet (10 mg) by mouth daily    Kidney replaced by transplant       carvedilol 25 MG tablet    COREG    120 tablet    Take 2 tablets (50 mg) by mouth 2 times daily (with meals)    Renovascular hypertension, Kidney replaced by transplant       * SENSIPAR 30 MG tablet   Generic drug:  cinacalcet      Take 30 mg by mouth daily        * cinacalcet 30 MG tablet    SENSIPAR    30 tablet    Take 1 tablet (30 mg) by mouth daily    Kidney replaced by transplant       fludrocortisone 0.1 MG tablet    FLORINEF    30 tablet    Take 1 tablet (0.1 mg) by mouth daily    Kidney replaced by transplant, Dehydration       magnesium oxide 400 MG tablet    MAG-OX    30 tablet    Take 1 tablet (400 mg) by mouth daily (with lunch)    Kidney replaced by transplant       mycophenolate 250 MG capsule    GENERIC EQUIVALENT    180 capsule    Take 500 mg by mouth 2 times daily    Kidney replaced by transplant       sodium bicarbonate 650 MG tablet     120 tablet    Take 2 tablets (1,300 mg) by mouth 2 times daily    Acidosis, Kidney replaced by transplant       sulfamethoxazole-trimethoprim 400-80 MG per tablet    BACTRIM/SEPTRA    30 tablet    Take daily on Mondays, Wednesdays, Fridays    Kidney replaced by transplant, Immunosuppressed status (H)       * tacrolimus 0.5 MG capsule    GENERIC EQUIVALENT     60 capsule    Take 1 capsule (0.5 mg) by mouth 2 times daily HOLD    Kidney replaced by transplant       * tacrolimus 1 MG capsule    GENERIC EQUIVALENT    60 capsule    Take 1 capsule (1 mg) by mouth 2 times daily    Kidney replaced by transplant       valGANciclovir 450 MG tablet    VALCYTE    30 tablet    Take 1 tablet (450 mg) by mouth every other day Titrate dose up to a max of 2 tabs (900 mg) by mouth daily when directed by your transplant team.    Kidney replaced by transplant       * Notice:  This list has 4 medication(s) that are the same as other medications prescribed for you. Read the directions carefully, and ask your doctor or other care provider to review them with you.

## 2018-10-26 NOTE — TELEPHONE ENCOUNTER
Left message for patient regarding:  Tac level below goal. Current dose = 1.5mg BID.  Confirm dose and 12h trough. Ensure no recent illness or changes to other meds or missed doses.  If accurate, INCREASE tacrolimus to 2mg BID. Repeat labs as scheduled.

## 2018-10-26 NOTE — TELEPHONE ENCOUNTER
Tac level below goal. Current dose = 1.5mg BID.  Confirm dose and 12h trough. Ensure no recent illness or changes to other meds or missed doses.  If accurate, INCREASE tacrolimus to 2mg BID. Repeat labs as scheduled.

## 2018-10-26 NOTE — NURSING NOTE
Chief Complaint   Patient presents with     Allied Health Visit     neupogen injx 2nd [facility]     Lucretia Shah MA

## 2018-10-27 ENCOUNTER — INFUSION THERAPY VISIT (OUTPATIENT)
Dept: INFUSION THERAPY | Facility: CLINIC | Age: 38
End: 2018-10-27
Attending: INTERNAL MEDICINE
Payer: COMMERCIAL

## 2018-10-27 VITALS — TEMPERATURE: 99.1 F | HEART RATE: 91 BPM | SYSTOLIC BLOOD PRESSURE: 164 MMHG | DIASTOLIC BLOOD PRESSURE: 104 MMHG

## 2018-10-27 DIAGNOSIS — D70.9 NEUTROPENIA (H): Primary | ICD-10-CM

## 2018-10-27 DIAGNOSIS — Z94.0 KIDNEY REPLACED BY TRANSPLANT: ICD-10-CM

## 2018-10-27 DIAGNOSIS — D84.9 IMMUNOSUPPRESSION (H): ICD-10-CM

## 2018-10-27 PROCEDURE — 25000128 H RX IP 250 OP 636: Mod: ZF | Performed by: INTERNAL MEDICINE

## 2018-10-27 PROCEDURE — 96372 THER/PROPH/DIAG INJ SC/IM: CPT

## 2018-10-27 RX ADMIN — FILGRASTIM-SNDZ 300 MCG: 300 INJECTION, SOLUTION INTRAVENOUS; SUBCUTANEOUS at 09:58

## 2018-10-27 NOTE — MR AVS SNAPSHOT
After Visit Summary   10/27/2018    Harshad Beatty    MRN: [id number]           Patient Information     Date Of Birth          1980        Visit Information        Provider Department      10/27/2018 10:00 AM UC 47 ATC; UC SPEC INFUSION [facility] Specialty and Procedure        Today's Diagnoses     Neutropenia (H)    -  1    Immunosuppression (H)        Kidney replaced by transplant           Follow-ups after your visit        Your next 10 appointments already scheduled     Dec 07, 2018 11:30 AM CST   (Arrive by 11:00 AM)   Return Kidney Transplant with  Early Post Transplant   [facility] Nephrology ([facility])    [address]  [address]  [facility] [id number] [phone]            Dec 07, 2018  1:30 PM CST   (Arrive by 1:15 PM)   Office Visit with Lambert [facility] Medication Therapy Management ([facility])    [address] [place] [address]  3rd Floor  [place] [facility] [address] [phone]           Bring a current list of meds and any records pertaining to this visit. For Physicals, please bring immunization records and any forms needing to be filled out. Please arrive 10 minutes early to complete paperwork.            Feb 07, 2019  1:05 PM CST   (Arrive by 12:35 PM)   Return Kidney Transplant with  Kidney/Pancreas Recipient 2   [facility] Nephrology ([facility])    [address]  [address]  [place] [address] [phone]              Who to contact     If you have questions or need follow up information about today's clinic visit or your schedule please contact [facility] SPECIALTY AND PROCEDURE directly at [phone].  Normal or non-critical lab and imaging results will be communicated to you by MyChart, letter or phone within 4 business days after the clinic has received the results. If you do not hear from us within 7  days, please contact the clinic through Abakan or phone. If you have a critical or abnormal lab result, we will notify you by phone as soon as possible.  Submit refill requests through Abakan or call your pharmacy and they will forward the refill request to us. Please allow 3 business days for your refill to be completed.          Additional Information About Your Visit        Care EveryWhere ID     This is your Care EveryWhere ID. This could be used by other organizations to access your [place] medical records  [id number]        Your Vitals Were     Pulse Temperature                91 99.1  F (37.3  C) (Oral)           Blood Pressure from Last 3 Encounters:   10/27/18 (!) 164/104   10/16/18 (!) 147/104   10/10/18 114/76    Weight from Last 3 Encounters:   10/10/18 114.2 kg (251 lb 12.8 oz)   09/17/18 109.4 kg (241 lb 3.2 oz)   09/06/18 105.2 kg (232 lb)              Today, you had the following     No orders found for display       Primary Care Provider Office Phone # Fax #    [facility] [phone] [address] [address]        Equal Access to Services     JAKI PUGA : Hadii nan shaffero [place], waaxda luqadaha, qaybta kaalmada adenarcisoyada, herb mar. So [facility] [phone].    ATENCIÓN: Si habla español, tiene a rodriguez disposición servicios gratuitos de asistencia lingüística. [facility] [phone].    We comply with applicable federal civil rights laws and [place] laws. We do not discriminate on the basis of race, color, national origin, age, disability, sex, sexual orientation, or gender identity.            Thank you!     Thank you for choosing [facility] SPECIALTY AND PROCEDURE  for your care. Our goal is always to provide you with excellent care. Hearing back from our patients is one way we can continue to improve our services. Please take a few minutes to complete the written  survey that you may receive in the mail after your visit with us. Thank you!             Your Updated Medication List - Protect others around you: Learn how to safely use, store and throw away your medicines at www.disposemymeds.org.          This list is accurate as of 10/27/18 10:25 AM.  Always use your most recent med list.                   Brand Name Dispense Instructions for use Diagnosis    aspirin 325 MG EC tablet     30 tablet    Take 1 tablet (325 mg) by mouth daily    Kidney replaced by transplant       atorvastatin 10 MG tablet    LIPITOR    30 tablet    Take 1 tablet (10 mg) by mouth daily    Kidney replaced by transplant       carvedilol 25 MG tablet    COREG    120 tablet    Take 2 tablets (50 mg) by mouth 2 times daily (with meals)    Renovascular hypertension, Kidney replaced by transplant       * SENSIPAR 30 MG tablet   Generic drug:  cinacalcet      Take 30 mg by mouth daily        * cinacalcet 30 MG tablet    SENSIPAR    30 tablet    Take 1 tablet (30 mg) by mouth daily    Kidney replaced by transplant       fludrocortisone 0.1 MG tablet    FLORINEF    30 tablet    Take 1 tablet (0.1 mg) by mouth daily    Kidney replaced by transplant, Dehydration       magnesium oxide 400 MG tablet    MAG-OX    30 tablet    Take 1 tablet (400 mg) by mouth daily (with lunch)    Kidney replaced by transplant       mycophenolate 250 MG capsule    GENERIC EQUIVALENT    180 capsule    Take 500 mg by mouth 2 times daily    Kidney replaced by transplant       sodium bicarbonate 650 MG tablet     120 tablet    Take 2 tablets (1,300 mg) by mouth 2 times daily    Acidosis, Kidney replaced by transplant       sulfamethoxazole-trimethoprim 400-80 MG per tablet    BACTRIM/SEPTRA    30 tablet    Take daily on Mondays, Wednesdays, Fridays    Kidney replaced by transplant, Immunosuppressed status (H)       * tacrolimus 0.5 MG capsule    GENERIC EQUIVALENT    60 capsule    Take 1 capsule (0.5 mg) by mouth 2 times daily HOLD     Kidney replaced by transplant       * tacrolimus 1 MG capsule    GENERIC EQUIVALENT    60 capsule    Take 1 capsule (1 mg) by mouth 2 times daily    Kidney replaced by transplant       valGANciclovir 450 MG tablet    VALCYTE    30 tablet    Take 1 tablet (450 mg) by mouth every other day Titrate dose up to a max of 2 tabs (900 mg) by mouth daily when directed by your transplant team.    Kidney replaced by transplant       * Notice:  This list has 4 medication(s) that are the same as other medications prescribed for you. Read the directions carefully, and ask your doctor or other care provider to review them with you.

## 2018-10-27 NOTE — PROGRESS NOTES
Patient presents to the [facility] for neupogen (Zarxio).  Order written by Dr. Andrey Connolly was completed today. Name and  verified with patient. See MAR for medication details. Medication was divided into 1 syringes by pharmacy and given in the following sites sub-q in left arm without difficulty. Patient tolerated injection well and was discharged to home. Charge RN notified about BP.    Administrations This Visit     filgrastim-sndz (ZARXIO) injection syringe 300 mcg     Admin Date Action Dose Route Administered By             10/27/2018 Given 300 mcg Subcutaneous Evelyn Jasso CMA Alexandra Lentsch, CMA

## 2018-10-29 ENCOUNTER — RECORDS - HEALTHEAST (OUTPATIENT)
Dept: LAB | Facility: CLINIC | Age: 38
End: 2018-10-29

## 2018-10-29 ENCOUNTER — TELEPHONE (OUTPATIENT)
Dept: TRANSPLANT | Facility: CLINIC | Age: 38
End: 2018-10-29

## 2018-10-29 DIAGNOSIS — Z48.298 AFTERCARE FOLLOWING ORGAN TRANSPLANT: ICD-10-CM

## 2018-10-29 DIAGNOSIS — Z94.0 KIDNEY REPLACED BY TRANSPLANT: ICD-10-CM

## 2018-10-29 DIAGNOSIS — R50.9 FEVER: Primary | ICD-10-CM

## 2018-10-29 DIAGNOSIS — D70.9 NEUTROPENIA (H): ICD-10-CM

## 2018-10-29 LAB
ANION GAP SERPL CALCULATED.3IONS-SCNC: 7 MMOL/L (ref 5–18)
BASOPHILS # BLD AUTO: 0 THOU/UL (ref 0–0.2)
BASOPHILS NFR BLD AUTO: 1 % (ref 0–2)
BUN SERPL-MCNC: 18 MG/DL (ref 8–22)
CALCIUM SERPL-MCNC: 9.9 MG/DL (ref 8.5–10.5)
CHLORIDE BLD-SCNC: 106 MMOL/L (ref 98–107)
CO2 SERPL-SCNC: 24 MMOL/L (ref 22–31)
CREAT SERPL-MCNC: 2.67 MG/DL (ref 0.7–1.3)
EOSINOPHIL COUNT (ABSOLUTE): 0.2 THOU/UL (ref 0–0.4)
EOSINOPHIL NFR BLD AUTO: 5 % (ref 0–6)
ERYTHROCYTE [DISTWIDTH] IN BLOOD BY AUTOMATED COUNT: 13.5 % (ref 11–14.5)
GFR SERPL CREATININE-BSD FRML MDRD: 27 ML/MIN/1.73M2
GLUCOSE BLD-MCNC: 105 MG/DL (ref 70–125)
HCT VFR BLD AUTO: 31.1 % (ref 40–54)
HGB BLD-MCNC: 9.9 G/DL (ref 14–18)
LYMPHOCYTES # BLD AUTO: 0.8 THOU/UL (ref 0.8–4.4)
LYMPHOCYTES NFR BLD AUTO: 22 % (ref 20–40)
MAGNESIUM SERPL-MCNC: 1.7 MG/DL (ref 1.8–2.6)
MANUAL NRBC PER 100 CELLS: 1
MCH RBC QN AUTO: 27.7 PG (ref 27–34)
MCHC RBC AUTO-ENTMCNC: 31.8 G/DL (ref 32–36)
MCV RBC AUTO: 87 FL (ref 80–100)
METAMYELOCYTES (ABSOLUTE): 0.2 THOU/UL
METAMYELOCYTES NFR BLD MANUAL: 6 %
MONOCYTES # BLD AUTO: 1.8 THOU/UL (ref 0–0.9)
MONOCYTES NFR BLD AUTO: 49 % (ref 2–10)
MYELOCYTES (ABSOLUTE): 0.1 THOU/UL
MYELOCYTES NFR BLD MANUAL: 3 %
PHOSPHATE SERPL-MCNC: 2.1 MG/DL (ref 2.5–4.5)
PLAT MORPH BLD: NORMAL
PLATELET # BLD AUTO: 349 THOU/UL (ref 140–440)
PMV BLD AUTO: 8.6 FL (ref 8.5–12.5)
POLYCHROMASIA BLD QL SMEAR: ABNORMAL
POTASSIUM BLD-SCNC: 4.6 MMOL/L (ref 3.5–5)
RBC # BLD AUTO: 3.57 MILL/UL (ref 4.4–6.2)
SODIUM SERPL-SCNC: 137 MMOL/L (ref 136–145)
TACROLIMUS BLD-MCNC: 8.3 UG/L (ref 5–15)
TME LAST DOSE: NORMAL H
TOTAL NEUTROPHILS-ABS(DIFF): 0.5 THOU/UL (ref 2–7.7)
TOTAL NEUTROPHILS-REL(DIFF): 14 % (ref 50–70)
TOXIC GRANULATION: ABNORMAL
WBC: 3.7 THOU/UL (ref 4–11)

## 2018-10-29 PROCEDURE — 80197 ASSAY OF TACROLIMUS: CPT | Performed by: SURGERY

## 2018-10-29 RX ORDER — TACROLIMUS 1 MG/1
2 CAPSULE ORAL 2 TIMES DAILY
Qty: 120 CAPSULE | Refills: 11 | Status: SHIPPED | OUTPATIENT
Start: 2018-10-29 | End: 2018-11-20

## 2018-10-29 RX ORDER — TACROLIMUS 0.5 MG/1
0.5 CAPSULE ORAL 2 TIMES DAILY
Qty: 60 CAPSULE | Refills: 11 | Status: SHIPPED | OUTPATIENT
Start: 2018-10-29 | End: 2018-11-20

## 2018-10-29 NOTE — TELEPHONE ENCOUNTER
ANC <0.5. Discussed with Dr. Khalida de for neupogen 480mcg daily x3. Orders placed.   Patient reports fevers and sweating. Orders for 1L NS due to high creatinine completed.  Will check viral work up and add to acute clinic.    Harshad reports taking 2mg tac bid. Voiced understanding to increase to 2mg BID.

## 2018-10-30 ENCOUNTER — TELEPHONE (OUTPATIENT)
Dept: TRANSPLANT | Facility: CLINIC | Age: 38
End: 2018-10-30

## 2018-10-30 ENCOUNTER — INFUSION THERAPY VISIT (OUTPATIENT)
Dept: INFUSION THERAPY | Facility: CLINIC | Age: 38
End: 2018-10-30
Attending: INTERNAL MEDICINE
Payer: COMMERCIAL

## 2018-10-30 VITALS
DIASTOLIC BLOOD PRESSURE: 92 MMHG | OXYGEN SATURATION: 100 % | HEART RATE: 76 BPM | BODY MASS INDEX: 34.97 KG/M2 | SYSTOLIC BLOOD PRESSURE: 154 MMHG | WEIGHT: 254.3 LBS

## 2018-10-30 DIAGNOSIS — D70.9 NEUTROPENIA (H): ICD-10-CM

## 2018-10-30 DIAGNOSIS — D84.9 IMMUNOSUPPRESSION (H): ICD-10-CM

## 2018-10-30 DIAGNOSIS — D70.8 OTHER NEUTROPENIA (H): ICD-10-CM

## 2018-10-30 DIAGNOSIS — R50.9 FEVER: Primary | ICD-10-CM

## 2018-10-30 DIAGNOSIS — Z94.0 KIDNEY REPLACED BY TRANSPLANT: ICD-10-CM

## 2018-10-30 DIAGNOSIS — R50.9 FEVER, UNSPECIFIED FEVER CAUSE: ICD-10-CM

## 2018-10-30 DIAGNOSIS — Z94.0 KIDNEY REPLACED BY TRANSPLANT: Primary | ICD-10-CM

## 2018-10-30 LAB
ALBUMIN UR-MCNC: NEGATIVE MG/DL
ANION GAP SERPL CALCULATED.3IONS-SCNC: 7 MMOL/L (ref 3–14)
APPEARANCE UR: CLEAR
BILIRUB UR QL STRIP: NEGATIVE
BUN SERPL-MCNC: 17 MG/DL (ref 7–30)
CALCIUM SERPL-MCNC: 9.4 MG/DL (ref 8.5–10.1)
CHLORIDE SERPL-SCNC: 105 MMOL/L (ref 94–109)
CO2 SERPL-SCNC: 24 MMOL/L (ref 20–32)
COLOR UR AUTO: NORMAL
CREAT SERPL-MCNC: 2.35 MG/DL (ref 0.66–1.25)
GFR SERPL CREATININE-BSD FRML MDRD: 31 ML/MIN/1.7M2
GLUCOSE SERPL-MCNC: 98 MG/DL (ref 70–99)
GLUCOSE UR STRIP-MCNC: NEGATIVE MG/DL
HGB UR QL STRIP: NEGATIVE
KETONES UR STRIP-MCNC: NEGATIVE MG/DL
LEUKOCYTE ESTERASE UR QL STRIP: NEGATIVE
NITRATE UR QL: NEGATIVE
PH UR STRIP: 5 PH (ref 5–7)
POTASSIUM SERPL-SCNC: 4.5 MMOL/L (ref 3.4–5.3)
SODIUM SERPL-SCNC: 136 MMOL/L (ref 133–144)
SOURCE: NORMAL
SP GR UR STRIP: 1 (ref 1–1.03)
UROBILINOGEN UR STRIP-MCNC: 0 MG/DL (ref 0–2)

## 2018-10-30 PROCEDURE — 81003 URINALYSIS AUTO W/O SCOPE: CPT | Performed by: INTERNAL MEDICINE

## 2018-10-30 PROCEDURE — 96360 HYDRATION IV INFUSION INIT: CPT

## 2018-10-30 PROCEDURE — 25000128 H RX IP 250 OP 636: Mod: ZF | Performed by: INTERNAL MEDICINE

## 2018-10-30 PROCEDURE — 80048 BASIC METABOLIC PNL TOTAL CA: CPT | Performed by: INTERNAL MEDICINE

## 2018-10-30 PROCEDURE — 87252 VIRUS INOCULATION TISSUE: CPT | Performed by: INTERNAL MEDICINE

## 2018-10-30 PROCEDURE — 85025 COMPLETE CBC W/AUTO DIFF WBC: CPT | Performed by: INTERNAL MEDICINE

## 2018-10-30 RX ADMIN — SODIUM CHLORIDE 1000 ML: 9 INJECTION, SOLUTION INTRAVENOUS at 12:04

## 2018-10-30 NOTE — MR AVS SNAPSHOT
After Visit Summary   10/30/2018    Harshad Beatty    MRN: [id number]           Patient Information     Date Of Birth          1980        Visit Information        Provider Department      10/30/2018 11:00 AM MACKENZIE 43 ATC;  SPEC INFUSION [facility] Specialty and Procedure        Today's Diagnoses     Fever    -  1    Neutropenia (H)        Immunosuppression (H)        Kidney replaced by transplant        Fever, unspecified fever cause        Other neutropenia (H)           Follow-ups after your visit        Your next 10 appointments already scheduled     Oct 31, 2018  8:00 AM CDT   LAB with  LAB   [facility] ([facility])    [address] [place] [address]  [address] [facility] [id number] [phone]           Please do not eat 10-12 hours before your appointment if you are coming in fasting for labs on lipids, cholesterol, or glucose (sugar). This does not apply to pregnant women. Water, hot tea and black coffee (with nothing added) are okay. Do not drink other fluids, diet soda or chew gum.            Oct 31, 2018 11:00 AM CDT   Nurse Visit with  Tx Nurse   [facility] Solid Organ Transplant ([facility])    [address]  [address]  [facility] [place] [address] [phone]            Dec 07, 2018 11:30 AM CST   (Arrive by 11:00 AM)   Return Kidney Transplant with  Early Post Transplant   [facility] Nephrology ([facility])    [address]  [address]  [facility] [place] [address] [phone]            Dec 07, 2018  1:30 PM CST   (Arrive by 1:15 PM)   Office Visit with Lambert Wall RPH   [facility] Medication Therapy Management ([facility])    [address] [place] [address]  3rd Floor  [facility] [place] [id number] [phone]           Bring a current list of meds and any records pertaining to this visit. For Physicals, please bring immunization records and any  "forms needing to be filled out. Please arrive 10 minutes early to complete paperwork.            Feb 07, 2019  1:05 PM CST   (Arrive by 12:35 PM)   Return Kidney Transplant with  Kidney/Pancreas Recipient 2   [facility] Nephrology ([facility])    [address]  [address]  [facility] [address] [phone]              Future tests that were ordered for you today     Open Future Orders        Priority Expected Expires Ordered    Blood Morphology Pathologist Review Routine 10/31/2018 11/30/2018 10/30/2018    CBC with platelets differential Routine 10/31/2018 11/30/2018 10/30/2018    Reticulocyte Count Routine 10/31/2018 11/30/2018 10/30/2018    Lactate Dehydrogenase Routine 10/31/2018 11/30/2018 10/30/2018            Who to contact     If you have questions or need follow up information about today's clinic visit or your schedule please contact [facility] [place] [facility] AND PROCEDURE directly at [phone].  Normal or non-critical lab and imaging results will be communicated to you by Cinepapayahart, letter or phone within 4 business days after the clinic has received the results. If you do not hear from us within 7 days, please contact the clinic through CommonTime or phone. If you have a critical or abnormal lab result, we will notify you by phone as soon as possible.  Submit refill requests through CommonTime or call your pharmacy and they will forward the refill request to us. Please allow 3 business days for your refill to be completed.          Additional Information About Your Visit        CommonTime Information     CommonTime lets you send messages to your doctor, view your test results, renew your prescriptions, schedule appointments and more. To sign up, go to [email or web address] . Click on \"Log in\" on the left side of the screen, which will take you to the Welcome page. Then click on \"Sign up Now\" on the right side of the page.     You will be asked to enter the " access code listed below, as well as some personal information. Please follow the directions to create your username and password.     Your access code is: [email or web address]  Expires: 2019  6:30 AM     Your access code will  in 90 days. If you need help or a new code, please call your [place] clinic or [phone].        Care EveryWhere ID     This is your Care EveryWhere ID. This could be used by other organizations to access your [place] medical records  [id number]        Your Vitals Were     Pulse Pulse Oximetry BMI (Body Mass Index)             76 100% 34.97 kg/m2          Blood Pressure from Last 3 Encounters:   10/30/18 (!) 154/92   10/27/18 (!) 164/104   10/16/18 (!) 147/104    Weight from Last 3 Encounters:   10/30/18 115.3 kg (254 lb 4.8 oz)   10/10/18 114.2 kg (251 lb 12.8 oz)   18 109.4 kg (241 lb 3.2 oz)              We Performed the Following     Basic metabolic panel     CBC with platelets differential     CMV DNA quantification     UA reflex to Microscopic and Culture     Viral Culture Respiratory        Primary Care Provider Office Phone # Fax #    [facility] [phone] [address] [facility] [address]        Equal Access to Services     JAKI PUGA : Hadii nna lopez hadasho Soomaali, waaxda luqadaha, qaybta kaalmada adeegyada, herb beltran . So [facility] [place] [phone].    ATENCIÓN: Si habla español, tiene a rodriguez disposición servicios gratuitos de asistencia lingüística. Llame al [phone].    We comply with applicable federal civil rights laws and [place] laws. We do not discriminate on the basis of race, color, national origin, age, disability, sex, sexual orientation, or gender identity.            Thank you!     Thank you for choosing [facility] SPECIALTY AND PROCEDURE  for your care. Our goal is always to provide you with excellent care. Hearing back from our patients is  one way we can continue to improve our services. Please take a few minutes to complete the written survey that you may receive in the mail after your visit with us. Thank you!             Your Updated Medication List - Protect others around you: Learn how to safely use, store and throw away your medicines at www.disposemymeds.org.          This list is accurate as of 10/30/18  2:41 PM.  Always use your most recent med list.                   Brand Name Dispense Instructions for use Diagnosis    aspirin 325 MG EC tablet     30 tablet    Take 1 tablet (325 mg) by mouth daily    Kidney replaced by transplant       atorvastatin 10 MG tablet    LIPITOR    30 tablet    Take 1 tablet (10 mg) by mouth daily    Kidney replaced by transplant       carvedilol 25 MG tablet    COREG    120 tablet    Take 2 tablets (50 mg) by mouth 2 times daily (with meals)    Renovascular hypertension, Kidney replaced by transplant       * SENSIPAR 30 MG tablet   Generic drug:  cinacalcet      Take 30 mg by mouth daily        * cinacalcet 30 MG tablet    SENSIPAR    30 tablet    Take 1 tablet (30 mg) by mouth daily    Kidney replaced by transplant       fludrocortisone 0.1 MG tablet    FLORINEF    30 tablet    Take 1 tablet (0.1 mg) by mouth daily    Kidney replaced by transplant, Dehydration       magnesium oxide 400 MG tablet    MAG-OX    30 tablet    Take 1 tablet (400 mg) by mouth daily (with lunch)    Kidney replaced by transplant       mycophenolate 250 MG capsule    GENERIC EQUIVALENT    180 capsule    Take 500 mg by mouth 2 times daily    Kidney replaced by transplant       sodium bicarbonate 650 MG tablet     120 tablet    Take 2 tablets (1,300 mg) by mouth 2 times daily    Acidosis, Kidney replaced by transplant       sulfamethoxazole-trimethoprim 400-80 MG per tablet    BACTRIM/SEPTRA    30 tablet    Take daily on Mondays, Wednesdays, Fridays    Kidney replaced by transplant, Immunosuppressed status (H)       * tacrolimus 0.5 MG  capsule    GENERIC EQUIVALENT    60 capsule    Take 1 capsule (0.5 mg) by mouth 2 times daily Total dose = 2.5mg BID.    Kidney replaced by transplant       * tacrolimus 1 MG capsule    GENERIC EQUIVALENT    120 capsule    Take 2 capsules (2 mg) by mouth 2 times daily    Kidney replaced by transplant       valGANciclovir 450 MG tablet    VALCYTE    30 tablet    Take 1 tablet (450 mg) by mouth every other day Titrate dose up to a max of 2 tabs (900 mg) by mouth daily when directed by your transplant team.    Kidney replaced by transplant       * Notice:  This list has 4 medication(s) that are the same as other medications prescribed for you. Read the directions carefully, and ask your doctor or other care provider to review them with you.

## 2018-10-30 NOTE — LETTER
PHYSICIAN ORDERS      DATE & TIME ISSUED: 2018 11:36 AM  PATIENT NAME: Harshad Beatty   : 1980     [facility] MR# [if applicable]: [id number]     DIAGNOSIS:  Kidney Transplant  ICD-10 CODE: Z94.0     Please repeat the following labs tomorrow, 10/31/2018:  Tacrolimus drug level  BMP  CBC    Any questions please call: [phone]  Please fax lab results to [phone].      Brijesh Gaxiola M.D.

## 2018-10-30 NOTE — TELEPHONE ENCOUNTER
RNCC spoke with Harshad - DOREEN blairpogen per Dr. 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 Will check LDH and periph smear per Dr. Gaxiola. Repeat labs tomorrow to determine if biopsy needed. Harshad agreed to have labs drawn here.

## 2018-10-30 NOTE — TELEPHONE ENCOUNTER
Left message for patient regarding:  Tac level below goal. Current dose = 1.5mg BID.  Confirm dose and 12h trough. Ensure no recent illness or changes to other meds or missed doses.  If accurate, INCREASE tacrolimus to 2mg BID. Repeat labs as scheduled

## 2018-10-30 NOTE — PROGRESS NOTES
Nursing Note  [facility]:   Chief Complaint   Patient presents with     Infusion     IV fluids     During [facility]'s [facility] appointment, orders from Dr. Gaxiola were completed.  Frequency: once    Progress note:  Patient identification verified by name and date of birth.  Assessment completed.  Vitals recorded in Doc Flowsheets.  Patient was provided with education regarding infusion and possible side effects.  Patient verbalized understanding.      needed: No  Premedications: were not ordered.  Infusion Rates: 999 ml/hr., over one hour  Approximate appointment length:1 1/2 hours.   Labs: were drawn per orders.   Vascular access: peripheral IV placed today.  Treatment Conditions: patient denies fever, chills, signs of infection, recent illness, on antibiotics, productive cough or elevated temperature.  Patient tolerated infusion: well.    Patient was supposed to receive filgrastim-sndz (ZARXIO) injection syringe 480 mcg  today but was cancelled due to blood results.    Drug Waste Record? No     Discharge Plan:   Follow up plan of care with: ongoing infusions at [facility].  Discharge instructions were reviewed with patient.  Patient/representative verbalized understanding of discharge instructions and all questions answered.  Patient discharged [facility] in stable condition.    Jessica Montez RN    Administrations This Visit     0.9% sodium chloride BOLUS     Admin Date Action Dose Rate Route Administered By          10/30/2018 New Bag 1000 mL 1,000 mL/hr Intravenous Jessica Montez RN                         BP (!) 154/92  Pulse 76  Wt 115.3 kg (254 lb 4.8 oz)  SpO2 100%  BMI 34.97 kg/m2

## 2018-10-30 NOTE — TELEPHONE ENCOUNTER
Hold aspirin, repeat labs tomorrow or after IV hydration this afternoon.   Left detailed VM and asked Roshans to return my phone call to confirm he got this message.

## 2018-10-31 ENCOUNTER — TELEPHONE (OUTPATIENT)
Dept: TRANSPLANT | Facility: CLINIC | Age: 38
End: 2018-10-31

## 2018-10-31 DIAGNOSIS — D70.9 NEUTROPENIA (H): ICD-10-CM

## 2018-10-31 DIAGNOSIS — Z48.298 AFTERCARE FOLLOWING ORGAN TRANSPLANT: ICD-10-CM

## 2018-10-31 DIAGNOSIS — Z94.0 KIDNEY REPLACED BY TRANSPLANT: Primary | ICD-10-CM

## 2018-10-31 DIAGNOSIS — D72.819 LEUKOPENIA: ICD-10-CM

## 2018-10-31 DIAGNOSIS — Z94.0 KIDNEY REPLACED BY TRANSPLANT: ICD-10-CM

## 2018-10-31 LAB
ALBUMIN SERPL-MCNC: 3.6 G/DL (ref 3.4–5)
ALP SERPL-CCNC: 93 U/L (ref 40–150)
ALT SERPL W P-5'-P-CCNC: 31 U/L (ref 0–70)
ANION GAP SERPL CALCULATED.3IONS-SCNC: 8 MMOL/L (ref 3–14)
AST SERPL W P-5'-P-CCNC: 23 U/L (ref 0–45)
BASOPHILS # BLD AUTO: 0 10E9/L (ref 0–0.2)
BASOPHILS NFR BLD AUTO: 0 %
BILIRUB DIRECT SERPL-MCNC: 0.1 MG/DL (ref 0–0.2)
BILIRUB SERPL-MCNC: 0.3 MG/DL (ref 0.2–1.3)
BUN SERPL-MCNC: 22 MG/DL (ref 7–30)
CALCIUM SERPL-MCNC: 9.2 MG/DL (ref 8.5–10.1)
CHLORIDE SERPL-SCNC: 108 MMOL/L (ref 94–109)
CHOLEST SERPL-MCNC: 112 MG/DL
CMV DNA SPEC NAA+PROBE-ACNC: NORMAL [IU]/ML
CMV DNA SPEC NAA+PROBE-LOG#: NORMAL {LOG_IU}/ML
CO2 SERPL-SCNC: 22 MMOL/L (ref 20–32)
COPATH REPORT: NORMAL
CREAT SERPL-MCNC: 2.26 MG/DL (ref 0.66–1.25)
DIFFERENTIAL METHOD BLD: ABNORMAL
EOSINOPHIL # BLD AUTO: 0.2 10E9/L (ref 0–0.7)
EOSINOPHIL NFR BLD AUTO: 9.6 %
ERYTHROCYTE [DISTWIDTH] IN BLOOD BY AUTOMATED COUNT: 13.5 % (ref 10–15)
GFR SERPL CREATININE-BSD FRML MDRD: 33 ML/MIN/1.7M2
GLUCOSE SERPL-MCNC: 100 MG/DL (ref 70–99)
HCT VFR BLD AUTO: 31 % (ref 40–53)
HDLC SERPL-MCNC: 34 MG/DL
HGB BLD-MCNC: 9.9 G/DL (ref 13.3–17.7)
LDH SERPL L TO P-CCNC: 372 U/L (ref 85–227)
LDLC SERPL CALC-MCNC: 62 MG/DL
LYMPHOCYTES # BLD AUTO: 0.4 10E9/L (ref 0.8–5.3)
LYMPHOCYTES NFR BLD AUTO: 15.8 %
MCH RBC QN AUTO: 27.7 PG (ref 26.5–33)
MCHC RBC AUTO-ENTMCNC: 31.9 G/DL (ref 31.5–36.5)
MCV RBC AUTO: 87 FL (ref 78–100)
MONOCYTES # BLD AUTO: 0.9 10E9/L (ref 0–1.3)
MONOCYTES NFR BLD AUTO: 36 %
MYCOPHENOLATE SERPL LC/MS/MS-MCNC: 2.15 MG/L (ref 1–3.5)
MYCOPHENOLATE-G SERPL LC/MS/MS-MCNC: 47.3 MG/L (ref 30–95)
MYCOPHENOLIC ACID LAST DOSE: NORMAL
MYELOCYTES # BLD: 0.1 10E9/L
MYELOCYTES NFR BLD MANUAL: 2.6 %
NEUTROPHILS # BLD AUTO: 0.9 10E9/L (ref 1.6–8.3)
NEUTROPHILS NFR BLD AUTO: 36 %
NONHDLC SERPL-MCNC: 77 MG/DL
PLATELET # BLD AUTO: 285 10E9/L (ref 150–450)
PLATELET # BLD EST: ABNORMAL 10*3/UL
POTASSIUM SERPL-SCNC: 4.8 MMOL/L (ref 3.4–5.3)
PROT SERPL-MCNC: 8.1 G/DL (ref 6.8–8.8)
RBC # BLD AUTO: 3.58 10E12/L (ref 4.4–5.9)
RBC MORPH BLD: NORMAL
RETICS # AUTO: 72.3 10E9/L (ref 25–95)
RETICS/RBC NFR AUTO: 2 % (ref 0.5–2)
SODIUM SERPL-SCNC: 138 MMOL/L (ref 133–144)
SPECIMEN SOURCE: NORMAL
TACROLIMUS BLD-MCNC: 10.3 UG/L (ref 5–15)
TME LAST DOSE: NORMAL H
TRIGL SERPL-MCNC: 75 MG/DL
WBC # BLD AUTO: 2.4 10E9/L (ref 4–11)

## 2018-10-31 NOTE — TELEPHONE ENCOUNTER
Left message for patient regarding:  Tac now back above goal.  REDUCE tac from 2.5mg BID back to 2mg BID.

## 2018-11-01 ENCOUNTER — RECORDS - HEALTHEAST (OUTPATIENT)
Dept: LAB | Facility: CLINIC | Age: 38
End: 2018-11-01

## 2018-11-01 DIAGNOSIS — Z48.298 AFTERCARE FOLLOWING ORGAN TRANSPLANT: ICD-10-CM

## 2018-11-01 DIAGNOSIS — Z94.0 KIDNEY REPLACED BY TRANSPLANT: ICD-10-CM

## 2018-11-01 LAB
ANION GAP SERPL CALCULATED.3IONS-SCNC: 9 MMOL/L (ref 5–18)
BASOPHILS # BLD AUTO: 0 10E9/L (ref 0–0.2)
BASOPHILS # BLD AUTO: 0 THOU/UL (ref 0–0.2)
BASOPHILS NFR BLD AUTO: 0.9 %
BASOPHILS NFR BLD AUTO: 1 % (ref 0–2)
BUN SERPL-MCNC: 21 MG/DL (ref 8–22)
CALCIUM SERPL-MCNC: 10.3 MG/DL (ref 8.5–10.5)
CHLORIDE BLD-SCNC: 105 MMOL/L (ref 98–107)
CO2 SERPL-SCNC: 23 MMOL/L (ref 22–31)
CREAT SERPL-MCNC: 2.25 MG/DL (ref 0.7–1.3)
DIFFERENTIAL METHOD BLD: ABNORMAL
EOSINOPHIL # BLD AUTO: 0.1 10E9/L (ref 0–0.7)
EOSINOPHIL COUNT (ABSOLUTE): 0.1 THOU/UL (ref 0–0.4)
EOSINOPHIL NFR BLD AUTO: 3.5 %
EOSINOPHIL NFR BLD AUTO: 6 % (ref 0–6)
ERYTHROCYTE [DISTWIDTH] IN BLOOD BY AUTOMATED COUNT: 13.2 % (ref 11–14.5)
ERYTHROCYTE [DISTWIDTH] IN BLOOD BY AUTOMATED COUNT: 13.6 % (ref 10–15)
GFR SERPL CREATININE-BSD FRML MDRD: 33 ML/MIN/1.73M2
GLUCOSE BLD-MCNC: 101 MG/DL (ref 70–125)
HCT VFR BLD AUTO: 31.6 % (ref 40–54)
HCT VFR BLD AUTO: 32 % (ref 40–53)
HGB BLD-MCNC: 10.1 G/DL (ref 14–18)
HGB BLD-MCNC: 10.3 G/DL (ref 13.3–17.7)
LYMPHOCYTES # BLD AUTO: 0.5 THOU/UL (ref 0.8–4.4)
LYMPHOCYTES # BLD AUTO: 0.9 10E9/L (ref 0.8–5.3)
LYMPHOCYTES NFR BLD AUTO: 26 % (ref 20–40)
LYMPHOCYTES NFR BLD AUTO: 27.8 %
MCH RBC QN AUTO: 27.5 PG (ref 26.5–33)
MCH RBC QN AUTO: 27.5 PG (ref 27–34)
MCHC RBC AUTO-ENTMCNC: 32 G/DL (ref 32–36)
MCHC RBC AUTO-ENTMCNC: 32.2 G/DL (ref 31.5–36.5)
MCV RBC AUTO: 85 FL (ref 78–100)
MCV RBC AUTO: 86 FL (ref 80–100)
METAMYELOCYTES # BLD: 0.2 10E9/L
METAMYELOCYTES NFR BLD MANUAL: 5.2 %
MONOCYTES # BLD AUTO: 0.5 THOU/UL (ref 0–0.9)
MONOCYTES # BLD AUTO: 1.2 10E9/L (ref 0–1.3)
MONOCYTES NFR BLD AUTO: 26 % (ref 2–10)
MONOCYTES NFR BLD AUTO: 36.5 %
MYELOCYTES # BLD: 0.1 10E9/L
MYELOCYTES (ABSOLUTE): 0 THOU/UL
MYELOCYTES NFR BLD MANUAL: 1 %
MYELOCYTES NFR BLD MANUAL: 1.7 %
NEUTROPHILS # BLD AUTO: 0.7 10E9/L (ref 1.6–8.3)
NEUTROPHILS NFR BLD AUTO: 20.9 %
NRBC # BLD AUTO: 0 10*3/UL
NRBC BLD AUTO-RTO: 1 /100
OTHER CELLS # BLD MANUAL: 0.1 10E9/L
OTHER CELLS NFR BLD MANUAL: 3.5 %
PLAT MORPH BLD: NORMAL
PLATELET # BLD AUTO: 266 THOU/UL (ref 140–440)
PLATELET # BLD AUTO: 396 10E9/L (ref 150–450)
PLATELET # BLD EST: ABNORMAL 10*3/UL
PMV BLD AUTO: 8.6 FL (ref 8.5–12.5)
POIKILOCYTOSIS BLD QL SMEAR: SLIGHT
POTASSIUM BLD-SCNC: 4.8 MMOL/L (ref 3.5–5)
RBC # BLD AUTO: 3.67 MILL/UL (ref 4.4–6.2)
RBC # BLD AUTO: 3.75 10E12/L (ref 4.4–5.9)
REACTIVE LYMPHS: ABNORMAL
SODIUM SERPL-SCNC: 137 MMOL/L (ref 136–145)
TACROLIMUS BLD-MCNC: 10.4 UG/L (ref 5–15)
TME LAST DOSE: NORMAL H
TOTAL NEUTROPHILS-ABS(DIFF): 0.8 THOU/UL (ref 2–7.7)
TOTAL NEUTROPHILS-REL(DIFF): 40 % (ref 50–70)
TOXIC GRANULATION: ABNORMAL
WBC # BLD AUTO: 3.3 10E9/L (ref 4–11)
WBC: 2 THOU/UL (ref 4–11)

## 2018-11-01 PROCEDURE — 80197 ASSAY OF TACROLIMUS: CPT | Performed by: SURGERY

## 2018-11-02 LAB
BKV DNA # SPEC NAA+PROBE: NORMAL COPIES/ML
BKV DNA SPEC NAA+PROBE-LOG#: NORMAL LOG COPIES/ML
SPECIMEN SOURCE: NORMAL

## 2018-11-02 NOTE — TELEPHONE ENCOUNTER
Left message for patient regarding:  Tac now back above goal.  REDUCE tac from 2.5mg BID back to 2mg BID

## 2018-11-03 LAB
B19V DNA SER QL NAA+PROBE: NOT DETECTED
SPECIMEN SOURCE: NORMAL

## 2018-11-05 ENCOUNTER — RESULTS ONLY (OUTPATIENT)
Dept: OTHER | Facility: CLINIC | Age: 38
End: 2018-11-05

## 2018-11-05 ENCOUNTER — RECORDS - HEALTHEAST (OUTPATIENT)
Dept: LAB | Facility: CLINIC | Age: 38
End: 2018-11-05

## 2018-11-05 DIAGNOSIS — Z48.298 AFTERCARE FOLLOWING ORGAN TRANSPLANT: ICD-10-CM

## 2018-11-05 DIAGNOSIS — Z94.0 KIDNEY REPLACED BY TRANSPLANT: ICD-10-CM

## 2018-11-05 LAB
ANION GAP SERPL CALCULATED.3IONS-SCNC: 9 MMOL/L (ref 5–18)
BASOPHILS # BLD AUTO: 0.1 THOU/UL (ref 0–0.2)
BASOPHILS NFR BLD AUTO: 5 % (ref 0–2)
BUN SERPL-MCNC: 25 MG/DL (ref 8–22)
CALCIUM SERPL-MCNC: 10 MG/DL (ref 8.5–10.5)
CHLORIDE BLD-SCNC: 107 MMOL/L (ref 98–107)
CO2 SERPL-SCNC: 20 MMOL/L (ref 22–31)
CREAT SERPL-MCNC: 1.91 MG/DL (ref 0.7–1.3)
CREAT UR-MCNC: 66.8 MG/DL
EOSINOPHIL COUNT (ABSOLUTE): 0.2 THOU/UL (ref 0–0.4)
EOSINOPHIL NFR BLD AUTO: 8 % (ref 0–6)
ERYTHROCYTE [DISTWIDTH] IN BLOOD BY AUTOMATED COUNT: 13.2 % (ref 11–14.5)
GFR SERPL CREATININE-BSD FRML MDRD: 40 ML/MIN/1.73M2
GLUCOSE BLD-MCNC: 96 MG/DL (ref 70–125)
HCT VFR BLD AUTO: 31.5 % (ref 40–54)
HGB BLD-MCNC: 10.2 G/DL (ref 14–18)
LYMPHOCYTES # BLD AUTO: 0.4 THOU/UL (ref 0.8–4.4)
LYMPHOCYTES NFR BLD AUTO: 17 % (ref 20–40)
MAGNESIUM SERPL-MCNC: 1.8 MG/DL (ref 1.8–2.6)
MCH RBC QN AUTO: 27.9 PG (ref 27–34)
MCHC RBC AUTO-ENTMCNC: 32.4 G/DL (ref 32–36)
MCV RBC AUTO: 86 FL (ref 80–100)
MONOCYTES # BLD AUTO: 0.2 THOU/UL (ref 0–0.9)
MONOCYTES NFR BLD AUTO: 8 % (ref 2–10)
MYCOPHENOLATE SERPL LC/MS/MS-MCNC: 1.76 MG/L (ref 1–3.5)
MYCOPHENOLATE-G SERPL LC/MS/MS-MCNC: 41.9 MG/L (ref 30–95)
MYCOPHENOLIC ACID LAST DOSE: NORMAL
MYELOCYTES (ABSOLUTE): 0 THOU/UL
MYELOCYTES NFR BLD MANUAL: 1 %
PHOSPHATE SERPL-MCNC: 2.5 MG/DL (ref 2.5–4.5)
PLAT MORPH BLD: NORMAL
PLATELET # BLD AUTO: 175 THOU/UL (ref 140–440)
PMV BLD AUTO: 9.7 FL (ref 8.5–12.5)
POTASSIUM BLD-SCNC: 5.1 MMOL/L (ref 3.5–5)
PROTEIN, RANDOM URINE - HISTORICAL: 7 MG/DL
PROTEIN/CREAT RATIO, RANDOM UR: 0.1
RBC # BLD AUTO: 3.65 MILL/UL (ref 4.4–6.2)
SODIUM SERPL-SCNC: 136 MMOL/L (ref 136–145)
TOTAL NEUTROPHILS-ABS(DIFF): 1.3 THOU/UL (ref 2–7.7)
TOTAL NEUTROPHILS-REL(DIFF): 61 % (ref 50–70)
TOXIC GRANULATION: ABNORMAL
WBC: 2.2 THOU/UL (ref 4–11)

## 2018-11-05 PROCEDURE — 80197 ASSAY OF TACROLIMUS: CPT | Performed by: SURGERY

## 2018-11-05 PROCEDURE — 86833 HLA CLASS II HIGH DEFIN QUAL: CPT | Performed by: PATHOLOGY

## 2018-11-05 PROCEDURE — 86832 HLA CLASS I HIGH DEFIN QUAL: CPT | Performed by: PATHOLOGY

## 2018-11-06 LAB
TACROLIMUS BLD-MCNC: 9 UG/L (ref 5–15)
TME LAST DOSE: NORMAL H

## 2018-11-07 LAB
DONOR IDENTIFICATION: NORMAL
DSA COMMENTS: NORMAL
DSA PRESENT: NO
DSA TEST METHOD: NORMAL
ORGAN: NORMAL
PROTOCOL CUTOFF: NORMAL
SA1 CELL: NORMAL
SA1 COMMENTS: NORMAL
SA1 HI RISK ABY: NORMAL
SA1 MOD RISK ABY: NORMAL
SA1 TEST METHOD: NORMAL
SA2 CELL: NORMAL
SA2 COMMENTS: NORMAL
SA2 HI RISK ABY UA: NORMAL
SA2 MOD RISK ABY: NORMAL
SA2 TEST METHOD: NORMAL
UNACCEPTABLE ANTIGEN: NORMAL
UNOS CPRA: 0

## 2018-11-08 ENCOUNTER — TEAM CONFERENCE (OUTPATIENT)
Dept: TRANSPLANT | Facility: CLINIC | Age: 38
End: 2018-11-08

## 2018-11-08 ENCOUNTER — RECORDS - HEALTHEAST (OUTPATIENT)
Dept: LAB | Facility: CLINIC | Age: 38
End: 2018-11-08

## 2018-11-08 DIAGNOSIS — Z48.298 AFTERCARE FOLLOWING ORGAN TRANSPLANT: ICD-10-CM

## 2018-11-08 DIAGNOSIS — Z94.0 KIDNEY REPLACED BY TRANSPLANT: ICD-10-CM

## 2018-11-08 LAB
ANION GAP SERPL CALCULATED.3IONS-SCNC: 7 MMOL/L (ref 5–18)
BASOPHILS # BLD AUTO: 0 THOU/UL (ref 0–0.2)
BASOPHILS NFR BLD AUTO: 2 % (ref 0–2)
BUN SERPL-MCNC: 26 MG/DL (ref 8–22)
CALCIUM SERPL-MCNC: 10.3 MG/DL (ref 8.5–10.5)
CHLORIDE BLD-SCNC: 107 MMOL/L (ref 98–107)
CO2 SERPL-SCNC: 23 MMOL/L (ref 22–31)
CREAT SERPL-MCNC: 2.06 MG/DL (ref 0.7–1.3)
EOSINOPHIL COUNT (ABSOLUTE): 0.1 THOU/UL (ref 0–0.4)
EOSINOPHIL NFR BLD AUTO: 6 % (ref 0–6)
ERYTHROCYTE [DISTWIDTH] IN BLOOD BY AUTOMATED COUNT: 13.7 % (ref 11–14.5)
GFR SERPL CREATININE-BSD FRML MDRD: 36 ML/MIN/1.73M2
GLUCOSE BLD-MCNC: 107 MG/DL (ref 70–125)
HCT VFR BLD AUTO: 30.6 % (ref 40–54)
HGB BLD-MCNC: 9.8 G/DL (ref 14–18)
LYMPHOCYTES # BLD AUTO: 0.6 THOU/UL (ref 0.8–4.4)
LYMPHOCYTES NFR BLD AUTO: 32 % (ref 20–40)
MCH RBC QN AUTO: 27.8 PG (ref 27–34)
MCHC RBC AUTO-ENTMCNC: 32 G/DL (ref 32–36)
MCV RBC AUTO: 87 FL (ref 80–100)
MONOCYTES # BLD AUTO: 0.4 THOU/UL (ref 0–0.9)
MONOCYTES NFR BLD AUTO: 18 % (ref 2–10)
PLAT MORPH BLD: NORMAL
PLATELET # BLD AUTO: 192 THOU/UL (ref 140–440)
PMV BLD AUTO: 8.8 FL (ref 8.5–12.5)
POTASSIUM BLD-SCNC: 5.5 MMOL/L (ref 3.5–5)
RBC # BLD AUTO: 3.52 MILL/UL (ref 4.4–6.2)
SODIUM SERPL-SCNC: 137 MMOL/L (ref 136–145)
TOTAL NEUTROPHILS-ABS(DIFF): 0.8 THOU/UL (ref 2–7.7)
TOTAL NEUTROPHILS-REL(DIFF): 42 % (ref 50–70)
WBC: 2 THOU/UL (ref 4–11)

## 2018-11-08 PROCEDURE — 80197 ASSAY OF TACROLIMUS: CPT | Performed by: SURGERY

## 2018-11-08 NOTE — TELEPHONE ENCOUNTER
"Provider Call: Transplant Lab/Orders  Route to LPN  Reason for Call: Clarification; which order? order on 10/5 stated \"until further notice\". Lab would like to know if they should continure or not.  Document lab facility information when provider is calling about annual lab orders. Delete facility wildcards when not needed.  Facility Name: [facility]  Outside Facility Fax Number: [phone]  Callback needed? No    "

## 2018-11-08 NOTE — LETTER
OUTPATIENT LABORATORY TEST ORDER    Patient Name:Harshad Beatty   Transplant Date: 8/7/2018  YOB: 1980  Issue Date & Time: 11/8/2018  9:29 AM  [facility] MR: [id number]  Expiration Date:  (1 year after date issued)      Diagnoses: Aftercare following organ transplant (ICD-10 Z48.288)   Kidney Transplant (ICD-10 Z94.0)   Long term use of medications (ICD-10  Z79.899)     ?Lab results to be available on the same day drawn.   ?Patient should release information to the [facility] [place] Trans[facility].     ?Please fax to the Transplant Center at [phone].    1x/week, Months 4-6 post-transplant    11/9/2018 - 2/9/2019  Every 2 weeks, Months 7-9 post-transplant    2/10/2019 - 5/10/2019  Every 3 weeks, Months 10-12 post-transplant   5/11/2019 - 8/7/2019       ?Hemogram and Platelet   ?Basic Metabolic Panel (Sodium, Potassium,Chloride, CO2, Creatinine, Urea Nitrogen, Glucose, Calcium)   ?Prograf/Tacrolimus drug level     Monthly   ? Random Urine for Protein/Creatinine ratio   ? BK PCR Quantitative (Polyoma virus - blood in purple top tube to Reference Lab)   ? PRA/DSA level (mailers provided by the patient)    At 6 & 12 months post-transplant         Due: 2/2019 & 8/2019   ? HBsurfaceAb, HBcoreAb, HCV at 6 months only -   ? Liver Function Tests(Bilirubin Direct/Total, AST, ALT, Alkaline Phosphatase)   ?Complete Lipid Panel fasting (Cholesterol, Triglycerides, HDL, LDL)                                      If you have any questions, please call The Transplant Center at [phone] or [phone].    Please fax all results to [phone].  .

## 2018-11-09 ENCOUNTER — TELEPHONE (OUTPATIENT)
Dept: TRANSPLANT | Facility: CLINIC | Age: 38
End: 2018-11-09

## 2018-11-09 LAB
TACROLIMUS BLD-MCNC: 6.6 UG/L (ref 5–15)
TME LAST DOSE: NORMAL H

## 2018-11-09 NOTE — TELEPHONE ENCOUNTER
Issue tacrolimus 6.6 slightly below goal level   Plan   Please call confirm 12 hour level   Confirm current dose of tacrolimus  2.5 mg twice per day   Increase tacrolimus  3.0 mg twice per day   Repeat labs in one week

## 2018-11-09 NOTE — TELEPHONE ENCOUNTER
Patient Call: General    Reason for call: patient was returning call. Please leave a detail message if you get the VM.    Call back needed? Yes    Return Call Needed  Same as documented in contacts section  When to return call?: Same day: Route High Priority

## 2018-11-12 LAB
SPECIMEN SOURCE: NORMAL
VIRUS SPEC CULT: NORMAL
VIRUS SPEC CULT: NORMAL

## 2018-11-14 ENCOUNTER — TELEPHONE (OUTPATIENT)
Dept: TRANSPLANT | Facility: CLINIC | Age: 38
End: 2018-11-14

## 2018-11-14 ENCOUNTER — RECORDS - HEALTHEAST (OUTPATIENT)
Dept: LAB | Facility: CLINIC | Age: 38
End: 2018-11-14

## 2018-11-14 DIAGNOSIS — Z94.0 KIDNEY REPLACED BY TRANSPLANT: ICD-10-CM

## 2018-11-14 DIAGNOSIS — Z48.298 AFTERCARE FOLLOWING ORGAN TRANSPLANT: ICD-10-CM

## 2018-11-14 LAB
ANION GAP SERPL CALCULATED.3IONS-SCNC: 7 MMOL/L (ref 5–18)
BASOPHILS # BLD AUTO: 0 THOU/UL (ref 0–0.2)
BASOPHILS NFR BLD AUTO: 2 % (ref 0–2)
BUN SERPL-MCNC: 22 MG/DL (ref 8–22)
CALCIUM SERPL-MCNC: 10 MG/DL (ref 8.5–10.5)
CHLORIDE BLD-SCNC: 110 MMOL/L (ref 98–107)
CO2 SERPL-SCNC: 20 MMOL/L (ref 22–31)
CREAT SERPL-MCNC: 1.97 MG/DL (ref 0.7–1.3)
EOSINOPHIL COUNT (ABSOLUTE): 0.2 THOU/UL (ref 0–0.4)
EOSINOPHIL NFR BLD AUTO: 13 % (ref 0–6)
ERYTHROCYTE [DISTWIDTH] IN BLOOD BY AUTOMATED COUNT: 14 % (ref 11–14.5)
GFR SERPL CREATININE-BSD FRML MDRD: 38 ML/MIN/1.73M2
GLUCOSE BLD-MCNC: 108 MG/DL (ref 70–125)
HCT VFR BLD AUTO: 32.3 % (ref 40–54)
HGB BLD-MCNC: 10.2 G/DL (ref 14–18)
LYMPHOCYTES # BLD AUTO: 0.5 THOU/UL (ref 0.8–4.4)
LYMPHOCYTES NFR BLD AUTO: 34 % (ref 20–40)
MCH RBC QN AUTO: 27.7 PG (ref 27–34)
MCHC RBC AUTO-ENTMCNC: 31.6 G/DL (ref 32–36)
MCV RBC AUTO: 88 FL (ref 80–100)
MONOCYTES # BLD AUTO: 0.8 THOU/UL (ref 0–0.9)
MONOCYTES NFR BLD AUTO: 50 % (ref 2–10)
PLAT MORPH BLD: NORMAL
PLATELET # BLD AUTO: 400 THOU/UL (ref 140–440)
PMV BLD AUTO: 8.7 FL (ref 8.5–12.5)
POTASSIUM BLD-SCNC: 5 MMOL/L (ref 3.5–5)
RBC # BLD AUTO: 3.68 MILL/UL (ref 4.4–6.2)
SODIUM SERPL-SCNC: 137 MMOL/L (ref 136–145)
TACROLIMUS BLD-MCNC: 6.3 UG/L (ref 5–15)
TME LAST DOSE: NORMAL H
TOTAL NEUTROPHILS-ABS(DIFF): 0 THOU/UL (ref 2–7.7)
TOTAL NEUTROPHILS-REL(DIFF): 1 % (ref 50–70)
WBC: 1.6 THOU/UL (ref 4–11)

## 2018-11-14 PROCEDURE — 80197 ASSAY OF TACROLIMUS: CPT | Performed by: SURGERY

## 2018-11-14 NOTE — TELEPHONE ENCOUNTER
DATE:  11/14/2018   TIME OF RECEIPT FROM LAB:  9:22  LAB TEST:  WBC  LAB VALUE:  1.6  RESULTS GIVEN WITH READ-BACK TO (PROVIDER):  Warm hand off to Fauzia COUCH Coordinator    TIME LAB VALUE REPORTED TO PROVIDER:   9:24

## 2018-11-14 NOTE — LETTER
PHYSICIAN ORDERS      DATE & TIME ISSUED: 2018 12:31 PM  PATIENT NAME: Harshad Beatty   : 1980     [facility] MR# [if applicable]: [id number]     DIAGNOSIS:  Kidney replaced by transplant, leukopenia.  ICD-10 CODE: Z94.0     Along with current standing orders, please complete the following:  CBC with platelets and differential, twice weekly, for 4 weeks.    Any questions please call: [facility] Solid Organ Transplant                                              x5  Send all results to:  [phone].    .

## 2018-11-14 NOTE — TELEPHONE ENCOUNTER
RNCC did speak with Harshad who will repeat CBC with diff on Friday and then Monday.  Harshad did not increase tac from 2 to 2.5 BID - RNCC advised him to start this now. Harshad voiced understanding.

## 2018-11-15 ENCOUNTER — TELEPHONE (OUTPATIENT)
Dept: TRANSPLANT | Facility: CLINIC | Age: 38
End: 2018-11-15

## 2018-11-15 DIAGNOSIS — D70.8 OTHER NEUTROPENIA (H): Primary | ICD-10-CM

## 2018-11-15 NOTE — PROGRESS NOTES
Level of 6.3 not reflective of dose increase from 2mg to 2.5mg BID, will await next tac level as dose as increased to 2.5mg BID by patient starting 11/14/18.

## 2018-11-15 NOTE — TELEPHONE ENCOUNTER
Post Kidney and Pancreas Transplant Team Conference  Date: 11/15/2018  Transplant Coordinator: Mirna Harrell     Attendees:  []  Dr. Bahena [] Yanni Field, RN  [] Lynda Wilson LPN     [x]  Dr. Gaxiola [] Tonya Albrecht RN [] Sandra Epperson LPN   []  Dr. Connolly [] Sherry Jimenez RN    []  Dr. Ronquillo [] Julieta Perez RN    [] Dr. Lema [x] Eli Harrell RN    [] Dr. Mitchell [] Randy Peter RN    [] Surgery Fellow [] Dorcas Lopez RN    [] Tracy Finley NP [] Blanca Sow RN    [] Lambert Wall, PharmD [] Allison Aaron RN     [] Philippe Colon RN     [] Yanni Rasmussen RN        Verbal Plan Read Back:   Due to leukopenia:  Ensure patient has stopped valcyte  Give neupogen 480mcg subcutaneous daily x3.    Routed to RN Coordinator   Mirna Harrell    RNCC [facility] detailed [facility] G-CSF tomorrow through Sunday. Patricia to call back and confirm that valcyte has been stopped.

## 2018-11-16 ENCOUNTER — INFUSION THERAPY VISIT (OUTPATIENT)
Dept: INFUSION THERAPY | Facility: CLINIC | Age: 38
End: 2018-11-16
Attending: INTERNAL MEDICINE
Payer: COMMERCIAL

## 2018-11-16 VITALS — TEMPERATURE: 97.5 F | SYSTOLIC BLOOD PRESSURE: 140 MMHG | DIASTOLIC BLOOD PRESSURE: 72 MMHG | HEART RATE: 80 BPM

## 2018-11-16 DIAGNOSIS — D84.9 IMMUNOSUPPRESSION (H): ICD-10-CM

## 2018-11-16 DIAGNOSIS — D70.8 OTHER NEUTROPENIA (H): ICD-10-CM

## 2018-11-16 DIAGNOSIS — R50.9 FEVER, UNSPECIFIED FEVER CAUSE: Primary | ICD-10-CM

## 2018-11-16 DIAGNOSIS — Z94.0 KIDNEY REPLACED BY TRANSPLANT: ICD-10-CM

## 2018-11-16 PROCEDURE — 25000128 H RX IP 250 OP 636: Mod: ZF | Performed by: INTERNAL MEDICINE

## 2018-11-16 PROCEDURE — 96372 THER/PROPH/DIAG INJ SC/IM: CPT

## 2018-11-16 RX ADMIN — FILGRASTIM-SNDZ 480 MCG: 480 INJECTION, SOLUTION INTRAVENOUS; SUBCUTANEOUS at 10:38

## 2018-11-16 ASSESSMENT — PAIN SCALES - GENERAL: PAINLEVEL: NO PAIN (0)

## 2018-11-16 NOTE — MR AVS SNAPSHOT
After Visit Summary   11/16/2018    Harshad Beatty    MRN: [id number]           Patient Information     Date Of Birth          1980        Visit Information        Provider Department      11/16/2018 10:00 AM UC 49 ATC; UC SPEC INFUSION [facility]        Today's Diagnoses     Fever, unspecified fever cause    -  1    Other neutropenia (H)        Immunosuppression (H)        Kidney replaced by transplant           Follow-ups after your visit        Your next 10 appointments already scheduled     Nov 17, 2018  8:00 AM CST   Infusion 60 with UC SPEC INFUSION, UC 44 ATC   [facility] ([facility])    [address]  [address]  [place] [address]   [phone]            Nov 18, 2018  9:00 AM CST   Infusion 60 with UC SPEC INFUSION, UC 45 ATC   [facility] ([facility])    [address]  [address]  [place] [address]   [phone]            Dec 07, 2018 11:30 AM CST   (Arrive by 11:00 AM)   Return Kidney Transplant with Uc Early Post Transplant   [facility] Nephrology ([facility])    [address]  [address]  [place] [address]   [phone]            Dec 07, 2018  1:30 PM CST   (Arrive by 1:15 PM)   Office Visit with Lambert Wall RPH   [facility] Medication Therapy Management ([facility])    [address]  3rd Floor  [place] [address]   [phone]           Bring a current list of meds and any records pertaining to this visit. For Physicals, please bring immunization records and any forms needing to be filled out. Please arrive 10 minutes early to complete paperwork.            Feb 07, 2019  1:05 PM CST   (Arrive by 12:35 PM)   Return Kidney Transplant with Uc Kidney/Pancreas Recipient 2   [facility]  "Nephrology ([facility])    [address]  [address]  [place] [address] [phone]              Who to contact     If you have questions or need follow up information about today's clinic visit or your schedule please contact [facility] SPECIALTY AND PROCEDURE directly at [phone].  Normal or non-critical lab and imaging results will be communicated to you by MyChart, letter or phone within 4 business days after the clinic has received the results. If you do not hear from us within 7 days, please contact the clinic through MyChart or phone. If you have a critical or abnormal lab result, we will notify you by phone as soon as possible.  Submit refill requests through [employer] or call your pharmacy and they will forward the refill request to us. Please allow 3 business days for your refill to be completed.          Additional Information About Your Visit        [employer] Information     [employer] lets you send messages to your doctor, view your test results, renew your prescriptions, schedule appointments and more. To sign up, go to [email or web address] . Click on \"Log in\" on the left side of the screen, which will take you to the Welcome page. Then click on \"Sign up Now\" on the right side of the page.     You will be asked to enter the access code listed below, as well as some personal information. Please follow the directions to create your username and password.     Your access code is: [email or web address]  Expires: 2019  5:30 AM     Your access code will  in 90 days. If you need help or a new code, please call your [facility] clinic or [phone].        Care EveryWhere ID     This is your Care EveryWhere ID. This could be used by other organizations to access your [facility] medical records  [id number]        Your Vitals Were     Pulse Temperature                80 97.5  F (36.4  C) (Oral)           Blood Pressure from Last 3 Encounters: "   11/16/18 140/72   10/30/18 (!) 154/92   10/27/18 (!) 164/104    Weight from Last 3 Encounters:   10/30/18 115.3 kg (254 lb 4.8 oz)   10/10/18 114.2 kg (251 lb 12.8 oz)   09/17/18 109.4 kg (241 lb 3.2 oz)              Today, you had the following     No orders found for display       Primary Care Provider Office Phone # Fax #    [facility] [phone] [address] [address]        Equal Access to Services     JAKI PUGA : Silas Penn, alix poe, americo kaalmaalysha lyle, herb beltran . So [facility] [phone].    ATENCIÓN: Si habla español, tiene a rodriguez disposición servicios gratuitos de asistencia lingüística. [facility] [phone].    We comply with applicable federal civil rights laws and [place] laws. We do not discriminate on the basis of race, color, national origin, age, disability, sex, sexual orientation, or gender identity.            Thank you!     Thank you for choosing [facility] SPECIALTY AND PROCEDURE  for your care. Our goal is always to provide you with excellent care. Hearing back from our patients is one way we can continue to improve our services. Please take a few minutes to complete the written survey that you may receive in the mail after your visit with us. Thank you!             Your Updated Medication List - Protect others around you: Learn how to safely use, store and throw away your medicines at www.disposemymeds.org.          This list is accurate as of 11/16/18 10:52 AM.  Always use your most recent med list.                   Brand Name Dispense Instructions for use Diagnosis    aspirin 325 MG EC tablet     30 tablet    Take 1 tablet (325 mg) by mouth daily    Kidney replaced by transplant       atorvastatin 10 MG tablet    LIPITOR    30 tablet    Take 1 tablet (10 mg) by mouth daily    Kidney replaced by transplant       carvedilol 25 MG  tablet    COREG    120 tablet    Take 2 tablets (50 mg) by mouth 2 times daily (with meals)    Renovascular hypertension, Kidney replaced by transplant       * SENSIPAR 30 MG tablet   Generic drug:  cinacalcet      Take 30 mg by mouth daily        * cinacalcet 30 MG tablet    SENSIPAR    30 tablet    Take 1 tablet (30 mg) by mouth daily    Kidney replaced by transplant       fludrocortisone 0.1 MG tablet    FLORINEF    30 tablet    Take 1 tablet (0.1 mg) by mouth daily    Kidney replaced by transplant, Dehydration       magnesium oxide 400 MG tablet    MAG-OX    30 tablet    Take 1 tablet (400 mg) by mouth daily (with lunch)    Kidney replaced by transplant       mycophenolate 250 MG capsule    GENERIC EQUIVALENT    180 capsule    Take 500 mg by mouth 2 times daily    Kidney replaced by transplant       sodium bicarbonate 650 MG tablet     120 tablet    Take 2 tablets (1,300 mg) by mouth 2 times daily    Acidosis, Kidney replaced by transplant       sulfamethoxazole-trimethoprim 400-80 MG per tablet    BACTRIM/SEPTRA    30 tablet    Take daily on Mondays, Wednesdays, Fridays    Kidney replaced by transplant, Immunosuppressed status (H)       * tacrolimus 0.5 MG capsule    GENERIC EQUIVALENT    60 capsule    Take 1 capsule (0.5 mg) by mouth 2 times daily Total dose = 2.5mg BID.    Kidney replaced by transplant       * tacrolimus 1 MG capsule    GENERIC EQUIVALENT    120 capsule    Take 2 capsules (2 mg) by mouth 2 times daily    Kidney replaced by transplant       valGANciclovir 450 MG tablet    VALCYTE    30 tablet    Take 1 tablet (450 mg) by mouth every other day Titrate dose up to a max of 2 tabs (900 mg) by mouth daily when directed by your transplant team.    Kidney replaced by transplant       * Notice:  This list has 4 medication(s) that are the same as other medications prescribed for you. Read the directions carefully, and ask your doctor or other care provider to review them with you.

## 2018-11-16 NOTE — PROGRESS NOTES
Patient presents to the [facility] for Zarxio injection.  Order written by Dr. Connolly was completed today. Name and  verified with patient. See MAR for medication details. Medication was divided into 1 syringes by pharmacy and given in the following sites back side of left upper arm. Patient tolerated injection well and was discharged to home. Patient to return tomorrow for next dose.     CORONA Cervantes LPN    Administrations This Visit     filgrastim-sndz (ZARXIO) injection syringe 480 mcg     Admin Date Action Dose Route Administered By             2018 Given 480 mcg Subcutaneous Patricia Cervantes LPN

## 2018-11-17 ENCOUNTER — INFUSION THERAPY VISIT (OUTPATIENT)
Dept: INFUSION THERAPY | Facility: CLINIC | Age: 38
End: 2018-11-17
Attending: INTERNAL MEDICINE
Payer: COMMERCIAL

## 2018-11-17 VITALS
HEART RATE: 67 BPM | RESPIRATION RATE: 18 BRPM | SYSTOLIC BLOOD PRESSURE: 157 MMHG | DIASTOLIC BLOOD PRESSURE: 89 MMHG | TEMPERATURE: 98.3 F | OXYGEN SATURATION: 100 %

## 2018-11-17 DIAGNOSIS — Z94.0 KIDNEY REPLACED BY TRANSPLANT: ICD-10-CM

## 2018-11-17 DIAGNOSIS — R50.9 FEVER, UNSPECIFIED FEVER CAUSE: Primary | ICD-10-CM

## 2018-11-17 DIAGNOSIS — D70.8 OTHER NEUTROPENIA (H): ICD-10-CM

## 2018-11-17 DIAGNOSIS — D84.9 IMMUNOSUPPRESSION (H): ICD-10-CM

## 2018-11-17 PROCEDURE — 25000128 H RX IP 250 OP 636: Mod: ZF | Performed by: INTERNAL MEDICINE

## 2018-11-17 PROCEDURE — 96372 THER/PROPH/DIAG INJ SC/IM: CPT

## 2018-11-17 RX ADMIN — FILGRASTIM-SNDZ 480 MCG: 480 INJECTION, SOLUTION INTRAVENOUS; SUBCUTANEOUS at 08:18

## 2018-11-17 NOTE — PROGRESS NOTES
Infusion Nursing Note:  Harshad Beatty presents today to [facility] for a Zarxio infusion.  During today's [facility] appointment orders from Dr. Gaxiola were completed.  Frequency: daily; dose 2/3    Progress note:  ID verified by name and .  Assessment completed. Vitals were stable throughout time in [facility]. Patient education regarding infusion and possible side effects provided.  Patient verbalized understanding. yes    Treatment Conditions:   No treatment conditions.    Discharge Plan:   Follow up plan of care with: [facility]  Discharge instructions were reviewed with patient.  Patient/representative verbalized understanding of discharge instructions and all questions answered.    [facility] discharged from [facility] in stable condition.    Bernice De Los Santos RN    Administrations This Visit     filgrastim-sndz (ZARXIO) injection syringe 480 mcg     Admin Date Action Dose Route Administered By             2018 Given 480 mcg Subcutaneous Bernice De Los Santos RN                          /89  Pulse 67  Temp 98.3  F (36.8  C) (Oral)  Resp 18  SpO2 100%

## 2018-11-17 NOTE — MR AVS SNAPSHOT
After Visit Summary   11/17/2018    Harshad Beatty    MRN: [id number]           Patient Information     Date Of Birth          1980        Visit Information        Provider Department      11/17/2018 8:00 AM UC 44 ATC; UC SPEC INFUSION [facility] Specialty and Procedure        Today's Diagnoses     Fever, unspecified fever cause    -  1    Other neutropenia (H)        Immunosuppression (H)        Kidney replaced by transplant           Follow-ups after your visit        Your next 10 appointments already scheduled     Nov 18, 2018  9:00 AM CST   Infusion 60 with UC SPEC INFUSION, UC 45 ATC   [facility] Specialty and Procedure ([facility])    [address]  [address]  [place] [address]   [phone]            Dec 07, 2018 11:30 AM CST   (Arrive by 11:00 AM)   Return Kidney Transplant with Uc Early Post Transplant   [facility] Nephrology ([facility])    [address]  [address]  [place] [address] [phone]            Dec 07, 2018  1:30 PM CST   (Arrive by 1:15 PM)   [facility]    [address]  3rd [address]  [place] [address] [phone]           Bring a current list of meds and any records pertaining to this visit. For Physicals, please bring immunization records and any forms needing to be filled out. Please arrive 10 minutes early to complete paperwork.            Feb 07, 2019  1:05 PM CST   (Arrive by 12:35 PM)   Return Kidney Transplant with Uc Kidney/Pancreas Recipient 2   [facility] Nephrology ([facility])    [address]  [address]  [place] [address] [phone]              Who to contact     If you have questions or need follow up information about today's clinic visit or your schedule please  "contact [facility] SPECIALTY AND PROCEDURE directly at [phone].  Normal or non-critical lab and imaging results will be communicated to you by MyChart, letter or phone within 4 business days after the clinic has received the results. If you do not hear from us within 7 days, please contact the clinic through MyChart or phone. If you have a critical or abnormal lab result, we will notify you by phone as soon as possible.  Submit refill requests through [email or web address] or call your pharmacy and they will forward the refill request to us. Please allow 3 business days for your refill to be completed.          Additional Information About Your Visit        [employer] Information     [email or web address] lets you send messages to your doctor, view your test results, renew your prescriptions, schedule appointments and more. To sign up, go to [email or web address] . Click on \"Log in\" on the left side of the screen, which will take you to the Welcome page. Then click on \"Sign up Now\" on the right side of the page.     You will be asked to enter the access code listed below, as well as some personal information. Please follow the directions to create your username and password.     Your access code is: [email or web address]  Expires: 2019  5:30 AM     Your access code will  in 90 days. If you need help or a new code, please call your [place] clinic or [phone].        Care EveryWhere ID     This is your Care EveryWhere ID. This could be used by other organizations to access your [place] medical records  [id number]        Your Vitals Were     Pulse Temperature Respirations Pulse Oximetry          67 98.3  F (36.8  C) (Oral) 18 100%         Blood Pressure from Last 3 Encounters:   18 157/89   18 140/72   10/30/18 (!) 154/92    Weight from Last 3 Encounters:   10/30/18 115.3 kg (254 lb 4.8 oz)   10/10/18 114.2 kg (251 lb 12.8 oz)   18 109.4 kg (241 lb 3.2 oz)              Today, you had the " following     No orders found for display       Primary Care Provider Office Phone # Fax #    [facility] [phone] [address] [address]        Equal Access to Services     JAKI PUGA : Hadii aad ku hadraffi Penn, wajuanada luqadaha, qaybta kaalmada dariela, herb green ruby mar. So [facility] [phone].    ATENCIÓN: Si habla español, tiene a rodriguez disposición servicios gratuitos de asistencia lingüística. [facility] [phone].    We comply with applicable federal civil rights laws and [place] laws. We do not discriminate on the basis of race, color, national origin, age, disability, sex, sexual orientation, or gender identity.            Thank you!     Thank you for choosing [facility] SPECIALTY AND PROCEDURE  for your care. Our goal is always to provide you with excellent care. Hearing back from our patients is one way we can continue to improve our services. Please take a few minutes to complete the written survey that you may receive in the mail after your visit with us. Thank you!             Your Updated Medication List - Protect others around you: Learn how to safely use, store and throw away your medicines at www.disposemymeds.org.          This list is accurate as of 11/17/18  8:27 AM.  Always use your most recent med list.                   Brand Name Dispense Instructions for use Diagnosis    aspirin 325 MG EC tablet     30 tablet    Take 1 tablet (325 mg) by mouth daily    Kidney replaced by transplant       atorvastatin 10 MG tablet    LIPITOR    30 tablet    Take 1 tablet (10 mg) by mouth daily    Kidney replaced by transplant       carvedilol 25 MG tablet    COREG    120 tablet    Take 2 tablets (50 mg) by mouth 2 times daily (with meals)    Renovascular hypertension, Kidney replaced by transplant       * SENSIPAR 30 MG tablet   Generic drug:  cinacalcet      Take 30 mg by mouth daily         * cinacalcet 30 MG tablet    SENSIPAR    30 tablet    Take 1 tablet (30 mg) by mouth daily    Kidney replaced by transplant       fludrocortisone 0.1 MG tablet    FLORINEF    30 tablet    Take 1 tablet (0.1 mg) by mouth daily    Kidney replaced by transplant, Dehydration       magnesium oxide 400 MG tablet    MAG-OX    30 tablet    Take 1 tablet (400 mg) by mouth daily (with lunch)    Kidney replaced by transplant       mycophenolate 250 MG capsule    GENERIC EQUIVALENT    180 capsule    Take 500 mg by mouth 2 times daily    Kidney replaced by transplant       sodium bicarbonate 650 MG tablet     120 tablet    Take 2 tablets (1,300 mg) by mouth 2 times daily    Acidosis, Kidney replaced by transplant       sulfamethoxazole-trimethoprim 400-80 MG per tablet    BACTRIM/SEPTRA    30 tablet    Take daily on Mondays, Wednesdays, Fridays    Kidney replaced by transplant, Immunosuppressed status (H)       * tacrolimus 0.5 MG capsule    GENERIC EQUIVALENT    60 capsule    Take 1 capsule (0.5 mg) by mouth 2 times daily Total dose = 2.5mg BID.    Kidney replaced by transplant       * tacrolimus 1 MG capsule    GENERIC EQUIVALENT    120 capsule    Take 2 capsules (2 mg) by mouth 2 times daily    Kidney replaced by transplant       valGANciclovir 450 MG tablet    VALCYTE    30 tablet    Take 1 tablet (450 mg) by mouth every other day Titrate dose up to a max of 2 tabs (900 mg) by mouth daily when directed by your transplant team.    Kidney replaced by transplant       * Notice:  This list has 4 medication(s) that are the same as other medications prescribed for you. Read the directions carefully, and ask your doctor or other care provider to review them with you.

## 2018-11-18 ENCOUNTER — INFUSION THERAPY VISIT (OUTPATIENT)
Dept: INFUSION THERAPY | Facility: CLINIC | Age: 38
End: 2018-11-18
Attending: INTERNAL MEDICINE
Payer: COMMERCIAL

## 2018-11-18 VITALS
HEART RATE: 78 BPM | RESPIRATION RATE: 16 BRPM | TEMPERATURE: 98.3 F | DIASTOLIC BLOOD PRESSURE: 79 MMHG | SYSTOLIC BLOOD PRESSURE: 136 MMHG | OXYGEN SATURATION: 99 %

## 2018-11-18 DIAGNOSIS — D70.8 OTHER NEUTROPENIA (H): ICD-10-CM

## 2018-11-18 DIAGNOSIS — R50.9 FEVER, UNSPECIFIED FEVER CAUSE: Primary | ICD-10-CM

## 2018-11-18 DIAGNOSIS — Z94.0 KIDNEY REPLACED BY TRANSPLANT: ICD-10-CM

## 2018-11-18 DIAGNOSIS — D84.9 IMMUNOSUPPRESSION (H): ICD-10-CM

## 2018-11-18 PROCEDURE — 96372 THER/PROPH/DIAG INJ SC/IM: CPT

## 2018-11-18 PROCEDURE — 25000128 H RX IP 250 OP 636: Mod: ZF | Performed by: INTERNAL MEDICINE

## 2018-11-18 RX ADMIN — FILGRASTIM-SNDZ 480 MCG: 480 INJECTION, SOLUTION INTRAVENOUS; SUBCUTANEOUS at 09:23

## 2018-11-18 NOTE — MR AVS SNAPSHOT
After Visit Summary   11/18/2018    Harshad Beatty    MRN: [id number]           Patient Information     Date Of Birth          1980        Visit Information        Provider Department      11/18/2018 9:00 AM UC 45 ATC; UC SPEC INFUSION [facility]        Today's Diagnoses     Fever, unspecified fever cause    -  1    Other neutropenia (H)        Immunosuppression (H)        Kidney replaced by transplant           Follow-ups after your visit        Your next 10 appointments already scheduled     Dec 07, 2018 11:30 AM CST   (Arrive by 11:00 AM)   Return Kidney Transplant with  Early Post Transplant   [facility] ([facility])    [address]  [address]  [place] [id number] [phone]            Dec 07, 2018  1:30 PM CST   (Arrive by 1:15 PM)   Office Visit with Lambert Wall RPH   [facility]ication Therapy Management ([facility])    [address]  3rd Floor  [place] [id number] [phone]           Bring a current list of meds and any records pertaining to this visit. For Physicals, please bring immunization records and any forms needing to be filled out. Please arrive 10 minutes early to complete paperwork.            Feb 07, 2019  1:05 PM CST   (Arrive by 12:35 PM)   Return Kidney Transplant with  Kidney/Pancreas Recipient 2   [facility] ([facility])    [address]  [address]  [place] [id number] [phone]              Who to contact     If you have questions or need follow up information about today's clinic visit or your schedule please contact [facility] directly at [phone].  Normal or non-critical lab and imaging results will be communicated to you by MyChart, letter or phone within 4 business days after the clinic has received the  "results. If you do not hear from us within 7 days, please contact the clinic through Ofuz or phone. If you have a critical or abnormal lab result, we will notify you by phone as soon as possible.  Submit refill requests through Ofuz or call your pharmacy and they will forward the refill request to us. Please allow 3 business days for your refill to be completed.          Additional Information About Your Visit        [facility] Information     [facility] lets you send messages to your doctor, view your test results, renew your prescriptions, schedule appointments and more. To sign up, go to [email or web address] . Click on \"Log in\" on the left side of the screen, which will take you to the Welcome page. Then click on \"Sign up Now\" on the right side of the page.     You will be asked to enter the access code listed below, as well as some personal information. Please follow the directions to create your username and password.     Your access code is: [email or web address]  Expires: 2019  5:30 AM     Your access code will  in 90 days. If you need help or a new code, please call your [facility] clinic or [phone].        Care EveryWhere ID     This is your Care EveryWhere ID. This could be used by other organizations to access your [facility] medical records  [id number]        Your Vitals Were     Pulse Temperature Respirations Pulse Oximetry          78 98.3  F (36.8  C) (Oral) 16 99%         Blood Pressure from Last 3 Encounters:   18 136/79   18 157/89   18 140/72    Weight from Last 3 Encounters:   10/30/18 115.3 kg (254 lb 4.8 oz)   10/10/18 114.2 kg (251 lb 12.8 oz)   18 109.4 kg (241 lb 3.2 oz)              Today, you had the following     No orders found for display       Primary Care Provider Office Phone # Fax #    [facility] [phone] [address] [facility] [address]        Equal Access to Services     JAKI PUGA AH: " Hadii aad ku hadessieo Sodanyelali, waaxda luqadaha, qaybta kaalmada dariela, herb maría elenain hayaan davidnarciso green laranjithmaldonado isabela. So [facility] [phone].    ATENCIÓN: Si omar ham, tiene a rodriguez disposición servicios gratuitos de asistencia lingüística. Llame al [phone].    We comply with applicable federal civil rights laws and [place] laws. We do not discriminate on the basis of race, color, national origin, age, disability, sex, sexual orientation, or gender identity.            Thank you!     Thank you for choosing [facility] PROCEDURE  for your care. Our goal is always to provide you with excellent care. Hearing back from our patients is one way we can continue to improve our services. Please take a few minutes to complete the written survey that you may receive in the mail after your visit with us. Thank you!             Your Updated Medication List - Protect others around you: Learn how to safely use, store and throw away your medicines at www.disposemymeds.org.          This list is accurate as of 11/18/18  9:28 AM.  Always use your most recent med list.                   Brand Name Dispense Instructions for use Diagnosis    aspirin 325 MG EC tablet     30 tablet    Take 1 tablet (325 mg) by mouth daily    Kidney replaced by transplant       atorvastatin 10 MG tablet    LIPITOR    30 tablet    Take 1 tablet (10 mg) by mouth daily    Kidney replaced by transplant       carvedilol 25 MG tablet    COREG    120 tablet    Take 2 tablets (50 mg) by mouth 2 times daily (with meals)    Renovascular hypertension, Kidney replaced by transplant       * SENSIPAR 30 MG tablet   Generic drug:  cinacalcet      Take 30 mg by mouth daily        * cinacalcet 30 MG tablet    SENSIPAR    30 tablet    Take 1 tablet (30 mg) by mouth daily    Kidney replaced by transplant       fludrocortisone 0.1 MG tablet    FLORINEF    30 tablet    Take 1 tablet (0.1 mg) by mouth daily    Kidney replaced by  transplant, Dehydration       magnesium oxide 400 MG tablet    MAG-OX    30 tablet    Take 1 tablet (400 mg) by mouth daily (with lunch)    Kidney replaced by transplant       mycophenolate 250 MG capsule    GENERIC EQUIVALENT    180 capsule    Take 500 mg by mouth 2 times daily    Kidney replaced by transplant       sodium bicarbonate 650 MG tablet     120 tablet    Take 2 tablets (1,300 mg) by mouth 2 times daily    Acidosis, Kidney replaced by transplant       sulfamethoxazole-trimethoprim 400-80 MG per tablet    BACTRIM/SEPTRA    30 tablet    Take daily on Mondays, Wednesdays, Fridays    Kidney replaced by transplant, Immunosuppressed status (H)       * tacrolimus 0.5 MG capsule    GENERIC EQUIVALENT    60 capsule    Take 1 capsule (0.5 mg) by mouth 2 times daily Total dose = 2.5mg BID.    Kidney replaced by transplant       * tacrolimus 1 MG capsule    GENERIC EQUIVALENT    120 capsule    Take 2 capsules (2 mg) by mouth 2 times daily    Kidney replaced by transplant       valGANciclovir 450 MG tablet    VALCYTE    30 tablet    Take 1 tablet (450 mg) by mouth every other day Titrate dose up to a max of 2 tabs (900 mg) by mouth daily when directed by your transplant team.    Kidney replaced by transplant       * Notice:  This list has 4 medication(s) that are the same as other medications prescribed for you. Read the directions carefully, and ask your doctor or other care provider to review them with you.

## 2018-11-18 NOTE — PROGRESS NOTES
Nursing Note  [facility]:   Chief Complaint   Patient presents with     Imm/Inj     Zarxio     During [facility]'s [facility] appointment, orders from Dr. Connolly were completed.  Frequency: today is dose 3 of 3 total.    Progress note:  Patient identification verified by name and date of birth.  Assessment completed.  Vitals recorded in Doc Flowsheets.  Patient was provided with education regarding infusion and possible side effects.  Patient verbalized understanding.      needed: No  Premedications: were not ordered.  Injection given subQ to L arm.  Labs: were not ordered for this appointment.  Treatment Conditions: non-applicable.  Patient tolerated injection: well.    Discharge Plan:   Declined AVS.  Follow up plan of care with: transplant coordinator.  Discharge instructions were reviewed with patient.  Patient/representative verbalized understanding of discharge instructions and all questions answered.  [facility] in stable condition.    Lakshmi Garduno RN    Administrations This Visit     filgrastim-sndz (ZARXIO) injection syringe 480 mcg     Admin Date Action Dose Route Administered By             11/18/2018 Given 480 mcg Subcutaneous Lakshmi Garduno RN                         /79 (BP Location: Left arm)  Pulse 78  Temp 98.3  F (36.8  C) (Oral)  Resp 16  SpO2 99%

## 2018-11-19 ENCOUNTER — RECORDS - HEALTHEAST (OUTPATIENT)
Dept: LAB | Facility: CLINIC | Age: 38
End: 2018-11-19

## 2018-11-19 ENCOUNTER — TELEPHONE (OUTPATIENT)
Dept: TRANSPLANT | Facility: CLINIC | Age: 38
End: 2018-11-19

## 2018-11-19 DIAGNOSIS — E86.0 DEHYDRATION: ICD-10-CM

## 2018-11-19 DIAGNOSIS — Z94.0 KIDNEY REPLACED BY TRANSPLANT: ICD-10-CM

## 2018-11-19 DIAGNOSIS — Z48.298 AFTERCARE FOLLOWING ORGAN TRANSPLANT: ICD-10-CM

## 2018-11-19 LAB
ANION GAP SERPL CALCULATED.3IONS-SCNC: 10 MMOL/L (ref 5–18)
BASOPHILS # BLD AUTO: 0.3 THOU/UL (ref 0–0.2)
BASOPHILS NFR BLD AUTO: 1 % (ref 0–2)
BUN SERPL-MCNC: 23 MG/DL (ref 8–22)
CALCIUM SERPL-MCNC: 10.2 MG/DL (ref 8.5–10.5)
CHLORIDE BLD-SCNC: 108 MMOL/L (ref 98–107)
CO2 SERPL-SCNC: 20 MMOL/L (ref 22–31)
CREAT SERPL-MCNC: 2.37 MG/DL (ref 0.7–1.3)
EOSINOPHIL COUNT (ABSOLUTE): 0 THOU/UL (ref 0–0.4)
EOSINOPHIL NFR BLD AUTO: 0 % (ref 0–6)
ERYTHROCYTE [DISTWIDTH] IN BLOOD BY AUTOMATED COUNT: 13.9 % (ref 11–14.5)
GFR SERPL CREATININE-BSD FRML MDRD: 31 ML/MIN/1.73M2
GLUCOSE BLD-MCNC: 101 MG/DL (ref 70–125)
HCT VFR BLD AUTO: 33.2 % (ref 40–54)
HGB BLD-MCNC: 10.5 G/DL (ref 14–18)
LYMPHOCYTES # BLD AUTO: 2.3 THOU/UL (ref 0.8–4.4)
LYMPHOCYTES NFR BLD AUTO: 8 % (ref 20–40)
MANUAL NRBC PER 100 CELLS: 1
MCH RBC QN AUTO: 27.6 PG (ref 27–34)
MCHC RBC AUTO-ENTMCNC: 31.6 G/DL (ref 32–36)
MCV RBC AUTO: 87 FL (ref 80–100)
MONOCYTES # BLD AUTO: 2.3 THOU/UL (ref 0–0.9)
MONOCYTES NFR BLD AUTO: 8 % (ref 2–10)
PLAT MORPH BLD: NORMAL
PLATELET # BLD AUTO: 363 THOU/UL (ref 140–440)
PMV BLD AUTO: 9.1 FL (ref 8.5–12.5)
POTASSIUM BLD-SCNC: 4.9 MMOL/L (ref 3.5–5)
RBC # BLD AUTO: 3.8 MILL/UL (ref 4.4–6.2)
SODIUM SERPL-SCNC: 138 MMOL/L (ref 136–145)
TOTAL NEUTROPHILS-ABS(DIFF): 24 THOU/UL (ref 2–7.7)
TOTAL NEUTROPHILS-REL(DIFF): 83 % (ref 50–70)
WBC: 28.9 THOU/UL (ref 4–11)

## 2018-11-19 PROCEDURE — 80197 ASSAY OF TACROLIMUS: CPT | Performed by: SURGERY

## 2018-11-19 RX ORDER — FLUDROCORTISONE ACETATE 0.1 MG/1
0.1 TABLET ORAL
Qty: 15 TABLET | Refills: 3 | Status: SHIPPED | OUTPATIENT
Start: 2018-11-19 | End: 2019-01-14

## 2018-11-20 LAB
TACROLIMUS BLD-MCNC: 11.4 UG/L (ref 5–15)
TME LAST DOSE: NORMAL H

## 2018-11-20 RX ORDER — TACROLIMUS 0.5 MG/1
CAPSULE ORAL
Qty: 60 CAPSULE | Refills: 11 | Status: SHIPPED | OUTPATIENT
Start: 2018-11-20 | End: 2018-12-18

## 2018-11-20 RX ORDER — TACROLIMUS 1 MG/1
CAPSULE ORAL
Qty: 120 CAPSULE | Refills: 11 | Status: SHIPPED | OUTPATIENT
Start: 2018-11-20 | End: 2018-12-18

## 2018-11-20 NOTE — TELEPHONE ENCOUNTER
ISSUE:   Tacrolimus level 11/4 on 11/19/18, goal 8 - 10, dose 2.5 mg BID    PLAN:   Please call pt and confirm this was a good 12-hour trough. Verify dose 2.5 mg BID. Confirm no new medications or illness (laith. Diarrhea). If good trough, decrease dose to 2.5mg AM 2 mg PMand recheck level at next scheduled lab draw.     OUTCOME: Patricia confirmed the above, voiced understanding of med change.   No illness, no fevers. Was dehydrated.

## 2018-11-23 ENCOUNTER — RECORDS - HEALTHEAST (OUTPATIENT)
Dept: LAB | Facility: CLINIC | Age: 38
End: 2018-11-23

## 2018-11-23 DIAGNOSIS — Z48.298 AFTERCARE FOLLOWING ORGAN TRANSPLANT: ICD-10-CM

## 2018-11-23 DIAGNOSIS — Z94.0 KIDNEY REPLACED BY TRANSPLANT: ICD-10-CM

## 2018-11-23 LAB
ANION GAP SERPL CALCULATED.3IONS-SCNC: 7 MMOL/L (ref 5–18)
BUN SERPL-MCNC: 25 MG/DL (ref 8–22)
CALCIUM SERPL-MCNC: 10 MG/DL (ref 8.5–10.5)
CHLORIDE BLD-SCNC: 110 MMOL/L (ref 98–107)
CO2 SERPL-SCNC: 20 MMOL/L (ref 22–31)
CREAT SERPL-MCNC: 2.06 MG/DL (ref 0.7–1.3)
ERYTHROCYTE [DISTWIDTH] IN BLOOD BY AUTOMATED COUNT: 13.8 % (ref 11–14.5)
GFR SERPL CREATININE-BSD FRML MDRD: 36 ML/MIN/1.73M2
GLUCOSE BLD-MCNC: 109 MG/DL (ref 70–125)
HCT VFR BLD AUTO: 33.4 % (ref 40–54)
HGB BLD-MCNC: 10.6 G/DL (ref 14–18)
MCH RBC QN AUTO: 27.2 PG (ref 27–34)
MCHC RBC AUTO-ENTMCNC: 31.7 G/DL (ref 32–36)
MCV RBC AUTO: 86 FL (ref 80–100)
PLATELET # BLD AUTO: 233 THOU/UL (ref 140–440)
PMV BLD AUTO: 9.3 FL (ref 8.5–12.5)
POTASSIUM BLD-SCNC: 5 MMOL/L (ref 3.5–5)
RBC # BLD AUTO: 3.89 MILL/UL (ref 4.4–6.2)
SODIUM SERPL-SCNC: 137 MMOL/L (ref 136–145)
TACROLIMUS BLD-MCNC: 8.9 UG/L (ref 5–15)
TME LAST DOSE: NORMAL H
WBC: 4 THOU/UL (ref 4–11)

## 2018-11-23 PROCEDURE — 80197 ASSAY OF TACROLIMUS: CPT | Performed by: SURGERY

## 2018-11-26 ENCOUNTER — RECORDS - HEALTHEAST (OUTPATIENT)
Dept: LAB | Facility: CLINIC | Age: 38
End: 2018-11-26

## 2018-11-26 DIAGNOSIS — Z94.0 KIDNEY REPLACED BY TRANSPLANT: ICD-10-CM

## 2018-11-26 DIAGNOSIS — Z48.298 AFTERCARE FOLLOWING ORGAN TRANSPLANT: ICD-10-CM

## 2018-11-26 LAB
ANION GAP SERPL CALCULATED.3IONS-SCNC: 4 MMOL/L (ref 5–18)
BASOPHILS # BLD AUTO: 0 THOU/UL (ref 0–0.2)
BASOPHILS NFR BLD AUTO: 1 % (ref 0–2)
BUN SERPL-MCNC: 24 MG/DL (ref 8–22)
CALCIUM SERPL-MCNC: 10 MG/DL (ref 8.5–10.5)
CHLORIDE BLD-SCNC: 110 MMOL/L (ref 98–107)
CO2 SERPL-SCNC: 24 MMOL/L (ref 22–31)
CREAT SERPL-MCNC: 1.9 MG/DL (ref 0.7–1.3)
EOSINOPHIL # BLD AUTO: 0.1 THOU/UL (ref 0–0.4)
EOSINOPHIL NFR BLD AUTO: 4 % (ref 0–6)
ERYTHROCYTE [DISTWIDTH] IN BLOOD BY AUTOMATED COUNT: 13.8 % (ref 11–14.5)
GFR SERPL CREATININE-BSD FRML MDRD: 40 ML/MIN/1.73M2
GLUCOSE BLD-MCNC: 103 MG/DL (ref 70–125)
HCT VFR BLD AUTO: 34.1 % (ref 40–54)
HGB BLD-MCNC: 10.5 G/DL (ref 14–18)
LYMPHOCYTES # BLD AUTO: 0.4 THOU/UL (ref 0.8–4.4)
LYMPHOCYTES NFR BLD AUTO: 17 % (ref 20–40)
MCH RBC QN AUTO: 27.3 PG (ref 27–34)
MCHC RBC AUTO-ENTMCNC: 30.8 G/DL (ref 32–36)
MCV RBC AUTO: 89 FL (ref 80–100)
MONOCYTES # BLD AUTO: 0.4 THOU/UL (ref 0–0.9)
MONOCYTES NFR BLD AUTO: 15 % (ref 2–10)
NEUTROPHILS # BLD AUTO: 1.5 THOU/UL (ref 2–7.7)
NEUTROPHILS NFR BLD AUTO: 63 % (ref 50–70)
PLATELET # BLD AUTO: 184 THOU/UL (ref 140–440)
PMV BLD AUTO: 9.2 FL (ref 8.5–12.5)
POTASSIUM BLD-SCNC: 4.9 MMOL/L (ref 3.5–5)
RBC # BLD AUTO: 3.85 MILL/UL (ref 4.4–6.2)
SODIUM SERPL-SCNC: 138 MMOL/L (ref 136–145)
WBC: 2.4 THOU/UL (ref 4–11)

## 2018-11-26 PROCEDURE — 80197 ASSAY OF TACROLIMUS: CPT | Performed by: SURGERY

## 2018-11-27 LAB
TACROLIMUS BLD-MCNC: 8.3 UG/L (ref 5–15)
TME LAST DOSE: NORMAL H

## 2018-11-28 ENCOUNTER — TELEPHONE (OUTPATIENT)
Dept: TRANSPLANT | Facility: CLINIC | Age: 38
End: 2018-11-28

## 2018-11-28 NOTE — TELEPHONE ENCOUNTER
Post Kidney and Pancreas Transplant Team Conference  Date: 11/28/2018  Transplant Coordinator: Mirna Harrell     Attendees:        Attendees:  [x]  Dr. Bahena [] Yanni Field, RN  [] Lynda Wilson LPN     []  Dr. Gaxiola [x] Tonya Albrecht RN [] Sandra Epperson LPN   []  Dr. Connolly [] Sherry Jimenez RN    []  Dr. Ronquillo [] Julieta Perez RN    [] Dr. Lema [x] Eli Harrell RN    [] Dr. Mitchell [] Randy Peter RN    [] Surgery Fellow [] Dorcas Lopez RN    [] Tracy Finley, NP [] Blanca Sow RN    [] Lambert Wall, PharmD [] Allison Aaron, RN    Dr Mohr attend  [] Philippe Colon RN     [] Yanni Rasmussen RN        Verbal Plan Read Back:   Update date Epic MAR    Obtain PTH @ 4 months      Routed to RN Coordinator   Tonya Albrecht

## 2018-11-28 NOTE — TELEPHONE ENCOUNTER
Call placed to patient. No answer. Voice message left instructing patient to complete a PTH level in 4 months and return call for med reconciliation. Order sent.

## 2018-11-28 NOTE — LETTER
PHYSICIAN ORDERS      DATE & TIME ISSUED: 2018 10:32 AM  PATIENT NAME: Harshad Beatty   : 1980     [facility] MR# [if applicable]: [id number]     DIAGNOSIS:  Kidney transplant   ICD-10 CODE: Z94.0      Please complete the following level in 4 months (2019)  PTH level    Any questions please call: [phone] option #5    Please fax results to [phone].    .

## 2018-11-29 ENCOUNTER — TELEPHONE (OUTPATIENT)
Dept: TRANSPLANT | Facility: CLINIC | Age: 38
End: 2018-11-29

## 2018-11-29 ENCOUNTER — RECORDS - HEALTHEAST (OUTPATIENT)
Dept: LAB | Facility: CLINIC | Age: 38
End: 2018-11-29

## 2018-11-29 LAB
BASOPHILS # BLD AUTO: 0 THOU/UL (ref 0–0.2)
BASOPHILS NFR BLD AUTO: 1 % (ref 0–2)
EOSINOPHIL # BLD AUTO: 0.1 THOU/UL (ref 0–0.4)
EOSINOPHIL NFR BLD AUTO: 3 % (ref 0–6)
ERYTHROCYTE [DISTWIDTH] IN BLOOD BY AUTOMATED COUNT: 14 % (ref 11–14.5)
HCT VFR BLD AUTO: 33.1 % (ref 40–54)
HGB BLD-MCNC: 10.2 G/DL (ref 14–18)
LYMPHOCYTES # BLD AUTO: 0.4 THOU/UL (ref 0.8–4.4)
LYMPHOCYTES NFR BLD AUTO: 17 % (ref 20–40)
MCH RBC QN AUTO: 27.2 PG (ref 27–34)
MCHC RBC AUTO-ENTMCNC: 30.8 G/DL (ref 32–36)
MCV RBC AUTO: 88 FL (ref 80–100)
MONOCYTES # BLD AUTO: 0.3 THOU/UL (ref 0–0.9)
MONOCYTES NFR BLD AUTO: 14 % (ref 2–10)
NEUTROPHILS # BLD AUTO: 1.5 THOU/UL (ref 2–7.7)
NEUTROPHILS NFR BLD AUTO: 65 % (ref 50–70)
PLATELET # BLD AUTO: 207 THOU/UL (ref 140–440)
PMV BLD AUTO: 9.9 FL (ref 8.5–12.5)
RBC # BLD AUTO: 3.75 MILL/UL (ref 4.4–6.2)
WBC: 2.4 THOU/UL (ref 4–11)

## 2018-12-03 ENCOUNTER — RECORDS - HEALTHEAST (OUTPATIENT)
Dept: LAB | Facility: CLINIC | Age: 38
End: 2018-12-03

## 2018-12-03 ENCOUNTER — HOSPITAL ENCOUNTER (OUTPATIENT)
Facility: CLINIC | Age: 38
Setting detail: SPECIMEN
Discharge: HOME | End: 2018-12-03
Admitting: SURGERY
Payer: COMMERCIAL

## 2018-12-03 LAB
ANION GAP SERPL CALCULATED.3IONS-SCNC: 7 MMOL/L (ref 5–18)
BASOPHILS # BLD AUTO: 0 THOU/UL (ref 0–0.2)
BASOPHILS NFR BLD AUTO: 1 % (ref 0–2)
BK VIRUS SPECIMEN SOURCE: NORMAL
BKV DNA # SPEC NAA+PROBE: NORMAL COPIES/ML
BKV DNA SPEC NAA+PROBE-LOG#: NORMAL LOG COPIES/ML
BUN SERPL-MCNC: 25 MG/DL (ref 8–22)
CALCIUM SERPL-MCNC: 9.7 MG/DL (ref 8.5–10.5)
CHLORIDE BLD-SCNC: 108 MMOL/L (ref 98–107)
CO2 SERPL-SCNC: 22 MMOL/L (ref 22–31)
CREAT SERPL-MCNC: 2.02 MG/DL (ref 0.7–1.3)
CREAT UR-MCNC: 63.9 MG/DL
EOSINOPHIL # BLD AUTO: 0.1 THOU/UL (ref 0–0.4)
EOSINOPHIL NFR BLD AUTO: 4 % (ref 0–6)
ERYTHROCYTE [DISTWIDTH] IN BLOOD BY AUTOMATED COUNT: 14 % (ref 11–14.5)
GFR SERPL CREATININE-BSD FRML MDRD: 37 ML/MIN/1.73M2
GLUCOSE BLD-MCNC: 103 MG/DL (ref 70–125)
HCT VFR BLD AUTO: 32.9 % (ref 40–54)
HGB BLD-MCNC: 10.3 G/DL (ref 14–18)
LYMPHOCYTES # BLD AUTO: 0.3 THOU/UL (ref 0.8–4.4)
LYMPHOCYTES NFR BLD AUTO: 17 % (ref 20–40)
MCH RBC QN AUTO: 27.5 PG (ref 27–34)
MCHC RBC AUTO-ENTMCNC: 31.3 G/DL (ref 32–36)
MCV RBC AUTO: 88 FL (ref 80–100)
MONOCYTES # BLD AUTO: 0.3 THOU/UL (ref 0–0.9)
MONOCYTES NFR BLD AUTO: 17 % (ref 2–10)
NEUTROPHILS # BLD AUTO: 1.2 THOU/UL (ref 2–7.7)
NEUTROPHILS NFR BLD AUTO: 61 % (ref 50–70)
PLATELET # BLD AUTO: 255 THOU/UL (ref 140–440)
PMV BLD AUTO: 9.6 FL (ref 8.5–12.5)
POTASSIUM BLD-SCNC: 4.9 MMOL/L (ref 3.5–5)
PROTEIN, RANDOM URINE - HISTORICAL: 11 MG/DL
PROTEIN/CREAT RATIO, RANDOM UR: 0.17
RBC # BLD AUTO: 3.74 MILL/UL (ref 4.4–6.2)
SODIUM SERPL-SCNC: 137 MMOL/L (ref 136–145)
WBC: 2 THOU/UL (ref 4–11)

## 2018-12-03 PROCEDURE — 80197 ASSAY OF TACROLIMUS: CPT | Performed by: SURGERY

## 2018-12-04 DIAGNOSIS — Z48.298 AFTERCARE FOLLOWING ORGAN TRANSPLANT: ICD-10-CM

## 2018-12-04 DIAGNOSIS — Z94.0 KIDNEY REPLACED BY TRANSPLANT: ICD-10-CM

## 2018-12-04 LAB
TACROLIMUS BLD-MCNC: 9.2 UG/L (ref 5–15)
TME LAST DOSE: NORMAL H

## 2018-12-06 ENCOUNTER — RECORDS - HEALTHEAST (OUTPATIENT)
Dept: LAB | Facility: CLINIC | Age: 38
End: 2018-12-06

## 2018-12-06 ENCOUNTER — TELEPHONE (OUTPATIENT)
Dept: TRANSPLANT | Facility: CLINIC | Age: 38
End: 2018-12-06

## 2018-12-06 DIAGNOSIS — D70.8 OTHER NEUTROPENIA (H): Primary | ICD-10-CM

## 2018-12-06 LAB
BASOPHILS # BLD AUTO: 0.1 THOU/UL (ref 0–0.2)
BASOPHILS NFR BLD AUTO: 3 % (ref 0–2)
EOSINOPHIL COUNT (ABSOLUTE): 0.1 THOU/UL (ref 0–0.4)
EOSINOPHIL NFR BLD AUTO: 7 % (ref 0–6)
ERYTHROCYTE [DISTWIDTH] IN BLOOD BY AUTOMATED COUNT: 14.1 % (ref 11–14.5)
HCT VFR BLD AUTO: 33.1 % (ref 40–54)
HGB BLD-MCNC: 10.4 G/DL (ref 14–18)
LYMPHOCYTES # BLD AUTO: 0.3 THOU/UL (ref 0.8–4.4)
LYMPHOCYTES NFR BLD AUTO: 20 % (ref 20–40)
MCH RBC QN AUTO: 27.7 PG (ref 27–34)
MCHC RBC AUTO-ENTMCNC: 31.4 G/DL (ref 32–36)
MCV RBC AUTO: 88 FL (ref 80–100)
MONOCYTES # BLD AUTO: 0.2 THOU/UL (ref 0–0.9)
MONOCYTES NFR BLD AUTO: 11 % (ref 2–10)
PLAT MORPH BLD: NORMAL
PLATELET # BLD AUTO: 284 THOU/UL (ref 140–440)
PMV BLD AUTO: 9 FL (ref 8.5–12.5)
RBC # BLD AUTO: 3.76 MILL/UL (ref 4.4–6.2)
TOTAL NEUTROPHILS-ABS(DIFF): 1 THOU/UL (ref 2–7.7)
TOTAL NEUTROPHILS-REL(DIFF): 59 % (ref 50–70)
WBC: 1.7 THOU/UL (ref 4–11)

## 2018-12-06 NOTE — TELEPHONE ENCOUNTER
DATE:  12/6/2018   TIME OF RECEIPT FROM LAB:  8:56 am  LAB TEST/VALUE:  WBC 1.7 ,  Prelim ABN 0.8  RESULTS GIVEN WITH READ-BACK TO (PROVIDER):  Eli Harrell  TIME LAB VALUE REPORTED TO PROVIDER:   Miguel, phone,page 9:00 am

## 2018-12-07 ENCOUNTER — HOSPITAL ENCOUNTER (OUTPATIENT)
Facility: CLINIC | Age: 38
Setting detail: SPECIMEN
Discharge: HOME | End: 2018-12-07
Admitting: INTERNAL MEDICINE
Payer: COMMERCIAL

## 2018-12-07 ENCOUNTER — OFFICE VISIT (OUTPATIENT)
Dept: NEPHROLOGY | Facility: CLINIC | Age: 38
End: 2018-12-07
Attending: INTERNAL MEDICINE
Payer: COMMERCIAL

## 2018-12-07 ENCOUNTER — TELEPHONE (OUTPATIENT)
Dept: TRANSPLANT | Facility: CLINIC | Age: 38
End: 2018-12-07

## 2018-12-07 ENCOUNTER — AMBULATORY - HEALTHEAST (OUTPATIENT)
Dept: RESPIRATORY THERAPY | Facility: CLINIC | Age: 38
End: 2018-12-07

## 2018-12-07 ENCOUNTER — TELEPHONE (OUTPATIENT)
Dept: PHARMACY | Facility: CLINIC | Age: 38
End: 2018-12-07

## 2018-12-07 VITALS
DIASTOLIC BLOOD PRESSURE: 84 MMHG | HEART RATE: 72 BPM | TEMPERATURE: 98.4 F | SYSTOLIC BLOOD PRESSURE: 130 MMHG | OXYGEN SATURATION: 100 %

## 2018-12-07 DIAGNOSIS — Z94.0 KIDNEY REPLACED BY TRANSPLANT: ICD-10-CM

## 2018-12-07 DIAGNOSIS — D72.819 LEUKOPENIA, UNSPECIFIED TYPE: ICD-10-CM

## 2018-12-07 DIAGNOSIS — Z94.0 KIDNEY REPLACED BY TRANSPLANT: Primary | ICD-10-CM

## 2018-12-07 DIAGNOSIS — Z79.2 PROPHYLACTIC ANTIBIOTIC: Primary | ICD-10-CM

## 2018-12-07 DIAGNOSIS — R73.01 IMPAIRED FASTING GLUCOSE: ICD-10-CM

## 2018-12-07 DIAGNOSIS — Z48.298 AFTERCARE FOLLOWING ORGAN TRANSPLANT: ICD-10-CM

## 2018-12-07 DIAGNOSIS — R73.09 IMPAIRED GLUCOSE TOLERANCE TEST: ICD-10-CM

## 2018-12-07 DIAGNOSIS — Z23 ENCOUNTER FOR VACCINATION: ICD-10-CM

## 2018-12-07 DIAGNOSIS — T82.9XXA COMPLICATION OF ARTERIOVENOUS DIALYSIS FISTULA, INITIAL ENCOUNTER: ICD-10-CM

## 2018-12-07 DIAGNOSIS — D72.819 LEUKOPENIA: ICD-10-CM

## 2018-12-07 DIAGNOSIS — Z23 NEED FOR PROPHYLACTIC VACCINATION AND INOCULATION AGAINST INFLUENZA: ICD-10-CM

## 2018-12-07 LAB
ANION GAP SERPL CALCULATED.3IONS-SCNC: 6 MMOL/L (ref 3–14)
BASOPHILS # BLD AUTO: 0 10E9/L (ref 0–0.2)
BASOPHILS NFR BLD AUTO: 1.3 %
BUN SERPL-MCNC: 15 MG/DL (ref 7–30)
CALCIUM SERPL-MCNC: 9.4 MG/DL (ref 8.5–10.1)
CHLORIDE SERPL-SCNC: 105 MMOL/L (ref 94–109)
CO2 SERPL-SCNC: 24 MMOL/L (ref 20–32)
CREAT SERPL-MCNC: 1.87 MG/DL (ref 0.66–1.25)
DIFFERENTIAL METHOD BLD: ABNORMAL
EOSINOPHIL # BLD AUTO: 0.1 10E9/L (ref 0–0.7)
EOSINOPHIL NFR BLD AUTO: 4.5 %
ERYTHROCYTE [DISTWIDTH] IN BLOOD BY AUTOMATED COUNT: 13.9 % (ref 10–15)
GFR SERPL CREATININE-BSD FRML MDRD: 40 ML/MIN/1.7M2
GLUCOSE SERPL-MCNC: 100 MG/DL (ref 70–99)
HBA1C MFR BLD: 5.5 % (ref 0–5.6)
HCT VFR BLD AUTO: 34.9 % (ref 40–53)
HGB BLD-MCNC: 10.6 G/DL (ref 13.3–17.7)
IMM GRANULOCYTES # BLD: 0 10E9/L (ref 0–0.4)
IMM GRANULOCYTES NFR BLD: 0 %
LYMPHOCYTES # BLD AUTO: 0.3 10E9/L (ref 0.8–5.3)
LYMPHOCYTES NFR BLD AUTO: 18.6 %
MCH RBC QN AUTO: 26.4 PG (ref 26.5–33)
MCHC RBC AUTO-ENTMCNC: 30.4 G/DL (ref 31.5–36.5)
MCV RBC AUTO: 87 FL (ref 78–100)
MONOCYTES # BLD AUTO: 0.4 10E9/L (ref 0–1.3)
MONOCYTES NFR BLD AUTO: 25 %
NEUTROPHILS # BLD AUTO: 0.8 10E9/L (ref 1.6–8.3)
NEUTROPHILS NFR BLD AUTO: 50.6 %
NRBC # BLD AUTO: 0 10*3/UL
NRBC BLD AUTO-RTO: 0 /100
PLATELET # BLD AUTO: 258 10E9/L (ref 150–450)
PLATELET # BLD EST: ABNORMAL 10*3/UL
POTASSIUM SERPL-SCNC: 4.7 MMOL/L (ref 3.4–5.3)
RBC # BLD AUTO: 4.01 10E12/L (ref 4.4–5.9)
RBC MORPH BLD: NORMAL
SODIUM SERPL-SCNC: 134 MMOL/L (ref 133–144)
WBC # BLD AUTO: 1.6 10E9/L (ref 4–11)

## 2018-12-07 PROCEDURE — 85025 COMPLETE CBC W/AUTO DIFF WBC: CPT | Performed by: INTERNAL MEDICINE

## 2018-12-07 PROCEDURE — 86832 HLA CLASS I HIGH DEFIN QUAL: CPT | Performed by: INTERNAL MEDICINE

## 2018-12-07 PROCEDURE — G0463 HOSPITAL OUTPT CLINIC VISIT: HCPCS | Mod: 25,ZF

## 2018-12-07 PROCEDURE — 83036 HEMOGLOBIN GLYCOSYLATED A1C: CPT | Performed by: INTERNAL MEDICINE

## 2018-12-07 PROCEDURE — 36415 COLL VENOUS BLD VENIPUNCTURE: CPT | Performed by: INTERNAL MEDICINE

## 2018-12-07 PROCEDURE — 86833 HLA CLASS II HIGH DEFIN QUAL: CPT | Performed by: INTERNAL MEDICINE

## 2018-12-07 PROCEDURE — 80180 DRUG SCRN QUAN MYCOPHENOLATE: CPT | Performed by: INTERNAL MEDICINE

## 2018-12-07 PROCEDURE — 80048 BASIC METABOLIC PNL TOTAL CA: CPT | Performed by: INTERNAL MEDICINE

## 2018-12-07 PROCEDURE — 25000128 H RX IP 250 OP 636: Mod: ZF | Performed by: INTERNAL MEDICINE

## 2018-12-07 PROCEDURE — G0008 ADMIN INFLUENZA VIRUS VAC: HCPCS | Mod: ZF

## 2018-12-07 PROCEDURE — 90686 IIV4 VACC NO PRSV 0.5 ML IM: CPT | Mod: ZF | Performed by: INTERNAL MEDICINE

## 2018-12-07 RX ORDER — PENTAMIDINE ISETHIONATE 300 MG/300MG
300 INHALANT RESPIRATORY (INHALATION) ONCE
Qty: 300 MG | Refills: 0 | COMMUNITY
Start: 2018-12-07 | End: 2019-07-08

## 2018-12-07 RX ADMIN — INFLUENZA A VIRUS A/MICHIGAN/45/2015 X-275 (H1N1) ANTIGEN (FORMALDEHYDE INACTIVATED), INFLUENZA A VIRUS A/SINGAPORE/INFIMH-16-0019/2016 IVR-186 (H3N2) ANTIGEN (FORMALDEHYDE INACTIVATED), INFLUENZA B VIRUS B/PHUKET/3073/2013 ANTIGEN (FORMALDEHYDE INACTIVATED), AND INFLUENZA B VIRUS B/MARYLAND/15/2016 BX-69A ANTIGEN (FORMALDEHYDE INACTIVATED) 0.5 ML: 15; 15; 15; 15 INJECTION, SUSPENSION INTRAMUSCULAR at 12:11

## 2018-12-07 ASSESSMENT — PAIN SCALES - GENERAL: PAINLEVEL: NO PAIN (0)

## 2018-12-07 NOTE — TELEPHONE ENCOUNTER
LM for pt to reschedule our appt from 12/7/18.    Lambert Wall, PharmD  [place] Pharmacist    Phone: [phone]   
No

## 2018-12-07 NOTE — LETTER
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    - Latest DSA: No Date of DSA last checked: 9/2018   - BK: No   - Kidney Tx Biopsy: Yes- 8/17/2018- no rejection    # Immunosuppression: Tacrolimus immediate release (goal  8-10) and Mycophenolate mofetil (goal  1-3.5)   - Changes: No major changes. On reduced to  mg BID due to leukopenia.      # Prophylaxis:   - PJP: TMP/Sulfa (Bactrim) we discussed stopping due to leukopenia, will arrange pentamidine    - CMV: Valcyte will update CMV viral load     # Hypertension: Controlled; Goal BP: < 130/80   - Changes: No- on florinef daily    # Anemia in chronic renal disease: Hgb: Stable   - Iron studies: Replete- not on iron supplements.     # Mineral Bone Disorder:    - Secondary renal hyperparathyroidism; PTH level is:  mildly elevated- 168. Will be rechecked later. On sensipar  30 mg daily  - Vitamin D; level is:  low- not on vit D.  - Calcium; level is:  normal now.   - Phosphorus; level is: normal-     # Electrolytes:   - Potassium; level: controlled on florinef 4 days ago.  - Magnesium; level: normal- on mag supplements  - Bicarbonate; level: normal- on baking soda- quarter spoon.     # leukopenia:  Completed neupogen course. 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     Return visit: No Follow-up on file.    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    Brijesh Gaxiola MD    Chief Complaint   Mr. 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    Harshad had a postoperative course that was complicated by supratherapeutic tacrolimus levels that resulted in a rise in his serum creatinine.  For this, he underwent a kidney transplant biopsy on August 17 that showed no evidence of cellular or antibody meter rejection. His creatinine stabilized around 2 mg/dL.  He is compliant with medications and labs. He is back to work. He tries to hydrate adequately. No fevers or chills. He notes rapid weight gain and we discussed the need to modify diet and activity level. He was open to receiving weight management material.     Recent Hospitalizations:  [x] No [] Yes    New Medical Issues: [x] No [] Yes    Decreased energy: [x] No [] Yes    Chest pain or SOB with exertion:  [x] No [] Yes    Appetite change or weight change: [x] No [] Yes    Nausea, vomiting or diarrhea:  [x] No [] Yes    Fever, sweats or chills: [x] No [] Yes     Leg swelling: [x] No [] Yes      Other medical issues:  No    Home BP: 130s/80s.    Review of Systems   A comprehensive review of systems was obtained and negative, except as noted in the HPI or PMH.    Problem List   Patient Active Problem List   Diagnosis     Sickle cell trait (H)     Anemia in chronic kidney disease     Secondary hyperparathyroidism (H)     Kidney replaced by transplant      Kidney transplant recipient     Immunosuppression (H)     Need for CMV immunotherapy     Need for pneumocystis prophylaxis     Benign essential hypertension     Anemia due to other cause, not classified     Hypercalcemia     Hypomagnesemia     Dehydration     Neutropenia (H)     Fever     Social History   Social History   Substance Use Topics     Smoking status: Never Smoker     Smokeless tobacco: Never Used     Alcohol use No     Allergies   No Known Allergies    Medications   Current Outpatient Prescriptions   Medication Sig     atorvastatin (LIPITOR) 10 MG tablet Take 1 tablet (10 mg) by mouth daily     carvedilol (COREG) 25 MG tablet Take 2 tablets (50 mg) by mouth 2 times daily (with meals)     cinacalcet (SENSIPAR) 30 MG tablet Take 30 mg by mouth daily     cinacalcet (SENSIPAR) 30 MG tablet Take 1 tablet (30 mg) by mouth daily     fludrocortisone (FLORINEF) 0.1 MG tablet Take 1 tablet (0.1 mg) by mouth Every Mon, Wed, Fri Morning     magnesium oxide (MAG-OX) 400 MG tablet Take 1 tablet (400 mg) by mouth daily (with lunch)     mycophenolate (GENERIC EQUIVALENT) 250 MG capsule Take 500 mg by mouth 2 times daily     sodium bicarbonate 650 MG tablet Take 2 tablets (1,300 mg) by mouth 2 times daily     sulfamethoxazole-trimethoprim (BACTRIM/SEPTRA) 400-80 MG per tablet Take daily on Mondays, Wednesdays, Fridays     tacrolimus (GENERIC EQUIVALENT) 0.5 MG capsule Total dose = 2.5mg AM. 2mg PM.     tacrolimus (GENERIC EQUIVALENT) 1 MG capsule Total dose = 2.5mg AM, 2mg PM     aspirin 325 MG EC tablet Take 1 tablet (325 mg) by mouth daily (Patient not taking: Reported on 10/30/2018)     Current Facility-Administered Medications   Medication     filgrastim (NEUPOGEN) injection 480 mcg     There are no discontinued medications.    Physical Exam   Vital Signs: /84 (BP Location: Left arm)  Pulse 72  Temp 98.4  F (36.9  C) (Oral)  SpO2 100%    GENERAL APPEARANCE: alert and no distress  HENT: mouth with larg  apthous ulser of the tongue.  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     Data     Renal Latest Ref Rng & Units 12/3/2018 11/26/2018 11/23/2018   Na 133 - 144 mmol/L - - -   Na (external) 136 - 145 mmol/L 137 138 137   K 3.4 - 5.3 mmol/L - - -   K (external) 3.5 - 5.0 mmol/L 4.9 4.9 5.0   Cl 94 - 109 mmol/L - - -   Cl (external) 98 - 107 mmol/L 108(H) 110(H) 110(H)   CO2 20 - 32 mmol/L - - -   CO2 (external) 22 - 31 mmol/L 22 24 20(L)   BUN 7 - 30 mg/dL - - -   BUN (external) 8 - 22 mg/dL 25(H) 24(H) 25(H)   Cr 0.66 - 1.25 mg/dL - - -   Cr (external) 0.70 - 1.30 mg/dL 2.02(H) 1.90(H) 2.06(H)   Glucose 70 - 99 mg/dL - - -   Glucose (external) 70 - 125 mg/dL 103 103 109   Ca  8.5 - 10.1 mg/dL - - -   Ca (external) 8.5 - 10.5 mg/dL 9.7 10.0 10.0   Mg 1.6 - 2.3 mg/dL - - -   Mg (external) 1.8 - 2.6 mg/dL - - -     Bone Health Latest Ref Rng & Units 11/5/2018 10/29/2018 10/22/2018   Phos 2.5 - 4.5 mg/dL - - -   Phos (external) 2.5 - 4.5 mg/dL 2.5 2.1(L) 2.8   PTHi 18 - 80 pg/mL - - -   Vit D Def 20 - 75 ug/L - - -     Heme Latest Ref Rng & Units 12/6/2018 12/3/2018 11/29/2018   WBC 4.0 - 11.0 10e9/L - - -   WBC (external) 4.0 - 11.0 thou/uL 1.7(LL) 2.0(L) 2.4(L)   Hgb 13.3 - 17.7 g/dL - - -   Hgb (external) 14.0 - 18.0 g/dL 10.4(L) 10.3(L) 10.2(L)   Plt 150 - 450 10e9/L - - -   Plt (external) 140 - 440 thou/uL 284 255 207     Liver Latest Ref Rng & Units 10/31/2018 7/30/2018 5/31/2017   AP 40 - 150 U/L 93 50 57   TBili 0.2 - 1.3 mg/dL 0.3 0.5 0.4   DBili 0.0 - 0.2 mg/dL 0.1 - -   ALT 0 - 70  U/L 31 17 19   AST 0 - 45 U/L 23 4 7   Tot Protein 6.8 - 8.8 g/dL 8.1 8.6 7.9   Albumin 3.4 - 5.0 g/dL 3.6 3.9 3.9        Iron studies Latest Ref Rng & Units 7/30/2018   Iron 35 - 180 ug/dL 46   Iron sat 15 - 46 % 21   Ferritin 26 - 388 ng/mL 716(H)     [facility] Txp Virology Latest Ref Rng & Units 12/3/2018 10/31/2018 10/30/2018   CVM DNA Quant - - - Plasma, EDTA anticoagulant   CMV DNA Quant Ext Not Detected IU/mL - - -   BK Spec - - Plasma -   BK Res BKNEG:BK Virus DNA Not Detected copies/mL - BK Virus DNA Not Detected -   BK Log <2.7 Log copies/mL - Not Calculated -   BK Quant Log Ext <2.7 Log copies/mL Not Calculated - -   BK Quant Result Ext BKNEG copies/mL Not Detected - -   BK Quant Spec Ext - Plasma - -   Hep B Core NR:Nonreactive - - -        Recent Labs   Lab Test  11/23/18   0800  11/26/18   0844  12/03/18   0800   DOSTAC  11/22/2018 20:00  11/23/18 2000  12/3/18 2015   TACROL  8.9  8.3  9.2     Recent Labs   Lab Test  08/20/18   0900  08/27/18   0810  09/05/18   0845   DOSMPA  Not Provided  Not Provided  [id number]   MPACID  5.33*  6.41*  4.52*   MPAG  155.6*  98.1*  80.5       Early Post Transplant

## 2018-12-07 NOTE — NURSING NOTE
"Chief Complaint   Patient presents with     RECHECK     follow up kidney TX     /84 (BP Location: Left arm)  Pulse 72  Temp 98.4  F (36.9  C) (Oral)  SpO2 100%   Marbella Koo    Injectable Influenza Immunization Documentation    1.  Has the patient received the information for the injectable influenza vaccine? YES     2. Is the patient 6 months of age or older? YES     3. Does the patient have any of the following contraindications?         Severe allergy to eggs? No     Severe allergic reaction to previous influenza vaccines? No   Severe allergy to latex? No       History of Guillain-[place] syndrome? No     Currently have a temperature greater than 100.4F? No        4.  Severely egg allergic patients should have flu vaccine eligibility assessed by an MD, RN, or pharmacist, and those who received flu vaccine should be observed for 15 min by an MD, RN, Pharmacist, Medical Technician, or member of clinic staff.\": YES    5. Latex-allergic patients should be given latex-free influenza vaccine Yes. Please reference the Vaccine latex table to determine if your clinic s product is latex-containing.       Vaccination given by Tae Shah CMA          "

## 2018-12-08 LAB
CMV DNA SPEC NAA+PROBE-ACNC: <137 [IU]/ML
CMV DNA SPEC NAA+PROBE-LOG#: <2.1 {LOG_IU}/ML
SPECIMEN SOURCE: ABNORMAL

## 2018-12-10 ENCOUNTER — RECORDS - HEALTHEAST (OUTPATIENT)
Dept: LAB | Facility: CLINIC | Age: 38
End: 2018-12-10

## 2018-12-10 ENCOUNTER — HOSPITAL ENCOUNTER (OUTPATIENT)
Dept: RESPIRATORY THERAPY | Facility: CLINIC | Age: 38
Discharge: HOME | End: 2018-12-10

## 2018-12-10 ENCOUNTER — TELEPHONE (OUTPATIENT)
Dept: TRANSPLANT | Facility: CLINIC | Age: 38
End: 2018-12-10

## 2018-12-10 DIAGNOSIS — Z94.0 KIDNEY REPLACED BY TRANSPLANT: ICD-10-CM

## 2018-12-10 DIAGNOSIS — Z48.298 AFTERCARE FOLLOWING ORGAN TRANSPLANT: ICD-10-CM

## 2018-12-10 LAB
ANION GAP SERPL CALCULATED.3IONS-SCNC: 8 MMOL/L (ref 5–18)
BASOPHILS # BLD AUTO: 0.1 THOU/UL (ref 0–0.2)
BASOPHILS NFR BLD AUTO: 3 % (ref 0–2)
BUN SERPL-MCNC: 23 MG/DL (ref 8–22)
CALCIUM SERPL-MCNC: 9.8 MG/DL (ref 8.5–10.5)
CHLORIDE BLD-SCNC: 108 MMOL/L (ref 98–107)
CO2 SERPL-SCNC: 22 MMOL/L (ref 22–31)
CREAT SERPL-MCNC: 2.02 MG/DL (ref 0.7–1.3)
EOSINOPHIL COUNT (ABSOLUTE): 0.1 THOU/UL (ref 0–0.4)
EOSINOPHIL NFR BLD AUTO: 4 % (ref 0–6)
ERYTHROCYTE [DISTWIDTH] IN BLOOD BY AUTOMATED COUNT: 13.6 % (ref 11–14.5)
GFR SERPL CREATININE-BSD FRML MDRD: 37 ML/MIN/1.73M2
GLUCOSE BLD-MCNC: 103 MG/DL (ref 70–125)
HCT VFR BLD AUTO: 33 % (ref 40–54)
HGB BLD-MCNC: 10.4 G/DL (ref 14–18)
LYMPHOCYTES # BLD AUTO: 0.4 THOU/UL (ref 0.8–4.4)
LYMPHOCYTES NFR BLD AUTO: 21 % (ref 20–40)
MCH RBC QN AUTO: 27.7 PG (ref 27–34)
MCHC RBC AUTO-ENTMCNC: 31.5 G/DL (ref 32–36)
MCV RBC AUTO: 88 FL (ref 80–100)
METAMYELOCYTES (ABSOLUTE): 0 THOU/UL
METAMYELOCYTES NFR BLD MANUAL: 1 %
MONOCYTES # BLD AUTO: 0.2 THOU/UL (ref 0–0.9)
MONOCYTES NFR BLD AUTO: 11 % (ref 2–10)
PLAT MORPH BLD: NORMAL
PLATELET # BLD AUTO: 236 THOU/UL (ref 140–440)
PMV BLD AUTO: 9.1 FL (ref 8.5–12.5)
POTASSIUM BLD-SCNC: 4.7 MMOL/L (ref 3.5–5)
RBC # BLD AUTO: 3.75 MILL/UL (ref 4.4–6.2)
SODIUM SERPL-SCNC: 138 MMOL/L (ref 136–145)
TOTAL NEUTROPHILS-ABS(DIFF): 1.2 THOU/UL (ref 2–7.7)
TOTAL NEUTROPHILS-REL(DIFF): 60 % (ref 50–70)
WBC: 2 THOU/UL (ref 4–11)

## 2018-12-10 PROCEDURE — 80197 ASSAY OF TACROLIMUS: CPT | Performed by: SURGERY

## 2018-12-10 NOTE — TELEPHONE ENCOUNTER
Provider Call: General  Route to LPN    Reason for call: Thurs is the last of orders for CBC with diff  Do you  Want to reorder     Call back needed? Yes    Return Call Needed  Same as documented in contacts section  When to return call?: Greater than one day: Route standard priority

## 2018-12-10 NOTE — LETTER
OUTPATIENT LABORATORY TEST ORDER    Patient Name:Harshad Beatty   Transplant Date: 8/7/2018  YOB: 1980  Issue Date & Time: 12/10/2018  3:03 PM  [facility] MR: [id number]  Expiration Date:  (1 year after date issued)      Diagnoses: Aftercare following organ transplant (ICD-10 Z48.288)   Kidney Transplant (ICD-10 Z94.0)   Long term use of medications (ICD-10  Z79.899)     ?Lab results to be available on the same day drawn.   ?Patient should release information to the [facility], [facility].     ?Please fax to the Transplant Center at [phone].    2x/week  11/9/2018 - 12/31/2018  1 x week 1/1/2019 - 2/9/2019  Every 2 weeks  2/10/2019 - 5/10/2019  Every 3 weeks 5/11/2019 - 8/7/2019       ?Hemogram and Platelet   ?Basic Metabolic Panel (Sodium, Potassium,Chloride, CO2, Creatinine, Urea Nitrogen, Glucose, Calcium)   ?Prograf/Tacrolimus drug level     Monthly   ? Random Urine for Protein/Creatinine ratio   ? BK PCR Quantitative (Polyoma virus - blood in purple top tube to Reference Lab)   ? PRA/DSA level (mailers provided by the patient)    At 6 & 12 months post-transplant         Due: 2/2019 & 8/2019   ? HBsurfaceAb, HBcoreAb, HCV at 6 months only -   ? Liver Function Tests(Bilirubin Direct/Total, AST, ALT, Alkaline Phosphatase)   ?Complete Lipid Panel fasting (Cholesterol, Triglycerides, HDL, LDL)                                      If you have any questions, please call The Transplant Center at [phone] or [phone].    Please fax all results to [phone].  .

## 2018-12-10 NOTE — LETTER
OUTPATIENT LABORATORY TEST ORDER    Patient Name:Harshad Beatty   Transplant Date: 8/7/2018  YOB: 1980  Issue Date & Time: 12/10/2018  3:03 PM  [facility] MR: [id number]  Expiration Date:  (1 year after date issued)      Diagnoses: Aftercare following organ transplant (ICD-10 Z48.288)   Kidney Transplant (ICD-10 Z94.0)   Long term use of medications (ICD-10  Z79.899)     ?Lab results to be available on the same day drawn.   ?Patient should release information to the [facility], [facility].     ?Please fax to the Transplant Center at [phone].    2x/week  11/9/2018 - 12/31/2018  1 x week 1/1/2019 - 2/9/2019  Every 2 weeks  2/10/2019 - 5/10/2019  Every 3 weeks 5/11/2019 - 8/7/2019       ?Hemogram and Platelet   ?Basic Metabolic Panel (Sodium, Potassium,Chloride, CO2, Creatinine, Urea Nitrogen, Glucose, Calcium)   ?Prograf/Tacrolimus drug level     Monthly   ? Random Urine for Protein/Creatinine ratio   ? BK PCR Quantitative (Polyoma virus - blood in purple top tube to Reference Lab)   ? PRA/DSA level (mailers provided by the patient)    At 6 & 12 months post-transplant         Due: 2/2019 & 8/2019   ? HBsurfaceAb, HBcoreAb, HCV at 6 months only -   ? Liver Function Tests(Bilirubin Direct/Total, AST, ALT, Alkaline Phosphatase)   ?Complete Lipid Panel fasting (Cholesterol, Triglycerides, HDL, LDL)                                      If you have any questions, please call The Transplant Center at [phone] or [phone].    Please fax all results to [phone].  .

## 2018-12-11 LAB
MYCOPHENOLATE SERPL LC/MS/MS-MCNC: 1.21 MG/L (ref 1–3.5)
MYCOPHENOLATE-G SERPL LC/MS/MS-MCNC: 51.9 MG/L (ref 30–95)
TACROLIMUS BLD-MCNC: 7.9 UG/L (ref 5–15)
TME LAST DOSE: NORMAL H
TME LAST DOSE: NORMAL H

## 2018-12-12 LAB
DONOR IDENTIFICATION: NORMAL
DPB1*01: 1795
DSA COMMENTS: NORMAL
DSA PRESENT: YES
DSA TEST METHOD: NORMAL
ORGAN: NORMAL
SA1 CELL: NORMAL
SA1 COMMENTS: NORMAL
SA1 HI RISK ABY: NORMAL
SA1 MOD RISK ABY: NORMAL
SA1 TEST METHOD: NORMAL
SA2 CELL: NORMAL
SA2 COMMENTS: NORMAL
SA2 HI RISK ABY UA: NORMAL
SA2 MOD RISK ABY: NORMAL
SA2 TEST METHOD: NORMAL
UNACCEPTABLE ANTIGEN: NORMAL
UNOS CPRA: 0

## 2018-12-13 ENCOUNTER — RECORDS - HEALTHEAST (OUTPATIENT)
Dept: LAB | Facility: CLINIC | Age: 38
End: 2018-12-13

## 2018-12-13 DIAGNOSIS — Z48.298 AFTERCARE FOLLOWING ORGAN TRANSPLANT: ICD-10-CM

## 2018-12-13 DIAGNOSIS — Z94.0 KIDNEY REPLACED BY TRANSPLANT: ICD-10-CM

## 2018-12-13 LAB
ANION GAP SERPL CALCULATED.3IONS-SCNC: 7 MMOL/L (ref 5–18)
BUN SERPL-MCNC: 17 MG/DL (ref 8–22)
CALCIUM SERPL-MCNC: 10 MG/DL (ref 8.5–10.5)
CHLORIDE BLD-SCNC: 110 MMOL/L (ref 98–107)
CO2 SERPL-SCNC: 22 MMOL/L (ref 22–31)
CREAT SERPL-MCNC: 1.9 MG/DL (ref 0.7–1.3)
ERYTHROCYTE [DISTWIDTH] IN BLOOD BY AUTOMATED COUNT: 13.5 % (ref 11–14.5)
GFR SERPL CREATININE-BSD FRML MDRD: 40 ML/MIN/1.73M2
GLUCOSE BLD-MCNC: 99 MG/DL (ref 70–125)
HCT VFR BLD AUTO: 33.4 % (ref 40–54)
HGB BLD-MCNC: 10.6 G/DL (ref 14–18)
MCH RBC QN AUTO: 27.7 PG (ref 27–34)
MCHC RBC AUTO-ENTMCNC: 31.7 G/DL (ref 32–36)
MCV RBC AUTO: 87 FL (ref 80–100)
PLATELET # BLD AUTO: 244 THOU/UL (ref 140–440)
PMV BLD AUTO: 8.7 FL (ref 8.5–12.5)
POTASSIUM BLD-SCNC: 4.8 MMOL/L (ref 3.5–5)
RBC # BLD AUTO: 3.83 MILL/UL (ref 4.4–6.2)
SODIUM SERPL-SCNC: 139 MMOL/L (ref 136–145)
TACROLIMUS BLD-MCNC: 5.7 UG/L (ref 5–15)
TME LAST DOSE: NORMAL H
WBC: 2.3 THOU/UL (ref 4–11)

## 2018-12-13 PROCEDURE — 80197 ASSAY OF TACROLIMUS: CPT | Performed by: SURGERY

## 2018-12-14 ENCOUNTER — TELEPHONE (OUTPATIENT)
Dept: NEPHROLOGY | Facility: CLINIC | Age: 38
End: 2018-12-14

## 2018-12-14 DIAGNOSIS — Z94.0 KIDNEY REPLACED BY TRANSPLANT: Primary | ICD-10-CM

## 2018-12-14 NOTE — TELEPHONE ENCOUNTER
ISSUE:   Tacrolimus level 5.7 on 12/13/18, goal 8-10, dose 2.5mg AM, 2 mg PM    PLAN:   Please call pt and confirm this was a good 12-hour trough. Verify dose as above. Confirm no new medications or illness (laith. Diarrhea). If good trough, increase dose to 2.5 mg BID and recheck level as scheduled.     RNCC left detailed VM to increase dose and asked for Patricia to call back to confirm he got this message.

## 2018-12-14 NOTE — TELEPHONE ENCOUNTER
DSA dicussed with Dr. Ronquillo - historic DSA showed on most recent draw, will check monthly through 1 year.  RNCC left detailed VM and asked Harshad to bring lab  kits on the first of every month starting after the new year. Call tx center if he needs additional  kits.

## 2018-12-17 ENCOUNTER — RECORDS - HEALTHEAST (OUTPATIENT)
Dept: LAB | Facility: CLINIC | Age: 38
End: 2018-12-17

## 2018-12-17 DIAGNOSIS — Z48.298 AFTERCARE FOLLOWING ORGAN TRANSPLANT: ICD-10-CM

## 2018-12-17 DIAGNOSIS — Z94.0 KIDNEY REPLACED BY TRANSPLANT: ICD-10-CM

## 2018-12-17 LAB
ANION GAP SERPL CALCULATED.3IONS-SCNC: 5 MMOL/L (ref 5–18)
BUN SERPL-MCNC: 18 MG/DL (ref 8–22)
CALCIUM SERPL-MCNC: 9.8 MG/DL (ref 8.5–10.5)
CHLORIDE BLD-SCNC: 110 MMOL/L (ref 98–107)
CO2 SERPL-SCNC: 23 MMOL/L (ref 22–31)
CREAT SERPL-MCNC: 1.88 MG/DL (ref 0.7–1.3)
ERYTHROCYTE [DISTWIDTH] IN BLOOD BY AUTOMATED COUNT: 13.2 % (ref 11–14.5)
GFR SERPL CREATININE-BSD FRML MDRD: 40 ML/MIN/1.73M2
GLUCOSE BLD-MCNC: 102 MG/DL (ref 70–125)
HCT VFR BLD AUTO: 33.2 % (ref 40–54)
HGB BLD-MCNC: 10.5 G/DL (ref 14–18)
MCH RBC QN AUTO: 27.6 PG (ref 27–34)
MCHC RBC AUTO-ENTMCNC: 31.6 G/DL (ref 32–36)
MCV RBC AUTO: 87 FL (ref 80–100)
PLATELET # BLD AUTO: 240 THOU/UL (ref 140–440)
PMV BLD AUTO: 9.3 FL (ref 8.5–12.5)
POTASSIUM BLD-SCNC: 4.6 MMOL/L (ref 3.5–5)
RBC # BLD AUTO: 3.81 MILL/UL (ref 4.4–6.2)
SODIUM SERPL-SCNC: 138 MMOL/L (ref 136–145)
TACROLIMUS BLD-MCNC: 5.4 UG/L (ref 5–15)
TME LAST DOSE: NORMAL H
WBC: 2.3 THOU/UL (ref 4–11)

## 2018-12-17 PROCEDURE — 80197 ASSAY OF TACROLIMUS: CPT | Performed by: SURGERY

## 2018-12-18 ENCOUNTER — TELEPHONE (OUTPATIENT)
Dept: TRANSPLANT | Facility: CLINIC | Age: 38
End: 2018-12-18

## 2018-12-18 DIAGNOSIS — I15.0 RENOVASCULAR HYPERTENSION: ICD-10-CM

## 2018-12-18 DIAGNOSIS — Z94.0 KIDNEY REPLACED BY TRANSPLANT: ICD-10-CM

## 2018-12-18 RX ORDER — TACROLIMUS 1 MG/1
2 CAPSULE ORAL 2 TIMES DAILY
Qty: 120 CAPSULE | Refills: 11 | Status: SHIPPED | OUTPATIENT
Start: 2018-12-18 | End: 2018-12-24

## 2018-12-18 RX ORDER — TACROLIMUS 0.5 MG/1
CAPSULE ORAL
Qty: 60 CAPSULE | Refills: 11 | Status: SHIPPED | OUTPATIENT
Start: 2018-12-18 | End: 2018-12-24

## 2018-12-18 RX ORDER — CARVEDILOL 25 MG/1
50 TABLET ORAL 2 TIMES DAILY WITH MEALS
Qty: 60 TABLET | Refills: 0 | Status: SHIPPED | OUTPATIENT
Start: 2018-12-18 | End: 2019-01-07

## 2018-12-18 NOTE — TELEPHONE ENCOUNTER
ISSUE:   Tacrolimus level 5.7 on 12/13/18, goal 8-10, dose 2.5mg BID     PLAN:   Please call pt and confirm this was a good 12-hour trough. Verify dose as above. Confirm no new medications or illness (laith. Diarrhea). If good trough, increase dose to 3 mg BID and recheck level as scheduled.          Harshad voiced understanding.  RNCC asked Harshad to ensure he is leaving a VM if he calls and I cannot answer, as otherwise I do not know if he has tried to communicate with me.     Will repeat tac level on Thursday.    Discussed need to follow up with PCP so they can manage BPs. Will send two week script for coreg, then fill through PCP. Harshad voiced understanding.

## 2018-12-19 ENCOUNTER — TELEPHONE (OUTPATIENT)
Dept: TRANSPLANT | Facility: CLINIC | Age: 38
End: 2018-12-19

## 2018-12-19 DIAGNOSIS — Z94.0 KIDNEY REPLACED BY TRANSPLANT: ICD-10-CM

## 2018-12-19 RX ORDER — MYCOPHENOLATE MOFETIL 250 MG/1
1000 CAPSULE ORAL 2 TIMES DAILY
Qty: 240 CAPSULE | Refills: 11 | Status: SHIPPED | OUTPATIENT
Start: 2018-12-19 | End: 2020-01-15

## 2018-12-19 NOTE — TELEPHONE ENCOUNTER
Left message for patient regarding:  Start CellCept 1000 mg BID  Inform txp team if having diarrhea

## 2018-12-20 ENCOUNTER — RECORDS - HEALTHEAST (OUTPATIENT)
Dept: LAB | Facility: CLINIC | Age: 38
End: 2018-12-20

## 2018-12-20 DIAGNOSIS — Z48.298 AFTERCARE FOLLOWING ORGAN TRANSPLANT: ICD-10-CM

## 2018-12-20 DIAGNOSIS — Z94.0 KIDNEY REPLACED BY TRANSPLANT: ICD-10-CM

## 2018-12-20 LAB
ANION GAP SERPL CALCULATED.3IONS-SCNC: 7 MMOL/L (ref 5–18)
BUN SERPL-MCNC: 22 MG/DL (ref 8–22)
CALCIUM SERPL-MCNC: 10 MG/DL (ref 8.5–10.5)
CHLORIDE BLD-SCNC: 110 MMOL/L (ref 98–107)
CO2 SERPL-SCNC: 22 MMOL/L (ref 22–31)
CREAT SERPL-MCNC: 2 MG/DL (ref 0.7–1.3)
ERYTHROCYTE [DISTWIDTH] IN BLOOD BY AUTOMATED COUNT: 13.1 % (ref 11–14.5)
GFR SERPL CREATININE-BSD FRML MDRD: 38 ML/MIN/1.73M2
GLUCOSE BLD-MCNC: 105 MG/DL (ref 70–125)
HCT VFR BLD AUTO: 33.4 % (ref 40–54)
HGB BLD-MCNC: 10.4 G/DL (ref 14–18)
MCH RBC QN AUTO: 27.2 PG (ref 27–34)
MCHC RBC AUTO-ENTMCNC: 31.1 G/DL (ref 32–36)
MCV RBC AUTO: 87 FL (ref 80–100)
PLATELET # BLD AUTO: 224 THOU/UL (ref 140–440)
PMV BLD AUTO: 8.9 FL (ref 8.5–12.5)
POTASSIUM BLD-SCNC: 4.5 MMOL/L (ref 3.5–5)
RBC # BLD AUTO: 3.83 MILL/UL (ref 4.4–6.2)
SODIUM SERPL-SCNC: 139 MMOL/L (ref 136–145)
WBC: 2.2 THOU/UL (ref 4–11)

## 2018-12-20 PROCEDURE — 80197 ASSAY OF TACROLIMUS: CPT | Performed by: SURGERY

## 2018-12-21 ENCOUNTER — TELEPHONE (OUTPATIENT)
Dept: TRANSPLANT | Facility: CLINIC | Age: 38
End: 2018-12-21

## 2018-12-21 DIAGNOSIS — Z94.0 KIDNEY REPLACED BY TRANSPLANT: ICD-10-CM

## 2018-12-21 LAB
TACROLIMUS BLD-MCNC: 5.5 UG/L (ref 5–15)
TME LAST DOSE: NORMAL H

## 2018-12-21 NOTE — TELEPHONE ENCOUNTER
ISSUE:   Tacrolimus level 5.5 on 12/19/18, goal 8-10, dose 3 mg BID    PLAN:   Please call pt and confirm this was a good 12-hour trough. Verify dose 3 mg BID. Confirm no new medications or illness (laith. Diarrhea). If good trough, increase dose to 3.5 mg BID and recheck level with next set of transplant labs.      OUTCOME:  Call placed to pt, no answer.  Left message for pt to return call.

## 2018-12-24 ENCOUNTER — RECORDS - HEALTHEAST (OUTPATIENT)
Dept: LAB | Facility: CLINIC | Age: 38
End: 2018-12-24

## 2018-12-24 DIAGNOSIS — Z94.0 KIDNEY REPLACED BY TRANSPLANT: ICD-10-CM

## 2018-12-24 DIAGNOSIS — Z48.298 AFTERCARE FOLLOWING ORGAN TRANSPLANT: ICD-10-CM

## 2018-12-24 LAB
ANION GAP SERPL CALCULATED.3IONS-SCNC: 6 MMOL/L (ref 5–18)
BUN SERPL-MCNC: 21 MG/DL (ref 8–22)
CALCIUM SERPL-MCNC: 9.8 MG/DL (ref 8.5–10.5)
CHLORIDE BLD-SCNC: 107 MMOL/L (ref 98–107)
CO2 SERPL-SCNC: 24 MMOL/L (ref 22–31)
CREAT SERPL-MCNC: 1.8 MG/DL (ref 0.7–1.3)
ERYTHROCYTE [DISTWIDTH] IN BLOOD BY AUTOMATED COUNT: 12.8 % (ref 11–14.5)
GFR SERPL CREATININE-BSD FRML MDRD: 42 ML/MIN/1.73M2
GLUCOSE BLD-MCNC: 93 MG/DL (ref 70–125)
HCT VFR BLD AUTO: 34.3 % (ref 40–54)
HGB BLD-MCNC: 10.7 G/DL (ref 14–18)
MCH RBC QN AUTO: 27.2 PG (ref 27–34)
MCHC RBC AUTO-ENTMCNC: 31.2 G/DL (ref 32–36)
MCV RBC AUTO: 87 FL (ref 80–100)
PLATELET # BLD AUTO: 226 THOU/UL (ref 140–440)
PMV BLD AUTO: 9.1 FL (ref 8.5–12.5)
POTASSIUM BLD-SCNC: 4.3 MMOL/L (ref 3.5–5)
RBC # BLD AUTO: 3.94 MILL/UL (ref 4.4–6.2)
SODIUM SERPL-SCNC: 137 MMOL/L (ref 136–145)
WBC: 2.5 THOU/UL (ref 4–11)

## 2018-12-24 PROCEDURE — 80197 ASSAY OF TACROLIMUS: CPT | Performed by: SURGERY

## 2018-12-24 RX ORDER — TACROLIMUS 0.5 MG/1
0.5 CAPSULE ORAL 2 TIMES DAILY
Qty: 60 CAPSULE | Refills: 11 | Status: SHIPPED | OUTPATIENT
Start: 2018-12-24 | End: 2018-12-28

## 2018-12-24 RX ORDER — TACROLIMUS 1 MG/1
3 CAPSULE ORAL 2 TIMES DAILY
Qty: 180 CAPSULE | Refills: 11 | Status: SHIPPED | OUTPATIENT
Start: 2018-12-24 | End: 2018-12-28

## 2018-12-24 NOTE — TELEPHONE ENCOUNTER
RNCC left detailed VM asking Patricia to increase tac dose and call back to confirm he has done so.

## 2018-12-25 LAB
TACROLIMUS BLD-MCNC: 9.7 UG/L (ref 5–15)
TME LAST DOSE: NORMAL H

## 2018-12-26 ENCOUNTER — DOCUMENTATION ONLY (OUTPATIENT)
Dept: TRANSPLANT | Facility: CLINIC | Age: 38
End: 2018-12-26

## 2018-12-26 NOTE — PROGRESS NOTES
Chart Prep    Clinic Visit on:  2/7/19    Last lab completed:  12/24/18    Lab letter updated: 12/10/18    Lab orders up to date in Epic.

## 2018-12-27 ENCOUNTER — RECORDS - HEALTHEAST (OUTPATIENT)
Dept: LAB | Facility: CLINIC | Age: 38
End: 2018-12-27

## 2018-12-27 DIAGNOSIS — Z94.0 KIDNEY REPLACED BY TRANSPLANT: ICD-10-CM

## 2018-12-27 DIAGNOSIS — Z48.298 AFTERCARE FOLLOWING ORGAN TRANSPLANT: ICD-10-CM

## 2018-12-27 LAB
ANION GAP SERPL CALCULATED.3IONS-SCNC: 7 MMOL/L (ref 5–18)
BUN SERPL-MCNC: 25 MG/DL (ref 8–22)
CALCIUM SERPL-MCNC: 10 MG/DL (ref 8.5–10.5)
CHLORIDE BLD-SCNC: 111 MMOL/L (ref 98–107)
CO2 SERPL-SCNC: 23 MMOL/L (ref 22–31)
CREAT SERPL-MCNC: 1.9 MG/DL (ref 0.7–1.3)
ERYTHROCYTE [DISTWIDTH] IN BLOOD BY AUTOMATED COUNT: 13 % (ref 11–14.5)
GFR SERPL CREATININE-BSD FRML MDRD: 40 ML/MIN/1.73M2
GLUCOSE BLD-MCNC: 103 MG/DL (ref 70–125)
HCT VFR BLD AUTO: 35.2 % (ref 40–54)
HGB BLD-MCNC: 11.1 G/DL (ref 14–18)
MCH RBC QN AUTO: 27.8 PG (ref 27–34)
MCHC RBC AUTO-ENTMCNC: 31.5 G/DL (ref 32–36)
MCV RBC AUTO: 88 FL (ref 80–100)
PLATELET # BLD AUTO: 221 THOU/UL (ref 140–440)
PMV BLD AUTO: 9.2 FL (ref 8.5–12.5)
POTASSIUM BLD-SCNC: 4.3 MMOL/L (ref 3.5–5)
RBC # BLD AUTO: 3.99 MILL/UL (ref 4.4–6.2)
SODIUM SERPL-SCNC: 141 MMOL/L (ref 136–145)
TACROLIMUS BLD-MCNC: 11.5 UG/L (ref 5–15)
TME LAST DOSE: NORMAL H
WBC: 3 THOU/UL (ref 4–11)

## 2018-12-27 PROCEDURE — 80197 ASSAY OF TACROLIMUS: CPT | Performed by: SURGERY

## 2018-12-28 ENCOUNTER — TELEPHONE (OUTPATIENT)
Dept: TRANSPLANT | Facility: CLINIC | Age: 38
End: 2018-12-28

## 2018-12-28 DIAGNOSIS — Z94.0 KIDNEY REPLACED BY TRANSPLANT: ICD-10-CM

## 2018-12-28 RX ORDER — TACROLIMUS 1 MG/1
3 CAPSULE ORAL 2 TIMES DAILY
Qty: 180 CAPSULE | Refills: 11 | Status: SHIPPED | OUTPATIENT
Start: 2018-12-28 | End: 2019-01-29

## 2018-12-28 RX ORDER — TACROLIMUS 0.5 MG/1
CAPSULE ORAL
Qty: 60 CAPSULE | Refills: 11 | Status: SHIPPED | OUTPATIENT
Start: 2018-12-28 | End: 2019-01-29

## 2018-12-28 NOTE — TELEPHONE ENCOUNTER
ISSUE:   Tacrolimus level 11.x on 12/27, goal 8-10, dose 3.5 mg BID    PLAN:   Please call pt and confirm this was a good 12-hour trough. Verify dose 3.5 mg BID. Confirm no new medications or illness (laith. Diarrhea). If good trough, decrease dose to 3 mg BID and recheck level as scheduled.    **OKAY TO REDUCE LAB DRAWS TO WEEKLY.    OUTCOME: Patricia confirmed and voiced understanding.

## 2018-12-31 ENCOUNTER — RECORDS - HEALTHEAST (OUTPATIENT)
Dept: LAB | Facility: CLINIC | Age: 38
End: 2018-12-31

## 2018-12-31 ENCOUNTER — RESULTS ONLY (OUTPATIENT)
Dept: OTHER | Facility: CLINIC | Age: 38
End: 2018-12-31

## 2018-12-31 DIAGNOSIS — Z94.0 KIDNEY REPLACED BY TRANSPLANT: ICD-10-CM

## 2018-12-31 DIAGNOSIS — Z48.298 AFTERCARE FOLLOWING ORGAN TRANSPLANT: ICD-10-CM

## 2018-12-31 LAB
ANION GAP SERPL CALCULATED.3IONS-SCNC: 5 MMOL/L (ref 5–18)
BUN SERPL-MCNC: 16 MG/DL (ref 8–22)
CALCIUM SERPL-MCNC: 9.9 MG/DL (ref 8.5–10.5)
CHLORIDE BLD-SCNC: 108 MMOL/L (ref 98–107)
CO2 SERPL-SCNC: 24 MMOL/L (ref 22–31)
CREAT SERPL-MCNC: 1.8 MG/DL (ref 0.7–1.3)
ERYTHROCYTE [DISTWIDTH] IN BLOOD BY AUTOMATED COUNT: 12.9 % (ref 11–14.5)
GFR SERPL CREATININE-BSD FRML MDRD: 42 ML/MIN/1.73M2
GLUCOSE BLD-MCNC: 95 MG/DL (ref 70–125)
HCT VFR BLD AUTO: 36.3 % (ref 40–54)
HGB BLD-MCNC: 11.3 G/DL (ref 14–18)
MCH RBC QN AUTO: 27.2 PG (ref 27–34)
MCHC RBC AUTO-ENTMCNC: 31.1 G/DL (ref 32–36)
MCV RBC AUTO: 87 FL (ref 80–100)
PLATELET # BLD AUTO: 263 THOU/UL (ref 140–440)
PMV BLD AUTO: 9.4 FL (ref 8.5–12.5)
POTASSIUM BLD-SCNC: 4.7 MMOL/L (ref 3.5–5)
RBC # BLD AUTO: 4.16 MILL/UL (ref 4.4–6.2)
SODIUM SERPL-SCNC: 137 MMOL/L (ref 136–145)
WBC: 2.6 THOU/UL (ref 4–11)

## 2018-12-31 PROCEDURE — 86833 HLA CLASS II HIGH DEFIN QUAL: CPT | Performed by: SURGERY

## 2018-12-31 PROCEDURE — 80197 ASSAY OF TACROLIMUS: CPT | Performed by: SURGERY

## 2018-12-31 PROCEDURE — 86832 HLA CLASS I HIGH DEFIN QUAL: CPT | Performed by: SURGERY

## 2019-01-02 ENCOUNTER — TELEPHONE (OUTPATIENT)
Dept: TRANSPLANT | Facility: CLINIC | Age: 39
End: 2019-01-02

## 2019-01-02 NOTE — LETTER
OUTPATIENT LABORATORY TEST ORDER    Patient Name:Harshad Beatty   Transplant Date: 8/7/2018  YOB: 1980  Issue Date & Time: 1/2/2019  10:00 AM  [facility] MR: [id number]  Expiration Date:  (1 year after date issued)      Diagnoses: Aftercare following organ transplant (ICD-10 Z48.288)   Kidney Transplant (ICD-10 Z94.0)   Long term use of medications (ICD-10  Z79.899)     ?Lab results to be available on the same day drawn.   ?Patient should release information to the [facility], [facility].     ?Please fax to the Transplant Center at [phone].    1 x week 1/1/2019 - 2/9/2019  Every 2 weeks  2/10/2019 - 5/10/2019  Every 3 weeks 5/11/2019 - 8/7/2019       ?Hemogram and Platelet   ?Basic Metabolic Panel (Sodium, Potassium,Chloride, CO2, Creatinine, Urea Nitrogen, Glucose, Calcium)   ?Prograf/Tacrolimus drug level     Monthly   DUE: NOW   ? Random Urine for Protein/Creatinine ratio   ? BK PCR Quantitative (Polyoma virus - blood in purple top tube to Reference Lab)   ? PRA/DSA level (mailers provided by the patient)    At 6 & 12 months post-transplant         Due: 2/2019 & 8/2019   ? HBsurfaceAb, HBcoreAb, HCV at 6 months only -   ? Liver Function Tests(Bilirubin Direct/Total, AST, ALT, Alkaline Phosphatase)   ?Complete Lipid Panel fasting (Cholesterol, Triglycerides, HDL, LDL)                                      If you have any questions, please call The Transplant Center at [phone] or [phone].    Please fax all results to [phone].  .

## 2019-01-02 NOTE — TELEPHONE ENCOUNTER
Updated standing lab orders faxed to patients lab and mailed copy to patient.  Left message for patient reminding him to have PRA/DSA drawn at next lab draw, bring DSA kit with to the lab.

## 2019-01-02 NOTE — LETTER
OUTPATIENT LABORATORY TEST ORDER    Patient Name:Harshad Beatty   Transplant Date: 8/7/2018  YOB: 1980  Issue Date & Time: 1/2/2019  10:00 AM  [facility] MR: [id number]  Expiration Date:  (1 year after date issued)      Diagnoses: Aftercare following organ transplant (ICD-10 Z48.288)   Kidney Transplant (ICD-10 Z94.0)   Long term use of medications (ICD-10  Z79.899)     ?Lab results to be available on the same day drawn.   ?Patient should release information to the [facility], [facility].     ?Please fax to the Transplant Center at [phone].    1 x week 1/1/2019 - 2/9/2019  Every 2 weeks  2/10/2019 - 5/10/2019  Every 3 weeks 5/11/2019 - 8/7/2019       ?Hemogram and Platelet   ?Basic Metabolic Panel (Sodium, Potassium,Chloride, CO2, Creatinine, Urea Nitrogen, Glucose, Calcium)   ?Prograf/Tacrolimus drug level     Monthly   DUE: NOW   ? Random Urine for Protein/Creatinine ratio   ? BK PCR Quantitative (Polyoma virus - blood in purple top tube to Reference Lab)   ? PRA/DSA level (mailers provided by the patient)    At 6 & 12 months post-transplant         Due: 2/2019 & 8/2019   ? HBsurfaceAb, HBcoreAb, HCV at 6 months only -   ? Liver Function Tests(Bilirubin Direct/Total, AST, ALT, Alkaline Phosphatase)   ?Complete Lipid Panel fasting (Cholesterol, Triglycerides, HDL, LDL)                                      If you have any questions, please call The Transplant Center at [phone] or [phone].    Please fax all results to [phone].  .

## 2019-01-04 LAB
DONOR IDENTIFICATION: NORMAL
DPB1*01: 1272
DSA COMMENTS: NORMAL
DSA PRESENT: YES
DSA TEST METHOD: NORMAL
ORGAN: NORMAL

## 2019-01-07 ENCOUNTER — RESULTS ONLY (OUTPATIENT)
Dept: OTHER | Facility: CLINIC | Age: 39
End: 2019-01-07

## 2019-01-07 ENCOUNTER — TELEPHONE (OUTPATIENT)
Dept: TRANSPLANT | Facility: CLINIC | Age: 39
End: 2019-01-07

## 2019-01-07 ENCOUNTER — RECORDS - HEALTHEAST (OUTPATIENT)
Dept: LAB | Facility: CLINIC | Age: 39
End: 2019-01-07

## 2019-01-07 DIAGNOSIS — Z48.298 AFTERCARE FOLLOWING ORGAN TRANSPLANT: ICD-10-CM

## 2019-01-07 DIAGNOSIS — T82.9XXA COMPLICATION OF ARTERIOVENOUS DIALYSIS FISTULA: ICD-10-CM

## 2019-01-07 DIAGNOSIS — Z94.0 KIDNEY REPLACED BY TRANSPLANT: ICD-10-CM

## 2019-01-07 DIAGNOSIS — R79.89 ELEVATED SERUM CREATININE: ICD-10-CM

## 2019-01-07 DIAGNOSIS — I15.0 RENOVASCULAR HYPERTENSION: ICD-10-CM

## 2019-01-07 DIAGNOSIS — Z94.0 KIDNEY TRANSPLANTED: Primary | ICD-10-CM

## 2019-01-07 LAB
ANION GAP SERPL CALCULATED.3IONS-SCNC: 7 MMOL/L (ref 5–18)
BK VIRUS SPECIMEN SOURCE: NORMAL
BKV DNA # SPEC NAA+PROBE: NORMAL COPIES/ML
BKV DNA SPEC NAA+PROBE-LOG#: NORMAL LOG COPIES/ML
BUN SERPL-MCNC: 22 MG/DL (ref 8–22)
CALCIUM SERPL-MCNC: 9.8 MG/DL (ref 8.5–10.5)
CHLORIDE BLD-SCNC: 112 MMOL/L (ref 98–107)
CO2 SERPL-SCNC: 20 MMOL/L (ref 22–31)
CREAT SERPL-MCNC: 2.17 MG/DL (ref 0.7–1.3)
ERYTHROCYTE [DISTWIDTH] IN BLOOD BY AUTOMATED COUNT: 12.7 % (ref 11–14.5)
GFR SERPL CREATININE-BSD FRML MDRD: 34 ML/MIN/1.73M2
GLUCOSE BLD-MCNC: 132 MG/DL (ref 70–125)
HCT VFR BLD AUTO: 36.7 % (ref 40–54)
HGB BLD-MCNC: 11.5 G/DL (ref 14–18)
MCH RBC QN AUTO: 26.9 PG (ref 27–34)
MCHC RBC AUTO-ENTMCNC: 31.3 G/DL (ref 32–36)
MCV RBC AUTO: 86 FL (ref 80–100)
PLATELET # BLD AUTO: 240 THOU/UL (ref 140–440)
PMV BLD AUTO: 9.4 FL (ref 8.5–12.5)
POTASSIUM BLD-SCNC: 4.7 MMOL/L (ref 3.5–5)
RBC # BLD AUTO: 4.28 MILL/UL (ref 4.4–6.2)
SODIUM SERPL-SCNC: 139 MMOL/L (ref 136–145)
WBC: 2.5 THOU/UL (ref 4–11)

## 2019-01-07 PROCEDURE — 86833 HLA CLASS II HIGH DEFIN QUAL: CPT | Performed by: PATHOLOGY

## 2019-01-07 PROCEDURE — 80197 ASSAY OF TACROLIMUS: CPT | Performed by: SURGERY

## 2019-01-07 PROCEDURE — 86832 HLA CLASS I HIGH DEFIN QUAL: CPT | Performed by: PATHOLOGY

## 2019-01-07 RX ORDER — CARVEDILOL 25 MG/1
50 TABLET ORAL 2 TIMES DAILY WITH MEALS
Qty: 120 TABLET | Refills: 11 | Status: SHIPPED | OUTPATIENT
Start: 2019-01-07 | End: 2024-09-16

## 2019-01-07 NOTE — TELEPHONE ENCOUNTER
Creatinine elevated - any s/s of dehydration?    RNCC left detailed VM asking for return phone call regarding general health update.

## 2019-01-07 NOTE — TELEPHONE ENCOUNTER
Post Kidney and Pancreas Transplant Team Conference  Date: 1/7/2019  Transplant Coordinator: Mirna Harrell     Attendees:  []  Dr. Bahena [] Yanni Field, RN  [] Lynda Wilson LPN     []  Dr. Gaxiola [] Tonya Albrecht RN [] Sandra Epperson LPN   [x]  Dr. Connolly [] Sherry Jimenez RN    []  Dr. Ronquillo [] Julieta Perez RN    [] Dr. Lema [x] Eli Harrell RN    [] Dr. Mitchell [] Randy Peter RN    [] Surgery Fellow [] Dorcas Lopez RN    [] Tracy Finley, NP [] Blanca Sow RN    [] Lambert Wall, PharmD [] Allison Aaron RN     [] Philippe Colon, RN     [] Yanni Rasmussen RN        Verbal Plan Read Back:   Kidney biopsy due to elevated creatinine in setting of DSA.    Routed to RN Coordinator   Mirna Harrell

## 2019-01-07 NOTE — TELEPHONE ENCOUNTER
Transplant Coordinator Renal Biopsy Communication    Call placed to Harshad Beatty to discuss indication for kidney transplant biopsy per Dr. Connolly.     Indication for transplant renal biopsy: elevated serum creatinine in setting of DSA   Laterality: right  Date of biopsy: 1/14/19    Patient location within 70 miles of [facility]: Yes.  If no, must stay overnight locally. Pt verbalizes staying at home.     Harshad Beatty's medication list was reviewed.   Anticoagulant: none   Ibuprofen: No.  Fish Oil:  No.  Medications held: none    Recent blood pressure readings are WNL or not applicable. Instructed to take medication, especially blood pressure medications, before arriving to the Clinic and Surgery Center at 0600 day of procedure.     Procedure expectations and duration of stay discussed. Expressed pt can expect a phone call from LPN/MA to confirm biopsy date/time/location/directions/review of medications. Pt has no additional questions at this time. Transplant Office phone number given to pt for future questions.        Mirna Harrell  Kidney/Pancreas Transplant Coordinator  [phone] option 5

## 2019-01-08 DIAGNOSIS — Z94.0 KIDNEY TRANSPLANTED: Primary | ICD-10-CM

## 2019-01-08 LAB
TACROLIMUS BLD-MCNC: 8.2 UG/L (ref 5–15)
TME LAST DOSE: NORMAL H

## 2019-01-08 NOTE — TELEPHONE ENCOUNTER
Post Kidney and Pancreas Transplant Team Conference  Date: 1/8/2019  Transplant Coordinator: Mirna Harrell     Attendees:  []  Dr. Bahena [] Yanni Field, RN  [] Lynda Wilson LPN     []  Dr. Gaxiola [] Tonya Albrecht RN [] Sandra Epperson LPN   [x]  Dr. Connolly [] Sherry Jimenez RN    []  Dr. Ronquillo [] Julieta Perez RN    [] Dr. Lema [x] Eli Harrell RN    [] Dr. Mitchell [] Randy Peter RN    [] Surgery Fellow [] Dorcas Lopez RN    [] Tracy Finley, NP [] Blanca Sow RN    [] Lambert Wall, PharmD [] Allison Aaron, RN     [] Philippe Colon, RN     [] Yanni Rasmussen, RN        Verbal Plan Read Back:   Biopsy for DSA.  US of fistula as fistula has enlarged.    Routed to RN Coordinator   Mirna Patricia voiced understanding.

## 2019-01-10 ENCOUNTER — HOSPITAL ENCOUNTER (OUTPATIENT)
Dept: RESPIRATORY THERAPY | Facility: CLINIC | Age: 39
Discharge: HOME | End: 2019-01-10

## 2019-01-10 ENCOUNTER — COMMUNICATION - HEALTHEAST (OUTPATIENT)
Dept: TELEHEALTH | Facility: CLINIC | Age: 39
End: 2019-01-10

## 2019-01-10 DIAGNOSIS — Z94.0 KIDNEY REPLACED BY TRANSPLANT: ICD-10-CM

## 2019-01-10 LAB
DONOR IDENTIFICATION: NORMAL
DPB1*01: 956
DSA COMMENTS: NORMAL
DSA PRESENT: YES
DSA TEST METHOD: NORMAL
ORGAN: NORMAL
SA1 CELL: NORMAL
SA1 COMMENTS: NORMAL
SA1 HI RISK ABY: NORMAL
SA1 MOD RISK ABY: NORMAL
SA1 TEST METHOD: NORMAL
SA2 CELL: NORMAL
SA2 COMMENTS: NORMAL
SA2 HI RISK ABY UA: NORMAL
SA2 MOD RISK ABY: NORMAL
SA2 TEST METHOD: NORMAL
UNACCEPTABLE ANTIGEN: NORMAL
UNOS CPRA: 0

## 2019-01-11 NOTE — TELEPHONE ENCOUNTER
LPN/MA  Renal Biopsy Communication    Call to pt to confirm renal biopsy procedure. Biopsy orders entered and patient aware of [place] [facility] at 6:00[place].     No need to be NPO.      Discussed anticoagulants (i.e. fish oil, ASA, Plavix, Coumadin, Ibuprofen). Pt denies use of anticoagulants.     Take all medicine before arrival for biopsy and bring all medicine bottles with.      Report to 1st floor lab, then 5th floor for biopsy.      Use [employer] and bring form of entertainment.     Discussed with patient need to stay overnight locally evening of procedure if live more than 70 miles away.    Call placed to scheduling at [phone] to schedule and confirm biopsy date/time    Lab appointment scheduled for morning of biopsy.

## 2019-01-14 ENCOUNTER — RESULTS ONLY (OUTPATIENT)
Dept: OTHER | Facility: CLINIC | Age: 39
End: 2019-01-14

## 2019-01-14 ENCOUNTER — ANCILLARY PROCEDURE (OUTPATIENT)
Dept: ULTRASOUND IMAGING | Facility: CLINIC | Age: 39
End: 2019-01-14
Payer: COMMERCIAL

## 2019-01-14 ENCOUNTER — ANCILLARY PROCEDURE (OUTPATIENT)
Dept: RADIOLOGY | Facility: AMBULATORY SURGERY CENTER | Age: 39
End: 2019-01-14
Payer: COMMERCIAL

## 2019-01-14 ENCOUNTER — HOSPITAL ENCOUNTER (OUTPATIENT)
Facility: AMBULATORY SURGERY CENTER | Age: 39
End: 2019-01-14
Attending: INTERNAL MEDICINE
Payer: COMMERCIAL

## 2019-01-14 VITALS
SYSTOLIC BLOOD PRESSURE: 148 MMHG | DIASTOLIC BLOOD PRESSURE: 70 MMHG | TEMPERATURE: 97.7 F | RESPIRATION RATE: 17 BRPM | HEART RATE: 69 BPM | OXYGEN SATURATION: 100 % | WEIGHT: 268 LBS | BODY MASS INDEX: 37.52 KG/M2 | HEIGHT: 71 IN

## 2019-01-14 DIAGNOSIS — Z94.0 KIDNEY REPLACED BY TRANSPLANT: ICD-10-CM

## 2019-01-14 DIAGNOSIS — E86.0 DEHYDRATION: ICD-10-CM

## 2019-01-14 DIAGNOSIS — T82.9XXA COMPLICATION OF ARTERIOVENOUS DIALYSIS FISTULA: ICD-10-CM

## 2019-01-14 DIAGNOSIS — R79.89 ELEVATED SERUM CREATININE: ICD-10-CM

## 2019-01-14 DIAGNOSIS — Z94.0 KIDNEY TRANSPLANTED: ICD-10-CM

## 2019-01-14 LAB
ABO + RH BLD: NORMAL
ABO + RH BLD: NORMAL
ALBUMIN UR-MCNC: NEGATIVE MG/DL
ANION GAP SERPL CALCULATED.3IONS-SCNC: 6 MMOL/L (ref 3–14)
APPEARANCE UR: CLEAR
BASOPHILS # BLD AUTO: 0 10E9/L (ref 0–0.2)
BASOPHILS NFR BLD AUTO: 0.5 %
BILIRUB UR QL STRIP: NEGATIVE
BLD GP AB SCN SERPL QL: NORMAL
BLOOD BANK CMNT PATIENT-IMP: NORMAL
BUN SERPL-MCNC: 23 MG/DL (ref 7–30)
CALCIUM SERPL-MCNC: 8.6 MG/DL (ref 8.5–10.1)
CHLORIDE SERPL-SCNC: 107 MMOL/L (ref 94–109)
CO2 SERPL-SCNC: 23 MMOL/L (ref 20–32)
COLOR UR AUTO: YELLOW
CREAT SERPL-MCNC: 1.77 MG/DL (ref 0.66–1.25)
CREAT UR-MCNC: 137 MG/DL
DIFFERENTIAL METHOD BLD: ABNORMAL
EOSINOPHIL # BLD AUTO: 0.1 10E9/L (ref 0–0.7)
EOSINOPHIL NFR BLD AUTO: 3.8 %
ERYTHROCYTE [DISTWIDTH] IN BLOOD BY AUTOMATED COUNT: 12.4 % (ref 10–15)
GFR SERPL CREATININE-BSD FRML MDRD: 47 ML/MIN/{1.73_M2}
GLUCOSE SERPL-MCNC: 136 MG/DL (ref 70–99)
GLUCOSE UR STRIP-MCNC: NEGATIVE MG/DL
HCT VFR BLD AUTO: 36.1 % (ref 40–53)
HGB BLD-MCNC: 11.2 G/DL (ref 13.3–17.7)
HGB BLD-MCNC: 11.9 G/DL (ref 13.3–17.7)
HGB UR QL STRIP: ABNORMAL
IMM GRANULOCYTES # BLD: 0 10E9/L (ref 0–0.4)
IMM GRANULOCYTES NFR BLD: 0.5 %
INR PPP: 1.11 (ref 0.86–1.14)
KETONES UR STRIP-MCNC: NEGATIVE MG/DL
LEUKOCYTE ESTERASE UR QL STRIP: NEGATIVE
LYMPHOCYTES # BLD AUTO: 0.6 10E9/L (ref 0.8–5.3)
LYMPHOCYTES NFR BLD AUTO: 29.9 %
MCH RBC QN AUTO: 26.5 PG (ref 26.5–33)
MCHC RBC AUTO-ENTMCNC: 31 G/DL (ref 31.5–36.5)
MCV RBC AUTO: 86 FL (ref 78–100)
MICROALBUMIN UR-MCNC: 50 MG/L
MICROALBUMIN/CREAT UR: 36.57 MG/G CR (ref 0–17)
MONOCYTES # BLD AUTO: 0.3 10E9/L (ref 0–1.3)
MONOCYTES NFR BLD AUTO: 14.7 %
NEUTROPHILS # BLD AUTO: 1.1 10E9/L (ref 1.6–8.3)
NEUTROPHILS NFR BLD AUTO: 50.6 %
NITRATE UR QL: NEGATIVE
NRBC # BLD AUTO: 0 10*3/UL
NRBC BLD AUTO-RTO: 0 /100
PH UR STRIP: 5 PH (ref 5–7)
PLATELET # BLD AUTO: 255 10E9/L (ref 150–450)
POTASSIUM SERPL-SCNC: 3.7 MMOL/L (ref 3.4–5.3)
PROT UR-MCNC: 0.28 G/L
PROT/CREAT 24H UR: 0.2 G/G CR (ref 0–0.2)
RBC # BLD AUTO: 4.22 10E12/L (ref 4.4–5.9)
RBC #/AREA URNS AUTO: <1 /HPF (ref 0–2)
SODIUM SERPL-SCNC: 136 MMOL/L (ref 133–144)
SOURCE: ABNORMAL
SP GR UR STRIP: 1.01 (ref 1–1.03)
SPECIMEN EXP DATE BLD: NORMAL
UROBILINOGEN UR STRIP-MCNC: 0 MG/DL (ref 0–2)
WBC # BLD AUTO: 2.1 10E9/L (ref 4–11)
WBC #/AREA URNS AUTO: <1 /HPF (ref 0–5)

## 2019-01-14 RX ORDER — FLUDROCORTISONE ACETATE 0.1 MG/1
0.1 TABLET ORAL
Qty: 15 TABLET | Refills: 3 | COMMUNITY
Start: 2019-01-14 | End: 2019-02-08

## 2019-01-14 RX ADMIN — Medication 10 ML: at 08:51

## 2019-01-14 ASSESSMENT — MIFFLIN-ST. JEOR: SCORE: 2157.77

## 2019-01-14 NOTE — H&P
Nephrology Procedure H&P  01/14/2019     Assessment & Recommendations:   1. LDKT - baseline creatinine ~ 2.0-2.4, which has trended down slightly in the 1.8-2.0 range lately.. Normal proteinuria. Will plan on kidney transplant biopsy to evaluate rule out antibody-mediated rejection in setting of positive DSA. Will make no changes in immunosuppression.    Transplant History:  Transplant: 8/7/2018 (Kidney)        Donor Class:   Crossmatch at time of Transplant:  Negative  DSA at time of Transplant:  Yes  Present Maintenance Immunosuppression:  Tacrolimus and Mycophenolate mofetil  Baseline creatinine:  2.0-2.4  Latest DSA lab date:  PRA:  Class I:   SA1 Comments   Date Value Ref Range Status   01/07/2019   Final     Test performed by modified procedure. Serum heat inactivated and tested   by a modified ([place]) protocol including fetal calf serum addition.   High-risk, mfi >3,000. Mod-risk, mfi 500-3,000.         Class II:    SA2 Comments   Date Value Ref Range Status   01/07/2019   Final     Test performed by modified procedure. Serum heat inactivated and tested   by a modified ([place]) protocol including fetal calf serum addition.   High-risk, mfi >3,000. Mod-risk, mfi 500-3,000.       Biopsy:  Yes: 8/7/18  Rejection History:  No  Significant Complications: None  Transplant Coordinator: Mirna Harrell   Transplant Office Phone Number:  [phone]     2. Hypertension - fair control, near target of less than < 130/80. Will decrease Florinef to twice weekly with goal of tapering off if able..  3. Hyperkalemia - now with low normal serum potassium and will decrease Florinef, as noted above.  Continue low potassium diet and see if Florinef can be tapered off.  4. Elevated Blood Glucose - generally running ~ 90-130s.  Recommend weight loss and increased exercise.  5. Obesity - significant weight gain early after transplant.  Recommend increased exercise and watch caloric intake.    Reason for Visit:  Mr. Beatty is  here for DSA and potential kidney transplant biopsy.    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  He had negative crossmatch at time of the time of transplant, but did have DSA to DPB1 at 1150 mfi.  Post transplant, his DSA resolved, but recently returned in 4662-5594 mfi range.  No change in kidney function, even lower creatinine.  Presents for biopsy due to DSA.    Mr. Patricia reports feeling good overall, but still getting his strength back from the surgery.  His energy level is good and improved, but not normal.  Denies any chest pain or shortness of breath with exertion.  Appetite is good and he has gained about 30 lbs since transplant.  No nausea, vomiting or diarrhea.  No fever, sweats or chills.  Slight leg swelling.    Pain Over Kidney Tx:  Yes, with certain movements. Pain or Burning with Urination:  No  Gross Hematuria:  No  Taking NSAIDs:  No    Home BP: 130-140/70-80s    Review of Systems:  A comprehensive review of systems was obtained and negative, except as noted in the History of Present Illness or Active Medical Problems.    Active Medical Problems:  Patient Active Problem List    Diagnosis     Fever     Neutropenia (H)     Dehydration     Hypomagnesemia     Immunosuppression (H)     Need for CMV immunotherapy     Need for pneumocystis prophylaxis     Benign essential hypertension     Anemia due to other cause, not classified     Hypercalcemia     Kidney transplant recipient     Kidney replaced by transplant     Secondary hyperparathyroidism (H)     Anemia in chronic kidney disease     Sickle cell trait (H)     Current Medications:  Current Outpatient Medications   Medication Sig Dispense Refill     atorvastatin (LIPITOR) 10 MG tablet Take 1 tablet (10 mg) by mouth daily 30 tablet 11     carvedilol (COREG) 25 MG tablet Take 2 tablets (50 mg) by mouth 2 times daily (with meals) 120  "tablet 11     cinacalcet (SENSIPAR) 30 MG tablet Take 1 tablet (30 mg) by mouth daily 30 tablet 3     fludrocortisone (FLORINEF) 0.1 MG tablet Take 1 tablet (0.1 mg) by mouth in the morning, Mon and Thur 15 tablet 3     magnesium oxide (MAG-OX) 400 MG tablet Take 1 tablet (400 mg) by mouth daily (with lunch) 30 tablet 3     mycophenolate (GENERIC EQUIVALENT) 250 MG capsule Take 4 capsules (1,000 mg) by mouth 2 times daily 240 capsule 11     pentamidine (NEBUPENT) 300 MG neb solution Inhale 300 mg into the lungs once for 1 dose 300 mg 0     sodium bicarbonate 650 MG tablet Take 2 tablets (1,300 mg) by mouth 2 times daily 120 tablet 11     tacrolimus (GENERIC EQUIVALENT) 0.5 MG capsule Hold for dose change. 60 capsule 11     tacrolimus (GENERIC EQUIVALENT) 1 MG capsule Take 3 capsules (3 mg) by mouth 2 times daily 180 capsule 11     Vitals:  BP (!) 150/91   Pulse 69   Temp 97.7  F (36.5  C) (Oral)   Resp 17   Ht 1.803 m (5' 11\")   Wt 121.6 kg (268 lb)   SpO2 98%   BMI 37.38 kg/m      Physical Exam:   GENERAL APPEARANCE: alert and no distress  HENT: mouth without ulcers or lesions  PULM: lungs clear to auscultation, equal air movement  CV: regular rhythm, normal rate     - trace LE edema bilaterally  GI: soft, nontender, bowel sounds are normal  MS: no evidence of inflammation in joints, no muscle tenderness  TX KIDNEY: nontender    Labs:   All labs reviewed by me  Recent Results (from the past 8 hour(s))   BK virus PCR quantitative    Collection Time: 01/14/19  6:14 AM   Result Value Ref Range    BK Virus Specimen Plasma, EDTA anticoagulant     BK Virus Result PENDING BKNEG^BK Virus DNA Not Detected copies/mL    BK Virus Log PENDING <2.7 Log copies/mL   INR    Collection Time: 01/14/19  6:14 AM   Result Value Ref Range    INR 1.11 0.86 - 1.14   CBC with platelets differential    Collection Time: 01/14/19  6:14 AM   Result Value Ref Range    WBC 2.1 (L) 4.0 - 11.0 10e9/L    RBC Count 4.22 (L) 4.4 - 5.9 10e12/L "    Hemoglobin 11.2 (L) 13.3 - 17.7 g/dL    Hematocrit 36.1 (L) 40.0 - 53.0 %    MCV 86 78 - 100 fl    MCH 26.5 26.5 - 33.0 pg    MCHC 31.0 (L) 31.5 - 36.5 g/dL    RDW 12.4 10.0 - 15.0 %    Platelet Count 255 150 - 450 10e9/L    Diff Method Automated Method     % Neutrophils 50.6 %    % Lymphocytes 29.9 %    % Monocytes 14.7 %    % Eosinophils 3.8 %    % Basophils 0.5 %    % Immature Granulocytes 0.5 %    Nucleated RBCs 0 0 /100    Absolute Neutrophil 1.1 (L) 1.6 - 8.3 10e9/L    Absolute Lymphocytes 0.6 (L) 0.8 - 5.3 10e9/L    Absolute Monocytes 0.3 0.0 - 1.3 10e9/L    Absolute Eosinophils 0.1 0.0 - 0.7 10e9/L    Absolute Basophils 0.0 0.0 - 0.2 10e9/L    Abs Immature Granulocytes 0.0 0 - 0.4 10e9/L    Absolute Nucleated RBC 0.0    Basic metabolic panel    Collection Time: 01/14/19  6:14 AM   Result Value Ref Range    Sodium 136 133 - 144 mmol/L    Potassium 3.7 3.4 - 5.3 mmol/L    Chloride 107 94 - 109 mmol/L    Carbon Dioxide 23 20 - 32 mmol/L    Anion Gap 6 3 - 14 mmol/L    Glucose 136 (H) 70 - 99 mg/dL    Urea Nitrogen 23 7 - 30 mg/dL    Creatinine 1.77 (H) 0.66 - 1.25 mg/dL    GFR Estimate 47 (L) >60 mL/min/[1.73_m2]    GFR Estimate If Black 55 (L) >60 mL/min/[1.73_m2]    Calcium 8.6 8.5 - 10.1 mg/dL   Protein  random urine with Creat Ratio    Collection Time: 01/14/19  6:18 AM   Result Value Ref Range    Protein Random Urine 0.28 g/L    Protein Total Urine g/gr Creatinine 0.20 0 - 0.2 g/g Cr   Albumin Random Urine Quantitative with Creat Ratio    Collection Time: 01/14/19  6:18 AM   Result Value Ref Range    Creatinine Urine 137 mg/dL    Albumin Urine mg/L 50 mg/L    Albumin Urine mg/g Cr 36.57 (H) 0 - 17 mg/g Cr   Routine UA with microscopic    Collection Time: 01/14/19  6:18 AM   Result Value Ref Range    Color Urine Yellow     Appearance Urine Clear     Glucose Urine Negative NEG^Negative mg/dL    Bilirubin Urine Negative NEG^Negative    Ketones Urine Negative NEG^Negative mg/dL    Specific Gravity Urine  1.014 1.003 - 1.035    Blood Urine Small (A) NEG^Negative    pH Urine 5.0 5.0 - 7.0 pH    Protein Albumin Urine Negative NEG^Negative mg/dL    Urobilinogen mg/dL 0.0 0.0 - 2.0 mg/dL    Nitrite Urine Negative NEG^Negative    Leukocyte Esterase Urine Negative NEG^Negative    Source Midstream Urine     WBC Urine <1 0 - 5 /HPF    RBC Urine <1 0 - 2 /HPF        Juan Bahena MD

## 2019-01-14 NOTE — DISCHARGE INSTRUCTIONS
[facility]  Home Care Following Kidney Biopsy  ACTIVITY: NO heavy lifting (weight greater than 10 pounds) for 1 week. NO strenuous activity for 24 hours; relax and take it easy.     DIET: Resume your regular diet and drink plenty of fluids UNLESS you are fluid restricted.    DRAINAGE: There should be minimal drainage from the biopsy site. If bleeding soaks the dressing, you should lie down and apply pressure to the site for a minimum of 10 minutes. Call one of the numbers below if experienced bleeding that soaked the dressing.     DRESSING: Keep the dressing in place for 24 hours to prevent the site from re-opening and bleeding.     SEDATION: IF you received sedation medications, DO NOT drive or operate heavy machinery, DO NOT drink alcoholic beverages, and DO NOT make important legal decisions.    CALL ONE OF THE NUMBERS BELOW IF YOU EXPERIENCE ANY ONE OF THE FOLLOWING:      Excessive bleeding or drainage    Excessive swelling, redness, or tenderness at the site    Fever above 100.5 F, orally    Severe pain    Drainage that is green, yellow, thick white, or has a bad odor    If you havebloody urine or see bloody clots in your urine     Emergency Department: [phone] (open 24 hours)  Transplant Center: [phone] (open 7:00 AM - 6:30 PM)

## 2019-01-15 ENCOUNTER — TELEPHONE (OUTPATIENT)
Dept: TRANSPLANT | Facility: CLINIC | Age: 39
End: 2019-01-15

## 2019-01-15 DIAGNOSIS — T82.9XXA COMPLICATION OF ARTERIOVENOUS DIALYSIS FISTULA: ICD-10-CM

## 2019-01-15 DIAGNOSIS — R79.89 ELEVATED SERUM CREATININE: Primary | ICD-10-CM

## 2019-01-15 LAB
COPATH REPORT: NORMAL
DONOR IDENTIFICATION: NORMAL
DPB1*01: 1097
DSA COMMENTS: NORMAL
DSA PRESENT: YES
DSA TEST METHOD: NORMAL
ORGAN: NORMAL
SA1 CELL: NORMAL
SA1 COMMENTS: NORMAL
SA1 HI RISK ABY: NORMAL
SA1 MOD RISK ABY: NORMAL
SA1 TEST METHOD: NORMAL
SA2 CELL: NORMAL
SA2 COMMENTS: NORMAL
SA2 HI RISK ABY UA: NORMAL
SA2 MOD RISK ABY: NORMAL
SA2 TEST METHOD: NORMAL
UNACCEPTABLE ANTIGEN: NORMAL
UNOS CPRA: 0

## 2019-01-15 NOTE — TELEPHONE ENCOUNTER
Post Kidney and Pancreas Transplant Team Conference  Date: 1/15/2019  Transplant Coordinator: Mirna Harrell     Attendees:  []  Dr. Bahena [] Yanni Field, RN  [] Lynda Wilson LPN     []  Dr. Gaxiola [] Tonya Albrecht RN [] Sandra Epperson LPN   [x]  Dr. Connolly [] Sherry Jimenez RN    []  Dr. Ronquillo [] Julieta Perez RN    [] Dr. Lema [x] Eli Harrell RN    [] Dr. Mitchell [] Randy Peter RN    [] Surgery Fellow [] Dorcas Lopez RN    [] Tracy Finley, NP [] Blanca Sow RN    [] Lambert Wall, PharmD [] Allison Aaron RN     [] Philippe Colon, RN     [] Yanni Rasmussen RN        Verbal Plan Read Back:   For high flow fistula, obtain echocardiogram and review with transplant surgery if need for right heart cath / fistula ligation.    Routed to RN Coordinator   Mirna Harrell    Orders entered and detailed VM left for Harshad to schedule with [facility] when they reach out to him.

## 2019-01-15 NOTE — TELEPHONE ENCOUNTER
RNCC discussed with Harshad the results of his biopsy - no rejection. Will repeat labs as scheduled on 1/21/19.

## 2019-01-16 ENCOUNTER — TELEPHONE (OUTPATIENT)
Dept: TRANSPLANT | Facility: CLINIC | Age: 39
End: 2019-01-16

## 2019-01-16 NOTE — TELEPHONE ENCOUNTER
Post Kidney and Pancreas Transplant Team Conference  Date: 1/16/2019  Transplant Coordinator: Mirna Harrell     Attendees:  [x]  Dr. Bahena [] Yanni Field, RN  [x] Lynda Wilson LPN     []  Dr. Gaxiola [x] Tonya Albrecht RN [] Sandra Epperson LPN   [x]  Dr. Connolly [] Sherry Jimenez, CHRISSIE    [x]  Dr. Ronquillo [] Julieta Perez RN    [] Dr. High [x] Eli Harrell RN    [] Dr. Lema [] Randy Peter RN    [] Dr. Mitchell [] Dorcas Lopez, RN    [] Surgery Fellow [] Blanca Sow RN    [] Tracy Finley NP              Verbal Plan Read Back:   No changes at this time.    Routed to RN Coordinator   Lynda Wilson

## 2019-01-21 ENCOUNTER — RECORDS - HEALTHEAST (OUTPATIENT)
Dept: LAB | Facility: CLINIC | Age: 39
End: 2019-01-21

## 2019-01-21 ENCOUNTER — TELEPHONE (OUTPATIENT)
Dept: TRANSPLANT | Facility: CLINIC | Age: 39
End: 2019-01-21

## 2019-01-21 DIAGNOSIS — Z94.0 KIDNEY REPLACED BY TRANSPLANT: ICD-10-CM

## 2019-01-21 DIAGNOSIS — Z48.298 AFTERCARE FOLLOWING ORGAN TRANSPLANT: ICD-10-CM

## 2019-01-21 LAB
ANION GAP SERPL CALCULATED.3IONS-SCNC: 6 MMOL/L (ref 5–18)
BUN SERPL-MCNC: 21 MG/DL (ref 8–22)
CALCIUM SERPL-MCNC: 10.1 MG/DL (ref 8.5–10.5)
CHLORIDE BLD-SCNC: 110 MMOL/L (ref 98–107)
CO2 SERPL-SCNC: 24 MMOL/L (ref 22–31)
CREAT SERPL-MCNC: 1.92 MG/DL (ref 0.7–1.3)
ERYTHROCYTE [DISTWIDTH] IN BLOOD BY AUTOMATED COUNT: 12.5 % (ref 11–14.5)
GFR SERPL CREATININE-BSD FRML MDRD: 39 ML/MIN/1.73M2
GLUCOSE BLD-MCNC: 111 MG/DL (ref 70–125)
HCT VFR BLD AUTO: 37.9 % (ref 40–54)
HGB BLD-MCNC: 12 G/DL (ref 14–18)
MCH RBC QN AUTO: 27 PG (ref 27–34)
MCHC RBC AUTO-ENTMCNC: 31.7 G/DL (ref 32–36)
MCV RBC AUTO: 85 FL (ref 80–100)
PLATELET # BLD AUTO: 254 THOU/UL (ref 140–440)
PMV BLD AUTO: 9.4 FL (ref 8.5–12.5)
POTASSIUM BLD-SCNC: 4.7 MMOL/L (ref 3.5–5)
RBC # BLD AUTO: 4.45 MILL/UL (ref 4.4–6.2)
SODIUM SERPL-SCNC: 140 MMOL/L (ref 136–145)
TACROLIMUS BLD-MCNC: 12.4 UG/L (ref 5–15)
TME LAST DOSE: NORMAL H
WBC: 2.7 THOU/UL (ref 4–11)

## 2019-01-21 PROCEDURE — 80197 ASSAY OF TACROLIMUS: CPT | Performed by: SURGERY

## 2019-01-21 NOTE — TELEPHONE ENCOUNTER
I attempted to contact patient to schedule Echo and reached his VM. I LVM asking him to return my call.

## 2019-01-21 NOTE — LETTER
January 28, 2019      Harshad Beatty  [address] [place] [address]          Dear Harshad Beatty,    [facility] Transplant scheduling office has been trying to reach you to schedule your appointment(s).    Our transplant team has not been able to contact you at: [phone] (home)     Please call [phone] so we can arrange this important visit.  We look forward to hearing from you.      Thank you,    [facility]  Morena Sheth  [address] [place] [id number]

## 2019-01-22 ENCOUNTER — TELEPHONE (OUTPATIENT)
Dept: TRANSPLANT | Facility: CLINIC | Age: 39
End: 2019-01-22

## 2019-01-22 NOTE — TELEPHONE ENCOUNTER
Left message for patient regarding:  ISSUE:   Tacrolimus level 12/4 on 1/21/19, goal 8, dose 3 mg BID     PLAN:   Please call pt and confirm this was a good 12-hour trough. Verify dose 3 mg BID. Confirm no new medications or illness (laith. Diarrhea). If good trough, decrease dose to 2.5 mg BID and recheck level as scheduled.

## 2019-01-22 NOTE — TELEPHONE ENCOUNTER
ISSUE:   Tacrolimus level 12/4 on 1/21/19, goal 8, dose 3 mg BID    PLAN:   Please call pt and confirm this was a good 12-hour trough. Verify dose 3 mg BID. Confirm no new medications or illness (laith. Diarrhea). If good trough, decrease dose to 2.5 mg BID and recheck level as scheduled.

## 2019-01-28 ENCOUNTER — RECORDS - HEALTHEAST (OUTPATIENT)
Dept: LAB | Facility: CLINIC | Age: 39
End: 2019-01-28

## 2019-01-28 DIAGNOSIS — Z48.298 AFTERCARE FOLLOWING ORGAN TRANSPLANT: ICD-10-CM

## 2019-01-28 DIAGNOSIS — Z94.0 KIDNEY REPLACED BY TRANSPLANT: ICD-10-CM

## 2019-01-28 LAB
ANION GAP SERPL CALCULATED.3IONS-SCNC: 9 MMOL/L (ref 5–18)
BUN SERPL-MCNC: 23 MG/DL (ref 8–22)
CALCIUM SERPL-MCNC: 10.1 MG/DL (ref 8.5–10.5)
CHLORIDE BLD-SCNC: 110 MMOL/L (ref 98–107)
CO2 SERPL-SCNC: 21 MMOL/L (ref 22–31)
CREAT SERPL-MCNC: 2 MG/DL (ref 0.7–1.3)
ERYTHROCYTE [DISTWIDTH] IN BLOOD BY AUTOMATED COUNT: 12.6 % (ref 11–14.5)
GFR SERPL CREATININE-BSD FRML MDRD: 38 ML/MIN/1.73M2
GLUCOSE BLD-MCNC: 95 MG/DL (ref 70–125)
HCT VFR BLD AUTO: 39.1 % (ref 40–54)
HGB BLD-MCNC: 12.2 G/DL (ref 14–18)
MCH RBC QN AUTO: 26.4 PG (ref 27–34)
MCHC RBC AUTO-ENTMCNC: 31.2 G/DL (ref 32–36)
MCV RBC AUTO: 85 FL (ref 80–100)
PLATELET # BLD AUTO: 245 THOU/UL (ref 140–440)
PMV BLD AUTO: 9.6 FL (ref 8.5–12.5)
POTASSIUM BLD-SCNC: 4.8 MMOL/L (ref 3.5–5)
RBC # BLD AUTO: 4.62 MILL/UL (ref 4.4–6.2)
SODIUM SERPL-SCNC: 140 MMOL/L (ref 136–145)
WBC: 2.9 THOU/UL (ref 4–11)

## 2019-01-28 PROCEDURE — 80197 ASSAY OF TACROLIMUS: CPT | Performed by: SURGERY

## 2019-01-28 NOTE — TELEPHONE ENCOUNTER
"I have scheduled the Echo to follow his upcoming appointment with transplant on 2/7/19 but I have not been able to reach pt. I have left him 4 messages asking him to return my call and I have mentioned the echo to follow next appointment in my message. I am also sending a \"Trying To Reach You\" letter.  "

## 2019-01-29 ENCOUNTER — TELEPHONE (OUTPATIENT)
Dept: TRANSPLANT | Facility: CLINIC | Age: 39
End: 2019-01-29

## 2019-01-29 DIAGNOSIS — Z94.0 KIDNEY REPLACED BY TRANSPLANT: ICD-10-CM

## 2019-01-29 LAB
TACROLIMUS BLD-MCNC: 10 UG/L (ref 5–15)
TME LAST DOSE: NORMAL H

## 2019-01-29 RX ORDER — TACROLIMUS 1 MG/1
2 CAPSULE ORAL 2 TIMES DAILY
Qty: 120 CAPSULE | Refills: 11 | Status: SHIPPED | OUTPATIENT
Start: 2019-01-29 | End: 2019-04-01

## 2019-01-29 RX ORDER — TACROLIMUS 0.5 MG/1
CAPSULE ORAL
Qty: 60 CAPSULE | Refills: 11 | Status: SHIPPED | OUTPATIENT
Start: 2019-01-29 | End: 2019-04-01

## 2019-01-29 NOTE — TELEPHONE ENCOUNTER
6 month Kidney and Pancreas Transplant Patient Phone Call    Congratulations on your 6 month post-transplant anniversary!    Please re-review with the patient how to contact the Transplant Center:  Best phone contact is [phone], as if the need is urgent, any care coordinator will be able to assist you.    Medications:  Now that you are 6 months post-transplant, please make the following medication changes:  CMV mismatch: No.  Your new tacrolimus drug range is 6-8 (please make the medication dose change as instructed - documented in another note).    Please complete medication reconciliation (including confirmation of any magnesium or phosphorous supplements) and ensure the patient has an up to date medication card at home.     *ALWAYS BRING YOUR MED CARD TO APPOINTMENTS.*    Please confirm if the patient is independent with medication set up and administration: Yes:  Date  .  If no, who helps the patient with their medications?  NA  Have you missed any medication doses in the past week: Yes:  Date  5 days worth of sensipar.  Have you missed any medication doses in the past month: Yes:  Date see above.  Contact your pharmacy first for refills.  CONTACT THE TRANSPLANT CENTER IF YOU RUN OUT OF YOUR IS MEDICATIONS.    Labs:  Labs will now be drawn EVERY OTHER WEEK through 9 months post-transplant.   Your RNCC may ask you to repeat labs in addition to your every other week labs.   Please try to have these labs drawn early in the week (Monday or Tuesday).  Contact the Transplant Center if additional lab orders are needed.   It is recommended that you take a copy of your lab letter to each lab draw.    If the patient does not have a copy of their lab letter, please send one now.  EBV mismatch: NoScott Ferrovis is up to date with both BK and DSA lab work.  Due for : lipid panel, hepatic panel and urine pr/cr with NEXT lab draw.    General Transplant Recommendations:    6 month and 1 year nephrology visits with our MDs.  -6  month follow up is scheduled for 2/7, along with echocardiogram (for high flow fistula)    Frequent reviews of the transplant handbook and / or [facility].    Lab review on MyChart.     It is now safe to resume your regular dental care.    Mirna Harrell, RN, BSN  I reviewed all of the above with Harshad who voiced understanding.

## 2019-01-29 NOTE — TELEPHONE ENCOUNTER
ISSUE:   Tacrolimus level 10 on 1/28, goal 6-8 , dose 3 mg BID    PLAN:   Please call pt and confirm this was a good 12-hour trough. Verify dose 3 mg BID. Confirm no new medications or illness (laith. Diarrhea). If good trough, decrease dose to 2 mg BID and recheck level in 1 week (will send order).    Harshad confirmed the above and voiced understanding - will make above dose change.

## 2019-01-30 ENCOUNTER — TELEPHONE (OUTPATIENT)
Dept: TRANSPLANT | Facility: CLINIC | Age: 39
End: 2019-01-30

## 2019-01-30 NOTE — TELEPHONE ENCOUNTER
Post Kidney and Pancreas Transplant Team Conference  Date: 1/30/2019  Transplant Coordinator: Mirna Harrell     Attendees:  [x]  Dr. Bahena [] Yanni Field, RN  [x] Lynda Wilson LPN     []  Dr. Gaxiola [x] Tonya Albrecht RN [] Sandra Epperson LPN   [x]  Dr. Connolly [] Sherry Jimenez RN    []  Dr. Ronquillo [] Julieta Perez RN    [] Dr. High [x] Eli Harrell RN    [] Dr. Lema [] Randy Peter RN    [] Dr. Mitchell [] Dorcas Lopez, RN    [] Surgery Fellow [] Blanca Sow RN    [] Tracy Finley NP               Verbal Plan Read Back:   No changes at this time.    Routed to RN Coordinator   Lynda Wilson

## 2019-01-30 NOTE — TELEPHONE ENCOUNTER
Prior Authorization Retail Medication Request    Medication/Dose:   cinacalcet (SENSIPAR) 30 MG tablet Take 1 tablet (30 mg) by mouth daily     ICD code (if different than what is on RX):  Z94.0  Previously Tried and Failed:    Rationale:      Insurance Name:  [employer]  Insurance ID:        Pharmacy Information (if different than what is on RX)  [facility]  Phone:  [phone]

## 2019-01-30 NOTE — TELEPHONE ENCOUNTER
Central Prior Authorization Team   Phone: [phone]    Prior Authorization Not Needed per Pharmacy    Medication: cinacalcet (SENSIPAR) 30 MG tablet-PA Not Needed  Insurance Company: [employer] Part [employer] - Phone [phone] Fax [phone]  Expected CoPay:      Pharmacy Filling the Rx: [facility] #[id number] - [place], [place] - [address]  Pharmacy Notified: Yes  Patient Notified: No    Called pharmacy for insurance info, but tech stated that they need to call their help desk for assistance. They will call back if PA is needed.

## 2019-02-04 ENCOUNTER — RECORDS - HEALTHEAST (OUTPATIENT)
Dept: LAB | Facility: CLINIC | Age: 39
End: 2019-02-04

## 2019-02-04 ENCOUNTER — RESULTS ONLY (OUTPATIENT)
Dept: OTHER | Facility: CLINIC | Age: 39
End: 2019-02-04

## 2019-02-04 DIAGNOSIS — Z48.298 AFTERCARE FOLLOWING ORGAN TRANSPLANT: ICD-10-CM

## 2019-02-04 DIAGNOSIS — Z94.0 KIDNEY REPLACED BY TRANSPLANT: ICD-10-CM

## 2019-02-04 LAB
ALBUMIN SERPL-MCNC: 4.2 G/DL (ref 3.5–5)
ALP SERPL-CCNC: 146 U/L (ref 45–120)
ALT SERPL W P-5'-P-CCNC: 30 U/L (ref 0–45)
ANION GAP SERPL CALCULATED.3IONS-SCNC: 8 MMOL/L (ref 5–18)
AST SERPL W P-5'-P-CCNC: 29 U/L (ref 0–40)
BILIRUB DIRECT SERPL-MCNC: 0.2 MG/DL
BILIRUB SERPL-MCNC: 0.5 MG/DL (ref 0–1)
BK VIRUS SPECIMEN SOURCE: NORMAL
BKV DNA # SPEC NAA+PROBE: NORMAL COPIES/ML
BKV DNA SPEC NAA+PROBE-LOG#: NORMAL LOG COPIES/ML
BUN SERPL-MCNC: 23 MG/DL (ref 8–22)
CALCIUM SERPL-MCNC: 9.8 MG/DL (ref 8.5–10.5)
CHLORIDE BLD-SCNC: 109 MMOL/L (ref 98–107)
CO2 SERPL-SCNC: 20 MMOL/L (ref 22–31)
CREAT SERPL-MCNC: 1.92 MG/DL (ref 0.7–1.3)
CREAT UR-MCNC: 149.7 MG/DL
ERYTHROCYTE [DISTWIDTH] IN BLOOD BY AUTOMATED COUNT: 12.8 % (ref 11–14.5)
GFR SERPL CREATININE-BSD FRML MDRD: 39 ML/MIN/1.73M2
GLUCOSE BLD-MCNC: 117 MG/DL (ref 70–125)
HCT VFR BLD AUTO: 38.2 % (ref 40–54)
HEPATITIS B SURFACE ANTIBODY LHE- HISTORICAL: POSITIVE
HGB BLD-MCNC: 12.2 G/DL (ref 14–18)
MCH RBC QN AUTO: 26.7 PG (ref 27–34)
MCHC RBC AUTO-ENTMCNC: 31.9 G/DL (ref 32–36)
MCV RBC AUTO: 84 FL (ref 80–100)
PLATELET # BLD AUTO: 245 THOU/UL (ref 140–440)
PMV BLD AUTO: 9.8 FL (ref 8.5–12.5)
POTASSIUM BLD-SCNC: 4.7 MMOL/L (ref 3.5–5)
PROT SERPL-MCNC: 7.7 G/DL (ref 6–8)
PROTEIN, RANDOM URINE - HISTORICAL: 21 MG/DL
PROTEIN/CREAT RATIO, RANDOM UR: 0.14
RBC # BLD AUTO: 4.57 MILL/UL (ref 4.4–6.2)
SODIUM SERPL-SCNC: 137 MMOL/L (ref 136–145)
WBC: 2.3 THOU/UL (ref 4–11)

## 2019-02-04 PROCEDURE — 86832 HLA CLASS I HIGH DEFIN QUAL: CPT | Performed by: INTERNAL MEDICINE

## 2019-02-04 PROCEDURE — 80197 ASSAY OF TACROLIMUS: CPT | Performed by: SURGERY

## 2019-02-04 PROCEDURE — 86833 HLA CLASS II HIGH DEFIN QUAL: CPT | Performed by: INTERNAL MEDICINE

## 2019-02-05 DIAGNOSIS — Z94.0 KIDNEY REPLACED BY TRANSPLANT: ICD-10-CM

## 2019-02-05 LAB
HCV AB SERPL QL IA: NEGATIVE
TACROLIMUS BLD-MCNC: 8.3 UG/L (ref 5–15)
TME LAST DOSE: NORMAL H

## 2019-02-06 LAB
DONOR IDENTIFICATION: NORMAL
DPB1*01: 1352
DSA COMMENTS: NORMAL
DSA PRESENT: YES
DSA TEST METHOD: NORMAL
HBV CORE AB SERPL QL IA: NEGATIVE
ORGAN: NORMAL
SA1 CELL: NORMAL
SA1 COMMENTS: NORMAL
SA1 HI RISK ABY: NORMAL
SA1 MOD RISK ABY: NORMAL
SA1 TEST METHOD: NORMAL
SA2 CELL: NORMAL
SA2 COMMENTS: NORMAL
SA2 HI RISK ABY UA: NORMAL
SA2 MOD RISK ABY: NORMAL
SA2 TEST METHOD: NORMAL
UNACCEPTABLE ANTIGEN: NORMAL
UNOS CPRA: 0

## 2019-02-08 DIAGNOSIS — E86.0 DEHYDRATION: ICD-10-CM

## 2019-02-08 DIAGNOSIS — Z94.0 KIDNEY REPLACED BY TRANSPLANT: ICD-10-CM

## 2019-02-08 RX ORDER — FLUDROCORTISONE ACETATE 0.1 MG/1
0.1 TABLET ORAL
Qty: 15 TABLET | Refills: 3 | Status: SHIPPED | OUTPATIENT
Start: 2019-02-11 | End: 2019-04-30

## 2019-02-18 ENCOUNTER — RECORDS - HEALTHEAST (OUTPATIENT)
Dept: LAB | Facility: CLINIC | Age: 39
End: 2019-02-18

## 2019-02-18 DIAGNOSIS — Z48.298 AFTERCARE FOLLOWING ORGAN TRANSPLANT: ICD-10-CM

## 2019-02-18 DIAGNOSIS — Z94.0 KIDNEY REPLACED BY TRANSPLANT: ICD-10-CM

## 2019-02-18 LAB
ANION GAP SERPL CALCULATED.3IONS-SCNC: 5 MMOL/L (ref 5–18)
BUN SERPL-MCNC: 21 MG/DL (ref 8–22)
CALCIUM SERPL-MCNC: 10.3 MG/DL (ref 8.5–10.5)
CHLORIDE BLD-SCNC: 108 MMOL/L (ref 98–107)
CHOLEST SERPL-MCNC: 139 MG/DL
CO2 SERPL-SCNC: 24 MMOL/L (ref 22–31)
CREAT SERPL-MCNC: 1.73 MG/DL (ref 0.7–1.3)
ERYTHROCYTE [DISTWIDTH] IN BLOOD BY AUTOMATED COUNT: 12.9 % (ref 11–14.5)
FASTING STATUS PATIENT QL REPORTED: YES
GFR SERPL CREATININE-BSD FRML MDRD: 44 ML/MIN/1.73M2
GLUCOSE BLD-MCNC: 103 MG/DL (ref 70–125)
HCT VFR BLD AUTO: 40.6 % (ref 40–54)
HDLC SERPL-MCNC: 47 MG/DL
HGB BLD-MCNC: 12.7 G/DL (ref 14–18)
LDLC SERPL CALC-MCNC: 77 MG/DL
MCH RBC QN AUTO: 26.7 PG (ref 27–34)
MCHC RBC AUTO-ENTMCNC: 31.3 G/DL (ref 32–36)
MCV RBC AUTO: 85 FL (ref 80–100)
PLATELET # BLD AUTO: 247 THOU/UL (ref 140–440)
PMV BLD AUTO: 9.3 FL (ref 8.5–12.5)
POTASSIUM BLD-SCNC: 4.8 MMOL/L (ref 3.5–5)
RBC # BLD AUTO: 4.76 MILL/UL (ref 4.4–6.2)
SODIUM SERPL-SCNC: 137 MMOL/L (ref 136–145)
TRIGL SERPL-MCNC: 75 MG/DL
WBC: 2.7 THOU/UL (ref 4–11)

## 2019-02-18 PROCEDURE — 80197 ASSAY OF TACROLIMUS: CPT | Performed by: SURGERY

## 2019-02-19 ENCOUNTER — TELEPHONE (OUTPATIENT)
Dept: TRANSPLANT | Facility: CLINIC | Age: 39
End: 2019-02-19

## 2019-02-19 LAB
TACROLIMUS BLD-MCNC: 8.6 UG/L (ref 5–15)
TME LAST DOSE: NORMAL H

## 2019-02-19 NOTE — TELEPHONE ENCOUNTER
Left message for patient regarding:  ISSUE:   Tacrolimus level 8.6 on 2/18/19, goal 6-8, dose 2 mg BID     PLAN:   Please call pt and confirm this was a good 12-hour trough. Verify dose 2 mg BID. Confirm no new medications or illness (laith. Diarrhea). If good trough, decrease dose to 1.5 mg BID and recheck level in 1 week (send order if needed

## 2019-02-19 NOTE — TELEPHONE ENCOUNTER
ISSUE:   Tacrolimus level 8.6 on 2/18/19, goal 6-8, dose 2 mg BID    PLAN:   Please call pt and confirm this was a good 12-hour trough. Verify dose 2 mg BID. Confirm no new medications or illness (laith. Diarrhea). If good trough, decrease dose to 1.5 mg BID and recheck level in 1 week (send order if needed).

## 2019-02-25 ENCOUNTER — AMBULATORY - HEALTHEAST (OUTPATIENT)
Dept: RESPIRATORY THERAPY | Facility: CLINIC | Age: 39
End: 2019-02-25

## 2019-02-25 ENCOUNTER — HOSPITAL ENCOUNTER (OUTPATIENT)
Dept: RESPIRATORY THERAPY | Facility: CLINIC | Age: 39
Discharge: HOME | End: 2019-02-25

## 2019-02-25 DIAGNOSIS — Z94.0 KIDNEY REPLACED BY TRANSPLANT: ICD-10-CM

## 2019-02-27 ENCOUNTER — TELEPHONE (OUTPATIENT)
Dept: TRANSPLANT | Facility: CLINIC | Age: 39
End: 2019-02-27

## 2019-02-27 NOTE — TELEPHONE ENCOUNTER
Post Kidney and Pancreas Transplant Team Conference  Date: 2/27/2019  Transplant Coordinator: Mirna Harrell     Attendees:  [x]  Dr. Bahena [] Yanni Field, RN  [x] Lynda Wilson LPN     [x]  Dr. Gaxiola [x] Tonya Albrecht, CHRISSIE [] Sandra Epperson LPN   []  Dr. Connolly [] Sherry Jimenez, CHRISSIE    [x]  Dr. Ronquillo [] Julieta Perez RN    [] Dr. High [x] Eli Harrell RN    [] Dr. Lema [] Randy Peter RN    [] Dr. Mitchell [] Dorcas Lopez, RN    [] Surgery Fellow [] Blanca Sow RN    [] Tracy Finley NP              Verbal Plan Read Back:   No changes at this time    Routed to RN Coordinator   Lynda Wilson

## 2019-03-04 ENCOUNTER — RECORDS - HEALTHEAST (OUTPATIENT)
Dept: LAB | Facility: CLINIC | Age: 39
End: 2019-03-04

## 2019-03-04 ENCOUNTER — RESULTS ONLY (OUTPATIENT)
Dept: OTHER | Facility: CLINIC | Age: 39
End: 2019-03-04

## 2019-03-04 DIAGNOSIS — Z48.298 AFTERCARE FOLLOWING ORGAN TRANSPLANT: ICD-10-CM

## 2019-03-04 DIAGNOSIS — Z94.0 KIDNEY REPLACED BY TRANSPLANT: ICD-10-CM

## 2019-03-04 LAB
ANION GAP SERPL CALCULATED.3IONS-SCNC: 5 MMOL/L (ref 5–18)
BK VIRUS SPECIMEN SOURCE: NORMAL
BKV DNA # SPEC NAA+PROBE: NORMAL COPIES/ML
BKV DNA SPEC NAA+PROBE-LOG#: NORMAL LOG COPIES/ML
BUN SERPL-MCNC: 21 MG/DL (ref 8–22)
CALCIUM SERPL-MCNC: 10.6 MG/DL (ref 8.5–10.5)
CHLORIDE BLD-SCNC: 108 MMOL/L (ref 98–107)
CO2 SERPL-SCNC: 25 MMOL/L (ref 22–31)
CREAT SERPL-MCNC: 1.77 MG/DL (ref 0.7–1.3)
CREAT UR-MCNC: 138.7 MG/DL
ERYTHROCYTE [DISTWIDTH] IN BLOOD BY AUTOMATED COUNT: 13 % (ref 11–14.5)
GFR SERPL CREATININE-BSD FRML MDRD: 43 ML/MIN/1.73M2
GLUCOSE BLD-MCNC: 104 MG/DL (ref 70–125)
HCT VFR BLD AUTO: 39.4 % (ref 40–54)
HGB BLD-MCNC: 12.2 G/DL (ref 14–18)
MCH RBC QN AUTO: 26.5 PG (ref 27–34)
MCHC RBC AUTO-ENTMCNC: 31 G/DL (ref 32–36)
MCV RBC AUTO: 86 FL (ref 80–100)
PLATELET # BLD AUTO: 232 THOU/UL (ref 140–440)
PMV BLD AUTO: 9.2 FL (ref 8.5–12.5)
POTASSIUM BLD-SCNC: 4.3 MMOL/L (ref 3.5–5)
PROTEIN, RANDOM URINE - HISTORICAL: 17 MG/DL
PROTEIN/CREAT RATIO, RANDOM UR: 0.12
PTH-INTACT SERPL-MCNC: 253 PG/ML (ref 10–86)
RBC # BLD AUTO: 4.61 MILL/UL (ref 4.4–6.2)
SODIUM SERPL-SCNC: 138 MMOL/L (ref 136–145)
WBC: 2.8 THOU/UL (ref 4–11)

## 2019-03-04 PROCEDURE — 86833 HLA CLASS II HIGH DEFIN QUAL: CPT | Performed by: TRANSPLANT SURGERY

## 2019-03-04 PROCEDURE — 80197 ASSAY OF TACROLIMUS: CPT | Performed by: SURGERY

## 2019-03-04 PROCEDURE — 86832 HLA CLASS I HIGH DEFIN QUAL: CPT | Performed by: TRANSPLANT SURGERY

## 2019-03-05 ENCOUNTER — TELEPHONE (OUTPATIENT)
Dept: TRANSPLANT | Facility: CLINIC | Age: 39
End: 2019-03-05

## 2019-03-05 LAB
TACROLIMUS BLD-MCNC: 7.8 UG/L (ref 5–15)
TME LAST DOSE: NORMAL H

## 2019-03-05 NOTE — TELEPHONE ENCOUNTER
Prior Authorization Retail Medication Request    Medication/Dose:   cinacalcet (SENSIPAR) 30 MG tablet Take 1 tablet (30 mg) by mouth daily     ICD code (if different than what is on RX):  Z94.0  Previously Tried and Failed:    Rationale:      Insurance Name:    Insurance ID:        Pharmacy Information (if different than what is on RX)  [facility]  Phone:  [phone]

## 2019-03-05 NOTE — TELEPHONE ENCOUNTER
Post Kidney and Pancreas Transplant Team Conference  Date: 3/5/2019  Transplant Coordinator: Mirna Harrell     Attendees:  []  Dr. Bahena [] Yanni Field, RN  [] Lynda Wilson LPN     [x]  Dr. Gaxiola [] Tonya Albrecht RN [] Sandra Epperson LPN   []  Dr. Connolly [] Sherry Jimenez RN    []  Dr. Ronquillo [] Julieta Perez RN    [] Dr. Lema [x] Eli Harrell RN    [] Dr. Mitchell [] Randy Peter RN    [] Dr. Mohr [] Dorcas Lopez RN    [] Surgery Fellow [] Blanca Sow RN    [] Tracy Finley NP [] Allison Aaron RN    [] Lambert Wall, PharmD [] Philippe Colon RN     [] Yanni Rasmussen RN        Verbal Plan Read Back:   Due to increased serum calcium -  Recheck PTH - updated PTH remains elevated (253).  Ensure Harshad is taking his sensipar as directed - 30mg daily.  If taking sensipar as directed, may need parathyroidectomy after nuc med scan.     Routed to RN Coordinator   Mirna Harrell    Harshad reports that he has been having difficulty obtaining his Sensipar from [facility] pharmacies for the last month - he has not been taking the medication. SOT to call [facility] to discuss - PA not needed (previously charted).

## 2019-03-05 NOTE — TELEPHONE ENCOUNTER
I contacted pt to rescheduled missed appointments last month. I have rescheduled the nephrology appointment and echo for 4/30/19 and pt is aware

## 2019-03-05 NOTE — TELEPHONE ENCOUNTER
Prior Authorization Approval    Authorization Effective Date: 2/3/2019  Authorization Expiration Date: 3/4/2020  Medication: cinacalcet (SENSIPAR) 30 MG tablet-PA Pending  Approved Dose/Quantity:   Reference #: [id number]   Insurance Company: [employer] - Phone [phone] Fax [phone]  Expected CoPay:       CoPay Card Available:      Foundation Assistance Needed:    Which Pharmacy is filling the prescription (Not needed for infusion/clinic administered): [id number] [place], [place] - [address]  Pharmacy Notified: Yes  Patient Notified: No-Pharmacy will notify patient when ready.

## 2019-03-07 ENCOUNTER — DOCUMENTATION ONLY (OUTPATIENT)
Dept: TRANSPLANT | Facility: CLINIC | Age: 39
End: 2019-03-07

## 2019-03-07 LAB
DONOR IDENTIFICATION: NORMAL
DSA COMMENTS: NORMAL
DSA PRESENT: NO
DSA TEST METHOD: NORMAL
ORGAN: NORMAL
SA1 CELL: NORMAL
SA1 COMMENTS: NORMAL
SA1 HI RISK ABY: NORMAL
SA1 MOD RISK ABY: NORMAL
SA1 TEST METHOD: NORMAL
SA2 CELL: NORMAL
SA2 COMMENTS: NORMAL
SA2 HI RISK ABY UA: NORMAL
SA2 MOD RISK ABY: NORMAL
SA2 TEST METHOD: NORMAL
UNACCEPTABLE ANTIGEN: NORMAL
UNOS CPRA: 0

## 2019-03-07 NOTE — PROGRESS NOTES
Chart Prep    Clinic Visit on: 4/30/19    Last lab completed: 3/4/19    Lab letter updated: 1/2/19    Lab orders up to date in Epic.

## 2019-03-07 NOTE — PROGRESS NOTES
Assessment and Plan:  1. ESRD due to hypertension on dialysis  -  Kg, HD 3.5 hrs typically. Right Arm fistula, uses heparin on dialysis. He produces large amount of urine. He is scheduled [place] LDKT [place] as part of paired kidney exchange.   2. Immunosuppression management no contraindication to standard induction and maintenance.  3. Sickle Cell Trait no history of crises or other complications related to his sickle trait. Would ensure adequate hydration and oxygenation   4.  Renal osteodystrophy will update his PTH and vitamin D.  5.  Anemia of chronic kidney disease in addition to sickle cell trait, hemoglobin last checked was 10 g/dL we will continue to monitor.  6.  Immune suppression prophylaxis patient has no known allergies Bactrim single strength daily or renally dosed will be advised.  Valcyte for a period of 3 months will serve as CMV prophylaxis.  7.  Obesity patient was advised to closely monitor his weight changes after dialysis and to pursue active lifestyle as soon as able.  8. Cardiac Risk Average   9. DVT prophylaxis consider subcutaneous heparin   Assessment and plan was discussed with patient and he voiced his understanding and agreement.    Reason for Visit:  Mr. Beatty is here for day -8 prior to living donor kidney transplant.    HPI:   Harshad Beatty is a 38 year old male with ESKD from Hypertension day-8 for LDKT .         Transplant Hx:       Tx: LDKT  Date: 8/7/2018       Recent DSA: Yes  Historic DPB1*1 MFI of 1680       Crossmatch is negative     Mr. Beatty is here for day -8 visit prior to living donor kidney transplantation.  He is in the usual state of health.  He works full-time.  He is very active without any chest pains or shortness of breath.  He reports no recent illnesses no fevers no chills no nausea vomiting diarrhea or abdominal pain.  No cough or phlegm production.  He was found to be suitable candidate by our committee and he is now  TRIAGE NOTE: PALPITATIONS FOR A COUPLE DAYS, ROOM SPINNING SINCE LAST NIGHT. PT 
DENIES CP OR PALPITATIONS NOW, +DIZZINESS AND SOB 



Pt to room, changed into gown and all monitors. NSR noted. Pt states that she 
ran out of her lexapro and welbutrin last week and thinks she may be having a 
reaction to stopping those medications abruptly. Pt without complaint at this 
time, though she states that she feels anxious to go home as she thinks these 
symptoms will return. scheduled for living donor kidney transplantation as part of impaired kidney donation.  The kidney is expected to arrive from [place] in the afternoon on August 7, 2018.  Mr. Betaty is currently on dialysis about 3-1/2 hours on Monday Wednesday Friday.  He is scheduled to dialyze on Monday prior to surgery.  His estimated dry weight is 118 kg and he typically dialyzes 3-1/2 hours to right upper extremity fistula that is large with good thrill and bruit.  He is maintained on several blood pressure agents including lisinopril and minoxidil.  I reviewed his regimen and instructed him to continue his carvedilol through the morning of the surgery as well as the amlodipine.  After engraftment I plan on withholding the lisinopril and minoxidil and his regimen should consist of carvedilol and nifedipine unless otherwise indicated by the inpatient nephrology team.  He has history of sickle cell trait without any clinical consequences. He never experienced a crisis or required blood transfusion for this matter except for one blood transfusion at the time of diagnosis with end-stage kidney disease.     Home BP: Not checked.      ROS:   A comprehensive review of systems was obtained and negative, except as noted in the HPI or PMH.    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 Authorizing Provider   AMLODIPINE BESYLATE PO Take 5 mg by mouth daily    Reported, Patient   calcium acetate (PHOSLO) 667 MG CAPS capsule Take 2,668 mg by mouth 3 times daily (with meals)    Reported, Patient   carvedilol (COREG) 25 MG tablet Take 2 tablets by mouth daily 4/27/18   Reported, Patient   Cinacalcet HCl (SENSIPAR PO) Take 60 mg by mouth daily    Reported, Patient   LISINOPRIL PO Take 80 mg by mouth daily    Reported, Patient   MINOXIDIL PO Take 10 mg by mouth daily    Reported, Patient       Vitals:  Vital Signs 7/30/2018   Systolic 149   Diastolic 76   Pulse 76   Temperature 97.7   Respirations 18   Weight (LB) 264 lb   Height 5' 10\"   BMI (Calculated) 37.96   Pain    O2 100       Exam:   GENERAL APPEARANCE: alert and no distress  HENT: mouth without ulcers or lesions  LYMPHATICS: no cervical or supraclavicular nodes  RESP: lungs clear to auscultation - no rales, rhonchi or wheezes  CV: regular rhythm, normal rate, no rub, no murmur  EDEMA: no LE edema bilaterally  ABDOMEN: soft, nondistended, nontender, bowel sounds normal  MS: extremities normal - no gross deformities noted, no evidence of inflammation in joints, no muscle tenderness  SKIN: no rash    Results:   Reviewed     "

## 2019-03-08 ENCOUNTER — TELEPHONE (OUTPATIENT)
Dept: TRANSPLANT | Facility: CLINIC | Age: 39
End: 2019-03-08

## 2019-03-08 DIAGNOSIS — Z94.0 KIDNEY REPLACED BY TRANSPLANT: ICD-10-CM

## 2019-03-08 DIAGNOSIS — E21.3 HYPERPARATHYROIDISM (H): Primary | ICD-10-CM

## 2019-03-08 NOTE — TELEPHONE ENCOUNTER
Post Kidney and Pancreas Transplant Team Conference  Date: 3/8/2019  Transplant Coordinator: Mirna Harrell     Attendees:  []  Dr. Bahena [] Yanni Field, CHRISSIE  [x] Lynda Wilson LPN     [x]  Dr. Gaxiola [] Tonya Albrecht, RN [] Sandra Epperson LPN   []  Dr. Connolly [] Sherry Jimenez RN    []  Dr. Ronquillo [] Julieta Perez RN    [] Dr. Lema [x] Eli Harrell RN    [] Dr. Mitchell [] Randy Peter RN    [] Dr. Mohr [] Dorcas Lopez RN    [] Surgery Fellow [] Blanca Sow RN    [] Tracy Finley NP [] Allison Aaron RN    [] Lambert Wall, PharmD [] Philippe Colon RN     [] Yanni Rasmussen RN        Verbal Plan Read Back:   Repeat PTH 3 months after re-starting sensipar.  If sensipar is still needed at 1 year post transplant, will need referral to Dr. Arvizu.   Monitor serum calcium.    Routed to RN Coordinator   Mirna Harrell    Tomasa reports re-starting his sensipar on 3/9/19. Will recheck PTH at 1 year post transplant.    Harshad voiced understanding  / participated in discussion of why PTH could be elevated and why referral to endocrine may be necessary.

## 2019-03-20 NOTE — LETTER
To Whom it May Concern,     Harshad Beatty is a post kidney transplant patient with stable renal function. There are no differences known regarding the delivery of dental care, including local anesthesia, with or without epinephrine, to solid organ transplant patients versus the general population.    The transplant center recommends waiting 6 months after transplant for dental work, if dental work is needed before that time, antibiotics is recommended. After 6 months post transplant antibiotics are not needed. Please note that there are many other clinical reasons antibiotics may be needed (prosthetic heart valve, joint implants, ect), this should be discussed between patient and dentist.     If dental work is required before 6 months post transplant, the Transplant Center recommends:     Amoxicillin 2 gm 1-hour before the procedure      Or     Clindamycin 600 mg 1- hour before the procedure    This medication should be prescribed by your dentist after discussion of your health history.     Currently the risk and benefits of dental procedures are not dependent to the transplanted organ. Please follow your usual practice.     Sincerely,     .

## 2019-03-29 ENCOUNTER — RECORDS - HEALTHEAST (OUTPATIENT)
Dept: LAB | Facility: CLINIC | Age: 39
End: 2019-03-29

## 2019-03-29 DIAGNOSIS — Z94.0 KIDNEY REPLACED BY TRANSPLANT: ICD-10-CM

## 2019-03-29 DIAGNOSIS — Z48.298 AFTERCARE FOLLOWING ORGAN TRANSPLANT: ICD-10-CM

## 2019-03-29 LAB
ANION GAP SERPL CALCULATED.3IONS-SCNC: 6 MMOL/L (ref 5–18)
BUN SERPL-MCNC: 19 MG/DL (ref 8–22)
CALCIUM SERPL-MCNC: 10 MG/DL (ref 8.5–10.5)
CHLORIDE BLD-SCNC: 108 MMOL/L (ref 98–107)
CO2 SERPL-SCNC: 26 MMOL/L (ref 22–31)
CREAT SERPL-MCNC: 1.84 MG/DL (ref 0.7–1.3)
ERYTHROCYTE [DISTWIDTH] IN BLOOD BY AUTOMATED COUNT: 12.6 % (ref 11–14.5)
GFR SERPL CREATININE-BSD FRML MDRD: 41 ML/MIN/1.73M2
GLUCOSE BLD-MCNC: 107 MG/DL (ref 70–125)
HCT VFR BLD AUTO: 39.5 % (ref 40–54)
HGB BLD-MCNC: 12.4 G/DL (ref 14–18)
MCH RBC QN AUTO: 26.3 PG (ref 27–34)
MCHC RBC AUTO-ENTMCNC: 31.4 G/DL (ref 32–36)
MCV RBC AUTO: 84 FL (ref 80–100)
PLATELET # BLD AUTO: 270 THOU/UL (ref 140–440)
PMV BLD AUTO: 9.4 FL (ref 8.5–12.5)
POTASSIUM BLD-SCNC: 4.7 MMOL/L (ref 3.5–5)
RBC # BLD AUTO: 4.71 MILL/UL (ref 4.4–6.2)
SODIUM SERPL-SCNC: 140 MMOL/L (ref 136–145)
WBC: 2.9 THOU/UL (ref 4–11)

## 2019-03-29 PROCEDURE — 80197 ASSAY OF TACROLIMUS: CPT | Performed by: SURGERY

## 2019-03-31 LAB
TACROLIMUS BLD-MCNC: 9.9 UG/L (ref 5–15)
TME LAST DOSE: NORMAL H

## 2019-04-01 ENCOUNTER — HOSPITAL ENCOUNTER (OUTPATIENT)
Dept: RESPIRATORY THERAPY | Facility: CLINIC | Age: 39
Discharge: HOME | End: 2019-04-01

## 2019-04-01 DIAGNOSIS — Z94.0 KIDNEY REPLACED BY TRANSPLANT: Primary | ICD-10-CM

## 2019-04-01 DIAGNOSIS — Z94.0 KIDNEY REPLACED BY TRANSPLANT: ICD-10-CM

## 2019-04-01 RX ORDER — TACROLIMUS 1 MG/1
1 CAPSULE ORAL 2 TIMES DAILY
Qty: 60 CAPSULE | Refills: 11 | Status: SHIPPED | OUTPATIENT
Start: 2019-04-01 | End: 2019-05-21

## 2019-04-01 RX ORDER — TACROLIMUS 0.5 MG/1
0.5 CAPSULE ORAL 2 TIMES DAILY
Qty: 60 CAPSULE | Refills: 11 | Status: SHIPPED | OUTPATIENT
Start: 2019-04-01 | End: 2019-05-21

## 2019-04-01 NOTE — TELEPHONE ENCOUNTER
Left message for patient regarding:  ISSUE:   Tacrolimus level 9.9 on 3/29/19, goal 6-8, dose 2 mg BID     PLAN:   Please call pt and confirm this was a good 12-hour trough. Verify dose 2 mg BID. Confirm no new medications or illness (laith. Diarrhea). If good trough, decrease dose to 1.5 mg BID and recheck level in 1 week (send order for full set tx labs).

## 2019-04-01 NOTE — LETTER
PHYSICIAN ORDERS      DATE & TIME ISSUED: 2019 3:18 PM  PATIENT NAME: Harshad Beatty   : 1980     [facility] MR# [if applicable]: [id number]     DIAGNOSIS:  Kidney Transplant  ICD-10 CODE: Z94.0     Please repeat the following labs in 1 week:  Tacrolimus drug level  CBC  BMP    Any questions please call: [phone]  Please fax lab results to [phone].    .

## 2019-04-01 NOTE — TELEPHONE ENCOUNTER
ISSUE:   Tacrolimus level 9.9 on 3/29/19, goal 6-8, dose 2 mg BID    PLAN:   Please call pt and confirm this was a good 12-hour trough. Verify dose 2 mg BID. Confirm no new medications or illness (laith. Diarrhea). If good trough, decrease dose to 1.5 mg BID and recheck level in 1 week (send order for full set tx labs).

## 2019-04-01 NOTE — TELEPHONE ENCOUNTER
Patient returned call and confirmed current Tacrolimus dose and good 12 hour trough level.  Patient verbalizes understanding to decrease Tacrolimus dose to 1.5 mg BID and will repeat txp labs in 1 week.

## 2019-04-05 DIAGNOSIS — Z94.0 KIDNEY REPLACED BY TRANSPLANT: ICD-10-CM

## 2019-04-05 RX ORDER — CINACALCET 30 MG/1
30 TABLET, FILM COATED ORAL DAILY
Qty: 30 TABLET | Refills: 0 | Status: SHIPPED | OUTPATIENT
Start: 2019-04-05 | End: 2021-09-14

## 2019-04-05 NOTE — TELEPHONE ENCOUNTER
Medication refill: Prescription refill request from [facility] pharmacy for Sensipar 30mg tablets for a 30 day supply. Per pharmacy 30 day refill supply at a time due to patient's insurance  Last OV: 12/7/2018  Next OV:4/30/19  Pharmacy:[facility] pharmacy on [address] in [place], [phone]    Refilled per Nephrology protocol    Veronica Cee RN, BSN  Nephrology Care Coordinator  [facility]  [email or web address]  [phone]

## 2019-04-15 ENCOUNTER — RESULTS ONLY (OUTPATIENT)
Dept: OTHER | Facility: CLINIC | Age: 39
End: 2019-04-15

## 2019-04-15 ENCOUNTER — RECORDS - HEALTHEAST (OUTPATIENT)
Dept: LAB | Facility: CLINIC | Age: 39
End: 2019-04-15

## 2019-04-15 ENCOUNTER — APPOINTMENT (OUTPATIENT)
Dept: LAB | Facility: CLINIC | Age: 39
End: 2019-04-15
Attending: PATHOLOGY
Payer: COMMERCIAL

## 2019-04-15 DIAGNOSIS — Z94.0 KIDNEY REPLACED BY TRANSPLANT: ICD-10-CM

## 2019-04-15 DIAGNOSIS — Z48.298 AFTERCARE FOLLOWING ORGAN TRANSPLANT: ICD-10-CM

## 2019-04-15 LAB
ANION GAP SERPL CALCULATED.3IONS-SCNC: 5 MMOL/L (ref 5–18)
BK VIRUS SPECIMEN SOURCE: NORMAL
BKV DNA # SPEC NAA+PROBE: NORMAL COPIES/ML
BKV DNA SPEC NAA+PROBE-LOG#: NORMAL LOG COPIES/ML
BUN SERPL-MCNC: 23 MG/DL (ref 8–22)
CALCIUM SERPL-MCNC: 10.3 MG/DL (ref 8.5–10.5)
CHLORIDE BLD-SCNC: 106 MMOL/L (ref 98–107)
CO2 SERPL-SCNC: 25 MMOL/L (ref 22–31)
CREAT SERPL-MCNC: 1.81 MG/DL (ref 0.7–1.3)
CREAT UR-MCNC: 138 MG/DL
ERYTHROCYTE [DISTWIDTH] IN BLOOD BY AUTOMATED COUNT: 12.7 % (ref 11–14.5)
GFR SERPL CREATININE-BSD FRML MDRD: 42 ML/MIN/1.73M2
GLUCOSE BLD-MCNC: 102 MG/DL (ref 70–125)
HCT VFR BLD AUTO: 38.4 % (ref 40–54)
HGB BLD-MCNC: 12 G/DL (ref 14–18)
MCH RBC QN AUTO: 26.1 PG (ref 27–34)
MCHC RBC AUTO-ENTMCNC: 31.3 G/DL (ref 32–36)
MCV RBC AUTO: 84 FL (ref 80–100)
PLATELET # BLD AUTO: 252 THOU/UL (ref 140–440)
PMV BLD AUTO: 9.2 FL (ref 8.5–12.5)
POTASSIUM BLD-SCNC: 4.5 MMOL/L (ref 3.5–5)
PROTEIN, RANDOM URINE - HISTORICAL: 15 MG/DL
PROTEIN/CREAT RATIO, RANDOM UR: 0.11
RBC # BLD AUTO: 4.59 MILL/UL (ref 4.4–6.2)
SODIUM SERPL-SCNC: 136 MMOL/L (ref 136–145)
WBC: 3.1 THOU/UL (ref 4–11)

## 2019-04-15 PROCEDURE — 86833 HLA CLASS II HIGH DEFIN QUAL: CPT | Performed by: PATHOLOGY

## 2019-04-15 PROCEDURE — 86832 HLA CLASS I HIGH DEFIN QUAL: CPT | Performed by: PATHOLOGY

## 2019-04-15 PROCEDURE — 80197 ASSAY OF TACROLIMUS: CPT | Performed by: SURGERY

## 2019-04-16 LAB
TACROLIMUS BLD-MCNC: 6.3 UG/L (ref 5–15)
TME LAST DOSE: NORMAL H

## 2019-04-17 ENCOUNTER — TELEPHONE (OUTPATIENT)
Dept: TRANSPLANT | Facility: CLINIC | Age: 39
End: 2019-04-17

## 2019-04-17 NOTE — TELEPHONE ENCOUNTER
Left message for patient regarding:  ISSUE:   Tacrolimus level 6.3 on 4/15/19, goal 6-8 (closer to 8), dose 1.5 mg BID     PLAN:   Please call pt and confirm this was a good 12-hour trough. Verify dose 1.5 mg BID. Confirm no new medications or illness (laith. Diarrhea). If good trough, increase dose to 2mg AM, 1.5mg PM and recheck level in 1 week (please send order to outside lab).

## 2019-04-17 NOTE — TELEPHONE ENCOUNTER
Post Kidney and Pancreas Transplant Team Conference  Date: 4/17/2019  Transplant Coordinator: Mirna Harrell     Attendees:  [x]  Dr. Bahena [] Yanni Field, CHRISSIE  [x] Lynda Wilson LPN     [x]  Dr. Gaxiola [x] Tonya Albrecht RN [] Sandra Epperson LPN   []  Dr. Connolly [] Sherry Jimenez RN    []  Dr. Ronquillo [] Julieta Perez RN    [] Dr. High [x] Eli Harrell RN    [] Dr. Lema [] Randy Peter RN    [] Dr. Mitchell [] Dorcas Lopez, RN    [] Surgery Fellow [] Blanca Sow RN    [] Tracy Finley NP              Verbal Plan Read Back:   No changes at this time    Routed to RN Coordinator   Lynda Wilson

## 2019-04-17 NOTE — TELEPHONE ENCOUNTER
ISSUE:   Tacrolimus level 6.3 on 4/15/19, goal 6-8 (closer to 8), dose 1.5 mg BID    PLAN:   Please call pt and confirm this was a good 12-hour trough. Verify dose 1.5 mg BID. Confirm no new medications or illness (laith. Diarrhea). If good trough, increase dose to 2mg AM, 1.5mg PM and recheck level in 1 week (please send order to outside lab).    **Please document date of last dose of pentamidine.

## 2019-04-30 ENCOUNTER — OFFICE VISIT (OUTPATIENT)
Dept: NEPHROLOGY | Facility: CLINIC | Age: 39
End: 2019-04-30
Attending: INTERNAL MEDICINE
Payer: COMMERCIAL

## 2019-04-30 ENCOUNTER — ANCILLARY PROCEDURE (OUTPATIENT)
Dept: CARDIOLOGY | Facility: CLINIC | Age: 39
End: 2019-04-30
Attending: INTERNAL MEDICINE
Payer: COMMERCIAL

## 2019-04-30 VITALS
WEIGHT: 297 LBS | HEART RATE: 81 BPM | OXYGEN SATURATION: 97 % | DIASTOLIC BLOOD PRESSURE: 73 MMHG | SYSTOLIC BLOOD PRESSURE: 122 MMHG | BODY MASS INDEX: 41.42 KG/M2

## 2019-04-30 DIAGNOSIS — Z94.0 KIDNEY REPLACED BY TRANSPLANT: ICD-10-CM

## 2019-04-30 DIAGNOSIS — E66.01 CLASS 3 SEVERE OBESITY WITH BODY MASS INDEX (BMI) OF 40.0 TO 44.9 IN ADULT, UNSPECIFIED OBESITY TYPE, UNSPECIFIED WHETHER SERIOUS COMORBIDITY PRESENT (H): Primary | ICD-10-CM

## 2019-04-30 DIAGNOSIS — R79.89 ELEVATED SERUM CREATININE: ICD-10-CM

## 2019-04-30 DIAGNOSIS — D63.1 ANEMIA IN STAGE 3 CHRONIC KIDNEY DISEASE (H): ICD-10-CM

## 2019-04-30 DIAGNOSIS — E83.42 HYPOMAGNESEMIA: ICD-10-CM

## 2019-04-30 DIAGNOSIS — N18.30 ANEMIA IN STAGE 3 CHRONIC KIDNEY DISEASE (H): ICD-10-CM

## 2019-04-30 DIAGNOSIS — Z94.0 HTN, KIDNEY TRANSPLANT RELATED: ICD-10-CM

## 2019-04-30 DIAGNOSIS — N25.81 SECONDARY RENAL HYPERPARATHYROIDISM (H): ICD-10-CM

## 2019-04-30 DIAGNOSIS — I15.1 HTN, KIDNEY TRANSPLANT RELATED: ICD-10-CM

## 2019-04-30 DIAGNOSIS — E55.9 VITAMIN D DEFICIENCY: ICD-10-CM

## 2019-04-30 DIAGNOSIS — E66.813 CLASS 3 SEVERE OBESITY WITH BODY MASS INDEX (BMI) OF 40.0 TO 44.9 IN ADULT, UNSPECIFIED OBESITY TYPE, UNSPECIFIED WHETHER SERIOUS COMORBIDITY PRESENT (H): Primary | ICD-10-CM

## 2019-04-30 DIAGNOSIS — T82.9XXA COMPLICATION OF ARTERIOVENOUS DIALYSIS FISTULA: ICD-10-CM

## 2019-04-30 DIAGNOSIS — E83.52 HYPERCALCEMIA: ICD-10-CM

## 2019-04-30 DIAGNOSIS — D84.9 IMMUNOSUPPRESSION (H): ICD-10-CM

## 2019-04-30 DIAGNOSIS — Z48.298 AFTERCARE FOLLOWING ORGAN TRANSPLANT: ICD-10-CM

## 2019-04-30 PROBLEM — Z29.89 NEED FOR CMV IMMUNOTHERAPY: Status: RESOLVED | Noted: 2018-08-13 | Resolved: 2019-04-30

## 2019-04-30 PROBLEM — R50.9 FEVER: Status: RESOLVED | Noted: 2018-10-29 | Resolved: 2019-04-30

## 2019-04-30 PROBLEM — E66.9 OBESITY: Status: ACTIVE | Noted: 2019-04-30

## 2019-04-30 PROBLEM — E86.0 DEHYDRATION: Status: RESOLVED | Noted: 2018-08-22 | Resolved: 2019-04-30

## 2019-04-30 NOTE — LETTER
2019       RE: Harshad Beatty  [address]  [place] [address]     Dear Colleague,    Thank you for referring your patient, Harshad Beatty, to the [facility] NEPHROLOGY at [facility]. Please see a copy of my visit note below.    [facility]  Nephrology Clinic  Kidney/Pancreas Recipient  2019     Name: Harshad Beatty  MRN: [id number]   Age: 39 year old  : 1980  Referring provider: Daysi Canales*     CHRONIC TRANSPLANT NEPHROLOGY VISIT    Assessment and Plan:   # LDKT: Stable   - Baseline Cr ~ 1.8-2.0;    - Proteinuria: Normal   - Date of DSA last checked: 2019  Latest DSA: No. Previously low level DSA to DPB1, now resolved.   - BK Viremia: No   - Kidney Tx Biopsy: 2019; No diagnostic evidence of acute rejection. Focal acute tubular injury.             2018; No diagnostic evidence of acute rejection.    # Immunosuppression: Tacrolimus immediate release (goal 6-8) and Mycophenolate mofetil (goal not followed)   - Changes: No    # Prophylaxis:    - PJP on Inhaled Pentamidine.  Will recheck CD4 level at 12 months post-transplant and if > 200, patient can stop PJP prophylaxis.     # Hypertension: Borderline control; Goal BP: < 130/80   - Changes: Yes - will discontinue Florinef    # Anemia in Chronic Renal Disease: Hgb: Stable   - Iron studies: Replete    # Mineral Bone Disorder:    - Secondary renal hyperparathyroidism; PTH level is: Mildly elevated. Off of cinacalcet for 3 weeks. Recheck PTH at 12 months post-transplant.   - Vitamin D; level is: Low, not on supplement due to hypercalcemia. Recheck vitamin D level at 12 months post-transplant.    - Calcium; level is: High. Not on supplement. Will monitor.     # Electrolytes:   - Potassium; level: Normal. Has been on Florinef, but will have him hold this as his serum potassium level has been normal and due to borderline blood pressure control. Recommend continued low potassium diet.  -  Magnesium; level: Normal. On supplement, will check magnesium with next labs.  - Bicarbonate; level: Normal. Takes 1 tsp baking soda daily (equivalent to ~ 7 tablets of sodium bicarbonate 650 mg tablet).      # Leukopenia: Stable, mildly low WBC.  Likely medication related.    # Obesity: Marked increase in weight following transplant.  Patient has gained ~ 60 lbs, now weight at 297 lbs.   - [facility].   - Recommend increased exercise and watch caloric intake.    # Skin Cancer Risk:    - Discussed sun protection and recommend regular follow up with Dermatology.    # Medical Compliance: Yes    Follow-up: Return in about 3 months (around 7/30/2019).     # Transplant History:  Etiology of kidney failure: focal segmental glomerulosclerosis (FSGS)  Tx: LDKT  Transplant: 8/7/2018 (Kidney)  Donor Type: Living Donor Class:   Crossmatch at time of Tx: negative  DSA at time of Tx: Yes- DPB1*01/1150   Significant changes in immunosuppression: None  CMV IgG Ab Discordance (D+/R-): No  EBV IgG Ab Discordance (D+/R-): No  Significant transplant-related complications: None    Transplant Office Phone Number: [phone]    Assessment and plan was discussed with the patient and they voiced understanding and agreement.    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    The patient was last evaluated on 12/7/2018 by Dr. Gaxiola. At that time, MMF was reduced to 500 mg BID due to leukopenia.  Please see note for further details.     Today, the patient reports he has been doing well. He notes he has had significant weight gain recently despite going to the gym twice a week. He does feel like he has shortness of breath when working out at the gym, but this has been stable. His appetite is normal. His energy level has been  good. He denies any recent hospitalizations, new medical issues, chest pain with exertion, nausea, vomiting, diarrhea, fever, sweats, chills, or leg swelling.      Recent Hospitalizations:  [x] No [] Yes    New Medical Issues: [x] No [] Yes    Decreased energy: [x] No [] Yes    Chest pain or SOB with exertion:  [] No [x] Yes  Shortness of breath, no chest pain.   Appetite change or weight change: [] No [x] Yes  Weight gain.   Nausea, vomiting or diarrhea:  [x] No [] Yes    Fever, sweats or chills: [x] No [] Yes    Leg swelling: [x] No [] Yes      Other medical issues:  No    Home BP: 130/80's     Review of Systems:   A comprehensive review of systems was obtained and negative, except as noted in the HPI or past medical history.     Active Medications:     Current Outpatient Medications:      atorvastatin (LIPITOR) 10 MG tablet, Take 1 tablet (10 mg) by mouth daily, Disp: 30 tablet, Rfl: 11     carvedilol (COREG) 25 MG tablet, Take 2 tablets (50 mg) by mouth 2 times daily (with meals), Disp: 120 tablet, Rfl: 11     magnesium oxide (MAG-OX) 400 MG tablet, Take 1 tablet (400 mg) by mouth daily (with lunch), Disp: 30 tablet, Rfl: 3     mycophenolate (GENERIC EQUIVALENT) 250 MG capsule, Take 4 capsules (1,000 mg) by mouth 2 times daily, Disp: 240 capsule, Rfl: 11     pentamidine (NEBUPENT) 300 MG neb solution, Inhale 300 mg into the lungs once for 1 dose, Disp: 300 mg, Rfl: 0     sodium bicarbonate 650 MG tablet, Take 2 tablets (1,300 mg) by mouth 2 times daily, Disp: 120 tablet, Rfl: 11     tacrolimus (GENERIC EQUIVALENT) 0.5 MG capsule, Take 1 capsule (0.5 mg) by mouth 2 times daily Total dose = 1.5 mg BID (Patient taking differently: Take 0.5 mg by mouth 3 times daily Total dose = 1.5 mg BID), Disp: 60 capsule, Rfl: 11     tacrolimus (GENERIC EQUIVALENT) 1 MG capsule, Take 1 capsule (1 mg) by mouth 2 times daily Total dose = 1.5 mg BID, Disp: 60 capsule, Rfl: 11     cinacalcet (SENSIPAR) 30 MG tablet, Take 1 tablet  (30 mg) by mouth daily (Patient not taking: Reported on 4/30/2019), Disp: 30 tablet, Rfl: 0      Allergies:   Patient has no known allergies.      Active Medical Problems:  Patient Active Problem List   Diagnosis     Sickle cell trait (H)     Anemia in chronic kidney disease     Secondary hyperparathyroidism (H)     Kidney replaced by transplant     Kidney transplant recipient     Immunosuppression (H)     Need for CMV immunotherapy     Need for pneumocystis prophylaxis     Benign essential hypertension     Anemia due to other cause, not classified     Hypercalcemia     Hypomagnesemia     Dehydration     Neutropenia (H)     Fever        Social History:   Social History     Tobacco Use     Smoking status: Never Smoker     Smokeless tobacco: Never Used   Substance Use Topics     Alcohol use: No     Drug use: Yes     Types: Marijuana     Comment: remote       Physical Exam:   /73   Pulse 81   Wt 134.7 kg (297 lb)   SpO2 97%   BMI 41.42 kg/m      Wt Readings from Last 4 Encounters:   04/30/19 134.7 kg (297 lb)   01/14/19 121.6 kg (268 lb)   10/30/18 115.3 kg (254 lb 4.8 oz)   10/10/18 114.2 kg (251 lb 12.8 oz)       GENERAL APPEARANCE: alert and no distress  HENT: mouth without ulcers or lesions  LYMPHATICS: no cervical or supraclavicular nodes  RESP: lungs clear to auscultation - no rales, rhonchi or wheezes  CV: regular rhythm, normal rate, no rub, no murmur  EDEMA: no LE edema bilaterally  ABDOMEN: soft, nondistended, nontender, bowel sounds normal  MS: extremities normal - no gross deformities noted, no evidence of inflammation in joints, no muscle tenderness  SKIN: no rash  TX KIDNEY: normal  DIALYSIS ACCESS: RUE AV Fistula with good thrill    Data:   Renal Latest Ref Rng & Units 4/15/2019 3/29/2019 3/4/2019   Na 133 - 144 mmol/L - - -   Na (external) 136 - 145 mmol/L 136 140 138   K 3.4 - 5.3 mmol/L - - -   K (external) 3.5 - 5.0 mmol/L 4.5 4.7(L) 4.3   Cl 94 - 109 mmol/L - - -   Cl (external) 98 - 107  mmol/L 106 108(H) 108(H)   CO2 20 - 32 mmol/L - - -   CO2 (external) 22 - 31 mmol/L 25 26 25   BUN 7 - 30 mg/dL - - -   BUN (external) 8 - 22 mg/dL 23(H) 19 21   Cr 0.66 - 1.25 mg/dL - - -   Cr (external) 0.70 - 1.30 mg/dL 1.81(H) 1.84(H) 1.77(H)   Glucose 70 - 99 mg/dL - - -   Glucose (external) 70 - 125 mg/dL 102 107 104   Ca  8.5 - 10.1 mg/dL - - -   Ca (external) 8.5 - 10.5 mg/dL 10.3 10.0 10.6(H)   Mg 1.6 - 2.3 mg/dL - - -   Mg (external) 1.8 - 2.6 mg/dL - - -     Bone Health Latest Ref Rng & Units 3/4/2019 11/5/2018 10/29/2018   Phos 2.5 - 4.5 mg/dL - - -   Phos (external) 2.5 - 4.5 mg/dL - 2.5 2.1(L)   PTHi 18 - 80 pg/mL - - -   PTHi (external) 10 - 86 pg/mL 253(H) - -   Vit D Def 20 - 75 ug/L - - -     Heme Latest Ref Rng & Units 4/15/2019 3/29/2019 3/4/2019   WBC 4.0 - 11.0 10e9/L - - -   WBC (external) 4.0 - 11.0 thou/uL 3.1(L) 2.9(L) 2.8(L)   Hgb 13.3 - 17.7 g/dL - - -   Hgb (external) 14.0 - 18.0 g/dL 12.0(L) 12.4(L) 12.2(L)   Plt 150 - 450 10e9/L - - -   Plt (external) 140 - 440 thou/uL 252 270 232     Liver Latest Ref Rng & Units 2/4/2019 10/31/2018 7/30/2018   AP 40 - 150 U/L - 93 50   AP (external) 45 - 120 U/L 146(H) - -   TBili 0.2 - 1.3 mg/dL - 0.3 0.5   TBili (external) 0.0 - 1.0 mg/dL 0.5 - -   DBili 0.0 - 0.2 mg/dL - 0.1 -   DBili (external) <=0.5 mg/dL 0.2 - -   ALT 0 - 70 U/L - 31 17   ALT (external) 0 - 45 U/L 30 - -   AST 0 - 45 U/L - 23 4   AST (external) 0 - 40 U/L 29 - -   Tot Protein 6.8 - 8.8 g/dL - 8.1 8.6   Tot Protein (external) 6.0 - 8.0 g/dL 7.7 - -   Albumin 3.4 - 5.0 g/dL - 3.6 3.9   Albumin (external) 3.5 - 5.0 g/dL 4.2 - -     Pancreas Latest Ref Rng & Units 12/7/2018   A1C 0 - 5.6 % 5.5     Iron studies Latest Ref Rng & Units 7/30/2018   Iron 35 - 180 ug/dL 46   Iron sat 15 - 46 % 21   Ferritin 26 - 388 ng/mL 716(H)     [facility] Txp Virology Latest Ref Rng & Units 4/15/2019 3/4/2019 2/4/2019   CVM DNA Quant - - - -   CMV DNA Quant Ext Not Detected IU/mL - - -   BK Spec - - - -    BK Res BKNEG:BK Virus DNA Not Detected copies/mL - - -   BK Log <2.7 Log copies/mL - - -   BK Quant Log Ext <2.7 Log copies/mL Not Calculated Not Calculated Not Calculated   BK Quant Result Ext NEG copies/mL Not Detected BK Virus DNA Not Detected Not Detected   BK Quant Spec Ext - Plasma Plasma Plasma   Hep B Core NR:Nonreactive - - -   Hep B Core Ext Negative - - Negative   Hep B Surf Ext  Negative - - Positive(A)        Recent Labs   Lab Test 03/04/19  0824 03/29/19  0828 04/15/19  0840   DOSTAC 03/03/19 2000 2030 03/28/19 04/14/2019 20:30   TACROL 7.8 9.9 6.3     Recent Labs   Lab Test 08/27/18  0810 09/05/18  0845 12/07/18  1223   DOSMPA Not Provided 904,180,815 Not Provided   MPACID 6.41* 4.52* 1.21   MPAG 98.1* 80.5 51.9       Scribe Disclosure:   I, Linda Rhodes, am serving as a scribe to document services personally performed by Kidney/Pancreas Recipient at this visit, based upon the provider's statements to me. All documentation has been reviewed by the aforementioned provider prior to being entered into the official medical record.       Again, thank you for allowing me to participate in the care of your patient.      Sincerely,    Kidney/Pancreas Recipient

## 2019-04-30 NOTE — LETTER
2019      RE: Harshad Beatty  [address] [place] [address]       [facility]  Nephrology Clinic  Kidney/Pancreas Recipient  2019     Name: Harshad Beatty  MRN: [id number]   Age: 39 year old  : 1980  Referring provider: Daysi Canales*     CHRONIC TRANSPLANT NEPHROLOGY VISIT    Assessment and Plan:   # LDKT: Stable   - Baseline Cr ~ 1.8-2.0;    - Proteinuria: Normal   - Date of DSA last checked: 2019  Latest DSA: No. Previously low level DSA to DPB1, now resolved.   - BK Viremia: No   - Kidney Tx Biopsy: 2019; No diagnostic evidence of acute rejection. Focal acute tubular injury.             2018; No diagnostic evidence of acute rejection.    # Immunosuppression: Tacrolimus immediate release (goal 6-8) and Mycophenolate mofetil (goal not followed)   - Changes: No    # Prophylaxis:    - PJP on Inhaled Pentamidine.  Will recheck CD4 level at 12 months post-transplant and if > 200, patient can stop PJP prophylaxis.     # Hypertension: Borderline control; Goal BP: < 130/80   - Changes: Yes - will discontinue Florinef    # Anemia in Chronic Renal Disease: Hgb: Stable   - Iron studies: Replete    # Mineral Bone Disorder:    - Secondary renal hyperparathyroidism; PTH level is: Mildly elevated. Off of cinacalcet for 3 weeks. Recheck PTH at 12 months post-transplant.   - Vitamin D; level is: Low, not on supplement due to hypercalcemia. Recheck vitamin D level at 12 months post-transplant.    - Calcium; level is: High. Not on supplement. Will monitor.     # Electrolytes:   - Potassium; level: Normal. Has been on Florinef, but will have him hold this as his serum potassium level has been normal and due to borderline blood pressure control. Recommend continued low potassium diet.  - Magnesium; level: Normal. On supplement, will check magnesium with next labs.  - Bicarbonate; level: Normal. Takes 1 tsp baking soda daily (equivalent to ~ 7 tablets of sodium bicarbonate 650 mg  tablet).      # Leukopenia: Stable, mildly low WBC.  Likely medication related.    # Obesity: Marked increase in weight following transplant.  Patient has gained ~ 60 lbs, now weight at 297 lbs.   - Will [facility] to [facility].   - Recommend increased exercise and watch caloric intake.    # Skin Cancer Risk:    - Discussed sun protection and recommend regular follow up with Dermatology.    # Medical Compliance: Yes    Follow-up: Return in about 3 months (around 7/30/2019).     # Transplant History:  Etiology of kidney failure: focal segmental glomerulosclerosis (FSGS)  Tx: LDKT  Transplant: 8/7/2018 (Kidney)  Donor Type: Living Donor Class:   Crossmatch at time of Tx: negative  DSA at time of Tx: Yes- DPB1*01/1150   Significant changes in immunosuppression: None  CMV IgG Ab Discordance (D+/R-): No  EBV IgG Ab Discordance (D+/R-): No  Significant transplant-related complications: None    Transplant Office Phone Number: [phone]    Assessment and plan was discussed with the patient and they voiced understanding and agreement.    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    The patient was last evaluated on 12/7/2018 by Dr. Gaxiola. At that time, MMF was reduced to 500 mg BID due to leukopenia.  Please see note for further details.     Today, the patient reports he has been doing well. He notes he has had significant weight gain recently despite going to the gym twice a week. He does feel like he has shortness of breath when working out at the gym, but this has been stable. His appetite is normal. His energy level has been good. He denies any recent hospitalizations, new medical issues, chest pain with exertion, nausea, vomiting, diarrhea, fever, sweats, chills, or leg swelling.      Recent Hospitalizations:  [x]  No [] Yes    New Medical Issues: [x] No [] Yes    Decreased energy: [x] No [] Yes    Chest pain or SOB with exertion:  [] No [x] Yes  Shortness of breath, no chest pain.   Appetite change or weight change: [] No [x] Yes  Weight gain.   Nausea, vomiting or diarrhea:  [x] No [] Yes    Fever, sweats or chills: [x] No [] Yes    Leg swelling: [x] No [] Yes      Other medical issues:  No    Home BP: 130/80's     Review of Systems:   A comprehensive review of systems was obtained and negative, except as noted in the HPI or past medical history.     Active Medications:     Current Outpatient Medications:      atorvastatin (LIPITOR) 10 MG tablet, Take 1 tablet (10 mg) by mouth daily, Disp: 30 tablet, Rfl: 11     carvedilol (COREG) 25 MG tablet, Take 2 tablets (50 mg) by mouth 2 times daily (with meals), Disp: 120 tablet, Rfl: 11     magnesium oxide (MAG-OX) 400 MG tablet, Take 1 tablet (400 mg) by mouth daily (with lunch), Disp: 30 tablet, Rfl: 3     mycophenolate (GENERIC EQUIVALENT) 250 MG capsule, Take 4 capsules (1,000 mg) by mouth 2 times daily, Disp: 240 capsule, Rfl: 11     pentamidine (NEBUPENT) 300 MG neb solution, Inhale 300 mg into the lungs once for 1 dose, Disp: 300 mg, Rfl: 0     sodium bicarbonate 650 MG tablet, Take 2 tablets (1,300 mg) by mouth 2 times daily, Disp: 120 tablet, Rfl: 11     tacrolimus (GENERIC EQUIVALENT) 0.5 MG capsule, Take 1 capsule (0.5 mg) by mouth 2 times daily Total dose = 1.5 mg BID (Patient taking differently: Take 0.5 mg by mouth 3 times daily Total dose = 1.5 mg BID), Disp: 60 capsule, Rfl: 11     tacrolimus (GENERIC EQUIVALENT) 1 MG capsule, Take 1 capsule (1 mg) by mouth 2 times daily Total dose = 1.5 mg BID, Disp: 60 capsule, Rfl: 11     cinacalcet (SENSIPAR) 30 MG tablet, Take 1 tablet (30 mg) by mouth daily (Patient not taking: Reported on 4/30/2019), Disp: 30 tablet, Rfl: 0      Allergies:   Patient has no known allergies.      Active Medical Problems:  Patient Active Problem  List   Diagnosis     Sickle cell trait (H)     Anemia in chronic kidney disease     Secondary hyperparathyroidism (H)     Kidney replaced by transplant     Kidney transplant recipient     Immunosuppression (H)     Need for CMV immunotherapy     Need for pneumocystis prophylaxis     Benign essential hypertension     Anemia due to other cause, not classified     Hypercalcemia     Hypomagnesemia     Dehydration     Neutropenia (H)     Fever        Social History:   Social History     Tobacco Use     Smoking status: Never Smoker     Smokeless tobacco: Never Used   Substance Use Topics     Alcohol use: No     Drug use: Yes     Types: Marijuana     Comment: remote       Physical Exam:   /73   Pulse 81   Wt 134.7 kg (297 lb)   SpO2 97%   BMI 41.42 kg/m      Wt Readings from Last 4 Encounters:   04/30/19 134.7 kg (297 lb)   01/14/19 121.6 kg (268 lb)   10/30/18 115.3 kg (254 lb 4.8 oz)   10/10/18 114.2 kg (251 lb 12.8 oz)       GENERAL APPEARANCE: alert and no distress  HENT: mouth without ulcers or lesions  LYMPHATICS: no cervical or supraclavicular nodes  RESP: lungs clear to auscultation - no rales, rhonchi or wheezes  CV: regular rhythm, normal rate, no rub, no murmur  EDEMA: no LE edema bilaterally  ABDOMEN: soft, nondistended, nontender, bowel sounds normal  MS: extremities normal - no gross deformities noted, no evidence of inflammation in joints, no muscle tenderness  SKIN: no rash  TX KIDNEY: normal  DIALYSIS ACCESS: RUE AV Fistula with good thrill    Data:   Renal Latest Ref Rng & Units 4/15/2019 3/29/2019 3/4/2019   Na 133 - 144 mmol/L - - -   Na (external) 136 - 145 mmol/L 136 140 138   K 3.4 - 5.3 mmol/L - - -   K (external) 3.5 - 5.0 mmol/L 4.5 4.7(L) 4.3   Cl 94 - 109 mmol/L - - -   Cl (external) 98 - 107 mmol/L 106 108(H) 108(H)   CO2 20 - 32 mmol/L - - -   CO2 (external) 22 - 31 mmol/L 25 26 25   BUN 7 - 30 mg/dL - - -   BUN (external) 8 - 22 mg/dL 23(H) 19 21   Cr 0.66 - 1.25 mg/dL - - -   Cr  (external) 0.70 - 1.30 mg/dL 1.81(H) 1.84(H) 1.77(H)   Glucose 70 - 99 mg/dL - - -   Glucose (external) 70 - 125 mg/dL 102 107 104   Ca  8.5 - 10.1 mg/dL - - -   Ca (external) 8.5 - 10.5 mg/dL 10.3 10.0 10.6(H)   Mg 1.6 - 2.3 mg/dL - - -   Mg (external) 1.8 - 2.6 mg/dL - - -     Bone Health Latest Ref Rng & Units 3/4/2019 11/5/2018 10/29/2018   Phos 2.5 - 4.5 mg/dL - - -   Phos (external) 2.5 - 4.5 mg/dL - 2.5 2.1(L)   PTHi 18 - 80 pg/mL - - -   PTHi (external) 10 - 86 pg/mL 253(H) - -   Vit D Def 20 - 75 ug/L - - -     Heme Latest Ref Rng & Units 4/15/2019 3/29/2019 3/4/2019   WBC 4.0 - 11.0 10e9/L - - -   WBC (external) 4.0 - 11.0 thou/uL 3.1(L) 2.9(L) 2.8(L)   Hgb 13.3 - 17.7 g/dL - - -   Hgb (external) 14.0 - 18.0 g/dL 12.0(L) 12.4(L) 12.2(L)   Plt 150 - 450 10e9/L - - -   Plt (external) 140 - 440 thou/uL 252 270 232     Liver Latest Ref Rng & Units 2/4/2019 10/31/2018 7/30/2018   AP 40 - 150 U/L - 93 50   AP (external) 45 - 120 U/L 146(H) - -   TBili 0.2 - 1.3 mg/dL - 0.3 0.5   TBili (external) 0.0 - 1.0 mg/dL 0.5 - -   DBili 0.0 - 0.2 mg/dL - 0.1 -   DBili (external) <=0.5 mg/dL 0.2 - -   ALT 0 - 70 U/L - 31 17   ALT (external) 0 - 45 U/L 30 - -   AST 0 - 45 U/L - 23 4   AST (external) 0 - 40 U/L 29 - -   Tot Protein 6.8 - 8.8 g/dL - 8.1 8.6   Tot Protein (external) 6.0 - 8.0 g/dL 7.7 - -   Albumin 3.4 - 5.0 g/dL - 3.6 3.9   Albumin (external) 3.5 - 5.0 g/dL 4.2 - -     Pancreas Latest Ref Rng & Units 12/7/2018   A1C 0 - 5.6 % 5.5     Iron studies Latest Ref Rng & Units 7/30/2018   Iron 35 - 180 ug/dL 46   Iron sat 15 - 46 % 21   Ferritin 26 - 388 ng/mL 716(H)     [facility] Txp Virology Latest Ref Rng & Units 4/15/2019 3/4/2019 2/4/2019   CVM DNA Quant - - - -   CMV DNA Quant Ext Not Detected IU/mL - - -   BK Spec - - - -   BK Res BKNEG:BK Virus DNA Not Detected copies/mL - - -   BK Log <2.7 Log copies/mL - - -   BK Quant Log Ext <2.7 Log copies/mL Not Calculated Not Calculated Not Calculated   BK Quant Result Ext  NEG copies/mL Not Detected BK Virus DNA Not Detected Not Detected   BK Quant Spec Ext - Plasma Plasma Plasma   Hep B Core NR:Nonreactive - - -   Hep B Core Ext Negative - - Negative   Hep B Surf Ext  Negative - - Positive(A)        Recent Labs   Lab Test 03/04/19  0824 03/29/19  0828 04/15/19  0840   DOSTAC 03/03/19 2000 2030 03/28/19 04/14/2019 20:30   TACROL 7.8 9.9 6.3     Recent Labs   Lab Test 08/27/18  0810 09/05/18  0845 12/07/18  1223   DOSMPA Not Provided 904,180,815 Not Provided   MPACID 6.41* 4.52* 1.21   MPAG 98.1* 80.5 51.9       Scribe Disclosure:   I, Linda Rhodes, am serving as a scribe to document services personally performed by Kidney/Pancreas Recipient at this visit, based upon the provider's statements to me. All documentation has been reviewed by the aforementioned provider prior to being entered into the official medical record.       Juan Bahena MD

## 2019-04-30 NOTE — PROGRESS NOTES
[facility]  Nephrology Clinic  Kidney/Pancreas Recipient  2019     Name: Harshad Beatty  MRN: [id number]   Age: 39 year old  : 1980  Referring provider: Daysi Canales*     CHRONIC TRANSPLANT NEPHROLOGY VISIT    Assessment and Plan:   # LDKT: Stable   - Baseline Cr ~ 1.8-2.0;    - Proteinuria: Normal   - Date of DSA last checked: 2019  Latest DSA: No. Previously low level DSA to DPB1, now resolved.   - BK Viremia: No   - Kidney Tx Biopsy: 2019; No diagnostic evidence of acute rejection. Focal acute tubular injury.             2018; No diagnostic evidence of acute rejection.    # Immunosuppression: Tacrolimus immediate release (goal 6-8) and Mycophenolate mofetil (goal not followed)   - Changes: No    # Prophylaxis:    - PJP on Inhaled Pentamidine.  Will recheck CD4 level at 12 months post-transplant and if > 200, patient can stop PJP prophylaxis.     # Hypertension: Borderline control; Goal BP: < 130/80   - Changes: Yes - will discontinue Florinef    # Anemia in Chronic Renal Disease: Hgb: Stable   - Iron studies: Replete    # Mineral Bone Disorder:    - Secondary renal hyperparathyroidism; PTH level is: Mildly elevated. Off of cinacalcet for 3 weeks. Recheck PTH at 12 months post-transplant.   - Vitamin D; level is: Low, not on supplement due to hypercalcemia. Recheck vitamin D level at 12 months post-transplant.    - Calcium; level is: High. Not on supplement. Will monitor.     # Electrolytes:   - Potassium; level: Normal. Has been on Florinef, but will have him hold this as his serum potassium level has been normal and due to borderline blood pressure control. Recommend continued low potassium diet.  - Magnesium; level: Normal. On supplement, will check magnesium with next labs.  - Bicarbonate; level: Normal. Takes 1 tsp baking soda daily (equivalent to ~ 7 tablets of sodium bicarbonate 650 mg tablet).      # Leukopenia: Stable, mildly low WBC.  Likely medication related.    #  Obesity: Marked increase in weight following transplant.  Patient has gained ~ 60 lbs, now weight at 297 lbs.   - Will refer to [facility].   - Recommend increased exercise and watch caloric intake.    # Skin Cancer Risk:    - Discussed sun protection and recommend regular follow up with Dermatology.    # Medical Compliance: Yes    Follow-up: Return in about 3 months (around 7/30/2019).     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    Chief Complaint   Follow-up    History of Present Illness:  Harshad Betaty is a 39 year old male with a history of end stage kidney disease secondary to FSGS, is status-post LDKT completed on 8/07/2018 who presents for follow-up.    The patient was last evaluated on 12/7/2018 by Dr. Gaxiola. At that time, MMF was reduced to 500 mg BID due to leukopenia. Please see note for further details.     Today, the patient reports he has been doing well. He notes he has had significant weight gain recently despite going to the gym twice a week. He does feel like he has shortness of breath when working out at the gym, but this has been stable. His appetite is normal. His energy level has been good. He denies any recent hospitalizations, new medical issues, chest pain with exertion, nausea, vomiting, diarrhea, fever, sweats, chills, or leg swelling.      Recent Hospitalizations:  [x] No [] Yes    New Medical Issues: [x] No [] Yes    Decreased energy: [x] No [] Yes     Chest pain or SOB with exertion:  [] No [x] Yes  Shortness of breath, no chest pain.   Appetite change or weight change: [] No [x] Yes  Weight gain.   Nausea, vomiting or diarrhea:  [x] No [] Yes    Fever, sweats or chills: [x] No [] Yes    Leg swelling: [x] No [] Yes      Other medical issues:  No    Home BP: 130/80's     Review of Systems:   A comprehensive review of systems was obtained and negative, except as noted in the HPI or past medical history.     Active Medications:     Current Outpatient Medications:      atorvastatin (LIPITOR) 10 MG tablet, Take 1 tablet (10 mg) by mouth daily, Disp: 30 tablet, Rfl: 11     carvedilol (COREG) 25 MG tablet, Take 2 tablets (50 mg) by mouth 2 times daily (with meals), Disp: 120 tablet, Rfl: 11     magnesium oxide (MAG-OX) 400 MG tablet, Take 1 tablet (400 mg) by mouth daily (with lunch), Disp: 30 tablet, Rfl: 3     mycophenolate (GENERIC EQUIVALENT) 250 MG capsule, Take 4 capsules (1,000 mg) by mouth 2 times daily, Disp: 240 capsule, Rfl: 11     pentamidine (NEBUPENT) 300 MG neb solution, Inhale 300 mg into the lungs once for 1 dose, Disp: 300 mg, Rfl: 0     sodium bicarbonate 650 MG tablet, Take 2 tablets (1,300 mg) by mouth 2 times daily, Disp: 120 tablet, Rfl: 11     tacrolimus (GENERIC EQUIVALENT) 0.5 MG capsule, Take 1 capsule (0.5 mg) by mouth 2 times daily Total dose = 1.5 mg BID (Patient taking differently: Take 0.5 mg by mouth 3 times daily Total dose = 1.5 mg BID), Disp: 60 capsule, Rfl: 11     tacrolimus (GENERIC EQUIVALENT) 1 MG capsule, Take 1 capsule (1 mg) by mouth 2 times daily Total dose = 1.5 mg BID, Disp: 60 capsule, Rfl: 11     cinacalcet (SENSIPAR) 30 MG tablet, Take 1 tablet (30 mg) by mouth daily (Patient not taking: Reported on 4/30/2019), Disp: 30 tablet, Rfl: 0      Allergies:   Patient has no known allergies.      Active Medical Problems:  Patient Active Problem List   Diagnosis     Sickle cell trait (H)     Anemia in chronic kidney disease      Secondary hyperparathyroidism (H)     Kidney replaced by transplant     Kidney transplant recipient     Immunosuppression (H)     Need for CMV immunotherapy     Need for pneumocystis prophylaxis     Benign essential hypertension     Anemia due to other cause, not classified     Hypercalcemia     Hypomagnesemia     Dehydration     Neutropenia (H)     Fever        Social History:   Social History     Tobacco Use     Smoking status: Never Smoker     Smokeless tobacco: Never Used   Substance Use Topics     Alcohol use: No     Drug use: Yes     Types: Marijuana     Comment: remote       Physical Exam:   /73   Pulse 81   Wt 134.7 kg (297 lb)   SpO2 97%   BMI 41.42 kg/m     Wt Readings from Last 4 Encounters:   04/30/19 134.7 kg (297 lb)   01/14/19 121.6 kg (268 lb)   10/30/18 115.3 kg (254 lb 4.8 oz)   10/10/18 114.2 kg (251 lb 12.8 oz)       GENERAL APPEARANCE: alert and no distress  HENT: mouth without ulcers or lesions  LYMPHATICS: no cervical or supraclavicular nodes  RESP: lungs clear to auscultation - no rales, rhonchi or wheezes  CV: regular rhythm, normal rate, no rub, no murmur  EDEMA: no LE edema bilaterally  ABDOMEN: soft, nondistended, nontender, bowel sounds normal  MS: extremities normal - no gross deformities noted, no evidence of inflammation in joints, no muscle tenderness  SKIN: no rash  TX KIDNEY: normal  DIALYSIS ACCESS: RUE AV Fistula with good thrill    Data:   Renal Latest Ref Rng & Units 4/15/2019 3/29/2019 3/4/2019   Na 133 - 144 mmol/L - - -   Na (external) 136 - 145 mmol/L 136 140 138   K 3.4 - 5.3 mmol/L - - -   K (external) 3.5 - 5.0 mmol/L 4.5 4.7(L) 4.3   Cl 94 - 109 mmol/L - - -   Cl (external) 98 - 107 mmol/L 106 108(H) 108(H)   CO2 20 - 32 mmol/L - - -   CO2 (external) 22 - 31 mmol/L 25 26 25   BUN 7 - 30 mg/dL - - -   BUN (external) 8 - 22 mg/dL 23(H) 19 21   Cr 0.66 - 1.25 mg/dL - - -   Cr (external) 0.70 - 1.30 mg/dL 1.81(H) 1.84(H) 1.77(H)   Glucose 70 - 99 mg/dL - - -    Glucose (external) 70 - 125 mg/dL 102 107 104   Ca  8.5 - 10.1 mg/dL - - -   Ca (external) 8.5 - 10.5 mg/dL 10.3 10.0 10.6(H)   Mg 1.6 - 2.3 mg/dL - - -   Mg (external) 1.8 - 2.6 mg/dL - - -     Bone Health Latest Ref Rng & Units 3/4/2019 11/5/2018 10/29/2018   Phos 2.5 - 4.5 mg/dL - - -   Phos (external) 2.5 - 4.5 mg/dL - 2.5 2.1(L)   PTHi 18 - 80 pg/mL - - -   PTHi (external) 10 - 86 pg/mL 253(H) - -   Vit D Def 20 - 75 ug/L - - -     Heme Latest Ref Rng & Units 4/15/2019 3/29/2019 3/4/2019   WBC 4.0 - 11.0 10e9/L - - -   WBC (external) 4.0 - 11.0 thou/uL 3.1(L) 2.9(L) 2.8(L)   Hgb 13.3 - 17.7 g/dL - - -   Hgb (external) 14.0 - 18.0 g/dL 12.0(L) 12.4(L) 12.2(L)   Plt 150 - 450 10e9/L - - -   Plt (external) 140 - 440 thou/uL 252 270 232     Liver Latest Ref Rng & Units 2/4/2019 10/31/2018 7/30/2018   AP 40 - 150 U/L - 93 50   AP (external) 45 - 120 U/L 146(H) - -   TBili 0.2 - 1.3 mg/dL - 0.3 0.5   TBili (external) 0.0 - 1.0 mg/dL 0.5 - -   DBili 0.0 - 0.2 mg/dL - 0.1 -   DBili (external) <=0.5 mg/dL 0.2 - -   ALT 0 - 70 U/L - 31 17   ALT (external) 0 - 45 U/L 30 - -   AST 0 - 45 U/L - 23 4   AST (external) 0 - 40 U/L 29 - -   Tot Protein 6.8 - 8.8 g/dL - 8.1 8.6   Tot Protein (external) 6.0 - 8.0 g/dL 7.7 - -   Albumin 3.4 - 5.0 g/dL - 3.6 3.9   Albumin (external) 3.5 - 5.0 g/dL 4.2 - -     Pancreas Latest Ref Rng & Units 12/7/2018   A1C 0 - 5.6 % 5.5     Iron studies Latest Ref Rng & Units 7/30/2018   Iron 35 - 180 ug/dL 46   Iron sat 15 - 46 % 21   Ferritin 26 - 388 ng/mL 716(H)     [facility] Txp Virology Latest Ref Rng & Units 4/15/2019 3/4/2019 2/4/2019   CVM DNA Quant - - - -   CMV DNA Quant Ext Not Detected IU/mL - - -   BK Spec - - - -   BK Res BKNEG:BK Virus DNA Not Detected copies/mL - - -   BK Log <2.7 Log copies/mL - - -   BK Quant Log Ext <2.7 Log copies/mL Not Calculated Not Calculated Not Calculated   BK Quant Result Ext NEG copies/mL Not Detected BK Virus DNA Not Detected Not Detected   BK Quant Spec  Ext - Plasma Plasma Plasma   Hep B Core NR:Nonreactive - - -   Hep B Core Ext Negative - - Negative   Hep B Surf Ext  Negative - - Positive(A)        Recent Labs   Lab Test 03/04/19  0824 03/29/19  0828 04/15/19  0840   DOSTAC 03/03/19 2000 2030 03/28/19 04/14/2019 20:30   TACROL 7.8 9.9 6.3     Recent Labs   Lab Test 08/27/18  0810 09/05/18  0845 12/07/18  1223   DOSMPA Not Provided 904,180,815 Not Provided   MPACID 6.41* 4.52* 1.21   MPAG 98.1* 80.5 51.9       Scribe Disclosure:   I, Linda Rhodes, am serving as a scribe to document services personally performed by Kidney/Pancreas Recipient at this visit, based upon the provider's statements to me. All documentation has been reviewed by the aforementioned provider prior to being entered into the official medical record.

## 2019-05-01 ENCOUNTER — HOSPITAL ENCOUNTER (OUTPATIENT)
Dept: RESPIRATORY THERAPY | Facility: CLINIC | Age: 39
Discharge: HOME | End: 2019-05-01

## 2019-05-01 DIAGNOSIS — Z94.0 KIDNEY REPLACED BY TRANSPLANT: ICD-10-CM

## 2019-05-02 ENCOUNTER — TELEPHONE (OUTPATIENT)
Dept: TRANSPLANT | Facility: CLINIC | Age: 39
End: 2019-05-02

## 2019-05-02 ENCOUNTER — RESULTS ONLY (OUTPATIENT)
Dept: OTHER | Facility: CLINIC | Age: 39
End: 2019-05-02

## 2019-05-02 ENCOUNTER — RECORDS - HEALTHEAST (OUTPATIENT)
Dept: LAB | Facility: CLINIC | Age: 39
End: 2019-05-02

## 2019-05-02 DIAGNOSIS — Z94.0 KIDNEY REPLACED BY TRANSPLANT: ICD-10-CM

## 2019-05-02 LAB
ANION GAP SERPL CALCULATED.3IONS-SCNC: 7 MMOL/L (ref 5–18)
BK VIRUS SPECIMEN SOURCE: NORMAL
BKV DNA # SPEC NAA+PROBE: NORMAL COPIES/ML
BKV DNA SPEC NAA+PROBE-LOG#: NORMAL LOG COPIES/ML
BUN SERPL-MCNC: 24 MG/DL (ref 8–22)
CALCIUM SERPL-MCNC: 10.4 MG/DL (ref 8.5–10.5)
CHLORIDE BLD-SCNC: 110 MMOL/L (ref 98–107)
CO2 SERPL-SCNC: 22 MMOL/L (ref 22–31)
CREAT SERPL-MCNC: 1.79 MG/DL (ref 0.7–1.3)
ERYTHROCYTE [DISTWIDTH] IN BLOOD BY AUTOMATED COUNT: 13 % (ref 11–14.5)
GFR SERPL CREATININE-BSD FRML MDRD: 42 ML/MIN/1.73M2
GLUCOSE BLD-MCNC: 139 MG/DL (ref 70–125)
HCT VFR BLD AUTO: 38.8 % (ref 40–54)
HGB BLD-MCNC: 12.4 G/DL (ref 14–18)
MCH RBC QN AUTO: 26.8 PG (ref 27–34)
MCHC RBC AUTO-ENTMCNC: 32 G/DL (ref 32–36)
MCV RBC AUTO: 84 FL (ref 80–100)
PLATELET # BLD AUTO: 268 THOU/UL (ref 140–440)
PMV BLD AUTO: 9.3 FL (ref 8.5–12.5)
POTASSIUM BLD-SCNC: 4.7 MMOL/L (ref 3.5–5)
RBC # BLD AUTO: 4.63 MILL/UL (ref 4.4–6.2)
SODIUM SERPL-SCNC: 139 MMOL/L (ref 136–145)
WBC: 2.9 THOU/UL (ref 4–11)

## 2019-05-02 PROCEDURE — 86833 HLA CLASS II HIGH DEFIN QUAL: CPT | Performed by: PATHOLOGY

## 2019-05-02 PROCEDURE — 86832 HLA CLASS I HIGH DEFIN QUAL: CPT | Performed by: PATHOLOGY

## 2019-05-02 PROCEDURE — 80197 ASSAY OF TACROLIMUS: CPT | Performed by: PATHOLOGY

## 2019-05-02 NOTE — TELEPHONE ENCOUNTER
ISSUE:   Glucose 139 on labs this morning    LPN TASK/PLAN:   Please call patient to determine if this was fasting or not. If it is not, we will need discuss with provider.

## 2019-05-02 NOTE — TELEPHONE ENCOUNTER
Left message for patient regarding:  ISSUE:   Glucose 139 on labs this morning     LPN TASK/PLAN:   Please call patient to determine if this was fasting or not. If it is not, we will need discuss with provider.

## 2019-05-02 NOTE — LETTER
OUTPATIENT LABORATORY TEST ORDER    Patient Name:Harshad Beatty   Transplant Date: 8/7/2018  YOB: 1980  Issue Date & Time: 5/2/2019  10:05 AM  [facility] MR: [id number]  Expiration Date:  (1 year after date issued)      Diagnoses: Aftercare following organ transplant (ICD-10 Z48.288)   Kidney Transplant (ICD-10 Z94.0)   Long term use of medications (ICD-10  Z79.899)     ?Lab results to be available on the same day drawn.   ?Patient should release information to the [facility].     ?Please fax to the Transplant Center at [phone].    Every 2 weeks  2/10/2019 - 5/10/2019  Every 3 weeks 5/11/2019 - 8/7/2019       ?Hemogram and Platelet   ?Basic Metabolic Panel (Sodium, Potassium,Chloride, CO2, Creatinine, Urea Nitrogen, Glucose, Calcium)   ?Prograf/Tacrolimus drug level     Monthly      ? Random Urine for Protein/Creatinine ratio   ? BK PCR Quantitative (Polyoma virus - blood in purple top tube to Reference Lab)   ? PRA/DSA level (mailers provided by the patient)    At 12 months post-transplant         Due: 8/2019   ? HBsurfaceAb, HBcoreAb, HCV at 6 months only -   ? Liver Function Tests(Bilirubin Direct/Total, AST, ALT, Alkaline Phosphatase)   ? Complete Lipid Panel fasting (Cholesterol, Triglycerides, HDL, LDL)   ? PTH   ? Vitamin D   ? Magnesium                                      If you have any questions, please call The Transplant Center at [phone] or [phone].    Please fax all results to [phone].  .

## 2019-05-03 LAB
TACROLIMUS BLD-MCNC: 7.1 UG/L (ref 5–15)
TME LAST DOSE: NORMAL H

## 2019-05-20 ENCOUNTER — RECORDS - HEALTHEAST (OUTPATIENT)
Dept: LAB | Facility: CLINIC | Age: 39
End: 2019-05-20

## 2019-05-20 LAB
ANION GAP SERPL CALCULATED.3IONS-SCNC: 5 MMOL/L (ref 5–18)
BUN SERPL-MCNC: 21 MG/DL (ref 8–22)
CALCIUM SERPL-MCNC: 9.6 MG/DL (ref 8.5–10.5)
CHLORIDE BLD-SCNC: 105 MMOL/L (ref 98–107)
CO2 SERPL-SCNC: 26 MMOL/L (ref 22–31)
CREAT SERPL-MCNC: 1.66 MG/DL (ref 0.7–1.3)
CREAT UR-MCNC: 68.9 MG/DL
ERYTHROCYTE [DISTWIDTH] IN BLOOD BY AUTOMATED COUNT: 12.7 % (ref 11–14.5)
GFR SERPL CREATININE-BSD FRML MDRD: 46 ML/MIN/1.73M2
GLUCOSE BLD-MCNC: 95 MG/DL (ref 70–125)
HCT VFR BLD AUTO: 37.7 % (ref 40–54)
HGB BLD-MCNC: 11.7 G/DL (ref 14–18)
MCH RBC QN AUTO: 26.2 PG (ref 27–34)
MCHC RBC AUTO-ENTMCNC: 31 G/DL (ref 32–36)
MCV RBC AUTO: 85 FL (ref 80–100)
PLATELET # BLD AUTO: 254 THOU/UL (ref 140–440)
PMV BLD AUTO: 9.2 FL (ref 8.5–12.5)
POTASSIUM BLD-SCNC: 4.4 MMOL/L (ref 3.5–5)
PROTEIN, RANDOM URINE - HISTORICAL: 7 MG/DL
PROTEIN/CREAT RATIO, RANDOM UR: 0.1
RBC # BLD AUTO: 4.46 MILL/UL (ref 4.4–6.2)
SODIUM SERPL-SCNC: 136 MMOL/L (ref 136–145)
WBC: 3.5 THOU/UL (ref 4–11)

## 2019-05-20 PROCEDURE — 80197 ASSAY OF TACROLIMUS: CPT | Performed by: PATHOLOGY

## 2019-05-21 DIAGNOSIS — Z94.0 KIDNEY REPLACED BY TRANSPLANT: Primary | ICD-10-CM

## 2019-05-21 LAB
TACROLIMUS BLD-MCNC: 5.7 UG/L (ref 5–15)
TME LAST DOSE: NORMAL H

## 2019-05-21 RX ORDER — TACROLIMUS 0.5 MG/1
0.5 CAPSULE ORAL EVERY EVENING
Qty: 30 CAPSULE | Refills: 11 | Status: SHIPPED | OUTPATIENT
Start: 2019-05-21 | End: 2019-08-06

## 2019-05-21 RX ORDER — TACROLIMUS 1 MG/1
CAPSULE ORAL
Qty: 90 CAPSULE | Refills: 11 | Status: SHIPPED | OUTPATIENT
Start: 2019-05-21 | End: 2019-08-06

## 2019-05-21 NOTE — TELEPHONE ENCOUNTER
ISSUE:   Tacrolimus level 5.7 on 5/20, goal 6, dose 1.5 mg BID    PLAN:   Please call pt and confirm this was a good 12-hour trough. Verify dose 1.5 mg BID. Confirm no new medications or illness (laith. Diarrhea). If good trough, increase dose to 2mg AM, 1.5 mg PM and recheck level in 1 week (please send orders).

## 2019-05-21 NOTE — TELEPHONE ENCOUNTER
Left message for patient regarding:  ISSUE:   Tacrolimus level 5.7 on 5/20, goal 6, dose 1.5 mg BID     PLAN:   Please call pt and confirm this was a good 12-hour trough. Verify dose 1.5 mg BID. Confirm no new medications or illness (laith. Diarrhea). If good trough, increase dose to 2mg AM, 1.5 mg PM and recheck level in 1 week (please send orders).

## 2019-05-21 NOTE — LETTER
PHYSICIAN ORDERS      DATE & TIME ISSUED: May 21, 2019 2:43 PM  PATIENT NAME: Harshad Beatty   : 1980     [facility] MR# [if applicable]: [id number]     DIAGNOSIS:  Kidney Transplant  ICD-10 CODE: Z94.0     Please repeat the following labs in 1 week:  Tacrolimus drug level  CBC  BMP    Any questions please call: [phone]  Please fax lab results to [phone].    .

## 2019-05-21 NOTE — TELEPHONE ENCOUNTER
Spoke to patient who confirms current dose of Tacrolimus 2 mg in the AM and 1 mg in the PM and confirms good 12 hour trough level.  Patient verbalizes understanding to increase Tacrolimus dose to 2 mg in the AM and 1.5 mg in the PM.  Updated lab orders to repeat in 1 week.

## 2019-06-04 ENCOUNTER — TELEPHONE (OUTPATIENT)
Dept: TRANSPLANT | Facility: CLINIC | Age: 39
End: 2019-06-04

## 2019-06-04 ENCOUNTER — RECORDS - HEALTHEAST (OUTPATIENT)
Dept: LAB | Facility: CLINIC | Age: 39
End: 2019-06-04

## 2019-06-04 ENCOUNTER — HOSPITAL ENCOUNTER (OUTPATIENT)
Dept: RESPIRATORY THERAPY | Facility: CLINIC | Age: 39
Discharge: HOME | End: 2019-06-04

## 2019-06-04 DIAGNOSIS — Z94.0 KIDNEY REPLACED BY TRANSPLANT: ICD-10-CM

## 2019-06-04 LAB
ANION GAP SERPL CALCULATED.3IONS-SCNC: 9 MMOL/L (ref 5–18)
BUN SERPL-MCNC: 19 MG/DL (ref 8–22)
CALCIUM SERPL-MCNC: 10 MG/DL (ref 8.5–10.5)
CHLORIDE BLD-SCNC: 111 MMOL/L (ref 98–107)
CO2 SERPL-SCNC: 21 MMOL/L (ref 22–31)
CREAT SERPL-MCNC: 1.74 MG/DL (ref 0.7–1.3)
ERYTHROCYTE [DISTWIDTH] IN BLOOD BY AUTOMATED COUNT: 12.9 % (ref 11–14.5)
GFR SERPL CREATININE-BSD FRML MDRD: 44 ML/MIN/1.73M2
GLUCOSE BLD-MCNC: 148 MG/DL (ref 70–125)
HCT VFR BLD AUTO: 38.2 % (ref 40–54)
HGB BLD-MCNC: 12 G/DL (ref 14–18)
MCH RBC QN AUTO: 26.4 PG (ref 27–34)
MCHC RBC AUTO-ENTMCNC: 31.4 G/DL (ref 32–36)
MCV RBC AUTO: 84 FL (ref 80–100)
PLATELET # BLD AUTO: 275 THOU/UL (ref 140–440)
PMV BLD AUTO: 8.9 FL (ref 8.5–12.5)
POTASSIUM BLD-SCNC: 4.7 MMOL/L (ref 3.5–5)
RBC # BLD AUTO: 4.55 MILL/UL (ref 4.4–6.2)
SODIUM SERPL-SCNC: 141 MMOL/L (ref 136–145)
WBC: 2.9 THOU/UL (ref 4–11)

## 2019-06-04 PROCEDURE — 80197 ASSAY OF TACROLIMUS: CPT | Performed by: PATHOLOGY

## 2019-06-04 NOTE — TELEPHONE ENCOUNTER
Blood sugars are elevated. Are these fasting?  Has Harshad been monitoring blood sugars at home or has he discussed blood sugars / A1c with his primary?  How is weight loss management coming?  Has Harshad lost any weight? If so, how much?    If no A1C with PCP, please draw with next set of labs.

## 2019-06-04 NOTE — LETTER
PHYSICIAN ORDERS      DATE & TIME ISSUED: 2019 11:48 AM  PATIENT NAME: Harshad Beatty   : 1980     [facility] MR# [if applicable]: [id number]     DIAGNOSIS:  Kidney Transplant  ICD-10 CODE: Z94.0     Please complete the following lab at the next routine transplant lab draw:  A1c    Any questions please call: [phone]  Please fax lab results to [phone].    .

## 2019-06-04 NOTE — TELEPHONE ENCOUNTER
Left message for patient regarding:  Blood sugars are elevated. Are these fasting?  Has Harshad been monitoring blood sugars at home or has he discussed blood sugars / A1c with his primary?  How is weight loss management coming?  Has Harshad lost any weight? If so, how much?     If no A1C with PCP, please draw with next set of labs

## 2019-06-05 LAB
TACROLIMUS BLD-MCNC: 7.7 UG/L (ref 5–15)
TME LAST DOSE: NORMAL H

## 2019-06-06 NOTE — TELEPHONE ENCOUNTER
Left message for patient regarding:  Blood sugars are elevated. Are these fasting?  Has Harshad been monitoring blood sugars at home or has he discussed blood sugars / A1c with his primary?  How is weight loss management coming?  Has Harshad lost any weight? If so, how much?     If no A1C with PCP, please draw with next set of labs    Updated lab orders to include A1c

## 2019-06-19 ENCOUNTER — TELEPHONE (OUTPATIENT)
Dept: TRANSPLANT | Facility: CLINIC | Age: 39
End: 2019-06-19

## 2019-06-19 NOTE — TELEPHONE ENCOUNTER
Post Kidney and Pancreas Transplant Team Conference  Date: 6/19/2019  Transplant Coordinator: Dorcas Lopez     Attendees:  []  Dr. Bahena [] Yanni Field, RN  [x] Lynda Wilson LPN     [x]  Dr. Gaxiola [x] Tonya Albrecht RN [] Sandra Epperson LPN   [x]  Dr. Connolly [] Sherry Jimenez RN    []  Dr. Ronquillo [] Julieta Perez RN [x] Lambert Wall, PharmD   [] Dr. Lema [x] Eli Harrell, CRHISSIE    [] Dr. Mitchell [] Randy Peter RN    [] Dr. Lopez [x] Dorcas Lopez RN    [x] Dr. Mohr [] Blanca Sow RN    [] Dr. Frey [] Allison Aaron, CHRISSIE    [] Surgery Fellow [] Philippe Colon RN    [] Tracy Finley, NP [] Yanni Rasmussen RN        Verbal Plan Read Back:   CD4 count needed at 1 yr post txp  A1c needed    Routed to RN Coordinator   Lynda Wilson

## 2019-06-19 NOTE — LETTER
2019  PHYSICIAN ORDERS      DATE & TIME ISSUED: 2019 12:28 PM  PATIENT NAME: Harshad Beatty   : 1980     [facility] MR# [if applicable]: [id number]     DIAGNOSIS:  post kidney transplant  ICD-10 CODE: Z94.0     Please collect the following upon next lab apt (in addition to standing orders):  Hemoglobin A1C  T Cell Subset (specifically CD4 count)      Any questions please call: [phone] (option 5)      Please fax these results to [phone].      Brijesh Gaxiola

## 2019-07-01 ENCOUNTER — RECORDS - HEALTHEAST (OUTPATIENT)
Dept: LAB | Facility: CLINIC | Age: 39
End: 2019-07-01

## 2019-07-01 LAB
ANION GAP SERPL CALCULATED.3IONS-SCNC: 8 MMOL/L (ref 5–18)
BK VIRUS SPECIMEN SOURCE: NORMAL
BKV DNA # SPEC NAA+PROBE: NORMAL COPIES/ML
BKV DNA SPEC NAA+PROBE-LOG#: NORMAL LOG COPIES/ML
BUN SERPL-MCNC: 21 MG/DL (ref 8–22)
CALCIUM SERPL-MCNC: 10.1 MG/DL (ref 8.5–10.5)
CD3 CELLS # BLD: 635 /UL
CD3 CELLS NFR BLD: 72 % (ref 53–86)
CD3+CD4+ CELLS # BLD: 262 /UL
CD3+CD4+ CELLS NFR BLD: 30 % (ref 31–67)
CD3+CD4+ CELLS/CD3+CD8+ CLL BLD: 0.77 % (ref 0.8–4.5)
CD3+CD8+ CELLS # BLD: 342 /UL (ref 117–923)
CD3+CD8+ CELLS NFR BLD: 39 % (ref 13–44)
CHLORIDE BLD-SCNC: 111 MMOL/L (ref 98–107)
CO2 SERPL-SCNC: 21 MMOL/L (ref 22–31)
CREAT SERPL-MCNC: 1.75 MG/DL (ref 0.7–1.3)
ERYTHROCYTE [DISTWIDTH] IN BLOOD BY AUTOMATED COUNT: 12.6 % (ref 11–14.5)
GFR SERPL CREATININE-BSD FRML MDRD: 44 ML/MIN/1.73M2
GLUCOSE BLD-MCNC: 134 MG/DL (ref 70–125)
HCT VFR BLD AUTO: 38.4 % (ref 40–54)
HGB BLD-MCNC: 12.1 G/DL (ref 14–18)
LYMPHOCYTES # BLD AUTO: 0.9 THOU/UL (ref 0.8–4.4)
MCH RBC QN AUTO: 26.4 PG (ref 27–34)
MCHC RBC AUTO-ENTMCNC: 31.5 G/DL (ref 32–36)
MCV RBC AUTO: 84 FL (ref 80–100)
PLATELET # BLD AUTO: 255 THOU/UL (ref 140–440)
PMV BLD AUTO: 9.1 FL (ref 8.5–12.5)
POTASSIUM BLD-SCNC: 4.3 MMOL/L (ref 3.5–5)
RBC # BLD AUTO: 4.58 MILL/UL (ref 4.4–6.2)
SODIUM SERPL-SCNC: 140 MMOL/L (ref 136–145)
WBC: 3 THOU/UL (ref 4–11)

## 2019-07-01 PROCEDURE — 80197 ASSAY OF TACROLIMUS: CPT | Performed by: PATHOLOGY

## 2019-07-02 ENCOUNTER — AMBULATORY - HEALTHEAST (OUTPATIENT)
Dept: RESPIRATORY THERAPY | Facility: CLINIC | Age: 39
End: 2019-07-02

## 2019-07-02 DIAGNOSIS — Z94.0 KIDNEY REPLACED BY TRANSPLANT: ICD-10-CM

## 2019-07-02 LAB
HBA1C MFR BLD: 6.2 % (ref 4.2–6.1)
TACROLIMUS BLD-MCNC: 7.9 UG/L (ref 5–15)
TME LAST DOSE: NORMAL H

## 2019-07-03 ENCOUNTER — TELEPHONE (OUTPATIENT)
Dept: TRANSPLANT | Facility: CLINIC | Age: 39
End: 2019-07-03

## 2019-07-03 ENCOUNTER — HOSPITAL ENCOUNTER (OUTPATIENT)
Dept: RESPIRATORY THERAPY | Facility: CLINIC | Age: 39
Discharge: HOME | End: 2019-07-03

## 2019-07-03 DIAGNOSIS — Z94.0 KIDNEY REPLACED BY TRANSPLANT: ICD-10-CM

## 2019-07-03 NOTE — TELEPHONE ENCOUNTER
CD4 count >200  A1C 6.2    PLAN:  Per review with poncho Chavez to come off pent nebs. To follow-up with PCP re elevated A1C.  Call placed to pt. No answer. Left voice mail asking for return call.

## 2019-07-08 NOTE — TELEPHONE ENCOUNTER
Call placed to Harshad. He voices understanding to stop pent nebs and to follow-up with PCP on elevated A1C at 6.2

## 2019-08-05 ENCOUNTER — RECORDS - HEALTHEAST (OUTPATIENT)
Dept: LAB | Facility: CLINIC | Age: 39
End: 2019-08-05

## 2019-08-05 ENCOUNTER — TELEPHONE (OUTPATIENT)
Dept: TRANSPLANT | Facility: CLINIC | Age: 39
End: 2019-08-05

## 2019-08-05 ENCOUNTER — HOSPITAL ENCOUNTER (OUTPATIENT)
Dept: RESPIRATORY THERAPY | Facility: CLINIC | Age: 39
Discharge: HOME | End: 2019-08-05

## 2019-08-05 ENCOUNTER — RESULTS ONLY (OUTPATIENT)
Dept: OTHER | Facility: CLINIC | Age: 39
End: 2019-08-05

## 2019-08-05 DIAGNOSIS — Z48.298 AFTERCARE FOLLOWING ORGAN TRANSPLANT: ICD-10-CM

## 2019-08-05 DIAGNOSIS — Z79.899 ENCOUNTER FOR LONG-TERM CURRENT USE OF MEDICATION: ICD-10-CM

## 2019-08-05 DIAGNOSIS — Z94.0 KIDNEY REPLACED BY TRANSPLANT: ICD-10-CM

## 2019-08-05 DIAGNOSIS — Z94.0 KIDNEY REPLACED BY TRANSPLANT: Primary | ICD-10-CM

## 2019-08-05 LAB
ALBUMIN SERPL-MCNC: 3.9 G/DL (ref 3.5–5)
ALP SERPL-CCNC: 89 U/L (ref 45–120)
ALT SERPL W P-5'-P-CCNC: 21 U/L (ref 0–45)
ANION GAP SERPL CALCULATED.3IONS-SCNC: 7 MMOL/L (ref 5–18)
AST SERPL W P-5'-P-CCNC: 17 U/L (ref 0–40)
BILIRUB DIRECT SERPL-MCNC: 0.2 MG/DL
BILIRUB SERPL-MCNC: 0.3 MG/DL (ref 0–1)
BK VIRUS SPECIMEN SOURCE: NORMAL
BKV DNA # SPEC NAA+PROBE: NORMAL COPIES/ML
BKV DNA SPEC NAA+PROBE-LOG#: NORMAL LOG COPIES/ML
BUN SERPL-MCNC: 21 MG/DL (ref 8–22)
CALCIUM SERPL-MCNC: 10.1 MG/DL (ref 8.5–10.5)
CHLORIDE BLD-SCNC: 109 MMOL/L (ref 98–107)
CHOLEST SERPL-MCNC: 133 MG/DL
CO2 SERPL-SCNC: 22 MMOL/L (ref 22–31)
CREAT SERPL-MCNC: 1.85 MG/DL (ref 0.7–1.3)
CREAT UR-MCNC: 118.4 MG/DL
ERYTHROCYTE [DISTWIDTH] IN BLOOD BY AUTOMATED COUNT: 12.2 % (ref 11–14.5)
FASTING STATUS PATIENT QL REPORTED: YES
GFR SERPL CREATININE-BSD FRML MDRD: 41 ML/MIN/1.73M2
GLUCOSE BLD-MCNC: 105 MG/DL (ref 70–125)
HCT VFR BLD AUTO: 36.8 % (ref 40–54)
HDLC SERPL-MCNC: 42 MG/DL
HGB BLD-MCNC: 11.7 G/DL (ref 14–18)
LDLC SERPL CALC-MCNC: 72 MG/DL
MCH RBC QN AUTO: 26.4 PG (ref 27–34)
MCHC RBC AUTO-ENTMCNC: 31.8 G/DL (ref 32–36)
MCV RBC AUTO: 83 FL (ref 80–100)
PLATELET # BLD AUTO: 257 THOU/UL (ref 140–440)
PMV BLD AUTO: 9.4 FL (ref 8.5–12.5)
POTASSIUM BLD-SCNC: 4.3 MMOL/L (ref 3.5–5)
PROT SERPL-MCNC: 7.3 G/DL (ref 6–8)
PROTEIN, RANDOM URINE - HISTORICAL: 23 MG/DL
PROTEIN/CREAT RATIO, RANDOM UR: 0.19
RBC # BLD AUTO: 4.43 MILL/UL (ref 4.4–6.2)
SODIUM SERPL-SCNC: 138 MMOL/L (ref 136–145)
TRIGL SERPL-MCNC: 95 MG/DL
WBC: 2.9 THOU/UL (ref 4–11)

## 2019-08-05 PROCEDURE — 86833 HLA CLASS II HIGH DEFIN QUAL: CPT | Performed by: PATHOLOGY

## 2019-08-05 PROCEDURE — 80197 ASSAY OF TACROLIMUS: CPT | Performed by: PATHOLOGY

## 2019-08-05 PROCEDURE — 86832 HLA CLASS I HIGH DEFIN QUAL: CPT | Performed by: PATHOLOGY

## 2019-08-05 NOTE — LETTER
August 6, 2019    Dear Harshad,    Congratulations on your 1 year post-transplant anniversary!    *The best phone contact for the Transplant Office is [phone] option 5 , as you will have a new transplant coordination team to assist you after your first year of transplant.  When you should contact the Transplant Center:    If you run out of your immunosuppression medications.     Prolonged illness that is not resolved after recommendations of the PCP, such as frequent UTI.    Viral infections such as : Shingles (herpes-zoster), CMV, EBV, and Influenza    Cancer    Increased creatinine or pain over your graft site.     Hospitalizations at facilities other than [facility].    When you should visit their local ED or Urgent Care:    Severe dehydration (uncontrolled nausea, vomiting, diarrhea).    Unable to urinate.    Fevers >101    Other medical emergencies.    Medications:    ALWAYS BRING YOUR MED CARD TO APPOINTMENTS.    Please keep your medical insurance up to date.    Do NOT miss your immunosuppression medications.    Labs:    Labs will now be drawn monthly up to 3 years post-transplant.     Contact the Transplant Center if additional lab orders are needed.  *Enclosed is a copy of your updated lab letter    General Transplant Recommendations:    Yearly nephrology visits with our MDs.    Yearly follow up with your primary care provider (PCP)     Follow up with specialty providers as directed.    Frequent reviews of the transplant handbook and / or My Transplant Place.    Lab review on [employer].     Thank you!  Your Transplant Team

## 2019-08-05 NOTE — LETTER
OUTPATIENT LABORATORY TEST ORDER    Patient Name: Harshad Beatty  Transplant Date: 8/7/2018   Date of Birth: [id number]  Issue Date & Time: 8/6/2019  8:32 AM  [facility] MR: [id number] Exp. Date (1 year after date issued)      Diagnoses: Kidney Transplant (ICD-10  Z94.0)   Long term use of medications (ICD-10  Z79.899)     Lab results to be available on the same day drawn.   Patient should release information to the [facility].  Please fax to the Transplant Center at [phone].    Monthly   ?Hemogram and Platelet  ?Basic Metabolic Panel (Sodium, Potassium, Chloride, CO2, Creatinine, Urea Nitrogen,     Glucose,   Calcium)         ?/Tacrolimus/Prograf drug level          ?BK (Polyoma Virus) PCR Quantitative - Plasma                       Every 6 Months                  ?PRA/DSA level (mailers provided by the patient)                                           ?Urine for protein/creatinine    If you have any questions, please call The Transplant Center at [phone] or [phone].    Please fax labs to [phone]  .

## 2019-08-05 NOTE — TELEPHONE ENCOUNTER
1 year Kidney and Pancreas Transplant Patient Phone Call    Congratulations on your 1 year post-transplant anniversary!    Please re-review with the patient how to contact the Transplant Center, as their coordination team will now change:  Best phone contact is [phone] option 5, as if the need is urgent, as you will have a new transplant coordination team to assist you after your first year of transplant.    When the patient should contact the Transplant Center:    If you run out of your immunosuppression medications.     Prolonged illness that is not resolved after recommendations of the PCP, such as frequent UTI.    Viral infections such as : Shingles (herpes-zoster), CMV, EBV, and Influenza    Cancer    Increased creatinine or pain over your graft site.   Please review the patient's baseline creatinine with them: approx 1.8     Hospitalizations at facilities other than [facility].    When the patient should visit their local ED or Urgent Care:    Severe dehydration (uncontrolled nausea, vomiting, diarrhea).    Unable to urinate.    Fevers >101    Other medical emergencies.      Medications:  Your new tacro goal level is 4-6.    Please complete medication reconciliation and ensure the patient has an up to date medication card at home.   *ALWAYS BRING YOUR MED CARD TO APPOINTMENTS.*  Please confirm if the patient is independent with medication set up and administration: Yes:  Date 8/6/2019.  If no, who helps the patient with their medications?    Please review if the patient has had any incidents of missed medications: No  If yes, in the last 1 month: no  If yes, in the last 3 months: no  Does the patient have any strategies in place to avoid missed immunosuppression doses: no  IF yes, please explain:     Labs:  Please review current lab frequency with the patient after 1 year post transplant:  Labs will now be drawn monthly up to 3 years post-transplant.   Contact the Transplant Center if additional lab orders are  needed.  Please update and mail lab letter and orders.    Your coordinator is currently monitoring your most recent transplant complications: DUE FOR BK AND DSA LABWORK.    General Transplant Recommendations:    Yearly nephrology visits with our MDs.    yearly follow up with your primary care provider (PCP)     Follow up with specialty providers as directed.    Frequent reviews of the transplant handbook and / or My Transplant Place.    Lab review on MyChart.   If the patient does not utilize MyChart, please record how the patient monitors their lab values:     Mirna Harrell

## 2019-08-06 ENCOUNTER — TELEPHONE (OUTPATIENT)
Dept: TRANSPLANT | Facility: CLINIC | Age: 39
End: 2019-08-06

## 2019-08-06 DIAGNOSIS — Z94.0 KIDNEY REPLACED BY TRANSPLANT: ICD-10-CM

## 2019-08-06 LAB
TACROLIMUS BLD-MCNC: 8.9 UG/L (ref 5–15)
TME LAST DOSE: NORMAL H

## 2019-08-06 RX ORDER — TACROLIMUS 1 MG/1
1 CAPSULE ORAL 2 TIMES DAILY
Qty: 90 CAPSULE | Refills: 11
Start: 2019-08-06 | End: 2019-10-09

## 2019-08-06 RX ORDER — TACROLIMUS 0.5 MG/1
0.5 CAPSULE ORAL 2 TIMES DAILY
Qty: 30 CAPSULE | Refills: 11
Start: 2019-08-06 | End: 2019-10-09

## 2019-08-06 NOTE — TELEPHONE ENCOUNTER
ISSUE:  Tac 8.9 (ok to target 4-6 now that he is almost 1 year post txp)    PLAN:  Call placed to pt. He confirms current dose of 2mg am and 1.5mg pm and 12 hour level. He has been advised to DECREASE dose to 1.5mg BID and repeat all txp labs next week. Rx updated. Lab orders updated.     Congratulations on your 1 year post-transplant anniversary!    *The best phone contact for the Transplant Office is [phone] option 5 , as you will have a new transplant coordination team to assist you after your first year of transplant.  When you should contact the Transplant Center:    If you run out of your immunosuppression medications.     Prolonged illness that is not resolved after recommendations of the PCP, such as frequent UTI.    Viral infections such as : Shingles (herpes-zoster), CMV, EBV, and Influenza    Cancer    Increased creatinine or pain over your graft site.     Hospitalizations at facilities other than [facility].    When you should visit their local ED or Urgent Care:    Severe dehydration (uncontrolled nausea, vomiting, diarrhea).    Unable to urinate.    Fevers >101    Other medical emergencies.    Medications:    ALWAYS BRING YOUR MED CARD TO APPOINTMENTS.    Please keep your medical insurance up to date.    Do NOT miss your immunosuppression medications.    Labs:    Labs will now be drawn monthly up to 3 years post-transplant.     Contact the Transplant Center if additional lab orders are needed.  *Enclosed is a copy of your updated lab letter    General Transplant Recommendations:    Yearly nephrology visits with our MDs.    Yearly follow up with your primary care provider (PCP)     Follow up with specialty providers as directed.    Frequent reviews of the transplant handbook and / or [facility].    Lab review on [facility].     Dorcas oLpez    Pt states he is attempting weight loss, current weight is 290lbs. He declines weight management at this time.  He is asking about fistula ligation -  elevated flow - message sent to vascular access  Message sent to [facility] scheduling to set him up for 1 year follow up visit.

## 2019-08-14 ENCOUNTER — TELEPHONE (OUTPATIENT)
Dept: TRANSPLANT | Facility: CLINIC | Age: 39
End: 2019-08-14

## 2019-08-19 DIAGNOSIS — Z94.0 KIDNEY REPLACED BY TRANSPLANT: ICD-10-CM

## 2019-08-19 RX ORDER — ATORVASTATIN CALCIUM 10 MG/1
10 TABLET, FILM COATED ORAL DAILY
Qty: 30 TABLET | Refills: 11 | Status: SHIPPED | OUTPATIENT
Start: 2019-08-19 | End: 2024-09-16

## 2019-09-12 ENCOUNTER — TELEPHONE (OUTPATIENT)
Dept: TRANSPLANT | Facility: CLINIC | Age: 39
End: 2019-09-12

## 2019-09-12 NOTE — TELEPHONE ENCOUNTER
Pt no showed for fistula ligation consult apt.   Call placed to pt. No answer. Left detailed vm advising that he call back if he wishes to reschedule.

## 2019-10-04 ENCOUNTER — RECORDS - HEALTHEAST (OUTPATIENT)
Dept: LAB | Facility: CLINIC | Age: 39
End: 2019-10-04

## 2019-10-04 DIAGNOSIS — Z94.0 KIDNEY REPLACED BY TRANSPLANT: ICD-10-CM

## 2019-10-04 DIAGNOSIS — Z79.899 ENCOUNTER FOR LONG-TERM CURRENT USE OF MEDICATION: ICD-10-CM

## 2019-10-04 DIAGNOSIS — Z48.298 AFTERCARE FOLLOWING ORGAN TRANSPLANT: ICD-10-CM

## 2019-10-04 LAB
ANION GAP SERPL CALCULATED.3IONS-SCNC: 7 MMOL/L (ref 5–18)
BK VIRUS SPECIMEN SOURCE: NORMAL
BKV DNA # SPEC NAA+PROBE: NORMAL COPIES/ML
BKV DNA SPEC NAA+PROBE-LOG#: NORMAL LOG COPIES/ML
BUN SERPL-MCNC: 13 MG/DL (ref 8–22)
CALCIUM SERPL-MCNC: 9.9 MG/DL (ref 8.5–10.5)
CHLORIDE BLD-SCNC: 104 MMOL/L (ref 98–107)
CO2 SERPL-SCNC: 24 MMOL/L (ref 22–31)
CREAT SERPL-MCNC: 1.69 MG/DL (ref 0.7–1.3)
ERYTHROCYTE [DISTWIDTH] IN BLOOD BY AUTOMATED COUNT: 12.4 % (ref 11–14.5)
GFR SERPL CREATININE-BSD FRML MDRD: 45 ML/MIN/1.73M2
GLUCOSE BLD-MCNC: 102 MG/DL (ref 70–125)
HCT VFR BLD AUTO: 40 % (ref 40–54)
HGB BLD-MCNC: 12.7 G/DL (ref 14–18)
MCH RBC QN AUTO: 26.2 PG (ref 27–34)
MCHC RBC AUTO-ENTMCNC: 31.8 G/DL (ref 32–36)
MCV RBC AUTO: 83 FL (ref 80–100)
PLATELET # BLD AUTO: 277 THOU/UL (ref 140–440)
PMV BLD AUTO: 8.8 FL (ref 8.5–12.5)
POTASSIUM BLD-SCNC: 4.6 MMOL/L (ref 3.5–5)
RBC # BLD AUTO: 4.85 MILL/UL (ref 4.4–6.2)
SODIUM SERPL-SCNC: 135 MMOL/L (ref 136–145)
WBC: 2.7 THOU/UL (ref 4–11)

## 2019-10-04 PROCEDURE — 80197 ASSAY OF TACROLIMUS: CPT | Performed by: INTERNAL MEDICINE

## 2019-10-05 LAB
TACROLIMUS BLD-MCNC: 7.7 UG/L (ref 5–15)
TME LAST DOSE: NORMAL H

## 2019-10-07 DIAGNOSIS — Z94.0 KIDNEY REPLACED BY TRANSPLANT: ICD-10-CM

## 2019-10-07 NOTE — TELEPHONE ENCOUNTER
Left message for patient regarding:  Tacrolimus level 7.7 on 10/4/2019, goal 4-6, dose 1.5 mg BID        LPN TASK/PLAN:   Please call pt and confirm this was a good 12-hour trough. Verify dose 1.5 mg BID. Confirm no new medications or illness. If good trough, decrease dose to 1 mg BID and recheck level in 1 week

## 2019-10-07 NOTE — LETTER
PHYSICIAN ORDERS      DATE & TIME ISSUED: 2019 10:09 AM  PATIENT NAME: Harshad Beatty   : 1980     [facility] MR# [if applicable]: [id number]     DIAGNOSIS:  Kidney transplant   ICD-10 CODE: Z94.0      Please repeat the following level in one week of dose change  Tacrolimus level (ensure 12 hours between last dose and blood draw)    Any questions please call: [phone] option 5    Please fax results to [phone].    .

## 2019-10-07 NOTE — TELEPHONE ENCOUNTER
ISSUE:   Tacrolimus level 7.7 on 10/4/2019, goal 4-6, dose 1.5 mg BID      LPN TASK/PLAN:   Please call pt and confirm this was a good 12-hour trough. Verify dose 1.5 mg BID. Confirm no new medications or illness. If good trough, decrease dose to 1 mg BID and recheck level in 1 week

## 2019-10-09 RX ORDER — TACROLIMUS 1 MG/1
1 CAPSULE ORAL 2 TIMES DAILY
Qty: 60 CAPSULE | Refills: 11 | Status: SHIPPED | OUTPATIENT
Start: 2019-10-09 | End: 2019-10-22

## 2019-10-09 RX ORDER — TACROLIMUS 0.5 MG/1
CAPSULE ORAL
Qty: 30 CAPSULE | Refills: 11
Start: 2019-10-09 | End: 2021-08-05

## 2019-10-09 NOTE — TELEPHONE ENCOUNTER
Call placed to patient. No answer. Voice message left requesting a return call to discuss current tacrolimus level. Instructions left for patient to decrease tacrolimus dose to 1 mg bid and repeat level in one week. Order/rx sent

## 2019-10-14 ENCOUNTER — OFFICE VISIT (OUTPATIENT)
Dept: TRANSPLANT | Facility: CLINIC | Age: 39
End: 2019-10-14
Attending: SURGERY
Payer: COMMERCIAL

## 2019-10-14 VITALS
DIASTOLIC BLOOD PRESSURE: 75 MMHG | OXYGEN SATURATION: 94 % | WEIGHT: 307.6 LBS | HEIGHT: 71 IN | HEART RATE: 91 BPM | BODY MASS INDEX: 43.06 KG/M2 | SYSTOLIC BLOOD PRESSURE: 135 MMHG

## 2019-10-14 DIAGNOSIS — T82.898A ANEURYSM OF ARTERIOVENOUS DIALYSIS FISTULA, INITIAL ENCOUNTER (H): Primary | ICD-10-CM

## 2019-10-14 ASSESSMENT — MIFFLIN-ST. JEOR: SCORE: 2332.39

## 2019-10-14 NOTE — PROGRESS NOTES
Dialysis Access Service   Progress Note    S:  Mr. Beatty is being seen today for surgical followup of his dialysis access and having his right arm fistula ligated post LDKT in 8/7/2018.Kidney function is stable     O:  Pulse:  [91] 91  BP: (135)/(75) 135/75  SpO2:  [94 %] 94 %  GENERAL: healthy  Circulation:   Radial pulse 2+  Ulnar pulse  2+   Capillary refill:  capillary refill brisk    Sensory exam:   arm: Normal   [x]           Abnormal   []          Comment:    hand: Normal   [x]           Abnormal   []          Comment:   Motor exam:   arm: Normal   [x]           Abnormal   []          Comment:    hand: Normal   [x]           Abnormal   []          Comment:    Access: R upper extremity wound(s) healed. non-tender Large AV fistula.     Assessment & Plan: Mr. Beatty's kidney function is stable.  He is suitable for having his fistula ligated.     Tracy Finley CNP     TT: *** min  CT: *** min    {[facility] TRANSPLANT MD SIGNATURE:[id number]}

## 2019-10-14 NOTE — PROGRESS NOTES
Dialysis Access Service  Consult Note        HPI:Mr. Sow kidney function is stable.  Had a LDKT in 2017.  Doing well.          Risk factors for vascular access:         Yes No  Hx of CVC    []    [x]   Comment:   Hx of PICC line         []     [x]  Comment:   Hx of Pacemaker    []     [x]  Comment:   History of failed access:  []         [x]  Comment:  SVC syndrome   []      [x]  Comment:  Heart Failure    []     [x]  EF:    Periph arterial disease  []     [x]  Comment:  Prior Fracture/Surgery  []     [x]  Location:   DVT    []    [x]   Location:  Diabetes    []        [x]  Comment:  Neuropathy   []     [x]  Comment:   Anticoagulation:   []    [x]  Agent:      Anticoagulation contraindication:[]  [x]     Details:                   Pediatric    []         [x]  Age:                  Hx of transplant   [x]    []  Comment:   LDKT 2017   Current immunosuppression [x]    []  Comment:   Prograf and cellcept         ROS: 10 point ROS neg other than the symptoms noted above in the HPI.        Past Medical History:   Diagnosis Date     Anemia in chronic kidney disease      ESRD (end stage renal disease) (H)      Fever 10/29/2018     History of cardiomyopathy      HTN (hypertension)      Neutropenia (H) 10/8/2018     Secondary hyperparathyroidism (H)      Sickle cell trait (H)        Past Surgical History:   Procedure Laterality Date     CREATE FISTULA ARTERIOVENOUS UPPER EXTREMITY       kidney transplant right  08/07/2018     ORIF right 5th metatarpal fracture       PERCUTANEOUS BIOPSY KIDNEY Right 1/14/2019    Procedure: Right Kidney Biopsy;  Surgeon: Andrey Connolly MD;  Location:  OR       Family History   Problem Relation Age of Onset     Sickle Cell Anemia Father      Sickle Cell Trait Sister      Pancreatic Cancer Maternal Grandfather      Kidney Disease Cousin        Social History     Tobacco Use     Smoking status: Never Smoker     Smokeless tobacco: Never Used   Substance Use Topics     Alcohol use: No          Current Outpatient Medications:      atorvastatin (LIPITOR) 10 MG tablet, Take 1 tablet (10 mg) by mouth daily, Disp: 30 tablet, Rfl: 11     carvedilol (COREG) 25 MG tablet, Take 2 tablets (50 mg) by mouth 2 times daily (with meals), Disp: 120 tablet, Rfl: 11     mycophenolate (GENERIC EQUIVALENT) 250 MG capsule, Take 4 capsules (1,000 mg) by mouth 2 times daily, Disp: 240 capsule, Rfl: 11     sodium bicarbonate 650 MG tablet, Take 2 tablets (1,300 mg) by mouth 2 times daily, Disp: 120 tablet, Rfl: 11     tacrolimus (GENERIC EQUIVALENT) 0.5 MG capsule, HOLD, Disp: 30 capsule, Rfl: 11     cinacalcet (SENSIPAR) 30 MG tablet, Take 1 tablet (30 mg) by mouth daily (Patient not taking: Reported on 4/30/2019), Disp: 30 tablet, Rfl: 0     magnesium oxide (MAG-OX) 400 MG tablet, Take 1 tablet (400 mg) by mouth daily (with lunch) (Patient not taking: Reported on 10/14/2019), Disp: 30 tablet, Rfl: 3     tacrolimus (GENERIC EQUIVALENT) 1 MG capsule, Take 1 capsule (1 mg) by mouth 2 times daily, Disp: 60 capsule, Rfl: 11                        PHYSICAL EXAM:  Pulse:  [91] 91  BP: (135)/(75) 135/75  SpO2:  [94 %] 94 %  Constitutional: healthy, alert and cooperative  HEENT: sclera anicteric, MMM, conjunctiva pink  Chest: HD catheter absent.  CV:  NSR  : No CVA tenderness  Abdomen: old incision   Skin:  No rashes or jaundice  Neuro: normal gait  Psych: normal mood and affect  EXTREMITY EXAM:   Vein Exam: Obvious suitable veins?    Left arm: YES   []           NO  []       Comment:    Right arm: YES   []           NO  []       Comment:   Arterial Exam:   Radial   L: 2+ R: 2+   Ulnar   L: 2+;R: 2+  Patent Palmar arch  L: YES   []  NO   [x]       R: YES   []   NO   [x]        Capillary refill:  L: <3sec, R:<3sec    Sensory exam:   Left hand: Normal   [x]       Abnormal   []     Comment:    Right hand: Normal   [x]       Abnormal   []     Comment:     Motor exam normal:   Left hand: Normal   [x]       Abnormal   []      Comment:     Right hand: Normal   [x]       Abnormal   []     Comment:                           Assessment & Plan: Mr. Beatty is medically suitable for fistula ligation     The surgical risks and benefits were reviewed and questions were answered. We discussed the day of surgery plan, anesthesia, postop care, risk of infection, numbness, injury to surrounding structures, bleeding, thrombosis, steal syndrome, possible need for future angioplasty or surgical revision, as well as nonmaturation or need for site abandonment. This was contrasted with morbidity and mortality risk of long-term catheter based hemodialysis access. The patient does wish to proceed with surgery for permanent access creation at this time. The patient was counselled to contact our nurse coordinator, BREA Diego CNS (Sum) at [phone] with any questions or concerns.  Thank you for the opportunity to participate in Mr. Beatty's care.    Tracy renae, CNP       I have reviewed history, examined patient and discussed plan with the fellow/resident/CONCHITA.  I concur with the findings in this note.       Risks of the surgical procedure including but not limited to the rare risk of mortality discussed in detail. Patient verbalized good understanding and had several pertinent questions which were answered satisfactorily.     TT: 35 min, CT: 25 min.    .

## 2019-10-14 NOTE — LETTER
"10/14/2019     RE: Harshad Beatty  [address] [place] [address]     Dear Colleague,    Thank you for referring your patient, Harshad Beatty, to the [facility]ID [facility]GAN TRANSPLANT at [facility]. Please see a copy of my visit note below.    Chief Complaint   Patient presents with     Consult     fistula - right arm     Blood pressure 135/75, pulse 91, height 1.803 m (5' 11\"), weight 139.5 kg (307 lb 9.6 oz), SpO2 94 %.    Robina Cheung CMA       Dialysis Access Service  Consult Note        HPI:Mr. Beatty's kidney function is stable.  Had a LDKT in 2017.  Doing well.          Risk factors for vascular access:         Yes No  Hx of CVC    []    [x]   Comment:   Hx of PICC line         []     [x]  Comment:   Hx of Pacemaker    []     [x]  Comment:   History of failed access:  []         [x]  Comment:  SVC syndrome   []      [x]  Comment:  Heart Failure    []     [x]  EF:    Periph arterial disease  []     [x]  Comment:  Prior Fracture/Surgery  []     [x]  Location:   DVT    []    [x]   Location:  Diabetes    []        [x]  Comment:  Neuropathy   []     [x]  Comment:   Anticoagulation:   []    [x]  Agent:      Anticoagulation contraindication:[]  [x]     Details:                   Pediatric    []         [x]  Age:                  Hx of transplant   [x]    []  Comment:   LDKT 2017   Current immunosuppression [x]    []  Comment:   Prograf and cellcept         ROS: 10 point ROS neg other than the symptoms noted above in the HPI.        Past Medical History:   Diagnosis Date     Anemia in chronic kidney disease      ESRD (end stage renal disease) (H)      Fever 10/29/2018     History of cardiomyopathy      HTN (hypertension)      Neutropenia (H) 10/8/2018     Secondary hyperparathyroidism (H)      Sickle cell trait (H)        Past Surgical History:   Procedure Laterality Date     CREATE FISTULA ARTERIOVENOUS UPPER EXTREMITY       kidney transplant right  08/07/2018     ORIF " right 5th metatarpal fracture       PERCUTANEOUS BIOPSY KIDNEY Right 1/14/2019    Procedure: Right Kidney Biopsy;  Surgeon: Andrey Connolly MD;  Location: [facility] OR       Family History   Problem Relation Age of Onset     Sickle Cell Anemia Father      Sickle Cell Trait Sister      Pancreatic Cancer Maternal Grandfather      Kidney Disease Cousin        Social History     Tobacco Use     Smoking status: Never Smoker     Smokeless tobacco: Never Used   Substance Use Topics     Alcohol use: No         Current Outpatient Medications:      atorvastatin (LIPITOR) 10 MG tablet, Take 1 tablet (10 mg) by mouth daily, Disp: 30 tablet, Rfl: 11     carvedilol (COREG) 25 MG tablet, Take 2 tablets (50 mg) by mouth 2 times daily (with meals), Disp: 120 tablet, Rfl: 11     mycophenolate (GENERIC EQUIVALENT) 250 MG capsule, Take 4 capsules (1,000 mg) by mouth 2 times daily, Disp: 240 capsule, Rfl: 11     sodium bicarbonate 650 MG tablet, Take 2 tablets (1,300 mg) by mouth 2 times daily, Disp: 120 tablet, Rfl: 11     tacrolimus (GENERIC EQUIVALENT) 0.5 MG capsule, HOLD, Disp: 30 capsule, Rfl: 11     cinacalcet (SENSIPAR) 30 MG tablet, Take 1 tablet (30 mg) by mouth daily (Patient not taking: Reported on 4/30/2019), Disp: 30 tablet, Rfl: 0     magnesium oxide (MAG-OX) 400 MG tablet, Take 1 tablet (400 mg) by mouth daily (with lunch) (Patient not taking: Reported on 10/14/2019), Disp: 30 tablet, Rfl: 3     tacrolimus (GENERIC EQUIVALENT) 1 MG capsule, Take 1 capsule (1 mg) by mouth 2 times daily, Disp: 60 capsule, Rfl: 11                        PHYSICAL EXAM:  Pulse:  [91] 91  BP: (135)/(75) 135/75  SpO2:  [94 %] 94 %  Constitutional: healthy, alert and cooperative  HEENT: sclera anicteric, MMM, conjunctiva pink  Chest: HD catheter absent.  CV:  NSR  : No CVA tenderness  Abdomen: old incision   Skin:  No rashes or jaundice  Neuro: normal gait  Psych: normal mood and affect  EXTREMITY EXAM:   Vein Exam: Obvious suitable veins?     Left arm: YES   []           NO  []       Comment:    Right arm: YES   []           NO  []       Comment:   Arterial Exam:   Radial   L: 2+ R: 2+   Ulnar   L: 2+;R: 2+  Patent Palmar arch  L: YES   []  NO   [x]       R: YES   []   NO   [x]        Capillary refill:  L: <3sec, R:<3sec    Sensory exam:   Left hand: Normal   [x]       Abnormal   []     Comment:    Right hand: Normal   [x]       Abnormal   []     Comment:     Motor exam normal:   Left hand: Normal   [x]       Abnormal   []     Comment:     Right hand: Normal   [x]       Abnormal   []     Comment:                           Assessment & Plan: Mr. Beatty is medically suitable for fistula ligation     The surgical risks and benefits were reviewed and questions were answered. We discussed the day of surgery plan, anesthesia, postop care, risk of infection, numbness, injury to surrounding structures, bleeding, thrombosis, steal syndrome, possible need for future angioplasty or surgical revision, as well as nonmaturation or need for site abandonment. This was contrasted with morbidity and mortality risk of long-term catheter based hemodialysis access. The patient does wish to proceed with surgery for permanent access creation at this time. The patient was counselled to contact our nurse coordinator, BREA Diego (Sum), CNS at [phone] with any questions or concerns.  Thank you for the opportunity to participate in Mr. Beatty's care.    Tracy renae, CNP     I have reviewed history, examined patient and discussed plan with the fellow/resident/CONCHITA.  I concur with the findings in this note.     Risks of the surgical procedure including but not limited to the rare risk of mortality discussed in detail. Patient verbalized good understanding and had several pertinent questions which were answered satisfactorily.     TT: 35 min, CT: 25 min.    .

## 2019-10-14 NOTE — PROGRESS NOTES
"Chief Complaint   Patient presents with     Consult     fistula - right arm     Blood pressure 135/75, pulse 91, height 1.803 m (5' 11\"), weight 139.5 kg (307 lb 9.6 oz), SpO2 94 %.    Robina Cheung CMA     "

## 2019-10-17 ENCOUNTER — TELEPHONE (OUTPATIENT)
Dept: TRANSPLANT | Facility: CLINIC | Age: 39
End: 2019-10-17

## 2019-10-17 NOTE — TELEPHONE ENCOUNTER
Returned pt phone call.  Pt aware RNCC requested pt fistula op report.  Will await report.      From: Tracy Finley NP   Sent: 10/14/2019   3:40 PM CDT   To: Roxann Roche RN, BREA Enriquez CNS     Can we get the op report for his fistula from   [facility]     Ty Molina     Needs right fistula ligation

## 2019-10-17 NOTE — TELEPHONE ENCOUNTER
Patient Call: General  Route to LPN    Reason for call: Pt is returning the call/VM left for him    Pt states he had his fistula placed at [facility], please connect to confirm the message was received     Call back needed? Yes    Return Call Needed  Same as documented in contacts section  When to return call?: Same day: Route High Priority

## 2019-10-22 DIAGNOSIS — Z94.0 KIDNEY REPLACED BY TRANSPLANT: Primary | ICD-10-CM

## 2019-10-22 RX ORDER — TACROLIMUS 1 MG/1
1 CAPSULE ORAL 2 TIMES DAILY
Qty: 180 CAPSULE | Refills: 3 | Status: SHIPPED | OUTPATIENT
Start: 2019-10-22 | End: 2020-05-11

## 2019-10-28 ENCOUNTER — PREP FOR PROCEDURE (OUTPATIENT)
Dept: TRANSPLANT | Facility: CLINIC | Age: 39
End: 2019-10-28

## 2019-10-28 DIAGNOSIS — Z94.0 KIDNEY TRANSPLANTED: Primary | ICD-10-CM

## 2019-10-28 DIAGNOSIS — T82.9XXA COMPLICATION OF ARTERIOVENOUS DIALYSIS FISTULA: ICD-10-CM

## 2019-11-07 DIAGNOSIS — Z45.2 ENCOUNTER FOR ADJUSTMENT AND MANAGEMENT OF VASCULAR ACCESS DEVICE: ICD-10-CM

## 2019-11-07 DIAGNOSIS — T82.9XXA COMPLICATION OF VASCULAR ACCESS FOR DIALYSIS, INITIAL ENCOUNTER: ICD-10-CM

## 2019-11-07 DIAGNOSIS — Z94.0 S/P KIDNEY TRANSPLANT: Primary | ICD-10-CM

## 2019-11-07 RX ORDER — CEFAZOLIN SODIUM 2 G/50ML
2 SOLUTION INTRAVENOUS
Status: CANCELLED | OUTPATIENT
Start: 2019-11-12

## 2019-11-11 ENCOUNTER — ANESTHESIA EVENT (OUTPATIENT)
Dept: SURGERY | Facility: CLINIC | Age: 39
End: 2019-11-11
Payer: COMMERCIAL

## 2019-11-12 ENCOUNTER — HOSPITAL ENCOUNTER (OUTPATIENT)
Facility: CLINIC | Age: 39
Discharge: HOME | End: 2019-11-12
Attending: SURGERY | Admitting: SURGERY
Payer: COMMERCIAL

## 2019-11-12 ENCOUNTER — ANESTHESIA (OUTPATIENT)
Dept: SURGERY | Facility: CLINIC | Age: 39
End: 2019-11-12
Payer: COMMERCIAL

## 2019-11-12 VITALS
TEMPERATURE: 97.7 F | HEIGHT: 71 IN | RESPIRATION RATE: 20 BRPM | WEIGHT: 310.19 LBS | BODY MASS INDEX: 43.43 KG/M2 | SYSTOLIC BLOOD PRESSURE: 113 MMHG | DIASTOLIC BLOOD PRESSURE: 78 MMHG | OXYGEN SATURATION: 97 % | HEART RATE: 61 BPM

## 2019-11-12 DIAGNOSIS — Z94.0 KIDNEY TRANSPLANTED: ICD-10-CM

## 2019-11-12 DIAGNOSIS — Z94.0 S/P KIDNEY TRANSPLANT: ICD-10-CM

## 2019-11-12 DIAGNOSIS — I77.0 AV (ARTERIOVENOUS FISTULA) (H): ICD-10-CM

## 2019-11-12 DIAGNOSIS — T82.9XXA COMPLICATION OF VASCULAR ACCESS FOR DIALYSIS, INITIAL ENCOUNTER: ICD-10-CM

## 2019-11-12 DIAGNOSIS — T82.9XXA COMPLICATION OF ARTERIOVENOUS DIALYSIS FISTULA: ICD-10-CM

## 2019-11-12 DIAGNOSIS — Z45.2 ENCOUNTER FOR ADJUSTMENT AND MANAGEMENT OF VASCULAR ACCESS DEVICE: Primary | ICD-10-CM

## 2019-11-12 LAB
APTT PPP: 30 SEC (ref 22–37)
BASOPHILS # BLD AUTO: 0 10E9/L (ref 0–0.2)
BASOPHILS NFR BLD AUTO: 1.1 %
CREAT SERPL-MCNC: 1.69 MG/DL (ref 0.66–1.25)
DIFFERENTIAL METHOD BLD: ABNORMAL
EOSINOPHIL # BLD AUTO: 0.1 10E9/L (ref 0–0.7)
EOSINOPHIL NFR BLD AUTO: 2.2 %
ERYTHROCYTE [DISTWIDTH] IN BLOOD BY AUTOMATED COUNT: 12.7 % (ref 10–15)
GFR SERPL CREATININE-BSD FRML MDRD: 50 ML/MIN/{1.73_M2}
GLUCOSE BLDC GLUCOMTR-MCNC: 91 MG/DL (ref 70–99)
GLUCOSE SERPL-MCNC: 92 MG/DL (ref 70–99)
HCT VFR BLD AUTO: 39.9 % (ref 40–53)
HGB BLD-MCNC: 12.4 G/DL (ref 13.3–17.7)
IMM GRANULOCYTES # BLD: 0 10E9/L (ref 0–0.4)
IMM GRANULOCYTES NFR BLD: 0.4 %
INR PPP: 1.06 (ref 0.86–1.14)
LYMPHOCYTES # BLD AUTO: 0.8 10E9/L (ref 0.8–5.3)
LYMPHOCYTES NFR BLD AUTO: 27.3 %
MCH RBC QN AUTO: 25.6 PG (ref 26.5–33)
MCHC RBC AUTO-ENTMCNC: 31.1 G/DL (ref 31.5–36.5)
MCV RBC AUTO: 82 FL (ref 78–100)
MONOCYTES # BLD AUTO: 0.4 10E9/L (ref 0–1.3)
MONOCYTES NFR BLD AUTO: 15.3 %
NEUTROPHILS # BLD AUTO: 1.5 10E9/L (ref 1.6–8.3)
NEUTROPHILS NFR BLD AUTO: 53.7 %
NRBC # BLD AUTO: 0 10*3/UL
NRBC BLD AUTO-RTO: 0 /100
PLATELET # BLD AUTO: 269 10E9/L (ref 150–450)
POTASSIUM SERPL-SCNC: 4.4 MMOL/L (ref 3.4–5.3)
RBC # BLD AUTO: 4.84 10E12/L (ref 4.4–5.9)
WBC # BLD AUTO: 2.8 10E9/L (ref 4–11)

## 2019-11-12 PROCEDURE — 85025 COMPLETE CBC W/AUTO DIFF WBC: CPT | Performed by: CLINICAL NURSE SPECIALIST

## 2019-11-12 PROCEDURE — 85610 PROTHROMBIN TIME: CPT | Performed by: CLINICAL NURSE SPECIALIST

## 2019-11-12 PROCEDURE — 25000125 ZZHC RX 250: Performed by: NURSE ANESTHETIST, CERTIFIED REGISTERED

## 2019-11-12 PROCEDURE — 25800030 ZZH RX IP 258 OP 636: Performed by: ANESTHESIOLOGY

## 2019-11-12 PROCEDURE — 37000009 ZZH ANESTHESIA TECHNICAL FEE, EACH ADDTL 15 MIN: Performed by: SURGERY

## 2019-11-12 PROCEDURE — 40000171 ZZH STATISTIC PRE-PROCEDURE ASSESSMENT III: Performed by: SURGERY

## 2019-11-12 PROCEDURE — 25800030 ZZH RX IP 258 OP 636: Performed by: NURSE ANESTHETIST, CERTIFIED REGISTERED

## 2019-11-12 PROCEDURE — 25000128 H RX IP 250 OP 636: Performed by: STUDENT IN AN ORGANIZED HEALTH CARE EDUCATION/TRAINING PROGRAM

## 2019-11-12 PROCEDURE — 36000053 ZZH SURGERY LEVEL 2 EA 15 ADDTL MIN - UMMC: Performed by: SURGERY

## 2019-11-12 PROCEDURE — 25000128 H RX IP 250 OP 636: Performed by: CLINICAL NURSE SPECIALIST

## 2019-11-12 PROCEDURE — 25000132 ZZH RX MED GY IP 250 OP 250 PS 637: Performed by: ANESTHESIOLOGY

## 2019-11-12 PROCEDURE — 82962 GLUCOSE BLOOD TEST: CPT

## 2019-11-12 PROCEDURE — 88304 TISSUE EXAM BY PATHOLOGIST: CPT | Performed by: SURGERY

## 2019-11-12 PROCEDURE — 85730 THROMBOPLASTIN TIME PARTIAL: CPT | Performed by: CLINICAL NURSE SPECIALIST

## 2019-11-12 PROCEDURE — 37000008 ZZH ANESTHESIA TECHNICAL FEE, 1ST 30 MIN: Performed by: SURGERY

## 2019-11-12 PROCEDURE — 82565 ASSAY OF CREATININE: CPT | Performed by: CLINICAL NURSE SPECIALIST

## 2019-11-12 PROCEDURE — 25000128 H RX IP 250 OP 636: Performed by: NURSE ANESTHETIST, CERTIFIED REGISTERED

## 2019-11-12 PROCEDURE — 82947 ASSAY GLUCOSE BLOOD QUANT: CPT | Performed by: CLINICAL NURSE SPECIALIST

## 2019-11-12 PROCEDURE — 36000051 ZZH SURGERY LEVEL 2 1ST 30 MIN - UMMC: Performed by: SURGERY

## 2019-11-12 PROCEDURE — 71000027 ZZH RECOVERY PHASE 2 EACH 15 MINS: Performed by: SURGERY

## 2019-11-12 PROCEDURE — 84132 ASSAY OF SERUM POTASSIUM: CPT | Performed by: CLINICAL NURSE SPECIALIST

## 2019-11-12 PROCEDURE — 25000125 ZZHC RX 250: Performed by: STUDENT IN AN ORGANIZED HEALTH CARE EDUCATION/TRAINING PROGRAM

## 2019-11-12 PROCEDURE — 27210794 ZZH OR GENERAL SUPPLY STERILE: Performed by: SURGERY

## 2019-11-12 RX ORDER — FENTANYL CITRATE 50 UG/ML
25-50 INJECTION, SOLUTION INTRAMUSCULAR; INTRAVENOUS
Status: DISCONTINUED | OUTPATIENT
Start: 2019-11-12 | End: 2019-11-12 | Stop reason: HOSPADM

## 2019-11-12 RX ORDER — ONDANSETRON 2 MG/ML
INJECTION INTRAMUSCULAR; INTRAVENOUS PRN
Status: DISCONTINUED | OUTPATIENT
Start: 2019-11-12 | End: 2019-11-12

## 2019-11-12 RX ORDER — CEFAZOLIN SODIUM 2 G/100ML
2 INJECTION, SOLUTION INTRAVENOUS
Status: COMPLETED | OUTPATIENT
Start: 2019-11-12 | End: 2019-11-12

## 2019-11-12 RX ORDER — ONDANSETRON 2 MG/ML
4 INJECTION INTRAMUSCULAR; INTRAVENOUS EVERY 30 MIN PRN
Status: DISCONTINUED | OUTPATIENT
Start: 2019-11-12 | End: 2019-11-12 | Stop reason: HOSPADM

## 2019-11-12 RX ORDER — LIDOCAINE HYDROCHLORIDE 20 MG/ML
INJECTION, SOLUTION INFILTRATION; PERINEURAL PRN
Status: DISCONTINUED | OUTPATIENT
Start: 2019-11-12 | End: 2019-11-12

## 2019-11-12 RX ORDER — ONDANSETRON 4 MG/1
4 TABLET, ORALLY DISINTEGRATING ORAL EVERY 30 MIN PRN
Status: DISCONTINUED | OUTPATIENT
Start: 2019-11-12 | End: 2019-11-12 | Stop reason: HOSPADM

## 2019-11-12 RX ORDER — ACETAMINOPHEN 325 MG/1
975 TABLET ORAL ONCE
Status: COMPLETED | OUTPATIENT
Start: 2019-11-12 | End: 2019-11-12

## 2019-11-12 RX ORDER — NALOXONE HYDROCHLORIDE 0.4 MG/ML
.1-.4 INJECTION, SOLUTION INTRAMUSCULAR; INTRAVENOUS; SUBCUTANEOUS
Status: DISCONTINUED | OUTPATIENT
Start: 2019-11-12 | End: 2019-11-12 | Stop reason: HOSPADM

## 2019-11-12 RX ORDER — SODIUM CHLORIDE, SODIUM LACTATE, POTASSIUM CHLORIDE, CALCIUM CHLORIDE 600; 310; 30; 20 MG/100ML; MG/100ML; MG/100ML; MG/100ML
INJECTION, SOLUTION INTRAVENOUS CONTINUOUS
Status: DISCONTINUED | OUTPATIENT
Start: 2019-11-12 | End: 2019-11-12 | Stop reason: HOSPADM

## 2019-11-12 RX ORDER — OXYCODONE HCL 5 MG/5 ML
5 SOLUTION, ORAL ORAL EVERY 4 HOURS PRN
Status: DISCONTINUED | OUTPATIENT
Start: 2019-11-12 | End: 2019-11-12 | Stop reason: HOSPADM

## 2019-11-12 RX ORDER — PROPOFOL 10 MG/ML
INJECTION, EMULSION INTRAVENOUS CONTINUOUS PRN
Status: DISCONTINUED | OUTPATIENT
Start: 2019-11-12 | End: 2019-11-12

## 2019-11-12 RX ORDER — FLUMAZENIL 0.1 MG/ML
0.2 INJECTION, SOLUTION INTRAVENOUS
Status: DISCONTINUED | OUTPATIENT
Start: 2019-11-12 | End: 2019-11-12 | Stop reason: HOSPADM

## 2019-11-12 RX ORDER — OXYCODONE HYDROCHLORIDE 5 MG/1
5 TABLET ORAL EVERY 6 HOURS PRN
Qty: 10 TABLET | Refills: 0 | Status: SHIPPED | OUTPATIENT
Start: 2019-11-12 | End: 2019-11-15

## 2019-11-12 RX ORDER — BUPIVACAINE HYDROCHLORIDE 5 MG/ML
INJECTION, SOLUTION EPIDURAL; INTRACAUDAL PRN
Status: DISCONTINUED | OUTPATIENT
Start: 2019-11-12 | End: 2019-11-12

## 2019-11-12 RX ORDER — LIDOCAINE 40 MG/G
CREAM TOPICAL
Status: DISCONTINUED | OUTPATIENT
Start: 2019-11-12 | End: 2019-11-12 | Stop reason: HOSPADM

## 2019-11-12 RX ORDER — SODIUM CHLORIDE 9 MG/ML
INJECTION, SOLUTION INTRAVENOUS CONTINUOUS PRN
Status: DISCONTINUED | OUTPATIENT
Start: 2019-11-12 | End: 2019-11-12

## 2019-11-12 RX ORDER — PROPOFOL 10 MG/ML
INJECTION, EMULSION INTRAVENOUS PRN
Status: DISCONTINUED | OUTPATIENT
Start: 2019-11-12 | End: 2019-11-12

## 2019-11-12 RX ORDER — FENTANYL CITRATE 50 UG/ML
INJECTION, SOLUTION INTRAMUSCULAR; INTRAVENOUS PRN
Status: DISCONTINUED | OUTPATIENT
Start: 2019-11-12 | End: 2019-11-12

## 2019-11-12 RX ADMIN — FENTANYL CITRATE 50 MCG: 50 INJECTION INTRAMUSCULAR; INTRAVENOUS at 07:51

## 2019-11-12 RX ADMIN — CEFAZOLIN SODIUM 1 G: 2 INJECTION, SOLUTION INTRAVENOUS at 10:25

## 2019-11-12 RX ADMIN — PROPOFOL: 10 INJECTION, EMULSION INTRAVENOUS at 09:10

## 2019-11-12 RX ADMIN — OXYCODONE HYDROCHLORIDE 5 MG: 5 SOLUTION ORAL at 12:52

## 2019-11-12 RX ADMIN — FENTANYL CITRATE 50 MCG: 50 INJECTION, SOLUTION INTRAMUSCULAR; INTRAVENOUS at 10:36

## 2019-11-12 RX ADMIN — BUPIVACAINE HYDROCHLORIDE 5 ML: 5 INJECTION, SOLUTION EPIDURAL; INTRACAUDAL; PERINEURAL at 07:55

## 2019-11-12 RX ADMIN — CEFAZOLIN SODIUM 2 G: 2 INJECTION, SOLUTION INTRAVENOUS at 08:25

## 2019-11-12 RX ADMIN — PROPOFOL 30 MG: 10 INJECTION, EMULSION INTRAVENOUS at 08:31

## 2019-11-12 RX ADMIN — LIDOCAINE HYDROCHLORIDE 5 ML: 20 INJECTION, SOLUTION INFILTRATION; PERINEURAL at 07:55

## 2019-11-12 RX ADMIN — ACETAMINOPHEN 975 MG: 325 TABLET, FILM COATED ORAL at 12:51

## 2019-11-12 RX ADMIN — MEPIVACAINE HYDROCHLORIDE 20 ML: 20 INJECTION, SOLUTION EPIDURAL; INFILTRATION at 07:55

## 2019-11-12 RX ADMIN — PROPOFOL 50 MCG/KG/MIN: 10 INJECTION, EMULSION INTRAVENOUS at 08:16

## 2019-11-12 RX ADMIN — LIDOCAINE HYDROCHLORIDE 40 MG: 20 INJECTION, SOLUTION INFILTRATION; PERINEURAL at 08:15

## 2019-11-12 RX ADMIN — ONDANSETRON 4 MG: 2 INJECTION INTRAMUSCULAR; INTRAVENOUS at 08:20

## 2019-11-12 RX ADMIN — FENTANYL CITRATE 50 MCG: 50 INJECTION, SOLUTION INTRAMUSCULAR; INTRAVENOUS at 09:21

## 2019-11-12 RX ADMIN — MIDAZOLAM 1 MG: 1 INJECTION INTRAMUSCULAR; INTRAVENOUS at 07:51

## 2019-11-12 RX ADMIN — MIDAZOLAM 2 MG: 1 INJECTION INTRAMUSCULAR; INTRAVENOUS at 08:07

## 2019-11-12 RX ADMIN — SODIUM CHLORIDE: 9 INJECTION, SOLUTION INTRAVENOUS at 08:07

## 2019-11-12 ASSESSMENT — MIFFLIN-ST. JEOR: SCORE: 2336.19

## 2019-11-12 NOTE — ANESTHESIA PREPROCEDURE EVALUATION
Anesthesia Pre-Procedure Evaluation    Patient: Harshad Beatty   MRN:     [id number] Gender:   male   Age:    39 year old :      1980        Preoperative Diagnosis: Kidney transplanted [Z94.0]  Complication of arteriovenous dialysis fistula [T82.9XXA]   Procedure(s):  LIGATION, ARTERIOVENOUS FISTULA RIGHT UPPER ARM EXTREMETY     Past Medical History:   Diagnosis Date     Anemia in chronic kidney disease      ESRD (end stage renal disease) (H)      Fever 10/29/2018     History of cardiomyopathy      HTN (hypertension)      Neutropenia (H) 10/8/2018     Secondary hyperparathyroidism (H)      Sickle cell trait (H)       Past Surgical History:   Procedure Laterality Date     CREATE FISTULA ARTERIOVENOUS UPPER EXTREMITY       kidney transplant right  2018     ORIF right 5th metatarpal fracture       PERCUTANEOUS BIOPSY KIDNEY Right 2019    Procedure: Right Kidney Biopsy;  Surgeon: Andrey Connolly MD;  Location: [facility] OR          Anesthesia Evaluation     . Pt has had prior anesthetic.     No history of anesthetic complications          ROS/MED HX    ENT/Pulmonary:  - neg pulmonary ROS     Neurologic:  - neg neurologic ROS     Cardiovascular:     (+) hypertension----. : . . . :. .       METS/Exercise Tolerance:  >4 METS   Hematologic:         Musculoskeletal:         GI/Hepatic:         Renal/Genitourinary:     (+) chronic renal disease (s/p renal transplant 2018; Cr 1.7-1.9), type: CRI, Pt has history of transplant,       Endo:     (+) Obesity, .      Psychiatric:  - neg psychiatric ROS       Infectious Disease:         Malignancy:         Other:                         PHYSICAL EXAM:   Mental Status/Neuro:    Airway: Facies: Feasible   Respiratory: Auscultation: CTAB     Resp. Rate: Normal     Resp. Effort: Normal      CV: Rhythm: Regular  Rate: Age appropriate  Heart: Normal Sounds  Edema: None   Comments:                      LABS:  CBC:   Lab Results   Component Value Date    WBC 2.8 (L)  "11/12/2019    WBC 2.1 (L) 01/14/2019    HGB 12.4 (L) 11/12/2019    HGB 11.9 (L) 01/14/2019    HCT 39.9 (L) 11/12/2019    HCT 36.1 (L) 01/14/2019     11/12/2019     01/14/2019     BMP:   Lab Results   Component Value Date     01/14/2019     12/07/2018    POTASSIUM 4.4 11/12/2019    POTASSIUM 3.7 01/14/2019    CHLORIDE 107 01/14/2019    CHLORIDE 105 12/07/2018    CO2 23 01/14/2019    CO2 24 12/07/2018    BUN 23 01/14/2019    BUN 15 12/07/2018    CR 1.69 (H) 11/12/2019    CR 1.77 (H) 01/14/2019    GLC 92 11/12/2019     (H) 01/14/2019     COAGS:   Lab Results   Component Value Date    PTT 30 11/12/2019    INR 1.06 11/12/2019     POC:   Lab Results   Component Value Date    BGM 91 11/12/2019     OTHER:   Lab Results   Component Value Date    PH 7.49 (H) 08/07/2018    LACT 0.8 08/07/2018    A1C 5.5 12/07/2018    JUAN 8.6 01/14/2019    PHOS 2.9 08/27/2018    MAG 1.9 08/27/2018    ALBUMIN 3.6 10/31/2018    PROTTOTAL 8.1 10/31/2018    ALT 31 10/31/2018    AST 23 10/31/2018    ALKPHOS 93 10/31/2018    BILITOTAL 0.3 10/31/2018        Preop Vitals    BP Readings from Last 3 Encounters:   11/12/19 (!) 134/91   10/14/19 135/75   04/30/19 122/73    Pulse Readings from Last 3 Encounters:   11/12/19 68   10/14/19 91   04/30/19 81      Resp Readings from Last 3 Encounters:   11/12/19 (!) 44   01/14/19 17   11/18/18 16    SpO2 Readings from Last 3 Encounters:   11/12/19 97%   10/14/19 94%   04/30/19 97%      Temp Readings from Last 1 Encounters:   11/12/19 36.6  C (97.8  F) (Oral)    Ht Readings from Last 1 Encounters:   11/12/19 1.791 m (5' 10.5\")      Wt Readings from Last 1 Encounters:   11/12/19 140.7 kg (310 lb 3 oz)    Estimated body mass index is 43.88 kg/m  as calculated from the following:    Height as of this encounter: 1.791 m (5' 10.5\").    Weight as of this encounter: 140.7 kg (310 lb 3 oz).     LDA:  Peripheral IV 11/12/19 Left Hand (Active)   Number of days: 0       Hemodialysis " Arteriovenous (AV) Access upper arm, right (Active)   Type fistula 8/7/2018  1:00 PM   Assessment bruit audible, strong;thrill palpable, strong 8/7/2018  1:00 PM   Number of days:        Hemodialysis Vascular Access Arteriovenous fistula Right Arm (Active)   Site Assessment WDL;Thrill present 10/16/2018  8:07 AM   Dressing Status Not applicable 8/9/2018  4:00 PM   Number of days: 461       Closed/Suction Drain Right;Lateral;Medial Abdomen Bulb 19 Green[place] (Active)   Site Description WDL 8/10/2018  4:00 PM   Dressing Status Normal: Clean, Dry & Intact 8/10/2018  4:00 PM   Dressing Change Due 08/11/18 8/10/2018  4:00 PM   Drainage Appearance Serosanguenous 8/10/2018  4:00 PM   Status To bulb suction 8/10/2018  4:00 PM   Output (ml) 10 ml 8/10/2018 12:00 PM   Number of days: 462        Assessment:   ASA SCORE: 3      Smoking Status:  Non-Smoker/Unknown   NPO Status: NPO Appropriate     Plan:   Anes. Type:  MAC; Peripheral Nerve Block     Block Details: Single Shot; Supraclav.   Pre-Medication: None   Induction:  IV (Standard)   Airway: Native Airway   Access/Monitoring: PIV   Maintenance: Propofol Sedation     Postop Plan:   Postop Pain: None  Postop Sedation/Airway: Not planned  Disposition: Outpatient     PONV Management:   Adult Risk Factors:, Non-Smoker   Prevention: Ondansetron, Propofol     CONSENT: Direct conversation   Plan and risks discussed with: Patient          Comments for Plan/Consent:  39M with functional renal transplant here for fistula ligation.  ASA 3.  Plan: Regional anesthesia + MAC                 Soledad Rodriguez MD

## 2019-11-12 NOTE — DISCHARGE INSTRUCTIONS
[facility]  Same-Day Surgery   Adult Discharge Orders & Instructions     For 24 hours after surgery    1. Get plenty of rest.  A responsible adult must stay with you for at least 24 hours after you leave the hospital.   2. Do not drive or use heavy equipment.  If you have weakness or tingling, don't drive or use heavy equipment until this feeling goes away.  3. Do not drink alcohol.  4. Avoid strenuous or risky activities.  Ask for help when climbing stairs.   5. You may feel lightheaded.  IF so, sit for a few minutes before standing.  Have someone help you get up.   6. If you have nausea (feel sick to your stomach): Drink only clear liquids such as apple juice, ginger ale, broth or 7-Up.  Rest may also help.  Be sure to drink enough fluids.  Move to a regular diet as you feel able.  7. You may have a slight fever. Call the doctor if your fever is over 100 F (37.7 C) (taken under the tongue) or lasts longer than 24 hours.  8. You may have a dry mouth, a sore throat, muscle aches or trouble sleeping.  These should go away after 24 hours.  9. Do not make important or legal decisions.   Call your doctor for any of the followin.  Signs of infection (fever, growing tenderness at the surgery site, a large amount of drainage or bleeding, severe pain, foul-smelling drainage, redness, swelling).    2. It has been over 8 to 10 hours since surgery and you are still not able to urinate (pass water).    3.  Headache for over 24 hours.      To contact a doctor, call Dr Mitchell's office at [phone] or:        [phone] and ask for the resident on call for Transplant (answered 24 hours a day)      Emergency Department:    [facility]: [phone]       (TTY for hearing impaired: [phone])    [facility]: [phone]       (TTY for hearing impaired: [phone])             Tips for taking pain medications  To get the best pain relief possible , remember these  points:      Take pain medications as directed, before pain becomes severe      Pain medication can upset your stomach: taking it with food may help      Constipation is a common side effect of pain medication. Drink plenty of  Fluids      Eat foods high in fiber. Take a stool softener  if recommended by your doctor or  Pharmacist.        Do not drink alcohol, drive or operate machinery while taking pain medications.      Ask about other ways to control pain, such as with heat, ice or relaxation.      Specific Surgical Instructions:    You have undergone excision of your RIGHT arm AV fistula. Please observe the following discharge instructions:    -You may remove the arm dressing and shower tomorrow.   -Recommend you replace compression after you shower with either ACE wrap or compression sleeve of your choice  -Incisions closed with dissolving stitches and skin glue. The glue will flake off with time  -Please do not soak or submerge your arm in lakes or pools for ~2 weeks after surgery to allow your incisions to heal  -Please avoid heavy activity with your arm for 2 weeks to allow your incisions to heal  -Would not recommend driving until you feel you can safely and easily move and flex your arm and safely execute movements of driving.   -Some degree of numbness, stiffness, and swelling in your arm is expected after this surgery; however, if you have significant swelling or bleeding, or are otherwise concerned please call Dr. Mitchell's office  -Please call Dr. Mitchell's office to schedule a follow up appointment in ~4 weeks  -You will be given a short prescription for narcotic pain medication. Please use a stool softener when taking this medication. Please do not drive or make important decisions while using this pain medication.

## 2019-11-12 NOTE — ANESTHESIA POSTPROCEDURE EVALUATION
Anesthesia POST Procedure Evaluation    Patient: Harshad Beatty   MRN:     [id number] Gender:   male   Age:    39 year old :      1980        Preoperative Diagnosis: Kidney transplanted [Z94.0]  Complication of arteriovenous dialysis fistula [T82.9XXA]   Procedure(s):  LIGATION, ARTERIOVENOUS FISTULA RIGHT UPPER ARM EXTREMETY, Excision of Right Arm Skin   Postop Comments: No value filed.       Anesthesia Type:  Not documented  No value filed.    Reportable Event: NO     PAIN: Uncomplicated   Sign Out status: Comfortable, Well controlled pain     PONV: No PONV   Sign Out status:  No Nausea or Vomiting     Neuro/Psych: Uneventful perioperative course   Sign Out Status: Preoperative baseline; Age appropriate mentation     Airway/Resp.: Uneventful perioperative course   Sign Out Status: Non labored breathing, age appropriate RR; Resp. Status within EXPECTED Parameters     CV: Uneventful perioperative course   Sign Out status: Appropriate BP and perfusion indices; Appropriate HR/Rhythm     Disposition:   Sign Out in:  Phase II  Disposition:  Home  Recovery Course: Uneventful  Follow-Up: Not required           Last Anesthesia Record Vitals:  CRNA VITALS  2019 1102 - 2019 1202      2019             Pulse:  58    SpO2:  97 %    Resp Rate (set):  10          Last PACU Vitals:  Vitals Value Taken Time   /74 2019 11:36 AM   Temp 36.6  C (97.8  F) 2019 11:36 AM   Pulse 63 2019 11:36 AM   Resp 20 2019 11:36 AM   SpO2 100 % 2019 11:36 AM   Temp src     NIBP 120/81 2019 11:26 AM   Pulse 58 2019 11:32 AM   SpO2 97 % 2019 11:32 AM   Resp     Temp     Ht Rate 67 2019 11:27 AM   Temp 2     Vitals shown include unvalidated device data.      Electronically Signed By: Soledad Rodriguez MD, 2019, 12:03 PM

## 2019-11-12 NOTE — BRIEF OP NOTE
[facility], [place]    Brief Operative Note    Pre-operative diagnosis: Kidney transplanted [Z94.0]  Complication of arteriovenous dialysis fistula [T82.9XXA]  Post-operative diagnosis Same as pre-operative diagnosis    Procedure: Procedure(s):  LIGATION, ARTERIOVENOUS FISTULA RIGHT UPPER ARM EXTREMETY, Excision of Right Arm Skin  Surgeon: Surgeon(s) and Role:     * Bety Mitchell MD - Primary     * Kareem Lugo MD - Fellow - Assisting  Anesthesia: MAC with Block   Estimated blood loss: 50 mL  Drains: None  Specimens:   ID Type Source Tests Collected by Time Destination   A : AV fistula Tissue Other SURGICAL PATHOLOGY EXAM Bety Mitchell MD 11/12/2019 10:10 AM      Findings:   large aneurysmal AV fistula- brachiocepalic, with dilated vein from AC fossa into right shoulder. Vascular control obtained at prior anastomosis and at most proximal area of venous dilation. Fistula circumferentially dissected in subcutaneous space for majority of length; however, was densely adhesed to prior stick sites. Decided to excise adherent skin in mid-upper arm at area of former stick sites. Clamp placed above anastomosis with palpable and dopplerable improvement in radial pulse. Brachial pulse preserved. Clamps placed proximally and distally and fistula excised and sent to pathology. Proximal and distal vascular ends were oversewn with 4-0 prolene in two layers. Wound irrigated and hemostasis obtained, Skin and soft tissue excised with fistula ~25cm2. Flaps mobilized to allow closure of arm wound. .  Complications: None.  Implants: * No implants in log *

## 2019-11-12 NOTE — ANESTHESIA CARE TRANSFER NOTE
Patient: Harshad Beatty    Procedure(s):  LIGATION, ARTERIOVENOUS FISTULA RIGHT UPPER ARM EXTREMETY, Excision of Right Arm Skin    Diagnosis: Kidney transplanted [Z94.0]  Complication of arteriovenous dialysis fistula [T82.9XXA]  Diagnosis Additional Information: No value filed.    Anesthesia Type:   No value filed.     Note:  Airway :Room Air  Patient transferred to:Phase II  Comments: To Phase II on room air with + air exchange, placed on monitor. Report given to RN, questions answered, VSS, patient alert and comfortable.Handoff Report: Identifed the Patient, Identified the Reponsible Provider, Reviewed the pertinent medical history, Discussed the surgical course, Reviewed Intra-OP anesthesia mangement and issues during anesthesia, Set expectations for post-procedure period and Allowed opportunity for questions and acknowledgement of understanding      Vitals: (Last set prior to Anesthesia Care Transfer)    CRNA VITALS  11/12/2019 1102 - 11/12/2019 1137      11/12/2019             Pulse:  58    SpO2:  97 %    Resp Rate (set):  10            Electronically Signed By: BREA Vieira CRNA  November 12, 2019  11:37 AM

## 2019-11-12 NOTE — ANESTHESIA PROCEDURE NOTES
Peripheral Nerve Block Procedure Note  Date/Time: 11/12/2019 7:55 AM    Staff:     Anesthesiologist:  Jb Loya MD    Resident/CRNA:  Zayra Dwyer MD    Block performed by resident/CRNA in the presence of a teaching physician    Location: Pre-op  Procedure Start/Stop TImes:      11/12/2019 7:45 AM     11/12/2019 7:55 AM    patient identified, IV checked, site marked, risks and benefits discussed, informed consent, monitors and equipment checked, pre-op evaluation, at physician/surgeon's request and post-op pain management      Correct Patient: Yes      Correct Position: Yes      Correct Site: Yes      Correct Procedure: Yes      Correct Laterality:  Yes    Site Marked:  Yes  Procedure details:     Procedure:  Supraclavicular (+ intercostal brachial)    ASA:  3    Diagnosis:  Perioperative pain control.     Laterality:  Right    Position:  Sitting    Sterile Prep: chloraprep      Local skin infiltration:  2% lidocaine (Intercostal brachial)    amount (mL):  5    Needle:  Short bevel    Needle gauge:  21    Needle length (mm):  110    Ultrasound: Yes      Ultrasound used to identify targeted nerve, plexus, or vascular structure and placed a needle adjacent to it      Permanent Image entered into patiient's record      Abnormal pain on injection: No      Blood Aspirated: No      Paresthesias:  No    Bleeding at site: No      Bolus via:  Needle    Infusion Method:  Single Shot    Complications:  None

## 2019-11-15 LAB — COPATH REPORT: NORMAL

## 2019-11-25 NOTE — OP NOTE
Transplant Surgery  Operative Note    Preop Dx:  Right upper extremity AV fistula- aneurysmal, s/p functioning kidney transplant  Postop Dx: same  Procedure: fistula excision with removal of skin- 25cm2.  Surgeon: Bety Mitchell M.D.  ASSISTANT:  Kareem Lugo, fellow.There was no qualified general surgery resident available to assist during this procedure.   Anesthesia: MAC with block  EBL: 50 ml  Fluids: crystalloid  UO: no stallworth  Drains: none  Specimen: AV fistula with overlying skin/soft tissue.  Complications: None  Findings:  large aneurysmal AV fistula- brachiocepalic, with dilated vein from AC fossa into right shoulder. Vascular control obtained at prior anastomosis and at most proximal area of venous dilation. Fistula circumferentially dissected in subcutaneous space for majority of length; however, was densely adhesed to prior stick sites. Decided to excise adherent skin in mid-upper arm at area of former stick sites. Clamp placed above anastomosis with palpable and dopplerable improvement in radial pulse. Brachial pulse preserved. Clamps placed proximally and distally and fistula excised and sent to pathology. Proximal and distal vascular ends were oversewn with 4-0 prolene in two layers. Wound irrigated and hemostasis obtained, Skin and soft tissue excised with fistula ~25cm2. Flaps mobilized to allow closure of arm wound. .  Complications: None.    Indication: The patient has large aneurysmal AV fistula and is now s/p successful kidney transplantation.  After discussing the risks and benefits of surgery and potential complications, the patient provided informed consent.     DETAILS OF PROCEDURE:  The patient was brought to the operating room, placed in a supine position.  Perioperative prophylactic IV antibiotics were given.  Anesthesia was adminisitered. The arm was prepped and draped in the usual sterile fashion.  Time out was performed by all members of the surgical team (circulator,  anesthesia, technologist, surgeon) confirming the correct patient, procedure, laterality, and fire risk. All present were in agreement.    I palpated and confirmed a radial pulse. I began by reopening the previous skin incision over the AV fistula creation site. I dissected through the skin and soft tissue using electrocautery and blunt dissection until I was able to visualize the prior sutures at the AVF anastomosis. I looped this and obtained proximal control. I turned my attention to the most distal area at which the fistula bulge was palpable and visible. I made a transverse skin incision in this area and dissected the fistula free from the subcutaneous tissues and obtained distal control.    I then began to dissect the fistula free from the subcutaneous tissues along its tunnel. I was able to free most of the length using blunt dissection, with cautery in a few particularly adhesed areas. I made a counterincision in the mid upper arm to allow for further mobilization.    However, there were two areas in his upper arm in which he had been frequently stuck for dialysis access where the fistula was densely adherent to the overlying skin. To remove these and to achieve a better cosmetic outcome, we elected to resect skin.    We first clamped and divided the fistula proximally and distally. The native arterial and venous ends were oversewn in double-layered vascular closures of 5-0 prolene. These were hemostatic. The remnant vein decompressed following division and the arterial stump retracted into the AC fossa and was not palpable.     We then made two oblique incisions to include the adherent skin to the graft and excised the skin/subcutaneous tissues and graft together en block. The total excised area was 25cm 2. We obtained meticulous hemostasis of this large wound.    Myocutaneous flaps were mobilized to allow for tension free closure. The wound was closed in multiple layers in a Z-plasty fashion to reduce  tension and allow for appropriate wound healing. The skin was dressed with exofin. When dry, a compressive dressing was applied.     All needle, sponge, and instrument counts were accurate.  The patient tolerated the procedure well without apparent complications and was trasfered to the PACU in good condition.  Faculty was present for critical portions of the procedure.    I was present during the key portions of the procedure, and I was immediately available for the entire procedure.

## 2019-11-27 ENCOUNTER — OFFICE VISIT (OUTPATIENT)
Dept: TRANSPLANT | Facility: CLINIC | Age: 39
End: 2019-11-27
Attending: CLINICAL NURSE SPECIALIST
Payer: COMMERCIAL

## 2019-11-27 VITALS
OXYGEN SATURATION: 96 % | DIASTOLIC BLOOD PRESSURE: 88 MMHG | HEART RATE: 89 BPM | BODY MASS INDEX: 43.27 KG/M2 | SYSTOLIC BLOOD PRESSURE: 135 MMHG | WEIGHT: 305.9 LBS

## 2019-11-27 DIAGNOSIS — Z94.0 S/P KIDNEY TRANSPLANT: Primary | ICD-10-CM

## 2019-11-27 DIAGNOSIS — Z09 FOLLOW-UP EXAMINATION AFTER VASCULAR SURGERY: ICD-10-CM

## 2019-11-27 DIAGNOSIS — Z48.89 ENCOUNTER FOR POST SURGICAL WOUND CHECK: ICD-10-CM

## 2019-11-27 NOTE — LETTER
11/27/2019       RE: Harshad Beatty  [address]  [place] [address]     Dear Colleague,    Thank you for referring your patient, Harshad Beatty, to the [facility] SOLID ORGAN TRANSPLANT at [facility]. Please see a copy of my visit note below.    Dialysis Access Service   Progress Note    S:  Mr. Beatty is being seen today for surgical followup of his dialysis access.  He reports no issues with the wound, and  no steal syndrome of the distal extremity. C/O a small fluid collection above axillary incision and mild pain with light pressure. Denies pain, swelling in right arm, tingling/numbness and a change of color/temperature in right hand and fingers. S/P right  Upper arm AV fistula excision with removal of skin- 25cm2 on 11/12/2019.    O:  Pulse:  [89] 89  BP: (135)/(88) 135/88  SpO2:  [96 %] 96 %  GENERAL: healthy, alert, cooperative  Circulation:   Radial pulse 3+  Ulnar pulse  3+   Capillary refill:  capillary refill < 2 sec    Sensory exam:   arm: Normal   [x]           Abnormal   []          Comment:    hand: Normal   [x]           Abnormal   []          Comment:   Motor exam:   arm: Normal   [x]           Abnormal   []          Comment:    hand: Normal   [x]           Abnormal   []          Comment:    Access: R upper extremity wound(s) healed, non-tender. No venous hypertension, negative thrill, no edema in right arm without apparent infection. Right upper arm incisions clean and dry without redness/drainage noted, with Derma Hughes intact. A small palpable, hard to touch fluid collection area above axillary incision, it is not warm to touch nor redness noted.    Assessment & Plan: Mr. Beatty's dialysis access wounds has healed well at this time point.    1. Elevate right arm with support as tolerated  2. Continue to monitor fluid collection at axillary incision area  3. Follow up in clinic as needed      We would like to see the patient back in the clinic as needed  to assess progress. The patient was counselled to contact our nurse coordinator, BREA Diego CNS (Sum) at [phone] with any questions or concerns.  Thank you for the opportunity to participate in Mr. Beatty's care.    TT: 15 min  CT: 15 min    {PINKY Arce (Sum)  Dialysis Vascular Access/SOT Clinical Nurse Specialist    Solid Organ Transplant Service - [facility]   Phone # [phone]  Pager # [phone]

## 2019-11-27 NOTE — PROGRESS NOTES
Dialysis Access Service   Progress Note    S:  Mr. Beatty is being seen today for surgical followup of his dialysis access.  He reports no issues with the wound, and  no steal syndrome of the distal extremity. C/O a small fluid collection above axillary incision and mild pain with light pressure. Denies pain, swelling in right arm, tingling/numbness and a change of color/temperature in right hand and fingers. S/P right  Upper arm AV fistula excision with removal of skin- 25cm2 on 11/12/2019.    O:  Pulse:  [89] 89  BP: (135)/(88) 135/88  SpO2:  [96 %] 96 %  GENERAL: healthy, alert, cooperative  Circulation:   Radial pulse 3+  Ulnar pulse  3+   Capillary refill:  capillary refill < 2 sec    Sensory exam:   arm: Normal   [x]           Abnormal   []          Comment:    hand: Normal   [x]           Abnormal   []          Comment:   Motor exam:   arm: Normal   [x]           Abnormal   []          Comment:    hand: Normal   [x]           Abnormal   []          Comment:    Access: R upper extremity wound(s) healed, non-tender. No venous hypertension, negative thrill, no edema in right arm without apparent infection. Right upper arm incisions clean and dry without redness/drainage noted, with Derma Hughes intact. A small palpable, hard to touch fluid collection area above axillary incision, it is not warm to touch nor redness noted.    Assessment & Plan: Mr. Beatty's dialysis access wounds has healed well at this time point.    1. Elevate right arm with support as tolerated  2. Continue to monitor fluid collection at axillary incision area  3. Follow up in clinic as needed      We would like to see the patient back in the clinic as needed to assess progress. The patient was counselled to contact our nurse coordinator, BREA Diego CNS (Sum) at [phone] with any questions or concerns.  Thank you for the opportunity to participate in Mr. Beatty's care.    TT: 15 min  CT: 15 min    {PINKY Arce (Sum)  Dialysis  Vascular Access/SOT Clinical Nurse Specialist    Solid Organ Transplant Service - [facility]   Phone # [phone]  Pager # [phone]

## 2019-12-02 ENCOUNTER — RECORDS - HEALTHEAST (OUTPATIENT)
Dept: LAB | Facility: CLINIC | Age: 39
End: 2019-12-02

## 2019-12-02 DIAGNOSIS — Z48.298 AFTERCARE FOLLOWING ORGAN TRANSPLANT: ICD-10-CM

## 2019-12-02 DIAGNOSIS — Z79.899 ENCOUNTER FOR LONG-TERM CURRENT USE OF MEDICATION: ICD-10-CM

## 2019-12-02 DIAGNOSIS — Z94.0 KIDNEY REPLACED BY TRANSPLANT: ICD-10-CM

## 2019-12-02 LAB
ANION GAP SERPL CALCULATED.3IONS-SCNC: 7 MMOL/L (ref 5–18)
BK VIRUS SPECIMEN SOURCE: NORMAL
BKV DNA # SPEC NAA+PROBE: NORMAL COPIES/ML
BKV DNA SPEC NAA+PROBE-LOG#: NORMAL LOG COPIES/ML
BUN SERPL-MCNC: 19 MG/DL (ref 8–22)
CALCIUM SERPL-MCNC: 10 MG/DL (ref 8.5–10.5)
CHLORIDE BLD-SCNC: 106 MMOL/L (ref 98–107)
CO2 SERPL-SCNC: 23 MMOL/L (ref 22–31)
CREAT SERPL-MCNC: 1.81 MG/DL (ref 0.7–1.3)
ERYTHROCYTE [DISTWIDTH] IN BLOOD BY AUTOMATED COUNT: 12.9 % (ref 11–14.5)
GFR SERPL CREATININE-BSD FRML MDRD: 42 ML/MIN/1.73M2
GLUCOSE BLD-MCNC: 103 MG/DL (ref 70–125)
HCT VFR BLD AUTO: 38.5 % (ref 40–54)
HGB BLD-MCNC: 12.1 G/DL (ref 14–18)
MCH RBC QN AUTO: 25.9 PG (ref 27–34)
MCHC RBC AUTO-ENTMCNC: 31.4 G/DL (ref 32–36)
MCV RBC AUTO: 82 FL (ref 80–100)
PLATELET # BLD AUTO: 409 THOU/UL (ref 140–440)
PMV BLD AUTO: 9.1 FL (ref 8.5–12.5)
POTASSIUM BLD-SCNC: 4.3 MMOL/L (ref 3.5–5)
RBC # BLD AUTO: 4.67 MILL/UL (ref 4.4–6.2)
SODIUM SERPL-SCNC: 136 MMOL/L (ref 136–145)
TACROLIMUS BLD-MCNC: 5.4 UG/L (ref 5–15)
TME LAST DOSE: NORMAL H
WBC: 3.8 THOU/UL (ref 4–11)

## 2019-12-02 PROCEDURE — 80197 ASSAY OF TACROLIMUS: CPT | Performed by: INTERNAL MEDICINE

## 2019-12-30 ENCOUNTER — RECORDS - HEALTHEAST (OUTPATIENT)
Dept: LAB | Facility: CLINIC | Age: 39
End: 2019-12-30

## 2019-12-30 DIAGNOSIS — Z79.899 ENCOUNTER FOR LONG-TERM CURRENT USE OF MEDICATION: ICD-10-CM

## 2019-12-30 DIAGNOSIS — Z48.298 AFTERCARE FOLLOWING ORGAN TRANSPLANT: ICD-10-CM

## 2019-12-30 DIAGNOSIS — Z94.0 KIDNEY REPLACED BY TRANSPLANT: ICD-10-CM

## 2019-12-30 LAB
ANION GAP SERPL CALCULATED.3IONS-SCNC: 9 MMOL/L (ref 5–18)
BK VIRUS SPECIMEN SOURCE: NORMAL
BKV DNA # SPEC NAA+PROBE: NORMAL COPIES/ML
BKV DNA SPEC NAA+PROBE-LOG#: NORMAL LOG COPIES/ML
BUN SERPL-MCNC: 18 MG/DL (ref 8–22)
CALCIUM SERPL-MCNC: 10 MG/DL (ref 8.5–10.5)
CHLORIDE BLD-SCNC: 108 MMOL/L (ref 98–107)
CO2 SERPL-SCNC: 24 MMOL/L (ref 22–31)
CREAT SERPL-MCNC: 1.67 MG/DL (ref 0.7–1.3)
ERYTHROCYTE [DISTWIDTH] IN BLOOD BY AUTOMATED COUNT: 13.2 % (ref 11–14.5)
GFR SERPL CREATININE-BSD FRML MDRD: 46 ML/MIN/1.73M2
GLUCOSE BLD-MCNC: 101 MG/DL (ref 70–125)
HCT VFR BLD AUTO: 41.9 % (ref 40–54)
HGB BLD-MCNC: 12.8 G/DL (ref 14–18)
MCH RBC QN AUTO: 25.8 PG (ref 27–34)
MCHC RBC AUTO-ENTMCNC: 30.5 G/DL (ref 32–36)
MCV RBC AUTO: 85 FL (ref 80–100)
PLATELET # BLD AUTO: 297 THOU/UL (ref 140–440)
PMV BLD AUTO: 9.3 FL (ref 8.5–12.5)
POTASSIUM BLD-SCNC: 4.9 MMOL/L (ref 3.5–5)
RBC # BLD AUTO: 4.96 MILL/UL (ref 4.4–6.2)
SODIUM SERPL-SCNC: 141 MMOL/L (ref 136–145)
WBC: 3.7 THOU/UL (ref 4–11)

## 2019-12-30 PROCEDURE — 80197 ASSAY OF TACROLIMUS: CPT | Performed by: INTERNAL MEDICINE

## 2019-12-31 LAB
TACROLIMUS BLD-MCNC: 5.5 UG/L (ref 5–15)
TME LAST DOSE: NORMAL H

## 2020-01-13 ENCOUNTER — TELEPHONE (OUTPATIENT)
Dept: TRANSPLANT | Facility: CLINIC | Age: 40
End: 2020-01-13

## 2020-01-13 NOTE — TELEPHONE ENCOUNTER
Patient Call: General  Route to LPN    Reason for call: Please connect with pt regarding his PCP has retired.     Call back needed? Yes    Return Call Needed  Same as documented in contacts section  When to return call?: Greater than one day: Route standard priority

## 2020-01-13 NOTE — TELEPHONE ENCOUNTER
Left message for patient acknowledging his message.  Informed patient that he should pick another PCP locally and txp team recommends Internal Medicine.  Instructed patient return call with any other questions/concerns.

## 2020-01-15 DIAGNOSIS — Z94.0 KIDNEY REPLACED BY TRANSPLANT: Primary | ICD-10-CM

## 2020-01-15 RX ORDER — MYCOPHENOLATE MOFETIL 250 MG/1
1000 CAPSULE ORAL 2 TIMES DAILY
Qty: 240 CAPSULE | Refills: 11 | Status: SHIPPED | OUTPATIENT
Start: 2020-01-15 | End: 2021-01-27

## 2020-01-15 NOTE — TELEPHONE ENCOUNTER
Patient Call: Medication Refill  Route to LPN  Instruct the patient to first contact their pharmacy. If they have called their pharmacy and require further assistance, route to LPN.    Pharmacy Name: [facility]/pharmacy  Pharmacy Location: [address]  Name of Medication: mycophenolate (GENERIC EQUIVALENT) 250 MG capsule  When will the patient be out of this medication?: Less than 24 hours [facility] if no [facility]        Patient ran out on Monday. Pharmacy sent a refill request but we did not receive it. Please send new script ASAP

## 2020-02-21 ENCOUNTER — RESULTS ONLY (OUTPATIENT)
Dept: OTHER | Facility: CLINIC | Age: 40
End: 2020-02-21

## 2020-02-21 ENCOUNTER — RECORDS - HEALTHEAST (OUTPATIENT)
Dept: LAB | Facility: CLINIC | Age: 40
End: 2020-02-21

## 2020-02-21 DIAGNOSIS — Z48.298 AFTERCARE FOLLOWING ORGAN TRANSPLANT: ICD-10-CM

## 2020-02-21 DIAGNOSIS — Z94.0 KIDNEY REPLACED BY TRANSPLANT: ICD-10-CM

## 2020-02-21 DIAGNOSIS — Z79.899 ENCOUNTER FOR LONG-TERM CURRENT USE OF MEDICATION: ICD-10-CM

## 2020-02-21 LAB
ANION GAP SERPL CALCULATED.3IONS-SCNC: 8 MMOL/L (ref 5–18)
BK VIRUS SPECIMEN SOURCE: NORMAL
BKV DNA # SPEC NAA+PROBE: NORMAL COPIES/ML
BKV DNA SPEC NAA+PROBE-LOG#: NORMAL LOG COPIES/ML
BUN SERPL-MCNC: 22 MG/DL (ref 8–22)
CALCIUM SERPL-MCNC: 10.1 MG/DL (ref 8.5–10.5)
CHLORIDE BLD-SCNC: 108 MMOL/L (ref 98–107)
CO2 SERPL-SCNC: 22 MMOL/L (ref 22–31)
CREAT SERPL-MCNC: 1.93 MG/DL (ref 0.7–1.3)
CREAT UR-MCNC: 228.3 MG/DL
ERYTHROCYTE [DISTWIDTH] IN BLOOD BY AUTOMATED COUNT: 13.1 % (ref 11–14.5)
GFR SERPL CREATININE-BSD FRML MDRD: 39 ML/MIN/1.73M2
GLUCOSE BLD-MCNC: 102 MG/DL (ref 70–125)
HCT VFR BLD AUTO: 39.4 % (ref 40–54)
HGB BLD-MCNC: 12.6 G/DL (ref 14–18)
MCH RBC QN AUTO: 26 PG (ref 27–34)
MCHC RBC AUTO-ENTMCNC: 32 G/DL (ref 32–36)
MCV RBC AUTO: 81 FL (ref 80–100)
PLATELET # BLD AUTO: 265 THOU/UL (ref 140–440)
PMV BLD AUTO: 9.9 FL (ref 8.5–12.5)
POTASSIUM BLD-SCNC: 4.4 MMOL/L (ref 3.5–5)
PROTEIN, RANDOM URINE - HISTORICAL: 22 MG/DL
PROTEIN/CREAT RATIO, RANDOM UR: 0.1
RBC # BLD AUTO: 4.85 MILL/UL (ref 4.4–6.2)
SODIUM SERPL-SCNC: 138 MMOL/L (ref 136–145)
TACROLIMUS BLD-MCNC: 8.4 UG/L (ref 5–15)
TME LAST DOSE: NORMAL H
WBC: 2.5 THOU/UL (ref 4–11)

## 2020-02-21 PROCEDURE — 86833 HLA CLASS II HIGH DEFIN QUAL: CPT | Performed by: PATHOLOGY

## 2020-02-21 PROCEDURE — 86832 HLA CLASS I HIGH DEFIN QUAL: CPT | Performed by: PATHOLOGY

## 2020-02-21 PROCEDURE — 80197 ASSAY OF TACROLIMUS: CPT | Performed by: INTERNAL MEDICINE

## 2020-05-08 ENCOUNTER — RECORDS - HEALTHEAST (OUTPATIENT)
Dept: LAB | Facility: CLINIC | Age: 40
End: 2020-05-08

## 2020-05-08 DIAGNOSIS — Z79.899 ENCOUNTER FOR LONG-TERM CURRENT USE OF MEDICATION: ICD-10-CM

## 2020-05-08 DIAGNOSIS — Z94.0 KIDNEY REPLACED BY TRANSPLANT: ICD-10-CM

## 2020-05-08 DIAGNOSIS — Z48.298 AFTERCARE FOLLOWING ORGAN TRANSPLANT: ICD-10-CM

## 2020-05-08 LAB
ANION GAP SERPL CALCULATED.3IONS-SCNC: 9 MMOL/L (ref 5–18)
BK VIRUS SPECIMEN SOURCE: NORMAL
BKV DNA # SPEC NAA+PROBE: NORMAL COPIES/ML
BKV DNA SPEC NAA+PROBE-LOG#: NORMAL LOG COPIES/ML
BUN SERPL-MCNC: 24 MG/DL (ref 8–22)
CALCIUM SERPL-MCNC: 10 MG/DL (ref 8.5–10.5)
CHLORIDE BLD-SCNC: 106 MMOL/L (ref 98–107)
CO2 SERPL-SCNC: 23 MMOL/L (ref 22–31)
CREAT SERPL-MCNC: 2.04 MG/DL (ref 0.7–1.3)
ERYTHROCYTE [DISTWIDTH] IN BLOOD BY AUTOMATED COUNT: 12.5 % (ref 11–14.5)
GFR SERPL CREATININE-BSD FRML MDRD: 36 ML/MIN/1.73M2
GLUCOSE BLD-MCNC: 106 MG/DL (ref 70–125)
HCT VFR BLD AUTO: 40 % (ref 40–54)
HGB BLD-MCNC: 12.6 G/DL (ref 14–18)
MCH RBC QN AUTO: 26.4 PG (ref 27–34)
MCHC RBC AUTO-ENTMCNC: 31.5 G/DL (ref 32–36)
MCV RBC AUTO: 84 FL (ref 80–100)
PLATELET # BLD AUTO: 295 THOU/UL (ref 140–440)
PMV BLD AUTO: 9.4 FL (ref 8.5–12.5)
POTASSIUM BLD-SCNC: 4.6 MMOL/L (ref 3.5–5)
RBC # BLD AUTO: 4.78 MILL/UL (ref 4.4–6.2)
SODIUM SERPL-SCNC: 138 MMOL/L (ref 136–145)
TACROLIMUS BLD-MCNC: 9.2 UG/L (ref 5–15)
TME LAST DOSE: NORMAL H
WBC: 3.3 THOU/UL (ref 4–11)

## 2020-05-08 PROCEDURE — 80197 ASSAY OF TACROLIMUS: CPT | Performed by: INTERNAL MEDICINE

## 2020-05-11 ENCOUNTER — TELEPHONE (OUTPATIENT)
Dept: TRANSPLANT | Facility: CLINIC | Age: 40
End: 2020-05-11

## 2020-05-11 DIAGNOSIS — Z94.0 KIDNEY REPLACED BY TRANSPLANT: ICD-10-CM

## 2020-05-11 RX ORDER — TACROLIMUS 1 MG/1
1 CAPSULE ORAL 2 TIMES DAILY
Qty: 180 CAPSULE | Refills: 3 | Status: SHIPPED | OUTPATIENT
Start: 2020-05-11 | End: 2020-05-26

## 2020-05-11 NOTE — TELEPHONE ENCOUNTER
Tac 9.2 (goal 4-6). Second level above goal range.   Creat 2.0 (baseline 1.8-2.0)    Call placed to patient for general health check in:  -Denies N/V/D   -No acute illness   -No s/s of UTI  -Feels like he is hydrating well.      Patient confirmed accurate 12 hour drug level and current dose of 1.5 mg BID. Reduce tacrolimus to 1 mg BID and recheck all transplant labs in 1 week. Patient verbalized understanding.     Patient has a new incisional hernia over his transplanted kidney which is sore. [facility] PCP at [facility] was referring patient to surgeon but patient did not know where. Patient prefers to have this taken care of at the  with the transplant surgeons. Message sent to schedulers.

## 2020-05-23 DIAGNOSIS — Z94.0 KIDNEY REPLACED BY TRANSPLANT: Primary | ICD-10-CM

## 2020-05-26 RX ORDER — TACROLIMUS 1 MG/1
CAPSULE ORAL
Qty: 90 CAPSULE | Refills: 11 | Status: SHIPPED | OUTPATIENT
Start: 2020-05-26 | End: 2021-08-05

## 2020-06-15 ENCOUNTER — VIRTUAL VISIT (OUTPATIENT)
Dept: TRANSPLANT | Facility: CLINIC | Age: 40
End: 2020-06-15
Attending: SURGERY
Payer: COMMERCIAL

## 2020-06-15 VITALS — DIASTOLIC BLOOD PRESSURE: 89 MMHG | SYSTOLIC BLOOD PRESSURE: 134 MMHG

## 2020-06-15 DIAGNOSIS — K43.2 INCISIONAL HERNIA, WITHOUT OBSTRUCTION OR GANGRENE: Primary | ICD-10-CM

## 2020-06-15 NOTE — LETTER
"    6/15/2020         RE: Harshad Beatty  [address] [place] [address]        Dear Colleague,    Thank you for referring your patient, Harshad Beatty, to the [facility]. Please see a copy of my visit note below.    Harshad Beatty is a 40 year old male who is being evaluated via a billable video visit.      The patient has been notified of following:     \"This video visit will be conducted via a call between you and your physician/provider. We have found that certain health care needs can be provided without the need for an in-person physical exam.  This service lets us provide the care you need with a video conversation.  If a prescription is necessary we can send it directly to your pharmacy.  If lab work is needed we can place an order for that and you can then stop by our lab to have the test done at a later time.    Video visits are billed at different rates depending on your insurance coverage.  Please reach out to your insurance provider with any questions.    If during the course of the call the physician/provider feels a video visit is not appropriate, you will not be charged for this service.\"    Patient has given verbal consent for Video visit? Yes    How would you like to obtain your AVS? [facility]  Patient would like the video invitation sent by: Text to cell phone: [phone]    Will anyone else be joining your video visit? No      Video-Visit Details    Type of service:  Video Visit      Originating Location (pt. Location): Home    Distant Location (provider location):  [facility]     Platform used for Video Visit: Other: factime          Patient was called and message left. Magaly Webster on 6/15/2020 at 1:53 PM    Patient was called and message left. Magaly Webster on 6/15/2020 at 1:36 PM    Patient was called and message was left to call back into clinic. Magaly Webster on 6/15/2020 at 1:20 PM    Transplant Surgery Progress Note    Transplants:  " 8/7/2018 (Kidney); Postoperative day:  678  S: Patient complains of bulge with fluid resonance in rlq    Transplant History:    Transplant Type:  LDKT  Donor Type: Living   Transplant Date:  8/7/2018 (Kidney)       Present Maintenance Immunosuppression:  Tacrolimus and Mycophenolate mofetil      Transplant Coordinator: Roxann Roche     Transplant Office Phone Number: [phone]     Immunosuppressant Medications     Immunosuppressive Agents Disp Start End     mycophenolate (GENERIC EQUIVALENT) 250 MG capsule    240 capsule 1/15/2020     Sig - Route: Take 4 capsules (1,000 mg) by mouth 2 times daily - Oral    Class: E-Prescribe    Notes to Pharmacy: TXP DT 8/7/2018 (Kidney) TXP Dischg DT 8/10/2018 DXZ94.0 TX Center [facility]     tacrolimus (GENERIC EQUIVALENT) 0.5 MG capsule    30 capsule 10/9/2019     Sig: HOLD    Class: No Print Out    Notes to Pharmacy: Transplant Date: 8/7/2018 (Kidney) Discharge Date: 8/10/2018 ICD10: Kidney replaced by transplant - Z94.0 Location of Transplant: [facility] ([place], [place])     tacrolimus (GENERIC EQUIVALENT) 1 MG capsule    90 capsule 5/26/2020     Sig: Total dose = 2 mg in the AM and 1.5 mg in the PM    Class: E-Prescribe          Possible Immunosuppression-related side effects:   []             headache  []             vivid dreams  []             irritability  []             cognitive difficuties  []             fine tremor  []             nausea  []             diarrhea  []             neuropathy      []             edema  []             renal calcineurin toxicity  []             hyperkalemia  []             post-transplant diabetes  []             decreased appetite  []             increased appetite  []             other:  []             none    Prescription Medications as of 6/15/2020       Rx Number Disp Refills Start End Last Dispensed Date Next Fill Date Owning Pharmacy    atorvastatin (LIPITOR) 10  MG tablet  30 tablet 11 8/19/2019    [facility] [place]    Sig: Take 1 tablet (10 mg) by mouth daily    Class: E-Prescribe    Route: Oral    carvedilol (COREG) 25 MG tablet  120 tablet 11 1/7/2019    [facility] [place] [address]    Sig: Take 2 tablets (50 mg) by mouth 2 times daily (with meals)    Class: E-Prescribe    Route: Oral    mycophenolate (GENERIC EQUIVALENT) 250 MG capsule  240 capsule 11 1/15/2020    [facility] [place] [address]    Sig: Take 4 capsules (1,000 mg) by mouth 2 times daily    Class: E-Prescribe    Notes to Pharmacy: TXP DT 8/7/2018 (Kidney) TXP Dischg DT 8/10/2018 DXZ94.0 TX Center [facility]    Route: Oral    sodium bicarbonate 650 MG tablet  120 tablet 11 8/24/2018    [facility] [place] [address]    Sig: Take 2 tablets (1,300 mg) by mouth 2 times daily    Class: E-Prescribe    Route: Oral    tacrolimus (GENERIC EQUIVALENT) 0.5 MG capsule  30 capsule 11 10/9/2019    [facility] [place] [address] [address]    Sig: HOLD    Class: No Print Out    Notes to Pharmacy: Transplant Date: 8/7/2018 (Kidney) Discharge Date: 8/10/2018 ICD10: Kidney replaced by transplant - Z94.0 Location of Transplant: [facility] ([place], [place])    tacrolimus (GENERIC EQUIVALENT) 1 MG capsule  90 capsule 11 5/26/2020    [facility] [place] [address]    Sig: Total dose = 2 mg in the AM and 1.5 mg in the PM    Class: E-Prescribe    cinacalcet (SENSIPAR) 30 MG tablet  30 tablet 0 4/5/2019    [facility]  [place] [address]    Sig: Take 1 tablet (30 mg) by mouth daily    Class: E-Prescribe    Route: Oral    Prior authorization:  Approved    magnesium oxide (MAG-OX) 400 MG tablet  30 tablet 3 8/10/2018    [facility] - [place], [place] - [address] [place] [address]    Sig: Take 1  tablet (400 mg) by mouth daily (with lunch)    Class: E-Prescribe    Route: Oral          Constitutional - A&O in NAD.   Eyes - no redness or discharge.  Sclera anicteric  Respiratory - no cough, no labored breathing  Musculoskeletal - range of motion normal  Skin - no discoloration, no jaundice  Neurological - no tremors.  No facial droop or dysarthria  Psychiatric - normal mood and affect  Abdomen - bulge noted in rlq, no skin changes  The rest of a comprehensive physical examination is deferred due to PHE (public health emergency) video visit restrictions    Transplant Immunosuppression Labs Latest Ref Rng & Units 5/8/2020 2/21/2020 12/30/2019 12/2/2019 11/12/2019   Tacro Level 5.0 - 15.0 ug/L 9.2 8.4 5.5 5.4 -   Tacro Level - 5/7/2020 2030 02/20/2020 2000 12/29/19 2000 [id number] -   Creat 0.66 - 1.25 mg/dL - - - - 1.69(H)   BUN 7 - 30 mg/dL - - - - -   WBC 4.0 - 11.0 10e9/L - - - - 2.8(L)   Neutrophil % - - - - 53.7   ANEU 1.6 - 8.3 10e9/L - - - - 1.5(L)       Chemistries:   Recent Labs   Lab Test 11/12/19 0645 01/14/19 0614   BUN  --  23   CR 1.69* 1.77*   GFRESTIMATED 50* 47*   GLC 92 136*     Lab Results   Component Value Date    A1C 5.5 12/07/2018     Recent Labs   Lab Test 10/31/18  0831   ALBUMIN 3.6   BILITOTAL 0.3   ALKPHOS 93   AST 23   ALT 31     Urine Studies:  Recent Labs   Lab Test 01/14/19 0618   COLOR Yellow   APPEARANCE Clear   URINEGLC Negative   URINEBILI Negative   URINEKETONE Negative   SG 1.014   UBLD Small*   URINEPH 5.0   PROTEIN Negative   NITRITE Negative   LEUKEST Negative   RBCU <1   WBCU <1     Recent Labs   Lab Test 01/14/19  0618 10/16/18  0749   UTPG 0.20 0.20     Hematology:   Recent Labs   Lab Test 11/12/19 0645 01/14/19  1159 01/14/19 0614 12/07/18  1223   HGB 12.4* 11.9* 11.2* 10.6*     --  255 258   WBC 2.8*  --  2.1* 1.6*     Coags:   Recent Labs   Lab Test 11/12/19  0645 01/14/19  0614   INR 1.06 1.11     HLA antibodies:   SA1 Hi Risk Aggie   Date Value Ref Range  Status   02/21/2020 None  Final     SA1 Mod Risk Aggie   Date Value Ref Range Status   02/21/2020 None  Final     SA2 Hi Risk Aggie   Date Value Ref Range Status   02/21/2020 None  Final     SA2 Mod Risk Aggie   Date Value Ref Range Status   02/21/2020 None  Final       Assessment: Harshad Beatty is doing well s/p LDKT:  Issues we addressed during his visit include:    Incisional hernia - mildly symptomatic  WIll need repair  Had a CT in [facility]  Will need films for review  Set up for face to face visit in the next 2 weeks  Schedule surgery shortly after  Plan:    1. Graft function: stable   Creat 1.6 to 1.9 aLTHOUGH THE LAST ONE WAS 2.04   His tac was reduced  Need to repeat creat and tac level before next visit with me  2. Immunosuppression Management: Changed no change .  Complexity of management:Medium.  Contributing factors: hernia    Followup: in 2 weeks  Risks of the surgical procedure including but not limited to the rare risk of mortality discussed in detail. Patient verbalized good understanding and had several pertinent questions which were answered satisfactorily.       Total Time: 25 min,   Counselling Time: 15 min.    .        Again, thank you for allowing me to participate in the care of your patient.        Sincerely,        Bety Mitchell MD

## 2020-06-15 NOTE — PROGRESS NOTES
"Harshad Beatty is a 40 year old male who is being evaluated via a billable video visit.      The patient has been notified of following:     \"This video visit will be conducted via a call between you and your physician/provider. We have found that certain health care needs can be provided without the need for an in-person physical exam.  This service lets us provide the care you need with a video conversation.  If a prescription is necessary we can send it directly to your pharmacy.  If lab work is needed we can place an order for that and you can then stop by our lab to have the test done at a later time.    Video visits are billed at different rates depending on your insurance coverage.  Please reach out to your insurance provider with any questions.    If during the course of the call the physician/provider feels a video visit is not appropriate, you will not be charged for this service.\"    Patient has given verbal consent for Video visit? Yes    How would you like to obtain your AVS? Ad[facility]  Patient would like the video invitation sent by: Text to cell phone: [phone]    Will anyone else be joining your video visit? No      Video-Visit Details    Type of service:  Video Visit      Originating Location (pt. Location): Home    Distant Location (provider location):   [employer] [facility]     Platform used for Video Visit: Other: factime          Patient was called and message left. Magaly Webster on 6/15/2020 at 1:53 PM    Patient was called and message left. Magaly Webster on 6/15/2020 at 1:36 PM    Patient was called and message was left to call back into clinic. Magaly Webster on 6/15/2020 at 1:20 PM    Transplant Surgery Progress Note    Transplants:  8/7/2018 (Kidney); Postoperative day:  678  S: Patient complains of bulge with fluid resonance in rlq    Transplant History:    Transplant Type:  LDKT  Donor Type: Living   Transplant Date:  8/7/2018 (Kidney)       Present Maintenance Immunosuppression:  " Tacrolimus and Mycophenolate mofetil      Transplant Coordinator: Roxann Roche     Transplant Office Phone Number: [phone]     Immunosuppressant Medications     Immunosuppressive Agents Disp Start End     mycophenolate (GENERIC EQUIVALENT) 250 MG capsule    240 capsule 1/15/2020     Sig - Route: Take 4 capsules (1,000 mg) by mouth 2 times daily - Oral    Class: E-Prescribe    Notes to Pharmacy: TXP DT 8/7/2018 (Kidney) TXP Dischg DT 8/10/2018 DXZ94.0 TX Center [facility]     tacrolimus (GENERIC EQUIVALENT) 0.5 MG capsule    30 capsule 10/9/2019     Sig: HOLD    Class: No Print Out    Notes to Pharmacy: Transplant Date: 8/7/2018 (Kidney) Discharge Date: 8/10/2018 ICD10: Kidney replaced by transplant - Z94.0 Location of Transplant: [facility] ([place], [place])     tacrolimus (GENERIC EQUIVALENT) 1 MG capsule    90 capsule 5/26/2020     Sig: Total dose = 2 mg in the AM and 1.5 mg in the PM    Class: E-Prescribe          Possible Immunosuppression-related side effects:   []             headache  []             vivid dreams  []             irritability  []             cognitive difficuties  []             fine tremor  []             nausea  []             diarrhea  []             neuropathy      []             edema  []             renal calcineurin toxicity  []             hyperkalemia  []             post-transplant diabetes  []             decreased appetite  []             increased appetite  []             other:  []             none    Prescription Medications as of 6/15/2020       Rx Number Disp Refills Start End Last Dispensed Date Next Fill Date Owning Pharmacy    atorvastatin (LIPITOR) 10 MG tablet  30 tablet 11 8/19/2019    [id number] [address]    Sig: Take 1 tablet (10 mg) by mouth daily    Class: E-Prescribe    Route: Oral    carvedilol (COREG) 25 MG tablet  120 tablet 11 1/7/2019    [id number] [address]    Sig: Take 2 tablets (50 mg) by mouth 2 times daily (with meals)    Class: E-Prescribe    Route: Oral    mycophenolate (GENERIC EQUIVALENT) 250 MG capsule  240 capsule 11 1/15/2020    [facility]/pharmacy #[address] [place] [address]    Sig: Take 4 capsules (1,000 mg) by mouth 2 times daily    Class: E-Prescribe    Notes to Pharmacy: TXP DT 8/7/2018 (Kidney) TXP Dischg DT 8/10/2018 DXZ94.0 TX Center [facility]    Route: Oral    sodium bicarbonate 650 MG tablet  120 tablet 11 8/24/2018    [facility] [place] [address]    Sig: Take 2 tablets (1,300 mg) by mouth 2 times daily    Class: E-Prescribe    Route: Oral    tacrolimus (GENERIC EQUIVALENT) 0.5 MG capsule  30 capsule 11 10/9/2019    [facility]/pharmacy #[address] [place] [address]    Sig: HOLD    Class: No Print Out    Notes to Pharmacy: Transplant Date: 8/7/2018 (Kidney) Discharge Date: 8/10/2018 ICD10: Kidney replaced by transplant - Z94.0 Location of Transplant: [facility] [place] ([place], [place])    tacrolimus (GENERIC EQUIVALENT) 1 MG capsule  90 capsule 11 5/26/2020    [facility] [place] [address] [address]    Sig: Total dose = 2 mg in the AM and 1.5 mg in the PM    Class: E-Prescribe    cinacalcet (SENSIPAR) 30 MG tablet  30 tablet 0 4/5/2019    [facility] [place] [address] [address]    Sig: Take 1 tablet (30 mg) by mouth daily    Class: E-Prescribe    Route: Oral    Prior authorization:  Approved    magnesium oxide (MAG-OX) 400 MG tablet  30 tablet 3 8/10/2018    [facility] - [place], [place] - [address] [facility]    Sig: Take 1 tablet (400 mg) by mouth daily (with lunch)    Class: E-Prescribe    Route: Oral          Constitutional - A&O in NAD.   Eyes - no redness or discharge.  Sclera anicteric  Respiratory - no cough, no labored breathing  Musculoskeletal - range of motion  normal  Skin - no discoloration, no jaundice  Neurological - no tremors.  No facial droop or dysarthria  Psychiatric - normal mood and affect  Abdomen - bulge noted in rlq, no skin changes  The rest of a comprehensive physical examination is deferred due to PHE (public health emergency) video visit restrictions    Transplant Immunosuppression Labs Latest Ref Rng & Units 5/8/2020 2/21/2020 12/30/2019 12/2/2019 11/12/2019   Tacro Level 5.0 - 15.0 ug/L 9.2 8.4 5.5 5.4 -   Tacro Level - 5/7/2020 2030 02/20/2020 2000 12/29/19 2000 [id number] 0993 -   Creat 0.66 - 1.25 mg/dL - - - - 1.69(H)   BUN 7 - 30 mg/dL - - - - -   WBC 4.0 - 11.0 10e9/L - - - - 2.8(L)   Neutrophil % - - - - 53.7   ANEU 1.6 - 8.3 10e9/L - - - - 1.5(L)       Chemistries:   Recent Labs   Lab Test 11/12/19 0645 01/14/19 0614   BUN  --  23   CR 1.69* 1.77*   GFRESTIMATED 50* 47*   GLC 92 136*     Lab Results   Component Value Date    A1C 5.5 12/07/2018     Recent Labs   Lab Test 10/31/18  0831   ALBUMIN 3.6   BILITOTAL 0.3   ALKPHOS 93   AST 23   ALT 31     Urine Studies:  Recent Labs   Lab Test 01/14/19 0618   COLOR Yellow   APPEARANCE Clear   URINEGLC Negative   URINEBILI Negative   URINEKETONE Negative   SG 1.014   UBLD Small*   URINEPH 5.0   PROTEIN Negative   NITRITE Negative   LEUKEST Negative   RBCU <1   WBCU <1     Recent Labs   Lab Test 01/14/19  0618 10/16/18  0749   UTPG 0.20 0.20     Hematology:   Recent Labs   Lab Test 11/12/19 0645 01/14/19  1159 01/14/19 0614 12/07/18  1223   HGB 12.4* 11.9* 11.2* 10.6*     --  255 258   WBC 2.8*  --  2.1* 1.6*     Coags:   Recent Labs   Lab Test 11/12/19 0645 01/14/19 0614   INR 1.06 1.11     HLA antibodies:   SA1 Hi Risk Aggie   Date Value Ref Range Status   02/21/2020 None  Final     SA1 Mod Risk Aggie   Date Value Ref Range Status   02/21/2020 None  Final     SA2 Hi Risk Aggie   Date Value Ref Range Status   02/21/2020 None  Final     SA2 Mod Risk Aggie   Date Value Ref Range Status   02/21/2020 None   Final       Assessment: Harshad Beatty is doing well s/p LDKT:  Issues we addressed during his visit include:    Incisional hernia - mildly symptomatic  WIll need repair  Had a CT in [facility]  Will need films for review  Set up for face to face visit in the next 2 weeks  Schedule surgery shortly after  Plan:    1. Graft function: stable   Creat 1.6 to 1.9 aLTHOUGH THE LAST ONE WAS 2.04   His tac was reduced  Need to repeat creat and tac level before next visit with me  2. Immunosuppression Management: Changed no change .  Complexity of management:Medium.  Contributing factors: hernia    Followup: in 2 weeks  Risks of the surgical procedure including but not limited to the rare risk of mortality discussed in detail. Patient verbalized good understanding and had several pertinent questions which were answered satisfactorily.       Total Time: 25 min,   Counselling Time: 15 min.    .

## 2020-06-22 ENCOUNTER — PREP FOR PROCEDURE (OUTPATIENT)
Dept: TRANSPLANT | Facility: CLINIC | Age: 40
End: 2020-06-22

## 2020-06-22 DIAGNOSIS — Z94.0 KIDNEY TRANSPLANTED: ICD-10-CM

## 2020-06-22 DIAGNOSIS — K43.2 HERNIA, INCISIONAL: Primary | ICD-10-CM

## 2020-06-22 DIAGNOSIS — Z11.59 ENCOUNTER FOR SCREENING FOR OTHER VIRAL DISEASES: Primary | ICD-10-CM

## 2020-07-13 ENCOUNTER — TELEPHONE (OUTPATIENT)
Dept: TRANSPLANT | Facility: CLINIC | Age: 40
End: 2020-07-13

## 2020-07-13 NOTE — TELEPHONE ENCOUNTER
Patient Call: General  Route to LPN    Reason for call: Pt has questions re his upcoming surgery he wants to review with the coordinator     Call back needed? Yes    Return Call Needed  Same as documented in contacts section  When to return call?: Greater than one day: Route standard priority

## 2020-07-13 NOTE — TELEPHONE ENCOUNTER
Patient scheduled for a hernia repair on 7/24 and has the following questions:     1. How much time does he need to take off of work to recover?   2. What are his lifting restrictions?     Has pre-op appointment with Paula next Monday but would like to start [facility] paperwork before then.

## 2020-07-15 NOTE — TELEPHONE ENCOUNTER
Contacted patient regarding his questions.  Attempted twice.  No answer a message was left    Asked patient if I can leave information on VM    He needs 6-8 weeks off work with no lifting more than 10 lbs    He will slowly ease back to work and He can fax his [place] paperwork to the [facility] office

## 2020-07-16 NOTE — TELEPHONE ENCOUNTER
Pt returning call to Tracy Finley   He gives his permission to leave messages on his VM  His main concerns are  re his Time off work and follow up

## 2020-07-20 ENCOUNTER — AMBULATORY - HEALTHEAST (OUTPATIENT)
Dept: FAMILY MEDICINE | Facility: CLINIC | Age: 40
End: 2020-07-20

## 2020-07-20 ENCOUNTER — OFFICE VISIT (OUTPATIENT)
Dept: TRANSPLANT | Facility: CLINIC | Age: 40
End: 2020-07-20
Attending: SURGERY
Payer: COMMERCIAL

## 2020-07-20 VITALS
DIASTOLIC BLOOD PRESSURE: 86 MMHG | OXYGEN SATURATION: 98 % | HEART RATE: 77 BPM | WEIGHT: 307.7 LBS | HEIGHT: 70 IN | SYSTOLIC BLOOD PRESSURE: 142 MMHG | BODY MASS INDEX: 44.05 KG/M2

## 2020-07-20 DIAGNOSIS — K43.2 INCISIONAL HERNIA, WITHOUT OBSTRUCTION OR GANGRENE: Primary | ICD-10-CM

## 2020-07-20 DIAGNOSIS — Z11.59 ENCOUNTER FOR SCREENING FOR OTHER VIRAL DISEASES: ICD-10-CM

## 2020-07-20 PROCEDURE — G0463 HOSPITAL OUTPT CLINIC VISIT: HCPCS | Mod: ZF

## 2020-07-20 ASSESSMENT — MIFFLIN-ST. JEOR: SCORE: 2308

## 2020-07-20 NOTE — PROGRESS NOTES
"Chief Complaint   Patient presents with     Pre-Op Exam     Day-5      Blood pressure (!) 142/86, pulse 77, height 1.772 m (5' 9.75\"), weight 139.6 kg (307 lb 11.2 oz), SpO2 98 %.    Clarice Colvin CMA    "

## 2020-07-20 NOTE — PROGRESS NOTES
Transplant Surgery H&P:                           HPI:      Mr. Beatty is a 40 year old male history and physical for incisional hernia.                     YES  NO   Chronic anticoagulation  []      [x] Indication:   Recurrent infections  []      [x]  Type:                  Bladder dysfunction  []      [x] Cause:  Claudication   []      [x] Distance:    Previous Amputation  []      [x] Cause:         Special considerations:  PONV: no  Church: No    MEDICAL HISTORY:      Patient Active Problem List    Diagnosis Date Noted     Hernia, incisional 06/22/2020     Priority: Medium     Added automatically from request for surgery [id number]       Kidney transplanted 10/28/2019     Priority: Medium     Added automatically from request for surgery [id number]       Complication of arteriovenous dialysis fistula 10/28/2019     Priority: Medium     Added automatically from request for surgery [id number]       Aftercare following organ transplant 04/30/2019     Priority: Medium     Vitamin D deficiency 04/30/2019     Priority: Medium     Obesity 04/30/2019     Priority: Medium     Neutropenia (H) 10/08/2018     Priority: Medium     Hypomagnesemia 08/14/2018     Priority: Medium     Immunosuppression (H) 08/13/2018     Priority: Medium     Need for pneumocystis prophylaxis 08/13/2018     Priority: Medium     HTN, kidney transplant related 08/13/2018     Priority: Medium     Hypercalcemia 08/13/2018     Priority: Medium     Kidney replaced by transplant 08/07/2018     Priority: Medium     Secondary renal hyperparathyroidism (H)      Priority: Medium     Anemia in chronic kidney disease      Priority: Medium     Sickle cell trait (H)      Priority: Medium      Past Medical History:   Diagnosis Date     Anemia in chronic kidney disease      ESRD (end stage renal disease) (H)      Fever 10/29/2018     History of cardiomyopathy      HTN (hypertension)      Neutropenia (H) 10/8/2018     Secondary hyperparathyroidism (H)      Sickle  cell trait (H)      Past Surgical History:   Procedure Laterality Date     CREATE FISTULA ARTERIOVENOUS UPPER EXTREMITY       kidney transplant right  08/07/2018     LIGATE FISTULA ARTERIOVENOUS UPPER EXTREMITY Right 11/12/2019    Procedure: LIGATION, ARTERIOVENOUS FISTULA RIGHT UPPER ARM EXTREMETY, Excision of Right Arm Skin;  Surgeon: Bety Mitchell MD;  Location: [facility] OR     ORIF right 5th metatarpal fracture       PERCUTANEOUS BIOPSY KIDNEY Right 1/14/2019    Procedure: Right Kidney Biopsy;  Surgeon: Andrey Connolly MD;  Location: [facility]     Current Outpatient Medications   Medication Sig Dispense Refill     atorvastatin (LIPITOR) 10 MG tablet Take 1 tablet (10 mg) by mouth daily 30 tablet 11     carvedilol (COREG) 25 MG tablet Take 2 tablets (50 mg) by mouth 2 times daily (with meals) 120 tablet 11     mycophenolate (GENERIC EQUIVALENT) 250 MG capsule Take 4 capsules (1,000 mg) by mouth 2 times daily 240 capsule 11     sodium bicarbonate 650 MG tablet Take 2 tablets (1,300 mg) by mouth 2 times daily 120 tablet 11     tacrolimus (GENERIC EQUIVALENT) 1 MG capsule Total dose = 2 mg in the AM and 1.5 mg in the PM 90 capsule 11     cinacalcet (SENSIPAR) 30 MG tablet Take 1 tablet (30 mg) by mouth daily (Patient not taking: Reported on 6/15/2020) 30 tablet 0     magnesium oxide (MAG-OX) 400 MG tablet Take 1 tablet (400 mg) by mouth daily (with lunch) (Patient not taking: Reported on 10/14/2019) 30 tablet 3     tacrolimus (GENERIC EQUIVALENT) 0.5 MG capsule HOLD (Patient not taking: Reported on 7/20/2020) 30 capsule 11     OTC products: None, except as noted above  No Known Allergies   Social History     Tobacco Use     Smoking status: Never Smoker     Smokeless tobacco: Never Used   Substance Use Topics     Alcohol use: No     OR  Social History     Socioeconomic History     Marital status:      Spouse name: Yesica     Number of children: 3     Years of education: 16     Highest education level: Not on  file   Occupational History     Occupation: bond processor     Employer: [employer]   Social Needs     Financial resource strain: Not on file     Food insecurity     Worry: Not on file     Inability: Not on file     Transportation needs     Medical: Not on file     Non-medical: Not on file   Tobacco Use     Smoking status: Never Smoker     Smokeless tobacco: Never Used   Substance and Sexual Activity     Alcohol use: No     Drug use: Yes     Types: Marijuana     Comment: remote     Sexual activity: Yes     Partners: Female     Birth control/protection: Implant   Lifestyle     Physical activity     Days per week: Not on file     Minutes per session: Not on file     Stress: Not on file   Relationships     Social connections     Talks on phone: Not on file     Gets together: Not on file     Attends Buddhism service: Not on file     Active member of club or organization: Not on file     Attends meetings of clubs or organizations: Not on file     Relationship status: Not on file     Intimate partner violence     Fear of current or ex partner: Not on file     Emotionally abused: Not on file     Physically abused: Not on file     Forced sexual activity: Not on file   Other Topics Concern     Not on file   Social History Narrative     Not on file     History   Drug Use     Types: Marijuana     Comment: remote     Family History   Problem Relation Age of Onset     Sickle Cell Anemia Father      Sickle Cell Trait Sister      Pancreatic Cancer Maternal Grandfather      Kidney Disease Cousin        REVIEW OF SYSTEMS:        CONSTITUTIONAL: NEGATIVE for fever, chills, change in weight  INTEGUMENTARY/SKIN: NEGATIVE for worrisome rashes, moles or lesions  EYES: NEGATIVE for vision changes or irritation  ENT/MOUTH: NEGATIVE for ear, mouth and throat problems  RESP: NEGATIVE for significant cough or SOB  BREAST: NEGATIVE for masses, tenderness or discharge  CV: NEGATIVE for chest pain, palpitations or peripheral edema  GI: NEGATIVE  for nausea, abdominal pain, heartburn, or change in bowel habits  : NEGATIVE for frequency, dysuria, or hematuria  MUSCULOSKELETAL: NEGATIVE for significant arthralgias or myalgia  NEURO: NEGATIVE for weakness, dizziness or paresthesias  ENDOCRINE: NEGATIVE for temperature intolerance, skin/hair changes  HEME: NEGATIVE for bleeding problems  PSYCHIATRIC: NEGATIVE for changes in mood or affect    EXAM:                       Pulse:  [77] 77  BP: (142)/(86) 142/86  SpO2:  [98 %] 98 %    GENERAL APPEARANCE: healthy, alert and no distress     EYES: EOMI,  PERRL     HENT: ear canals and TM's normal and nose and mouth without ulcers or lesions     NECK: no adenopathy, no asymmetry, masses, or scars and thyroid normal to palpation     RESP: lungs clear to auscultation - no rales, rhonchi or wheezes     CV: regular rates and rhythm, normal S1 S2, no S3 or S4 and no murmur, click or rub     ABDOMEN: right sided incisional hernia      MS: extremities normal- no gross deformities noted, no evidence of inflammation in joints, FROM in all extremities.     SKIN: no suspicious lesions or rashes     NEURO: Normal strength and tone, sensory exam grossly normal, mentation intact and speech normal     PSYCH: mentation appears normal. and affect normal/bright     LYMPHATICS: No cervical adenopathy      DIAGNOSTICS:                EKG: pending  Chest XRay  Labs Resulted Today: No results found for any visits on 07/20/20.  Labs Drawn and in Process:   Unresulted Labs Ordered in the Past 30 Days of this Admission     No orders found from 6/20/2020 to 7/21/2020.        Recent Labs   Lab Test 11/12/19  0645 01/14/19  1159 01/14/19  0614 12/07/18  1223   HGB 12.4* 11.9* 11.2* 10.6*     --  255 258   INR 1.06  --  1.11  --    NA  --   --  136 134   POTASSIUM 4.4  --  3.7 4.7   CR 1.69*  --  1.77* 1.87*   A1C  --   --   --  5.5     UN cPRA   Date Value Ref Range Status   02/21/2020 0  Final     A    ASSESSMENT/PLAN:                   Mr. Beatty is a 40 year old male is medically appropriate incisional hernia repair.     1. Labs reviewed. Findings requiring additional evaluation before surgery: No  2. EKG (7/20/2020): pending  3. ABO= A  4. Outstanding issues: CBC, CMP, COVID-19 testing, EKG and X-ray   5. Consent: Not Done      Signed Electronically by: Tracy Finley NP

## 2020-07-20 NOTE — LETTER
"    7/20/2020         RE: Harshad Beatty  [address] [facility] [place] [address]        Dear Colleague,    Thank you for referring your patient, Harshad Beatty, to the [facility]. Please see a copy of my visit note below.    Chief Complaint   Patient presents with     Pre-Op Exam     Day-5      Blood pressure (!) 142/86, pulse 77, height 1.772 m (5' 9.75\"), weight 139.6 kg (307 lb 11.2 oz), SpO2 98 %.    Clarice Colvin, [facility]      Transplant Surgery H&P:                           HPI:      Mr. Beatty is a 40 year old male history and physical for incisional hernia.                     YES  NO   Chronic anticoagulation  []      [x] Indication:   Recurrent infections  []      [x]  Type:                  Bladder dysfunction  []      [x] Cause:  Claudication   []      [x] Distance:    Previous Amputation  []      [x] Cause:         Special considerations:  PONV: no  Holiness: No    MEDICAL HISTORY:      Patient Active Problem List    Diagnosis Date Noted     Hernia, incisional 06/22/2020     Priority: Medium     Added automatically from request for surgery [id number]       Kidney transplanted 10/28/2019     Priority: Medium     Added automatically from request for surgery [id number]       Complication of arteriovenous dialysis fistula 10/28/2019     Priority: Medium     Added automatically from request for surgery [id number]       Aftercare following organ transplant 04/30/2019     Priority: Medium     Vitamin D deficiency 04/30/2019     Priority: Medium     Obesity 04/30/2019     Priority: Medium     Neutropenia (H) 10/08/2018     Priority: Medium     Hypomagnesemia 08/14/2018     Priority: Medium     Immunosuppression (H) 08/13/2018     Priority: Medium     Need for pneumocystis prophylaxis 08/13/2018     Priority: Medium     HTN, kidney transplant related 08/13/2018     Priority: Medium     Hypercalcemia 08/13/2018     Priority: Medium     Kidney replaced by transplant 08/07/2018     Priority: Medium     " Secondary renal hyperparathyroidism (H)      Priority: Medium     Anemia in chronic kidney disease      Priority: Medium     Sickle cell trait (H)      Priority: Medium      Past Medical History:   Diagnosis Date     Anemia in chronic kidney disease      ESRD (end stage renal disease) (H)      Fever 10/29/2018     History of cardiomyopathy      HTN (hypertension)      Neutropenia (H) 10/8/2018     Secondary hyperparathyroidism (H)      Sickle cell trait (H)      Past Surgical History:   Procedure Laterality Date     CREATE FISTULA ARTERIOVENOUS UPPER EXTREMITY       kidney transplant right  08/07/2018     LIGATE FISTULA ARTERIOVENOUS UPPER EXTREMITY Right 11/12/2019    Procedure: LIGATION, ARTERIOVENOUS FISTULA RIGHT UPPER ARM EXTREMETY, Excision of Right Arm Skin;  Surgeon: Bety Mitchell MD;  Location: [facility] OR     ORIF right 5th metatarpal fracture       PERCUTANEOUS BIOPSY KIDNEY Right 1/14/2019    Procedure: Right Kidney Biopsy;  Surgeon: Andrey Connolly MD;  [facility]     Current Outpatient Medications   Medication Sig Dispense Refill     atorvastatin (LIPITOR) 10 MG tablet Take 1 tablet (10 mg) by mouth daily 30 tablet 11     carvedilol (COREG) 25 MG tablet Take 2 tablets (50 mg) by mouth 2 times daily (with meals) 120 tablet 11     mycophenolate (GENERIC EQUIVALENT) 250 MG capsule Take 4 capsules (1,000 mg) by mouth 2 times daily 240 capsule 11     sodium bicarbonate 650 MG tablet Take 2 tablets (1,300 mg) by mouth 2 times daily 120 tablet 11     tacrolimus (GENERIC EQUIVALENT) 1 MG capsule Total dose = 2 mg in the AM and 1.5 mg in the PM 90 capsule 11     cinacalcet (SENSIPAR) 30 MG tablet Take 1 tablet (30 mg) by mouth daily (Patient not taking: Reported on 6/15/2020) 30 tablet 0     magnesium oxide (MAG-OX) 400 MG tablet Take 1 tablet (400 mg) by mouth daily (with lunch) (Patient not taking: Reported on 10/14/2019) 30 tablet 3     tacrolimus (GENERIC EQUIVALENT) 0.5 MG capsule HOLD (Patient not  taking: Reported on 7/20/2020) 30 capsule 11     OTC products: None, except as noted above  No Known Allergies   Social History     Tobacco Use     Smoking status: Never Smoker     Smokeless tobacco: Never Used   Substance Use Topics     Alcohol use: No     OR  Social History     Socioeconomic History     Marital status:      Spouse name: Yesica     Number of children: 3     Years of education: 16     Highest education level: Not on file   Occupational History     Occupation: bond processor     Employer: [employer]   Social Needs     Financial resource strain: Not on file     Food insecurity     Worry: Not on file     Inability: Not on file     Transportation needs     Medical: Not on file     Non-medical: Not on file   Tobacco Use     Smoking status: Never Smoker     Smokeless tobacco: Never Used   Substance and Sexual Activity     Alcohol use: No     Drug use: Yes     Types: Marijuana     Comment: remote     Sexual activity: Yes     Partners: Female     Birth control/protection: Implant   Lifestyle     Physical activity     Days per week: Not on file     Minutes per session: Not on file     Stress: Not on file   Relationships     Social connections     Talks on phone: Not on file     Gets together: Not on file     Attends Mandaen service: Not on file     Active member of club or organization: Not on file     Attends meetings of clubs or organizations: Not on file     Relationship status: Not on file     Intimate partner violence     Fear of current or ex partner: Not on file     Emotionally abused: Not on file     Physically abused: Not on file     Forced sexual activity: Not on file   Other Topics Concern     Not on file   Social History Narrative     Not on file     History   Drug Use     Types: Marijuana     Comment: remote     Family History   Problem Relation Age of Onset     Sickle Cell Anemia Father      Sickle Cell Trait Sister      Pancreatic Cancer Maternal Grandfather      Kidney Disease Cousin         REVIEW OF SYSTEMS:        CONSTITUTIONAL: NEGATIVE for fever, chills, change in weight  INTEGUMENTARY/SKIN: NEGATIVE for worrisome rashes, moles or lesions  EYES: NEGATIVE for vision changes or irritation  ENT/MOUTH: NEGATIVE for ear, mouth and throat problems  RESP: NEGATIVE for significant cough or SOB  BREAST: NEGATIVE for masses, tenderness or discharge  CV: NEGATIVE for chest pain, palpitations or peripheral edema  GI: NEGATIVE for nausea, abdominal pain, heartburn, or change in bowel habits  : NEGATIVE for frequency, dysuria, or hematuria  MUSCULOSKELETAL: NEGATIVE for significant arthralgias or myalgia  NEURO: NEGATIVE for weakness, dizziness or paresthesias  ENDOCRINE: NEGATIVE for temperature intolerance, skin/hair changes  HEME: NEGATIVE for bleeding problems  PSYCHIATRIC: NEGATIVE for changes in mood or affect    EXAM:                       Pulse:  [77] 77  BP: (142)/(86) 142/86  SpO2:  [98 %] 98 %    GENERAL APPEARANCE: healthy, alert and no distress     EYES: EOMI,  PERRL     HENT: ear canals and TM's normal and nose and mouth without ulcers or lesions     NECK: no adenopathy, no asymmetry, masses, or scars and thyroid normal to palpation     RESP: lungs clear to auscultation - no rales, rhonchi or wheezes     CV: regular rates and rhythm, normal S1 S2, no S3 or S4 and no murmur, click or rub     ABDOMEN: right sided incisional hernia      MS: extremities normal- no gross deformities noted, no evidence of inflammation in joints, FROM in all extremities.     SKIN: no suspicious lesions or rashes     NEURO: Normal strength and tone, sensory exam grossly normal, mentation intact and speech normal     PSYCH: mentation appears normal. and affect normal/bright     LYMPHATICS: No cervical adenopathy      DIAGNOSTICS:                EKG: pending  Chest XRay  Labs Resulted Today: No results found for any visits on 07/20/20.  Labs Drawn and in Process:   Unresulted Labs Ordered in the Past 30 Days of this  Admission     No orders found from 6/20/2020 to 7/21/2020.        Recent Labs   Lab Test 11/12/19  0645 01/14/19  1159 01/14/19  0614 12/07/18  1223   HGB 12.4* 11.9* 11.2* 10.6*     --  255 258   INR 1.06  --  1.11  --    NA  --   --  136 134   POTASSIUM 4.4  --  3.7 4.7   CR 1.69*  --  1.77* 1.87*   A1C  --   --   --  5.5     UNOS cPRA   Date Value Ref Range Status   02/21/2020 0  Final     A    ASSESSMENT/PLAN:                  Mr. Beatty is a 40 year old male is medically appropriate incisional hernia repair.     1. Labs reviewed. Findings requiring additional evaluation before surgery: No  2. EKG (7/20/2020): pending  3. ABO= A  4. Outstanding issues: CBC, CMP, COVID-19 testing, EKG and X-ray   5. Consent: Not Done      Signed Electronically by: Tracy Finley NP        Again, thank you for allowing me to participate in the care of your patient.        Sincerely,        Tracy Finley NP

## 2020-07-21 ENCOUNTER — AMBULATORY - HEALTHEAST (OUTPATIENT)
Dept: FAMILY MEDICINE | Facility: CLINIC | Age: 40
End: 2020-07-21

## 2020-07-21 DIAGNOSIS — Z11.59 ENCOUNTER FOR SCREENING FOR OTHER VIRAL DISEASES: ICD-10-CM

## 2020-07-23 ENCOUNTER — ANESTHESIA EVENT (OUTPATIENT)
Dept: SURGERY | Facility: CLINIC | Age: 40
End: 2020-07-23
Payer: COMMERCIAL

## 2020-07-24 ENCOUNTER — HOSPITAL ENCOUNTER (OUTPATIENT)
Facility: CLINIC | Age: 40
Discharge: HOME | End: 2020-07-24
Attending: SURGERY | Admitting: SURGERY
Payer: COMMERCIAL

## 2020-07-24 ENCOUNTER — ANCILLARY PROCEDURE (OUTPATIENT)
Dept: ULTRASOUND IMAGING | Facility: CLINIC | Age: 40
End: 2020-07-24
Payer: COMMERCIAL

## 2020-07-24 ENCOUNTER — ANESTHESIA (OUTPATIENT)
Dept: SURGERY | Facility: CLINIC | Age: 40
End: 2020-07-24
Payer: COMMERCIAL

## 2020-07-24 VITALS
HEART RATE: 63 BPM | HEIGHT: 71 IN | SYSTOLIC BLOOD PRESSURE: 159 MMHG | BODY MASS INDEX: 43.3 KG/M2 | WEIGHT: 309.31 LBS | OXYGEN SATURATION: 100 % | TEMPERATURE: 97.8 F | DIASTOLIC BLOOD PRESSURE: 108 MMHG | RESPIRATION RATE: 18 BRPM

## 2020-07-24 DIAGNOSIS — Z94.0 KIDNEY TRANSPLANTED: ICD-10-CM

## 2020-07-24 DIAGNOSIS — Z94.9 INCISIONAL HERNIA FOLLOWING TRANSPLANT: ICD-10-CM

## 2020-07-24 DIAGNOSIS — K43.2 INCISIONAL HERNIA OF ANTERIOR ABDOMINAL WALL WITHOUT OBSTRUCTION OR GANGRENE: Primary | ICD-10-CM

## 2020-07-24 DIAGNOSIS — K43.2 HERNIA, INCISIONAL: ICD-10-CM

## 2020-07-24 DIAGNOSIS — K43.2 INCISIONAL HERNIA FOLLOWING TRANSPLANT: ICD-10-CM

## 2020-07-24 LAB
ABO + RH BLD: NORMAL
ABO + RH BLD: NORMAL
BLD GP AB SCN SERPL QL: NORMAL
BLOOD BANK CMNT PATIENT-IMP: NORMAL
GLUCOSE BLDC GLUCOMTR-MCNC: 120 MG/DL (ref 70–99)
SPECIMEN EXP DATE BLD: NORMAL

## 2020-07-24 PROCEDURE — 25000125 ZZHC RX 250: Performed by: NURSE ANESTHETIST, CERTIFIED REGISTERED

## 2020-07-24 PROCEDURE — 82962 GLUCOSE BLOOD TEST: CPT

## 2020-07-24 PROCEDURE — 36000051 ZZH SURGERY LEVEL 2 1ST 30 MIN - UMMC: Performed by: SURGERY

## 2020-07-24 PROCEDURE — 25000128 H RX IP 250 OP 636: Performed by: NURSE ANESTHETIST, CERTIFIED REGISTERED

## 2020-07-24 PROCEDURE — 25800030 ZZH RX IP 258 OP 636: Performed by: NURSE PRACTITIONER

## 2020-07-24 PROCEDURE — 71000015 ZZH RECOVERY PHASE 1 LEVEL 2 EA ADDTL HR: Performed by: SURGERY

## 2020-07-24 PROCEDURE — C1781 MESH (IMPLANTABLE): HCPCS | Performed by: SURGERY

## 2020-07-24 PROCEDURE — 37000009 ZZH ANESTHESIA TECHNICAL FEE, EACH ADDTL 15 MIN: Performed by: SURGERY

## 2020-07-24 PROCEDURE — 40000171 ZZH STATISTIC PRE-PROCEDURE ASSESSMENT III: Performed by: SURGERY

## 2020-07-24 PROCEDURE — 86850 RBC ANTIBODY SCREEN: CPT | Performed by: ANESTHESIOLOGY

## 2020-07-24 PROCEDURE — 25000128 H RX IP 250 OP 636: Performed by: STUDENT IN AN ORGANIZED HEALTH CARE EDUCATION/TRAINING PROGRAM

## 2020-07-24 PROCEDURE — 37000008 ZZH ANESTHESIA TECHNICAL FEE, 1ST 30 MIN: Performed by: SURGERY

## 2020-07-24 PROCEDURE — C9290 INJ, BUPIVACAINE LIPOSOME: HCPCS | Performed by: STUDENT IN AN ORGANIZED HEALTH CARE EDUCATION/TRAINING PROGRAM

## 2020-07-24 PROCEDURE — 36000053 ZZH SURGERY LEVEL 2 EA 15 ADDTL MIN - UMMC: Performed by: SURGERY

## 2020-07-24 PROCEDURE — 25800030 ZZH RX IP 258 OP 636: Performed by: NURSE ANESTHETIST, CERTIFIED REGISTERED

## 2020-07-24 PROCEDURE — 71000027 ZZH RECOVERY PHASE 2 EACH 15 MINS: Performed by: SURGERY

## 2020-07-24 PROCEDURE — 36415 COLL VENOUS BLD VENIPUNCTURE: CPT | Performed by: ANESTHESIOLOGY

## 2020-07-24 PROCEDURE — 86900 BLOOD TYPING SEROLOGIC ABO: CPT | Performed by: ANESTHESIOLOGY

## 2020-07-24 PROCEDURE — 71000014 ZZH RECOVERY PHASE 1 LEVEL 2 FIRST HR: Performed by: SURGERY

## 2020-07-24 PROCEDURE — 27210794 ZZH OR GENERAL SUPPLY STERILE: Performed by: SURGERY

## 2020-07-24 PROCEDURE — 25000132 ZZH RX MED GY IP 250 OP 250 PS 637: Performed by: ANESTHESIOLOGY

## 2020-07-24 PROCEDURE — 25000565 ZZH ISOFLURANE, EA 15 MIN: Performed by: SURGERY

## 2020-07-24 PROCEDURE — 25000128 H RX IP 250 OP 636: Performed by: NURSE PRACTITIONER

## 2020-07-24 PROCEDURE — 25000128 H RX IP 250 OP 636: Performed by: ANESTHESIOLOGY

## 2020-07-24 PROCEDURE — 86901 BLOOD TYPING SEROLOGIC RH(D): CPT | Performed by: ANESTHESIOLOGY

## 2020-07-24 DEVICE — MESH PHASIX RECONSTRUCTIVE RESORB 6X8" 1201520: Type: IMPLANTABLE DEVICE | Site: ABDOMEN | Status: FUNCTIONAL

## 2020-07-24 RX ORDER — HYDROMORPHONE HYDROCHLORIDE 1 MG/ML
.3-.5 INJECTION, SOLUTION INTRAMUSCULAR; INTRAVENOUS; SUBCUTANEOUS EVERY 10 MIN PRN
Status: DISCONTINUED | OUTPATIENT
Start: 2020-07-24 | End: 2020-07-29 | Stop reason: HOSPADM

## 2020-07-24 RX ORDER — MEPERIDINE HYDROCHLORIDE 25 MG/ML
12.5 INJECTION INTRAMUSCULAR; INTRAVENOUS; SUBCUTANEOUS
Status: DISCONTINUED | OUTPATIENT
Start: 2020-07-24 | End: 2020-07-29 | Stop reason: HOSPADM

## 2020-07-24 RX ORDER — FLUMAZENIL 0.1 MG/ML
0.2 INJECTION, SOLUTION INTRAVENOUS
Status: DISCONTINUED | OUTPATIENT
Start: 2020-07-24 | End: 2020-07-24 | Stop reason: HOSPADM

## 2020-07-24 RX ORDER — NALOXONE HYDROCHLORIDE 0.4 MG/ML
.1-.4 INJECTION, SOLUTION INTRAMUSCULAR; INTRAVENOUS; SUBCUTANEOUS
Status: DISCONTINUED | OUTPATIENT
Start: 2020-07-24 | End: 2020-07-24 | Stop reason: HOSPADM

## 2020-07-24 RX ORDER — ONDANSETRON 2 MG/ML
INJECTION INTRAMUSCULAR; INTRAVENOUS PRN
Status: DISCONTINUED | OUTPATIENT
Start: 2020-07-24 | End: 2020-07-24

## 2020-07-24 RX ORDER — ACETAMINOPHEN 325 MG/1
975 TABLET ORAL ONCE
Status: COMPLETED | OUTPATIENT
Start: 2020-07-24 | End: 2020-07-24

## 2020-07-24 RX ORDER — PIPERACILLIN SODIUM, TAZOBACTAM SODIUM 3; .375 G/15ML; G/15ML
3.38 INJECTION, POWDER, LYOPHILIZED, FOR SOLUTION INTRAVENOUS ONCE
Status: COMPLETED | OUTPATIENT
Start: 2020-07-24 | End: 2020-07-24

## 2020-07-24 RX ORDER — SODIUM CHLORIDE, SODIUM LACTATE, POTASSIUM CHLORIDE, CALCIUM CHLORIDE 600; 310; 30; 20 MG/100ML; MG/100ML; MG/100ML; MG/100ML
INJECTION, SOLUTION INTRAVENOUS CONTINUOUS
Status: DISCONTINUED | OUTPATIENT
Start: 2020-07-24 | End: 2020-07-29 | Stop reason: HOSPADM

## 2020-07-24 RX ORDER — OXYCODONE HYDROCHLORIDE 5 MG/1
5 TABLET ORAL EVERY 4 HOURS PRN
Qty: 30 TABLET | Refills: 0 | Status: SHIPPED | OUTPATIENT
Start: 2020-07-24 | End: 2020-07-31

## 2020-07-24 RX ORDER — DEXAMETHASONE SODIUM PHOSPHATE 4 MG/ML
INJECTION, SOLUTION INTRA-ARTICULAR; INTRALESIONAL; INTRAMUSCULAR; INTRAVENOUS; SOFT TISSUE PRN
Status: DISCONTINUED | OUTPATIENT
Start: 2020-07-24 | End: 2020-07-24

## 2020-07-24 RX ORDER — LIDOCAINE 40 MG/G
CREAM TOPICAL
Status: DISCONTINUED | OUTPATIENT
Start: 2020-07-24 | End: 2020-07-24 | Stop reason: HOSPADM

## 2020-07-24 RX ORDER — PROPOFOL 10 MG/ML
INJECTION, EMULSION INTRAVENOUS PRN
Status: DISCONTINUED | OUTPATIENT
Start: 2020-07-24 | End: 2020-07-24

## 2020-07-24 RX ORDER — OXYCODONE HYDROCHLORIDE 5 MG/1
5 TABLET ORAL EVERY 4 HOURS PRN
Status: DISCONTINUED | OUTPATIENT
Start: 2020-07-24 | End: 2020-07-29 | Stop reason: HOSPADM

## 2020-07-24 RX ORDER — FENTANYL CITRATE 50 UG/ML
INJECTION, SOLUTION INTRAMUSCULAR; INTRAVENOUS PRN
Status: DISCONTINUED | OUTPATIENT
Start: 2020-07-24 | End: 2020-07-24

## 2020-07-24 RX ORDER — FENTANYL CITRATE 50 UG/ML
25-50 INJECTION, SOLUTION INTRAMUSCULAR; INTRAVENOUS
Status: DISCONTINUED | OUTPATIENT
Start: 2020-07-24 | End: 2020-07-24 | Stop reason: HOSPADM

## 2020-07-24 RX ORDER — LIDOCAINE HYDROCHLORIDE 20 MG/ML
INJECTION, SOLUTION INFILTRATION; PERINEURAL PRN
Status: DISCONTINUED | OUTPATIENT
Start: 2020-07-24 | End: 2020-07-24

## 2020-07-24 RX ORDER — SODIUM CHLORIDE, SODIUM LACTATE, POTASSIUM CHLORIDE, CALCIUM CHLORIDE 600; 310; 30; 20 MG/100ML; MG/100ML; MG/100ML; MG/100ML
INJECTION, SOLUTION INTRAVENOUS CONTINUOUS PRN
Status: DISCONTINUED | OUTPATIENT
Start: 2020-07-24 | End: 2020-07-24

## 2020-07-24 RX ORDER — BUPIVACAINE HYDROCHLORIDE 2.5 MG/ML
INJECTION, SOLUTION EPIDURAL; INFILTRATION; INTRACAUDAL PRN
Status: DISCONTINUED | OUTPATIENT
Start: 2020-07-24 | End: 2020-07-24

## 2020-07-24 RX ORDER — NALOXONE HYDROCHLORIDE 0.4 MG/ML
.1-.4 INJECTION, SOLUTION INTRAMUSCULAR; INTRAVENOUS; SUBCUTANEOUS
Status: DISCONTINUED | OUTPATIENT
Start: 2020-07-24 | End: 2020-07-25 | Stop reason: HOSPADM

## 2020-07-24 RX ORDER — EPHEDRINE SULFATE 50 MG/ML
INJECTION, SOLUTION INTRAMUSCULAR; INTRAVENOUS; SUBCUTANEOUS PRN
Status: DISCONTINUED | OUTPATIENT
Start: 2020-07-24 | End: 2020-07-24

## 2020-07-24 RX ORDER — GABAPENTIN 300 MG/1
300 CAPSULE ORAL ONCE
Status: COMPLETED | OUTPATIENT
Start: 2020-07-24 | End: 2020-07-24

## 2020-07-24 RX ORDER — ONDANSETRON 4 MG/1
4 TABLET, ORALLY DISINTEGRATING ORAL EVERY 30 MIN PRN
Status: DISCONTINUED | OUTPATIENT
Start: 2020-07-24 | End: 2020-07-29 | Stop reason: HOSPADM

## 2020-07-24 RX ORDER — DIAZEPAM 5 MG
5 TABLET ORAL EVERY 6 HOURS PRN
Qty: 20 TABLET | Refills: 1 | Status: SHIPPED | OUTPATIENT
Start: 2020-07-24

## 2020-07-24 RX ORDER — SODIUM CHLORIDE, SODIUM LACTATE, POTASSIUM CHLORIDE, CALCIUM CHLORIDE 600; 310; 30; 20 MG/100ML; MG/100ML; MG/100ML; MG/100ML
INJECTION, SOLUTION INTRAVENOUS CONTINUOUS
Status: DISCONTINUED | OUTPATIENT
Start: 2020-07-24 | End: 2020-07-24 | Stop reason: HOSPADM

## 2020-07-24 RX ORDER — ONDANSETRON 2 MG/ML
4 INJECTION INTRAMUSCULAR; INTRAVENOUS EVERY 30 MIN PRN
Status: DISCONTINUED | OUTPATIENT
Start: 2020-07-24 | End: 2020-07-29 | Stop reason: HOSPADM

## 2020-07-24 RX ADMIN — ONDANSETRON 4 MG: 2 INJECTION INTRAMUSCULAR; INTRAVENOUS at 10:44

## 2020-07-24 RX ADMIN — ROCURONIUM BROMIDE 50 MG: 10 INJECTION INTRAVENOUS at 07:49

## 2020-07-24 RX ADMIN — SODIUM CHLORIDE, POTASSIUM CHLORIDE, SODIUM LACTATE AND CALCIUM CHLORIDE: 600; 310; 30; 20 INJECTION, SOLUTION INTRAVENOUS at 07:42

## 2020-07-24 RX ADMIN — PHENYLEPHRINE HYDROCHLORIDE 100 MCG: 10 INJECTION INTRAVENOUS at 08:19

## 2020-07-24 RX ADMIN — PHENYLEPHRINE HYDROCHLORIDE 0.2 MCG/KG/MIN: 10 INJECTION INTRAVENOUS at 09:29

## 2020-07-24 RX ADMIN — ACETAMINOPHEN 975 MG: 325 TABLET, FILM COATED ORAL at 07:37

## 2020-07-24 RX ADMIN — PHENYLEPHRINE HYDROCHLORIDE 100 MCG: 10 INJECTION INTRAVENOUS at 09:26

## 2020-07-24 RX ADMIN — ROCURONIUM BROMIDE 20 MG: 10 INJECTION INTRAVENOUS at 08:46

## 2020-07-24 RX ADMIN — PHENYLEPHRINE HYDROCHLORIDE 100 MCG: 10 INJECTION INTRAVENOUS at 09:20

## 2020-07-24 RX ADMIN — MIDAZOLAM 2 MG: 1 INJECTION INTRAMUSCULAR; INTRAVENOUS at 07:40

## 2020-07-24 RX ADMIN — BUPIVACAINE 20 ML: 13.3 INJECTION, SUSPENSION, LIPOSOMAL INFILTRATION at 08:05

## 2020-07-24 RX ADMIN — Medication 10 MG: at 08:14

## 2020-07-24 RX ADMIN — FENTANYL CITRATE 50 MCG: 50 INJECTION, SOLUTION INTRAMUSCULAR; INTRAVENOUS at 12:00

## 2020-07-24 RX ADMIN — GABAPENTIN 300 MG: 300 CAPSULE ORAL at 07:37

## 2020-07-24 RX ADMIN — FENTANYL CITRATE 150 MCG: 50 INJECTION, SOLUTION INTRAMUSCULAR; INTRAVENOUS at 07:49

## 2020-07-24 RX ADMIN — PHENYLEPHRINE HYDROCHLORIDE 100 MCG: 10 INJECTION INTRAVENOUS at 08:34

## 2020-07-24 RX ADMIN — Medication 10 MG: at 08:07

## 2020-07-24 RX ADMIN — ONDANSETRON 4 MG: 2 INJECTION INTRAMUSCULAR; INTRAVENOUS at 14:59

## 2020-07-24 RX ADMIN — PIPERACILLIN AND TAZOBACTAM 3.38 G: 3; .375 INJECTION, POWDER, FOR SOLUTION INTRAVENOUS at 08:10

## 2020-07-24 RX ADMIN — OXYCODONE HYDROCHLORIDE 5 MG: 5 TABLET ORAL at 15:02

## 2020-07-24 RX ADMIN — PROPOFOL 50 MG: 10 INJECTION, EMULSION INTRAVENOUS at 10:54

## 2020-07-24 RX ADMIN — PHENYLEPHRINE HYDROCHLORIDE 100 MCG: 10 INJECTION INTRAVENOUS at 09:10

## 2020-07-24 RX ADMIN — DEXAMETHASONE SODIUM PHOSPHATE 6 MG: 4 INJECTION, SOLUTION INTRA-ARTICULAR; INTRALESIONAL; INTRAMUSCULAR; INTRAVENOUS; SOFT TISSUE at 08:00

## 2020-07-24 RX ADMIN — PROPOFOL 200 MG: 10 INJECTION, EMULSION INTRAVENOUS at 07:49

## 2020-07-24 RX ADMIN — VANCOMYCIN HYDROCHLORIDE 2000 MG: 1 INJECTION, POWDER, LYOPHILIZED, FOR SOLUTION INTRAVENOUS at 07:55

## 2020-07-24 RX ADMIN — LIDOCAINE HYDROCHLORIDE 100 MG: 20 INJECTION, SOLUTION INFILTRATION; PERINEURAL at 07:49

## 2020-07-24 RX ADMIN — PHENYLEPHRINE HYDROCHLORIDE 100 MCG: 10 INJECTION INTRAVENOUS at 09:05

## 2020-07-24 RX ADMIN — PHENYLEPHRINE HYDROCHLORIDE 100 MCG: 10 INJECTION INTRAVENOUS at 08:45

## 2020-07-24 RX ADMIN — HYDROMORPHONE HYDROCHLORIDE 0.5 MG: 1 INJECTION, SOLUTION INTRAMUSCULAR; INTRAVENOUS; SUBCUTANEOUS at 15:01

## 2020-07-24 RX ADMIN — ROCURONIUM BROMIDE 10 MG: 10 INJECTION INTRAVENOUS at 10:08

## 2020-07-24 RX ADMIN — BUPIVACAINE HYDROCHLORIDE 20 ML: 2.5 INJECTION, SOLUTION EPIDURAL; INFILTRATION; INTRACAUDAL; PERINEURAL at 08:05

## 2020-07-24 RX ADMIN — Medication 5 MG: at 08:19

## 2020-07-24 RX ADMIN — ROCURONIUM BROMIDE 20 MG: 10 INJECTION INTRAVENOUS at 09:10

## 2020-07-24 RX ADMIN — PHENYLEPHRINE HYDROCHLORIDE 100 MCG: 10 INJECTION INTRAVENOUS at 08:28

## 2020-07-24 RX ADMIN — SUGAMMADEX 200 MG: 100 INJECTION, SOLUTION INTRAVENOUS at 11:26

## 2020-07-24 ASSESSMENT — MIFFLIN-ST. JEOR: SCORE: 2327.19

## 2020-07-24 NOTE — ANESTHESIA PREPROCEDURE EVALUATION
"Anesthesia Pre-Procedure Evaluation    Patient: Harshad Beatty   MRN:     [id number] Gender:   male   Age:    40 year old :      1980        Preoperative Diagnosis: Hernia, incisional [K43.2]  Kidney transplanted [Z94.0]   Procedure(s):  Open Incisional hernia repair w/ Mesh     LABS:  CBC:   Lab Results   Component Value Date    WBC 2.8 (L) 2019    WBC 2.1 (L) 2019    HGB 12.4 (L) 2019    HGB 11.9 (L) 2019    HCT 39.9 (L) 2019    HCT 36.1 (L) 2019     2019     2019     BMP:   Lab Results   Component Value Date     2019     2018    POTASSIUM 4.4 2019    POTASSIUM 3.7 2019    CHLORIDE 107 2019    CHLORIDE 105 2018    CO2 23 2019    CO2 24 2018    BUN 23 2019    BUN 15 2018    CR 1.69 (H) 2019    CR 1.77 (H) 2019    GLC 92 2019     (H) 2019     COAGS:   Lab Results   Component Value Date    PTT 30 2019    INR 1.06 2019     POC:   Lab Results   Component Value Date     (H) 2020     OTHER:   Lab Results   Component Value Date    PH 7.49 (H) 2018    LACT 0.8 2018    A1C 5.5 2018    JUAN 8.6 2019    PHOS 2.9 2018    MAG 1.9 2018    ALBUMIN 3.6 10/31/2018    PROTTOTAL 8.1 10/31/2018    ALT 31 10/31/2018    AST 23 10/31/2018    ALKPHOS 93 10/31/2018    BILITOTAL 0.3 10/31/2018        Preop Vitals    BP Readings from Last 3 Encounters:   20 (!) 141/102   20 (!) 142/86   06/15/20 134/89    Pulse Readings from Last 3 Encounters:   20 78   20 77   19 89      Resp Readings from Last 3 Encounters:   20 18   19 20   19 17    SpO2 Readings from Last 3 Encounters:   20 98%   20 98%   19 96%      Temp Readings from Last 1 Encounters:   20 36.8  C (98.2  F) (Oral)    Ht Readings from Last 1 Encounters:   20 1.791 m (5' 10.5\")    " "  Wt Readings from Last 1 Encounters:   07/24/20 140.3 kg (309 lb 4.9 oz)    Estimated body mass index is 43.75 kg/m  as calculated from the following:    Height as of this encounter: 1.791 m (5' 10.5\").    Weight as of this encounter: 140.3 kg (309 lb 4.9 oz).     LDA:  Hemodialysis Arteriovenous (AV) Access upper arm, right (Active)   Type fistula 08/07/18 1300   Assessment bruit audible, strong;thrill palpable, strong 08/07/18 1300   Number of days:        Hemodialysis Vascular Access Arteriovenous fistula Right Arm (Active)   Site Assessment WDL;Thrill present 10/16/18 0807   Dressing Status Not applicable 08/09/18 1600   Number of days: 716        Past Medical History:   Diagnosis Date     Anemia in chronic kidney disease      ESRD (end stage renal disease) (H)      Fever 10/29/2018     History of cardiomyopathy      HTN (hypertension)      Neutropenia (H) 10/8/2018     Secondary hyperparathyroidism (H)      Sickle cell trait (H)       Past Surgical History:   Procedure Laterality Date     CREATE FISTULA ARTERIOVENOUS UPPER EXTREMITY       kidney transplant right  08/07/2018     LIGATE FISTULA ARTERIOVENOUS UPPER EXTREMITY Right 11/12/2019    Procedure: LIGATION, ARTERIOVENOUS FISTULA RIGHT UPPER ARM EXTREMETY, Excision of Right Arm Skin;  Surgeon: Bety Mitchell MD;  Location: [facility] OR     ORIF right 5th metatarpal fracture       PERCUTANEOUS BIOPSY KIDNEY Right 1/14/2019    Procedure: Right Kidney Biopsy;  Surgeon: Andrey Connolly MD;  Location: [facility] OR      No Known Allergies     Anesthesia Evaluation     . Pt has had prior anesthetic.     No history of anesthetic complications          ROS/MED HX    ENT/Pulmonary:  - neg pulmonary ROS     Neurologic:  - neg neurologic ROS     Cardiovascular:     (+) hypertension----. : . . . :. .       METS/Exercise Tolerance:  >4 METS   Hematologic: Comments: Sickle cell trait        Musculoskeletal:  - neg musculoskeletal ROS       GI/Hepatic:  - neg GI/hepatic ROS  "     (-) GERD   Renal/Genitourinary:     (+) chronic renal disease (s/p renal transplant 6/2018), type: CRI, Pt has history of transplant,       Endo:     (+) Obesity, .      Psychiatric: Comment: Marijuana use        Infectious Disease:  - neg infectious disease ROS       Malignancy:      - no malignancy   Other:    - neg other ROS                     PHYSICAL EXAM:   Mental Status/Neuro: A/A/O   Airway: Facies: Feasible  Mallampati: I  Mouth/Opening: Full  TM distance: > 6 cm  Neck ROM: Full   Respiratory:   Resp. Rate: Normal     Resp. Effort: Normal      CV:    Comments:      Dental: Normal Dentition                Assessment:   ASA SCORE: 3    H&P: History and physical reviewed and following examination; no interval change.   Smoking Status:  Non-Smoker/Unknown   NPO Status: NPO Appropriate     Plan:   Anes. Type:  General; Peripheral Nerve Block     Block Details: Exparel; TAP; Single Shot   Pre-Medication: Acetaminophen   Induction:  IV (Standard)   Airway: ETT; Oral   Access/Monitoring: PIV   Maintenance: Balanced     Postop Plan:   Postop Pain: Opioids; Regional  Postop Sedation/Airway: Not planned  Disposition: Outpatient     PONV Management:   Adult Risk Factors:, Non-Smoker, Postop Opioids   Prevention: Ondansetron, Dexamethasone     CONSENT: Direct conversation   Plan and risks discussed with: Patient   Blood Products: Consent Deferred (Minimal Blood Loss)                   Jb Loya MD

## 2020-07-24 NOTE — OR NURSING
Dr. Turcios  Was called about patients BP of 175/111.  Wants patient to get pain meds at this time.

## 2020-07-24 NOTE — OR NURSING
Paged Dr. Mitchell to advise procedure order and pt consent both state left for procedure but pt states hernia is on right, advised block team of delay d/t consent issue

## 2020-07-24 NOTE — PROGRESS NOTES
RECOVERY NOTE  Assigned as pt nurse for phase II  Report from Latoya RN @ pt bedside in PACU @ 1435  Pt transferred by RN to phase II assist pt with dressing, transferring from cart to chair, obtaining VS.   All belongings returned to pt at this time  No report of any items missing   Pt ARRIVE to phase II @ 1445  Oral pain medication given for pain. Zofran provided to prevent Nausea and Dilaudid given to treat immediate pain prior to increasing activity   Pt sitting up in chair alert and oriented with call light in reach and pt verbalizes understanding of use    Pt tolerating clear liquids, crackers and po meds without difficulty   Abdominal binger in place   Pt denies urge to void at this time  Discharge pharmacy called @ 1525. Scripts are filled and ready for  now.    Written instructions to be sent with pt upon discharge. Instructions reviewed over the phone with Kady and verbally with pt. Education provided. Questions encouraged and answered. Over the phone consent obtained. See form for further details     PIV removed    Pt transport placed and pending to take pt down to discharge pharmacy prior to [facility] via wheelchair

## 2020-07-24 NOTE — ANESTHESIA POSTPROCEDURE EVALUATION
Anesthesia POST Procedure Evaluation    Patient: Harshad Beatty   MRN:     [id number] Gender:   male   Age:    40 year old :      1980        Preoperative Diagnosis: Hernia, incisional [K43.2]  Kidney transplanted [Z94.0]   Procedure(s):  Open Incisional hernia repair with Mesh   Postop Comments: No value filed.     Anesthesia Type: General, Peripheral Nerve Block       Disposition: Outpatient   Postop Pain Control: Uneventful            Sign Out: Well controlled pain   PONV: No   Neuro/Psych: Uneventful            Sign Out: Acceptable/Baseline neuro status   Airway/Respiratory: Uneventful            Sign Out: Acceptable/Baseline resp. status   CV/Hemodynamics: Uneventful            Sign Out: Acceptable CV status   Other NRE: NONE   DID A NON-ROUTINE EVENT OCCUR? No         Last Anesthesia Record Vitals:  CRNA VITALS  2020 1117 - 2020 1217      2020             SpO2:  96 %          Last PACU Vitals:  Vitals Value Taken Time   /98 2020 12:50 PM   Temp 36.4  C (97.5  F) 2020 12:30 PM   Pulse 68 2020 12:50 PM   Resp 18 2020 12:30 PM   SpO2 98 % 2020 12:58 PM   Temp src     NIBP     Pulse     SpO2     Resp     Temp     Ht Rate     Temp 2     Vitals shown include unvalidated device data.      Electronically Signed By: Jb Loya MD, 2020, 12:59 PM

## 2020-07-24 NOTE — DISCHARGE INSTRUCTIONS
You have undergone repair of an incisional hernia. Please observe the following discharge instructions:    -Your wound is closed with dissolving stitches and skin glue. The glue will flake off with time.  -Please do not soak or submerge your wound in lakes or pools for two weeks to allow wound healing.  -You may shower tomorrow.  -Please continue to wear the abdominal binder whenever up and active.  -Please avoid ANY heavy activity (lifting greater than TEN POUNDS, bending at the waist, aggressive rolling or twisting, sit ups or abdominal exercises, abdominal straining or pushing, coughing) for AT LEAST six weeks. Do not resume any lifting until cleared by your surgeon.  -Please call the clinic to schedule a follow up appointment with Dr. Mitchell in four weeks.  -Continue using your immunosuppression medications per schedule.  -You are being discharged with two new pain medications: Oxycodone (take one 5mg tablet by mouth every four hours as needed for pain) and Valium (take one 5mg tablet by mouth every six hours as needed for pain). Please do not mix these medications. You may continue to take tylenol around the clock (650mg every four hours) by mouth. I would take Tylenol continuously for at least the first 72 hours and then you can wean as able.   -Please call the [facility]  and ask for the surgery resident on call if you are having severe pain, fevers, wound opening or drainage, nausea, or other complaints. Please also call if there are issues with your pain medications or if you have any other questions.     [facility], [place]  Same-Day Surgery   Adult Discharge Orders & Instructions   For 24 hours after surgery  1. Get plenty of rest.  A responsible adult must stay with you for at least 24 hours after you leave the hospital.   2. Do not drive or use heavy equipment.  If you have weakness or tingling, don't drive or use heavy equipment until this feeling goes  away.  3. Do not drink alcohol.  4. Avoid strenuous or risky activities.  Ask for help when climbing stairs.   5. You may feel lightheaded.  IF so, sit for a few minutes before standing.  Have someone help you get up.   6. If you have nausea (feel sick to your stomach): Drink only clear liquids such as apple juice, ginger ale, broth or 7-Up.  Rest may also help.  Be sure to drink enough fluids.  Move to a regular diet as you feel able.  7. You may have a slight fever. Call the doctor if your fever is over 100 F (37.7 C) (taken under the tongue) or lasts longer than 24 hours.  8. You may have a dry mouth, a sore throat, muscle aches or trouble sleeping.  These should go away after 24 hours.  9. Do not make important or legal decisions.   Call your doctor for any of the followin.  Signs of infection (fever, growing tenderness at the surgery site, a large amount of drainage or bleeding, severe pain, foul-smelling drainage, redness, swelling).  2. It has been over 8 to 10 hours since surgery and you are still not able to urinate (pass water).  3.  Headache for over 24 hours.  4.  Numbness, tingling or weakness the day after surgery (if you had spinal anesthesia).  To contact a doctor, call Dr Mitchell's office at [phone] or:      [phone] and ask for the resident on call for Dr. Mitchell or General Surgery (answered 24 hours a day)      Emergency Department:    [facility]: [phone]       (TTY for hearing impaired: [phone])

## 2020-07-24 NOTE — ANESTHESIA CARE TRANSFER NOTE
Patient: Harshad Beatty    Procedure(s):  Open Incisional hernia repair with Mesh    Diagnosis: Hernia, incisional [K43.2]  Kidney transplanted [Z94.0]  Diagnosis Additional Information: No value filed.    Anesthesia Type:   General, Peripheral Nerve Block     Note:  Airway :Nasal Cannula  Patient transferred to:PACU  Comments: Patient vital signs: stable    Spontaneous breathing. Arouses to voice. Monitors applied.    Report to RN.  Handoff Report: Identifed the Patient, Identified the Reponsible Provider, Reviewed the pertinent medical history, Discussed the surgical course, Reviewed Intra-OP anesthesia mangement and issues during anesthesia, Set expectations for post-procedure period and Allowed opportunity for questions and acknowledgement of understanding      Vitals: (Last set prior to Anesthesia Care Transfer)    CRNA VITALS  7/24/2020 1117 - 7/24/2020 1155      7/24/2020             SpO2:  96 %                Electronically Signed By: BREA Hope CRNA  July 24, 2020  11:55 AM

## 2020-07-24 NOTE — OP NOTE
Transplant Surgery  Operative Note    Preop Dx:  Right lower quadrant incisional hernia  Postop Dx: same  Procedure: incisional hernia repair with mesh.   Surgeon: Bety Mitchell M.D.  ASSISTANT:  Kareem Lugo, fellow. There was no qualified general surgery resident available to assist during this procedure.   Anesthesia: General and TAP block  EBL: 25 ml  Fluids: crystalloid  UO: 600mL  Drains: none  Specimen: none.  Complications: None  Findings:  7X5cm fascial defect in upper aspect of RLQ kidney transplant incision. Peritoneum entered while trying to dissect sac away from muscle edges. Hernia sac not completely excised but maintained in flap with medial muscle edge. [id number] Phasix ST underlay mesh placed INTRAPERITONEALLY and fixed with transfascial sutures. Fascia closed primarily. [id number] Phasix mesh trimmed to fit and then placed in onlay fashion onto fascia. Wound irrigated and closed in layers.   Complications: None.    Indication: The patient has large right flank incisional hernia after kidney transplant.  After discussing the risks and benefits of surgery and potential complications, the patient provided informed consent.     DETAILS OF PROCEDURE:  The patient was brought to the operating room, placed in a supine position.  Perioperative prophylactic IV antibiotics were given.  Anesthesia was adminisitered. The abdomen was prepped and draped in the usual sterile fashion. The prior skin incision was marked and the skin was draped with ioban. Time out was performed by all members of the surgical team (circulator, anesthesia, technologist, surgeon) confirming the correct patient, procedure, and laterality. All were in agreement.    I appreciated the hernia at the upper aspect of the wound so I elected to open the upper half of the prior incision. Using blunt and cautery dissection I was able to dissect down to the level of the hernia sac. I liberated the sac from its adhesions to the anterior  fascial edges, leaving it somewhat attached to the posterior fascia. I began sweeping it down from the posterior fascia to create a preperitoneal plane, but as I did that, I entered the peritoneum. I did not appreciate any intra-abdominal adhesions. I elected to place the underlay mesh intraperitoneally to achieve adequate purchase as mobilizing the preperitoneal space was difficult and would not provide sufficient mesh overlap. I divided the attachments between the sac and the inferior edge of the fascia leaving it attached to the superomedial edge. I mobilized the myocutaneous flap from the anterior fascia for a few centimenters in all directions to allow sufficient space for overlapping the mesh underlay.    I measured the hernia defect at 7X5cm. I selected a [id number] phasix that I cut into two [id number] pieces. I placed one piece soaked in saline on the back table. I soaked the second piece and marked it for orientation, then brought it to the field. I fixed the mesh intraperitoneally with several transfascial #1 prolene sutures. I tied the anteromedial side sequentially first, then verified good approximation without gaps and without any trapped bowel. I then tied the posterolateral side. I was satisfied with the flat lay of the mesh and that there were no gaps between the mesh and fascia.    I then excised and discarded part of the hernia sac to remove redundancy. I reapproximated the sac over the mesh with 3-0 vicryl as an extra shielding layer. I then primarily closed the fascia on top of the mesh using running #1 prolene. I copiously irrigate the wound. I then passed the second piece of mesh onto the field and trimmed it to fit in the base of the wound. I fixed the mesh onto the anterior fascia in an onlay fashion. I again irrigated the wound and achieved adequate hemostasis. I then closed the wound in layers- interrupted deep dermal 3-0 vicryl, running 4-0 monocryl, and exofin skin glue.    All needle,  sponge, and instrument counts were accurate.  The patient tolerated the procedure well without apparent complications and was trasfered to the PACU in good condition.  Faculty was present for critical portions of the procedure.    I was present during the key portions of the procedure, and I was immediately available for the entire procedure.

## 2020-08-11 ENCOUNTER — TRANSFERRED RECORDS (OUTPATIENT)
Dept: HEALTH INFORMATION MANAGEMENT | Facility: CLINIC | Age: 40
End: 2020-08-11

## 2020-08-11 LAB
CHOLEST SERPL-MCNC: 181 MG/DL (ref 100–199)
HDLC SERPL-MCNC: 37 MG/DL
LDLC SERPL CALC-MCNC: 127 MG/DL
NONHDLC SERPL-MCNC: 144 MG/DL
TRIGL SERPL-MCNC: 83 MG/DL

## 2020-08-28 NOTE — PROGRESS NOTES
ACUTE TRANSPLANT NEPHROLOGY VISIT    Assessment & Plan      # LDKT: baseline Cr ~ 2.0-2.4; Stable   - Proteinuria: Normal- will need monthly UPC for h/o FSGS.    - Latest DSA: No Date of DSA last checked: 9/2018   - BK: No   - Kidney Tx Biopsy: Yes- 8/17/2018- no rejection    # Immunosuppression: Tacrolimus immediate release (goal  8-10) and Mycophenolate mofetil (goal  1-3.5)   - Changes: No major changes. On reduced to  mg BID due to leukopenia.      # Prophylaxis:   - PJP: TMP/Sulfa (Bactrim) we discussed stopping due to leukopenia, will arrange pentamidine    - CMV: Valcyte will update CMV viral load     # Hypertension: Controlled; Goal BP: < 130/80   - Changes: No- on florinef daily    # Anemia in chronic renal disease: Hgb: Stable   - Iron studies: Replete- not on iron supplements.     # Mineral Bone Disorder:    - Secondary renal hyperparathyroidism; PTH level is:  mildly elevated- 168. Will be rechecked later. On sensipar  30 mg daily  - Vitamin D; level is:  low- not on vit D.  - Calcium; level is:  normal now.   - Phosphorus; level is: normal-     # Electrolytes:   - Potassium; level: controlled on florinef 4 days ago.  - Magnesium; level: normal- on mag supplements  - Bicarbonate; level: normal- on baking soda- quarter spoon.     # leukopenia:  Completed neupogen course. 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     Return visit: No Follow-up on file.    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    Brijesh Gaxiola MD    Chief Complaint   Mr. 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    Harshad had a postoperative course that was complicated by supratherapeutic tacrolimus levels that resulted in a rise in his serum creatinine.  For this, he underwent a kidney transplant biopsy on August 17 that showed no evidence of cellular or antibody meter rejection. His creatinine stabilized around 2 mg/dL.  He is compliant with medications and labs. He is back to work. He tries to hydrate adequately. No fevers or chills. He notes rapid weight gain and we discussed the need to modify diet and activity level. He was open to receiving weight management material.     Recent Hospitalizations:  [x] No [] Yes    New Medical Issues: [x] No [] Yes    Decreased energy: [x] No [] Yes    Chest pain or SOB with exertion:  [x] No [] Yes    Appetite change or weight change: [x] No [] Yes    Nausea, vomiting or diarrhea:  [x] No [] Yes    Fever, sweats or chills: [x] No [] Yes     Leg swelling: [x] No [] Yes      Other medical issues:  No    Home BP: 130s/80s.    Review of Systems   A comprehensive review of systems was obtained and negative, except as noted in the HPI or PMH.    Problem List   Patient Active Problem List   Diagnosis     Sickle cell trait (H)     Anemia in chronic kidney disease     Secondary hyperparathyroidism (H)     Kidney replaced by transplant     Kidney transplant recipient     Immunosuppression (H)     Need for CMV  immunotherapy     Need for pneumocystis prophylaxis     Benign essential hypertension     Anemia due to other cause, not classified     Hypercalcemia     Hypomagnesemia     Dehydration     Neutropenia (H)     Fever     Social History   Social History   Substance Use Topics     Smoking status: Never Smoker     Smokeless tobacco: Never Used     Alcohol use No     Allergies   No Known Allergies    Medications   Current Outpatient Prescriptions   Medication Sig     atorvastatin (LIPITOR) 10 MG tablet Take 1 tablet (10 mg) by mouth daily     carvedilol (COREG) 25 MG tablet Take 2 tablets (50 mg) by mouth 2 times daily (with meals)     cinacalcet (SENSIPAR) 30 MG tablet Take 30 mg by mouth daily     cinacalcet (SENSIPAR) 30 MG tablet Take 1 tablet (30 mg) by mouth daily     fludrocortisone (FLORINEF) 0.1 MG tablet Take 1 tablet (0.1 mg) by mouth Every Mon, Wed, Fri Morning     magnesium oxide (MAG-OX) 400 MG tablet Take 1 tablet (400 mg) by mouth daily (with lunch)     mycophenolate (GENERIC EQUIVALENT) 250 MG capsule Take 500 mg by mouth 2 times daily     sodium bicarbonate 650 MG tablet Take 2 tablets (1,300 mg) by mouth 2 times daily     sulfamethoxazole-trimethoprim (BACTRIM/SEPTRA) 400-80 MG per tablet Take daily on Mondays, Wednesdays, Fridays     tacrolimus (GENERIC EQUIVALENT) 0.5 MG capsule Total dose = 2.5mg AM. 2mg PM.     tacrolimus (GENERIC EQUIVALENT) 1 MG capsule Total dose = 2.5mg AM, 2mg PM     aspirin 325 MG EC tablet Take 1 tablet (325 mg) by mouth daily (Patient not taking: Reported on 10/30/2018)     Current Facility-Administered Medications   Medication     filgrastim (NEUPOGEN) injection 480 mcg     There are no discontinued medications.    Physical Exam   Vital Signs: /84 (BP Location: Left arm)  Pulse 72  Temp 98.4  F (36.9  C) (Oral)  SpO2 100%    GENERAL APPEARANCE: alert and no distress  HENT: mouth with larg apthous ulser of the tongue.  LYMPHATICS: no cervical or supraclavicular  nodes  RESP: lungs clear to auscultation - no rales, rhonchi or wheezes  CV: regular rhythm, normal rate, no rub, no murmur  EDEMA: no LE edema bilaterally  ABDOMEN: soft, nondistended, nontender, bowel sounds normal  MS: extremities normal - no gross deformities noted, no evidence of inflammation in joints, no muscle tenderness  SKIN: no rash  TX KIDNEY: normal  DIALYSIS ACCESS:  RUE AV Fistula aneurysmal- with thrill and bruit.     Data     Renal Latest Ref Rng & Units 12/3/2018 11/26/2018 11/23/2018   Na 133 - 144 mmol/L - - -   Na (external) 136 - 145 mmol/L 137 138 137   K 3.4 - 5.3 mmol/L - - -   K (external) 3.5 - 5.0 mmol/L 4.9 4.9 5.0   Cl 94 - 109 mmol/L - - -   Cl (external) 98 - 107 mmol/L 108(H) 110(H) 110(H)   CO2 20 - 32 mmol/L - - -   CO2 (external) 22 - 31 mmol/L 22 24 20(L)   BUN 7 - 30 mg/dL - - -   BUN (external) 8 - 22 mg/dL 25(H) 24(H) 25(H)   Cr 0.66 - 1.25 mg/dL - - -   Cr (external) 0.70 - 1.30 mg/dL 2.02(H) 1.90(H) 2.06(H)   Glucose 70 - 99 mg/dL - - -   Glucose (external) 70 - 125 mg/dL 103 103 109   Ca  8.5 - 10.1 mg/dL - - -   Ca (external) 8.5 - 10.5 mg/dL 9.7 10.0 10.0   Mg 1.6 - 2.3 mg/dL - - -   Mg (external) 1.8 - 2.6 mg/dL - - -     Bone Health Latest Ref Rng & Units 11/5/2018 10/29/2018 10/22/2018   Phos 2.5 - 4.5 mg/dL - - -   Phos (external) 2.5 - 4.5 mg/dL 2.5 2.1(L) 2.8   PTHi 18 - 80 pg/mL - - -   Vit D Def 20 - 75 ug/L - - -     Heme Latest Ref Rng & Units 12/6/2018 12/3/2018 11/29/2018   WBC 4.0 - 11.0 10e9/L - - -   WBC (external) 4.0 - 11.0 thou/uL 1.7(LL) 2.0(L) 2.4(L)   Hgb 13.3 - 17.7 g/dL - - -   Hgb (external) 14.0 - 18.0 g/dL 10.4(L) 10.3(L) 10.2(L)   Plt 150 - 450 10e9/L - - -   Plt (external) 140 - 440 thou/uL 284 255 207     Liver Latest Ref Rng & Units 10/31/2018 7/30/2018 5/31/2017   AP 40 - 150 U/L 93 50 57   TBili 0.2 - 1.3 mg/dL 0.3 0.5 0.4   DBili 0.0 - 0.2 mg/dL 0.1 - -   ALT 0 - 70 U/L 31 17 19   AST 0 - 45 U/L 23 4 7   Tot Protein 6.8 - 8.8 g/dL 8.1 8.6  7.9   Albumin 3.4 - 5.0 g/dL 3.6 3.9 3.9        Iron studies Latest Ref Rng & Units 7/30/2018   Iron 35 - 180 ug/dL 46   Iron sat 15 - 46 % 21   Ferritin 26 - 388 ng/mL 716(H)     [facility] Txp Virology Latest Ref Rng & Units 12/3/2018 10/31/2018 10/30/2018   CVM DNA Quant - - - Plasma, EDTA anticoagulant   CMV DNA Quant Ext Not Detected IU/mL - - -   BK Spec - - Plasma -   BK Res BKNEG:BK Virus DNA Not Detected copies/mL - BK Virus DNA Not Detected -   BK Log <2.7 Log copies/mL - Not Calculated -   BK Quant Log Ext <2.7 Log copies/mL Not Calculated - -   BK Quant Result Ext BKNEG copies/mL Not Detected - -   BK Quant Spec Ext - Plasma - -   Hep B Core NR:Nonreactive - - -        Recent Labs   Lab Test  11/23/18   0800  11/26/18   0844  12/03/18   0800   DOSTAC  11/22/2018 20:00  11/23/18 2000  12/3/18 2015   TACROL  8.9  8.3  9.2     Recent Labs   Lab Test  08/20/18   0900  08/27/18   0810  09/05/18   0845   DOSMPA  Not Provided  Not Provided  [id number]   MPACID  5.33*  6.41*  4.52*   MPAG  155.6*  98.1*  80.5      POST-OP DIAGNOSIS:  Phimosis/redundant prepuce 28-Aug-2020 13:22:00  Terrell Pereyra POST-OP DIAGNOSIS:  Phimosis/redundant prepuce 28-Aug-2020 13:22:00  Terrell Pereyra

## 2020-09-03 ENCOUNTER — TRANSFERRED RECORDS (OUTPATIENT)
Dept: HEALTH INFORMATION MANAGEMENT | Facility: CLINIC | Age: 40
End: 2020-09-03

## 2020-09-03 LAB
CREAT SERPL-MCNC: 1.67 MG/DL (ref 0.72–1.25)
GFR SERPL CREATININE-BSD FRML MDRD: 46 ML/MIN/1.73M2
GLUCOSE SERPL-MCNC: 98 MG/DL (ref 65–100)
POTASSIUM SERPL-SCNC: 4.4 MMOL/L (ref 3.5–5)

## 2021-01-27 DIAGNOSIS — Z94.0 KIDNEY REPLACED BY TRANSPLANT: Primary | ICD-10-CM

## 2021-01-27 RX ORDER — MYCOPHENOLATE MOFETIL 250 MG/1
1000 CAPSULE ORAL 2 TIMES DAILY
Qty: 240 CAPSULE | Refills: 0 | Status: SHIPPED | OUTPATIENT
Start: 2021-01-27 | End: 2021-02-18

## 2021-01-27 NOTE — LETTER
OUTPATIENT LABORATORY TEST ORDER    Patient Name: Harshad Beatty  Transplant Date: 8/7/2018   YOB: 1980  Issue Date & Time: 1/27/2021  11:48 AM  [facility] MR: [id number] Exp. Date (1 year after date issued)      Diagnoses: Kidney Transplant (ICD-10  Z94.0)   Long term use of medications (ICD-10  Z79.899)     Lab results to be available on the same day drawn.   Patient should release information to the [facility].  Please fax to the Transplant Center at [phone].    Every other month   ?Hemogram and Platelet  ?Basic Metabolic Panel (Sodium, Potassium, Chloride, CO2, Creatinine, Urea Nitrogen,     Glucose,   Calcium)         ?/Tacrolimus/Prograf drug level                          Every 6 Months                  ?Urine for protein/creatinine    Yearly:   ?PRA/DSA level (mailers provided by the patient)     If you have any questions, please call The Transplant Center at [phone] or [phone].    Please fax labs to [phone]  .

## 2021-01-27 NOTE — TELEPHONE ENCOUNTER
Patient Call: Medication Refill  Pharmacy Name: [facility]   Pharmacy Location: [address]  Name of Medication: mycophenolate (GENERIC EQUIVALENT) 250 MG capsule  When will the patient be out of this medication?: Less than 24 hours (Miguel MACHADO, then page if no answer)     Patient is all out today

## 2021-02-18 DIAGNOSIS — Z94.0 KIDNEY REPLACED BY TRANSPLANT: Primary | ICD-10-CM

## 2021-02-18 RX ORDER — MYCOPHENOLATE MOFETIL 250 MG/1
1000 CAPSULE ORAL 2 TIMES DAILY
Qty: 240 CAPSULE | Refills: 0 | Status: SHIPPED | OUTPATIENT
Start: 2021-02-18 | End: 2021-03-21

## 2021-03-20 DIAGNOSIS — Z94.0 KIDNEY REPLACED BY TRANSPLANT: Primary | ICD-10-CM

## 2021-03-22 ENCOUNTER — TELEPHONE (OUTPATIENT)
Dept: TRANSPLANT | Facility: CLINIC | Age: 41
End: 2021-03-22

## 2021-03-22 RX ORDER — MYCOPHENOLATE MOFETIL 250 MG/1
1000 CAPSULE ORAL 2 TIMES DAILY
Qty: 240 CAPSULE | Refills: 0 | Status: SHIPPED | OUTPATIENT
Start: 2021-03-22 | End: 2021-04-19

## 2021-03-22 NOTE — TELEPHONE ENCOUNTER
ISSUE:  Patient is overdue for labs    PLAN:  Please call patient and let him know to get labs drawn ASAP.

## 2021-03-22 NOTE — LETTER
OUTPATIENT LABORATORY TEST ORDER    Patient Name: Harshad Beatty  Transplant Date: 8/7/2018   YOB: 1980  Issue Date & Time:3/22/2021  10:34 AM  [facility] MR: [id number] Exp. Date (1 year after date issued)      Diagnoses: Kidney Transplant (ICD-10  Z94.0)   Long term use of medications (ICD-10  Z79.899)     Lab results to be available on the same day drawn.   Patient should release information to the [facility] Transplant Center.  Please fax to the Transplant Center at [phone].    Every other month   ?Hemogram and Platelet  ?Basic Metabolic Panel (Sodium, Potassium, Chloride, CO2, Creatinine, Urea Nitrogen,     Glucose,   Calcium)         ?/Tacrolimus/Prograf drug level                          Every 6 Months                  ?Urine for protein/creatinine    Yearly:   ?PRA/DSA level (mailers provided by the patient)     If you have any questions, please call The Transplant Center at [phone] or [phone].    Please fax labs to [phone]  .

## 2021-04-06 ENCOUNTER — RESULTS ONLY (OUTPATIENT)
Dept: OTHER | Facility: CLINIC | Age: 41
End: 2021-04-06

## 2021-04-06 ENCOUNTER — RECORDS - HEALTHEAST (OUTPATIENT)
Dept: LAB | Facility: CLINIC | Age: 41
End: 2021-04-06

## 2021-04-06 DIAGNOSIS — Z94.0 KIDNEY REPLACED BY TRANSPLANT: ICD-10-CM

## 2021-04-06 DIAGNOSIS — Z48.298 AFTERCARE FOLLOWING ORGAN TRANSPLANT: ICD-10-CM

## 2021-04-06 DIAGNOSIS — Z79.899 ENCOUNTER FOR LONG-TERM CURRENT USE OF MEDICATION: ICD-10-CM

## 2021-04-06 LAB
ANION GAP SERPL CALCULATED.3IONS-SCNC: 6 MMOL/L (ref 5–18)
BUN SERPL-MCNC: 17 MG/DL (ref 8–22)
CALCIUM SERPL-MCNC: 9.4 MG/DL (ref 8.5–10.5)
CHLORIDE BLD-SCNC: 109 MMOL/L (ref 98–107)
CO2 SERPL-SCNC: 24 MMOL/L (ref 22–31)
CREAT SERPL-MCNC: 1.74 MG/DL (ref 0.7–1.3)
ERYTHROCYTE [DISTWIDTH] IN BLOOD BY AUTOMATED COUNT: 12.5 % (ref 11–14.5)
GFR SERPL CREATININE-BSD FRML MDRD: 43 ML/MIN/1.73M2
GLUCOSE BLD-MCNC: 100 MG/DL (ref 70–125)
HCT VFR BLD AUTO: 41.4 % (ref 40–54)
HGB BLD-MCNC: 13.1 G/DL (ref 14–18)
MCH RBC QN AUTO: 25.8 PG (ref 27–34)
MCHC RBC AUTO-ENTMCNC: 31.6 G/DL (ref 32–36)
MCV RBC AUTO: 82 FL (ref 80–100)
PLATELET # BLD AUTO: 290 THOU/UL (ref 140–440)
PMV BLD AUTO: 9.5 FL (ref 8.5–12.5)
POTASSIUM BLD-SCNC: 4.1 MMOL/L (ref 3.5–5)
RBC # BLD AUTO: 5.08 MILL/UL (ref 4.4–6.2)
SODIUM SERPL-SCNC: 139 MMOL/L (ref 136–145)
WBC: 3.6 THOU/UL (ref 4–11)

## 2021-04-06 PROCEDURE — 86833 HLA CLASS II HIGH DEFIN QUAL: CPT | Performed by: TRANSPLANT SURGERY

## 2021-04-06 PROCEDURE — 80197 ASSAY OF TACROLIMUS: CPT | Performed by: INTERNAL MEDICINE

## 2021-04-06 PROCEDURE — 86832 HLA CLASS I HIGH DEFIN QUAL: CPT | Performed by: TRANSPLANT SURGERY

## 2021-04-07 ENCOUNTER — RECORDS - HEALTHEAST (OUTPATIENT)
Dept: LAB | Facility: CLINIC | Age: 41
End: 2021-04-07

## 2021-04-07 LAB
CREAT UR-MCNC: 159.9 MG/DL
PROTEIN, RANDOM URINE - HISTORICAL: 17 MG/DL
PROTEIN/CREAT RATIO, RANDOM UR: 0.11
TACROLIMUS BLD-MCNC: 4.1 UG/L (ref 5–15)
TME LAST DOSE: ABNORMAL H

## 2021-04-19 DIAGNOSIS — Z94.0 KIDNEY REPLACED BY TRANSPLANT: Primary | ICD-10-CM

## 2021-04-19 RX ORDER — MYCOPHENOLATE MOFETIL 250 MG/1
1000 CAPSULE ORAL 2 TIMES DAILY
Qty: 240 CAPSULE | Refills: 11 | Status: SHIPPED | OUTPATIENT
Start: 2021-04-19 | End: 2022-02-10

## 2021-04-22 ENCOUNTER — TRANSFERRED RECORDS (OUTPATIENT)
Dept: HEALTH INFORMATION MANAGEMENT | Facility: CLINIC | Age: 41
End: 2021-04-22

## 2021-04-22 LAB
ALT SERPL-CCNC: 25 IU/L (ref 8–45)
AST SERPL-CCNC: 20 IU/L (ref 2–40)

## 2021-05-27 NOTE — PROGRESS NOTES
RESPIRATORY CARE NOTE     Patient Name: Harshad Beatty  Today's Date: 4/1/2019     Pt came in today in the PFT lab for his monthly pentamidine neb. Pt given albuterol 2/5 mg/NS neb prior to pentamidine. Pt tolerated the nebs well. BS were clear t/o pre and post nebs. Vitals were stable. BP was 136/80, HR 88 bpm pre neb tx, HR was 73 bpm post neb tx, RR 16 bpm. Pt was on RA, SpO2 97% at rest. Pt left in no distress.       Naty Marie RRT

## 2021-05-28 NOTE — PROGRESS NOTES
Pt came into PFT lab to do Q 28 day Pentamidine treatment. PT on RA,SPO2 95-96%, HR 71-84, RR 18, /71,BS clear. Pt given Alb neb prior to Pentamidine. After neb treatments SPO2 98%, HR79 , RR20, BS clear. Pt left in no distress.  RESPIRATORY CARE NOTE     Patient Name: Harshad Beatty  Today's Date: 5/1/2019     Problem List  Patient Active Problem List   Diagnosis     Renal failure     HTN (hypertension)                           Alisa Quintero LRT

## 2021-05-29 NOTE — PROGRESS NOTES
RESPIRATORY CARE NOTE     Patient Name: Harshad Beatty  Today's Date: 6/4/2019     Pt came in to the PFT lab today for his monthly 300 mg pentamidine neb. Pt given albuterol 2.5mg/NS neb prior to pentamidine. Pt tolerated the nebs well. Vitals were stable. Pt was on RA, SpO2 98%, BP was 132/80, RR 16 bpm, HR 78 bpm. BS were clear t/o pre and post nebs. Pt left in no distress.       aNty Marie RRT

## 2021-05-30 NOTE — PROGRESS NOTES
RESPIRATORY CARE NOTE     Patient Name: Harshad Beatty  Today's Date: 7/3/2019     Pt here today in the PFT lab for his monthly pentamidine 300 mg neb. Pt given albuterol 2.5mg/NS neb prior to pentamidine. Pt was on RA, SpO2 97%, RR 16 bpm. BP pre nebs was 136/86, BP post nebs was 142/81. HR pre nebs was 72 bpm, HR post nebs was 77 bpm. Pt tolerated nebs well. BS were clear t/o pre and post neb txs. Pt left in no distress.       Naty Marie RRT

## 2021-06-22 NOTE — PROGRESS NOTES
RESPIRATORY CARE NOTE     Patient Name: Harshad Beatty  Today's Date: 12/10/2018     Pt came in to the PFT lab today for his first pentamidine 300 mg neb tx. Pt given albuterol 2.5 mg/NS neb prior to pentamidine. Vitals signs were stable. Pt tolerated the nebs well. RR was 16 bpm, HR 80-84 bpm. BS were clear t/o pre and post neb txs. BP was 135/80.  Pt left in no distress.       Naty Marie RRT

## 2021-06-23 NOTE — PROGRESS NOTES
RESPIRATORY CARE NOTE     Patient Name: Harshad Beatty  Today's Date: 1/10/2019     Problem List  Patient Active Problem List   Diagnosis     Renal failure     HTN (hypertension)                 Patient tolerated albuterol and pentamadine nebs well. Vital signs were stable. Patient left in no distress.          Jessika Calderón

## 2021-06-24 NOTE — PROGRESS NOTES
RESPIRATORY CARE NOTE     Patient Name: Harshad Beatty  Today's Date: 2/25/2019     Problem List  Patient Active Problem List   Diagnosis     Renal failure     HTN (hypertension)               Patient was given albuterol and pentamadine by nebulizer. His pulse and respirations were stable. Breath sounds clear.            Jessika Calderón

## 2021-08-05 DIAGNOSIS — Z94.0 KIDNEY REPLACED BY TRANSPLANT: Primary | ICD-10-CM

## 2021-08-05 RX ORDER — TACROLIMUS 0.5 MG/1
0.5 CAPSULE ORAL EVERY EVENING
Qty: 30 CAPSULE | Refills: 0 | Status: SHIPPED | OUTPATIENT
Start: 2021-08-05 | End: 2021-09-09

## 2021-08-05 RX ORDER — TACROLIMUS 1 MG/1
CAPSULE ORAL
Qty: 90 CAPSULE | Refills: 0 | Status: SHIPPED | OUTPATIENT
Start: 2021-08-05 | End: 2021-09-09

## 2021-08-05 NOTE — TELEPHONE ENCOUNTER
Patient Call: Medication Refill  Route to LPN  Instruct the patient to first contact their pharmacy. If they have called their pharmacy and require further assistance, route to LPN.    tacrolimus (GENERIC EQUIVALENT) 0.5 MG capsule  tacrolimus (GENERIC EQUIVALENT) 1 MG capsule    [id number] [place], [place] - [address] Phone:  [phone]   Fax:  [phone]          When will the patient be out of this medication?: Less than 3 days (Route high priority)

## 2021-08-10 ENCOUNTER — TELEPHONE (OUTPATIENT)
Dept: TRANSPLANT | Facility: CLINIC | Age: 41
End: 2021-08-10

## 2021-08-10 NOTE — TELEPHONE ENCOUNTER
ISSUE:  Overdue for transplant labs and transplant nephrology routine follow up.    PLAN:  Place call to Harshad to confirm he will continue to be followed by [facility] for his kidney transplant needs.  If so, ensure lab orders are up to date and request transplant labs to be completed per protocol.    If Harshad is following with another nephrologist, notify [facility].

## 2021-08-17 ENCOUNTER — LAB REQUISITION (OUTPATIENT)
Dept: LAB | Facility: CLINIC | Age: 41
End: 2021-08-17
Payer: COMMERCIAL

## 2021-08-17 ENCOUNTER — LAB (OUTPATIENT)
Dept: LAB | Facility: CLINIC | Age: 41
End: 2021-08-17
Payer: COMMERCIAL

## 2021-08-17 DIAGNOSIS — Z79.899 OTHER LONG TERM (CURRENT) DRUG THERAPY: ICD-10-CM

## 2021-08-17 DIAGNOSIS — Z94.0 KIDNEY TRANSPLANT STATUS: ICD-10-CM

## 2021-08-17 LAB
ALBUMIN MFR UR ELPH: 10.2 MG/DL
ANION GAP SERPL CALCULATED.3IONS-SCNC: 7 MMOL/L (ref 5–18)
BUN SERPL-MCNC: 21 MG/DL (ref 8–22)
CALCIUM SERPL-MCNC: 10.4 MG/DL (ref 8.5–10.5)
CHLORIDE BLD-SCNC: 107 MMOL/L (ref 98–107)
CO2 SERPL-SCNC: 22 MMOL/L (ref 22–31)
CREAT SERPL-MCNC: 1.91 MG/DL (ref 0.7–1.3)
CREAT UR-MCNC: 161 MG/DL
ERYTHROCYTE [DISTWIDTH] IN BLOOD BY AUTOMATED COUNT: 12.5 % (ref 10–15)
GFR SERPL CREATININE-BSD FRML MDRD: 43 ML/MIN/1.73M2
GLUCOSE BLD-MCNC: 104 MG/DL (ref 70–125)
HCT VFR BLD AUTO: 39 % (ref 40–53)
HGB BLD-MCNC: 12.5 G/DL (ref 13.3–17.7)
MCH RBC QN AUTO: 25.7 PG (ref 26.5–33)
MCHC RBC AUTO-ENTMCNC: 32.1 G/DL (ref 31.5–36.5)
MCV RBC AUTO: 80 FL (ref 78–100)
PLATELET # BLD AUTO: 297 10E3/UL (ref 150–450)
POTASSIUM BLD-SCNC: 4.3 MMOL/L (ref 3.5–5)
PROT/CREAT 24H UR: 0.06 MG/MG CR
RBC # BLD AUTO: 4.86 10E6/UL (ref 4.4–5.9)
SODIUM SERPL-SCNC: 136 MMOL/L (ref 136–145)
WBC # BLD AUTO: 3.7 10E3/UL (ref 4–11)

## 2021-08-17 PROCEDURE — 84156 ASSAY OF PROTEIN URINE: CPT | Mod: ORL | Performed by: INTERNAL MEDICINE

## 2021-08-17 PROCEDURE — 80048 BASIC METABOLIC PNL TOTAL CA: CPT | Mod: ORL | Performed by: INTERNAL MEDICINE

## 2021-08-17 PROCEDURE — 85027 COMPLETE CBC AUTOMATED: CPT | Mod: ORL | Performed by: INTERNAL MEDICINE

## 2021-08-17 PROCEDURE — 80197 ASSAY OF TACROLIMUS: CPT | Performed by: INTERNAL MEDICINE

## 2021-08-17 PROCEDURE — 36415 COLL VENOUS BLD VENIPUNCTURE: CPT | Mod: ORL | Performed by: INTERNAL MEDICINE

## 2021-08-18 LAB
TACROLIMUS BLD-MCNC: 6.4 UG/L (ref 5–15)
TME LAST DOSE: NORMAL H
TME LAST DOSE: NORMAL H

## 2021-09-09 DIAGNOSIS — Z94.0 KIDNEY REPLACED BY TRANSPLANT: ICD-10-CM

## 2021-09-09 RX ORDER — TACROLIMUS 0.5 MG/1
0.5 CAPSULE ORAL EVERY EVENING
Qty: 30 CAPSULE | Refills: 4 | Status: SHIPPED | OUTPATIENT
Start: 2021-09-09 | End: 2021-12-07

## 2021-09-09 RX ORDER — TACROLIMUS 1 MG/1
CAPSULE ORAL
Qty: 90 CAPSULE | Refills: 4 | Status: SHIPPED | OUTPATIENT
Start: 2021-09-09 | End: 2021-12-07

## 2021-09-09 NOTE — TELEPHONE ENCOUNTER
Call returned to patient. No answer. Voice message left instructing patient to return call to schedule annual appointment.

## 2021-09-09 NOTE — TELEPHONE ENCOUNTER
Patient calling back to schedule his annual  appointment with Neph  He did option 4 and call came to Admin's line  He did it twice

## 2021-09-09 NOTE — TELEPHONE ENCOUNTER
Patient Call: Medication Refill  Is out of Tacrolimus  Not sure if he needs new script and what are his currant doses (Harshad is not sure if this has been changed)     Pharmacy Name: [facility] [place], [place]   Pharmacy Location: [address] [place] [address]   Name of Medication: Tacrolimus 0.5mg and  1.0mg   When will the patient be out of this medication?: Less than 3 days (Route high priority)     Please call Harshad # [phone]

## 2021-09-10 ENCOUNTER — TELEPHONE (OUTPATIENT)
Dept: TRANSPLANT | Facility: CLINIC | Age: 41
End: 2021-09-10

## 2021-09-10 NOTE — TELEPHONE ENCOUNTER
Spoke with the patient and confirmed post nephrologyappointments on 9/14/21.  Informed patient an itinerary can be accessed on [email or web address]

## 2021-09-14 ENCOUNTER — VIRTUAL VISIT (OUTPATIENT)
Dept: NEPHROLOGY | Facility: CLINIC | Age: 41
End: 2021-09-14
Attending: INTERNAL MEDICINE
Payer: COMMERCIAL

## 2021-09-14 DIAGNOSIS — Z48.298 AFTERCARE FOLLOWING ORGAN TRANSPLANT: ICD-10-CM

## 2021-09-14 DIAGNOSIS — Z94.0 HTN, KIDNEY TRANSPLANT RELATED: ICD-10-CM

## 2021-09-14 DIAGNOSIS — D84.9 IMMUNOSUPPRESSION (H): ICD-10-CM

## 2021-09-14 DIAGNOSIS — E83.52 HYPERCALCEMIA: Primary | ICD-10-CM

## 2021-09-14 DIAGNOSIS — I15.1 HTN, KIDNEY TRANSPLANT RELATED: ICD-10-CM

## 2021-09-14 PROCEDURE — 99214 OFFICE O/P EST MOD 30 MIN: CPT | Mod: TEL | Performed by: INTERNAL MEDICINE

## 2021-09-14 RX ORDER — LOSARTAN POTASSIUM 25 MG/1
25 TABLET ORAL DAILY
COMMUNITY
End: 2023-09-27

## 2021-09-14 NOTE — LETTER
9/14/2021       RE: Harshad Beatty  [address] [place] [address]     Dear Colleague,    Thank you for referring your patient, Harshad Beatty, to the [facility] [place] at [facility]. Please see a copy of my visit note below.    Telephone visit    Start:2:12  Stop: 2:33     CHRONIC TRANSPLANT NEPHROLOGY VISIT    Assessment and Plan:   # LDKT: Stable   - Baseline Cr ~ 1.8-2.0;    - Proteinuria: Normal   - Date of DSA last checked: 4/2019  Latest DSA: No. Previously low level DSA to DPB1, now resolved.   - BK Viremia: No   - Kidney Tx Biopsy: 1/14/2019; No diagnostic evidence of acute rejection. Focal acute tubular injury.             8/17/2018; No diagnostic evidence of acute rejection.    # Immunosuppression: Tacrolimus (goal trough 4-6), MMF 1 g po bid   - Changes: No    - Continue with intensive monitoring of immunosuppression for efficacy and toxicity.      # Prophylaxis:    - none CD4>200     # Hypertension:  Goal BP: < 130/80   - Changes: currently on coreg 25 mg p obid & losartan 25 mg po qd    # Anemia in Chronic Renal Disease: Hgb: Stable   - Iron studies: Replete    # Mineral Bone Disorder: update PTH, 25-OH vit D. Mild hypercalcemia    # Electrolytes:   - Potassium; level: Normal. Has been on Florinef, but will have him hold this as his serum potassium level has been normal and due to borderline blood pressure control. Recommend continued low potassium diet.  - Magnesium; level: Normal. On supplement, will check magnesium with next labs.  - Bicarbonate; level: Normal. Takes 1 tsp baking soda daily (equivalent to ~ 7 tablets of sodium bicarbonate 650 mg tablet).      # Leukopenia: Stable, mildly low WBC.  Likely medication related.    # Obesity: Marked increase in weight following transplant.    - weight is up to 303 lbs, just started being more active, has not seen weight management clinic, will seek if not achieving weight  loss over the next 3 months   - Recommend increased exercise and watch caloric intake.    # Skin Cancer Risk:    - Discussed sun protection and recommend regular follow up with Dermatology.    # Medical Compliance: Yes    ## COVID-19 Virus Review: Discussed COVID-19 virus and the potential medical risks.  Reviewed preventative health recommendations, including wearing a mask where appropriate.  Recommended COVID vaccination be up to date with either an initial vaccination or booster shot when appropriate.  Asked patient to inform the transplant center if they are exposed or diagnosed with this virus.    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  Feeling well overall. Energy level is good. Denies any fevers, chills, weight loss, night sweats. No nausea, vomiting, abdominal pain, diarrhea. No chest pain, sob, leg swelling. No recent illness or hospitalization. Weight is up since transplant. He is actively working on weight loss more exercise and watching his diet. He will seek weight management clinic if unsuccessful. He reports his BP meds were adjusted now on coreg 25 mg po bid & losartan 25 mg po  bid    Current IS FK2/1.5; MMF 1/1    Home BP: 129-135/85-86    Review of Systems:   A comprehensive review of systems was obtained and negative, except as noted in the HPI or past medical history.     Active Medications:     Current Outpatient Medications:      atorvastatin (LIPITOR) 10 MG tablet, Take 1 tablet (10 mg) by mouth daily, Disp: 30 tablet, Rfl: 11     carvedilol (COREG) 25 MG tablet, Take 2 tablets (50 mg) by mouth 2 times daily (with meals), Disp: 120 tablet, Rfl: 11     cinacalcet (SENSIPAR) 30 MG tablet, Take 1 tablet (30 mg) by mouth daily, Disp: 30 tablet, Rfl: 0     diazepam (VALIUM) 5 MG tablet, Take 1 tablet (5 mg) by mouth every 6 hours as needed for anxiety, Disp: 20 tablet, Rfl: 1     magnesium oxide (MAG-OX) 400 MG tablet, Take 1 tablet (400 mg) by mouth daily (with lunch) (Patient not taking: Reported on 10/14/2019), Disp: 30 tablet, Rfl: 3     mycophenolate (GENERIC EQUIVALENT) 250 MG capsule, Take 4 capsules (1,000 mg) by mouth 2 times daily, Disp: 240 capsule, Rfl: 11     sodium bicarbonate 650 MG tablet, Take 2 tablets (1,300 mg) by mouth 2 times daily, Disp: 120 tablet, Rfl: 11     tacrolimus (GENERIC EQUIVALENT) 0.5 MG capsule, Take 1 capsule (0.5 mg) by mouth every evening HOLD, Disp: 30 capsule, Rfl: 4     tacrolimus (GENERIC EQUIVALENT) 1 MG capsule, Total dose = 2 mg in the AM and 1.5 mg in the PM, Disp: 90 capsule, Rfl: 4      Allergies:   Patient has no known allergies.      Active Medical Problems:  Patient Active Problem List   Diagnosis     Sickle cell trait (H)     Anemia in chronic kidney disease     Secondary renal hyperparathyroidism (H)     Kidney replaced by transplant     Immunosuppression (H)     Need for pneumocystis prophylaxis     HTN, kidney transplant related     Hypercalcemia     Hypomagnesemia     Neutropenia (H)     Aftercare following organ transplant     Vitamin D deficiency     Obesity     Kidney transplanted     Complication of arteriovenous dialysis  fistula     Hernia, incisional        Social History:   Social History     Tobacco Use     Smoking status: Never Smoker     Smokeless tobacco: Never Used   Substance Use Topics     Alcohol use: No     Drug use: Yes     Types: Marijuana     Comment: remote       Physical Exam:   There were no vitals taken for this visit.   Wt Readings from Last 4 Encounters:   07/24/20 140.3 kg (309 lb 4.9 oz)   07/20/20 139.6 kg (307 lb 11.2 oz)   11/27/19 138.8 kg (305 lb 14.4 oz)   11/12/19 140.7 kg (310 lb 3 oz)       GENERAL APPEARANCE: alert and no distress  HENT: mouth without ulcers or lesions  LYMPHATICS: no cervical or supraclavicular nodes  RESP: lungs clear to auscultation - no rales, rhonchi or wheezes  CV: regular rhythm, normal rate, no rub, no murmur  EDEMA: no LE edema bilaterally  ABDOMEN: soft, nondistended, nontender, bowel sounds normal  MS: extremities normal - no gross deformities noted, no evidence of inflammation in joints, no muscle tenderness  SKIN: no rash  TX KIDNEY: normal  DIALYSIS ACCESS: RUE AV Fistula with good thrill    Data:   Renal Latest Ref Rng & Units 8/17/2021 4/6/2021 9/3/2020   Na 136 - 145 mmol/L 136 139 -   Na (external) 136 - 145 mmol/L 136 139 -   K 3.5 - 5.0 mmol/L 4.3 4.1 4.4   K (external) 3.5 - 5.0 mmol/L 4.3 4.1 -   Cl 98 - 107 mmol/L 107 109(H) -   Cl (external) 98 - 107 mmol/L 107 109(H) -   CO2 22 - 31 mmol/L 22 24 -   CO2 (external) 22 - 31 mmol/L 22 24 -   BUN 8 - 22 mg/dL 21 17 -   BUN (external) 8 - 22 mg/dL 21 17 -   Cr 0.70 - 1.30 mg/dL 1.91(H) 1.74(H) 1.67(A)   Cr (external) 0.70 - 1.30 mg/dL 1.91(H) 1.74(H) -   Glucose 70 - 125 mg/dL 104 100 98   Glucose (external) 70 - 125 mg/dL 104 100 -   Ca  8.5 - 10.5 mg/dL 10.4 9.4 -   Ca (external) 8.5 - 10.5 mg/dL 10.4 9.4 -   Mg 1.8 - 2.6 mg/dL - - -   Mg (external) 1.8 - 2.6 mg/dL - - -     Bone Health Latest Ref Rng & Units 3/4/2019 11/5/2018 10/29/2018   Phos 2.5 - 4.5 mg/dL - 2.5 2.1(L)   Phos (external) 2.5 - 4.5 mg/dL - 2.5  2.1(L)   PTHi 10 - 86 pg/mL 253(H) - -   PTHi (external) 10 - 86 pg/mL 253(H) - -   Vit D Def 20 - 75 ug/L - - -     Heme Latest Ref Rng & Units 8/17/2021 4/6/2021 5/8/2020   WBC 4.0 - 11.0 10e3/uL 3.7(L) 3.6(L) 3.3(L)   WBC (external) 4.0 - 11.0 thou/uL - 3.6(L) 3.3(L)   Hgb 13.3 - 17.7 g/dL 12.5(L) 13.1(L) 12.6(L)   Hgb (external) 14.0 - 18.0 g/dL - 13.1(L) 12.6(L)   Plt 150 - 450 10e3/uL 297 290 295   Plt (external) 140 - 440 thou/uL - 290 295   ABSOLUTE NEUTROPHIL 1.6 - 8.3 10e9/L - - -   ABSOLUTE NEUTROPHILS (EXTERNAL) 2.0 - 7.7 thou/uL - - -   ABSOLUTE LYMPHOCYTES 0.8 - 5.3 10e9/L - - -   ABSOLUTE LYMPHOCYTES (EXTERNAL) 0.8 - 4.4 thou/uL - - -   ABSOLUTE MONOCYTES 0.0 - 1.3 10e9/L - - -   ABSOLUTE MONOCYTES (EXTERNAL) 0.0 - 0.9 thou/uL - - -   ABSOLUTE EOSINOPHILS 0.0 - 0.7 10e9/L - - -   ABSOLUTE EOSINOPHILS (EXTERNAL) 0.0 - 0.4 thou/uL - - -   ABSOLUTE BASOPHILS 0.0 - 0.2 10e9/L - - -   ABSOLUTE BASOPHILS (EXTERNAL) 0.0 - 0.2 thou/uL - - -   ABS IMMATURE GRANULOCYTES 0 - 0.4 10e9/L - - -   ABSOLUTE NUCLEATED RBC - - - -     Liver Latest Ref Rng & Units 4/22/2021 8/5/2019 2/4/2019   AP 45 - 120 U/L - 89 146(H)   AP (external) 45 - 120 U/L - 89 146(H)   TBili 0.0 - 1.0 mg/dL - 0.3 0.5   TBili (external) 0.0 - 1.0 mg/dL - 0.3 0.5   DBili <=0.5 mg/dL - 0.2 0.2   DBili (external) <=0.5 mg/dL - 0.2 0.2   ALT 8 - 45 IU/L 25 21 30   ALT (external) 0 - 45 U/L - 21 30   AST 2 - 40 IU/L 20 17 29   AST (external) 0 - 40 U/L - 17 29   Tot Protein 6.0 - 8.0 g/dL - 7.3 7.7   Tot Protein (external) 6.0 - 8.0 g/dL - 7.3 7.7   Albumin 3.5 - 5.0 g/dL - 3.9 4.2   Albumin (external) 3.5 - 5.0 g/dL - 3.9 4.2     Pancreas Latest Ref Rng & Units 7/1/2019 12/7/2018 5/7/2014   A1C 4.2 - 6.1 % 6.2(H) 5.5 5.5   A1C (external) 4.2 - 6.1 % 6.2(H) - -     Iron studies Latest Ref Rng & Units 7/30/2018   Iron 35 - 180 ug/dL 46   Iron sat 15 - 46 % 21   Ferritin 26 - 388 ng/mL 716(H)     [facility] Txp Virology Latest Ref Rng & Units 5/8/2020  2/21/2020 12/30/2019   CVM DNA Quant - - - -   CMV DNA Quant Ext Not Detected IU/mL - - -   CMV QUANT IU/ML CMVND:CMV DNA Not Detected [IU]/mL - - -   LOG IU/ML OF CMVQNT <2.1 [Log:IU]/mL - - -   LOG IU/ML OF CMVQNT (EXTERNAL) <2.1 log IU/mL - - -   BK Spec - - - -   BK Res BKNEG:BK Virus DNA Not Detected copies/mL - - -   BK Log <2.7 Log copies/mL - - -   BK Quant Log Ext <2.7 Log copies/mL Not Calculated Not Calculated Not Calculated   BK Quant Result Ext NEG copies/mL Not Detected Not Detected Not Detected   BK Quant Spec Ext - Plasma - Plasma   EBV CAPSID ANTIBODY IGG 0.0 - 0.8 AI - - -   EBV DNA QUANT (EXTERNAL) copies/mL - - -   EBV INTERP DNA QUANT (EXTERNAL) Not Detected - - -   EBV DNA LOG OF COPIES (EXTERNAL) log copies/mL - - -   Hep B Core NR:Nonreactive - - -   Hep B Core Ext Negative - - -   Hep B Surf Ext  Negative - - -        Recent Labs   Lab Test 05/08/20  0927 04/06/21  0913 08/17/21  0846   DOSTAC 5/7/2020 2030 4/5/21 2000 8/16/2021   TACROL 9.2 4.1* 6.4     Recent Labs   Lab Test 08/27/18  0810 09/05/18  0845 10/29/18  0850 11/05/18  0910 12/07/18  1223   DOSMPA Not Provided 904,180,815  --   --  Not Provided   MPACID 6.41* 4.52* 2.15 1.76 1.21   MPAG 98.1* 80.5 47.3 41.9 51.9           Again, thank you for allowing me to participate in the care of your patient.      Sincerely,    Ed Garnica MD

## 2021-09-14 NOTE — PROGRESS NOTES
Telephone visit    Start:2:12  Stop: 2:33     CHRONIC TRANSPLANT NEPHROLOGY VISIT    Assessment and Plan:   # LDKT: Stable   - Baseline Cr ~ 1.8-2.0;    - Proteinuria: Normal   - Date of DSA last checked: 4/2019  Latest DSA: No. Previously low level DSA to DPB1, now resolved.   - BK Viremia: No   - Kidney Tx Biopsy: 1/14/2019; No diagnostic evidence of acute rejection. Focal acute tubular injury.             8/17/2018; No diagnostic evidence of acute rejection.    # Immunosuppression: Tacrolimus (goal trough 4-6), MMF 1 g po bid   - Changes: No    - Continue with intensive monitoring of immunosuppression for efficacy and toxicity.      # Prophylaxis:    - none CD4>200     # Hypertension:  Goal BP: < 130/80   - Changes: currently on coreg 25 mg p obid & losartan 25 mg po qd    # Anemia in Chronic Renal Disease: Hgb: Stable   - Iron studies: Replete    # Mineral Bone Disorder: update PTH, 25-OH vit D. Mild hypercalcemia    # Electrolytes:   - Potassium; level: Normal. Has been on Florinef, but will have him hold this as his serum potassium level has been normal and due to borderline blood pressure control. Recommend continued low potassium diet.  - Magnesium; level: Normal. On supplement, will check magnesium with next labs.  - Bicarbonate; level: Normal. Takes 1 tsp baking soda daily (equivalent to ~ 7 tablets of sodium bicarbonate 650 mg tablet).      # Leukopenia: Stable, mildly low WBC.  Likely medication related.    # Obesity: Marked increase in weight following transplant.    - weight is up to 303 lbs, just started being more active, has not seen weight management clinic, will seek if not achieving weight loss over the next 3 months   - Recommend increased exercise and watch caloric intake.    # Skin Cancer Risk:    - Discussed sun protection and recommend regular follow up with Dermatology.    # Medical Compliance: Yes    ## COVID-19 Virus Review: Discussed COVID-19 virus and the potential medical risks.   Reviewed preventative health recommendations, including wearing a mask where appropriate.  Recommended COVID vaccination be up to date with either an initial vaccination or booster shot when appropriate.  Asked patient to inform the transplant center if they are exposed or diagnosed with this virus.    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  Feeling well overall. Energy level is good. Denies any fevers, chills, weight loss, night sweats. No nausea, vomiting, abdominal pain, diarrhea. No chest pain, sob, leg swelling. No recent illness or hospitalization. Weight is up since transplant. He is actively working on weight loss more exercise and watching his diet. He will seek weight management clinic if unsuccessful. He reports his BP meds were adjusted now on coreg 25 mg po bid & losartan 25 mg po bid    Current IS FK2/1.5; MMF 1/1    Home BP: 129-135/85-86    Review of Systems:   A comprehensive review of systems was obtained and negative, except as noted in the HPI or past medical history.     Active Medications:     Current Outpatient Medications:      atorvastatin (LIPITOR) 10 MG tablet, Take 1  tablet (10 mg) by mouth daily, Disp: 30 tablet, Rfl: 11     carvedilol (COREG) 25 MG tablet, Take 2 tablets (50 mg) by mouth 2 times daily (with meals), Disp: 120 tablet, Rfl: 11     cinacalcet (SENSIPAR) 30 MG tablet, Take 1 tablet (30 mg) by mouth daily, Disp: 30 tablet, Rfl: 0     diazepam (VALIUM) 5 MG tablet, Take 1 tablet (5 mg) by mouth every 6 hours as needed for anxiety, Disp: 20 tablet, Rfl: 1     magnesium oxide (MAG-OX) 400 MG tablet, Take 1 tablet (400 mg) by mouth daily (with lunch) (Patient not taking: Reported on 10/14/2019), Disp: 30 tablet, Rfl: 3     mycophenolate (GENERIC EQUIVALENT) 250 MG capsule, Take 4 capsules (1,000 mg) by mouth 2 times daily, Disp: 240 capsule, Rfl: 11     sodium bicarbonate 650 MG tablet, Take 2 tablets (1,300 mg) by mouth 2 times daily, Disp: 120 tablet, Rfl: 11     tacrolimus (GENERIC EQUIVALENT) 0.5 MG capsule, Take 1 capsule (0.5 mg) by mouth every evening HOLD, Disp: 30 capsule, Rfl: 4     tacrolimus (GENERIC EQUIVALENT) 1 MG capsule, Total dose = 2 mg in the AM and 1.5 mg in the PM, Disp: 90 capsule, Rfl: 4      Allergies:   Patient has no known allergies.      Active Medical Problems:  Patient Active Problem List   Diagnosis     Sickle cell trait (H)     Anemia in chronic kidney disease     Secondary renal hyperparathyroidism (H)     Kidney replaced by transplant     Immunosuppression (H)     Need for pneumocystis prophylaxis     HTN, kidney transplant related     Hypercalcemia     Hypomagnesemia     Neutropenia (H)     Aftercare following organ transplant     Vitamin D deficiency     Obesity     Kidney transplanted     Complication of arteriovenous dialysis fistula     Hernia, incisional        Social History:   Social History     Tobacco Use     Smoking status: Never Smoker     Smokeless tobacco: Never Used   Substance Use Topics     Alcohol use: No     Drug use: Yes     Types: Marijuana     Comment: remote       Physical Exam:   There were no vitals taken for  this visit.   Wt Readings from Last 4 Encounters:   07/24/20 140.3 kg (309 lb 4.9 oz)   07/20/20 139.6 kg (307 lb 11.2 oz)   11/27/19 138.8 kg (305 lb 14.4 oz)   11/12/19 140.7 kg (310 lb 3 oz)       GENERAL APPEARANCE: alert and no distress  HENT: mouth without ulcers or lesions  LYMPHATICS: no cervical or supraclavicular nodes  RESP: lungs clear to auscultation - no rales, rhonchi or wheezes  CV: regular rhythm, normal rate, no rub, no murmur  EDEMA: no LE edema bilaterally  ABDOMEN: soft, nondistended, nontender, bowel sounds normal  MS: extremities normal - no gross deformities noted, no evidence of inflammation in joints, no muscle tenderness  SKIN: no rash  TX KIDNEY: normal  DIALYSIS ACCESS: RUE AV Fistula with good thrill    Data:   Renal Latest Ref Rng & Units 8/17/2021 4/6/2021 9/3/2020   Na 136 - 145 mmol/L 136 139 -   Na (external) 136 - 145 mmol/L 136 139 -   K 3.5 - 5.0 mmol/L 4.3 4.1 4.4   K (external) 3.5 - 5.0 mmol/L 4.3 4.1 -   Cl 98 - 107 mmol/L 107 109(H) -   Cl (external) 98 - 107 mmol/L 107 109(H) -   CO2 22 - 31 mmol/L 22 24 -   CO2 (external) 22 - 31 mmol/L 22 24 -   BUN 8 - 22 mg/dL 21 17 -   BUN (external) 8 - 22 mg/dL 21 17 -   Cr 0.70 - 1.30 mg/dL 1.91(H) 1.74(H) 1.67(A)   Cr (external) 0.70 - 1.30 mg/dL 1.91(H) 1.74(H) -   Glucose 70 - 125 mg/dL 104 100 98   Glucose (external) 70 - 125 mg/dL 104 100 -   Ca  8.5 - 10.5 mg/dL 10.4 9.4 -   Ca (external) 8.5 - 10.5 mg/dL 10.4 9.4 -   Mg 1.8 - 2.6 mg/dL - - -   Mg (external) 1.8 - 2.6 mg/dL - - -     Bone Health Latest Ref Rng & Units 3/4/2019 11/5/2018 10/29/2018   Phos 2.5 - 4.5 mg/dL - 2.5 2.1(L)   Phos (external) 2.5 - 4.5 mg/dL - 2.5 2.1(L)   PTHi 10 - 86 pg/mL 253(H) - -   PTHi (external) 10 - 86 pg/mL 253(H) - -   Vit D Def 20 - 75 ug/L - - -     Heme Latest Ref Rng & Units 8/17/2021 4/6/2021 5/8/2020   WBC 4.0 - 11.0 10e3/uL 3.7(L) 3.6(L) 3.3(L)   WBC (external) 4.0 - 11.0 thou/uL - 3.6(L) 3.3(L)   Hgb 13.3 - 17.7 g/dL 12.5(L) 13.1(L)  12.6(L)   Hgb (external) 14.0 - 18.0 g/dL - 13.1(L) 12.6(L)   Plt 150 - 450 10e3/uL 297 290 295   Plt (external) 140 - 440 thou/uL - 290 295   ABSOLUTE NEUTROPHIL 1.6 - 8.3 10e9/L - - -   ABSOLUTE NEUTROPHILS (EXTERNAL) 2.0 - 7.7 thou/uL - - -   ABSOLUTE LYMPHOCYTES 0.8 - 5.3 10e9/L - - -   ABSOLUTE LYMPHOCYTES (EXTERNAL) 0.8 - 4.4 thou/uL - - -   ABSOLUTE MONOCYTES 0.0 - 1.3 10e9/L - - -   ABSOLUTE MONOCYTES (EXTERNAL) 0.0 - 0.9 thou/uL - - -   ABSOLUTE EOSINOPHILS 0.0 - 0.7 10e9/L - - -   ABSOLUTE EOSINOPHILS (EXTERNAL) 0.0 - 0.4 thou/uL - - -   ABSOLUTE BASOPHILS 0.0 - 0.2 10e9/L - - -   ABSOLUTE BASOPHILS (EXTERNAL) 0.0 - 0.2 thou/uL - - -   ABS IMMATURE GRANULOCYTES 0 - 0.4 10e9/L - - -   ABSOLUTE NUCLEATED RBC - - - -     Liver Latest Ref Rng & Units 4/22/2021 8/5/2019 2/4/2019   AP 45 - 120 U/L - 89 146(H)   AP (external) 45 - 120 U/L - 89 146(H)   TBili 0.0 - 1.0 mg/dL - 0.3 0.5   TBili (external) 0.0 - 1.0 mg/dL - 0.3 0.5   DBili <=0.5 mg/dL - 0.2 0.2   DBili (external) <=0.5 mg/dL - 0.2 0.2   ALT 8 - 45 IU/L 25 21 30   ALT (external) 0 - 45 U/L - 21 30   AST 2 - 40 IU/L 20 17 29   AST (external) 0 - 40 U/L - 17 29   Tot Protein 6.0 - 8.0 g/dL - 7.3 7.7   Tot Protein (external) 6.0 - 8.0 g/dL - 7.3 7.7   Albumin 3.5 - 5.0 g/dL - 3.9 4.2   Albumin (external) 3.5 - 5.0 g/dL - 3.9 4.2     Pancreas Latest Ref Rng & Units 7/1/2019 12/7/2018 5/7/2014   A1C 4.2 - 6.1 % 6.2(H) 5.5 5.5   A1C (external) 4.2 - 6.1 % 6.2(H) - -     Iron studies Latest Ref Rng & Units 7/30/2018   Iron 35 - 180 ug/dL 46   Iron sat 15 - 46 % 21   Ferritin 26 - 388 ng/mL 716(H)     [facility] Txp Virology Latest Ref Rng & Units 5/8/2020 2/21/2020 12/30/2019   CVM DNA Quant - - - -   CMV DNA Quant Ext Not Detected IU/mL - - -   CMV QUANT IU/ML CMVND:CMV DNA Not Detected [IU]/mL - - -   LOG IU/ML OF CMVQNT <2.1 [Log:IU]/mL - - -   LOG IU/ML OF CMVQNT (EXTERNAL) <2.1 log IU/mL - - -   BK Spec - - - -   BK Res BKNEG:BK Virus DNA Not Detected  copies/mL - - -   BK Log <2.7 Log copies/mL - - -   BK Quant Log Ext <2.7 Log copies/mL Not Calculated Not Calculated Not Calculated   BK Quant Result Ext NEG copies/mL Not Detected Not Detected Not Detected   BK Quant Spec Ext - Plasma - Plasma   EBV CAPSID ANTIBODY IGG 0.0 - 0.8 AI - - -   EBV DNA QUANT (EXTERNAL) copies/mL - - -   EBV INTERP DNA QUANT (EXTERNAL) Not Detected - - -   EBV DNA LOG OF COPIES (EXTERNAL) log copies/mL - - -   Hep B Core NR:Nonreactive - - -   Hep B Core Ext Negative - - -   Hep B Surf Ext  Negative - - -        Recent Labs   Lab Test 05/08/20  0927 04/06/21  0913 08/17/21  0846   DOSTAC 5/7/2020 2030 4/5/21 2000 8/16/2021   TACROL 9.2 4.1* 6.4     Recent Labs   Lab Test 08/27/18  0810 09/05/18  0845 10/29/18  0850 11/05/18  0910 12/07/18  1223   DOSMPA Not Provided 904,180,815  --   --  Not Provided   MPACID 6.41* 4.52* 2.15 1.76 1.21   MPAG 98.1* 80.5 47.3 41.9 51.9

## 2021-09-15 NOTE — PROGRESS NOTES
Message  Received: Yesterday  Ed Garnica MD Ututalum, Teresa, RN  Added PTH, 25-OH vit D to labs just checking if need to be faxed with his labs next month     OUTCOME: Lab requisition letter sent to patient and [facility] lab.    Renetta Thomas RN, BSN  Solid Organ Transplant, Post Kidney and Pancreas  Transplant Care Coordinator  [phone]

## 2021-10-22 ENCOUNTER — LAB REQUISITION (OUTPATIENT)
Dept: LAB | Facility: CLINIC | Age: 41
End: 2021-10-22
Payer: COMMERCIAL

## 2021-10-22 ENCOUNTER — LAB (OUTPATIENT)
Dept: LAB | Facility: CLINIC | Age: 41
End: 2021-10-22
Payer: COMMERCIAL

## 2021-10-22 DIAGNOSIS — Z94.0 KIDNEY TRANSPLANT STATUS: ICD-10-CM

## 2021-10-22 DIAGNOSIS — E55.9 VITAMIN D DEFICIENCY, UNSPECIFIED: ICD-10-CM

## 2021-10-22 DIAGNOSIS — Z79.899 OTHER LONG TERM (CURRENT) DRUG THERAPY: ICD-10-CM

## 2021-10-22 DIAGNOSIS — E21.3 HYPERPARATHYROIDISM, UNSPECIFIED (H): ICD-10-CM

## 2021-10-22 LAB
ANION GAP SERPL CALCULATED.3IONS-SCNC: 7 MMOL/L (ref 5–18)
BUN SERPL-MCNC: 20 MG/DL (ref 8–22)
CALCIUM SERPL-MCNC: 10.2 MG/DL (ref 8.5–10.5)
CHLORIDE BLD-SCNC: 106 MMOL/L (ref 98–107)
CO2 SERPL-SCNC: 25 MMOL/L (ref 22–31)
CREAT SERPL-MCNC: 1.69 MG/DL (ref 0.7–1.3)
ERYTHROCYTE [DISTWIDTH] IN BLOOD BY AUTOMATED COUNT: 12.4 % (ref 10–15)
GFR SERPL CREATININE-BSD FRML MDRD: 49 ML/MIN/1.73M2
GLUCOSE BLD-MCNC: 157 MG/DL (ref 70–125)
HCT VFR BLD AUTO: 37.6 % (ref 40–53)
HGB BLD-MCNC: 12 G/DL (ref 13.3–17.7)
MCH RBC QN AUTO: 26.5 PG (ref 26.5–33)
MCHC RBC AUTO-ENTMCNC: 31.9 G/DL (ref 31.5–36.5)
MCV RBC AUTO: 83 FL (ref 78–100)
PLATELET # BLD AUTO: 264 10E3/UL (ref 150–450)
POTASSIUM BLD-SCNC: 4 MMOL/L (ref 3.5–5)
RBC # BLD AUTO: 4.53 10E6/UL (ref 4.4–5.9)
SODIUM SERPL-SCNC: 138 MMOL/L (ref 136–145)
WBC # BLD AUTO: 3.4 10E3/UL (ref 4–11)

## 2021-10-22 PROCEDURE — 87799 DETECT AGENT NOS DNA QUANT: CPT | Mod: ORL | Performed by: INTERNAL MEDICINE

## 2021-10-22 PROCEDURE — 80048 BASIC METABOLIC PNL TOTAL CA: CPT | Mod: ORL | Performed by: INTERNAL MEDICINE

## 2021-10-22 PROCEDURE — 83970 ASSAY OF PARATHORMONE: CPT | Mod: ORL | Performed by: INTERNAL MEDICINE

## 2021-10-22 PROCEDURE — 82306 VITAMIN D 25 HYDROXY: CPT | Mod: ORL | Performed by: INTERNAL MEDICINE

## 2021-10-22 PROCEDURE — 85027 COMPLETE CBC AUTOMATED: CPT | Mod: ORL | Performed by: INTERNAL MEDICINE

## 2021-10-22 PROCEDURE — 80197 ASSAY OF TACROLIMUS: CPT | Performed by: INTERNAL MEDICINE

## 2021-10-22 PROCEDURE — 36415 COLL VENOUS BLD VENIPUNCTURE: CPT | Mod: ORL | Performed by: INTERNAL MEDICINE

## 2021-10-23 LAB
PTH-INTACT SERPL-MCNC: 156 PG/ML (ref 18–80)
TACROLIMUS BLD-MCNC: 6.4 UG/L (ref 5–15)
TME LAST DOSE: NORMAL H
TME LAST DOSE: NORMAL H

## 2021-10-25 ENCOUNTER — TELEPHONE (OUTPATIENT)
Dept: TRANSPLANT | Facility: CLINIC | Age: 41
End: 2021-10-25

## 2021-10-25 LAB
BKV DNA # SPEC NAA+PROBE: NOT DETECTED COPIES/ML
DEPRECATED CALCIDIOL+CALCIFEROL SERPL-MC: 7 UG/L (ref 30–80)

## 2021-10-25 NOTE — TELEPHONE ENCOUNTER
Ed Garnica MD Ututalum, Teresa, RN  Monitor for now given mild hyper Ca   If Ca-10.5 aguillon above need to consider sensipar   Recheck in 3 months 25-OH vit D ad phos    ----- Message -----   From: Divine Peter, CHRISSIE   Sent: 10/24/2021   3:52 PM CDT   To: Ed Garnica MD     , improved from prev level (253) 2 yrs ago.   Not on any supplements.   Result sent to Dr. Garnica.       OUTCOME:  Orders sent to:  [facility]   Phone:  [phone]   Fax:  [phone]

## 2021-10-25 NOTE — LETTER
PHYSICIAN ORDERS    DATE & TIME ISSUED: 2021 9:41 AM  PATIENT NAME: Harshad Beatty   : 1980     [facility] MR# [if applicable]: [id number]     DIAGNOSIS / ICD - 10 CODES    Kidney Transplanted (Z94.0)    After Care Following Organ Transplant (Z48.298)    Long Term Use of Medication (Z79.899)    Immunosuppressed Status (Z92.25)    Complications Kidney Transplant (T86.10)      Please repeat in 3 months:    Phosphorus    25-OH vit D      Patient should release information to the [facility].   Please fax results to the Transplant Center at [phone].  Any questions please call [phone].          Ed Garnica MD

## 2021-10-25 NOTE — LETTER
PHYSICIAN ORDERS    DATE & TIME ISSUED: 2021 9:41 AM  PATIENT NAME: Harshad Beatty   : 1980     [facility] MR# [if applicable]: [id number]     DIAGNOSIS / ICD - 10 CODES    Kidney Transplanted (Z94.0)    After Care Following Organ Transplant (Z48.298)    Long Term Use of Medication (Z79.899)    Immunosuppressed Status (Z92.25)    Complications Kidney Transplant (T86.10)      Please repeat in 3 months:    Phosphorus    25-OH vit D      Patient should release information to the [facility].   Please fax results to the Transplant Center at [phone].  Any questions please call [phone].          Ed Garnica MD

## 2021-12-07 DIAGNOSIS — Z94.0 KIDNEY REPLACED BY TRANSPLANT: Primary | ICD-10-CM

## 2021-12-07 RX ORDER — TACROLIMUS 1 MG/1
CAPSULE ORAL
Qty: 270 CAPSULE | Refills: 3 | Status: SHIPPED | OUTPATIENT
Start: 2021-12-07 | End: 2022-03-03

## 2021-12-07 RX ORDER — TACROLIMUS 0.5 MG/1
0.5 CAPSULE ORAL EVERY EVENING
Qty: 90 CAPSULE | Refills: 3 | Status: SHIPPED | OUTPATIENT
Start: 2021-12-07 | End: 2022-03-03

## 2021-12-29 ENCOUNTER — LAB REQUISITION (OUTPATIENT)
Dept: LAB | Facility: CLINIC | Age: 41
End: 2021-12-29
Payer: COMMERCIAL

## 2021-12-29 DIAGNOSIS — Z79.899 OTHER LONG TERM (CURRENT) DRUG THERAPY: ICD-10-CM

## 2021-12-29 DIAGNOSIS — Z94.0 KIDNEY TRANSPLANT STATUS: ICD-10-CM

## 2021-12-29 LAB
ANION GAP SERPL CALCULATED.3IONS-SCNC: 11 MMOL/L (ref 5–18)
BUN SERPL-MCNC: 21 MG/DL (ref 8–22)
CALCIUM SERPL-MCNC: 10.2 MG/DL (ref 8.5–10.5)
CHLORIDE BLD-SCNC: 106 MMOL/L (ref 98–107)
CO2 SERPL-SCNC: 23 MMOL/L (ref 22–31)
CREAT SERPL-MCNC: 1.84 MG/DL (ref 0.7–1.3)
ERYTHROCYTE [DISTWIDTH] IN BLOOD BY AUTOMATED COUNT: 12.8 % (ref 10–15)
GFR SERPL CREATININE-BSD FRML MDRD: 47 ML/MIN/1.73M2
GLUCOSE BLD-MCNC: 113 MG/DL (ref 70–125)
HCT VFR BLD AUTO: 40.1 % (ref 40–53)
HGB BLD-MCNC: 12.5 G/DL (ref 13.3–17.7)
MCH RBC QN AUTO: 26.5 PG (ref 26.5–33)
MCHC RBC AUTO-ENTMCNC: 31.2 G/DL (ref 31.5–36.5)
MCV RBC AUTO: 85 FL (ref 78–100)
PLATELET # BLD AUTO: 291 10E3/UL (ref 150–450)
POTASSIUM BLD-SCNC: 4.4 MMOL/L (ref 3.5–5)
RBC # BLD AUTO: 4.71 10E6/UL (ref 4.4–5.9)
SODIUM SERPL-SCNC: 140 MMOL/L (ref 136–145)
TACROLIMUS BLD-MCNC: 6.1 UG/L (ref 5–15)
TME LAST DOSE: NORMAL H
TME LAST DOSE: NORMAL H
WBC # BLD AUTO: 3.6 10E3/UL (ref 4–11)

## 2021-12-29 PROCEDURE — 80048 BASIC METABOLIC PNL TOTAL CA: CPT | Mod: ORL | Performed by: INTERNAL MEDICINE

## 2021-12-29 PROCEDURE — 36415 COLL VENOUS BLD VENIPUNCTURE: CPT | Mod: ORL | Performed by: INTERNAL MEDICINE

## 2021-12-29 PROCEDURE — 85027 COMPLETE CBC AUTOMATED: CPT | Mod: ORL | Performed by: INTERNAL MEDICINE

## 2021-12-29 PROCEDURE — 80197 ASSAY OF TACROLIMUS: CPT | Mod: ORL | Performed by: INTERNAL MEDICINE

## 2022-02-05 NOTE — PROGRESS NOTES
Transplant Surgery Progress Note    Transplants:  8/7/2018 (Kidney); Postoperative day:  41  S: s/p LDKT for ESRD 2/2 HTN and FSGS. Recovering well. No pain. Incision well-healed, no hernia. Did have rise in creatinine that was assessed with biopsy. Biopsy negative for rejection or recurrent FSGS. Likely etiology supratherapeutic tacrolimus levels. Has had dose decreased to 4 BID- last level   10.8 from 9/13. Cr today 2.11 from 2.40, close to baseline 2-2.1. Making good urine, no swelling/dyspnea. Blood pressures well-controlled.     Transplant History:    Transplant Type:  LDKT  Donor Type: Living   Transplant Date:  8/7/2018 (Kidney)   Ureteral Stent:  No   Crossmatch:  negative   DSA at Tx:  No  Baseline Cr: 2-2.2   DeNovo DSA: No    Acute Rejection Hx:  No    Present Maintenance Immunosuppression:  Tacrolimus and Mycophenolate mofetil    CMV IgG Ab Discordance:  No  EBV IgG Ab Discordance:  No    BK Viremia:  No  EBV Viremia:  No    Transplant Coordinator: Mirna Harrell     Transplant Office Phone Number: [phone]     Immunsupresent Medications     Immunosuppressive Agents Disp Start End    mycophenolate (GENERIC EQUIVALENT) 250 MG capsule 180 capsule 9/5/2018     Sig - Route: Take 3 capsules (750 mg) by mouth 2 times daily - Oral    Class: E-Prescribe    tacrolimus (GENERIC EQUIVALENT) 0.5 MG capsule 60 capsule 9/11/2018     Sig: Hold, dose change.    Patient not taking:  Reported on 9/17/2018       Class: E-Prescribe    tacrolimus (GENERIC EQUIVALENT) 1 MG capsule 240 capsule 9/11/2018     Sig - Route: Take 4 capsules (4 mg) by mouth 2 times daily - Oral    Class: E-Prescribe          Possible Immunosuppression-related side effects:   []             headache  []             vivid dreams  []             irritability  []             cognitive difficuties  []             fine tremor  []             nausea  []             diarrhea  []             neuropathy      []             edema  []             renal  calcineurin toxicity  []             hyperkalemia  []             post-transplant diabetes  []             decreased appetite  []             increased appetite  []             other:  []             none    Prescription Medications as of 9/17/2018             atorvastatin (LIPITOR) 10 MG tablet Take 1 tablet (10 mg) by mouth daily    carvedilol (COREG) 25 MG tablet Take 2 tablets (50 mg) by mouth 2 times daily (with meals)    magnesium oxide (MAG-OX) 400 MG tablet Take 1 tablet (400 mg) by mouth daily (with lunch)    mycophenolate (GENERIC EQUIVALENT) 250 MG capsule Take 3 capsules (750 mg) by mouth 2 times daily    sodium bicarbonate 650 MG tablet Take 2 tablets (1,300 mg) by mouth 2 times daily    sulfamethoxazole-trimethoprim (BACTRIM/SEPTRA) 400-80 MG per tablet 1 tablet Monday, Wednesday, Friday    tacrolimus (GENERIC EQUIVALENT) 1 MG capsule Take 4 capsules (4 mg) by mouth 2 times daily    valGANciclovir (VALCYTE) 450 MG tablet Take 1 tablet (450 mg) by mouth daily Titrate dose up to a max of 2 tabs (900 mg) by mouth daily when directed by your transplant team.    acetaminophen (TYLENOL) 325 MG tablet Take 1-2 tablets (325-650 mg) by mouth every 4 hours as needed for mild pain or fever    aspirin 325 MG EC tablet Take 1 tablet (325 mg) by mouth daily    ondansetron (ZOFRAN-ODT) 4 MG ODT tab Take 1 tablet (4 mg) by mouth every 6 hours as needed for nausea or vomiting    tacrolimus (GENERIC EQUIVALENT) 0.5 MG capsule Hold, dose change.          O:   Temp:  [98.2  F (36.8  C)] 98.2  F (36.8  C)  Pulse:  [71] 71  BP: (125)/(75) 125/75  SpO2:  [99 %] 99 %  General Appearance: in no apparent distress.   Skin: Normal, no rashes or jaundice  Heart: regular rate and rhythm, normal S1 and S2  Lungs: easy respirations, no audible wheezing.  Abdomen: Abdomen soft. Nontender. No hernia with straining. Incision well-healed. No tenderness over kidney graft. Extremities: Tremor absent.   Edema: absent.    Transplant  Immunosuppression Labs Latest Ref Rng & Units 9/13/2018 9/10/2018 9/7/2018 9/5/2018 9/3/2018   Tacro Level 5.0 - 15.0 ug/L 10.8 10.3 10.4 13.0 14.1   Tacro Level - 09/12/2018 20:00 09/09/2018 20:15 09/06/2018 20:15 [id number]   Creat 0.66 - 1.25 mg/dL - - - 2.03(H) -   BUN 7 - 30 mg/dL - - - 36(H) -   WBC 4.0 - 11.0 10e9/L - - - 2.5(L) -   Neutrophil % - - - 76.6 -   ANEU 1.6 - 8.3 10e9/L - - - 1.9 -       Chemistries:   Recent Labs   Lab Test  09/05/18   0845   BUN  36*   CR  2.03*   GFRESTIMATED  37*   GLC  86     No results found for: A1C, CPEPT  Recent Labs   Lab Test  07/30/18   1132   ALBUMIN  3.9   BILITOTAL  0.5   ALKPHOS  50   AST  4   ALT  17     Urine Studies:  Recent Labs   Lab Test  08/15/18   1000   COLOR  Straw   APPEARANCE  Clear   URINEGLC  Negative   URINEBILI  Negative   URINEKETONE  Negative   SG  1.008   UBLD  Small*   URINEPH  7.0   PROTEIN  Negative   NITRITE  Negative   LEUKEST  Negative   RBCU  1   WBCU  <1     Recent Labs   Lab Test  08/14/18   0800   UTPG  0.47*     Hematology:   Recent Labs   Lab Test  09/05/18   0845  08/31/18   0845  08/29/18   0845   HGB  10.7*  10.8*  11.1*   PLT  276  299  297   WBC  2.5*  2.3*  2.5*     Coags:   Recent Labs   Lab Test  05/31/17   0734   INR  1.05     HLA antibodies:   SA1 Hi Risk Aggie   Date Value Ref Range Status   09/10/2018 None  Final     SA1 Mod Risk Aggie   Date Value Ref Range Status   09/10/2018 None  Final     SA2 Hi Risk Aggie   Date Value Ref Range Status   09/10/2018 None  Final     SA2 Mod Risk Aggie   Date Value Ref Range Status   09/10/2018 None  Final       Assessment: Harshad Beatty is doing well s/p LDKT:  Issues we addressed during his visit include:    -creatinine: He has good graft function. We anticipate that his creatinine remains stably elevated due to his overall mass, but that it may fall in the future as his tacrolimus goals and levels decrease.  -RUE AV fistula, aneurysmal- as he recovers from this surgery, he may  choose a time that is convenient for fistula ligation/excision. This is not urgent and he agrees that he would like to make further recovery prior to this surgery.     Plan:    1. Graft function: good  2. Immunosuppression Management: No change continue tacrolimus 4 BID,  mycophenolate 750 BID.  Complexity of management:Low.  Contributing factors: organ dysfunction  Followup: may follow up with transplant nephrology for continued appointments. Discussed that he should contact us for appointment when ready to discuss     I have reviewed history, examined patient and discussed plan with the fellow/resident/CONCHITA.  I concur with the findings in this note.    Immunosuppressive regimen, management and long term risks discussed in detail. Changes, when applicable made as per orders.      Total time: 15 min  Counseling time: 10 min                    Unknown if ever smoked

## 2022-02-09 NOTE — ANESTHESIA PROCEDURE NOTES
Peripheral Nerve Block Procedure Note  Staff -   Anesthesiologist:  Skyler Rios MD  Resident/Fellow: Jose Judge MD    Performed By: resident  Procedure performed by resident/CRNA in presence of a teaching physician.        Location: OR AFTER induction  Procedure Start/Stop TImes:      7/24/2020 7:58 AM     7/24/2020 8:06 AM    patient identified, IV checked, site marked, risks and benefits discussed, informed consent, monitors and equipment checked, pre-op evaluation, at physician/surgeon's request and post-op pain management      Correct Patient: Yes      Correct Position: Yes      Correct Site: Yes      Correct Procedure: Yes      Correct Laterality:  Yes    Site Marked:  Yes  Procedure details:     Procedure:  TAP    Diagnosis:  Post operative pain    Laterality:  Bilateral    Position:  Supine    Sterile Prep: chloraprep, mask and sterile gloves      Local skin infiltration:  None    Needle:  Short bevel    Needle gauge:  21    Needle length (mm):  110    Ultrasound: Yes      Ultrasound used to identify targeted nerve, plexus, or vascular structure and placed a needle adjacent to it      Permanent Image entered into patiient's record      Abnormal pain on injection: No      Blood Aspirated: No      Paresthesias:  No    Bleeding at site: No      Bolus via:  Needle    Infusion Method:  Single Shot    Complications:  None  Assessment/Narrative:     Injection made incrementally with aspirations every (mL):  5     Right subcostal and Right classic TAP block          Bexarotene Counseling:  I discussed with the patient the risks of bexarotene including but not limited to hair loss, dry lips/skin/eyes, liver abnormalities, hyperlipidemia, pancreatitis, depression/suicidal ideation, photosensitivity, drug rash/allergic reactions, hypothyroidism, anemia, leukopenia, infection, cataracts, and teratogenicity.  Patient understands that they will need regular blood tests to check lipid profile, liver function tests, white blood cell count, thyroid function tests and pregnancy test if applicable.

## 2022-02-10 DIAGNOSIS — Z94.0 KIDNEY REPLACED BY TRANSPLANT: ICD-10-CM

## 2022-02-10 RX ORDER — MYCOPHENOLATE MOFETIL 250 MG/1
1000 CAPSULE ORAL 2 TIMES DAILY
Qty: 720 CAPSULE | Refills: 3 | Status: SHIPPED | OUTPATIENT
Start: 2022-02-10 | End: 2023-03-26

## 2022-03-01 PROCEDURE — 80197 ASSAY OF TACROLIMUS: CPT | Performed by: INTERNAL MEDICINE

## 2022-03-02 ENCOUNTER — TELEPHONE (OUTPATIENT)
Dept: TRANSPLANT | Facility: CLINIC | Age: 42
End: 2022-03-02
Payer: COMMERCIAL

## 2022-03-02 DIAGNOSIS — Z94.0 IMMUNOSUPPRESSIVE MANAGEMENT ENCOUNTER FOLLOWING KIDNEY TRANSPLANT: Primary | ICD-10-CM

## 2022-03-02 DIAGNOSIS — Z48.298 AFTERCARE FOLLOWING ORGAN TRANSPLANT: ICD-10-CM

## 2022-03-02 DIAGNOSIS — Z79.899 IMMUNOSUPPRESSIVE MANAGEMENT ENCOUNTER FOLLOWING KIDNEY TRANSPLANT: Primary | ICD-10-CM

## 2022-03-02 DIAGNOSIS — Z94.0 KIDNEY REPLACED BY TRANSPLANT: ICD-10-CM

## 2022-03-02 NOTE — LETTER
PHYSICIAN ORDERS    DATE & TIME ISSUED: March 3, 2022 11:20 AM  PATIENT NAME: Harshad Beatty   : 1980     [facility] MR# [if applicable]: [id number]     DIAGNOSIS / ICD - 10 CODES    Kidney Transplanted (Z94.0)    After Care Following Organ Transplant (Z48.298)    Long Term Use of Medication (Z79.899)      Please recheck:    Tacrolimus level (pls indicate date and time of last dose)      Patient should release information to the [facility].   Please fax results to the Transplant Center at [phone].  Any questions please call [phone].        .

## 2022-03-02 NOTE — LETTER
PHYSICIAN ORDERS    DATE & TIME ISSUED: March 3, 2022 11:20 AM  PATIENT NAME: Harshad Beatty   : 1980     [facility] MR# [if applicable]: [id number]     DIAGNOSIS / ICD - 10 CODES    Kidney Transplanted (Z94.0)    After Care Following Organ Transplant (Z48.298)    Long Term Use of Medication (Z79.899)      Please recheck:    Tacrolimus level (pls indicate date and time of last dose)      Patient should release information to the [facility].   Please fax results to the Transplant Center at [phone].  Any questions please call [phone].        .

## 2022-03-02 NOTE — TELEPHONE ENCOUNTER
Tacrolimus = 8.8  (3/1/22)  Goal 4-6  Current Tac dose 2.0 / 1.5    No DSA  Previous levels > 6    PLAN:   Call and confirm this was a good 12-hour trough.   Verify dose 2 mg / 1.5 mg.   Confirm no new medications or illness (laith. Diarrhea).   If good trough, decrease dose to 1.5 mg BID.   Recheck level in 2 weeks and make sure it is a good trough to avoid additional lab draws.      OUTCOME:  Called Harshad Beatty and left  re: dose change and labs.  Lab order sent to:  Located within [facility]   Phone:  [phone]   Fax:  [phone]          Updated prescription sent to:  [facility]/pharmacy #[id number] [place], [place] - [address]

## 2022-03-03 RX ORDER — TACROLIMUS 0.5 MG/1
0.5 CAPSULE ORAL 2 TIMES DAILY
Qty: 180 CAPSULE | Refills: 3 | Status: SHIPPED | OUTPATIENT
Start: 2022-03-03 | End: 2023-04-12

## 2022-03-03 RX ORDER — TACROLIMUS 1 MG/1
1 CAPSULE ORAL 2 TIMES DAILY
Qty: 180 CAPSULE | Refills: 3 | Status: SHIPPED | OUTPATIENT
Start: 2022-03-03 | End: 2023-03-26

## 2022-03-07 DIAGNOSIS — Z79.899 ENCOUNTER FOR LONG-TERM (CURRENT) USE OF HIGH-RISK MEDICATION: ICD-10-CM

## 2022-03-07 DIAGNOSIS — Z79.899 IMMUNOSUPPRESSIVE MANAGEMENT ENCOUNTER FOLLOWING KIDNEY TRANSPLANT: ICD-10-CM

## 2022-03-07 DIAGNOSIS — Z94.0 IMMUNOSUPPRESSIVE MANAGEMENT ENCOUNTER FOLLOWING KIDNEY TRANSPLANT: ICD-10-CM

## 2022-03-07 DIAGNOSIS — Z94.0 KIDNEY TRANSPLANTED: Primary | ICD-10-CM

## 2022-03-07 DIAGNOSIS — Z48.298 AFTERCARE FOLLOWING ORGAN TRANSPLANT: ICD-10-CM

## 2022-03-07 DIAGNOSIS — D84.9 IMMUNOSUPPRESSED STATUS (H): ICD-10-CM

## 2022-05-24 ENCOUNTER — LAB REQUISITION (OUTPATIENT)
Dept: LAB | Facility: CLINIC | Age: 42
End: 2022-05-24
Payer: COMMERCIAL

## 2022-05-24 DIAGNOSIS — Z79.899 OTHER LONG TERM (CURRENT) DRUG THERAPY: ICD-10-CM

## 2022-05-24 DIAGNOSIS — Z94.0 KIDNEY TRANSPLANT STATUS: ICD-10-CM

## 2022-05-24 LAB
ANION GAP SERPL CALCULATED.3IONS-SCNC: 9 MMOL/L (ref 5–18)
BUN SERPL-MCNC: 16 MG/DL (ref 8–22)
CALCIUM SERPL-MCNC: 9.4 MG/DL (ref 8.5–10.5)
CHLORIDE BLD-SCNC: 109 MMOL/L (ref 98–107)
CO2 SERPL-SCNC: 22 MMOL/L (ref 22–31)
CREAT SERPL-MCNC: 1.58 MG/DL (ref 0.7–1.3)
ERYTHROCYTE [DISTWIDTH] IN BLOOD BY AUTOMATED COUNT: 12.8 % (ref 10–15)
GFR SERPL CREATININE-BSD FRML MDRD: 56 ML/MIN/1.73M2
GLUCOSE BLD-MCNC: 111 MG/DL (ref 70–125)
HCT VFR BLD AUTO: 37.2 % (ref 40–53)
HGB BLD-MCNC: 11.6 G/DL (ref 13.3–17.7)
MCH RBC QN AUTO: 25.9 PG (ref 26.5–33)
MCHC RBC AUTO-ENTMCNC: 31.2 G/DL (ref 31.5–36.5)
MCV RBC AUTO: 83 FL (ref 78–100)
PLATELET # BLD AUTO: 271 10E3/UL (ref 150–450)
POTASSIUM BLD-SCNC: 4 MMOL/L (ref 3.5–5)
RBC # BLD AUTO: 4.48 10E6/UL (ref 4.4–5.9)
SODIUM SERPL-SCNC: 140 MMOL/L (ref 136–145)
TACROLIMUS BLD-MCNC: 6.4 UG/L (ref 5–15)
TME LAST DOSE: NORMAL H
TME LAST DOSE: NORMAL H
WBC # BLD AUTO: 3.4 10E3/UL (ref 4–11)

## 2022-05-24 PROCEDURE — 80197 ASSAY OF TACROLIMUS: CPT | Mod: ORL | Performed by: INTERNAL MEDICINE

## 2022-05-24 PROCEDURE — 85027 COMPLETE CBC AUTOMATED: CPT | Mod: ORL | Performed by: INTERNAL MEDICINE

## 2022-05-24 PROCEDURE — 36415 COLL VENOUS BLD VENIPUNCTURE: CPT | Mod: ORL | Performed by: INTERNAL MEDICINE

## 2022-05-24 PROCEDURE — 80048 BASIC METABOLIC PNL TOTAL CA: CPT | Mod: ORL | Performed by: INTERNAL MEDICINE

## 2022-05-24 PROCEDURE — 86833 HLA CLASS II HIGH DEFIN QUAL: CPT | Mod: ORL | Performed by: INTERNAL MEDICINE

## 2022-05-24 PROCEDURE — 86832 HLA CLASS I HIGH DEFIN QUAL: CPT | Mod: ORL | Performed by: INTERNAL MEDICINE

## 2022-05-24 PROCEDURE — 87799 DETECT AGENT NOS DNA QUANT: CPT | Mod: ORL | Performed by: INTERNAL MEDICINE

## 2022-05-25 LAB — BKV DNA # SPEC NAA+PROBE: NOT DETECTED COPIES/ML

## 2022-05-26 PROCEDURE — 84156 ASSAY OF PROTEIN URINE: CPT | Mod: ORL | Performed by: INTERNAL MEDICINE

## 2022-05-27 LAB
ALBUMIN MFR UR ELPH: 10.7 MG/DL
CREAT UR-MCNC: 123 MG/DL
DONOR IDENTIFICATION: NORMAL
DSA COMMENTS: NORMAL
DSA PRESENT: NO
DSA TEST METHOD: NORMAL
ORGAN: NORMAL
PROT/CREAT 24H UR: 0.09 MG/MG CR
SA 1 CELL: NORMAL
SA 1 TEST METHOD: NORMAL
SA 2 CELL: NORMAL
SA 2 TEST METHOD: NORMAL
SA1 HI RISK ABY: NORMAL
SA1 MOD RISK ABY: NORMAL
SA2 HI RISK ABY: NORMAL
SA2 MOD RISK ABY: NORMAL
UNACCEPTABLE ANTIGENS: NORMAL
UNOS CPRA: 0
ZZZSA 1  COMMENTS: NORMAL
ZZZSA 2 COMMENTS: NORMAL

## 2022-09-20 NOTE — TELEPHONE ENCOUNTER
Dsetiny Flores is a 79 year old female presents to the Urgent Care clinic today with complaints of burn/blisters on R hand, notes she spilled hot soup on her hand yesterday.              Patient would like communication of their results via:        Cell Phone:   Telephone Information:   Mobile [phone]     Okay to leave a message containing results? Yes     Post Kidney and Pancreas Transplant Team Conference  Date: 12/19/2018  Transplant Coordinator: Mirna Harrell     Attendees:  []  Dr. Bahena [] Yanni Field, RN  [x] Lynda Wilson LPN     []  Dr. Gaxiola [x] Tonya Albrecht RN [] Sandra Epperson LPN   [x]  Dr. Connolly [] Sherry Jimenez RN    [x]  Dr. Ronquillo [] Julieta Perez RN    [] Dr. High [x] Eli Harrell RN    [] Dr. Lema [] Randy Peter RN    [x] Dr. Mitchell [] Dorcas Lopez RN    [] Surgery Fellow [x] Blanca Sow RN    [] Tracy Finley NP              Verbal Plan Read Back:   Start CellCept 1000 mg BID    Routed to RN Coordinator   Lynda Patricia voiced understanding of med change.

## 2022-12-09 ENCOUNTER — LAB REQUISITION (OUTPATIENT)
Dept: LAB | Facility: CLINIC | Age: 42
End: 2022-12-09
Payer: COMMERCIAL

## 2022-12-09 DIAGNOSIS — Z94.0 KIDNEY TRANSPLANT STATUS: ICD-10-CM

## 2022-12-09 DIAGNOSIS — Z79.899 OTHER LONG TERM (CURRENT) DRUG THERAPY: ICD-10-CM

## 2022-12-09 LAB
ANION GAP SERPL CALCULATED.3IONS-SCNC: 10 MMOL/L (ref 5–18)
BUN SERPL-MCNC: 13 MG/DL (ref 8–22)
CALCIUM SERPL-MCNC: 9.6 MG/DL (ref 8.5–10.5)
CHLORIDE BLD-SCNC: 106 MMOL/L (ref 98–107)
CO2 SERPL-SCNC: 22 MMOL/L (ref 22–31)
CREAT SERPL-MCNC: 1.58 MG/DL (ref 0.7–1.3)
ERYTHROCYTE [DISTWIDTH] IN BLOOD BY AUTOMATED COUNT: 12.6 % (ref 10–15)
GFR SERPL CREATININE-BSD FRML MDRD: 56 ML/MIN/1.73M2
GLUCOSE BLD-MCNC: 105 MG/DL (ref 70–125)
HCT VFR BLD AUTO: 37.2 % (ref 40–53)
HGB BLD-MCNC: 11.8 G/DL (ref 13.3–17.7)
MCH RBC QN AUTO: 26 PG (ref 26.5–33)
MCHC RBC AUTO-ENTMCNC: 31.7 G/DL (ref 31.5–36.5)
MCV RBC AUTO: 82 FL (ref 78–100)
PLATELET # BLD AUTO: 309 10E3/UL (ref 150–450)
POTASSIUM BLD-SCNC: 4 MMOL/L (ref 3.5–5)
RBC # BLD AUTO: 4.53 10E6/UL (ref 4.4–5.9)
SODIUM SERPL-SCNC: 138 MMOL/L (ref 136–145)
TACROLIMUS BLD-MCNC: 6.2 UG/L (ref 5–15)
TME LAST DOSE: NORMAL H
TME LAST DOSE: NORMAL H
WBC # BLD AUTO: 4.4 10E3/UL (ref 4–11)

## 2022-12-09 PROCEDURE — 80048 BASIC METABOLIC PNL TOTAL CA: CPT | Mod: ORL | Performed by: INTERNAL MEDICINE

## 2022-12-09 PROCEDURE — 80197 ASSAY OF TACROLIMUS: CPT | Mod: ORL | Performed by: INTERNAL MEDICINE

## 2022-12-09 PROCEDURE — 87799 DETECT AGENT NOS DNA QUANT: CPT | Mod: ORL | Performed by: INTERNAL MEDICINE

## 2022-12-09 PROCEDURE — 36415 COLL VENOUS BLD VENIPUNCTURE: CPT | Mod: ORL | Performed by: INTERNAL MEDICINE

## 2022-12-09 PROCEDURE — 85027 COMPLETE CBC AUTOMATED: CPT | Mod: ORL | Performed by: INTERNAL MEDICINE

## 2022-12-12 LAB — BKV DNA # SPEC NAA+PROBE: NOT DETECTED COPIES/ML

## 2023-03-26 DIAGNOSIS — Z94.0 KIDNEY REPLACED BY TRANSPLANT: Primary | ICD-10-CM

## 2023-03-27 DIAGNOSIS — Z48.298 AFTERCARE FOLLOWING ORGAN TRANSPLANT: ICD-10-CM

## 2023-03-27 DIAGNOSIS — Z94.0 KIDNEY TRANSPLANTED: Primary | ICD-10-CM

## 2023-03-27 RX ORDER — MYCOPHENOLATE MOFETIL 250 MG/1
1000 CAPSULE ORAL 2 TIMES DAILY
Qty: 720 CAPSULE | Refills: 1 | Status: SHIPPED | OUTPATIENT
Start: 2023-03-27 | End: 2023-11-06

## 2023-03-27 RX ORDER — TACROLIMUS 1 MG/1
1 CAPSULE ORAL 2 TIMES DAILY
Qty: 180 CAPSULE | Refills: 1 | Status: SHIPPED | OUTPATIENT
Start: 2023-03-27 | End: 2023-11-06

## 2023-04-12 DIAGNOSIS — Z94.0 KIDNEY REPLACED BY TRANSPLANT: Primary | ICD-10-CM

## 2023-04-12 RX ORDER — TACROLIMUS 0.5 MG/1
0.5 CAPSULE ORAL 2 TIMES DAILY
Qty: 180 CAPSULE | Refills: 1 | Status: SHIPPED | OUTPATIENT
Start: 2023-04-12 | End: 2023-10-16

## 2023-05-02 ENCOUNTER — LAB (OUTPATIENT)
Dept: LAB | Facility: CLINIC | Age: 43
End: 2023-05-02
Payer: COMMERCIAL

## 2023-05-02 DIAGNOSIS — D84.9 IMMUNOSUPPRESSED STATUS (H): ICD-10-CM

## 2023-05-02 DIAGNOSIS — Z48.298 AFTERCARE FOLLOWING ORGAN TRANSPLANT: ICD-10-CM

## 2023-05-02 DIAGNOSIS — Z94.0 KIDNEY TRANSPLANTED: ICD-10-CM

## 2023-05-02 DIAGNOSIS — Z79.899 IMMUNOSUPPRESSIVE MANAGEMENT ENCOUNTER FOLLOWING KIDNEY TRANSPLANT: ICD-10-CM

## 2023-05-02 DIAGNOSIS — Z79.899 ENCOUNTER FOR LONG-TERM (CURRENT) USE OF HIGH-RISK MEDICATION: ICD-10-CM

## 2023-05-02 DIAGNOSIS — Z94.0 IMMUNOSUPPRESSIVE MANAGEMENT ENCOUNTER FOLLOWING KIDNEY TRANSPLANT: ICD-10-CM

## 2023-05-02 LAB
ANION GAP SERPL CALCULATED.3IONS-SCNC: 8 MMOL/L (ref 5–18)
BUN SERPL-MCNC: 12 MG/DL (ref 8–22)
CALCIUM SERPL-MCNC: 9.7 MG/DL (ref 8.5–10.5)
CHLORIDE BLD-SCNC: 108 MMOL/L (ref 98–107)
CO2 SERPL-SCNC: 23 MMOL/L (ref 22–31)
CREAT SERPL-MCNC: 1.61 MG/DL (ref 0.7–1.3)
ERYTHROCYTE [DISTWIDTH] IN BLOOD BY AUTOMATED COUNT: 12.8 % (ref 10–15)
GFR SERPL CREATININE-BSD FRML MDRD: 54 ML/MIN/1.73M2
GLUCOSE BLD-MCNC: 108 MG/DL (ref 70–125)
HCT VFR BLD AUTO: 37.9 % (ref 40–53)
HGB BLD-MCNC: 12.4 G/DL (ref 13.3–17.7)
MCH RBC QN AUTO: 26.2 PG (ref 26.5–33)
MCHC RBC AUTO-ENTMCNC: 32.7 G/DL (ref 31.5–36.5)
MCV RBC AUTO: 80 FL (ref 78–100)
PLATELET # BLD AUTO: 296 10E3/UL (ref 150–450)
POTASSIUM BLD-SCNC: 4.2 MMOL/L (ref 3.5–5)
RBC # BLD AUTO: 4.74 10E6/UL (ref 4.4–5.9)
SODIUM SERPL-SCNC: 139 MMOL/L (ref 136–145)
TACROLIMUS BLD-MCNC: 7.2 UG/L (ref 5–15)
TME LAST DOSE: NORMAL H
TME LAST DOSE: NORMAL H
WBC # BLD AUTO: 3.6 10E3/UL (ref 4–11)

## 2023-05-02 PROCEDURE — 85027 COMPLETE CBC AUTOMATED: CPT

## 2023-05-02 PROCEDURE — 36415 COLL VENOUS BLD VENIPUNCTURE: CPT

## 2023-05-02 PROCEDURE — 80048 BASIC METABOLIC PNL TOTAL CA: CPT

## 2023-05-02 PROCEDURE — 80197 ASSAY OF TACROLIMUS: CPT

## 2023-05-03 ENCOUNTER — TELEPHONE (OUTPATIENT)
Dept: TRANSPLANT | Facility: CLINIC | Age: 43
End: 2023-05-03
Payer: COMMERCIAL

## 2023-05-03 NOTE — TELEPHONE ENCOUNTER
Tacrolimus = 7.2  (5/2/23)  Goal 4-6  Current Tac dose 1.5 mg BID    Previous levels slightly above goal on same dose.    PLAN:   Call and confirm this was a good 12-hour trough.   Verify current dose.   Confirm no new medications or illness (laith. Diarrhea).  If good trough and correct dose above, recommend:   decrease dose to 1.5 mg / 1.0 mg.   Recheck level in 1-2 weeks and make sure it is a good trough to avoid additional lab draws.

## 2023-06-16 ENCOUNTER — LAB (OUTPATIENT)
Dept: LAB | Facility: CLINIC | Age: 43
End: 2023-06-16
Payer: COMMERCIAL

## 2023-06-16 DIAGNOSIS — Z94.0 IMMUNOSUPPRESSIVE MANAGEMENT ENCOUNTER FOLLOWING KIDNEY TRANSPLANT: ICD-10-CM

## 2023-06-16 DIAGNOSIS — Z94.0 KIDNEY TRANSPLANTED: ICD-10-CM

## 2023-06-16 DIAGNOSIS — Z79.899 IMMUNOSUPPRESSIVE MANAGEMENT ENCOUNTER FOLLOWING KIDNEY TRANSPLANT: ICD-10-CM

## 2023-06-16 DIAGNOSIS — Z48.298 AFTERCARE FOLLOWING ORGAN TRANSPLANT: ICD-10-CM

## 2023-06-16 DIAGNOSIS — D84.9 IMMUNOSUPPRESSED STATUS (H): ICD-10-CM

## 2023-06-16 DIAGNOSIS — Z79.899 ENCOUNTER FOR LONG-TERM (CURRENT) USE OF HIGH-RISK MEDICATION: ICD-10-CM

## 2023-06-16 LAB
TACROLIMUS BLD-MCNC: 5.7 UG/L (ref 5–15)
TME LAST DOSE: NORMAL H
TME LAST DOSE: NORMAL H

## 2023-06-16 PROCEDURE — 36415 COLL VENOUS BLD VENIPUNCTURE: CPT

## 2023-06-16 PROCEDURE — 80197 ASSAY OF TACROLIMUS: CPT

## 2023-09-27 ENCOUNTER — VIRTUAL VISIT (OUTPATIENT)
Dept: TRANSPLANT | Facility: CLINIC | Age: 43
End: 2023-09-27
Attending: INTERNAL MEDICINE
Payer: COMMERCIAL

## 2023-09-27 DIAGNOSIS — D63.1 ANEMIA IN STAGE 3A CHRONIC KIDNEY DISEASE (H): ICD-10-CM

## 2023-09-27 DIAGNOSIS — I15.1 HTN, KIDNEY TRANSPLANT RELATED: Primary | ICD-10-CM

## 2023-09-27 DIAGNOSIS — E87.20 METABOLIC ACIDOSIS: ICD-10-CM

## 2023-09-27 DIAGNOSIS — N18.31 STAGE 3A CHRONIC KIDNEY DISEASE (H): ICD-10-CM

## 2023-09-27 DIAGNOSIS — Z94.0 KIDNEY REPLACED BY TRANSPLANT: ICD-10-CM

## 2023-09-27 DIAGNOSIS — Z94.0 HTN, KIDNEY TRANSPLANT RELATED: Primary | ICD-10-CM

## 2023-09-27 DIAGNOSIS — N18.31 ANEMIA IN STAGE 3A CHRONIC KIDNEY DISEASE (H): ICD-10-CM

## 2023-09-27 DIAGNOSIS — Z48.298 AFTERCARE FOLLOWING ORGAN TRANSPLANT: ICD-10-CM

## 2023-09-27 DIAGNOSIS — E66.01 MORBID OBESITY (H): ICD-10-CM

## 2023-09-27 DIAGNOSIS — D84.9 IMMUNOSUPPRESSED STATUS (H): ICD-10-CM

## 2023-09-27 PROCEDURE — 99214 OFFICE O/P EST MOD 30 MIN: CPT | Mod: VID | Performed by: INTERNAL MEDICINE

## 2023-09-27 RX ORDER — SODIUM BICARBONATE
1 POWDER (GRAM) MISCELLANEOUS DAILY
COMMUNITY
Start: 2023-09-27 | End: 2023-09-27

## 2023-09-27 RX ORDER — LOSARTAN POTASSIUM 50 MG/1
50 TABLET ORAL DAILY
Qty: 90 TABLET | Refills: 3 | Status: SHIPPED | OUTPATIENT
Start: 2023-09-27 | End: 2024-08-13

## 2023-09-27 RX ORDER — SODIUM BICARBONATE
1 POWDER (GRAM) MISCELLANEOUS DAILY
COMMUNITY
Start: 2023-09-27

## 2023-09-27 NOTE — LETTER
9/27/2023      RE: Harshad Beatty  [address] [facility] [address] [facility] [id number]       Virtual Visit Details    Type of service:  Video Visit     Originating Location (pt. Location): Home  Distant Location (provider location):  Off-site  Platform used for Video Visit: Praidp      TRANSPLANT NEPHROLOGY CHRONIC POST TRANSPLANT VISIT    Assessment & Plan  # LDKT: Stable   - Baseline Creatinine:  ~ 1.6-1.9   - Proteinuria: Not checked recently   - Date DSA Last Checked: May/2022      Latest DSA: No cPRA: 0%   - BK Viremia: No   - Kidney Tx Biopsy: Jan 14, 2019; Result: No diagnostic evidence of acute rejection.   Mild interstitial fibrosis and tubular atrophy.    # Immunosuppression: Tacrolimus immediate release (goal 4-6) and Mycophenolate mofetil (dose 1000 mg every 12 hours)   - Continue with intensive monitoring of immunosuppression for efficacy and toxicity.   - Changes: No    # Infection Prophylaxis:   Last CD4 Level: 262 (Jul/2019)  - PJP: None    # Hypertension: Borderline control;  Goal BP: < 130/80   - Changes: Yes - Will increase losartan to 50 mg daily.   - Patient has no cardiac issues or indication for a beta blocker and would consider changing carvedilol to CCB, such as amlodipine.    # Elevated Blood Glucose: Glucose generally running ~ 100-110s   - Management as per primary care.    # Anemia in Chronic Renal Disease: Hgb: Stable      LYRIC: No   - Iron studies: Not checked recently    # Mineral Bone Disorder:    - Secondary renal hyperparathyroidism; PTH level: Not checked recently        On treatment: None  - Vitamin D; level: Not checked recently        On supplement: No  - Calcium; level: Normal        On supplement: No    # Electrolytes:   - Potassium; level: Normal        On supplement: No  - Magnesium; level: Not checked recently        On supplement: No  - Bicarbonate; level: Normal        On supplement: Yes    # Obesity, Class III (BMI = 42.9): Stable weight.   - Recommend weight loss for overall health by  increasing exercise and watching caloric intake.   - Will refer patient to Weight Management Program.    # Skin Cancer Risk:    - Discussed sun protection and recommend regular follow up with Dermatology.    # Medical Compliance: No.  Evidence of labs less than recommended and infrequent transplant follow-up.  - Discussed importance of checking labs regularly as recommended, taking medications as prescribed and attending scheduled medical appointments.    # Health Maintenance and Vaccination Review: Not Reviewed    # Transplant History:  Etiology of Kidney Failure: Focal segmental glomerulosclerosis (FSGS)  Tx: LDKT  Transplant: 8/7/2018 (Kidney)  Significant changes in immunosuppression: None  Significant transplant-related complications: None    Transplant Office Phone Number: [phone]    Assessment and plan was discussed with the patient and he voiced his understanding and agreement.    Return visit: Return in about 1 year (around 9/27/2024).    Juan Bahena MD    Chief Complaint  Mr. Beatty is a 43 year old here for kidney transplant and immunosuppression management.    History of Present Illness   Mr. Beatty reports feeling good overall with some medical complaints.  Since last clinic visit, patient reports no hospitalizations or new medical complaints and has been doing well overall.  His energy level has been okay and near normal.  He is active and does get some exercise.  Denies any chest pain or shortness of breath with exertion.  Rare leg swelling.    Appetite is good and weight is mostly stable being at 303 lbs with last clinic visit in May.  No nausea, vomiting or diarrhea.  No heartburn symptoms.  No fever, sweats or chills.  Occasional night sweats, maybe once a week and mild, with no change for years.    Home BP:  130-140/80s    Problem List  Patient Active Problem List   Diagnosis     Sickle cell trait (H24)     Anemia in chronic renal disease     Secondary renal hyperparathyroidism (H24)      Kidney replaced by transplant     Immunosuppressed status (H24)     Need for pneumocystis prophylaxis     HTN, kidney transplant related     Neutropenia (H24)     Aftercare following organ transplant     Vitamin D deficiency     Obesity     Complication of arteriovenous dialysis fistula     Stage 3a chronic kidney disease (H)     Metabolic acidosis       Allergies  No Known Allergies    Medications  Current Outpatient Medications   Medication Sig     losartan (COZAAR) 50 MG tablet Take 1 tablet (50 mg) by mouth daily     Sodium Bicarbonate, antacid, POWD Take 1 g by mouth daily Takes 1 tsp daily     atorvastatin (LIPITOR) 10 MG tablet Take 1 tablet (10 mg) by mouth daily     carvedilol (COREG) 25 MG tablet Take 2 tablets (50 mg) by mouth 2 times daily (with meals)     diazepam (VALIUM) 5 MG tablet Take 1 tablet (5 mg) by mouth every 6 hours as needed for anxiety     mycophenolate (GENERIC EQUIVALENT) 250 MG capsule Take 4 capsules (1,000 mg) by mouth 2 times daily     tacrolimus (GENERIC EQUIVALENT) 1 MG capsule Take 1 capsule (1 mg) by mouth 2 times daily TOTAL DOSE: 1.5 mg twice a day     tacrolimus (GENERIC) 0.5 MG capsule TAKE 1 CAPSULE (0.5 MG) BY MOUTH 2 TIMES DAILY TOTAL DOSE: 1.5 MG TWICE A DAY     No current facility-administered medications for this visit.     Medications Discontinued During This Encounter   Medication Reason     magnesium oxide (MAG-OX) 400 MG tablet      sodium bicarbonate 650 MG tablet      Sodium Bicarbonate, antacid, POWD      losartan (COZAAR) 25 MG tablet        Physical Exam  Vital Signs: There were no vitals taken for this visit.    GENERAL APPEARANCE: alert and no distress  HENT: no obvious abnormalities on appearance  RESP: breathing appears unremarkable with normal rate, no audible wheezing or cough and no apparent shortness of breath with conversation  MS: extremities normal - no gross deformities noted  SKIN: no apparent rash and normal skin tone  NEURO: speech is clear  with no obvious neurological deficits  PSYCH: mentation appears normal and affect normal    Data        Latest Ref Rng & Units 10/6/2023     8:15 AM 5/2/2023     8:00 AM 12/9/2022     8:10 AM   Renal   Sodium 135 - 145 mmol/L 138  139  138    K 3.4 - 5.3 mmol/L 4.2  4.2  4.0    Cl 98 - 107 mmol/L 105  108  106    Cl (external) 98 - 107 mmol/L 105  108  106    CO2 22 - 29 mmol/L 27  23  22    Urea Nitrogen 8 - 22 mg/dL  12  13    Urea Nitrogen 6.0 - 20.0 mg/dL 13.6      Creatinine 0.67 - 1.17 mg/dL 1.51  1.61  1.58    Glucose 70 - 99 mg/dL 105  108  105    Calcium 8.6 - 10.0 mg/dL 10.2  9.7  9.6          Latest Ref Rng & Units 10/22/2021    10:05 AM 3/4/2019     8:37 AM 11/5/2018     9:10 AM   Bone Health   Phosphorus 2.5 - 4.5 mg/dL   2.5    Phos (external) 2.5 - 4.5 mg/dL   2.5       Parathyroid Hormone Intact 18 - 80 pg/mL 156  253     PTHi (external) 10 - 86 pg/mL  253        Vit D Def 30 - 80 ug/L 7          This result is from an external source.         Latest Ref Rng & Units 10/6/2023     8:15 AM 5/2/2023     8:00 AM 12/9/2022     8:10 AM   Heme   WBC 4.0 - 11.0 10e3/uL 3.7  3.6  4.4    Hgb 13.3 - 17.7 g/dL 13.0  12.4  11.8    Plt 150 - 450 10e3/uL 297  296  309          Latest Ref Rng & Units 4/22/2021    12:00 AM 8/5/2019     8:10 AM 2/4/2019     8:40 AM   Liver   AP 45 - 120 U/L  89  146    AP (external) 45 - 120 U/L  89     146    TBili 0.0 - 1.0 mg/dL  0.3  0.5    TBili (external) 0.0 - 1.0 mg/dL  0.3     0.5    Bilirubin Direct <=0.5 mg/dL  0.2  0.2    DBili (external) <=0.5 mg/dL  0.2     0.2    ALT 8 - 45 IU/L 25     21  30    ALT (external) 0 - 45 U/L  21     30    AST 2 - 40 IU/L 20     17  29    AST (external) 0 - 40 U/L  17     29    Tot Protein 6.0 - 8.0 g/dL  7.3  7.7    Tot Protein (external) 6.0 - 8.0 g/dL  7.3     7.7    Albumin 3.5 - 5.0 g/dL  3.9  4.2    Albumin (external) 3.5 - 5.0 g/dL  3.9     4.2        This result is from an external source.         Latest Ref Rng & Units 7/1/2019      9:10 AM 12/7/2018    12:23 PM 5/7/2014     5:02 AM   Pancreas   A1C 4.2 - 6.1 % 6.2  5.5  5.5    A1C (external) 4.2 - 6.1 % 6.2             This result is from an external source.         Latest Ref Rng & Units 7/30/2018    11:32 AM   Iron studies   Iron 35 - 180 ug/dL 46    Iron Saturation Index 15 - 46 % 21    Ferritin 26 - 388 ng/mL 716          Latest Ref Rng & Units 5/8/2020     9:27 AM 2/21/2020     9:11 AM 12/30/2019    10:05 AM   [facility] Virology   BK Quant Log Ext <2.7 Log copies/mL Not Calculated  Not Calculated  Not Calculated    BK Quant Result Ext NEG copies/mL Not Detected  Not Detected  Not Detected    BK Quant Spec Ext  Plasma   Plasma        Recent Labs   Lab Test 05/02/23  0800 06/16/23  0755 10/06/23  0815   DOSTAC 5/1/2023 6/15/2023 10/5/2023   TACROL 7.2 5.7 5.6     Recent Labs   Lab Test 08/27/18  0810 09/03/18  0953 09/05/18  0845 09/10/18  0937 10/29/18  0850 11/05/18  0910 12/07/18  1223   DOSMPA Not Provided  --  904,180,815  --   --   --  Not Provided   MPACID 6.41*   < > 4.52*   < > 2.15 1.76 1.21   MPAG 98.1*   < > 80.5   < > 47.3 41.9 51.9    < > = values in this interval not displayed.        Juan Bahena MD

## 2023-09-27 NOTE — LETTER
9/27/2023         RE: Harshad Beatty  [address] [facility] [place] [address]        Dear Colleague,    Thank you for referring your patient, Harshad Beatty, to the [facility]. Please see a copy of my visit note below.        TRANSPLANT NEPHROLOGY CHRONIC POST TRANSPLANT VISIT    Assessment & Plan  # LDKT: Stable   - Baseline Creatinine:  ~ 1.6-1.9   - Proteinuria: Not checked recently   - Date DSA Last Checked: May/2022      Latest DSA: No cPRA: 0%   - BK Viremia: No   - Kidney Tx Biopsy: Jan 14, 2019; Result: No diagnostic evidence of acute rejection.   Mild interstitial fibrosis and tubular atrophy.    # Immunosuppression: Tacrolimus immediate release (goal 4-6) and Mycophenolate mofetil (dose 1000 mg every 12 hours)   - Continue with intensive monitoring of immunosuppression for efficacy and toxicity.   - Changes: No    # Infection Prophylaxis:   Last CD4 Level: 262 (Jul/2019)  - PJP: None    # Hypertension: Borderline control;  Goal BP: < 130/80   - Changes: Yes - Will increase losartan to 50 mg daily.   - Patient has no cardiac issues or indication for a beta blocker and would consider changing carvedilol to CCB, such as amlodipine.    # Elevated Blood Glucose: Glucose generally running ~ 100-110s   - Management as per primary care.    # Anemia in Chronic Renal Disease: Hgb: Stable      LYRIC: No   - Iron studies: Not checked recently    # Mineral Bone Disorder:    - Secondary renal hyperparathyroidism; PTH level: Not checked recently        On treatment: None  - Vitamin D; level: Not checked recently        On supplement: No  - Calcium; level: Normal        On supplement: No    # Electrolytes:   - Potassium; level: Normal        On supplement: No  - Magnesium; level: Not checked recently        On supplement: No  - Bicarbonate; level: Normal        On supplement: Yes    # Obesity, Class III (BMI = 42.9): Stable weight.   - Recommend weight loss for overall health by increasing exercise  and watching caloric intake.   - Will refer patient to Weight Management Program.    # Skin Cancer Risk:    - Discussed sun protection and recommend regular follow up with Dermatology.    # Medical Compliance: No.  Evidence of labs less than recommended and infrequent transplant follow-up.  - Discussed importance of checking labs regularly as recommended, taking medications as prescribed and attending scheduled medical appointments.    # Health Maintenance and Vaccination Review: Not Reviewed    # Transplant History:  Etiology of Kidney Failure: Focal segmental glomerulosclerosis (FSGS)  Tx: LDKT  Transplant: 8/7/2018 (Kidney)  Significant changes in immunosuppression: None  Significant transplant-related complications: None    Transplant Office Phone Number: [phone]    Assessment and plan was discussed with the patient and he voiced his understanding and agreement.    Return visit: Return in about 1 year (around 9/27/2024).    Juan Bahena MD    Chief Complaint  Mr. Beatty is a 43 year old here for kidney transplant and immunosuppression management.    History of Present Illness   Mr. Beatty reports feeling good overall with some medical complaints.  Since last clinic visit, patient reports no hospitalizations or new medical complaints and has been doing well overall.  His energy level has been okay and near normal.  He is active and does get some exercise.  Denies any chest pain or shortness of breath with exertion.  Rare leg swelling.    Appetite is good and weight is mostly stable being at 303 lbs with last clinic visit in May.  No nausea, vomiting or diarrhea.  No heartburn symptoms.  No fever, sweats or chills.  Occasional night sweats, maybe once a week and mild, with no change for years.    Home BP:  130-140/80s    Problem List  Patient Active Problem List   Diagnosis    Sickle cell trait (H24)    Anemia in chronic renal disease    Secondary renal hyperparathyroidism (H24)    Kidney replaced by  transplant    Immunosuppressed status (H24)    Need for pneumocystis prophylaxis    HTN, kidney transplant related    Neutropenia (H24)    Aftercare following organ transplant    Vitamin D deficiency    Obesity    Complication of arteriovenous dialysis fistula    Stage 3a chronic kidney disease (H)    Metabolic acidosis       Allergies  No Known Allergies    Medications  Current Outpatient Medications   Medication Sig    losartan (COZAAR) 50 MG tablet Take 1 tablet (50 mg) by mouth daily    Sodium Bicarbonate, antacid, POWD Take 1 g by mouth daily Takes 1 tsp daily    atorvastatin (LIPITOR) 10 MG tablet Take 1 tablet (10 mg) by mouth daily    carvedilol (COREG) 25 MG tablet Take 2 tablets (50 mg) by mouth 2 times daily (with meals)    diazepam (VALIUM) 5 MG tablet Take 1 tablet (5 mg) by mouth every 6 hours as needed for anxiety    mycophenolate (GENERIC EQUIVALENT) 250 MG capsule Take 4 capsules (1,000 mg) by mouth 2 times daily    tacrolimus (GENERIC EQUIVALENT) 1 MG capsule Take 1 capsule (1 mg) by mouth 2 times daily TOTAL DOSE: 1.5 mg twice a day    tacrolimus (GENERIC) 0.5 MG capsule TAKE 1 CAPSULE (0.5 MG) BY MOUTH 2 TIMES DAILY TOTAL DOSE: 1.5 MG TWICE A DAY     No current facility-administered medications for this visit.     Medications Discontinued During This Encounter   Medication Reason    magnesium oxide (MAG-OX) 400 MG tablet     sodium bicarbonate 650 MG tablet     Sodium Bicarbonate, antacid, POWD     losartan (COZAAR) 25 MG tablet        Physical Exam  Vital Signs: There were no vitals taken for this visit.    GENERAL APPEARANCE: alert and no distress  HENT: no obvious abnormalities on appearance  RESP: breathing appears unremarkable with normal rate, no audible wheezing or cough and no apparent shortness of breath with conversation  MS: extremities normal - no gross deformities noted  SKIN: no apparent rash and normal skin tone  NEURO: speech is clear with no obvious neurological deficits  PSYCH:  mentation appears normal and affect normal    Data        Latest Ref Rng & Units 10/6/2023     8:15 AM 5/2/2023     8:00 AM 12/9/2022     8:10 AM   Renal   Sodium 135 - 145 mmol/L 138  139  138    K 3.4 - 5.3 mmol/L 4.2  4.2  4.0    Cl 98 - 107 mmol/L 105  108  106    Cl (external) 98 - 107 mmol/L 105  108  106    CO2 22 - 29 mmol/L 27  23  22    Urea Nitrogen 8 - 22 mg/dL  12  13    Urea Nitrogen 6.0 - 20.0 mg/dL 13.6      Creatinine 0.67 - 1.17 mg/dL 1.51  1.61  1.58    Glucose 70 - 99 mg/dL 105  108  105    Calcium 8.6 - 10.0 mg/dL 10.2  9.7  9.6          Latest Ref Rng & Units 10/22/2021    10:05 AM 3/4/2019     8:37 AM 11/5/2018     9:10 AM   Bone Health   Phosphorus 2.5 - 4.5 mg/dL   2.5    Phos (external) 2.5 - 4.5 mg/dL   2.5       Parathyroid Hormone Intact 18 - 80 pg/mL 156  253     PTHi (external) 10 - 86 pg/mL  253        Vit D Def 30 - 80 ug/L 7          This result is from an external source.         Latest Ref Rng & Units 10/6/2023     8:15 AM 5/2/2023     8:00 AM 12/9/2022     8:10 AM   Heme   WBC 4.0 - 11.0 10e3/uL 3.7  3.6  4.4    Hgb 13.3 - 17.7 g/dL 13.0  12.4  11.8    Plt 150 - 450 10e3/uL 297  296  309          Latest Ref Rng & Units 4/22/2021    12:00 AM 8/5/2019     8:10 AM 2/4/2019     8:40 AM   Liver   AP 45 - 120 U/L  89  146    AP (external) 45 - 120 U/L  89     146    TBili 0.0 - 1.0 mg/dL  0.3  0.5    TBili (external) 0.0 - 1.0 mg/dL  0.3     0.5    Bilirubin Direct <=0.5 mg/dL  0.2  0.2    DBili (external) <=0.5 mg/dL  0.2     0.2    ALT 8 - 45 IU/L 25     21  30    ALT (external) 0 - 45 U/L  21     30    AST 2 - 40 IU/L 20     17  29    AST (external) 0 - 40 U/L  17     29    Tot Protein 6.0 - 8.0 g/dL  7.3  7.7    Tot Protein (external) 6.0 - 8.0 g/dL  7.3     7.7    Albumin 3.5 - 5.0 g/dL  3.9  4.2    Albumin (external) 3.5 - 5.0 g/dL  3.9     4.2        This result is from an external source.         Latest Ref Rng & Units 7/1/2019     9:10 AM 12/7/2018    12:23 PM 5/7/2014      5:02 AM   Pancreas   A1C 4.2 - 6.1 % 6.2  5.5  5.5    A1C (external) 4.2 - 6.1 % 6.2             This result is from an external source.         Latest Ref Rng & Units 7/30/2018    11:32 AM   Iron studies   Iron 35 - 180 ug/dL 46    Iron Saturation Index 15 - 46 % 21    Ferritin 26 - 388 ng/mL 716          Latest Ref Rng & Units 5/8/2020     9:27 AM 2/21/2020     9:11 AM 12/30/2019    10:05 AM   [facility] Txp Virology   BK Quant Log Ext <2.7 Log copies/mL Not Calculated  Not Calculated  Not Calculated    BK Quant Result Ext NEG copies/mL Not Detected  Not Detected  Not Detected    BK Quant Spec Ext  Plasma   Plasma        Recent Labs   Lab Test 05/02/23  0800 06/16/23  0755 10/06/23  0815   DOSTAC 5/1/2023 6/15/2023 10/5/2023   TACROL 7.2 5.7 5.6     Recent Labs   Lab Test 08/27/18  0810 09/03/18  0953 09/05/18  0845 09/10/18  0937 10/29/18  0850 11/05/18  0910 12/07/18  1223   DOSMPA Not Provided  --  904,180,815  --   --   --  Not Provided   MPACID 6.41*   < > 4.52*   < > 2.15 1.76 1.21   MPAG 98.1*   < > 80.5   < > 47.3 41.9 51.9    < > = values in this interval not displayed.          Again, thank you for allowing me to participate in the care of your patient.        Sincerely,        Juan Bahena MD

## 2023-09-27 NOTE — PROGRESS NOTES
Virtual Visit Details    Type of service:  Video Visit     Originating Location (pt. Location): Home  Distant Location (provider location):  Off-site  Platform used for Video Visit: Well      TRANSPLANT NEPHROLOGY CHRONIC POST TRANSPLANT VISIT    Assessment & Plan   # LDKT: Stable   - Baseline Creatinine:  ~ 1.6-1.9   - Proteinuria: Not checked recently   - Date DSA Last Checked: May/2022      Latest DSA: No cPRA: 0%   - BK Viremia: No   - Kidney Tx Biopsy: Jan 14, 2019; Result: No diagnostic evidence of acute rejection.   Mild interstitial fibrosis and tubular atrophy.    # Immunosuppression: Tacrolimus immediate release (goal 4-6) and Mycophenolate mofetil (dose 1000 mg every 12 hours)   - Continue with intensive monitoring of immunosuppression for efficacy and toxicity.   - Changes: No    # Infection Prophylaxis:   Last CD4 Level: 262 (Jul/2019)  - PJP: None    # Hypertension: Borderline control;  Goal BP: < 130/80   - Changes: Yes - Will increase losartan to 50 mg daily.   - Patient has no cardiac issues or indication for a beta blocker and would consider changing carvedilol to CCB, such as amlodipine.    # Elevated Blood Glucose: Glucose generally running ~ 100-110s   - Management as per primary care.    # Anemia in Chronic Renal Disease: Hgb: Stable      LYRIC: No   - Iron studies: Not checked recently    # Mineral Bone Disorder:    - Secondary renal hyperparathyroidism; PTH level: Not checked recently        On treatment: None  - Vitamin D; level: Not checked recently        On supplement: No  - Calcium; level: Normal        On supplement: No    # Electrolytes:   - Potassium; level: Normal        On supplement: No  - Magnesium; level: Not checked recently        On supplement: No  - Bicarbonate; level: Normal        On supplement: Yes    # Obesity, Class III (BMI = 42.9): Stable weight.   - Recommend weight loss for overall health by increasing exercise and watching caloric intake.   - Will refer patient to  Weight Management Program.    # Skin Cancer Risk:    - Discussed sun protection and recommend regular follow up with Dermatology.    # Medical Compliance: No.  Evidence of labs less than recommended and infrequent transplant follow-up.  - Discussed importance of checking labs regularly as recommended, taking medications as prescribed and attending scheduled medical appointments.    # Health Maintenance and Vaccination Review: Not Reviewed    # Transplant History:  Etiology of Kidney Failure: Focal segmental glomerulosclerosis (FSGS)  Tx: LDKT  Transplant: 8/7/2018 (Kidney)  Significant changes in immunosuppression: None  Significant transplant-related complications: None    Transplant Office Phone Number: [phone]    Assessment and plan was discussed with the patient and he voiced his understanding and agreement.    Return visit: Return in about 1 year (around 9/27/2024).    Juan Bahena MD    Chief Complaint   Mr. Beatty is a 43 year old here for kidney transplant and immunosuppression management.    History of Present Illness    Mr. Beatty reports feeling good overall with some medical complaints.  Since last clinic visit, patient reports no hospitalizations or new medical complaints and has been doing well overall.  His energy level has been okay and near normal.  He is active and does get some exercise.  Denies any chest pain or shortness of breath with exertion.  Rare leg swelling.    Appetite is good and weight is mostly stable being at 303 lbs with last clinic visit in May.  No nausea, vomiting or diarrhea.  No heartburn symptoms.  No fever, sweats or chills.  Occasional night sweats, maybe once a week and mild, with no change for years.    Home BP:  130-140/80s    Problem List   Patient Active Problem List   Diagnosis    Sickle cell trait (H24)    Anemia in chronic renal disease    Secondary renal hyperparathyroidism (H24)    Kidney replaced by transplant    Immunosuppressed status (H24)    Need  for pneumocystis prophylaxis    HTN, kidney transplant related    Neutropenia (H24)    Aftercare following organ transplant    Vitamin D deficiency    Obesity    Complication of arteriovenous dialysis fistula    Stage 3a chronic kidney disease (H)    Metabolic acidosis       Allergies   No Known Allergies    Medications   Current Outpatient Medications   Medication Sig    losartan (COZAAR) 50 MG tablet Take 1 tablet (50 mg) by mouth daily    Sodium Bicarbonate, antacid, POWD Take 1 g by mouth daily Takes 1 tsp daily    atorvastatin (LIPITOR) 10 MG tablet Take 1 tablet (10 mg) by mouth daily    carvedilol (COREG) 25 MG tablet Take 2 tablets (50 mg) by mouth 2 times daily (with meals)    diazepam (VALIUM) 5 MG tablet Take 1 tablet (5 mg) by mouth every 6 hours as needed for anxiety    mycophenolate (GENERIC EQUIVALENT) 250 MG capsule Take 4 capsules (1,000 mg) by mouth 2 times daily    tacrolimus (GENERIC EQUIVALENT) 1 MG capsule Take 1 capsule (1 mg) by mouth 2 times daily TOTAL DOSE: 1.5 mg twice a day    tacrolimus (GENERIC) 0.5 MG capsule TAKE 1 CAPSULE (0.5 MG) BY MOUTH 2 TIMES DAILY TOTAL DOSE: 1.5 MG TWICE A DAY     No current facility-administered medications for this visit.     Medications Discontinued During This Encounter   Medication Reason    magnesium oxide (MAG-OX) 400 MG tablet     sodium bicarbonate 650 MG tablet     Sodium Bicarbonate, antacid, POWD     losartan (COZAAR) 25 MG tablet        Physical Exam   Vital Signs: There were no vitals taken for this visit.    GENERAL APPEARANCE: alert and no distress  HENT: no obvious abnormalities on appearance  RESP: breathing appears unremarkable with normal rate, no audible wheezing or cough and no apparent shortness of breath with conversation  MS: extremities normal - no gross deformities noted  SKIN: no apparent rash and normal skin tone  NEURO: speech is clear with no obvious neurological deficits  PSYCH: mentation appears normal and affect  normal    Data         Latest Ref Rng & Units 10/6/2023     8:15 AM 5/2/2023     8:00 AM 12/9/2022     8:10 AM   Renal   Sodium 135 - 145 mmol/L 138  139  138    K 3.4 - 5.3 mmol/L 4.2  4.2  4.0    Cl 98 - 107 mmol/L 105  108  106    Cl (external) 98 - 107 mmol/L 105  108  106    CO2 22 - 29 mmol/L 27  23  22    Urea Nitrogen 8 - 22 mg/dL  12  13    Urea Nitrogen 6.0 - 20.0 mg/dL 13.6      Creatinine 0.67 - 1.17 mg/dL 1.51  1.61  1.58    Glucose 70 - 99 mg/dL 105  108  105    Calcium 8.6 - 10.0 mg/dL 10.2  9.7  9.6          Latest Ref Rng & Units 10/22/2021    10:05 AM 3/4/2019     8:37 AM 11/5/2018     9:10 AM   Bone Health   Phosphorus 2.5 - 4.5 mg/dL   2.5    Phos (external) 2.5 - 4.5 mg/dL   2.5       Parathyroid Hormone Intact 18 - 80 pg/mL 156  253     PTHi (external) 10 - 86 pg/mL  253        Vit D Def 30 - 80 ug/L 7          This result is from an external source.         Latest Ref Rng & Units 10/6/2023     8:15 AM 5/2/2023     8:00 AM 12/9/2022     8:10 AM   Heme   WBC 4.0 - 11.0 10e3/uL 3.7  3.6  4.4    Hgb 13.3 - 17.7 g/dL 13.0  12.4  11.8    Plt 150 - 450 10e3/uL 297  296  309          Latest Ref Rng & Units 4/22/2021    12:00 AM 8/5/2019     8:10 AM 2/4/2019     8:40 AM   Liver   AP 45 - 120 U/L  89  146    AP (external) 45 - 120 U/L  89     146    TBili 0.0 - 1.0 mg/dL  0.3  0.5    TBili (external) 0.0 - 1.0 mg/dL  0.3     0.5    Bilirubin Direct <=0.5 mg/dL  0.2  0.2    DBili (external) <=0.5 mg/dL  0.2     0.2    ALT 8 - 45 IU/L 25     21  30    ALT (external) 0 - 45 U/L  21     30    AST 2 - 40 IU/L 20     17  29    AST (external) 0 - 40 U/L  17     29    Tot Protein 6.0 - 8.0 g/dL  7.3  7.7    Tot Protein (external) 6.0 - 8.0 g/dL  7.3     7.7    Albumin 3.5 - 5.0 g/dL  3.9  4.2    Albumin (external) 3.5 - 5.0 g/dL  3.9     4.2        This result is from an external source.         Latest Ref Rng & Units 7/1/2019     9:10 AM 12/7/2018    12:23 PM 5/7/2014     5:02 AM   Pancreas   A1C 4.2 - 6.1 %  6.2  5.5  5.5    A1C (external) 4.2 - 6.1 % 6.2             This result is from an external source.         Latest Ref Rng & Units 7/30/2018    11:32 AM   Iron studies   Iron 35 - 180 ug/dL 46    Iron Saturation Index 15 - 46 % 21    Ferritin 26 - 388 ng/mL 716          Latest Ref Rng & Units 5/8/2020     9:27 AM 2/21/2020     9:11 AM 12/30/2019    10:05 AM   [facility] Virology   BK Quant Log Ext <2.7 Log copies/mL Not Calculated  Not Calculated  Not Calculated    BK Quant Result Ext NEG copies/mL Not Detected  Not Detected  Not Detected    BK Quant Spec Ext  Plasma   Plasma        Recent Labs   Lab Test 05/02/23  0800 06/16/23  0755 10/06/23  0815   DOSTAC 5/1/2023 6/15/2023 10/5/2023   TACROL 7.2 5.7 5.6     Recent Labs   Lab Test 08/27/18  0810 09/03/18  0953 09/05/18  0845 09/10/18  0937 10/29/18  0850 11/05/18  0910 12/07/18  1223   DOSMPA Not Provided  --  904,180,815  --   --   --  Not Provided   MPACID 6.41*   < > 4.52*   < > 2.15 1.76 1.21   MPAG 98.1*   < > 80.5   < > 47.3 41.9 51.9    < > = values in this interval not displayed.

## 2023-09-28 ENCOUNTER — TELEPHONE (OUTPATIENT)
Dept: TRANSPLANT | Facility: CLINIC | Age: 43
End: 2023-09-28
Payer: COMMERCIAL

## 2023-09-28 NOTE — TELEPHONE ENCOUNTER
ISSUE  Spong, MD Brittani Mckinley Teresa, RN  [facility],    Patient was a NO SHOW for his virtual clinic visit.  I tried to call him too, but couldn't reach him.  He hasn't been seen in a couple of years.  Last labs in May, so behind on that too.    Besides routine transplant labs, please check the following labs: PTH, Vitamin D, Magnesium, Iron panel, HbA1c, and UPC.    Thanks.    OUTCOME  Call placed to Harshad. No answer.   Letter sent to patient's home with lab orders.

## 2023-09-28 NOTE — LETTER
Harshad Beatty  [address] [facility] [place] [address]                September 28, 2023    Hi Harshad,  You missed your transplant nephrology appointment on September 27. Please call [phone] option #4 at your earliest convenience to reschedule.  Also, you are overdue for transplant labs. Your lab orders have been updated. Please have a full set of labs draw in the next 1-2 weeks.    Please call your transplant coordinator at [phone] with any questions or concerns.    Thanks,  Your Transplant Team

## 2023-09-28 NOTE — LETTER
PHYSICIAN ORDERS      DATE & TIME ISSUED: 2022 11:52 AM  PATIENT NAME: Harshad Beatty   : 1980     [facility] MR# [if applicable]: [id number]     DIAGNOSIS / ICD - 10 CODES  Kidney Transplanted (Z94.0)  After Care Following Organ Transplant (Z48.298)  Long Term Use of Medication (Z79.899)      Every 3 months  Hemogram and Platelet  Basic Metabolic Panel (Sodium,potassium,chloride,CO2,creatinine,urea,nitrogen,glucose,calcium)   / tacrolimus / Prograf drug level    Every 6 months  Urine for protein creatinine ratio    Yearly   PRA / DSA level (mailers provided by patient)      Patient should release information to the [facility].   Please fax results to the Transplant Center at [phone].  Any questions please call [phone] or [phone].        Ed Garnica MD

## 2023-10-06 ENCOUNTER — LAB (OUTPATIENT)
Dept: LAB | Facility: CLINIC | Age: 43
End: 2023-10-06
Payer: COMMERCIAL

## 2023-10-06 DIAGNOSIS — Z79.899 IMMUNOSUPPRESSIVE MANAGEMENT ENCOUNTER FOLLOWING KIDNEY TRANSPLANT: ICD-10-CM

## 2023-10-06 DIAGNOSIS — Z94.0 KIDNEY TRANSPLANTED: ICD-10-CM

## 2023-10-06 DIAGNOSIS — Z79.899 ENCOUNTER FOR LONG-TERM (CURRENT) USE OF HIGH-RISK MEDICATION: ICD-10-CM

## 2023-10-06 DIAGNOSIS — D84.9 IMMUNOSUPPRESSED STATUS (H): ICD-10-CM

## 2023-10-06 DIAGNOSIS — Z48.298 AFTERCARE FOLLOWING ORGAN TRANSPLANT: ICD-10-CM

## 2023-10-06 DIAGNOSIS — Z94.0 IMMUNOSUPPRESSIVE MANAGEMENT ENCOUNTER FOLLOWING KIDNEY TRANSPLANT: ICD-10-CM

## 2023-10-06 LAB
ALBUMIN MFR UR ELPH: 20.9 MG/DL
ANION GAP SERPL CALCULATED.3IONS-SCNC: 6 MMOL/L (ref 7–15)
BUN SERPL-MCNC: 13.6 MG/DL (ref 6–20)
CALCIUM SERPL-MCNC: 10.2 MG/DL (ref 8.6–10)
CHLORIDE SERPL-SCNC: 105 MMOL/L (ref 98–107)
CREAT SERPL-MCNC: 1.51 MG/DL (ref 0.67–1.17)
CREAT UR-MCNC: 254 MG/DL
DEPRECATED HCO3 PLAS-SCNC: 27 MMOL/L (ref 22–29)
EGFRCR SERPLBLD CKD-EPI 2021: 58 ML/MIN/1.73M2
ERYTHROCYTE [DISTWIDTH] IN BLOOD BY AUTOMATED COUNT: 13 % (ref 10–15)
GLUCOSE SERPL-MCNC: 105 MG/DL (ref 70–99)
HCT VFR BLD AUTO: 40.4 % (ref 40–53)
HGB BLD-MCNC: 13 G/DL (ref 13.3–17.7)
MCH RBC QN AUTO: 26.1 PG (ref 26.5–33)
MCHC RBC AUTO-ENTMCNC: 32.2 G/DL (ref 31.5–36.5)
MCV RBC AUTO: 81 FL (ref 78–100)
PLATELET # BLD AUTO: 297 10E3/UL (ref 150–450)
POTASSIUM SERPL-SCNC: 4.2 MMOL/L (ref 3.4–5.3)
PROT/CREAT 24H UR: 0.08 MG/MG CR (ref 0–0.2)
RBC # BLD AUTO: 4.99 10E6/UL (ref 4.4–5.9)
SODIUM SERPL-SCNC: 138 MMOL/L (ref 135–145)
TACROLIMUS BLD-MCNC: 5.6 UG/L (ref 5–15)
TME LAST DOSE: NORMAL H
TME LAST DOSE: NORMAL H
WBC # BLD AUTO: 3.7 10E3/UL (ref 4–11)

## 2023-10-06 PROCEDURE — 86828 HLA CLASS I&II ANTIBODY QUAL: CPT | Mod: XU

## 2023-10-06 PROCEDURE — 36415 COLL VENOUS BLD VENIPUNCTURE: CPT

## 2023-10-06 PROCEDURE — 85014 HEMATOCRIT: CPT

## 2023-10-06 PROCEDURE — 87799 DETECT AGENT NOS DNA QUANT: CPT

## 2023-10-06 PROCEDURE — 80048 BASIC METABOLIC PNL TOTAL CA: CPT

## 2023-10-06 PROCEDURE — 80197 ASSAY OF TACROLIMUS: CPT

## 2023-10-06 PROCEDURE — 86833 HLA CLASS II HIGH DEFIN QUAL: CPT

## 2023-10-06 PROCEDURE — 84156 ASSAY OF PROTEIN URINE: CPT

## 2023-10-06 PROCEDURE — 86832 HLA CLASS I HIGH DEFIN QUAL: CPT

## 2023-10-09 ENCOUNTER — TELEPHONE (OUTPATIENT)
Dept: TRANSPLANT | Facility: CLINIC | Age: 43
End: 2023-10-09
Payer: COMMERCIAL

## 2023-10-09 DIAGNOSIS — Z79.899 ENCOUNTER FOR LONG-TERM (CURRENT) USE OF HIGH-RISK MEDICATION: ICD-10-CM

## 2023-10-09 DIAGNOSIS — D64.9 ANEMIA: ICD-10-CM

## 2023-10-09 DIAGNOSIS — N25.81 SECONDARY HYPERPARATHYROIDISM (H): ICD-10-CM

## 2023-10-09 DIAGNOSIS — T86.10 COMPLICATIONS, KIDNEY TRANSPLANT: ICD-10-CM

## 2023-10-09 DIAGNOSIS — Z94.0 KIDNEY TRANSPLANTED: ICD-10-CM

## 2023-10-09 DIAGNOSIS — E55.9 VITAMIN D DEFICIENCY: Primary | ICD-10-CM

## 2023-10-09 LAB — BKV DNA # SPEC NAA+PROBE: NOT DETECTED COPIES/ML

## 2023-10-09 NOTE — TELEPHONE ENCOUNTER
Juan Bahena MD Ututalum, Teresa, RN  Please check the following labs: PTH, Vitamin D, Magnesium, Iron panel, HbA1c, and UPC.      Lab order placed:  PTH  Vit D  Mag  Iron panel  Hgb A1c  UPC      OUTCOME:  Called and left detailed VM to complete labs.

## 2023-10-14 DIAGNOSIS — Z94.0 KIDNEY REPLACED BY TRANSPLANT: ICD-10-CM

## 2023-10-16 RX ORDER — TACROLIMUS 0.5 MG/1
0.5 CAPSULE ORAL 2 TIMES DAILY
Qty: 180 CAPSULE | Refills: 3 | Status: SHIPPED | OUTPATIENT
Start: 2023-10-16 | End: 2024-09-16

## 2023-10-17 LAB
DONOR IDENTIFICATION: NORMAL
DSA COMMENTS: NORMAL
DSA PRESENT: NO
DSA TEST METHOD: NORMAL
ORGAN: NORMAL
SA 1 CELL: NORMAL
SA 1 TEST METHOD: NORMAL
SA 2 CELL: NORMAL
SA 2 TEST METHOD: NORMAL
SA1 HI RISK ABY: NORMAL
SA1 MOD RISK ABY: NORMAL
SA2 HI RISK ABY: NORMAL
SA2 MOD RISK ABY: NORMAL
SCR 1 TEST METHOD: NORMAL
SCR1 CELL: NORMAL
SCR1 RESULT: NORMAL
SCR2 CELL: NORMAL
SCR2 RESULT: NORMAL
SCR2 TEST METHOD: NORMAL
UNACCEPTABLE ANTIGENS: NORMAL
UNOS CPRA: 0
ZZZSA 1  COMMENTS: NORMAL
ZZZSA 2 COMMENTS: NORMAL
ZZZSCR1 COMMENTS: NORMAL
ZZZSCR2 COMMENTS: NORMAL

## 2023-10-20 PROBLEM — N18.31 STAGE 3A CHRONIC KIDNEY DISEASE (H): Status: ACTIVE | Noted: 2023-10-20

## 2023-10-20 PROBLEM — K43.2 HERNIA, INCISIONAL: Status: RESOLVED | Noted: 2020-06-22 | Resolved: 2023-10-20

## 2023-10-20 PROBLEM — E83.42 HYPOMAGNESEMIA: Status: RESOLVED | Noted: 2018-08-14 | Resolved: 2023-10-20

## 2023-10-20 PROBLEM — Z94.0 KIDNEY TRANSPLANTED: Status: RESOLVED | Noted: 2019-10-28 | Resolved: 2023-10-20

## 2023-10-20 PROBLEM — E83.52 HYPERCALCEMIA: Status: RESOLVED | Noted: 2018-08-13 | Resolved: 2023-10-20

## 2023-10-20 PROBLEM — E87.20 METABOLIC ACIDOSIS: Status: ACTIVE | Noted: 2023-10-20

## 2023-10-21 NOTE — PATIENT INSTRUCTIONS
Patient Recommendations:  - Increase losartan to 50 mg daily.  - Recommend scheduling an appointment with a Nephrologist at [facility] in 4-6 months or so to establish care with the goal of ongoing co-management between your primary Nephrologist and the Transplant Team.     Transplant Patient Information  Your Post Transplant Coordinator is: Randy Peter  For non urgent items, we encourage you to contact your coordinator/care team online via [employer]  You and your care team can also contact your transplant coordinator Monday - Friday, 8am - 5pm at [phone] (Option 2 to reach the coordinator or Option 4 to schedule an appointment).  After hours for urgent matters, please call [facility] at [phone].

## 2023-10-31 ENCOUNTER — TELEPHONE (OUTPATIENT)
Dept: TRANSPLANT | Facility: CLINIC | Age: 43
End: 2023-10-31
Payer: COMMERCIAL

## 2023-10-31 NOTE — TELEPHONE ENCOUNTER
----- Message -----   From: Barbra Santamaria   Sent: 10/20/2023   8:27 AM CDT   To: Divine Peter RN   Subject: Patient Status Information                       Good Morning Randy:     Please complete the following missing items within the Patient Status Form (found in the Transplant Tab).   1. Functional status   2. Working for income   3. Primary Insurance     Please respond to this message after inputting the information.     Thank you for partnering in our regulatory reporting to Barbra MANDEL - Clinical        OUTCOME:  Called Harshad Beatty. No answer.

## 2023-11-03 NOTE — TELEPHONE ENCOUNTER
Call placed to patient for Annual [employer]OS Patient Status Update -     Questions asked are standardized questions that the [employer] requires all transplant centers to report back to the UNOS.  [employer] requests this information for all transplant recipients (kidney, liver, heart, lung, pancreas, etc).  [employer] utilizes this information to best understand recipient outcomes and equitable distribution of organs.       Patient status form not completed, unable to reach patient. VM left.

## 2023-11-06 DIAGNOSIS — Z94.0 KIDNEY REPLACED BY TRANSPLANT: ICD-10-CM

## 2023-11-06 RX ORDER — TACROLIMUS 1 MG/1
1 CAPSULE ORAL 2 TIMES DAILY
Qty: 180 CAPSULE | Refills: 3 | Status: SHIPPED | OUTPATIENT
Start: 2023-11-06 | End: 2024-08-30

## 2023-11-06 RX ORDER — MYCOPHENOLATE MOFETIL 250 MG/1
1000 CAPSULE ORAL 2 TIMES DAILY
Qty: 720 CAPSULE | Refills: 3 | Status: SHIPPED | OUTPATIENT
Start: 2023-11-06 | End: 2024-05-20

## 2024-01-26 ENCOUNTER — LAB (OUTPATIENT)
Dept: LAB | Facility: CLINIC | Age: 44
End: 2024-01-26
Payer: COMMERCIAL

## 2024-01-26 DIAGNOSIS — Z94.0 KIDNEY TRANSPLANTED: ICD-10-CM

## 2024-01-26 DIAGNOSIS — Z79.899 IMMUNOSUPPRESSIVE MANAGEMENT ENCOUNTER FOLLOWING KIDNEY TRANSPLANT: ICD-10-CM

## 2024-01-26 DIAGNOSIS — Z79.899 ENCOUNTER FOR LONG-TERM (CURRENT) USE OF HIGH-RISK MEDICATION: ICD-10-CM

## 2024-01-26 DIAGNOSIS — D64.9 ANEMIA: ICD-10-CM

## 2024-01-26 DIAGNOSIS — E55.9 VITAMIN D DEFICIENCY: ICD-10-CM

## 2024-01-26 DIAGNOSIS — N25.81 SECONDARY HYPERPARATHYROIDISM (H): ICD-10-CM

## 2024-01-26 DIAGNOSIS — Z48.298 AFTERCARE FOLLOWING ORGAN TRANSPLANT: ICD-10-CM

## 2024-01-26 DIAGNOSIS — T86.10 COMPLICATIONS, KIDNEY TRANSPLANT: ICD-10-CM

## 2024-01-26 DIAGNOSIS — Z94.0 IMMUNOSUPPRESSIVE MANAGEMENT ENCOUNTER FOLLOWING KIDNEY TRANSPLANT: ICD-10-CM

## 2024-01-26 DIAGNOSIS — D84.9 IMMUNOSUPPRESSED STATUS (H): ICD-10-CM

## 2024-01-26 LAB
ANION GAP SERPL CALCULATED.3IONS-SCNC: 8 MMOL/L (ref 7–15)
BK VIRUS SPECIMEN TYPE: NORMAL
BKV DNA # SPEC NAA+PROBE: NOT DETECTED IU/ML
BUN SERPL-MCNC: 14 MG/DL (ref 6–20)
CALCIUM SERPL-MCNC: 9.9 MG/DL (ref 8.6–10)
CHLORIDE SERPL-SCNC: 107 MMOL/L (ref 98–107)
CREAT SERPL-MCNC: 1.53 MG/DL (ref 0.67–1.17)
DEPRECATED HCO3 PLAS-SCNC: 27 MMOL/L (ref 22–29)
EGFRCR SERPLBLD CKD-EPI 2021: 57 ML/MIN/1.73M2
ERYTHROCYTE [DISTWIDTH] IN BLOOD BY AUTOMATED COUNT: 13.2 % (ref 10–15)
FERRITIN SERPL-MCNC: 859 NG/ML (ref 31–409)
GLUCOSE SERPL-MCNC: 119 MG/DL (ref 70–99)
HBA1C MFR BLD: 6.4 %
HCT VFR BLD AUTO: 38.9 % (ref 40–53)
HGB BLD-MCNC: 12.5 G/DL (ref 13.3–17.7)
IRON BINDING CAPACITY (ROCHE): 245 UG/DL (ref 240–430)
IRON SATN MFR SERPL: 33 % (ref 15–46)
IRON SERPL-MCNC: 82 UG/DL (ref 61–157)
MAGNESIUM SERPL-MCNC: 2 MG/DL (ref 1.7–2.3)
MCH RBC QN AUTO: 26.4 PG (ref 26.5–33)
MCHC RBC AUTO-ENTMCNC: 32.1 G/DL (ref 31.5–36.5)
MCV RBC AUTO: 82 FL (ref 78–100)
PLATELET # BLD AUTO: 295 10E3/UL (ref 150–450)
POTASSIUM SERPL-SCNC: 4.2 MMOL/L (ref 3.4–5.3)
PTH-INTACT SERPL-MCNC: 264 PG/ML (ref 15–65)
RBC # BLD AUTO: 4.74 10E6/UL (ref 4.4–5.9)
SODIUM SERPL-SCNC: 142 MMOL/L (ref 135–145)
TACROLIMUS BLD-MCNC: 4.9 UG/L (ref 5–15)
TME LAST DOSE: ABNORMAL H
TME LAST DOSE: ABNORMAL H
VIT D+METAB SERPL-MCNC: 7 NG/ML (ref 20–50)
WBC # BLD AUTO: 3.4 10E3/UL (ref 4–11)

## 2024-01-26 PROCEDURE — 87799 DETECT AGENT NOS DNA QUANT: CPT

## 2024-01-26 PROCEDURE — 85027 COMPLETE CBC AUTOMATED: CPT

## 2024-01-26 PROCEDURE — 83550 IRON BINDING TEST: CPT

## 2024-01-26 PROCEDURE — 82728 ASSAY OF FERRITIN: CPT

## 2024-01-26 PROCEDURE — 83540 ASSAY OF IRON: CPT

## 2024-01-26 PROCEDURE — 83036 HEMOGLOBIN GLYCOSYLATED A1C: CPT

## 2024-01-26 PROCEDURE — 80048 BASIC METABOLIC PNL TOTAL CA: CPT

## 2024-01-26 PROCEDURE — 36415 COLL VENOUS BLD VENIPUNCTURE: CPT

## 2024-01-26 PROCEDURE — 80197 ASSAY OF TACROLIMUS: CPT

## 2024-01-26 PROCEDURE — 83735 ASSAY OF MAGNESIUM: CPT

## 2024-01-26 PROCEDURE — 83970 ASSAY OF PARATHORMONE: CPT

## 2024-01-26 PROCEDURE — 82306 VITAMIN D 25 HYDROXY: CPT

## 2024-01-31 ENCOUNTER — TELEPHONE (OUTPATIENT)
Dept: TRANSPLANT | Facility: CLINIC | Age: 44
End: 2024-01-31
Payer: COMMERCIAL

## 2024-01-31 NOTE — TELEPHONE ENCOUNTER
RE: calcium 9.9  Received: 2 days ago  Ed Spears MD Ututalum, Teresa, RN  If he is already on vit D 200O international unit(s) every day ok to continue as long as Ca<10      ----- Message -----  From: Divine Peter RN  Sent: 1/29/2024   9:10 AM CST  To: Ed Sultana MD  Subject: calcium 9.9                                      Dr Garnica,    You wanted to hold the Vit D d/t calcium being slightly elevated.  Were you looking at the old calcium level?  I will wait before holding Vit D as it is low.    Thanks,  Randy  ----- Message -----  From: Ed Spears MD  Sent: 1/26/2024   7:44 PM CST  To: Divine Peter RN    Borderline high Ca low vit D would hold off on vit D supplement        OUTCOME:  Called, no answer.  Left detailed VM and requested call back.      ADDENDUM:  Harshad called back and confirmed he is not on calcium or vitamin D supplement.

## 2024-05-20 ENCOUNTER — TELEPHONE (OUTPATIENT)
Dept: TRANSPLANT | Facility: CLINIC | Age: 44
End: 2024-05-20
Payer: COMMERCIAL

## 2024-05-20 DIAGNOSIS — Z94.0 KIDNEY REPLACED BY TRANSPLANT: ICD-10-CM

## 2024-05-20 RX ORDER — MYCOPHENOLATE MOFETIL 250 MG/1
1000 CAPSULE ORAL 2 TIMES DAILY
Qty: 720 CAPSULE | Refills: 3 | Status: SHIPPED | OUTPATIENT
Start: 2024-05-20 | End: 2024-09-16

## 2024-05-20 NOTE — TELEPHONE ENCOUNTER
Patient Call: Medication Refill  Route to LPN  Instruct the patient to first contact their pharmacy. If they have called their pharmacy and require further assistance, route to LPN.    Pharmacy Name: [facility]    Pharmacy Location: [place]  Name of Medication: Mycophenolate Dose: 250 mg - 90 day   When will the patient be out of this medication?: Less than 24 hours (Miguel MACHADO, then page if no answer)

## 2024-05-24 ENCOUNTER — LAB (OUTPATIENT)
Dept: LAB | Facility: CLINIC | Age: 44
End: 2024-05-24
Payer: COMMERCIAL

## 2024-05-24 ENCOUNTER — TELEPHONE (OUTPATIENT)
Dept: TRANSPLANT | Facility: CLINIC | Age: 44
End: 2024-05-24
Payer: COMMERCIAL

## 2024-05-24 DIAGNOSIS — Z48.298 AFTERCARE FOLLOWING ORGAN TRANSPLANT: ICD-10-CM

## 2024-05-24 DIAGNOSIS — Z79.899 ENCOUNTER FOR LONG-TERM (CURRENT) USE OF HIGH-RISK MEDICATION: Primary | ICD-10-CM

## 2024-05-24 DIAGNOSIS — Z94.0 IMMUNOSUPPRESSIVE MANAGEMENT ENCOUNTER FOLLOWING KIDNEY TRANSPLANT: ICD-10-CM

## 2024-05-24 DIAGNOSIS — Z79.899 IMMUNOSUPPRESSIVE MANAGEMENT ENCOUNTER FOLLOWING KIDNEY TRANSPLANT: ICD-10-CM

## 2024-05-24 DIAGNOSIS — Z94.0 KIDNEY TRANSPLANTED: Primary | ICD-10-CM

## 2024-05-24 DIAGNOSIS — Z94.0 KIDNEY TRANSPLANTED: ICD-10-CM

## 2024-05-24 LAB
ALBUMIN MFR UR ELPH: 22 MG/DL
ANION GAP SERPL CALCULATED.3IONS-SCNC: 10 MMOL/L (ref 7–15)
BK VIRUS SPECIMEN TYPE: NORMAL
BKV DNA # SPEC NAA+PROBE: NOT DETECTED IU/ML
BUN SERPL-MCNC: 15.3 MG/DL (ref 6–20)
CALCIUM SERPL-MCNC: 9.7 MG/DL (ref 8.6–10)
CHLORIDE SERPL-SCNC: 105 MMOL/L (ref 98–107)
CREAT SERPL-MCNC: 1.41 MG/DL (ref 0.67–1.17)
CREAT UR-MCNC: 247 MG/DL
DEPRECATED HCO3 PLAS-SCNC: 25 MMOL/L (ref 22–29)
EGFRCR SERPLBLD CKD-EPI 2021: 63 ML/MIN/1.73M2
ERYTHROCYTE [DISTWIDTH] IN BLOOD BY AUTOMATED COUNT: 13 % (ref 10–15)
GLUCOSE SERPL-MCNC: 104 MG/DL (ref 70–99)
HCT VFR BLD AUTO: 39.8 % (ref 40–53)
HGB BLD-MCNC: 12.8 G/DL (ref 13.3–17.7)
MCH RBC QN AUTO: 26.3 PG (ref 26.5–33)
MCHC RBC AUTO-ENTMCNC: 32.2 G/DL (ref 31.5–36.5)
MCV RBC AUTO: 82 FL (ref 78–100)
PLATELET # BLD AUTO: 304 10E3/UL (ref 150–450)
POTASSIUM SERPL-SCNC: 3.7 MMOL/L (ref 3.4–5.3)
PROT/CREAT 24H UR: 0.09 MG/MG CR (ref 0–0.2)
RBC # BLD AUTO: 4.87 10E6/UL (ref 4.4–5.9)
SODIUM SERPL-SCNC: 140 MMOL/L (ref 135–145)
TACROLIMUS BLD-MCNC: 5.3 UG/L (ref 5–15)
TME LAST DOSE: NORMAL H
TME LAST DOSE: NORMAL H
WBC # BLD AUTO: 3.6 10E3/UL (ref 4–11)

## 2024-05-24 PROCEDURE — 80048 BASIC METABOLIC PNL TOTAL CA: CPT

## 2024-05-24 PROCEDURE — 84156 ASSAY OF PROTEIN URINE: CPT

## 2024-05-24 PROCEDURE — 85027 COMPLETE CBC AUTOMATED: CPT

## 2024-05-24 PROCEDURE — 80197 ASSAY OF TACROLIMUS: CPT

## 2024-05-24 PROCEDURE — 36415 COLL VENOUS BLD VENIPUNCTURE: CPT

## 2024-05-24 PROCEDURE — 87799 DETECT AGENT NOS DNA QUANT: CPT

## 2024-05-24 NOTE — TELEPHONE ENCOUNTER
Patient Call: General  Route to LPN    Reason for call: Magaly from [facility]  called in regard of patient needing standing orders for darion 3 months, 6 months, and yearly labs. Clinic does not have any on file. More details with call back.     Call back needed? Yes    Return Call Needed  Same as documented in contacts section  When to return call?: Same day: Route High Priority

## 2024-06-13 ENCOUNTER — TELEPHONE (OUTPATIENT)
Dept: TRANSPLANT | Facility: CLINIC | Age: 44
End: 2024-06-13
Payer: COMMERCIAL

## 2024-06-13 NOTE — TELEPHONE ENCOUNTER
Called back Harshad Beatty   Left message re: another Provider (PCP) should be able to cover the NP that left and send his carvedilol prescription.

## 2024-06-13 NOTE — TELEPHONE ENCOUNTER
Pt completely out of Carvedilol 25 mg  His NP moved to new location and they told him to check with us to see if we can order emergent refill per pt Call pt when filled

## 2024-07-31 ENCOUNTER — LAB (OUTPATIENT)
Dept: LAB | Facility: CLINIC | Age: 44
End: 2024-07-31
Payer: COMMERCIAL

## 2024-07-31 DIAGNOSIS — Z48.298 AFTERCARE FOLLOWING ORGAN TRANSPLANT: ICD-10-CM

## 2024-07-31 DIAGNOSIS — Z94.0 KIDNEY TRANSPLANTED: ICD-10-CM

## 2024-07-31 DIAGNOSIS — Z79.899 IMMUNOSUPPRESSIVE MANAGEMENT ENCOUNTER FOLLOWING KIDNEY TRANSPLANT: ICD-10-CM

## 2024-07-31 DIAGNOSIS — Z94.0 IMMUNOSUPPRESSIVE MANAGEMENT ENCOUNTER FOLLOWING KIDNEY TRANSPLANT: ICD-10-CM

## 2024-07-31 LAB
ANION GAP SERPL CALCULATED.3IONS-SCNC: 12 MMOL/L (ref 7–15)
BUN SERPL-MCNC: 21.7 MG/DL (ref 6–20)
CALCIUM SERPL-MCNC: 9.7 MG/DL (ref 8.8–10.4)
CHLORIDE SERPL-SCNC: 108 MMOL/L (ref 98–107)
CREAT SERPL-MCNC: 1.71 MG/DL (ref 0.67–1.17)
EGFRCR SERPLBLD CKD-EPI 2021: 50 ML/MIN/1.73M2
ERYTHROCYTE [DISTWIDTH] IN BLOOD BY AUTOMATED COUNT: 12.7 % (ref 10–15)
GLUCOSE SERPL-MCNC: 108 MG/DL (ref 70–99)
HCO3 SERPL-SCNC: 22 MMOL/L (ref 22–29)
HCT VFR BLD AUTO: 37.7 % (ref 40–53)
HGB BLD-MCNC: 12.4 G/DL (ref 13.3–17.7)
MCH RBC QN AUTO: 26.6 PG (ref 26.5–33)
MCHC RBC AUTO-ENTMCNC: 32.9 G/DL (ref 31.5–36.5)
MCV RBC AUTO: 81 FL (ref 78–100)
PLATELET # BLD AUTO: 294 10E3/UL (ref 150–450)
POTASSIUM SERPL-SCNC: 4.4 MMOL/L (ref 3.4–5.3)
RBC # BLD AUTO: 4.66 10E6/UL (ref 4.4–5.9)
SODIUM SERPL-SCNC: 142 MMOL/L (ref 135–145)
TACROLIMUS BLD-MCNC: 5.6 UG/L (ref 5–15)
TME LAST DOSE: NORMAL H
TME LAST DOSE: NORMAL H
WBC # BLD AUTO: 3.5 10E3/UL (ref 4–11)

## 2024-07-31 PROCEDURE — 80048 BASIC METABOLIC PNL TOTAL CA: CPT

## 2024-07-31 PROCEDURE — 36415 COLL VENOUS BLD VENIPUNCTURE: CPT

## 2024-07-31 PROCEDURE — 80197 ASSAY OF TACROLIMUS: CPT

## 2024-07-31 PROCEDURE — 85027 COMPLETE CBC AUTOMATED: CPT

## 2024-08-13 ENCOUNTER — TELEPHONE (OUTPATIENT)
Dept: TRANSPLANT | Facility: CLINIC | Age: 44
End: 2024-08-13
Payer: COMMERCIAL

## 2024-08-13 DIAGNOSIS — Z79.899 IMMUNOSUPPRESSIVE MANAGEMENT ENCOUNTER FOLLOWING KIDNEY TRANSPLANT: ICD-10-CM

## 2024-08-13 DIAGNOSIS — I15.1 HTN, KIDNEY TRANSPLANT RELATED: ICD-10-CM

## 2024-08-13 DIAGNOSIS — Z94.0 HTN, KIDNEY TRANSPLANT RELATED: ICD-10-CM

## 2024-08-13 DIAGNOSIS — Z94.0 KIDNEY TRANSPLANTED: Primary | ICD-10-CM

## 2024-08-13 DIAGNOSIS — Z94.0 IMMUNOSUPPRESSIVE MANAGEMENT ENCOUNTER FOLLOWING KIDNEY TRANSPLANT: ICD-10-CM

## 2024-08-13 DIAGNOSIS — Z48.298 AFTERCARE FOLLOWING ORGAN TRANSPLANT: ICD-10-CM

## 2024-08-13 RX ORDER — LOSARTAN POTASSIUM 50 MG/1
50 TABLET ORAL DAILY
Qty: 90 TABLET | Refills: 3 | Status: SHIPPED | OUTPATIENT
Start: 2024-08-13 | End: 2024-09-16

## 2024-08-28 ENCOUNTER — TELEPHONE (OUTPATIENT)
Dept: TRANSPLANT | Facility: CLINIC | Age: 44
End: 2024-08-28
Payer: COMMERCIAL

## 2024-08-28 NOTE — TELEPHONE ENCOUNTER
[facility] Call Center    Phone Message    May a detailed message be left on voicemail: yes     Reason for Call: Medication Refill Request    Has the patient contacted the pharmacy for the refill? Yes   Name of medication being requested: mycophenolate (GENERIC EQUIVALENT) 250 MG capsule  Provider who prescribed the medication: Dr. Bahena  Pharmacy: [id number] [place], [place] - [address]  Date medication is needed: ASAP     Action Taken: Message routed to:  [facility] MACKENZIE SOCHRISTO    Travel Screening: Not Applicable     Date of Service:

## 2024-08-29 NOTE — TELEPHONE ENCOUNTER
Called back and left VM: his refills are good til May next year.  To call [facility] if insurance does not cover 90 days.

## 2024-08-30 DIAGNOSIS — Z94.0 KIDNEY REPLACED BY TRANSPLANT: ICD-10-CM

## 2024-08-30 RX ORDER — TACROLIMUS 1 MG/1
1 CAPSULE ORAL 2 TIMES DAILY
Qty: 180 CAPSULE | Refills: 3 | Status: SHIPPED | OUTPATIENT
Start: 2024-08-30

## 2024-09-16 ENCOUNTER — MYC REFILL (OUTPATIENT)
Dept: TRANSPLANT | Facility: CLINIC | Age: 44
End: 2024-09-16
Payer: COMMERCIAL

## 2024-09-16 ENCOUNTER — MYC REFILL (OUTPATIENT)
Dept: NEPHROLOGY | Facility: CLINIC | Age: 44
End: 2024-09-16
Payer: COMMERCIAL

## 2024-09-16 DIAGNOSIS — Z94.0 KIDNEY REPLACED BY TRANSPLANT: ICD-10-CM

## 2024-09-16 DIAGNOSIS — I15.0 RENOVASCULAR HYPERTENSION: ICD-10-CM

## 2024-09-16 DIAGNOSIS — Z94.0 HTN, KIDNEY TRANSPLANT RELATED: ICD-10-CM

## 2024-09-16 DIAGNOSIS — I15.1 HTN, KIDNEY TRANSPLANT RELATED: ICD-10-CM

## 2024-09-16 RX ORDER — ATORVASTATIN CALCIUM 10 MG/1
10 TABLET, FILM COATED ORAL DAILY
Qty: 30 TABLET | Refills: 11 | Status: SHIPPED | OUTPATIENT
Start: 2024-09-16

## 2024-09-16 RX ORDER — LOSARTAN POTASSIUM 50 MG/1
50 TABLET ORAL DAILY
Qty: 90 TABLET | Refills: 3 | Status: SHIPPED | OUTPATIENT
Start: 2024-09-16

## 2024-09-16 RX ORDER — CARVEDILOL 25 MG/1
50 TABLET ORAL 2 TIMES DAILY WITH MEALS
Qty: 120 TABLET | Refills: 11 | Status: SHIPPED | OUTPATIENT
Start: 2024-09-16

## 2024-09-16 RX ORDER — MYCOPHENOLATE MOFETIL 250 MG/1
1000 CAPSULE ORAL 2 TIMES DAILY
Qty: 720 CAPSULE | Refills: 3 | Status: SHIPPED | OUTPATIENT
Start: 2024-09-16

## 2024-09-16 RX ORDER — TACROLIMUS 0.5 MG/1
0.5 CAPSULE ORAL 2 TIMES DAILY
Qty: 180 CAPSULE | Refills: 3 | Status: SHIPPED | OUTPATIENT
Start: 2024-09-16

## 2024-09-21 ENCOUNTER — HEALTH MAINTENANCE LETTER (OUTPATIENT)
Age: 44
End: 2024-09-21

## 2025-03-18 ENCOUNTER — MYC REFILL (OUTPATIENT)
Dept: TRANSPLANT | Facility: CLINIC | Age: 45
End: 2025-03-18
Payer: COMMERCIAL

## 2025-03-18 DIAGNOSIS — Z48.298 AFTERCARE FOLLOWING ORGAN TRANSPLANT: Primary | ICD-10-CM

## 2025-03-18 DIAGNOSIS — Z94.0 HTN, KIDNEY TRANSPLANT RELATED: ICD-10-CM

## 2025-03-18 DIAGNOSIS — I15.0 RENOVASCULAR HYPERTENSION: ICD-10-CM

## 2025-03-18 DIAGNOSIS — I15.1 HTN, KIDNEY TRANSPLANT RELATED: ICD-10-CM

## 2025-03-18 DIAGNOSIS — Z94.0 KIDNEY REPLACED BY TRANSPLANT: ICD-10-CM

## 2025-03-18 RX ORDER — TACROLIMUS 0.5 MG/1
0.5 CAPSULE ORAL 2 TIMES DAILY
Qty: 180 CAPSULE | Refills: 0 | Status: SHIPPED | OUTPATIENT
Start: 2025-03-18

## 2025-03-18 RX ORDER — ATORVASTATIN CALCIUM 10 MG/1
10 TABLET, FILM COATED ORAL DAILY
Qty: 30 TABLET | Refills: 0 | Status: SHIPPED | OUTPATIENT
Start: 2025-03-18

## 2025-03-18 RX ORDER — MYCOPHENOLATE MOFETIL 250 MG/1
1000 CAPSULE ORAL 2 TIMES DAILY
Qty: 720 CAPSULE | Refills: 0 | Status: SHIPPED | OUTPATIENT
Start: 2025-03-18

## 2025-03-18 RX ORDER — CARVEDILOL 25 MG/1
50 TABLET ORAL 2 TIMES DAILY WITH MEALS
Qty: 120 TABLET | Refills: 0 | Status: SHIPPED | OUTPATIENT
Start: 2025-03-18

## 2025-03-18 RX ORDER — TACROLIMUS 1 MG/1
1 CAPSULE ORAL 2 TIMES DAILY
Qty: 180 CAPSULE | Refills: 0 | Status: SHIPPED | OUTPATIENT
Start: 2025-03-18

## 2025-03-18 RX ORDER — LOSARTAN POTASSIUM 50 MG/1
50 TABLET ORAL DAILY
Qty: 90 TABLET | Refills: 0 | Status: SHIPPED | OUTPATIENT
Start: 2025-03-18

## 2025-03-20 ENCOUNTER — LAB (OUTPATIENT)
Dept: LAB | Facility: CLINIC | Age: 45
End: 2025-03-20
Payer: COMMERCIAL

## 2025-03-20 DIAGNOSIS — Z48.298 AFTERCARE FOLLOWING ORGAN TRANSPLANT: ICD-10-CM

## 2025-03-20 DIAGNOSIS — Z79.899 IMMUNOSUPPRESSIVE MANAGEMENT ENCOUNTER FOLLOWING KIDNEY TRANSPLANT: ICD-10-CM

## 2025-03-20 DIAGNOSIS — Z94.0 KIDNEY TRANSPLANTED: ICD-10-CM

## 2025-03-20 DIAGNOSIS — Z94.0 IMMUNOSUPPRESSIVE MANAGEMENT ENCOUNTER FOLLOWING KIDNEY TRANSPLANT: ICD-10-CM

## 2025-03-20 LAB
ANION GAP SERPL CALCULATED.3IONS-SCNC: 8 MMOL/L (ref 7–15)
BUN SERPL-MCNC: 14.1 MG/DL (ref 6–20)
CALCIUM SERPL-MCNC: 10 MG/DL (ref 8.8–10.4)
CHLORIDE SERPL-SCNC: 107 MMOL/L (ref 98–107)
CREAT SERPL-MCNC: 1.5 MG/DL (ref 0.67–1.17)
EGFRCR SERPLBLD CKD-EPI 2021: 58 ML/MIN/1.73M2
ERYTHROCYTE [DISTWIDTH] IN BLOOD BY AUTOMATED COUNT: 12.4 % (ref 10–15)
GLUCOSE SERPL-MCNC: 100 MG/DL (ref 70–99)
HCO3 SERPL-SCNC: 25 MMOL/L (ref 22–29)
HCT VFR BLD AUTO: 38.9 % (ref 40–53)
HGB BLD-MCNC: 12.5 G/DL (ref 13.3–17.7)
MCH RBC QN AUTO: 25.9 PG (ref 26.5–33)
MCHC RBC AUTO-ENTMCNC: 32.1 G/DL (ref 31.5–36.5)
MCV RBC AUTO: 81 FL (ref 78–100)
PLATELET # BLD AUTO: 259 10E3/UL (ref 150–450)
POTASSIUM SERPL-SCNC: 3.9 MMOL/L (ref 3.4–5.3)
RBC # BLD AUTO: 4.82 10E6/UL (ref 4.4–5.9)
SODIUM SERPL-SCNC: 140 MMOL/L (ref 135–145)
TACROLIMUS BLD-MCNC: 4.4 UG/L (ref 5–15)
TME LAST DOSE: ABNORMAL H
TME LAST DOSE: ABNORMAL H
WBC # BLD AUTO: 3.6 10E3/UL (ref 4–11)

## 2025-03-20 PROCEDURE — 85027 COMPLETE CBC AUTOMATED: CPT

## 2025-03-20 PROCEDURE — 36415 COLL VENOUS BLD VENIPUNCTURE: CPT

## 2025-03-20 PROCEDURE — 80048 BASIC METABOLIC PNL TOTAL CA: CPT

## 2025-03-20 PROCEDURE — 80197 ASSAY OF TACROLIMUS: CPT

## 2025-05-12 DIAGNOSIS — Z94.0 KIDNEY REPLACED BY TRANSPLANT: ICD-10-CM

## 2025-05-12 DIAGNOSIS — I15.0 RENOVASCULAR HYPERTENSION: ICD-10-CM

## 2025-05-12 RX ORDER — CARVEDILOL 25 MG/1
50 TABLET ORAL 2 TIMES DAILY WITH MEALS
Qty: 120 TABLET | Refills: 0 | OUTPATIENT
Start: 2025-05-12

## 2025-05-12 NOTE — TELEPHONE ENCOUNTER
Called [facility] and spoke with Floresita who will send a message to Roxann Bolton NP prescriber for carvedilol.  Left direct line. Refused carvedilol from [facility] ([place]

## 2025-06-09 DIAGNOSIS — Z94.0 KIDNEY REPLACED BY TRANSPLANT: ICD-10-CM

## 2025-06-09 RX ORDER — TACROLIMUS 0.5 MG/1
0.5 CAPSULE ORAL 2 TIMES DAILY
Qty: 180 CAPSULE | Refills: 0 | Status: SHIPPED | OUTPATIENT
Start: 2025-06-09

## (undated) DEVICE — Device

## (undated) DEVICE — COVER ULTRASOUND PROBE W/GEL FLEXI-FEEL 6"X58" LF  25-FF658

## (undated) DEVICE — PREP CHLORAPREP 26ML TINTED ORANGE  260815

## (undated) DEVICE — SU PDS II 0 TP-1 60" Z991G

## (undated) DEVICE — SU PROLENE 4-0 SHDA 36" 8521H

## (undated) DEVICE — GLOVE PROTEXIS MICRO 7.5  2D73PM75

## (undated) DEVICE — SU VICRYL 3-0 SH 27" J316H

## (undated) DEVICE — SU SILK 2-0 TIE 12X30" A305H

## (undated) DEVICE — ESU GROUND PAD ADULT W/CORD E7507

## (undated) DEVICE — SU SILK 4-0 TIE 12X30" A303H

## (undated) DEVICE — DRAIN JACKSON PRATT RESERVOIR 100ML SU130-1305

## (undated) DEVICE — LINEN TOWEL PACK X5 5464

## (undated) DEVICE — SU PDS II 6-0 RB-2DA 30" Z149H

## (undated) DEVICE — SU PROLENE 6-0 RB-2DA 30" 8711H

## (undated) DEVICE — SOL NACL 0.9% INJ 1000ML BAG 07983-09

## (undated) DEVICE — DECANTER BAG 2002S

## (undated) DEVICE — SU MONOCRYL 4-0 PS-2 27" UND Y426H

## (undated) DEVICE — TUBING IRRIG CYSTO/BLADDER SET 81" LF 2C4040

## (undated) DEVICE — BLADE KNIFE SURG 11 WITH HANDLE 4-411

## (undated) DEVICE — SU PDS II 4-0 SH 27" Z315H

## (undated) DEVICE — SUCTION MANIFOLD DORNOCH ULTRA CART UL-CL500

## (undated) DEVICE — CLIP HORIZON MED BLUE 002200

## (undated) DEVICE — CATH PLUG W/CAP 000076

## (undated) DEVICE — SU PDS II 0 CT-1 27" Z340H

## (undated) DEVICE — CLIP HORIZON SM RED WIDE SLOT 001201

## (undated) DEVICE — DRAPE SHEET MED 44X70" 9355

## (undated) DEVICE — DRSG GAUZE 4X4" TRAY 6939

## (undated) DEVICE — SU SILK 3-0 SH CR 8X18" C013D

## (undated) DEVICE — LINEN TOWEL PACK X30 5481

## (undated) DEVICE — PREP CHLORAPREP W/ORANGE TINT 10.5ML 260715

## (undated) DEVICE — CLIP HORIZON LG ORANGE 004200

## (undated) DEVICE — SOL WATER IRRIG 1000ML BOTTLE 2F7114

## (undated) DEVICE — SPONGE LAP 18X18" X8435

## (undated) DEVICE — DRAPE SLUSH/WARMER 66X44" ORS-320

## (undated) DEVICE — SU SILK 1 TIE 6X30" A307H

## (undated) DEVICE — SU PROLENE 6-0 RB-2DA 18" 8714H

## (undated) DEVICE — CATH TRAY FOLEY SURESTEP 16FR WDRAIN BAG STLK LATEX A300316A

## (undated) DEVICE — DRSG PRIMAPORE 02X3" 7133

## (undated) DEVICE — SU SILK 3-0 TIE 12X30" A304H

## (undated) DEVICE — SU MONOCRYL 4-0 PS-2 18" UND Y496G

## (undated) DEVICE — SU PROLENE 7-0 BV-1DA 18" 8701H

## (undated) DEVICE — PUNCH AORTIC 4MM SINGLE USE 1001-602

## (undated) DEVICE — SU VICRYL 3-0 SH 27" UND J416H

## (undated) DEVICE — SU DERMABOND ADVANCED .7ML DNX12

## (undated) DEVICE — BLADE CLIPPER SGL USE 9680

## (undated) DEVICE — BASIN SET SINGLE STERILE 13752-624

## (undated) DEVICE — LINEN GOWN XLG 5407

## (undated) DEVICE — WIPES FOLEY CARE SURESTEP PROVON DFC100

## (undated) DEVICE — SU VICRYL 3-0 FS-1 27" J442H

## (undated) DEVICE — SYR BULB IRRIG 50ML LATEX FREE 0035280

## (undated) DEVICE — DRSG KERLIX 4 1/2"X4YDS ROLL 6715

## (undated) DEVICE — SU SILK 0 TIE 6X30" A306H

## (undated) DEVICE — CATH FOLEY 3WAY 16FR 30ML SIL 73016SI

## (undated) DEVICE — DRAPE EXTREMITY UPPER 120X76" 29414

## (undated) DEVICE — PACK AV FISTULA

## (undated) DEVICE — SU PROLENE 1 CTX 30" 8455H

## (undated) DEVICE — DEVICE CATH STABILIZATION STATLOCK FOLEY 3-WAY FOL0105

## (undated) DEVICE — LINEN TOWEL PACK X6 WHITE 5487

## (undated) DEVICE — PITCHER STERILE 1000ML  SSK9004A

## (undated) DEVICE — SOL NACL 0.9% IRRIG 1000ML BOTTLE 2F7124

## (undated) DEVICE — DRAIN JACKSON PRATT ROUND SIL 19FR W/TROCAR LF JP-2232

## (undated) DEVICE — SU ETHILON 3-0 PS-1 18" 1663H

## (undated) DEVICE — PAD CHUX UNDERPAD 23X24" 7136

## (undated) DEVICE — SU SILK 2-0 SH 30" K833H

## (undated) DEVICE — DRSG ABDOMINAL 07 1/2X8" 7197D

## (undated) DEVICE — GEL ULTRASOUND AQUASONIC 20GM 01-01

## (undated) DEVICE — DRAPE STOCKINETTE 4" 8544

## (undated) DEVICE — INSERT FOGARTY 33MM TRACTION HYDRAJAW HYDRA33

## (undated) DEVICE — ADH SKIN CLOSURE PREMIERPRO EXOFIN 1.0ML 3470

## (undated) DEVICE — BNDG ABDOMINAL BINDER 9X62-84" 79-89210

## (undated) DEVICE — BNDG ELASTIC 4"X5YDS STERILE 6611-4S

## (undated) RX ORDER — FENTANYL CITRATE-0.9 % NACL/PF 10 MCG/ML
PLASTIC BAG, INJECTION (ML) INTRAVENOUS
Status: DISPENSED
Start: 2020-07-24

## (undated) RX ORDER — PROPOFOL 10 MG/ML
INJECTION, EMULSION INTRAVENOUS
Status: DISPENSED
Start: 2020-07-24

## (undated) RX ORDER — DEXAMETHASONE SODIUM PHOSPHATE 4 MG/ML
INJECTION, SOLUTION INTRA-ARTICULAR; INTRALESIONAL; INTRAMUSCULAR; INTRAVENOUS; SOFT TISSUE
Status: DISPENSED
Start: 2020-07-24

## (undated) RX ORDER — FENTANYL CITRATE 50 UG/ML
INJECTION, SOLUTION INTRAMUSCULAR; INTRAVENOUS
Status: DISPENSED
Start: 2019-11-12

## (undated) RX ORDER — ONDANSETRON 2 MG/ML
INJECTION INTRAMUSCULAR; INTRAVENOUS
Status: DISPENSED
Start: 2020-07-24

## (undated) RX ORDER — LIDOCAINE HYDROCHLORIDE 10 MG/ML
INJECTION, SOLUTION EPIDURAL; INFILTRATION; INTRACAUDAL; PERINEURAL
Status: DISPENSED
Start: 2018-08-17

## (undated) RX ORDER — GLYCOPYRROLATE 0.2 MG/ML
INJECTION, SOLUTION INTRAMUSCULAR; INTRAVENOUS
Status: DISPENSED
Start: 2020-07-24

## (undated) RX ORDER — LIDOCAINE HYDROCHLORIDE 20 MG/ML
INJECTION, SOLUTION EPIDURAL; INFILTRATION; INTRACAUDAL; PERINEURAL
Status: DISPENSED
Start: 2020-07-24

## (undated) RX ORDER — HYDROMORPHONE HYDROCHLORIDE 1 MG/ML
INJECTION, SOLUTION INTRAMUSCULAR; INTRAVENOUS; SUBCUTANEOUS
Status: DISPENSED
Start: 2018-08-07

## (undated) RX ORDER — EPHEDRINE SULFATE 50 MG/ML
INJECTION, SOLUTION INTRAMUSCULAR; INTRAVENOUS; SUBCUTANEOUS
Status: DISPENSED
Start: 2020-07-24

## (undated) RX ORDER — DEXTROSE MONOHYDRATE, SODIUM CHLORIDE, AND POTASSIUM CHLORIDE 50; 1.49; 4.5 G/1000ML; G/1000ML; G/1000ML
INJECTION, SOLUTION INTRAVENOUS
Status: DISPENSED
Start: 2018-08-07

## (undated) RX ORDER — SODIUM CHLORIDE 9 MG/ML
INJECTION, SOLUTION INTRAVENOUS
Status: DISPENSED
Start: 2018-08-07

## (undated) RX ORDER — ONDANSETRON 2 MG/ML
INJECTION INTRAMUSCULAR; INTRAVENOUS
Status: DISPENSED
Start: 2019-11-12

## (undated) RX ORDER — OXYCODONE HYDROCHLORIDE 5 MG/1
TABLET ORAL
Status: DISPENSED
Start: 2020-07-24

## (undated) RX ORDER — PROPOFOL 10 MG/ML
INJECTION, EMULSION INTRAVENOUS
Status: DISPENSED
Start: 2019-11-12

## (undated) RX ORDER — PAPAVERINE HYDROCHLORIDE 30 MG/ML
INJECTION INTRAMUSCULAR; INTRAVENOUS
Status: DISPENSED
Start: 2018-08-07

## (undated) RX ORDER — EPHEDRINE SULFATE 50 MG/ML
INJECTION, SOLUTION INTRAMUSCULAR; INTRAVENOUS; SUBCUTANEOUS
Status: DISPENSED
Start: 2018-08-07

## (undated) RX ORDER — GLYCOPYRROLATE 0.2 MG/ML
INJECTION, SOLUTION INTRAMUSCULAR; INTRAVENOUS
Status: DISPENSED
Start: 2019-11-12

## (undated) RX ORDER — HEPARIN SODIUM 1000 [USP'U]/ML
INJECTION, SOLUTION INTRAVENOUS; SUBCUTANEOUS
Status: DISPENSED
Start: 2018-08-07

## (undated) RX ORDER — FUROSEMIDE 10 MG/ML
INJECTION INTRAMUSCULAR; INTRAVENOUS
Status: DISPENSED
Start: 2018-08-07

## (undated) RX ORDER — PIPERACILLIN SODIUM, TAZOBACTAM SODIUM 3; .375 G/15ML; G/15ML
INJECTION, POWDER, LYOPHILIZED, FOR SOLUTION INTRAVENOUS
Status: DISPENSED
Start: 2020-07-24

## (undated) RX ORDER — ACETAMINOPHEN 325 MG/1
TABLET ORAL
Status: DISPENSED
Start: 2020-07-24

## (undated) RX ORDER — ESMOLOL HYDROCHLORIDE 10 MG/ML
INJECTION INTRAVENOUS
Status: DISPENSED
Start: 2018-08-07

## (undated) RX ORDER — FENTANYL CITRATE 50 UG/ML
INJECTION, SOLUTION INTRAMUSCULAR; INTRAVENOUS
Status: DISPENSED
Start: 2018-08-07

## (undated) RX ORDER — FENTANYL CITRATE 50 UG/ML
INJECTION, SOLUTION INTRAMUSCULAR; INTRAVENOUS
Status: DISPENSED
Start: 2020-07-24

## (undated) RX ORDER — HYDROMORPHONE HYDROCHLORIDE 1 MG/ML
INJECTION, SOLUTION INTRAMUSCULAR; INTRAVENOUS; SUBCUTANEOUS
Status: DISPENSED
Start: 2020-07-24

## (undated) RX ORDER — ACETAMINOPHEN 325 MG/1
TABLET ORAL
Status: DISPENSED
Start: 2019-11-12

## (undated) RX ORDER — CEFUROXIME SODIUM 1.5 G/16ML
INJECTION, POWDER, FOR SOLUTION INTRAVENOUS
Status: DISPENSED
Start: 2018-08-07

## (undated) RX ORDER — NEOSTIGMINE METHYLSULFATE 1 MG/ML
VIAL (ML) INJECTION
Status: DISPENSED
Start: 2018-08-07

## (undated) RX ORDER — SODIUM CHLORIDE 9 MG/ML
INJECTION, SOLUTION INTRAVENOUS
Status: DISPENSED
Start: 2018-08-17

## (undated) RX ORDER — GABAPENTIN 300 MG/1
CAPSULE ORAL
Status: DISPENSED
Start: 2020-07-24

## (undated) RX ORDER — CEFAZOLIN SODIUM 2 G/100ML
INJECTION, SOLUTION INTRAVENOUS
Status: DISPENSED
Start: 2019-11-12

## (undated) RX ORDER — CEFAZOLIN SODIUM 1 G/3ML
INJECTION, POWDER, FOR SOLUTION INTRAMUSCULAR; INTRAVENOUS
Status: DISPENSED
Start: 2019-11-12

## (undated) RX ORDER — LIDOCAINE HYDROCHLORIDE 20 MG/ML
INJECTION, SOLUTION EPIDURAL; INFILTRATION; INTRACAUDAL; PERINEURAL
Status: DISPENSED
Start: 2019-11-12

## (undated) RX ORDER — GLYCOPYRROLATE 0.2 MG/ML
INJECTION, SOLUTION INTRAMUSCULAR; INTRAVENOUS
Status: DISPENSED
Start: 2018-08-07

## (undated) RX ORDER — PHENYLEPHRINE HCL IN 0.9% NACL 1 MG/10 ML
SYRINGE (ML) INTRAVENOUS
Status: DISPENSED
Start: 2018-08-07

## (undated) RX ORDER — PROPOFOL 10 MG/ML
INJECTION, EMULSION INTRAVENOUS
Status: DISPENSED
Start: 2018-08-07

## (undated) RX ORDER — OXYCODONE HCL 5 MG/5 ML
SOLUTION, ORAL ORAL
Status: DISPENSED
Start: 2019-11-12

## (undated) RX ORDER — HYDRALAZINE HYDROCHLORIDE 25 MG/1
TABLET, FILM COATED ORAL
Status: DISPENSED
Start: 2018-10-16